# Patient Record
Sex: MALE | Race: WHITE | Employment: OTHER | URBAN - METROPOLITAN AREA
[De-identification: names, ages, dates, MRNs, and addresses within clinical notes are randomized per-mention and may not be internally consistent; named-entity substitution may affect disease eponyms.]

---

## 2017-03-14 ENCOUNTER — HOSPITAL ENCOUNTER (OUTPATIENT)
Dept: CARDIOLOGY | Age: 77
Discharge: HOME | End: 2017-03-14
Payer: MEDICARE

## 2017-03-14 PROCEDURE — 93296 REM INTERROG EVL PM/IDS: CPT

## 2017-06-13 ENCOUNTER — HOSPITAL ENCOUNTER (OUTPATIENT)
Dept: CARDIOLOGY | Age: 77
Discharge: HOME | End: 2017-06-13
Payer: MEDICARE

## 2017-06-13 PROCEDURE — 93283 PRGRMG EVAL IMPLANTABLE DFB: CPT

## 2017-09-12 ENCOUNTER — HOSPITAL ENCOUNTER (OUTPATIENT)
Dept: CARDIOLOGY | Age: 77
Discharge: HOME | End: 2017-09-12
Payer: MEDICARE

## 2017-09-12 PROCEDURE — 93296 REM INTERROG EVL PM/IDS: CPT

## 2018-04-05 ENCOUNTER — HOSPITAL ENCOUNTER (OUTPATIENT)
Dept: CARDIOLOGY | Age: 78
Discharge: HOME | End: 2018-04-05
Payer: MEDICARE

## 2018-04-05 PROCEDURE — 93283 PRGRMG EVAL IMPLANTABLE DFB: CPT

## 2018-07-06 ENCOUNTER — HOSPITAL ENCOUNTER (OUTPATIENT)
Dept: CARDIOLOGY | Age: 78
Discharge: HOME | End: 2018-07-06
Payer: MEDICARE

## 2018-07-06 PROCEDURE — 93296 REM INTERROG EVL PM/IDS: CPT

## 2018-07-09 ENCOUNTER — HOSPITAL ENCOUNTER (OUTPATIENT)
Dept: CT IMAGING | Age: 78
Discharge: HOME | End: 2018-07-11
Payer: MEDICARE

## 2018-07-09 ENCOUNTER — HOSPITAL ENCOUNTER (OUTPATIENT)
Dept: GENERAL RADIOLOGY | Age: 78
Discharge: HOME | End: 2018-07-09
Attending: NEUROLOGICAL SURGERY | Admitting: NEUROLOGICAL SURGERY
Payer: MEDICARE

## 2018-07-09 VITALS
SYSTOLIC BLOOD PRESSURE: 139 MMHG | OXYGEN SATURATION: 91 % | TEMPERATURE: 97.7 F | RESPIRATION RATE: 18 BRPM | DIASTOLIC BLOOD PRESSURE: 86 MMHG | HEART RATE: 54 BPM

## 2018-07-09 DIAGNOSIS — M51.37 DEGENERATION OF LUMBAR OR LUMBOSACRAL INTERVERTEBRAL DISC: ICD-10-CM

## 2018-07-09 DIAGNOSIS — M48.062 SPINAL STENOSIS OF LUMBAR REGION WITH NEUROGENIC CLAUDICATION: ICD-10-CM

## 2018-07-09 DIAGNOSIS — M48.061 SPINAL STENOSIS, LUMBAR REGION, WITHOUT NEUROGENIC CLAUDICATION: ICD-10-CM

## 2018-07-09 DIAGNOSIS — M47.817 LUMBOSACRAL SPONDYLOSIS WITHOUT MYELOPATHY: ICD-10-CM

## 2018-07-09 PROCEDURE — 6370000000 HC RX 637 (ALT 250 FOR IP): Performed by: NEUROLOGICAL SURGERY

## 2018-07-09 PROCEDURE — 6360000004 HC RX CONTRAST MEDICATION: Performed by: NEUROLOGICAL SURGERY

## 2018-07-09 PROCEDURE — 72131 CT LUMBAR SPINE W/O DYE: CPT

## 2018-07-09 PROCEDURE — 72265 MYELOGRAPHY L-S SPINE: CPT

## 2018-07-09 RX ORDER — TRAMADOL HYDROCHLORIDE 50 MG/1
50 TABLET ORAL EVERY 6 HOURS PRN
Status: DISCONTINUED | OUTPATIENT
Start: 2018-07-09 | End: 2018-07-09 | Stop reason: HOSPADM

## 2018-07-09 RX ORDER — ACETAMINOPHEN 325 MG/1
650 TABLET ORAL EVERY 4 HOURS PRN
Status: DISCONTINUED | OUTPATIENT
Start: 2018-07-09 | End: 2018-07-09 | Stop reason: HOSPADM

## 2018-07-09 RX ADMIN — TRAMADOL HYDROCHLORIDE 50 MG: 50 TABLET, FILM COATED ORAL at 17:09

## 2018-07-09 RX ADMIN — IOPAMIDOL 20 ML: 408 INJECTION, SOLUTION INTRATHECAL at 14:25

## 2018-07-09 ASSESSMENT — PAIN SCALES - GENERAL: PAINLEVEL_OUTOF10: 4

## 2018-07-09 NOTE — FLOWSHEET NOTE
1500- pt arrived to unit, pt assessment complete, pt alert and oriented x 4, pain 5/10, will address pain, denies nausea and vomiting, oriented to room and call system, call light within reach, bed in lowest position, bed alarm engaged, will continue to monitor. 0- spoke with dr Iva Herrera regarding no orders, said to continue with everything at discharge at home, just rest and eat here. 1630- spoke with dr Iva Herrera regarding patients pain, no orders in, orders being placed per dr Iva Herrera. 8793- pt given discharge instructions, pt verbalizes understanding, awaiting wheelchair. 6894- pt discharged via wheelchair home.

## 2018-07-17 PROBLEM — M48.062 LUMBAR STENOSIS WITH NEUROGENIC CLAUDICATION: Status: ACTIVE | Noted: 2018-07-17

## 2018-08-10 ENCOUNTER — HOSPITAL ENCOUNTER (OUTPATIENT)
Dept: PREADMISSION TESTING | Age: 78
Discharge: HOME | End: 2018-08-14
Payer: MEDICARE

## 2018-08-10 VITALS
DIASTOLIC BLOOD PRESSURE: 68 MMHG | WEIGHT: 210.4 LBS | SYSTOLIC BLOOD PRESSURE: 120 MMHG | BODY MASS INDEX: 33.82 KG/M2 | RESPIRATION RATE: 16 BRPM | HEART RATE: 54 BPM | HEIGHT: 66 IN | OXYGEN SATURATION: 95 % | TEMPERATURE: 98.4 F

## 2018-08-10 PROBLEM — M48.061 LUMBAR STENOSIS: Status: ACTIVE | Noted: 2018-08-10

## 2018-08-10 LAB
ANION GAP SERPL CALCULATED.3IONS-SCNC: 17 MEQ/L (ref 7–13)
APTT: 25.7 SEC (ref 21.6–35.4)
BILIRUBIN URINE: NEGATIVE
BLOOD, URINE: NEGATIVE
BUN BLDV-MCNC: 29 MG/DL (ref 8–23)
CALCIUM SERPL-MCNC: 9.9 MG/DL (ref 8.6–10.2)
CHLORIDE BLD-SCNC: 99 MEQ/L (ref 98–107)
CLARITY: CLEAR
CO2: 26 MEQ/L (ref 22–29)
COLOR: YELLOW
CREAT SERPL-MCNC: 0.96 MG/DL (ref 0.7–1.2)
GFR AFRICAN AMERICAN: >60
GFR NON-AFRICAN AMERICAN: >60
GLUCOSE BLD-MCNC: 85 MG/DL (ref 74–109)
GLUCOSE URINE: NEGATIVE MG/DL
HCT VFR BLD CALC: 47.8 % (ref 42–52)
HEMOGLOBIN: 16 G/DL (ref 14–18)
INR BLD: 1.1
KETONES, URINE: NEGATIVE MG/DL
LEUKOCYTE ESTERASE, URINE: NEGATIVE
MCH RBC QN AUTO: 31.9 PG (ref 27–31.3)
MCHC RBC AUTO-ENTMCNC: 33.5 % (ref 33–37)
MCV RBC AUTO: 95.4 FL (ref 80–100)
NITRITE, URINE: NEGATIVE
PDW BLD-RTO: 14 % (ref 11.5–14.5)
PH UA: 5 (ref 5–9)
PLATELET # BLD: 165 K/UL (ref 130–400)
POTASSIUM SERPL-SCNC: 4.8 MEQ/L (ref 3.5–5.1)
PROTEIN UA: NEGATIVE MG/DL
PROTHROMBIN TIME: 11.5 SEC (ref 9.6–12.3)
RBC # BLD: 5.01 M/UL (ref 4.7–6.1)
SODIUM BLD-SCNC: 142 MEQ/L (ref 132–144)
SPECIFIC GRAVITY UA: 1.02 (ref 1–1.03)
URINE REFLEX TO CULTURE: NORMAL
UROBILINOGEN, URINE: 1 E.U./DL
WBC # BLD: 10.7 K/UL (ref 4.8–10.8)

## 2018-08-10 PROCEDURE — 85027 COMPLETE CBC AUTOMATED: CPT

## 2018-08-10 PROCEDURE — 85610 PROTHROMBIN TIME: CPT

## 2018-08-10 PROCEDURE — 86900 BLOOD TYPING SEROLOGIC ABO: CPT

## 2018-08-10 PROCEDURE — 86850 RBC ANTIBODY SCREEN: CPT

## 2018-08-10 PROCEDURE — 80048 BASIC METABOLIC PNL TOTAL CA: CPT

## 2018-08-10 PROCEDURE — 81003 URINALYSIS AUTO W/O SCOPE: CPT

## 2018-08-10 PROCEDURE — 85730 THROMBOPLASTIN TIME PARTIAL: CPT

## 2018-08-10 PROCEDURE — 86901 BLOOD TYPING SEROLOGIC RH(D): CPT

## 2018-08-10 RX ORDER — LIDOCAINE HYDROCHLORIDE 10 MG/ML
1 INJECTION, SOLUTION EPIDURAL; INFILTRATION; INTRACAUDAL; PERINEURAL
Status: CANCELLED | OUTPATIENT
Start: 2018-08-10 | End: 2018-08-10

## 2018-08-10 RX ORDER — SODIUM CHLORIDE 0.9 % (FLUSH) 0.9 %
10 SYRINGE (ML) INJECTION PRN
Status: CANCELLED | OUTPATIENT
Start: 2018-08-10

## 2018-08-10 RX ORDER — SODIUM CHLORIDE, SODIUM LACTATE, POTASSIUM CHLORIDE, CALCIUM CHLORIDE 600; 310; 30; 20 MG/100ML; MG/100ML; MG/100ML; MG/100ML
INJECTION, SOLUTION INTRAVENOUS CONTINUOUS
Status: CANCELLED | OUTPATIENT
Start: 2018-08-10

## 2018-08-10 RX ORDER — SODIUM CHLORIDE, SODIUM LACTATE, POTASSIUM CHLORIDE, CALCIUM CHLORIDE 600; 310; 30; 20 MG/100ML; MG/100ML; MG/100ML; MG/100ML
INJECTION, SOLUTION INTRAVENOUS ONCE
Status: CANCELLED | OUTPATIENT
Start: 2018-08-15

## 2018-08-10 RX ORDER — ACETAMINOPHEN 500 MG
1500 TABLET ORAL 3 TIMES DAILY
Status: ON HOLD | COMMUNITY
End: 2021-12-18 | Stop reason: HOSPADM

## 2018-08-10 RX ORDER — SODIUM CHLORIDE 0.9 % (FLUSH) 0.9 %
10 SYRINGE (ML) INJECTION EVERY 12 HOURS SCHEDULED
Status: CANCELLED | OUTPATIENT
Start: 2018-08-10

## 2018-08-10 ASSESSMENT — ENCOUNTER SYMPTOMS
WHEEZING: 0
VOMITING: 0
BLURRED VISION: 0
DOUBLE VISION: 0
BACK PAIN: 1
SPUTUM PRODUCTION: 0
SORE THROAT: 0
SINUS PAIN: 0
CONSTIPATION: 0
DIARRHEA: 0
COUGH: 0
ABDOMINAL PAIN: 0
EYE DISCHARGE: 0
HEARTBURN: 0
SHORTNESS OF BREATH: 0
PHOTOPHOBIA: 0
NAUSEA: 0

## 2018-08-10 NOTE — H&P
Nurse Practitioner History and Physical      CHIEF COMPLAINT:  Chronic left hip and back pain    HISTORY OF PRESENT ILLNESS:      The patient is a 77 y.o. male with significant past medical history of degeneration of lumbar, lumbosacral spondylosis without myelopathy, and spinal stenosis in lumbar region. Presents for decompressive lumbar laminectomy lumbar 2-3, 4-5 left sided approach.      Past Medical History:        Diagnosis Date    AAA (abdominal aortic aneurysm) ([address]     monitoring 4.5cm    Arthritis     lower spine    Atrial fibrillation ([address]     CAD (coronary artery disease)     stents x 2 cardiac     Chronic back pain     Heart attack ([address] 1984    Hyperlipidemia     on meds >20yrs    Hypertension     on meds > 20yrs    Knee injury     AS A CHILD-RIGHT     Past Surgical History:    Past Surgical History:   Procedure Laterality Date    CARDIAC DEFIBRILLATOR PLACEMENT  2008    CARDIAC DEFIBRILLATOR PLACEMENT      CARDIAC DEFIBRILLATOR PLACEMENT      COLONOSCOPY  2012    CORONARY ANGIOPLASTY WITH STENT PLACEMENT      5 stents    EYE SURGERY  2013    bilateral cataracts    JOINT REPLACEMENT  2013    RIGHT HIP    JOINT REPLACEMENT Right 2012    hip    TONSILLECTOMY  1945         Medications Prior to Admission:    Current Outpatient Prescriptions   Medication Sig Dispense Refill    Omega-3 Fatty Acids (FISH OIL PO) Take 2 capsules by mouth 2 times daily      GLUCOSAMINE-CHONDROITIN PO Take 1 tablet by mouth 2 times daily      TURMERIC PO Take 1 capsule by mouth 2 times daily      acetaminophen (TYLENOL) 500 MG tablet Take 1,500 mg by mouth 3 times daily      aspirin (ECOTRIN LOW STRENGTH) 81 MG EC tablet Take 81 mg by mouth      sotalol (BETAPACE) 80 MG tablet Take 0.5 tablets by mouth 2 times daily 60 tablet 3    carvedilol (COREG) 6.25 MG tablet Take 1 tablet by mouth 2 times daily (with meals) 60 tablet 3    atorvastatin (LIPITOR) 40 MG tablet daily       hydrochlorothiazide

## 2018-08-11 LAB
ABO/RH: NORMAL
ANTIBODY SCREEN: NORMAL

## 2018-08-15 ENCOUNTER — HOSPITAL ENCOUNTER (OUTPATIENT)
Dept: GENERAL RADIOLOGY | Age: 78
Setting detail: OUTPATIENT SURGERY
Discharge: HOME | End: 2018-08-17
Attending: NEUROLOGICAL SURGERY
Payer: MEDICARE

## 2018-08-15 ENCOUNTER — HOSPITAL ENCOUNTER (OUTPATIENT)
Age: 78
Discharge: HOME | End: 2018-08-16
Attending: NEUROLOGICAL SURGERY | Admitting: NEUROLOGICAL SURGERY
Payer: MEDICARE

## 2018-08-15 ENCOUNTER — ANESTHESIA (OUTPATIENT)
Dept: OPERATING ROOM | Age: 78
End: 2018-08-15
Payer: MEDICARE

## 2018-08-15 ENCOUNTER — ANESTHESIA EVENT (OUTPATIENT)
Dept: OPERATING ROOM | Age: 78
End: 2018-08-15
Payer: MEDICARE

## 2018-08-15 VITALS — DIASTOLIC BLOOD PRESSURE: 76 MMHG | SYSTOLIC BLOOD PRESSURE: 136 MMHG | TEMPERATURE: 97.9 F | OXYGEN SATURATION: 95 %

## 2018-08-15 DIAGNOSIS — M48.062 SPINAL STENOSIS OF LUMBAR REGION WITH NEUROGENIC CLAUDICATION: ICD-10-CM

## 2018-08-15 DIAGNOSIS — M51.37 DEGENERATION OF LUMBAR OR LUMBOSACRAL INTERVERTEBRAL DISC: Primary | ICD-10-CM

## 2018-08-15 DIAGNOSIS — R52 PAIN: ICD-10-CM

## 2018-08-15 PROCEDURE — 2580000003 HC RX 258: Performed by: NEUROLOGICAL SURGERY

## 2018-08-15 PROCEDURE — 72020 X-RAY EXAM OF SPINE 1 VIEW: CPT

## 2018-08-15 PROCEDURE — 6360000002 HC RX W HCPCS: Performed by: NEUROLOGICAL SURGERY

## 2018-08-15 PROCEDURE — 2720000010 HC SURG SUPPLY STERILE: Performed by: NEUROLOGICAL SURGERY

## 2018-08-15 PROCEDURE — 7100000001 HC PACU RECOVERY - ADDTL 15 MIN: Performed by: NEUROLOGICAL SURGERY

## 2018-08-15 PROCEDURE — 2580000003 HC RX 258: Performed by: NURSE PRACTITIONER

## 2018-08-15 PROCEDURE — 3600000014 HC SURGERY LEVEL 4 ADDTL 15MIN: Performed by: NEUROLOGICAL SURGERY

## 2018-08-15 PROCEDURE — 6370000000 HC RX 637 (ALT 250 FOR IP): Performed by: NEUROLOGICAL SURGERY

## 2018-08-15 PROCEDURE — 2500000003 HC RX 250 WO HCPCS: Performed by: NEUROLOGICAL SURGERY

## 2018-08-15 PROCEDURE — 6360000002 HC RX W HCPCS: Performed by: ANESTHESIOLOGY

## 2018-08-15 PROCEDURE — 7100000000 HC PACU RECOVERY - FIRST 15 MIN: Performed by: NEUROLOGICAL SURGERY

## 2018-08-15 PROCEDURE — 2700000000 HC OXYGEN THERAPY PER DAY

## 2018-08-15 PROCEDURE — 6360000002 HC RX W HCPCS: Performed by: NURSE PRACTITIONER

## 2018-08-15 PROCEDURE — 6360000002 HC RX W HCPCS: Performed by: NURSE ANESTHETIST, CERTIFIED REGISTERED

## 2018-08-15 PROCEDURE — 3700000001 HC ADD 15 MINUTES (ANESTHESIA): Performed by: NEUROLOGICAL SURGERY

## 2018-08-15 PROCEDURE — A4648 IMPLANTABLE TISSUE MARKER: HCPCS | Performed by: NEUROLOGICAL SURGERY

## 2018-08-15 PROCEDURE — 3600000004 HC SURGERY LEVEL 4 BASE: Performed by: NEUROLOGICAL SURGERY

## 2018-08-15 PROCEDURE — 2500000003 HC RX 250 WO HCPCS: Performed by: ANESTHESIOLOGY

## 2018-08-15 PROCEDURE — 2709999900 HC NON-CHARGEABLE SUPPLY: Performed by: NEUROLOGICAL SURGERY

## 2018-08-15 PROCEDURE — 3700000000 HC ANESTHESIA ATTENDED CARE: Performed by: NEUROLOGICAL SURGERY

## 2018-08-15 PROCEDURE — 2500000003 HC RX 250 WO HCPCS: Performed by: NURSE ANESTHETIST, CERTIFIED REGISTERED

## 2018-08-15 RX ORDER — SODIUM CHLORIDE, SODIUM LACTATE, POTASSIUM CHLORIDE, CALCIUM CHLORIDE 600; 310; 30; 20 MG/100ML; MG/100ML; MG/100ML; MG/100ML
INJECTION, SOLUTION INTRAVENOUS ONCE
Status: COMPLETED | OUTPATIENT
Start: 2018-08-15 | End: 2018-08-15

## 2018-08-15 RX ORDER — KETOROLAC TROMETHAMINE 15 MG/ML
15 INJECTION, SOLUTION INTRAMUSCULAR; INTRAVENOUS EVERY 6 HOURS
Status: DISCONTINUED | OUTPATIENT
Start: 2018-08-15 | End: 2018-08-16 | Stop reason: HOSPADM

## 2018-08-15 RX ORDER — LISINOPRIL 20 MG/1
40 TABLET ORAL DAILY
Status: DISCONTINUED | OUTPATIENT
Start: 2018-08-15 | End: 2018-08-16 | Stop reason: HOSPADM

## 2018-08-15 RX ORDER — LIDOCAINE HYDROCHLORIDE 10 MG/ML
1 INJECTION, SOLUTION EPIDURAL; INFILTRATION; INTRACAUDAL; PERINEURAL
Status: DISCONTINUED | OUTPATIENT
Start: 2018-08-15 | End: 2018-08-15 | Stop reason: HOSPADM

## 2018-08-15 RX ORDER — METOCLOPRAMIDE HYDROCHLORIDE 5 MG/ML
10 INJECTION INTRAMUSCULAR; INTRAVENOUS
Status: DISCONTINUED | OUTPATIENT
Start: 2018-08-15 | End: 2018-08-15 | Stop reason: HOSPADM

## 2018-08-15 RX ORDER — MORPHINE SULFATE 1 MG/ML
INJECTION, SOLUTION EPIDURAL; INTRATHECAL; INTRAVENOUS PRN
Status: DISCONTINUED | OUTPATIENT
Start: 2018-08-15 | End: 2018-08-15 | Stop reason: HOSPADM

## 2018-08-15 RX ORDER — ASPIRIN 81 MG/1
81 TABLET ORAL ONCE
Status: COMPLETED | OUTPATIENT
Start: 2018-08-15 | End: 2018-08-15

## 2018-08-15 RX ORDER — HYDROCODONE BITARTRATE AND ACETAMINOPHEN 5; 325 MG/1; MG/1
2 TABLET ORAL PRN
Status: DISCONTINUED | OUTPATIENT
Start: 2018-08-15 | End: 2018-08-15 | Stop reason: HOSPADM

## 2018-08-15 RX ORDER — MAGNESIUM HYDROXIDE 1200 MG/15ML
LIQUID ORAL CONTINUOUS PRN
Status: COMPLETED | OUTPATIENT
Start: 2018-08-15 | End: 2018-08-15

## 2018-08-15 RX ORDER — DEXAMETHASONE SODIUM PHOSPHATE 4 MG/ML
INJECTION, SOLUTION INTRA-ARTICULAR; INTRALESIONAL; INTRAMUSCULAR; INTRAVENOUS; SOFT TISSUE PRN
Status: DISCONTINUED | OUTPATIENT
Start: 2018-08-15 | End: 2018-08-15 | Stop reason: SDUPTHER

## 2018-08-15 RX ORDER — MEPERIDINE HYDROCHLORIDE 25 MG/ML
12.5 INJECTION INTRAMUSCULAR; INTRAVENOUS; SUBCUTANEOUS EVERY 5 MIN PRN
Status: DISCONTINUED | OUTPATIENT
Start: 2018-08-15 | End: 2018-08-15 | Stop reason: HOSPADM

## 2018-08-15 RX ORDER — ACETAMINOPHEN 650 MG/1
650 SUPPOSITORY RECTAL EVERY 4 HOURS PRN
Status: DISCONTINUED | OUTPATIENT
Start: 2018-08-15 | End: 2018-08-16 | Stop reason: HOSPADM

## 2018-08-15 RX ORDER — SODIUM CHLORIDE 0.9 % (FLUSH) 0.9 %
10 SYRINGE (ML) INJECTION PRN
Status: DISCONTINUED | OUTPATIENT
Start: 2018-08-15 | End: 2018-08-15 | Stop reason: HOSPADM

## 2018-08-15 RX ORDER — SODIUM CHLORIDE 0.9 % (FLUSH) 0.9 %
10 SYRINGE (ML) INJECTION PRN
Status: DISCONTINUED | OUTPATIENT
Start: 2018-08-15 | End: 2018-08-16 | Stop reason: HOSPADM

## 2018-08-15 RX ORDER — HYDROCODONE BITARTRATE AND ACETAMINOPHEN 5; 325 MG/1; MG/1
2 TABLET ORAL EVERY 4 HOURS PRN
Status: DISCONTINUED | OUTPATIENT
Start: 2018-08-15 | End: 2018-08-16 | Stop reason: HOSPADM

## 2018-08-15 RX ORDER — ACETAMINOPHEN 325 MG/1
650 TABLET ORAL EVERY 4 HOURS PRN
Status: DISCONTINUED | OUTPATIENT
Start: 2018-08-15 | End: 2018-08-16 | Stop reason: HOSPADM

## 2018-08-15 RX ORDER — SODIUM CHLORIDE 0.9 % (FLUSH) 0.9 %
10 SYRINGE (ML) INJECTION EVERY 12 HOURS SCHEDULED
Status: DISCONTINUED | OUTPATIENT
Start: 2018-08-15 | End: 2018-08-16 | Stop reason: HOSPADM

## 2018-08-15 RX ORDER — DOCUSATE SODIUM 100 MG/1
100 CAPSULE, LIQUID FILLED ORAL 2 TIMES DAILY
Status: DISCONTINUED | OUTPATIENT
Start: 2018-08-15 | End: 2018-08-16 | Stop reason: HOSPADM

## 2018-08-15 RX ORDER — CYCLOBENZAPRINE HCL 10 MG
10 TABLET ORAL 3 TIMES DAILY PRN
Status: DISCONTINUED | OUTPATIENT
Start: 2018-08-15 | End: 2018-08-16 | Stop reason: HOSPADM

## 2018-08-15 RX ORDER — HYDROCODONE BITARTRATE AND ACETAMINOPHEN 5; 325 MG/1; MG/1
1 TABLET ORAL PRN
Status: DISCONTINUED | OUTPATIENT
Start: 2018-08-15 | End: 2018-08-15 | Stop reason: HOSPADM

## 2018-08-15 RX ORDER — ONDANSETRON 2 MG/ML
4 INJECTION INTRAMUSCULAR; INTRAVENOUS EVERY 6 HOURS PRN
Status: DISCONTINUED | OUTPATIENT
Start: 2018-08-15 | End: 2018-08-16 | Stop reason: HOSPADM

## 2018-08-15 RX ORDER — EPHEDRINE SULFATE/0.9% NACL/PF 50 MG/5 ML
SYRINGE (ML) INTRAVENOUS PRN
Status: DISCONTINUED | OUTPATIENT
Start: 2018-08-15 | End: 2018-08-15 | Stop reason: SDUPTHER

## 2018-08-15 RX ORDER — FENTANYL CITRATE 50 UG/ML
50 INJECTION, SOLUTION INTRAMUSCULAR; INTRAVENOUS EVERY 10 MIN PRN
Status: DISCONTINUED | OUTPATIENT
Start: 2018-08-15 | End: 2018-08-15 | Stop reason: HOSPADM

## 2018-08-15 RX ORDER — FENTANYL 25 UG/H
1 PATCH TRANSDERMAL
Status: DISCONTINUED | OUTPATIENT
Start: 2018-08-15 | End: 2018-08-16 | Stop reason: HOSPADM

## 2018-08-15 RX ORDER — CARVEDILOL 6.25 MG/1
6.25 TABLET ORAL 2 TIMES DAILY WITH MEALS
Status: DISCONTINUED | OUTPATIENT
Start: 2018-08-15 | End: 2018-08-16 | Stop reason: HOSPADM

## 2018-08-15 RX ORDER — HYDROCODONE BITARTRATE AND ACETAMINOPHEN 5; 325 MG/1; MG/1
1 TABLET ORAL EVERY 4 HOURS PRN
Status: DISCONTINUED | OUTPATIENT
Start: 2018-08-15 | End: 2018-08-16 | Stop reason: HOSPADM

## 2018-08-15 RX ORDER — FENTANYL CITRATE 50 UG/ML
INJECTION, SOLUTION INTRAMUSCULAR; INTRAVENOUS PRN
Status: DISCONTINUED | OUTPATIENT
Start: 2018-08-15 | End: 2018-08-15 | Stop reason: SDUPTHER

## 2018-08-15 RX ORDER — SOTALOL HYDROCHLORIDE 80 MG/1
40 TABLET ORAL 2 TIMES DAILY
Status: DISCONTINUED | OUTPATIENT
Start: 2018-08-15 | End: 2018-08-16 | Stop reason: HOSPADM

## 2018-08-15 RX ORDER — DIPHENHYDRAMINE HYDROCHLORIDE 50 MG/ML
12.5 INJECTION INTRAMUSCULAR; INTRAVENOUS
Status: DISCONTINUED | OUTPATIENT
Start: 2018-08-15 | End: 2018-08-15 | Stop reason: HOSPADM

## 2018-08-15 RX ORDER — METHYLPREDNISOLONE SODIUM SUCCINATE 125 MG/2ML
INJECTION, POWDER, LYOPHILIZED, FOR SOLUTION INTRAMUSCULAR; INTRAVENOUS PRN
Status: DISCONTINUED | OUTPATIENT
Start: 2018-08-15 | End: 2018-08-15 | Stop reason: HOSPADM

## 2018-08-15 RX ORDER — ACETAMINOPHEN 500 MG
1500 TABLET ORAL 3 TIMES DAILY
Status: DISCONTINUED | OUTPATIENT
Start: 2018-08-15 | End: 2018-08-16 | Stop reason: HOSPADM

## 2018-08-15 RX ORDER — ONDANSETRON 2 MG/ML
INJECTION INTRAMUSCULAR; INTRAVENOUS PRN
Status: DISCONTINUED | OUTPATIENT
Start: 2018-08-15 | End: 2018-08-15 | Stop reason: SDUPTHER

## 2018-08-15 RX ORDER — PROPOFOL 10 MG/ML
INJECTION, EMULSION INTRAVENOUS PRN
Status: DISCONTINUED | OUTPATIENT
Start: 2018-08-15 | End: 2018-08-15 | Stop reason: SDUPTHER

## 2018-08-15 RX ORDER — SODIUM CHLORIDE 450 MG/100ML
INJECTION, SOLUTION INTRAVENOUS CONTINUOUS
Status: DISCONTINUED | OUTPATIENT
Start: 2018-08-15 | End: 2018-08-16 | Stop reason: HOSPADM

## 2018-08-15 RX ORDER — SODIUM CHLORIDE 0.9 % (FLUSH) 0.9 %
10 SYRINGE (ML) INJECTION EVERY 12 HOURS SCHEDULED
Status: DISCONTINUED | OUTPATIENT
Start: 2018-08-15 | End: 2018-08-15 | Stop reason: HOSPADM

## 2018-08-15 RX ORDER — SODIUM CHLORIDE, SODIUM LACTATE, POTASSIUM CHLORIDE, CALCIUM CHLORIDE 600; 310; 30; 20 MG/100ML; MG/100ML; MG/100ML; MG/100ML
INJECTION, SOLUTION INTRAVENOUS CONTINUOUS
Status: DISCONTINUED | OUTPATIENT
Start: 2018-08-15 | End: 2018-08-15

## 2018-08-15 RX ORDER — ONDANSETRON 2 MG/ML
4 INJECTION INTRAMUSCULAR; INTRAVENOUS
Status: DISCONTINUED | OUTPATIENT
Start: 2018-08-15 | End: 2018-08-15 | Stop reason: HOSPADM

## 2018-08-15 RX ORDER — ATORVASTATIN CALCIUM 40 MG/1
40 TABLET, FILM COATED ORAL DAILY
Status: DISCONTINUED | OUTPATIENT
Start: 2018-08-15 | End: 2018-08-16 | Stop reason: HOSPADM

## 2018-08-15 RX ORDER — HYDROCODONE BITARTRATE AND ACETAMINOPHEN 5; 325 MG/1; MG/1
1 TABLET ORAL EVERY 6 HOURS PRN
Qty: 28 TABLET | Refills: 0 | Status: SHIPPED | OUTPATIENT
Start: 2018-08-15 | End: 2018-08-22

## 2018-08-15 RX ORDER — ROCURONIUM BROMIDE 10 MG/ML
INJECTION, SOLUTION INTRAVENOUS PRN
Status: DISCONTINUED | OUTPATIENT
Start: 2018-08-15 | End: 2018-08-15 | Stop reason: SDUPTHER

## 2018-08-15 RX ORDER — BACITRACIN 500 [USP'U]/G
OINTMENT OPHTHALMIC 3 TIMES DAILY PRN
Status: DISCONTINUED | OUTPATIENT
Start: 2018-08-15 | End: 2018-08-16 | Stop reason: HOSPADM

## 2018-08-15 RX ORDER — MORPHINE SULFATE 2 MG/ML
2 INJECTION, SOLUTION INTRAMUSCULAR; INTRAVENOUS
Status: DISCONTINUED | OUTPATIENT
Start: 2018-08-15 | End: 2018-08-16 | Stop reason: HOSPADM

## 2018-08-15 RX ORDER — LIDOCAINE HYDROCHLORIDE AND EPINEPHRINE 10; 10 MG/ML; UG/ML
INJECTION, SOLUTION INFILTRATION; PERINEURAL PRN
Status: DISCONTINUED | OUTPATIENT
Start: 2018-08-15 | End: 2018-08-15 | Stop reason: HOSPADM

## 2018-08-15 RX ADMIN — Medication 10 MG: at 12:45

## 2018-08-15 RX ADMIN — SODIUM CHLORIDE, POTASSIUM CHLORIDE, SODIUM LACTATE AND CALCIUM CHLORIDE: 600; 310; 30; 20 INJECTION, SOLUTION INTRAVENOUS at 13:06

## 2018-08-15 RX ADMIN — ATORVASTATIN CALCIUM 40 MG: 40 TABLET, FILM COATED ORAL at 21:32

## 2018-08-15 RX ADMIN — SUGAMMADEX 200 MG: 100 INJECTION, SOLUTION INTRAVENOUS at 13:52

## 2018-08-15 RX ADMIN — PROPOFOL 250 MG: 10 INJECTION, EMULSION INTRAVENOUS at 12:10

## 2018-08-15 RX ADMIN — Medication 10 MG: at 13:00

## 2018-08-15 RX ADMIN — CEFAZOLIN SODIUM 2 G: 1 INJECTION, SOLUTION INTRAVENOUS at 21:32

## 2018-08-15 RX ADMIN — SODIUM CHLORIDE, POTASSIUM CHLORIDE, SODIUM LACTATE AND CALCIUM CHLORIDE: 600; 310; 30; 20 INJECTION, SOLUTION INTRAVENOUS at 10:30

## 2018-08-15 RX ADMIN — CARVEDILOL 6.25 MG: 6.25 TABLET, FILM COATED ORAL at 21:33

## 2018-08-15 RX ADMIN — DOCUSATE SODIUM 100 MG: 100 CAPSULE, LIQUID FILLED ORAL at 21:33

## 2018-08-15 RX ADMIN — ROCURONIUM BROMIDE 70 MG: 10 INJECTION INTRAVENOUS at 12:11

## 2018-08-15 RX ADMIN — ONDANSETRON 4 MG: 2 INJECTION INTRAMUSCULAR; INTRAVENOUS at 13:40

## 2018-08-15 RX ADMIN — SODIUM CHLORIDE, POTASSIUM CHLORIDE, SODIUM LACTATE AND CALCIUM CHLORIDE 125 ML/HR: 600; 310; 30; 20 INJECTION, SOLUTION INTRAVENOUS at 10:32

## 2018-08-15 RX ADMIN — SODIUM CHLORIDE 75 ML/HR: 4.5 INJECTION, SOLUTION INTRAVENOUS at 18:42

## 2018-08-15 RX ADMIN — HYDROCODONE BITARTRATE AND ACETAMINOPHEN 1 TABLET: 5; 325 TABLET ORAL at 18:40

## 2018-08-15 RX ADMIN — LISINOPRIL 40 MG: 20 TABLET ORAL at 21:33

## 2018-08-15 RX ADMIN — FENTANYL CITRATE 100 MCG: 50 INJECTION, SOLUTION INTRAMUSCULAR; INTRAVENOUS at 12:11

## 2018-08-15 RX ADMIN — ASPIRIN 81 MG: 81 TABLET, COATED ORAL at 21:32

## 2018-08-15 RX ADMIN — Medication 10 MG: at 12:25

## 2018-08-15 RX ADMIN — SOTALOL HYDROCHLORIDE 40 MG: 80 TABLET ORAL at 21:32

## 2018-08-15 RX ADMIN — KETOROLAC TROMETHAMINE 15 MG: 15 INJECTION, SOLUTION INTRAMUSCULAR; INTRAVENOUS at 18:40

## 2018-08-15 RX ADMIN — CEFAZOLIN SODIUM 2 G: 1 INJECTION, SOLUTION INTRAVENOUS at 12:24

## 2018-08-15 RX ADMIN — DEXAMETHASONE SODIUM PHOSPHATE 8 MG: 4 INJECTION, SOLUTION INTRAMUSCULAR; INTRAVENOUS at 13:15

## 2018-08-15 ASSESSMENT — PULMONARY FUNCTION TESTS
PIF_VALUE: 22
PIF_VALUE: 24
PIF_VALUE: 16
PIF_VALUE: 23
PIF_VALUE: 16
PIF_VALUE: 22
PIF_VALUE: 23
PIF_VALUE: 23
PIF_VALUE: 1
PIF_VALUE: 22
PIF_VALUE: 1
PIF_VALUE: 25
PIF_VALUE: 22
PIF_VALUE: 1
PIF_VALUE: 1
PIF_VALUE: 16
PIF_VALUE: 24
PIF_VALUE: 23
PIF_VALUE: 24
PIF_VALUE: 24
PIF_VALUE: 23
PIF_VALUE: 15
PIF_VALUE: 23
PIF_VALUE: 24
PIF_VALUE: 22
PIF_VALUE: 22
PIF_VALUE: 14
PIF_VALUE: 22
PIF_VALUE: 22
PIF_VALUE: 14
PIF_VALUE: 23
PIF_VALUE: 22
PIF_VALUE: 1
PIF_VALUE: 23
PIF_VALUE: 23
PIF_VALUE: 8
PIF_VALUE: 22
PIF_VALUE: 22
PIF_VALUE: 23
PIF_VALUE: 24
PIF_VALUE: 25
PIF_VALUE: 23
PIF_VALUE: 24
PIF_VALUE: 15
PIF_VALUE: 23
PIF_VALUE: 23
PIF_VALUE: 24
PIF_VALUE: 31
PIF_VALUE: 23
PIF_VALUE: 22
PIF_VALUE: 23
PIF_VALUE: 22
PIF_VALUE: 24
PIF_VALUE: 2
PIF_VALUE: 38
PIF_VALUE: 23
PIF_VALUE: 13
PIF_VALUE: 22
PIF_VALUE: 22
PIF_VALUE: 23
PIF_VALUE: 15
PIF_VALUE: 23
PIF_VALUE: 16
PIF_VALUE: 23
PIF_VALUE: 14
PIF_VALUE: 1
PIF_VALUE: 23
PIF_VALUE: 16
PIF_VALUE: 23
PIF_VALUE: 25
PIF_VALUE: 22
PIF_VALUE: 23
PIF_VALUE: 22
PIF_VALUE: 23
PIF_VALUE: 23
PIF_VALUE: 24
PIF_VALUE: 23
PIF_VALUE: 23
PIF_VALUE: 24
PIF_VALUE: 25
PIF_VALUE: 24
PIF_VALUE: 17
PIF_VALUE: 24
PIF_VALUE: 17
PIF_VALUE: 23
PIF_VALUE: 22
PIF_VALUE: 23
PIF_VALUE: 6
PIF_VALUE: 22
PIF_VALUE: 23
PIF_VALUE: 23
PIF_VALUE: 26
PIF_VALUE: 23
PIF_VALUE: 23
PIF_VALUE: 16
PIF_VALUE: 24
PIF_VALUE: 23
PIF_VALUE: 24
PIF_VALUE: 6
PIF_VALUE: 23
PIF_VALUE: 24
PIF_VALUE: 15
PIF_VALUE: 22
PIF_VALUE: 23
PIF_VALUE: 23
PIF_VALUE: 24
PIF_VALUE: 22
PIF_VALUE: 13
PIF_VALUE: 23

## 2018-08-15 ASSESSMENT — PAIN - FUNCTIONAL ASSESSMENT: PAIN_FUNCTIONAL_ASSESSMENT: 0-10

## 2018-08-15 ASSESSMENT — ENCOUNTER SYMPTOMS: SHORTNESS OF BREATH: 1

## 2018-08-15 ASSESSMENT — PAIN SCALES - GENERAL: PAINLEVEL_OUTOF10: 1

## 2018-08-15 ASSESSMENT — COPD QUESTIONNAIRES: CAT_SEVERITY: NO INTERVAL CHANGE

## 2018-08-15 NOTE — H&P (VIEW-ONLY)
adenopathy. Neurological: He is alert and oriented to person, place, and time. Gait normal.   Skin: Skin is warm, dry and intact. No rash noted. Psychiatric: Affect normal.       Assessment:  Patient Active Problem List   Diagnosis    Lumbosacral spondylosis without myelopathy    Degeneration of lumbar or lumbosacral intervertebral disc    [facility]    Hypokalemia    NSVT (nonsustained ventricular tachycardia) ([facility]    AICD (automatic cardioverter/defibrillator) present    CHF (congestive heart failure) ([address]    CAD (coronary artery disease)    HTN (hypertension)    Hyperlipemia    Renal artery stenosis ([facility])    COPD (chronic obstructive pulmonary disease) ([address]    AAA (abdominal aortic aneurysm) ([facility])    Obesity    Prostate CA ([address]    Anemia    Chest pain    Lumbar stenosis with neurogenic claudication    Lumbar stenosis         Plan:  Scheduled for decompressive lumbar laminectomy lumbar 2-3, 4-5 left sided approach on 8/15/2018.          Leeann Babin, TOMASZ - CNP  8/10/2018  5:27 PM

## 2018-08-15 NOTE — PROGRESS NOTES
Received in PACU from OR accompanied by JAUNITA [facility]. Awake answering questions appropriately. Speech clear. Moves times four. Hand  equal and strong bilaterally. Able to flex and extend both feet without difficulty. Visual acuity intact. CYNDY in lumbar area and draining csmall dark red drainage. Denies pain.

## 2018-08-15 NOTE — ANESTHESIA PRE PROCEDURE
GFRAA >60.0 08/10/2018    LABGLOM >60.0 08/10/2018    GLUCOSE 85 08/10/2018    PROT 5.9 10/30/2016    CALCIUM 9.9 08/10/2018    BILITOT 0.6 10/30/2016    ALKPHOS 70 10/30/2016    AST 12 10/30/2016    ALT 16 10/30/2016       POC Tests: No results for input(s): POCGLU, POCNA, POCK, POCCL, POCBUN, POCHEMO, POCHCT in the last 72 hours. Coags:   Lab Results   Component Value Date    PROTIME 11.5 08/10/2018    INR 1.1 08/10/2018    APTT 25.7 08/10/2018       HCG (If Applicable): No results found for: PREGTESTUR, PREGSERUM, HCG, HCGQUANT     ABGs: No results found for: PHART, PO2ART, AMI3MOX, FNB8MZD, BEART, U1UBSEXK     Type & Screen (If Applicable):  No results found for: LABABO, LABRH    Anesthesia Evaluation    Airway: Mallampati: II  TM distance: >3 FB   Neck ROM: limited  Mouth opening: > = 3 FB Dental:    (+) upper dentures and lower dentures      Pulmonary: breath sounds clear to auscultation  (+) COPD: no interval change,  shortness of breath: chronic,                             Cardiovascular:  Exercise tolerance: no interval change,         NYHA Classification: II  ECG reviewed  Rhythm: regular  Rate: normal  Echocardiogram reviewed         Beta Blocker:  Dose within 24 Hrs         Neuro/Psych:               GI/Hepatic/Renal:             Endo/Other:                     Abdominal:       Abdomen: soft. Vascular:                                        Anesthesia Plan      general     ASA 3       Induction: intravenous. MIPS: Postoperative opioids intended. Anesthetic plan and risks discussed with patient and spouse. Use of blood products discussed with whom consented to blood products.      Attending anesthesiologist reviewed and agrees with Pre Eval content              URBANO [address], DO   8/15/2018

## 2018-08-15 NOTE — BRIEF OP NOTE
Brief Postoperative Note  ______________________________________________________________    Patient: Kenia Berman  YOB: 1940  MRN: [id number]  Date of Procedure: 8/15/2018    Pre-Op Diagnosis: SPINAL STENOSIS L2-3 L3-4    Post-Op Diagnosis: Same       Procedure(s):  DECOMPRESSIVE  LUMBAR LAMINECTOMY L2-3 L3-4 RIGHT SIDED APPROACH    Anesthesia: General    Surgeon(s):  Hortencia Sousa MD    Staff:  First Assistant: Delia Borges; Meliton Maddox  Scrub Person First: Carolyn Push     Estimated Blood Loss: 556 (cc    Complications: None    Specimens:   * No specimens in log *    Implants:  * No implants in log *      Drains:   Closed/Suction Drain Back Bulb 7 Khmer (Active)       Urethral Catheter Non-latex 16 fr (Active)       Findings: as expected severe hypertrophy of joints and ligament    Rafi Sun MD  Date: 8/15/2018  Time: 1:57 PM

## 2018-08-16 VITALS
RESPIRATION RATE: 18 BRPM | SYSTOLIC BLOOD PRESSURE: 108 MMHG | OXYGEN SATURATION: 95 % | TEMPERATURE: 97.7 F | DIASTOLIC BLOOD PRESSURE: 60 MMHG | HEART RATE: 62 BPM

## 2018-08-16 PROCEDURE — 2580000003 HC RX 258: Performed by: NEUROLOGICAL SURGERY

## 2018-08-16 PROCEDURE — G8978 MOBILITY CURRENT STATUS: HCPCS

## 2018-08-16 PROCEDURE — G8980 MOBILITY D/C STATUS: HCPCS

## 2018-08-16 PROCEDURE — 97161 PT EVAL LOW COMPLEX 20 MIN: CPT

## 2018-08-16 PROCEDURE — G8979 MOBILITY GOAL STATUS: HCPCS

## 2018-08-16 PROCEDURE — 6360000002 HC RX W HCPCS: Performed by: NEUROLOGICAL SURGERY

## 2018-08-16 PROCEDURE — 2700000000 HC OXYGEN THERAPY PER DAY

## 2018-08-16 PROCEDURE — 6370000000 HC RX 637 (ALT 250 FOR IP): Performed by: NEUROLOGICAL SURGERY

## 2018-08-16 RX ADMIN — KETOROLAC TROMETHAMINE 15 MG: 15 INJECTION, SOLUTION INTRAMUSCULAR; INTRAVENOUS at 06:25

## 2018-08-16 RX ADMIN — CARVEDILOL 6.25 MG: 6.25 TABLET, FILM COATED ORAL at 09:15

## 2018-08-16 RX ADMIN — SOTALOL HYDROCHLORIDE 40 MG: 80 TABLET ORAL at 09:29

## 2018-08-16 RX ADMIN — Medication 10 ML: at 09:15

## 2018-08-16 RX ADMIN — KETOROLAC TROMETHAMINE 15 MG: 15 INJECTION, SOLUTION INTRAMUSCULAR; INTRAVENOUS at 00:05

## 2018-08-16 RX ADMIN — CEFAZOLIN SODIUM 2 G: 1 INJECTION, SOLUTION INTRAVENOUS at 06:24

## 2018-08-16 RX ADMIN — DOCUSATE SODIUM 100 MG: 100 CAPSULE, LIQUID FILLED ORAL at 09:15

## 2018-08-16 RX ADMIN — HYDROCODONE BITARTRATE AND ACETAMINOPHEN 1 TABLET: 5; 325 TABLET ORAL at 09:29

## 2018-08-16 ASSESSMENT — PAIN SCALES - GENERAL
PAINLEVEL_OUTOF10: 0
PAINLEVEL_OUTOF10: 4

## 2018-08-16 NOTE — FLOWSHEET NOTE
Delgadillo catheter and CYNDY Drain emptied and removed. CYNDY drained 30 ml of thin red fluid. Pt tolerated both well. Pt ambulated up to chair. Pt also tolerated very well. Dressing to back changed, one ABD folded in thirds and secured beneath two large tegaderms.  Electronically signed by Lala Barriga RN on 8/16/2018 at 6:24 AM

## 2018-08-16 NOTE — PROGRESS NOTES
Physical Therapy Med Surg Initial Assessment  Facility/Department: [facility] NEURO  Room: N227/N227-01       NAME: Jose Loving  : 1940 (77 y.o.)  MRN: [id number]  CODE STATUS: Full Code    Date of Service: 2018    Patient Diagnosis(es): Spinal stenosis of lumbar region with neurogenic claudication [M48.062]   No chief complaint on file.     Patient Active Problem List    Diagnosis Date Noted    PAF (paroxysmal atrial fibrillation) ([address] 10/19/2016     Priority: High    Hypokalemia 10/19/2016     Priority: Medium    NSVT (nonsustained ventricular tachycardia) ([address] 10/19/2016     Priority: Medium    AICD (automatic cardioverter/defibrillator) present 10/19/2016     Priority: Medium    CHF (congestive heart failure) ([address] 10/19/2016     Priority: Medium    CAD (coronary artery disease) 10/19/2016     Priority: Medium    HTN (hypertension) 10/19/2016     Priority: Medium    Hyperlipemia 10/19/2016     Priority: Medium    Anemia 10/19/2016     Priority: Medium    Renal artery stenosis ([address] 10/19/2016     Priority: Low    COPD (chronic obstructive pulmonary disease) ([address] 10/19/2016     Priority: Low    AAA (abdominal aortic aneurysm) ([address] 10/19/2016     Priority: Low    Spinal stenosis of lumbar region with neurogenic claudication 08/15/2018    Lumbar stenosis 08/10/2018    Lumbar stenosis with neurogenic claudication 2018    Chest pain 10/30/2016    Obesity 10/19/2016    Prostate CA ([address] 10/19/2016    Lumbosacral spondylosis without myelopathy 2015    Degeneration of lumbar or lumbosacral intervertebral disc 2015        Past Medical History:   Diagnosis Date    [facility]     monitoring 4.5cm    Arthritis     lower spine    Atrial fibrillation ([address]     CAD (coronary artery disease)     stents x 2 cardiac     Chronic back pain     Heart attack ([address] 1984    Hyperlipidemia     on meds >20yrs    Hypertension     on meds > 20yrs    Knee injury     AS A CHILD-RIGHT     Past Surgical History:   Procedure Laterality Date    CARDIAC DEFIBRILLATOR PLACEMENT  2008    CARDIAC DEFIBRILLATOR PLACEMENT      CARDIAC DEFIBRILLATOR PLACEMENT      COLONOSCOPY  2012    CORONARY ANGIOPLASTY WITH STENT PLACEMENT      5 stents    EYE SURGERY  2013    bilateral cataracts    JOINT REPLACEMENT  2013    RIGHT HIP    JOINT REPLACEMENT Right 2012    hip    TONSILLECTOMY  1945       Chart Reviewed: Yes    Restrictions:  Restrictions/Precautions: Fall Risk     SUBJECTIVE: Subjective: \"I can stand up straight\"       Post Treatment Pain Screening:   Pain Screening  Patient Currently in Pain: No  Pain Assessment  Pain Assessment: 0-10  Pain Level: 0    Prior Level of Function:  Social/Functional History  Lives With: Spouse  Type of Home: House  Home Layout: One level  Home Access: Stairs to enter without rails  Entrance Stairs - Number of Steps: 1  Home Equipment: Rolling walker  ADL Assistance: Independent  Ambulation Assistance: Independent  Transfer Assistance: Independent    OBJECTIVE:   Vision/Hearing:  Vision: Within Functional Limits  Hearing: Within functional limits    Cognition:  Overall Orientation Status: Within Normal Limits  Follows Commands: Within Functional Limits    Observation/Palpation  Posture: Fair (rounded shoulders)    ROM:  RLE AROM: WFL  LLE AROM : WFL    Strength:  Strength RLE  Strength RLE: WNL  Strength LLE  Strength LLE: WNL    Neuro:  Balance  Sitting - Static: Good  Sitting - Dynamic: Good;-  Standing - Static: Good  Standing - Dynamic: Good;-        Sensation  Overall Sensation Status: WNL    Bed mobility  Rolling to Left: Modified independent  Supine to Sit: Modified independent  Sit to Supine: Modified independent  Comment: educated patient on bed mobility    Transfers  Sit to Stand: Stand by assistance;Supervision  Stand to sit: Stand by assistance;Supervision  Comment: heavy use of UEs    Ambulation  Ambulation?: Yes  Ambulation 1  Surface: level tile  Device: No Device  Assistance: Supervision  Quality of Gait: decreased bilateral step length, steady  Distance: 10 ft x 2; 50 ft  Comments: SPO2 post ambulation 89%, given oxygen SPO2 95%         ASSESSMENT:   Body structures, Functions, Activity limitations: Decreased functional mobility ; Decreased strength  Decision Making: Low Complexity  History: high  Exam: low  Clinical Presentation: stable    Prognosis: Good  Patient Education: spinal precaution, bed mobility, transfers, gait    DISCHARGE RECOMMENDATIONS:  Discharge Recommendations: Continue to assess pending progress    Assessment: Patient demonstrates mild decline in mobility post lumbar laminectomy. Educated patient on bed mobility, transfers, and gait for discharge home. Patient demonstrates good understanding of maintaining spinal precaution. Discharge patient from PT with assist from spouse. REQUIRES PT FOLLOW UP: No      PLAN OF CARE:  Plan  Plan Comment: D/C from PT  Safety Devices  Type of devices: All fall risk precautions in place, Call light within reach, Nurse notified    G-Code:  PT G-Codes  Functional Assessment Tool Used: clinical judgement  Functional Limitation: Mobility: Walking and moving around  Mobility: Walking and Moving Around Current Status (): At least 1 percent but less than 20 percent impaired, limited or restricted  Mobility: Walking and Moving Around Goal Status (): At least 1 percent but less than 20 percent impaired, limited or restricted  Mobility: Walking and Moving Around Discharge Status ():  At least 1 percent but less than 20 percent impaired, limited or restricted    Goals:  Long term goals  Long term goal 1:  (no goals secondary to patient at supervision/SBA level)    [facility] (6 CLICK) BASIC MOBILITY  AM-PAC Inpatient Mobility Raw Score : 22     Therapy Time:   Individual   Time In 0920   Time Out 1000   Minutes 79 Yifan Pritchard, 08/16/18 at 10:05

## 2018-08-16 NOTE — OP NOTE
with  Kerrison rongeurs. A very thick ligamentum flavum was detached and removed  as was the superior lamina of L3, the medial aspect of superior  articulation, the contralateral ligamentum flavum, and the contralateral  lamina decompressing the canal widely. An epidural catheter was inserted and 2.5 mg of epidural Duramorph were  injected for postop pain control. The retractors were then shifted to L3-4 where the same process was  repeated drilling away the inferior lamina of L3, medial aspect of the  inferior articulation, and the base of spinous process, thinned down the  bone removing with Kerrison rongeurs. The ligamentum flavum was detached  and removed as was the superior lamina of L4, medial aspect of the superior  articulation of L4, contralateral ligament, and contralateral lamina. A 7-mm Jono-Portillo drain was introduced through a separate stab wound and  placed in the epidural space, and the fascia was then approximated using  2-0 Vicryl in interrupted fashion, 2-0 Vicryl was used in interrupted  fashion to approximate the superficial fascia, 3-0 Vicryl in interrupted  inverted manner for subdermal layer, and 4-0 Vicryl for the skin. A  dressing was applied. The patient was then returned to the supine position, extubated, and taken  to the recovery room. The estimated blood loss was 150 mL. No blood was given. One drain was  used.         Cathy Lazaro MD    D: 08/15/2018 14:43:35       T: 08/15/2018 14:45:38     [id number]  Job#: [id number]     Doc#: [id number]    CC:

## 2018-10-04 ENCOUNTER — HOSPITAL ENCOUNTER (OUTPATIENT)
Dept: CARDIOLOGY | Age: 78
Discharge: HOME | End: 2018-10-04
Payer: MEDICARE

## 2018-10-04 PROCEDURE — 93284 PRGRMG EVAL IMPLANTABLE DFB: CPT

## 2019-01-03 ENCOUNTER — HOSPITAL ENCOUNTER (OUTPATIENT)
Dept: CARDIOLOGY | Age: 79
Discharge: HOME | End: 2019-01-03
Payer: MEDICARE

## 2019-01-03 PROCEDURE — 93296 REM INTERROG EVL PM/IDS: CPT

## 2019-03-06 ENCOUNTER — HOSPITAL ENCOUNTER (OUTPATIENT)
Dept: CT IMAGING | Age: 79
Discharge: HOME | End: 2019-03-08
Payer: MEDICARE

## 2019-03-06 DIAGNOSIS — G45.9 TIA (TRANSIENT ISCHEMIC ATTACK): ICD-10-CM

## 2019-03-06 PROCEDURE — 70450 CT HEAD/BRAIN W/O DYE: CPT

## 2019-04-04 ENCOUNTER — HOSPITAL ENCOUNTER (OUTPATIENT)
Dept: CARDIOLOGY | Age: 79
Discharge: HOME | End: 2019-04-04
Payer: MEDICARE

## 2019-04-04 PROCEDURE — 93283 PRGRMG EVAL IMPLANTABLE DFB: CPT

## 2019-04-05 LAB
ALBUMIN SERPL-MCNC: 4.1 G/DL (ref 3.5–4.6)
ALP BLD-CCNC: 77 U/L (ref 35–104)
ALT SERPL-CCNC: 18 U/L (ref 0–41)
ANION GAP SERPL CALCULATED.3IONS-SCNC: 13 MEQ/L (ref 9–15)
AST SERPL-CCNC: 18 U/L (ref 0–40)
BILIRUB SERPL-MCNC: 1.1 MG/DL (ref 0.2–0.7)
BUN BLDV-MCNC: 25 MG/DL (ref 8–23)
CALCIUM SERPL-MCNC: 9.2 MG/DL (ref 8.5–9.9)
CHLORIDE BLD-SCNC: 102 MEQ/L (ref 95–107)
CO2: 28 MEQ/L (ref 20–31)
CREAT SERPL-MCNC: 0.77 MG/DL (ref 0.7–1.2)
FOLATE: >20 NG/ML (ref 7.3–26.1)
GFR AFRICAN AMERICAN: >60
GFR NON-AFRICAN AMERICAN: >60
GLOBULIN: 2.7 G/DL (ref 2.3–3.5)
GLUCOSE BLD-MCNC: 85 MG/DL (ref 70–99)
HBA1C MFR BLD: 5.3 % (ref 4.8–5.9)
HCT VFR BLD CALC: 39.8 % (ref 42–52)
HEMOGLOBIN: 13.5 G/DL (ref 14–18)
MCH RBC QN AUTO: 32 PG (ref 27–31.3)
MCHC RBC AUTO-ENTMCNC: 33.8 % (ref 33–37)
MCV RBC AUTO: 94.5 FL (ref 80–100)
PDW BLD-RTO: 15.8 % (ref 11.5–14.5)
PLATELET # BLD: 194 K/UL (ref 130–400)
POTASSIUM SERPL-SCNC: 4.7 MEQ/L (ref 3.4–4.9)
RBC # BLD: 4.22 M/UL (ref 4.7–6.1)
SODIUM BLD-SCNC: 143 MEQ/L (ref 135–144)
TOTAL PROTEIN: 6.8 G/DL (ref 6.3–8)
TSH SERPL DL<=0.05 MIU/L-ACNC: 0.74 UIU/ML (ref 0.44–3.86)
VITAMIN B-12: 535 PG/ML (ref 232–1245)
VITAMIN D 25-HYDROXY: 55.4 NG/ML (ref 30–100)
WBC # BLD: 8.6 K/UL (ref 4.8–10.8)

## 2019-04-09 LAB
ALBUMIN SERPL-MCNC: 3.91 G/DL (ref 3.75–5.01)
ALPHA-1-GLOBULIN: 0.31 G/DL (ref 0.19–0.46)
ALPHA-2-GLOBULIN: 0.77 G/DL (ref 0.48–1.05)
BETA GLOBULIN: 0.8 G/DL (ref 0.48–1.1)
GAMMA GLOBULIN: 0.81 G/DL (ref 0.62–1.51)
PROTEIN ELECTROPHORESIS, SERUM: NORMAL
SPE/IFE INTERPRETATION: NORMAL
TOTAL PROTEIN: 6.6 G/DL (ref 6–8.3)

## 2019-04-24 ENCOUNTER — HOSPITAL ENCOUNTER (OUTPATIENT)
Dept: ULTRASOUND IMAGING | Age: 79
Discharge: HOME | End: 2019-04-26
Payer: MEDICARE

## 2019-04-24 DIAGNOSIS — I65.23 BILATERAL CAROTID ARTERY STENOSIS: ICD-10-CM

## 2019-04-24 PROCEDURE — 93880 EXTRACRANIAL BILAT STUDY: CPT

## 2019-11-12 ENCOUNTER — HOSPITAL ENCOUNTER (OUTPATIENT)
Dept: CARDIOLOGY | Age: 79
Discharge: HOME | End: 2019-11-12
Payer: MEDICARE

## 2019-11-12 PROCEDURE — 93296 REM INTERROG EVL PM/IDS: CPT

## 2020-02-12 ENCOUNTER — HOSPITAL ENCOUNTER (OUTPATIENT)
Dept: CARDIOLOGY | Age: 80
Discharge: HOME | End: 2020-02-12
Payer: MEDICARE

## 2020-02-12 PROCEDURE — 93283 PRGRMG EVAL IMPLANTABLE DFB: CPT

## 2020-05-13 ENCOUNTER — HOSPITAL ENCOUNTER (OUTPATIENT)
Dept: CARDIOLOGY | Age: 80
Discharge: HOME | End: 2020-05-13
Payer: MEDICARE

## 2020-05-13 PROCEDURE — 93296 REM INTERROG EVL PM/IDS: CPT

## 2020-08-12 ENCOUNTER — HOSPITAL ENCOUNTER (OUTPATIENT)
Dept: CARDIOLOGY | Age: 80
Discharge: HOME | End: 2020-08-12
Payer: MEDICARE

## 2020-08-12 PROCEDURE — 93296 REM INTERROG EVL PM/IDS: CPT

## 2020-11-11 ENCOUNTER — HOSPITAL ENCOUNTER (OUTPATIENT)
Dept: CARDIOLOGY | Age: 80
Discharge: HOME | End: 2020-11-11
Payer: MEDICARE

## 2020-11-11 PROCEDURE — 93283 PRGRMG EVAL IMPLANTABLE DFB: CPT

## 2021-01-11 ENCOUNTER — NURSE ONLY (OUTPATIENT)
Dept: PRIMARY CARE CLINIC | Age: 81
End: 2021-01-11

## 2021-01-11 ENCOUNTER — HOSPITAL ENCOUNTER (OUTPATIENT)
Dept: PREADMISSION TESTING | Age: 81
Setting detail: SPECIMEN
Discharge: HOME | End: 2021-01-15
Payer: MEDICARE

## 2021-01-11 VITALS
SYSTOLIC BLOOD PRESSURE: 169 MMHG | WEIGHT: 227.6 LBS | OXYGEN SATURATION: 95 % | HEIGHT: 65 IN | RESPIRATION RATE: 16 BRPM | BODY MASS INDEX: 37.92 KG/M2 | DIASTOLIC BLOOD PRESSURE: 97 MMHG | TEMPERATURE: 96.8 F | HEART RATE: 59 BPM

## 2021-01-11 LAB
ABO/RH: NORMAL
ALBUMIN SERPL-MCNC: 4.8 G/DL (ref 3.5–4.6)
ALP BLD-CCNC: 89 U/L (ref 35–104)
ALT SERPL-CCNC: 20 U/L (ref 0–41)
ANION GAP SERPL CALCULATED.3IONS-SCNC: 9 MEQ/L (ref 9–15)
ANTIBODY SCREEN: NORMAL
AST SERPL-CCNC: 20 U/L (ref 0–40)
BASE EXCESS ARTERIAL: 6 (ref -3–3)
BILIRUB SERPL-MCNC: 1 MG/DL (ref 0.2–0.7)
BILIRUBIN URINE: NEGATIVE
BLOOD, URINE: NEGATIVE
BUN BLDV-MCNC: 19 MG/DL (ref 8–23)
CALCIUM IONIZED: 1.16 MMOL/L (ref 1.12–1.32)
CALCIUM SERPL-MCNC: 9.1 MG/DL (ref 8.5–9.9)
CHLORIDE BLD-SCNC: 98 MEQ/L (ref 95–107)
CLARITY: CLEAR
CO2: 30 MEQ/L (ref 20–31)
COLOR: YELLOW
CREAT SERPL-MCNC: 0.64 MG/DL (ref 0.7–1.2)
EKG ATRIAL RATE: 65 BPM
EKG P AXIS: 26 DEGREES
EKG Q-T INTERVAL: 482 MS
EKG QRS DURATION: 106 MS
EKG QTC CALCULATION (BAZETT): 501 MS
EKG R AXIS: 10 DEGREES
EKG T AXIS: 58 DEGREES
EKG VENTRICULAR RATE: 65 BPM
GFR AFRICAN AMERICAN: >60
GFR AFRICAN AMERICAN: >60
GFR NON-AFRICAN AMERICAN: >60
GFR NON-AFRICAN AMERICAN: >60
GLOBULIN: 2.4 G/DL (ref 2.3–3.5)
GLUCOSE BLD-MCNC: 101 MG/DL (ref 70–99)
GLUCOSE BLD-MCNC: 96 MG/DL (ref 60–115)
GLUCOSE URINE: NEGATIVE MG/DL
HCO3 ARTERIAL: 30.4 MMOL/L (ref 21–29)
HCT VFR BLD CALC: 47.2 % (ref 42–52)
HEMOGLOBIN: 15.5 G/DL (ref 14–18)
HEMOGLOBIN: 16.1 GM/DL (ref 13.5–17.5)
KETONES, URINE: NEGATIVE MG/DL
LACTATE: 0.73 MMOL/L (ref 0.4–2)
LEUKOCYTE ESTERASE, URINE: NEGATIVE
MCH RBC QN AUTO: 30.6 PG (ref 27–31.3)
MCHC RBC AUTO-ENTMCNC: 32.8 % (ref 33–37)
MCV RBC AUTO: 93.2 FL (ref 80–100)
NITRITE, URINE: NEGATIVE
O2 SAT, ARTERIAL: 91 % (ref 93–100)
PCO2 ARTERIAL: 50 MM HG (ref 35–45)
PDW BLD-RTO: 14.6 % (ref 11.5–14.5)
PERFORMED ON: ABNORMAL
PH ARTERIAL: 7.39 (ref 7.35–7.45)
PH UA: 7 (ref 5–9)
PLATELET # BLD: 169 K/UL (ref 130–400)
PO2 ARTERIAL: 63 MM HG (ref 75–108)
POC CHLORIDE: 100 MEQ/L (ref 99–110)
POC CREATININE: 0.8 MG/DL (ref 0.8–1.3)
POC HEMATOCRIT: 47 % (ref 41–53)
POC POTASSIUM: 4.1 MEQ/L (ref 3.5–5.1)
POC SAMPLE TYPE: ABNORMAL
POC SODIUM: 140 MEQ/L (ref 136–145)
POTASSIUM SERPL-SCNC: 4.1 MEQ/L (ref 3.4–4.9)
PROTEIN UA: NEGATIVE MG/DL
RBC # BLD: 5.06 M/UL (ref 4.7–6.1)
SODIUM BLD-SCNC: 137 MEQ/L (ref 135–144)
SPECIFIC GRAVITY UA: 1.01 (ref 1–1.03)
TCO2 ARTERIAL: 32 (ref 22–29)
TOTAL PROTEIN: 7.2 G/DL (ref 6.3–8)
UROBILINOGEN, URINE: 0.2 E.U./DL
WBC # BLD: 8.8 K/UL (ref 4.8–10.8)

## 2021-01-11 PROCEDURE — 82803 BLOOD GASES ANY COMBINATION: CPT

## 2021-01-11 PROCEDURE — 85027 COMPLETE CBC AUTOMATED: CPT

## 2021-01-11 PROCEDURE — 84132 ASSAY OF SERUM POTASSIUM: CPT

## 2021-01-11 PROCEDURE — 80053 COMPREHEN METABOLIC PANEL: CPT

## 2021-01-11 PROCEDURE — 85014 HEMATOCRIT: CPT

## 2021-01-11 PROCEDURE — 36600 WITHDRAWAL OF ARTERIAL BLOOD: CPT

## 2021-01-11 PROCEDURE — 86901 BLOOD TYPING SEROLOGIC RH(D): CPT

## 2021-01-11 PROCEDURE — 82435 ASSAY OF BLOOD CHLORIDE: CPT

## 2021-01-11 PROCEDURE — 82330 ASSAY OF CALCIUM: CPT

## 2021-01-11 PROCEDURE — 86850 RBC ANTIBODY SCREEN: CPT

## 2021-01-11 PROCEDURE — 86900 BLOOD TYPING SEROLOGIC ABO: CPT

## 2021-01-11 PROCEDURE — 84295 ASSAY OF SERUM SODIUM: CPT

## 2021-01-11 PROCEDURE — 81003 URINALYSIS AUTO W/O SCOPE: CPT

## 2021-01-11 PROCEDURE — 82565 ASSAY OF CREATININE: CPT

## 2021-01-11 PROCEDURE — 93005 ELECTROCARDIOGRAM TRACING: CPT | Performed by: THORACIC SURGERY (CARDIOTHORACIC VASCULAR SURGERY)

## 2021-01-11 PROCEDURE — 86923 COMPATIBILITY TEST ELECTRIC: CPT

## 2021-01-11 PROCEDURE — 83605 ASSAY OF LACTIC ACID: CPT

## 2021-01-11 RX ORDER — SODIUM CHLORIDE 0.9 % (FLUSH) 0.9 %
10 SYRINGE (ML) INJECTION EVERY 12 HOURS SCHEDULED
Status: CANCELLED | OUTPATIENT
Start: 2021-01-15

## 2021-01-11 RX ORDER — SODIUM CHLORIDE, SODIUM LACTATE, POTASSIUM CHLORIDE, CALCIUM CHLORIDE 600; 310; 30; 20 MG/100ML; MG/100ML; MG/100ML; MG/100ML
INJECTION, SOLUTION INTRAVENOUS CONTINUOUS
Status: CANCELLED | OUTPATIENT
Start: 2021-01-15

## 2021-01-11 RX ORDER — MULTIVIT-MIN/IRON/FOLIC ACID/K 18-600-40
1000 CAPSULE ORAL DAILY
COMMUNITY

## 2021-01-11 RX ORDER — MONTELUKAST SODIUM 10 MG/1
10 TABLET ORAL NIGHTLY
COMMUNITY
Start: 2020-07-16

## 2021-01-11 RX ORDER — AZELASTINE HCL 205.5 UG/1
2 SPRAY NASAL 2 TIMES DAILY
COMMUNITY
Start: 2020-09-03

## 2021-01-11 RX ORDER — LIDOCAINE HYDROCHLORIDE 10 MG/ML
1 INJECTION, SOLUTION EPIDURAL; INFILTRATION; INTRACAUDAL; PERINEURAL
Status: CANCELLED | OUTPATIENT
Start: 2021-01-15 | End: 2021-01-15

## 2021-01-11 RX ORDER — SODIUM CHLORIDE 0.9 % (FLUSH) 0.9 %
10 SYRINGE (ML) INJECTION PRN
Status: CANCELLED | OUTPATIENT
Start: 2021-01-15

## 2021-01-11 RX ORDER — CEFAZOLIN SODIUM 2 G/50ML
2 SOLUTION INTRAVENOUS ONCE
Status: CANCELLED | OUTPATIENT
Start: 2021-01-15

## 2021-01-11 RX ORDER — METOPROLOL SUCCINATE 100 MG/1
100 TABLET, EXTENDED RELEASE ORAL NIGHTLY
COMMUNITY

## 2021-01-12 PROCEDURE — 93010 ELECTROCARDIOGRAM REPORT: CPT | Performed by: INTERNAL MEDICINE

## 2021-01-13 LAB
SARS-COV-2: NOT DETECTED
SOURCE: NORMAL

## 2021-01-14 ENCOUNTER — ANESTHESIA EVENT (OUTPATIENT)
Dept: OPERATING ROOM | Age: 81
End: 2021-01-14
Payer: MEDICARE

## 2021-01-15 ENCOUNTER — HOSPITAL ENCOUNTER (OUTPATIENT)
Age: 81
Setting detail: SURGERY ADMIT
Discharge: HOME | End: 2021-01-15
Attending: THORACIC SURGERY (CARDIOTHORACIC VASCULAR SURGERY) | Admitting: THORACIC SURGERY (CARDIOTHORACIC VASCULAR SURGERY)
Payer: MEDICARE

## 2021-01-15 ENCOUNTER — APPOINTMENT (OUTPATIENT)
Dept: GENERAL RADIOLOGY | Age: 81
End: 2021-01-15
Attending: THORACIC SURGERY (CARDIOTHORACIC VASCULAR SURGERY)
Payer: MEDICARE

## 2021-01-15 ENCOUNTER — ANESTHESIA (OUTPATIENT)
Dept: OPERATING ROOM | Age: 81
End: 2021-01-15
Payer: MEDICARE

## 2021-01-15 VITALS
DIASTOLIC BLOOD PRESSURE: 81 MMHG | SYSTOLIC BLOOD PRESSURE: 145 MMHG | OXYGEN SATURATION: 94 % | HEART RATE: 60 BPM | RESPIRATION RATE: 16 BRPM | TEMPERATURE: 98 F

## 2021-01-15 VITALS — TEMPERATURE: 95.7 F | OXYGEN SATURATION: 96 %

## 2021-01-15 PROCEDURE — C1894 INTRO/SHEATH, NON-LASER: HCPCS | Performed by: THORACIC SURGERY (CARDIOTHORACIC VASCULAR SURGERY)

## 2021-01-15 PROCEDURE — 7100000010 HC PHASE II RECOVERY - FIRST 15 MIN: Performed by: THORACIC SURGERY (CARDIOTHORACIC VASCULAR SURGERY)

## 2021-01-15 PROCEDURE — 6360000004 HC RX CONTRAST MEDICATION: Performed by: THORACIC SURGERY (CARDIOTHORACIC VASCULAR SURGERY)

## 2021-01-15 PROCEDURE — 2580000003 HC RX 258: Performed by: NURSE PRACTITIONER

## 2021-01-15 PROCEDURE — 6360000002 HC RX W HCPCS: Performed by: THORACIC SURGERY (CARDIOTHORACIC VASCULAR SURGERY)

## 2021-01-15 PROCEDURE — 3600000005 HC SURGERY LEVEL 5 BASE: Performed by: THORACIC SURGERY (CARDIOTHORACIC VASCULAR SURGERY)

## 2021-01-15 PROCEDURE — 76000 FLUOROSCOPY <1 HR PHYS/QHP: CPT

## 2021-01-15 PROCEDURE — 6360000002 HC RX W HCPCS: Performed by: NURSE ANESTHETIST, CERTIFIED REGISTERED

## 2021-01-15 PROCEDURE — 2580000003 HC RX 258: Performed by: THORACIC SURGERY (CARDIOTHORACIC VASCULAR SURGERY)

## 2021-01-15 PROCEDURE — 2709999900 HC NON-CHARGEABLE SUPPLY: Performed by: THORACIC SURGERY (CARDIOTHORACIC VASCULAR SURGERY)

## 2021-01-15 PROCEDURE — 6360000002 HC RX W HCPCS: Performed by: ANESTHESIOLOGY

## 2021-01-15 PROCEDURE — 2500000003 HC RX 250 WO HCPCS: Performed by: NURSE ANESTHETIST, CERTIFIED REGISTERED

## 2021-01-15 PROCEDURE — 7100000001 HC PACU RECOVERY - ADDTL 15 MIN: Performed by: THORACIC SURGERY (CARDIOTHORACIC VASCULAR SURGERY)

## 2021-01-15 PROCEDURE — C2628 CATHETER, OCCLUSION: HCPCS | Performed by: THORACIC SURGERY (CARDIOTHORACIC VASCULAR SURGERY)

## 2021-01-15 PROCEDURE — 6370000000 HC RX 637 (ALT 250 FOR IP)

## 2021-01-15 PROCEDURE — 3700000000 HC ANESTHESIA ATTENDED CARE: Performed by: THORACIC SURGERY (CARDIOTHORACIC VASCULAR SURGERY)

## 2021-01-15 PROCEDURE — C1769 GUIDE WIRE: HCPCS | Performed by: THORACIC SURGERY (CARDIOTHORACIC VASCULAR SURGERY)

## 2021-01-15 PROCEDURE — 6370000000 HC RX 637 (ALT 250 FOR IP): Performed by: THORACIC SURGERY (CARDIOTHORACIC VASCULAR SURGERY)

## 2021-01-15 PROCEDURE — 2580000003 HC RX 258: Performed by: NURSE ANESTHETIST, CERTIFIED REGISTERED

## 2021-01-15 PROCEDURE — 3600000015 HC SURGERY LEVEL 5 ADDTL 15MIN: Performed by: THORACIC SURGERY (CARDIOTHORACIC VASCULAR SURGERY)

## 2021-01-15 PROCEDURE — 7100000011 HC PHASE II RECOVERY - ADDTL 15 MIN: Performed by: THORACIC SURGERY (CARDIOTHORACIC VASCULAR SURGERY)

## 2021-01-15 PROCEDURE — 7100000000 HC PACU RECOVERY - FIRST 15 MIN: Performed by: THORACIC SURGERY (CARDIOTHORACIC VASCULAR SURGERY)

## 2021-01-15 PROCEDURE — C1887 CATHETER, GUIDING: HCPCS | Performed by: THORACIC SURGERY (CARDIOTHORACIC VASCULAR SURGERY)

## 2021-01-15 PROCEDURE — 3700000001 HC ADD 15 MINUTES (ANESTHESIA): Performed by: THORACIC SURGERY (CARDIOTHORACIC VASCULAR SURGERY)

## 2021-01-15 RX ORDER — METOCLOPRAMIDE HYDROCHLORIDE 5 MG/ML
10 INJECTION INTRAMUSCULAR; INTRAVENOUS
Status: DISCONTINUED | OUTPATIENT
Start: 2021-01-15 | End: 2021-01-15 | Stop reason: HOSPADM

## 2021-01-15 RX ORDER — ONDANSETRON 2 MG/ML
INJECTION INTRAMUSCULAR; INTRAVENOUS PRN
Status: DISCONTINUED | OUTPATIENT
Start: 2021-01-15 | End: 2021-01-15 | Stop reason: SDUPTHER

## 2021-01-15 RX ORDER — HYDROCODONE BITARTRATE AND ACETAMINOPHEN 5; 325 MG/1; MG/1
2 TABLET ORAL PRN
Status: DISCONTINUED | OUTPATIENT
Start: 2021-01-15 | End: 2021-01-15 | Stop reason: HOSPADM

## 2021-01-15 RX ORDER — LIDOCAINE HYDROCHLORIDE 10 MG/ML
1 INJECTION, SOLUTION EPIDURAL; INFILTRATION; INTRACAUDAL; PERINEURAL
Status: DISCONTINUED | OUTPATIENT
Start: 2021-01-15 | End: 2021-01-15 | Stop reason: HOSPADM

## 2021-01-15 RX ORDER — SODIUM CHLORIDE 0.9 % (FLUSH) 0.9 %
10 SYRINGE (ML) INJECTION EVERY 12 HOURS SCHEDULED
Status: DISCONTINUED | OUTPATIENT
Start: 2021-01-15 | End: 2021-01-15 | Stop reason: HOSPADM

## 2021-01-15 RX ORDER — DEXAMETHASONE SODIUM PHOSPHATE 4 MG/ML
INJECTION, SOLUTION INTRA-ARTICULAR; INTRALESIONAL; INTRAMUSCULAR; INTRAVENOUS; SOFT TISSUE PRN
Status: DISCONTINUED | OUTPATIENT
Start: 2021-01-15 | End: 2021-01-15 | Stop reason: SDUPTHER

## 2021-01-15 RX ORDER — ROCURONIUM BROMIDE 10 MG/ML
INJECTION, SOLUTION INTRAVENOUS PRN
Status: DISCONTINUED | OUTPATIENT
Start: 2021-01-15 | End: 2021-01-15 | Stop reason: SDUPTHER

## 2021-01-15 RX ORDER — MAGNESIUM HYDROXIDE 1200 MG/15ML
LIQUID ORAL CONTINUOUS PRN
Status: COMPLETED | OUTPATIENT
Start: 2021-01-15 | End: 2021-01-15

## 2021-01-15 RX ORDER — MIDAZOLAM HYDROCHLORIDE 1 MG/ML
INJECTION INTRAMUSCULAR; INTRAVENOUS PRN
Status: DISCONTINUED | OUTPATIENT
Start: 2021-01-15 | End: 2021-01-15 | Stop reason: SDUPTHER

## 2021-01-15 RX ORDER — SODIUM CHLORIDE 0.9 % (FLUSH) 0.9 %
10 SYRINGE (ML) INJECTION PRN
Status: DISCONTINUED | OUTPATIENT
Start: 2021-01-15 | End: 2021-01-15 | Stop reason: HOSPADM

## 2021-01-15 RX ORDER — ONDANSETRON 2 MG/ML
4 INJECTION INTRAMUSCULAR; INTRAVENOUS
Status: DISCONTINUED | OUTPATIENT
Start: 2021-01-15 | End: 2021-01-15 | Stop reason: HOSPADM

## 2021-01-15 RX ORDER — SODIUM CHLORIDE, SODIUM LACTATE, POTASSIUM CHLORIDE, CALCIUM CHLORIDE 600; 310; 30; 20 MG/100ML; MG/100ML; MG/100ML; MG/100ML
INJECTION, SOLUTION INTRAVENOUS CONTINUOUS
Status: DISCONTINUED | OUTPATIENT
Start: 2021-01-15 | End: 2021-01-15 | Stop reason: HOSPADM

## 2021-01-15 RX ORDER — DIPHENHYDRAMINE HYDROCHLORIDE 50 MG/ML
12.5 INJECTION INTRAMUSCULAR; INTRAVENOUS
Status: DISCONTINUED | OUTPATIENT
Start: 2021-01-15 | End: 2021-01-15 | Stop reason: HOSPADM

## 2021-01-15 RX ORDER — PROPOFOL 10 MG/ML
INJECTION, EMULSION INTRAVENOUS PRN
Status: DISCONTINUED | OUTPATIENT
Start: 2021-01-15 | End: 2021-01-15 | Stop reason: SDUPTHER

## 2021-01-15 RX ORDER — MEPERIDINE HYDROCHLORIDE 25 MG/ML
12.5 INJECTION INTRAMUSCULAR; INTRAVENOUS; SUBCUTANEOUS EVERY 5 MIN PRN
Status: DISCONTINUED | OUTPATIENT
Start: 2021-01-15 | End: 2021-01-15 | Stop reason: HOSPADM

## 2021-01-15 RX ORDER — ESMOLOL HYDROCHLORIDE 10 MG/ML
INJECTION INTRAVENOUS PRN
Status: DISCONTINUED | OUTPATIENT
Start: 2021-01-15 | End: 2021-01-15 | Stop reason: SDUPTHER

## 2021-01-15 RX ORDER — FENTANYL CITRATE 50 UG/ML
50 INJECTION, SOLUTION INTRAMUSCULAR; INTRAVENOUS EVERY 10 MIN PRN
Status: DISCONTINUED | OUTPATIENT
Start: 2021-01-15 | End: 2021-01-15 | Stop reason: HOSPADM

## 2021-01-15 RX ORDER — HYDROCODONE BITARTRATE AND ACETAMINOPHEN 5; 325 MG/1; MG/1
1 TABLET ORAL PRN
Status: DISCONTINUED | OUTPATIENT
Start: 2021-01-15 | End: 2021-01-15 | Stop reason: HOSPADM

## 2021-01-15 RX ORDER — LIDOCAINE HYDROCHLORIDE 20 MG/ML
INJECTION, SOLUTION INFILTRATION; PERINEURAL PRN
Status: DISCONTINUED | OUTPATIENT
Start: 2021-01-15 | End: 2021-01-15 | Stop reason: SDUPTHER

## 2021-01-15 RX ORDER — CEFAZOLIN SODIUM 2 G/50ML
2 SOLUTION INTRAVENOUS ONCE
Status: COMPLETED | OUTPATIENT
Start: 2021-01-15 | End: 2021-01-15

## 2021-01-15 RX ADMIN — ONDANSETRON 4 MG: 2 INJECTION INTRAMUSCULAR; INTRAVENOUS at 08:30

## 2021-01-15 RX ADMIN — SUGAMMADEX 200 MG: 100 INJECTION, SOLUTION INTRAVENOUS at 09:40

## 2021-01-15 RX ADMIN — LIDOCAINE HYDROCHLORIDE 50 MG: 20 INJECTION, SOLUTION INFILTRATION; PERINEURAL at 08:24

## 2021-01-15 RX ADMIN — PHENYLEPHRINE HYDROCHLORIDE 33 MCG/MIN: 10 INJECTION INTRAVENOUS at 08:47

## 2021-01-15 RX ADMIN — PHENYLEPHRINE HYDROCHLORIDE 33 MCG: 10 INJECTION INTRAVENOUS at 08:30

## 2021-01-15 RX ADMIN — DEXAMETHASONE SODIUM PHOSPHATE 4 MG: 4 INJECTION, SOLUTION INTRAMUSCULAR; INTRAVENOUS at 08:30

## 2021-01-15 RX ADMIN — MIDAZOLAM HYDROCHLORIDE 2 MG: 2 INJECTION, SOLUTION INTRAMUSCULAR; INTRAVENOUS at 08:07

## 2021-01-15 RX ADMIN — PROPOFOL 130 MG: 10 INJECTION, EMULSION INTRAVENOUS at 09:47

## 2021-01-15 RX ADMIN — ESMOLOL HYDROCHLORIDE 50 MG: 100 INJECTION, SOLUTION INTRAVENOUS at 08:24

## 2021-01-15 RX ADMIN — CEFAZOLIN SODIUM 2 G: 2 SOLUTION INTRAVENOUS at 08:33

## 2021-01-15 RX ADMIN — SODIUM CHLORIDE, POTASSIUM CHLORIDE, SODIUM LACTATE AND CALCIUM CHLORIDE: 600; 310; 30; 20 INJECTION, SOLUTION INTRAVENOUS at 07:40

## 2021-01-15 RX ADMIN — FENTANYL CITRATE 50 MCG: 50 INJECTION, SOLUTION INTRAMUSCULAR; INTRAVENOUS at 08:24

## 2021-01-15 RX ADMIN — ROCURONIUM BROMIDE 50 MG: 10 INJECTION INTRAVENOUS at 08:24

## 2021-01-15 RX ADMIN — MIDAZOLAM HYDROCHLORIDE 2 MG: 2 INJECTION, SOLUTION INTRAMUSCULAR; INTRAVENOUS at 07:13

## 2021-01-15 RX ADMIN — PROPOFOL 130 MG: 10 INJECTION, EMULSION INTRAVENOUS at 08:24

## 2021-01-15 RX ADMIN — SODIUM CHLORIDE, POTASSIUM CHLORIDE, SODIUM LACTATE AND CALCIUM CHLORIDE: 600; 310; 30; 20 INJECTION, SOLUTION INTRAVENOUS at 08:42

## 2021-01-15 ASSESSMENT — PULMONARY FUNCTION TESTS
PIF_VALUE: 3
PIF_VALUE: 16
PIF_VALUE: 19
PIF_VALUE: 22
PIF_VALUE: 0
PIF_VALUE: 20
PIF_VALUE: 23
PIF_VALUE: 16
PIF_VALUE: 1
PIF_VALUE: 19
PIF_VALUE: 20
PIF_VALUE: 21
PIF_VALUE: 18
PIF_VALUE: 21
PIF_VALUE: 19
PIF_VALUE: 19
PIF_VALUE: 20
PIF_VALUE: 19
PIF_VALUE: 0
PIF_VALUE: 16
PIF_VALUE: 18
PIF_VALUE: 0
PIF_VALUE: 19
PIF_VALUE: 20
PIF_VALUE: 16
PIF_VALUE: 22
PIF_VALUE: 22
PIF_VALUE: 21
PIF_VALUE: 19
PIF_VALUE: 20
PIF_VALUE: 21
PIF_VALUE: 18
PIF_VALUE: 19
PIF_VALUE: 20
PIF_VALUE: 6
PIF_VALUE: 20
PIF_VALUE: 3
PIF_VALUE: 19
PIF_VALUE: 0
PIF_VALUE: 19
PIF_VALUE: 21
PIF_VALUE: 20
PIF_VALUE: 19
PIF_VALUE: 2
PIF_VALUE: 34
PIF_VALUE: 16
PIF_VALUE: 21
PIF_VALUE: 19
PIF_VALUE: 17
PIF_VALUE: 20
PIF_VALUE: 21
PIF_VALUE: 21
PIF_VALUE: 20
PIF_VALUE: 16
PIF_VALUE: 16
PIF_VALUE: 1
PIF_VALUE: 16

## 2021-01-15 ASSESSMENT — LIFESTYLE VARIABLES: SMOKING_STATUS: 1

## 2021-01-15 NOTE — OP NOTE
[address]                      [facility], [address] [facility]                                OPERATIVE REPORT    PATIENT NAME: Kandee Leventhal                   :        1940  MED REC NO:   [id number]                            ROOM:  ACCOUNT NO:   [de-identified]                           ADMIT DATE: 01/15/2021  PROVIDER:     Derian Malone MD    DATE OF PROCEDURE:  01/15/2021    PREOPERATIVE DIAGNOSIS:  Infrarenal abdominal aortic aneurysm. PREOPERATIVE DIAGNOSIS:  Dumbbell infrarenal abdominal aortic aneurysm. OPERATION PERFORMED:  1. Aortofemoral digital subtraction arteriogram with supervision and  interpretation. 2.  Repair of bilateral femoral arteries. SURGEON:  Derian Malone MD    ANESTHESIA:  General.    FINDINGS:  1. An 23-year-old gentleman who has been followed for 7+ years for  aneurysmal disease of his abdominal aorta and was taken to the operating  room today for repair. At the time of the procedure, we found that the  patient had an indwelling left renal stent to a large left renal artery  with diminutive flow on the right-sided renal vasculature. 2.  A landing zone and the area of the renal artery, which was not 3-5  cm. 3.  A dumbbell aneurysm from that level and then continuing down. 4.  The aneurysm itself did not extend to the iliac bifurcations. 5.  Patency to the iliac arteries. After we performed the DSA study which demonstrated the  inappropriateness anatomically of a repair, we then terminated the  procedure. We removed the sheaths from each femoral artery and repaired  the site with 5-0 Prolene and then performed a completion DSA study to  validate the presence of full patency to the vessels. We irrigated the  wounds, checked for hemostasis and closed. Sponge, instrument and  needle counts were correct x2 and the patient maintained palpable pulse  on the feet, right and left.   He was transferred directly from [facility] to  PACU in a stable condition. We called his wife Taylor Diaz and informed her  of the procedure and the results and we answered her questions. The  patient tolerated this operation well.         Blayne Wilson MD    D: 01/15/2021 10:52:32       T: 01/15/2021 11:02:19     [email or web address]  Job#: [id number]     Doc#: [id number]    CC:

## 2021-01-15 NOTE — H&P
[address]                      [address], [address] [facility]                              HISTORY AND PHYSICAL    PATIENT NAME: Maryana Seals                   :        1940  MED REC NO:   [id number]                            ROOM:  ACCOUNT NO:   [de-identified]                           ADMIT DATE: 01/15/2021  PROVIDER:     Usha Oswald MD    DATE OF SERVICE:  Friday, 01/15/2021    HISTORY OF PRESENT ILLNESS:  An endovascular aneurysm repair is the plan  for this 18-year-old gentleman. He was first seen in the office by me  in 2012 at the request of Dr. Lashawn Sullivan. At that time, he had an  aneurysm, which was about 4.5 cm and he has been seen annually since  then for evaluation of his infrarenal aneurysm. Now, it has grown to 5  cm and Dr. Dk Sparrow had requested the potential for repair and we spoke  with the patient and his wife in the office in December and we have  elected to perform this surgery at this time. The patient's status is  totally asymptomatic in terms of the aneurysm. PAST MEDICAL HISTORY:  Positive for coronary disease with  cardiomyopathy, CAD as well as multiple stents. He has a history of  hypertension and renal artery stenosis. He has a history of  hyperlipidemia. PRIOR SURGERIES:  Include coronary stenting x3 and a defibrillator in  . He has also in 2009 undergone renal angioplasty and stenting. REVIEW OF SYSTEMS:  His 12-point review of systems is unremarkable for  any other features. ALLERGIES:  He has an allergy to [facility]. HOME MEDICATIONS:  Include Eliquis and we stopped his Eliquis three days  ago. SOCIAL HISTORY:  He is , lives with his wife, Shante Wheat. They have  three children. He is a retired . He smoked one to  two packs of cigarettes a day for 35 years. He stopped ten years ago. He denies recreational drug use and he uses ethanol socially.     PHYSICAL EXAMINATION:  GENERAL:  He is a pleasant, cooperative, right-handed dominant gentleman  who wears glasses, has no hearing aids, has partial upper and lower  dentures, and he walks with no assists. VITAL SIGNS:  He is 5 feet 5 inches tall and 226 pounds yielding a BMI  of 35. His pulse is 64 with a pacer. He is afebrile. Respirations are  unlabored at 14 per minute. His sitting right arm blood pressure is  142/80 and his left is 130/102. HEENT:  The patient is normocephalic. NECK:  Supple. There is no jugular venous distention. I hear no  audible carotid bruits. THORAX:  Features clear breath sounds from apex to base bilaterally. He  has a pacer over his chest and he has S1, S2 intact. ABDOMEN:  Soft. There is a palpable aortic pulse. GENITALIA:  Normal for age and sex. RECTAL:  Exam is deferred to EULALIA Decker.  EXTREMITIES:  Examination at this time features no ankle edema and no  finger clubbing. He has bilateral palpable femoral pulses. NEUROLOGIC:  Evaluation features a mentation which is clear. He has no  motor deficits of his upper or lower extremities. We have reviewed with the patient and his wife in the office the concept  of an endovascular aneurysm repair using the anatomic EVAR model. Dr. Genna Quiñones has performed cardiac risk stratification. The patient who has  been watched now for eight years is a candidate for endovascular  aneurysm repair and this will be done in the standard way that we  provide for repair. In an effort to protect his privacy and comply with  HIPAA regulations, he has authorized us to discuss the outcome of his  surgery with his wife, Elyssa Bowen and his son, Milly Rubio.         Lynne Avina MD    D: 01/14/2021 16:59:06       T: 01/14/2021 17:07:20     [email or web address]  Job#: [id number]     Doc#: [id number]

## 2021-01-15 NOTE — ANESTHESIA POSTPROCEDURE EVALUATION
Department of Anesthesiology  Postprocedure Note    Patient: Gertrudis Ariza  MRN: [id number]  YOB: 1940  Date of evaluation: 1/15/2021  Time:  10:12 AM     Procedure Summary     Date: 01/15/21 Room / Location: [address] [place] [address]    Anesthesia Start: 0818 Anesthesia Stop: 1011    Procedure: AORTO FEMORAL DIGITAL SUBTRACTION ARTERIOGRAM AND REPAIR FEMORAL ARTERIES (Bilateral Groin) Diagnosis: (ABDOMINAL AORTIC ANEURYSM)    Surgeons: Alba Dupree MD Responsible Provider: Yannick Tay MD    Anesthesia Type: general ASA Status: 3          Anesthesia Type: general    Crystal Phase I:      Crystal Phase II:      Last vitals: Reviewed and per EMR flowsheets.        Anesthesia Post Evaluation    Patient location during evaluation: bedside  Patient participation: complete - patient participated  Nausea & Vomiting: no nausea and no vomiting  Complications: no  Cardiovascular status: blood pressure returned to baseline  Respiratory status: acceptable  Hydration status: euvolemic

## 2021-01-15 NOTE — ANESTHESIA PROCEDURE NOTES
Arterial Line:    An arterial line was placed in the pre-op for the following indication(s): continuous blood pressure monitoring. A 20 gauge (size), 1 and 3/4 inch (length), Angiocath (type) catheter was placed, Seldinger technique used, into the left radial artery, secured by Tegaderm. Anesthesia type: Local  Local infiltration: Injection    Events:  patient tolerated procedure well with no complications.   Anesthesiologist: Sulema Valdivia MD  Performed: Resident/CRNA and Anesthesiologist   Preanesthetic Checklist  Completed: patient identified, IV checked, site marked, risks and benefits discussed, surgical consent, monitors and equipment checked, pre-op evaluation, timeout performed, anesthesia consent given, oxygen available and patient being monitored

## 2021-01-15 NOTE — PROGRESS NOTES
Bilateral aquacel dressings to to right and left groins. Area is soft to touch and dressings are clean and dry and intact.

## 2021-01-15 NOTE — ANESTHESIA PRE PROCEDURE
Department of Anesthesiology  Preprocedure Note       Name:  Ruth Rodney   Age:  79 y.o.  :  1940                                          MRN:  [id number]         Date:  1/15/2021      Surgeon: Zana Grover):  Stanley Meyers MD    Procedure: Procedure(s):  ENDOVASCULAR REPAIR OF ABDOMINAL AORTIC ANEURYSM ART LINE/ SINGLE LUMEN ET-TUBE/ CENTRAL LINE OR TWO GOOD PERIPHERAL IV'S/ CELL SAVER    Medications prior to admission:   Prior to Admission medications    Medication Sig Start Date End Date Taking?  Authorizing Provider   metoprolol tartrate (LOPRESSOR) 25 MG tablet Take 25 mg by mouth as needed   Yes Historical Provider, MD   montelukast (SINGULAIR) 10 MG tablet Take 10 mg by mouth nightly 20  Yes Historical Provider, MD   metoprolol succinate (TOPROL XL) 100 MG extended release tablet Take 100 mg by mouth nightly   Yes Historical Provider, MD   acetaminophen (TYLENOL) 500 MG tablet Take 1,500 mg by mouth 3 times daily   Yes Historical Provider, MD   aspirin (ECOTRIN LOW STRENGTH) 81 MG EC tablet Take 81 mg by mouth 12/11/15  Yes Historical Provider, MD   sotalol (BETAPACE) 80 MG tablet Take 0.5 tablets by mouth 2 times daily  Patient taking differently: Take 120 mg by mouth 2 times daily  10/22/16  Yes Megan Hills MD   atorvastatin (LIPITOR) 40 MG tablet daily  7/18/15  Yes Historical Provider, MD   hydrochlorothiazide (HYDRODIURIL) 25 MG tablet daily  6/8/15  Yes Historical Provider, MD   lisinopril (PRINIVIL;ZESTRIL) 40 MG tablet daily  6/8/15  Yes Historical Provider, MD   azelastine HCl 0.15 % SOLN 2 sprays by NOT APPLICABLE route 2 times daily 9/3/20   Historical Provider, MD   Cholecalciferol (VITAMIN D) 50 MCG (2000) CAPS capsule Take 1,000 Units by mouth daily    Historical Provider, MD   Multiple Vitamins-Minerals (EMERGEN-C IMMUNE PO) Take by mouth    Historical Provider, MD   Omega-3 Fatty Acids (FISH OIL PO) Take 1,000 mg by mouth 2 times daily     Historical Provider, MD   GLUCOSAMINE-CHONDROITIN PO Take 1 tablet by mouth 2 times daily    Historical Provider, MD   TURMERIC PO Take 1 capsule by mouth 2 times daily    Historical Provider, MD   apixaban (ELIQUIS) 5 MG TABS tablet Take by mouth 2 times daily    Historical Provider, MD   nitroGLYCERIN (NITROSTAT) 0.4 MG SL tablet  12/28/14   Historical Provider, MD       Current medications:    Current Facility-Administered Medications   Medication Dose Route Frequency Provider Last Rate Last Admin    lactated ringers infusion   Intravenous Continuous Jorge Peña, APRN - CNP   New Bag at 01/15/21 0740    lidocaine PF 1 % injection 1 mL  1 mL Intradermal Once PRN Jorge Peña, APRN - CNP        sodium chloride flush 0.9 % injection 10 mL  10 mL Intravenous 2 times per day Jorge Peña, APRN - CNP        sodium chloride flush 0.9 % injection 10 mL  10 mL Intravenous PRN Jorge Peña, APRN - CNP        ceFAZolin (ANCEF) 2 g in dextrose 3 % 50 mL IVPB (duplex)  2 g Intravenous Once Maggie Push, MD         Facility-Administered Medications Ordered in Other Encounters   Medication Dose Route Frequency Provider Last Rate Last Admin    midazolam (VERSED) injection    PRN Mariya Randhawa APRN - CRNA   2 mg at 01/15/21 6500       Allergies:     Allergies   Allergen Reactions    Levofloxacin Other (See Comments)     CAUSED INCREASED HEART RATE       Problem List:    Patient Active Problem List   Diagnosis Code    Lumbosacral spondylosis without myelopathy M47.817    Degeneration of lumbar or lumbosacral intervertebral disc M51.37    PAF (paroxysmal atrial fibrillation) ([facility]) I48.0    Hypokalemia E87.6    NSVT (nonsustained ventricular tachycardia) ([facility]) I47.2    AICD (automatic cardioverter/defibrillator) present Z95.810    CHF (congestive heart failure) ([facility]) I50.9    CAD (coronary artery disease) I25.10    HTN (hypertension) I10    Hyperlipemia E78.5    Renal artery stenosis ([facility] I70.1    COPD (chronic obstructive pulmonary disease) ([facility]) J44.9    AAA (abdominal aortic aneurysm) ([facility]) I71.4    Obesity E66.9    Prostate CA ([facility] C61    Anemia D64.9    Chest pain R07.9    Lumbar stenosis with neurogenic claudication M48.062    Spinal stenosis of lumbar region with neurogenic claudication M48.062       Past Medical History:        Diagnosis Date    AAA (abdominal aortic aneurysm) ([facility])     monitoring 4.5cm    Arthritis     lower spine    Atrial fibrillation ([facility]     CAD (coronary artery disease)     stents x 2 cardiac     Chronic back pain     Heart attack ([facility] 1984    Hyperlipidemia     on meds >20yrs    Hypertension     on meds > 20yrs    Knee injury     AS A CHILD-RIGHT       Past Surgical History:        Procedure Laterality Date    CARDIAC DEFIBRILLATOR PLACEMENT      CARDIAC DEFIBRILLATOR PLACEMENT      CARDIAC DEFIBRILLATOR PLACEMENT      COLONOSCOPY  2012    CORONARY ANGIOPLASTY WITH STENT PLACEMENT      5 stents    EYE SURGERY  2013    bilateral cataracts    JOINT REPLACEMENT  2013    RIGHT HIP    JOINT REPLACEMENT Right 2012    hip    [place] LAMINECTOMY,>2 SGMT,LUMBAR N/A 8/15/2018    DECOMPRESSIVE  LUMBAR LAMINECTOMY L2-3 L3-4 RIGHT SIDED APPROACH performed by Zoie Jones MD at [address] [place] [address]       Social History:    Social History     Tobacco Use    Smoking status: Former Smoker     Types: Cigarettes     Quit date: 2000     Years since quittin.4    Smokeless tobacco: Never Used   Substance Use Topics    Alcohol use: Yes     Alcohol/week: 0.0 standard drinks     Comment: OCCASIONALLY                                Counseling given: Not Answered      Vital Signs (Current): There were no vitals filed for this visit.                                            BP Readings from Last 3 Encounters:   21 (!) 169/97   18 108/60   08/15/18 136/76       NPO Status: Time of last liquid consumption:                         Time auscultation  (+) COPD:  current smoker                           Cardiovascular:    (+) hypertension:, past MI: no interval change, CAD:, dysrhythmias: atrial fibrillation,       ECG reviewed  Rhythm: regular    Echocardiogram reviewed         Beta Blocker:  Dose within 24 Hrs         Neuro/Psych:   Negative Neuro/Psych ROS              GI/Hepatic/Renal:   (+) morbid obesity          Endo/Other:    (+) blood dyscrasia: anticoagulation therapy:., .                 Abdominal:           Vascular:   + PVD, aortic or cerebral, . Anesthesia Plan      general     ASA 3       Induction: intravenous. arterial line  MIPS: Postoperative opioids intended and Prophylactic antiemetics administered. Anesthetic plan and risks discussed with patient. Use of blood products discussed with patient whom consented to blood products. Plan discussed with CRNA.     Attending anesthesiologist reviewed and agrees with Pre Eval content              Diane Cm MD   1/15/2021

## 2021-01-15 NOTE — BRIEF OP NOTE
Brief Postoperative Note      Patient: Kim Lord  YOB: 1940  MRN: [id number]    Date of Procedure: 1/15/2021    Pre-Op Diagnosis: ABDOMINAL AORTIC ANEURYSM    Post-Op Diagnosis: SAME       Procedure(s):  AORTO FEMORAL DIGITAL SUBTRACTION ARTERIOGRAM AND REPAIR FEMORAL ARTERIES    Surgeon(s):  Stephane Terry MD    Assistant:  First Assistant: Erlinda Casillas    Anesthesia: General    Estimated Blood Loss (mL): minimal    Complications: none    Specimens:   * No specimens in log *    Implants:  * No implants in log *      Drains: * No LDAs found *    Findings: dictated    Electronically signed by Stephane Terry MD on 1/15/2021 at 9:47 AM

## 2021-01-16 LAB
BLOOD BANK DISPENSE STATUS: NORMAL
BLOOD BANK PRODUCT CODE: NORMAL
BPU ID: NORMAL
DESCRIPTION BLOOD BANK: NORMAL

## 2021-02-10 ENCOUNTER — HOSPITAL ENCOUNTER (OUTPATIENT)
Dept: CARDIOLOGY | Age: 81
Discharge: HOME | End: 2021-02-10
Payer: MEDICARE

## 2021-02-10 PROCEDURE — 93296 REM INTERROG EVL PM/IDS: CPT

## 2021-07-21 ENCOUNTER — HOSPITAL ENCOUNTER (OUTPATIENT)
Dept: ULTRASOUND IMAGING | Age: 81
Discharge: HOME | End: 2021-07-23
Payer: MEDICARE

## 2021-07-21 DIAGNOSIS — I71.40 ABDOMINAL AORTIC ANEURYSM WITHOUT RUPTURE: ICD-10-CM

## 2021-07-21 PROCEDURE — 76706 US ABDL AORTA SCREEN AAA: CPT

## 2021-08-24 ENCOUNTER — OFFICE VISIT (OUTPATIENT)
Dept: UROLOGY | Age: 81
End: 2021-08-24
Payer: MEDICARE

## 2021-08-24 VITALS
WEIGHT: 218 LBS | HEART RATE: 64 BPM | HEIGHT: 66 IN | DIASTOLIC BLOOD PRESSURE: 88 MMHG | BODY MASS INDEX: 35.03 KG/M2 | SYSTOLIC BLOOD PRESSURE: 132 MMHG

## 2021-08-24 DIAGNOSIS — R97.20 ELEVATED PSA: Primary | ICD-10-CM

## 2021-08-24 LAB
BILIRUBIN, POC: ABNORMAL
BLOOD URINE, POC: ABNORMAL
CLARITY, POC: CLEAR
COLOR, POC: YELLOW
GLUCOSE URINE, POC: ABNORMAL
KETONES, POC: ABNORMAL
LEUKOCYTE EST, POC: ABNORMAL
NITRITE, POC: ABNORMAL
PH, POC: 7
PROTEIN, POC: ABNORMAL
SPECIFIC GRAVITY, POC: 1.02
UROBILINOGEN, POC: 1

## 2021-08-24 PROCEDURE — 1123F ACP DISCUSS/DSCN MKR DOCD: CPT | Performed by: UROLOGY

## 2021-08-24 PROCEDURE — 4040F PNEUMOC VAC/ADMIN/RCVD: CPT | Performed by: UROLOGY

## 2021-08-24 PROCEDURE — G8417 CALC BMI ABV UP PARAM F/U: HCPCS | Performed by: UROLOGY

## 2021-08-24 PROCEDURE — G8427 DOCREV CUR MEDS BY ELIG CLIN: HCPCS | Performed by: UROLOGY

## 2021-08-24 PROCEDURE — 81003 URINALYSIS AUTO W/O SCOPE: CPT | Performed by: UROLOGY

## 2021-08-24 PROCEDURE — 1036F TOBACCO NON-USER: CPT | Performed by: UROLOGY

## 2021-08-24 PROCEDURE — 99204 OFFICE O/P NEW MOD 45 MIN: CPT | Performed by: UROLOGY

## 2021-08-24 RX ORDER — MAGNESIUM OXIDE 400 MG/1
400 TABLET ORAL DAILY
COMMUNITY

## 2021-08-24 RX ORDER — SOTALOL HYDROCHLORIDE 120 MG/1
120 TABLET ORAL 2 TIMES DAILY
COMMUNITY
Start: 2021-07-15

## 2021-08-24 RX ORDER — GENTAMICIN SULFATE 1 MG/G
CREAM TOPICAL
COMMUNITY
Start: 2020-12-18 | End: 2021-12-16 | Stop reason: ALTCHOICE

## 2021-08-24 RX ORDER — SULFAMETHOXAZOLE AND TRIMETHOPRIM 800; 160 MG/1; MG/1
1 TABLET ORAL 2 TIMES DAILY
Qty: 6 TABLET | Refills: 0 | Status: SHIPPED | OUTPATIENT
Start: 2021-08-24 | End: 2021-08-27

## 2021-08-24 RX ORDER — KETOCONAZOLE 20 MG/ML
SHAMPOO TOPICAL DAILY PRN
COMMUNITY

## 2021-08-24 NOTE — PROGRESS NOTES
[facility] EVALUATION NOTE                                                 H&P                                                                                                                                                 Reason for Visit  Elevated PSA of 76.8    History of Present Illness  79-year-old male with history of elevated PSA done at [address]   Patient has not had a PSA in some time  Denies obstructive voiding symptoms  Denies hematuria        Urologic Review of Systems/Symptoms  Minimal voiding dysfunction    Review of Systems  Hospitalization: Patient has multiple medical issues including aortic aneurysm and atrial fibrillation  All 14 categories of Review of Systems otherwise reviewed no other findings reported.   No new findings in his review of systems  Past Medical History:   Diagnosis Date    AAA (abdominal aortic aneurysm) ([address]     monitoring 4.5cm    Arthritis     lower spine    Atrial fibrillation ([address]     CAD (coronary artery disease)     stents x 2 cardiac     Chronic back pain     Heart attack ([address] 1984    Hyperlipidemia     on meds >20yrs    Hypertension     on meds > 20yrs    Knee injury     AS A CHILD-RIGHT     Past Surgical History:   Procedure Laterality Date    ABDOMINAL AORTIC ANEURYSM REPAIR, ENDOVASCULAR Bilateral 1/15/2021    AORTO FEMORAL DIGITAL SUBTRACTION ARTERIOGRAM AND REPAIR FEMORAL ARTERIES performed by Lucretia Licona MD at [address] [facility]  2008   [facility] COLONOSCOPY  2012    CORONARY ANGIOPLASTY WITH STENT PLACEMENT      5 stents    EYE SURGERY  2013    bilateral cataracts    JOINT REPLACEMENT  2013    RIGHT HIP    JOINT REPLACEMENT Right 2012    hip    [place] LAMINECTOMY,>2 SGMT,LUMBAR N/A 8/15/2018    DECOMPRESSIVE  LUMBAR LAMINECTOMY L2-3 L3-4 RIGHT SIDED APPROACH performed by Brenda Cabrales MD at [facility] History Socioeconomic History    Marital status:      Spouse name: None    Number of children: None    Years of education: None    Highest education level: None   Occupational History    None   Tobacco Use    Smoking status: Former Smoker     Types: Cigarettes     Quit date: 2000     Years since quittin.1    Smokeless tobacco: Never Used   Vaping Use    Vaping Use: Never used   Substance and Sexual Activity    Alcohol use: Yes     Alcohol/week: 0.0 standard drinks     Comment: OCCASIONALLY    Drug use: No    Sexual activity: None   Other Topics Concern    None   Social History Narrative    None     Social Determinants of Health     Financial Resource Strain:     Difficulty of Paying Living Expenses:    Food Insecurity:     Worried About Running Out of Food in the Last Year:     Ran Out of Food in the Last Year:    Transportation Needs:     Lack of Transportation (Medical):      Lack of Transportation (Non-Medical):    Physical Activity:     Days of Exercise per Week:     Minutes of Exercise per Session:    Stress:     Feeling of Stress :    Social Connections:     Frequency of Communication with Friends and Family:     Frequency of Social Gatherings with Friends and Family:     Attends Scientologist Services:     Active Member of Clubs or Organizations:     Attends Club or Organization Meetings:     Marital Status:    Intimate Partner Violence:     Fear of Current or Ex-Partner:     Emotionally Abused:     Physically Abused:     Sexually Abused:      Family History   Problem Relation Age of Onset    High Blood Pressure Mother     Other Mother         liver failure    Heart Attack Father         heart occlusion    Colon Cancer Sister     No Known Problems Son     Other Daughter         spinal problems     Current Outpatient Medications   Medication Sig Dispense Refill    gentamicin (GARAMYCIN) 0.1 % cream apply sparingly to affected areas(s) twice daily      sotalol (BETAPACE) 120 MG tablet Take 120 mg by mouth 2 times daily      ketoconazole (NIZORAL) 2 % shampoo Apply topically daily as needed for Itching Apply topically daily as needed.  magnesium oxide (MAG-OX) 400 MG tablet Take 400 mg by mouth daily      sulfamethoxazole-trimethoprim (BACTRIM DS;SEPTRA DS) 800-160 MG per tablet Take 1 tablet by mouth 2 times daily for 3 days START THE MORNING OF 9/28/21 6 tablet 0    azelastine HCl 0.15 % SOLN 2 sprays by NOT APPLICABLE route 2 times daily      Cholecalciferol (VITAMIN D) 50 MCG (2000 UT) CAPS capsule Take 1,000 Units by mouth daily      metoprolol tartrate (LOPRESSOR) 25 MG tablet Take 25 mg by mouth as needed      montelukast (SINGULAIR) 10 MG tablet Take 10 mg by mouth nightly      metoprolol succinate (TOPROL XL) 100 MG extended release tablet Take 100 mg by mouth nightly      Omega-3 Fatty Acids (FISH OIL PO) Take 1,000 mg by mouth 2 times daily       GLUCOSAMINE-CHONDROITIN PO Take 1 tablet by mouth 2 times daily      acetaminophen (TYLENOL) 500 MG tablet Take 1,500 mg by mouth 3 times daily      aspirin (ECOTRIN LOW STRENGTH) 81 MG EC tablet Take 81 mg by mouth Mon-Fri      apixaban (ELIQUIS) 5 MG TABS tablet Take by mouth 2 times daily      atorvastatin (LIPITOR) 40 MG tablet daily       hydrochlorothiazide (HYDRODIURIL) 25 MG tablet daily       lisinopril (PRINIVIL;ZESTRIL) 40 MG tablet daily       nitroGLYCERIN (NITROSTAT) 0.4 MG SL tablet  (Patient not taking: Reported on 8/24/2021)       No current facility-administered medications for this visit.      Levofloxacin  All reviewed and verified by Dr Jhonatan Singh on today's visit    No results found for: PSA, PSADIA  Results for POC orders placed in visit on 08/24/21   POCT Urinalysis No Micro (Auto)   Result Value Ref Range    Color, UA yellow     Clarity, UA clear     Glucose, UA POC neg     Bilirubin, UA neg     Ketones, UA neg     Spec Grav, UA 1.025     Blood, UA POC neg     pH, UA 7.0 Protein, UA POC >=300 mg/dL     Urobilinogen, UA 1.0     Leukocytes, UA neg     Nitrite, UA neg        Physical Exam  Vitals:    08/24/21 1316   BP: 132/88   Pulse: 64   Weight: 218 lb (98.9 kg)   Height: 5' 6\" (1.676 m)     Constitutional: Not in distress. Cardiovascular: Normal rate, BP reviewed. Rhythm consistent with atrial fib  Pulmonary/Chest: Normal respiratory effort not short of breath  Abdominal: Not distended. No hernias  Urologic Exam  Uncircumcised penis normal meatus  Testis mildly atrophic  Normal rectal tone  Hard prostate with large nodules bilaterally  Exam consistent with prostate cancer adherent to pelvic sidewall. Musculoskeletal: Patient is ambulatory. Extremities: No edema  Neurological: Intact no deficits  Lymphatics: No evidence of gross lymphadenopathy  Psychiatric: Alert oriented x3 normal affect patient intellectually solid for his age. Assessment/Medical Necessity-Decision Making  Findings consistent with stage III prostate cancer  PSA of 76.8  Plan  Prostate biopsies under anesthesia  PAT  Instructions given  Risks and benefits explained in detail  Greater than 50% of 45 minutes spent consulting patient face-to-face  Orders Placed This Encounter   Procedures    POCT Urinalysis No Micro (Auto)     Orders Placed This Encounter   Medications    sulfamethoxazole-trimethoprim (BACTRIM DS;SEPTRA DS) 800-160 MG per tablet     Sig: Take 1 tablet by mouth 2 times daily for 3 days START THE MORNING OF 9/28/21     Dispense:  6 tablet     Refill:  0     Griselda Delaney MD       Please note this report has been partially produced using speech recognition software  And may cause contain errors related to that system including grammar, punctuation and spelling as well as words and phrases that may seem inappropriate. If there are questions or concerns please feel free to contact me to clarify.

## 2021-09-07 ENCOUNTER — HOSPITAL ENCOUNTER (OUTPATIENT)
Dept: CARDIOLOGY | Age: 81
Discharge: HOME | End: 2021-09-07
Payer: MEDICARE

## 2021-09-07 PROCEDURE — 93296 REM INTERROG EVL PM/IDS: CPT

## 2021-09-16 ENCOUNTER — TELEPHONE (OUTPATIENT)
Dept: UROLOGY | Age: 81
End: 2021-09-16

## 2021-09-16 NOTE — TELEPHONE ENCOUNTER
Urology  I called the referring physician's office called regarding patient canceling prostate biopsies 9/16/2021 10:42 AM  Patient's prostate exam is highly suspicious for prostate cancer  Patient's PSA is 76.80  It was reiterated to the primary care physician that patient more than likely has prostate cancer and should pursue a prostate biopsy and to see whether or not he can convince this patient to follow-up with the procedure  Attempts to contact patient failed  Jose Ortega MD

## 2021-09-16 NOTE — TELEPHONE ENCOUNTER
Patient called to cancel surgery with Garima Kahn on Wed 9-29-21   He does not wish to have to surgery at all now,, he just doesn't want it.   Surgery has been cancelled with surgery

## 2021-10-26 LAB
ANION GAP SERPL CALCULATED.3IONS-SCNC: 12 MEQ/L (ref 9–15)
BUN BLDV-MCNC: 20 MG/DL (ref 8–23)
CALCIUM SERPL-MCNC: 9.7 MG/DL (ref 8.5–9.9)
CHLORIDE BLD-SCNC: 102 MEQ/L (ref 95–107)
CO2: 31 MEQ/L (ref 20–31)
CREAT SERPL-MCNC: 0.82 MG/DL (ref 0.7–1.2)
GFR AFRICAN AMERICAN: >60
GFR NON-AFRICAN AMERICAN: >60
GLUCOSE BLD-MCNC: 99 MG/DL (ref 70–99)
POTASSIUM SERPL-SCNC: 4.8 MEQ/L (ref 3.4–4.9)
SODIUM BLD-SCNC: 145 MEQ/L (ref 135–144)

## 2021-12-03 ENCOUNTER — HOSPITAL ENCOUNTER (OUTPATIENT)
Dept: CARDIOLOGY | Age: 81
Discharge: HOME | End: 2021-12-03
Payer: MEDICARE

## 2021-12-03 PROCEDURE — 93296 REM INTERROG EVL PM/IDS: CPT

## 2021-12-16 ENCOUNTER — APPOINTMENT (OUTPATIENT)
Dept: CT IMAGING | Age: 81
DRG: 190 | End: 2021-12-16
Payer: MEDICARE

## 2021-12-16 ENCOUNTER — HOSPITAL ENCOUNTER (INPATIENT)
Age: 81
LOS: 2 days | Discharge: HOME | DRG: 190 | End: 2021-12-18
Attending: EMERGENCY MEDICINE | Admitting: INTERNAL MEDICINE
Payer: MEDICARE

## 2021-12-16 DIAGNOSIS — J44.1 COPD WITH ACUTE EXACERBATION (HCC): Primary | ICD-10-CM

## 2021-12-16 DIAGNOSIS — J44.1 COPD EXACERBATION (HCC): ICD-10-CM

## 2021-12-16 DIAGNOSIS — R09.02 HYPOXIA: ICD-10-CM

## 2021-12-16 LAB
ALBUMIN SERPL-MCNC: 4.1 G/DL (ref 3.5–4.6)
ALP BLD-CCNC: 70 U/L (ref 35–104)
ALT SERPL-CCNC: 17 U/L (ref 0–41)
ANION GAP SERPL CALCULATED.3IONS-SCNC: 11 MEQ/L (ref 9–15)
APTT: 27.6 SEC (ref 24.4–36.8)
AST SERPL-CCNC: 34 U/L (ref 0–40)
BASOPHILS ABSOLUTE: 0 K/UL (ref 0–0.2)
BASOPHILS RELATIVE PERCENT: 0.5 %
BILIRUB SERPL-MCNC: 1.2 MG/DL (ref 0.2–0.7)
BUN BLDV-MCNC: 18 MG/DL (ref 8–23)
CALCIUM SERPL-MCNC: 9.3 MG/DL (ref 8.5–9.9)
CHLORIDE BLD-SCNC: 102 MEQ/L (ref 95–107)
CO2: 29 MEQ/L (ref 20–31)
CREAT SERPL-MCNC: 0.54 MG/DL (ref 0.7–1.2)
EOSINOPHILS ABSOLUTE: 0.3 K/UL (ref 0–0.7)
EOSINOPHILS RELATIVE PERCENT: 3.2 %
GFR AFRICAN AMERICAN: >60
GFR NON-AFRICAN AMERICAN: >60
GLOBULIN: 2.9 G/DL (ref 2.3–3.5)
GLUCOSE BLD-MCNC: 109 MG/DL (ref 70–99)
HCT VFR BLD CALC: 45.5 % (ref 42–52)
HEMOGLOBIN: 14.7 G/DL (ref 14–18)
INFLUENZA A BY PCR: NEGATIVE
INFLUENZA B BY PCR: NEGATIVE
INR BLD: 1.2
LYMPHOCYTES ABSOLUTE: 1.5 K/UL (ref 1–4.8)
LYMPHOCYTES RELATIVE PERCENT: 18.8 %
MAGNESIUM: 2.1 MG/DL (ref 1.7–2.4)
MCH RBC QN AUTO: 31 PG (ref 27–31.3)
MCHC RBC AUTO-ENTMCNC: 32.3 % (ref 33–37)
MCV RBC AUTO: 96 FL (ref 80–100)
MONOCYTES ABSOLUTE: 0.7 K/UL (ref 0.2–0.8)
MONOCYTES RELATIVE PERCENT: 9.1 %
NEUTROPHILS ABSOLUTE: 5.4 K/UL (ref 1.4–6.5)
NEUTROPHILS RELATIVE PERCENT: 68.4 %
PDW BLD-RTO: 14.2 % (ref 11.5–14.5)
PLATELET # BLD: 133 K/UL (ref 130–400)
POTASSIUM SERPL-SCNC: 5 MEQ/L (ref 3.4–4.9)
PRO-BNP: 1594 PG/ML
PROTHROMBIN TIME: 14.9 SEC (ref 12.3–14.9)
RBC # BLD: 4.75 M/UL (ref 4.7–6.1)
SARS-COV-2, NAAT: NOT DETECTED
SODIUM BLD-SCNC: 142 MEQ/L (ref 135–144)
TOTAL PROTEIN: 7 G/DL (ref 6.3–8)
TROPONIN: <0.01 NG/ML (ref 0–0.01)
WBC # BLD: 8 K/UL (ref 4.8–10.8)

## 2021-12-16 PROCEDURE — 83735 ASSAY OF MAGNESIUM: CPT

## 2021-12-16 PROCEDURE — 85025 COMPLETE CBC W/AUTO DIFF WBC: CPT

## 2021-12-16 PROCEDURE — 96375 TX/PRO/DX INJ NEW DRUG ADDON: CPT

## 2021-12-16 PROCEDURE — 84484 ASSAY OF TROPONIN QUANT: CPT

## 2021-12-16 PROCEDURE — 87502 INFLUENZA DNA AMP PROBE: CPT

## 2021-12-16 PROCEDURE — 83880 ASSAY OF NATRIURETIC PEPTIDE: CPT

## 2021-12-16 PROCEDURE — 94640 AIRWAY INHALATION TREATMENT: CPT

## 2021-12-16 PROCEDURE — 6360000002 HC RX W HCPCS: Performed by: NURSE PRACTITIONER

## 2021-12-16 PROCEDURE — 71275 CT ANGIOGRAPHY CHEST: CPT

## 2021-12-16 PROCEDURE — 87635 SARS-COV-2 COVID-19 AMP PRB: CPT

## 2021-12-16 PROCEDURE — 6360000002 HC RX W HCPCS: Performed by: EMERGENCY MEDICINE

## 2021-12-16 PROCEDURE — 6370000000 HC RX 637 (ALT 250 FOR IP): Performed by: NURSE PRACTITIONER

## 2021-12-16 PROCEDURE — 80053 COMPREHEN METABOLIC PANEL: CPT

## 2021-12-16 PROCEDURE — 87040 BLOOD CULTURE FOR BACTERIA: CPT

## 2021-12-16 PROCEDURE — 2580000003 HC RX 258: Performed by: NURSE PRACTITIONER

## 2021-12-16 PROCEDURE — 96365 THER/PROPH/DIAG IV INF INIT: CPT

## 2021-12-16 PROCEDURE — 6360000004 HC RX CONTRAST MEDICATION: Performed by: EMERGENCY MEDICINE

## 2021-12-16 PROCEDURE — 93005 ELECTROCARDIOGRAM TRACING: CPT | Performed by: EMERGENCY MEDICINE

## 2021-12-16 PROCEDURE — 94761 N-INVAS EAR/PLS OXIMETRY MLT: CPT

## 2021-12-16 PROCEDURE — 1210000000 HC MED SURG R&B

## 2021-12-16 PROCEDURE — 36415 COLL VENOUS BLD VENIPUNCTURE: CPT

## 2021-12-16 PROCEDURE — 85730 THROMBOPLASTIN TIME PARTIAL: CPT

## 2021-12-16 PROCEDURE — 85610 PROTHROMBIN TIME: CPT

## 2021-12-16 PROCEDURE — 99285 EMERGENCY DEPT VISIT HI MDM: CPT

## 2021-12-16 RX ORDER — GUAIFENESIN 600 MG/1
600 TABLET, EXTENDED RELEASE ORAL 2 TIMES DAILY
Status: DISCONTINUED | OUTPATIENT
Start: 2021-12-16 | End: 2021-12-18 | Stop reason: HOSPADM

## 2021-12-16 RX ORDER — ASPIRIN 81 MG/1
81 TABLET ORAL DAILY
Status: DISCONTINUED | OUTPATIENT
Start: 2021-12-17 | End: 2021-12-18 | Stop reason: HOSPADM

## 2021-12-16 RX ORDER — ONDANSETRON 4 MG/1
4 TABLET, ORALLY DISINTEGRATING ORAL EVERY 8 HOURS PRN
Status: DISCONTINUED | OUTPATIENT
Start: 2021-12-16 | End: 2021-12-18 | Stop reason: HOSPADM

## 2021-12-16 RX ORDER — ACETAMINOPHEN 325 MG/1
650 TABLET ORAL EVERY 6 HOURS PRN
Status: DISCONTINUED | OUTPATIENT
Start: 2021-12-16 | End: 2021-12-18 | Stop reason: HOSPADM

## 2021-12-16 RX ORDER — SODIUM CHLORIDE 0.9 % (FLUSH) 0.9 %
5-40 SYRINGE (ML) INJECTION PRN
Status: DISCONTINUED | OUTPATIENT
Start: 2021-12-16 | End: 2021-12-18 | Stop reason: HOSPADM

## 2021-12-16 RX ORDER — FUROSEMIDE 10 MG/ML
20 INJECTION INTRAMUSCULAR; INTRAVENOUS ONCE
Status: COMPLETED | OUTPATIENT
Start: 2021-12-16 | End: 2021-12-16

## 2021-12-16 RX ORDER — SODIUM CHLORIDE 9 MG/ML
25 INJECTION, SOLUTION INTRAVENOUS PRN
Status: DISCONTINUED | OUTPATIENT
Start: 2021-12-16 | End: 2021-12-18 | Stop reason: HOSPADM

## 2021-12-16 RX ORDER — PREDNISONE 20 MG/1
40 TABLET ORAL DAILY
Status: DISCONTINUED | OUTPATIENT
Start: 2021-12-19 | End: 2021-12-17

## 2021-12-16 RX ORDER — SODIUM CHLORIDE 0.9 % (FLUSH) 0.9 %
5-40 SYRINGE (ML) INJECTION EVERY 12 HOURS SCHEDULED
Status: DISCONTINUED | OUTPATIENT
Start: 2021-12-16 | End: 2021-12-18 | Stop reason: HOSPADM

## 2021-12-16 RX ORDER — HYDROCHLOROTHIAZIDE 25 MG/1
25 TABLET ORAL DAILY
Status: DISCONTINUED | OUTPATIENT
Start: 2021-12-17 | End: 2021-12-18 | Stop reason: HOSPADM

## 2021-12-16 RX ORDER — BICALUTAMIDE 50 MG/1
50 TABLET, FILM COATED ORAL DAILY
COMMUNITY
Start: 2021-11-02

## 2021-12-16 RX ORDER — IPRATROPIUM BROMIDE AND ALBUTEROL SULFATE 2.5; .5 MG/3ML; MG/3ML
1 SOLUTION RESPIRATORY (INHALATION)
Status: DISCONTINUED | OUTPATIENT
Start: 2021-12-16 | End: 2021-12-17

## 2021-12-16 RX ORDER — METHYLPREDNISOLONE SODIUM SUCCINATE 40 MG/ML
40 INJECTION, POWDER, LYOPHILIZED, FOR SOLUTION INTRAMUSCULAR; INTRAVENOUS EVERY 6 HOURS
Status: DISCONTINUED | OUTPATIENT
Start: 2021-12-16 | End: 2021-12-17

## 2021-12-16 RX ORDER — SODIUM CHLORIDE 0.9 % (FLUSH) 0.9 %
10 SYRINGE (ML) INJECTION 2 TIMES DAILY
Status: DISCONTINUED | OUTPATIENT
Start: 2021-12-16 | End: 2021-12-18 | Stop reason: HOSPADM

## 2021-12-16 RX ORDER — METOPROLOL SUCCINATE 100 MG/1
100 TABLET, EXTENDED RELEASE ORAL NIGHTLY
Status: DISCONTINUED | OUTPATIENT
Start: 2021-12-16 | End: 2021-12-18 | Stop reason: HOSPADM

## 2021-12-16 RX ORDER — SOTALOL HYDROCHLORIDE 120 MG/1
120 TABLET ORAL 2 TIMES DAILY
Status: DISCONTINUED | OUTPATIENT
Start: 2021-12-16 | End: 2021-12-18 | Stop reason: HOSPADM

## 2021-12-16 RX ORDER — MAGNESIUM SULFATE IN WATER 40 MG/ML
2000 INJECTION, SOLUTION INTRAVENOUS ONCE
Status: COMPLETED | OUTPATIENT
Start: 2021-12-16 | End: 2021-12-16

## 2021-12-16 RX ORDER — ACETAMINOPHEN 650 MG/1
650 SUPPOSITORY RECTAL EVERY 6 HOURS PRN
Status: DISCONTINUED | OUTPATIENT
Start: 2021-12-16 | End: 2021-12-18 | Stop reason: HOSPADM

## 2021-12-16 RX ORDER — MONTELUKAST SODIUM 10 MG/1
10 TABLET ORAL NIGHTLY
Status: DISCONTINUED | OUTPATIENT
Start: 2021-12-16 | End: 2021-12-18 | Stop reason: HOSPADM

## 2021-12-16 RX ORDER — ONDANSETRON 2 MG/ML
4 INJECTION INTRAMUSCULAR; INTRAVENOUS EVERY 6 HOURS PRN
Status: DISCONTINUED | OUTPATIENT
Start: 2021-12-16 | End: 2021-12-18 | Stop reason: HOSPADM

## 2021-12-16 RX ORDER — ALBUTEROL SULFATE 2.5 MG/3ML
2.5 SOLUTION RESPIRATORY (INHALATION)
Status: COMPLETED | OUTPATIENT
Start: 2021-12-16 | End: 2021-12-16

## 2021-12-16 RX ORDER — LANOLIN ALCOHOL/MO/W.PET/CERES
400 CREAM (GRAM) TOPICAL DAILY
Status: DISCONTINUED | OUTPATIENT
Start: 2021-12-17 | End: 2021-12-18 | Stop reason: HOSPADM

## 2021-12-16 RX ORDER — BICALUTAMIDE 50 MG/1
50 TABLET, FILM COATED ORAL DAILY
Status: DISCONTINUED | OUTPATIENT
Start: 2021-12-16 | End: 2021-12-18 | Stop reason: HOSPADM

## 2021-12-16 RX ORDER — AZELASTINE HCL 205.5 UG/1
2 SPRAY NASAL 2 TIMES DAILY
Status: DISCONTINUED | OUTPATIENT
Start: 2021-12-16 | End: 2021-12-18 | Stop reason: HOSPADM

## 2021-12-16 RX ORDER — LISINOPRIL 20 MG/1
40 TABLET ORAL DAILY
Status: DISCONTINUED | OUTPATIENT
Start: 2021-12-17 | End: 2021-12-18 | Stop reason: HOSPADM

## 2021-12-16 RX ORDER — POLYETHYLENE GLYCOL 3350 17 G/17G
17 POWDER, FOR SOLUTION ORAL DAILY PRN
Status: DISCONTINUED | OUTPATIENT
Start: 2021-12-16 | End: 2021-12-18 | Stop reason: HOSPADM

## 2021-12-16 RX ORDER — ATORVASTATIN CALCIUM 40 MG/1
40 TABLET, FILM COATED ORAL NIGHTLY
Status: DISCONTINUED | OUTPATIENT
Start: 2021-12-16 | End: 2021-12-18 | Stop reason: HOSPADM

## 2021-12-16 RX ORDER — METHYLPREDNISOLONE SODIUM SUCCINATE 125 MG/2ML
125 INJECTION, POWDER, LYOPHILIZED, FOR SOLUTION INTRAMUSCULAR; INTRAVENOUS ONCE
Status: COMPLETED | OUTPATIENT
Start: 2021-12-16 | End: 2021-12-16

## 2021-12-16 RX ADMIN — FUROSEMIDE 20 MG: 10 INJECTION, SOLUTION INTRAVENOUS at 10:04

## 2021-12-16 RX ADMIN — MONTELUKAST 10 MG: 10 TABLET, FILM COATED ORAL at 21:57

## 2021-12-16 RX ADMIN — METHYLPREDNISOLONE SODIUM SUCCINATE 125 MG: 125 INJECTION, POWDER, FOR SOLUTION INTRAMUSCULAR; INTRAVENOUS at 08:11

## 2021-12-16 RX ADMIN — ALBUTEROL SULFATE 2.5 MG: 2.5 SOLUTION RESPIRATORY (INHALATION) at 10:05

## 2021-12-16 RX ADMIN — MAGNESIUM SULFATE HEPTAHYDRATE 2000 MG: 2 INJECTION, SOLUTION INTRAVENOUS at 08:12

## 2021-12-16 RX ADMIN — METOPROLOL SUCCINATE 100 MG: 100 TABLET, EXTENDED RELEASE ORAL at 21:57

## 2021-12-16 RX ADMIN — APIXABAN 5 MG: 5 TABLET, FILM COATED ORAL at 21:57

## 2021-12-16 RX ADMIN — ALBUTEROL SULFATE 2.5 MG: 2.5 SOLUTION RESPIRATORY (INHALATION) at 10:10

## 2021-12-16 RX ADMIN — ATORVASTATIN CALCIUM 40 MG: 40 TABLET, FILM COATED ORAL at 21:57

## 2021-12-16 RX ADMIN — GUAIFENESIN 600 MG: 600 TABLET ORAL at 21:57

## 2021-12-16 RX ADMIN — IOPAMIDOL 100 ML: 612 INJECTION, SOLUTION INTRAVENOUS at 09:17

## 2021-12-16 RX ADMIN — ALBUTEROL SULFATE 2.5 MG: 2.5 SOLUTION RESPIRATORY (INHALATION) at 10:09

## 2021-12-16 RX ADMIN — Medication 10 ML: at 21:56

## 2021-12-16 RX ADMIN — SOTALOL HYDROCHLORIDE 120 MG: 120 TABLET ORAL at 21:56

## 2021-12-16 RX ADMIN — METHYLPREDNISOLONE SODIUM SUCCINATE 40 MG: 40 INJECTION, POWDER, FOR SOLUTION INTRAMUSCULAR; INTRAVENOUS at 21:57

## 2021-12-16 ASSESSMENT — ENCOUNTER SYMPTOMS
SHORTNESS OF BREATH: 1
VOMITING: 0
EYES NEGATIVE: 1
SORE THROAT: 0
NAUSEA: 0
DIARRHEA: 0
ABDOMINAL PAIN: 0
COUGH: 1
GASTROINTESTINAL NEGATIVE: 1
BACK PAIN: 0

## 2021-12-16 NOTE — H&P
Hospitalist History and Physical  Name: Jatin Plummer  Age: 79 y.o. Gender: male  CodeStatus: Prior  Allergies: Levofloxacin    Chief Complaint:Shortness of Breath (pt c/o SOB through the night)    Primary Care Provider: Yudi Pedro MD  InpatientTreatment Team: Treatment Team: Attending Provider: Natalie Hung MD; Registered Nurse: Yenifer Duncan RN  Admission Date: 12/16/2021      Subjective: Patient is a 79 y.o. male per chart review has a h/o CHF with EF 60% in 2016, afib on eliquis, CAD, OA, HTN, hpl, chronic back pain and recently dx Prostate cancer on chemo and radiation who presents with increased shortness of breath and productive cough over the last two weeks. SpO2 was 77% on arrival. He did improve to 90% on 4l nc. He does not wear O2 at home. He denies chest pain, fever/chills or abdominal discomfort. He is afebrile, hemodynamically stable,  EKG shows atrial paced rhythm with HR 63, normal axis, QTc 507, no ST changes. Labs remarkable for BNP 1594. CT PE negative. Pt with RLL atx vs infiltrate. Physical Exam  HENT:      Head: Normocephalic and atraumatic. Nose: Nose normal.      Mouth/Throat:      Mouth: Mucous membranes are dry. Pharynx: Oropharynx is clear. Eyes:      Extraocular Movements: Extraocular movements intact. Conjunctiva/sclera: Conjunctivae normal.      Pupils: Pupils are equal, round, and reactive to light. Cardiovascular:      Rate and Rhythm: Normal rate and regular rhythm. Pulses: Normal pulses. Heart sounds: Normal heart sounds. Pulmonary:      Effort: Pulmonary effort is normal.      Breath sounds: Normal breath sounds. Abdominal:      General: Bowel sounds are normal.      Palpations: Abdomen is soft. Musculoskeletal:         General: Normal range of motion. Cervical back: Normal range of motion and neck supple. Skin:     General: Skin is warm and dry. Capillary Refill: Capillary refill takes less than 2 seconds. Neurological:      General: No focal deficit present. Mental Status: He is alert and oriented to person, place, and time. Psychiatric:         Mood and Affect: Mood normal.         Review of Systems   Constitutional: Positive for fatigue. HENT: Negative. Eyes: Negative. Respiratory: Positive for cough and shortness of breath. Cardiovascular: Negative. Gastrointestinal: Negative. Endocrine: Negative. Genitourinary: Negative. Musculoskeletal: Negative. Skin: Negative. Neurological: Negative. Hematological: Negative. Psychiatric/Behavioral: Negative. Medications:  Reviewed     No current facility-administered medications on file prior to encounter. Current Outpatient Medications on File Prior to Encounter   Medication Sig Dispense Refill    gentamicin (GARAMYCIN) 0.1 % cream apply sparingly to affected areas(s) twice daily      sotalol (BETAPACE) 120 MG tablet Take 120 mg by mouth 2 times daily      ketoconazole (NIZORAL) 2 % shampoo Apply topically daily as needed for Itching Apply topically daily as needed.       magnesium oxide (MAG-OX) 400 MG tablet Take 400 mg by mouth daily      azelastine HCl 0.15 % SOLN 2 sprays by NOT APPLICABLE route 2 times daily      Cholecalciferol (VITAMIN D) 50 MCG (2000 UT) CAPS capsule Take 1,000 Units by mouth daily      metoprolol tartrate (LOPRESSOR) 25 MG tablet Take 25 mg by mouth as needed      montelukast (SINGULAIR) 10 MG tablet Take 10 mg by mouth nightly      metoprolol succinate (TOPROL XL) 100 MG extended release tablet Take 100 mg by mouth nightly      Omega-3 Fatty Acids (FISH OIL PO) Take 1,000 mg by mouth 2 times daily       GLUCOSAMINE-CHONDROITIN PO Take 1 tablet by mouth 2 times daily      acetaminophen (TYLENOL) 500 MG tablet Take 1,500 mg by mouth 3 times daily      aspirin (ECOTRIN LOW STRENGTH) 81 MG EC tablet Take 81 mg by mouth Mon-Fri      apixaban (ELIQUIS) 5 MG TABS tablet Take by mouth 2 times daily      atorvastatin (LIPITOR) 40 MG tablet daily       hydrochlorothiazide (HYDRODIURIL) 25 MG tablet daily       lisinopril (PRINIVIL;ZESTRIL) 40 MG tablet daily       nitroGLYCERIN (NITROSTAT) 0.4 MG SL tablet  (Patient not taking: Reported on 8/24/2021)          Past Medical History:   Diagnosis Date    AAA (abdominal aortic aneurysm) (HCC)     monitoring 4.5cm    Arthritis     lower spine    Atrial fibrillation (HCC)     CAD (coronary artery disease)     stents x 2 cardiac     Chronic back pain     Heart attack ([facility] [address] 1984    Hyperlipidemia     on meds >20yrs    Hypertension     on meds > 20yrs    Knee injury     AS A CHILD-RIGHT       Past Surgical History:   Procedure Laterality Date    ABDOMINAL AORTIC ANEURYSM REPAIR, ENDOVASCULAR Bilateral 1/15/2021    AORTO FEMORAL DIGITAL SUBTRACTION ARTERIOGRAM AND REPAIR FEMORAL ARTERIES performed by Jose M Grossman MD at [address]      COLONOSCOPY  2012    CORONARY ANGIOPLASTY WITH STENT PLACEMENT      5 stents    EYE SURGERY  2013    bilateral cataracts    JOINT REPLACEMENT  2013    RIGHT HIP    JOINT REPLACEMENT Right 2012    hip    [place] LAMINECTOMY,>2 SGMT,LUMBAR N/A 8/15/2018    DECOMPRESSIVE  LUMBAR LAMINECTOMY L2-3 L3-4 RIGHT SIDED APPROACH performed by Jose Peñaloza MD at [facility]        Family History   Problem Relation Age of Onset    High Blood Pressure Mother     Other Mother         liver failure    Heart Attack Father         heart occlusion    Colon Cancer Sister     No Known Problems Son     Other Daughter         spinal problems        Social History     Socioeconomic History    Marital status:      Spouse name: Not on file    Number of children: Not on file    Years of education: Not on file    Highest education level: Not on file   Occupational History    Not on file Tobacco Use    Smoking status: Former Smoker     Types: Cigarettes     Quit date: 2000     Years since quittin.4    Smokeless tobacco: Never Used   Vaping Use    Vaping Use: Never used   Substance and Sexual Activity    Alcohol use: Yes     Alcohol/week: 0.0 standard drinks     Comment: OCCASIONALLY    Drug use: No    Sexual activity: Not on file   Other Topics Concern    Not on file   Social History Narrative    Not on file     Social Determinants of Health     Financial Resource Strain:     Difficulty of Paying Living Expenses: Not on file   Food Insecurity:     Worried About Running Out of Food in the Last Year: Not on file    Geoffrey of Food in the Last Year: Not on file   Transportation Needs:     Lack of Transportation (Medical): Not on file    Lack of Transportation (Non-Medical):  Not on file   Physical Activity:     Days of Exercise per Week: Not on file    Minutes of Exercise per Session: Not on file   Stress:     Feeling of Stress : Not on file   Social Connections:     Frequency of Communication with Friends and Family: Not on file    Frequency of Social Gatherings with Friends and Family: Not on file    Attends Muslim Services: Not on file    Active Member of [address] [place] [address] or Organizations: Not on file    Attends Club or Organization Meetings: Not on file    Marital Status: Not on file   Intimate Partner Violence:     Fear of Current or Ex-Partner: Not on file    Emotionally Abused: Not on file    Physically Abused: Not on file    Sexually Abused: Not on file   Housing Stability:     Unable to Pay for Housing in the Last Year: Not on file    Number of [place] in the Last Year: Not on file    Unstable Housing in the Last Year: Not on file        Infusion Medications:   Scheduled Medications:    sodium chloride flush  10 mL IntraVENous BID     PRN Meds:     Labs:   Recent Labs     21  0730   WBC 8.0   HGB 14.7   HCT 45.5        Recent Labs 12/16/21  0730      K 5.0*      CO2 29   BUN 18   CREATININE 0.54*   CALCIUM 9.3     Recent Labs     12/16/21  0730   AST 34   ALT 17   BILITOT 1.2*   ALKPHOS 70     Recent Labs     12/16/21  0730   INR 1.2     Recent Labs     12/16/21  0730   TROPONINI <0.010       Urinalysis:   Lab Results   Component Value Date    NITRU Negative 01/11/2021    WBCUA 0-2 10/19/2016    BACTERIA Rare 10/19/2016    RBCUA 0-2 10/19/2016    BLOODU neg 08/24/2021    BLOODU Negative 01/11/2021    SPECGRAV 1.025 08/24/2021    SPECGRAV 1.008 01/11/2021    GLUCOSEU neg 08/24/2021    GLUCOSEU Negative 01/11/2021       Radiology:   Most recent    Chest CT      WITH CONTRAST:No results found for this or any previous visit. WITHOUT CONTRAST: No results found for this or any previous visit. CXR      2-view: Results for orders placed during the hospital encounter of 10/29/16    XR Chest Standard TWO VW    Narrative  2 CHEST FILMS  REASON FOR EXAM: SHORTNESS OF BREATH.  COMPARISON: OCTOBER 18, 2016. FINDINGS: Cardiac bipolar ICD device remains in satisfactory position. Heart size normal. Lungs clear. Pleural angles smooth. Bones intact. Impression  NO ACTIVE LUNG DISEASE. Portable: Results for orders placed during the hospital encounter of 10/18/16    XR Chest Portable    Narrative  CHEST SINGLE VIEW PORTABLE  REASON FOR EXAMINATION:  Mid chest pain, sweating  COMPARISON:2/24/11  Single view of the chest was obtained. The heart is normal in size. The lungs are clear. Pacer wire is seen which appears unchanged in position. The pulmonary vasculature is normal.  Mediastinum and hilar regions are unremarkable. No pleural effusions  are seen. Visualized bones are intact. IMPRESSION  NO ACTIVE CHEST DISEASE. Echo No results found for this or any previous visit.             Assessment/Plan:    Acute COPD exacerbation: Pulmonology consult, IV Steroids with taper to PO once exp wheezes resolve,  Chest PT, Acapella, Incentive spirometry, Duonebs Q4hr, Guaneficine. Ordered sputum to be sent for culture/ sensitivities and gram stain. Supplemental O2 with goal SPO2 of 92% or greater. Wean down O2 if/when possible. If continues to need O2, will order home O2 evaluation prior to discharge to evaluate for home O2 needs. 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 On ASA, statin, BB,  ACEI/ARB. Telemetry. Daily weights, fluid restriction, Cardiac diet. Monitor for signs/ symptoms of volume overload. Elevate lower exts while at rest    A fib/CAD: rate controlled, pacemaker present. Goal K and Mg 4.0 and 2.0, respectively. On Long term anticoagulation    HTN/HLD: Stable. Continue home meds    Prostate CA: continue Bicalutamide      I personally spent estimated 60 minutes with this patient today. Additional work up or/and treatment plan may be added today or then after based on clinical progression. I am managing a portion of pt care. Some medical issues are handled by other specialists. Additional work up and treatment should be done in out pt setting by pt PCP and other out pt providers. In addition to examining and evaluating pt, I spent additional time explaining care, normaland abnormal findings, and treatment plan. All of pt questions were answered. Counseling, diet and education were provided. Case will be discussed with nursing staff when appropriate. Family will be updated if and when appropriate. Electronically signed by TOMASZ Tarango CNP on 12/16/2021 at 10:40 AM     I saw and evaluated the pt. I personally obtained the key and critical portions of the history and physical exam and made additions where appropriate in the documentation.  I reviewed the mid level documentation and agree with assessment and plan that we come up with together    Shelly Reid DO  Internal Medicine   Hospitalist    >45 minutes in total care time

## 2021-12-16 NOTE — ED NOTES
Assumed care of patient. A & Ox4. Skin pink warm and dry. Pt on breathing treatment at this time. Denies any need of anything. Call light within reach. Aware we are waiting on bed.       Pilar Yanez RN  12/16/21 0654

## 2021-12-16 NOTE — ED NOTES
[facility]     Gianna Espitia RN  12/16/21 [id number]

## 2021-12-16 NOTE — ED NOTES
Patient medicated per MAR. All questions answered and allergies reviewed. Will continue to monitor.       Gloria De Paz RN  12/16/21 6001

## 2021-12-16 NOTE — ED PROVIDER NOTES
[facility] ED  eMERGENCYdEPARTMENT eNCOUnter      Pt Name: Carlo Ness  MRN: [id number]  [place] 1940  Date of evaluation: 12/16/2021  Tomasa Kingston MD    CHIEF COMPLAINT           HPI  aCrlo Ness is a 79 y.o. male per chart review has a h/o CHF with EF 60% in 2016, afib on eliquis, CAD, OA, HTN, hpl, chronic back pain presents to the ED with sob. Pt notes gradual onset, moderate, intermittent, worsening sob x 2 weeks. Worse with exertion. +Cough. Pt denies fever, cp, ab pain, dysuria, diarrhea. ROS  Review of Systems   Constitutional: Negative for activity change, chills and fever. HENT: Negative for ear pain and sore throat. Eyes: Negative for visual disturbance. Respiratory: Positive for cough and shortness of breath. Cardiovascular: Negative for chest pain, palpitations and leg swelling. Gastrointestinal: Negative for abdominal pain, diarrhea, nausea and vomiting. Genitourinary: Negative for dysuria. Musculoskeletal: Negative for back pain. Skin: Negative for rash. Neurological: Negative for dizziness and weakness. Except as noted above the remainder of the review of systems was reviewed and negative.        PAST MEDICAL HISTORY     Past Medical History:   Diagnosis Date    AAA (abdominal aortic aneurysm) ([address]     monitoring 4.5cm    Arthritis     lower spine    Atrial fibrillation (HCC)     CAD (coronary artery disease)     stents x 2 cardiac     Chronic back pain     Heart attack ([address] 1984    Hyperlipidemia     on meds >20yrs    Hypertension     on meds > 20yrs    Knee injury     AS A CHILD-RIGHT         SURGICAL HISTORY       Past Surgical History:   Procedure Laterality Date    ABDOMINAL AORTIC ANEURYSM REPAIR, ENDOVASCULAR Bilateral 1/15/2021    AORTO FEMORAL DIGITAL SUBTRACTION ARTERIOGRAM AND REPAIR FEMORAL ARTERIES performed by Susana Maya MD at [facility]  2008   [address] PLACEMENT      CARDIAC DEFIBRILLATOR PLACEMENT      COLONOSCOPY  2012    CORONARY ANGIOPLASTY WITH STENT PLACEMENT      5 stents    EYE SURGERY  2013    bilateral cataracts    JOINT REPLACEMENT  2013    RIGHT HIP    JOINT REPLACEMENT Right 2012    hip    [place] LAMINECTOMY,>2 SGMT,LUMBAR N/A 8/15/2018    DECOMPRESSIVE  LUMBAR LAMINECTOMY L2-3 L3-4 RIGHT SIDED APPROACH performed by Eunice Newell MD at [facility]       Previous Medications    ACETAMINOPHEN (TYLENOL) 500 MG TABLET    Take 1,500 mg by mouth 3 times daily    APIXABAN (ELIQUIS) 5 MG TABS TABLET    Take by mouth 2 times daily    ASPIRIN (ECOTRIN LOW STRENGTH) 81 MG EC TABLET    Take 81 mg by mouth Mon-Fri    ATORVASTATIN (LIPITOR) 40 MG TABLET    daily     AZELASTINE HCL 0.15 % SOLN    2 sprays by NOT APPLICABLE route 2 times daily    CHOLECALCIFEROL (VITAMIN D) 50 MCG (2000 UT) CAPS CAPSULE    Take 1,000 Units by mouth daily    GENTAMICIN (GARAMYCIN) 0.1 % CREAM    apply sparingly to affected areas(s) twice daily    GLUCOSAMINE-CHONDROITIN PO    Take 1 tablet by mouth 2 times daily    HYDROCHLOROTHIAZIDE (HYDRODIURIL) 25 MG TABLET    daily     KETOCONAZOLE (NIZORAL) 2 % SHAMPOO    Apply topically daily as needed for Itching Apply topically daily as needed.     LISINOPRIL (PRINIVIL;ZESTRIL) 40 MG TABLET    daily     MAGNESIUM OXIDE (MAG-OX) 400 MG TABLET    Take 400 mg by mouth daily    METOPROLOL SUCCINATE (TOPROL XL) 100 MG EXTENDED RELEASE TABLET    Take 100 mg by mouth nightly    METOPROLOL TARTRATE (LOPRESSOR) 25 MG TABLET    Take 25 mg by mouth as needed    MONTELUKAST (SINGULAIR) 10 MG TABLET    Take 10 mg by mouth nightly    NITROGLYCERIN (NITROSTAT) 0.4 MG SL TABLET        OMEGA-3 FATTY ACIDS (FISH OIL PO)    Take 1,000 mg by mouth 2 times daily     SOTALOL (BETAPACE) 120 MG TABLET    Take 120 mg by mouth 2 times daily       ALLERGIES     Levofloxacin    FAMILY HISTORY       Family History Problem Relation Age of Onset    High Blood Pressure Mother     Other Mother         liver failure    Heart Attack Father         heart occlusion    Colon Cancer Sister     No Known Problems Son     Other Daughter         spinal problems          SOCIAL HISTORY       Social History     Socioeconomic History    Marital status:      Spouse name: Not on file    Number of children: Not on file    Years of education: Not on file    Highest education level: Not on file   Occupational History    Not on file   Tobacco Use    Smoking status: Former Smoker     Types: Cigarettes     Quit date: 2000     Years since quittin.4    Smokeless tobacco: Never Used   Vaping Use    Vaping Use: Never used   Substance and Sexual Activity    Alcohol use: Yes     Alcohol/week: 0.0 standard drinks     Comment: OCCASIONALLY    Drug use: No    Sexual activity: Not on file   Other Topics Concern    Not on file   Social History Narrative    Not on file     Social Determinants of Health     Financial Resource Strain:     Difficulty of Paying Living Expenses: Not on file   Food Insecurity:     Worried About Running Out of Food in the Last Year: Not on file    Geoffrey of Food in the Last Year: Not on file   Transportation Needs:     Lack of Transportation (Medical): Not on file    Lack of Transportation (Non-Medical):  Not on file   Physical Activity:     Days of Exercise per Week: Not on file    Minutes of Exercise per Session: Not on file   Stress:     Feeling of Stress : Not on file   Social Connections:     Frequency of Communication with Friends and Family: Not on file    Frequency of Social Gatherings with Friends and Family: Not on file    Attends Jehovah's witness Services: Not on file    Active Member of Clubs or Organizations: Not on file    Attends Club or Organization Meetings: Not on file    Marital Status: Not on file   Intimate Partner Violence:     Fear of Current or Ex-Partner: Not on file    Emotionally Abused: Not on file    Physically Abused: Not on file    Sexually Abused: Not on file   Housing Stability:     Unable to Pay for Housing in the Last Year: Not on file    Number of Places Lived in the Last Year: Not on file    Unstable Housing in the Last Year: Not on file         PHYSICAL EXAM       ED Triage Vitals [12/16/21 0719]   BP Temp Temp Source Pulse Resp SpO2 Height Weight   (!) 187/97 97.6 °F (36.4 °C) Oral 63 24 (!) 79 % 5' 6\" (1.676 m) 218 lb (98.9 kg)       Physical Exam  Vitals and nursing note reviewed. Constitutional:       Appearance: He is well-developed. HENT:      Head: Normocephalic. Right Ear: External ear normal.      Left Ear: External ear normal.   Eyes:      Conjunctiva/sclera: Conjunctivae normal.      Pupils: Pupils are equal, round, and reactive to light. Cardiovascular:      Rate and Rhythm: Normal rate and regular rhythm. Heart sounds: Normal heart sounds. Pulmonary:      Effort: Pulmonary effort is normal.      Breath sounds: Examination of the right-lower field reveals wheezing. Examination of the left-lower field reveals wheezing. Wheezing present. Abdominal:      General: Bowel sounds are normal. There is no distension. Palpations: Abdomen is soft. Tenderness: There is no abdominal tenderness. Musculoskeletal:         General: Normal range of motion. Cervical back: Normal range of motion and neck supple. Skin:     General: Skin is warm and dry. Neurological:      Mental Status: He is alert and oriented to person, place, and time. Psychiatric:         Mood and Affect: Mood normal.           MDM  78 yo male presents to the ED with sob, cough. Pt is afebrile, hemodynamically stable however pt only noted to be 79% on RA. Pt placed on 4 L NC with improvement of sats to 97%. EKG shows atrial paced rhythm with HR 63, normal axis, QTc 507, no ST changes. Labs remarkable for BNP 1594. CT PE negative.   Pt with RLL atx vs infiltrate. Pt without fever, leukocytosis, productive cough. Lower suspicion for pneumonia clinically. Suspect pt is hypoxic, sob due to COPD. Given COPD exacerbation, hypoxia, case discussed with Dr. ZAPATA [facility] (medicine) and pt admitted in stable condition. FINAL IMPRESSION      1. COPD with acute exacerbation ([facility] [address]    2.  Hypoxia          DISPOSITION/PLAN   DISPOSITION Decision To Admit 12/16/2021 10:02:42 AM        DISCHARGE MEDICATIONS:  [unfilled]         Stacy Pedroza MD(electronically signed)  Attending Emergency Physician            Stacy Pedroza MD  12/16/21 4892

## 2021-12-16 NOTE — ED NOTES
SBAR report called to Timoteo Agrawal RN on 4W. Portable tele placed on pt; pt identifier confirmed. Pt stable for transport.      Tuyet Lopez RN  12/16/21 1640

## 2021-12-16 NOTE — ED NOTES
First contact with pt. Pt informed that he has bed assignment, however bed is being cleaned. Pt verbalizes understanding; denies needs at this time.      Chapo Villavicencio RN  12/16/21 6584

## 2021-12-16 NOTE — ED NOTES
Patient resting in bed with lights on and call light within reach. Equal rise and fall of chest. No distress noted. Nurse will continue to monitor. Bed in lowest position. No complaints at this time.       Mayra Montes RN  12/16/21 3883

## 2021-12-16 NOTE — ED NOTES
Pt to ED for c/o Hypoxia. Pt states his O2 was 77% on RA. Pt has hx of COPD, but denies wearing O2. Pt placed on 4L NC and came up to 90%.       Claudell Starr, RN  12/16/21 4851

## 2021-12-16 NOTE — ED NOTES
Pt moved to bed 20. Placed on monitors. Breathing treatment resumed.       Adan Valenzuela  12/16/21 7842

## 2021-12-16 NOTE — CARE COORDINATION
[facility] Case Management Initial Discharge Assessment    Met with Patient to discuss discharge plan. PCP: Arabella Grier MD                                Date of Last Visit:  Oncologist 11/24/21, pcp sept. If no PCP, list provided? N/A    Discharge Planning    Living Arrangements: independently at home    Who do you live with? wife    Who helps you with your care:  self    If lives at home:     Do you have any barriers navigating in your home? no    Patient can perform ADL? Yes    Current Services (outpatient and in home) :  None    Dialysis: No    Is transportation available to get to your appointments? Yes    DME Equipment:  no    Respiratory equipment: None    Respiratory provider:  no     Pharmacy:  yes - [facility] or [facility] mail away    Consult with Medication Assistance Program?  No      Patient agreeable to [facility]? Declined    Patient agreeable to SNF/Rehab? Declined    Other discharge needs identified? N/A    Does Patient Have a High-Risk for Readmission Diagnosis (CHF, PN, MI, COPD)? Yes    If Yes,     Consult with pulmonologist? Yes   Consult with cardiologist? No   Cardiac Rehab referral if EF <35%? N/A   Consult with Pharmacy for medication assessment prior to discharge? No   Consult with Behavioral health to aid in depression, anxiety, or coping issues? No   Palliative Care Consult? No   Pulmonary Rehab order for COPD, PN, and CHF (if EF > 35%)? No    Does patient have a reliable scale and know how to read it (for CHF)? N/A   Nutrition consult for CHF? N/A   Respiratory therapy consult that includes bedside instruction on administration of nebulizers and/or inhalers, and assessment of oxygen and equipment needs in the home? Yes    Initial Discharge Plan? (Note: please see concurrent daily documentation for any updates after initial note).       Pt denied any d/c needs however he may need a home o2 eval. Cm to assess for further d/c needs and

## 2021-12-16 NOTE — ACP (ADVANCE CARE PLANNING)
Advance Care Planning     Advance Care Planning Activator (Inpatient)  Conversation Note      Date of ACP Conversation: 12/16/2021     Conversation Conducted with: Patient with Decision Making Capacity    ACP Activator: Srinivas Garrett    Pt states that he does have a living will and it is current with his wishes. Health Care Decision Maker:     Current Designated Health Care Decision Maker:     Primary Decision Maker: Stephanie Love - [phone]    Secondary Decision Maker: Lois Hernandez - [place] - [phone]    Care Preferences    Ventilation: \"If you were in your present state of health and suddenly became very ill and were unable to breathe on your own, what would your preference be about the use of a ventilator (breathing machine) if it were available to you? \"      Would the patient desire the use of ventilator (breathing machine)?: yes    \"If your health worsens and it becomes clear that your chance of recovery is unlikely, what would your preference be about the use of a ventilator (breathing machine) if it were available to you? \"     Would the patient desire the use of ventilator (breathing machine)?: No      Resuscitation  \"CPR works best to restart the heart when there is a sudden event, like a heart attack, in someone who is otherwise healthy. Unfortunately, CPR does not typically restart the heart for people who have serious health conditions or who are very sick. \"    \"In the event your heart stopped as a result of an underlying serious health condition, would you want attempts to be made to restart your heart (answer \"yes\" for attempt to resuscitate) or would you prefer a natural death (answer \"no\" for do not attempt to resuscitate)? \" yes       [x] Yes   [] No   Educated Patient / Geno List regarding differences between Advance Directives and portable DNR orders.     Length of ACP Conversation in minutes:  10  Conversation Outcomes:  [x] ACP discussion completed  [] Existing advance directive reviewed with patient; no changes to patient's previously recorded wishes  [] New Advance Directive completed  [] Portable Do Not Rescitate prepared for Provider review and signature  [] POLST/POST/MOLST/MOST prepared for Provider review and signature      Follow-up plan:    [] Schedule follow-up conversation to continue planning  [] Referred individual to Provider for additional questions/concerns   [] Advised patient/agent/surrogate to review completed ACP document and update if needed with changes in condition, patient preferences or care setting    [x] This note routed to one or more involved healthcare providers

## 2021-12-17 LAB
ALBUMIN SERPL-MCNC: 4.2 G/DL (ref 3.5–4.6)
ALP BLD-CCNC: 82 U/L (ref 35–104)
ALT SERPL-CCNC: 15 U/L (ref 0–41)
ANION GAP SERPL CALCULATED.3IONS-SCNC: 15 MEQ/L (ref 9–15)
AST SERPL-CCNC: 17 U/L (ref 0–40)
BASOPHILS ABSOLUTE: 0 K/UL (ref 0–0.2)
BASOPHILS RELATIVE PERCENT: 0 %
BILIRUB SERPL-MCNC: 1.2 MG/DL (ref 0.2–0.7)
BUN BLDV-MCNC: 24 MG/DL (ref 8–23)
CALCIUM SERPL-MCNC: 9.1 MG/DL (ref 8.5–9.9)
CHLORIDE BLD-SCNC: 95 MEQ/L (ref 95–107)
CO2: 28 MEQ/L (ref 20–31)
CREAT SERPL-MCNC: 0.71 MG/DL (ref 0.7–1.2)
EKG ATRIAL RATE: 63 BPM
EKG P AXIS: -10 DEGREES
EKG P-R INTERVAL: 210 MS
EKG Q-T INTERVAL: 496 MS
EKG QRS DURATION: 104 MS
EKG QTC CALCULATION (BAZETT): 507 MS
EKG R AXIS: 28 DEGREES
EKG T AXIS: 61 DEGREES
EKG VENTRICULAR RATE: 63 BPM
EOSINOPHILS ABSOLUTE: 0 K/UL (ref 0–0.7)
EOSINOPHILS RELATIVE PERCENT: 0 %
GFR AFRICAN AMERICAN: >60
GFR NON-AFRICAN AMERICAN: >60
GLOBULIN: 3 G/DL (ref 2.3–3.5)
GLUCOSE BLD-MCNC: 235 MG/DL (ref 70–99)
HCT VFR BLD CALC: 45.4 % (ref 42–52)
HEMOGLOBIN: 14.8 G/DL (ref 14–18)
LYMPHOCYTES ABSOLUTE: 0.9 K/UL (ref 1–4.8)
LYMPHOCYTES RELATIVE PERCENT: 6.8 %
MCH RBC QN AUTO: 31 PG (ref 27–31.3)
MCHC RBC AUTO-ENTMCNC: 32.7 % (ref 33–37)
MCV RBC AUTO: 95 FL (ref 80–100)
MONOCYTES ABSOLUTE: 0.2 K/UL (ref 0.2–0.8)
MONOCYTES RELATIVE PERCENT: 1.7 %
NEUTROPHILS ABSOLUTE: 12.7 K/UL (ref 1.4–6.5)
NEUTROPHILS RELATIVE PERCENT: 91.5 %
PDW BLD-RTO: 14.3 % (ref 11.5–14.5)
PLATELET # BLD: 174 K/UL (ref 130–400)
POTASSIUM REFLEX MAGNESIUM: 3.9 MEQ/L (ref 3.4–4.9)
RBC # BLD: 4.78 M/UL (ref 4.7–6.1)
SODIUM BLD-SCNC: 138 MEQ/L (ref 135–144)
TOTAL PROTEIN: 7.2 G/DL (ref 6.3–8)
WBC # BLD: 13.9 K/UL (ref 4.8–10.8)

## 2021-12-17 PROCEDURE — 2580000003 HC RX 258: Performed by: NURSE PRACTITIONER

## 2021-12-17 PROCEDURE — 94664 DEMO&/EVAL PT USE INHALER: CPT

## 2021-12-17 PROCEDURE — 87205 SMEAR GRAM STAIN: CPT

## 2021-12-17 PROCEDURE — 2700000000 HC OXYGEN THERAPY PER DAY

## 2021-12-17 PROCEDURE — 87070 CULTURE OTHR SPECIMN AEROBIC: CPT

## 2021-12-17 PROCEDURE — 6360000002 HC RX W HCPCS: Performed by: NURSE PRACTITIONER

## 2021-12-17 PROCEDURE — 99223 1ST HOSP IP/OBS HIGH 75: CPT | Performed by: INTERNAL MEDICINE

## 2021-12-17 PROCEDURE — 85025 COMPLETE CBC W/AUTO DIFF WBC: CPT

## 2021-12-17 PROCEDURE — 94640 AIRWAY INHALATION TREATMENT: CPT

## 2021-12-17 PROCEDURE — 80053 COMPREHEN METABOLIC PANEL: CPT

## 2021-12-17 PROCEDURE — 1210000000 HC MED SURG R&B

## 2021-12-17 PROCEDURE — 36415 COLL VENOUS BLD VENIPUNCTURE: CPT

## 2021-12-17 PROCEDURE — 6370000000 HC RX 637 (ALT 250 FOR IP): Performed by: NURSE PRACTITIONER

## 2021-12-17 PROCEDURE — 6370000000 HC RX 637 (ALT 250 FOR IP): Performed by: INTERNAL MEDICINE

## 2021-12-17 PROCEDURE — 93010 ELECTROCARDIOGRAM REPORT: CPT | Performed by: INTERNAL MEDICINE

## 2021-12-17 PROCEDURE — 94761 N-INVAS EAR/PLS OXIMETRY MLT: CPT

## 2021-12-17 RX ORDER — BUDESONIDE AND FORMOTEROL FUMARATE DIHYDRATE 160; 4.5 UG/1; UG/1
2 AEROSOL RESPIRATORY (INHALATION) 2 TIMES DAILY
Status: DISCONTINUED | OUTPATIENT
Start: 2021-12-17 | End: 2021-12-18 | Stop reason: HOSPADM

## 2021-12-17 RX ORDER — PREDNISONE 20 MG/1
40 TABLET ORAL DAILY
Status: DISCONTINUED | OUTPATIENT
Start: 2021-12-18 | End: 2021-12-18 | Stop reason: HOSPADM

## 2021-12-17 RX ORDER — AZITHROMYCIN 250 MG/1
250 TABLET, FILM COATED ORAL DAILY
Status: DISCONTINUED | OUTPATIENT
Start: 2021-12-18 | End: 2021-12-17

## 2021-12-17 RX ORDER — CEFUROXIME AXETIL 500 MG/1
500 TABLET ORAL EVERY 12 HOURS SCHEDULED
Status: DISCONTINUED | OUTPATIENT
Start: 2021-12-17 | End: 2021-12-18 | Stop reason: HOSPADM

## 2021-12-17 RX ORDER — AZITHROMYCIN 500 MG/1
500 TABLET, FILM COATED ORAL ONCE
Status: DISCONTINUED | OUTPATIENT
Start: 2021-12-17 | End: 2021-12-17

## 2021-12-17 RX ORDER — IPRATROPIUM BROMIDE AND ALBUTEROL SULFATE 2.5; .5 MG/3ML; MG/3ML
1 SOLUTION RESPIRATORY (INHALATION) EVERY 4 HOURS PRN
Status: DISCONTINUED | OUTPATIENT
Start: 2021-12-17 | End: 2021-12-18 | Stop reason: HOSPADM

## 2021-12-17 RX ADMIN — MONTELUKAST 10 MG: 10 TABLET, FILM COATED ORAL at 20:15

## 2021-12-17 RX ADMIN — METHYLPREDNISOLONE SODIUM SUCCINATE 40 MG: 40 INJECTION, POWDER, FOR SOLUTION INTRAMUSCULAR; INTRAVENOUS at 10:45

## 2021-12-17 RX ADMIN — ASPIRIN 81 MG: 81 TABLET, COATED ORAL at 08:57

## 2021-12-17 RX ADMIN — SOTALOL HYDROCHLORIDE 120 MG: 120 TABLET ORAL at 20:17

## 2021-12-17 RX ADMIN — GUAIFENESIN 600 MG: 600 TABLET ORAL at 08:54

## 2021-12-17 RX ADMIN — BUDESONIDE AND FORMOTEROL FUMARATE DIHYDRATE 2 PUFF: 160; 4.5 AEROSOL RESPIRATORY (INHALATION) at 20:09

## 2021-12-17 RX ADMIN — APIXABAN 5 MG: 5 TABLET, FILM COATED ORAL at 08:53

## 2021-12-17 RX ADMIN — METHYLPREDNISOLONE SODIUM SUCCINATE 40 MG: 40 INJECTION, POWDER, FOR SOLUTION INTRAMUSCULAR; INTRAVENOUS at 04:23

## 2021-12-17 RX ADMIN — HYDROCHLOROTHIAZIDE 25 MG: 25 TABLET ORAL at 08:53

## 2021-12-17 RX ADMIN — Medication 400 MG: at 08:53

## 2021-12-17 RX ADMIN — LISINOPRIL 40 MG: 20 TABLET ORAL at 08:54

## 2021-12-17 RX ADMIN — ATORVASTATIN CALCIUM 40 MG: 40 TABLET, FILM COATED ORAL at 20:16

## 2021-12-17 RX ADMIN — CEFUROXIME AXETIL 500 MG: 500 TABLET ORAL at 20:18

## 2021-12-17 RX ADMIN — Medication 10 ML: at 20:15

## 2021-12-17 RX ADMIN — APIXABAN 5 MG: 5 TABLET, FILM COATED ORAL at 20:16

## 2021-12-17 RX ADMIN — BICALUTAMIDE 50 MG: 50 TABLET ORAL at 08:54

## 2021-12-17 RX ADMIN — GUAIFENESIN 600 MG: 600 TABLET ORAL at 20:15

## 2021-12-17 RX ADMIN — METOPROLOL SUCCINATE 100 MG: 100 TABLET, EXTENDED RELEASE ORAL at 20:16

## 2021-12-17 RX ADMIN — SOTALOL HYDROCHLORIDE 120 MG: 120 TABLET ORAL at 08:54

## 2021-12-17 NOTE — PROGRESS NOTES
mg, 100 mg, Oral, Nightly  montelukast (SINGULAIR) tablet 10 mg, 10 mg, Oral, Nightly  sotalol (BETAPACE) tablet 120 mg, 120 mg, Oral, BID  bicalutamide (CASODEX) chemo tablet 50 mg, 50 mg, Oral, Daily  Physical    VITALS:  BP (!) 141/77   Pulse 66   Temp 97.6 °F (36.4 °C) (Oral)   Resp 16   Ht 5' 6\" (1.676 m)   Wt 218 lb (98.9 kg)   SpO2 93%   BMI 35.19 kg/m²   Constitutional: Awake and alert in no acute distress. Lying in bed comfortably  Head: Normocephalic, atraumatic  Eyes: EOMI, PERRLA  ENT: moist mucous membranes, nasal cannula  Neck: neck supple, trachea midline  Lungs: Good inspiratory effort, no wheeze, no rhonchi, no rales  Heart: RRR, normal S1 and S2  GI: Soft, non-distended, non tender, no guarding, no rebound, +BS  MSK: no edema noted  Skin: warm, dry  Psych: appropriate affect     Data    CBC:   Lab Results   Component Value Date    WBC 13.9 12/17/2021    RBC 4.78 12/17/2021    HGB 14.8 12/17/2021    HCT 45.4 12/17/2021    MCV 95.0 12/17/2021    MCH 31.0 12/17/2021    MCHC 32.7 12/17/2021    RDW 14.3 12/17/2021     12/17/2021    MPV 8.0 06/11/2013     CMP:    Lab Results   Component Value Date     12/17/2021    K 3.9 12/17/2021    CL 95 12/17/2021    CO2 28 12/17/2021    BUN 24 12/17/2021    CREATININE 0.71 12/17/2021    GFRAA >60.0 12/17/2021    LABGLOM >60.0 12/17/2021    GLUCOSE 235 12/17/2021    PROT 7.2 12/17/2021    LABALBU 4.2 12/17/2021    CALCIUM 9.1 12/17/2021    BILITOT 1.2 12/17/2021    ALKPHOS 82 12/17/2021    AST 17 12/17/2021    ALT 15 12/17/2021       ASSESSMENT AND PLAN      # Acute hypoxic respiratory failure 2/2 COPD exacerbation, CAP  - pt not on home O2. Required 4L in ED to maintain SpO2>90%. Today titrated down to 2L. Continue to wean as tolerated. May need home O2.  Home O2 eval prior to discharge  - cont nebs, prednisone  - started on Rocephin/Azithro for presumed CAP - changed to cefuroxime due to sotalol and QTc prolongation risk  - pulm consulted    # Chronic combined CHF  - cont home meds  - monitor for signs of overload    # PAF/CAD  - has pacemaker  - cont anticoagulation    # HTN/HLD  - cont home meds    # Prostate CA  - cont home meds    DVT: on eliquis    Disposition: Pt improved today. Down to 2L O2. Will attempt to wean off by tomorrow. Home O2 eval prior to discharge, likely tomorrow. Pulm consulted.        Flaco Jaramillo DO  Internal Medicine   Hospitalist    >35 minutes in total care time

## 2021-12-17 NOTE — CONSULTS
Pulmonary and Critical Care Medicine  Consult Note  Encounter Date: 2021 11:32 AM    Mr. Ramiro Nolasco is a 79 y.o. male  : 1940  Requesting Provider: Shelly Reid DO    Reason for request: Worsening shortness of breath            HISTORY OF PRESENT ILLNESS:    Patient is 79 y.o. presents with 1 day history of worsening shortness of breath, he woke up at night feeling tight and short of breath, no coughing, no chest pain, no fever no chills he has been progressively getting short of breath over the last few weeks, no worsening lower extremity edema, he quit smoking almost 20 years ago. Past Medical History:        Diagnosis Date    AAA (abdominal aortic aneurysm) ([facility] [address]     monitoring 4.5cm    Arthritis     lower spine    Atrial fibrillation (HCC)     CAD (coronary artery disease)     stents x 2 cardiac     Chronic back pain     Heart attack ([facility] [address] 1984    Hyperlipidemia     on meds >20yrs    Hypertension     on meds > 20yrs    Knee injury     AS A CHILD-RIGHT       Past Surgical History:        Procedure Laterality Date    ABDOMINAL AORTIC ANEURYSM REPAIR, ENDOVASCULAR Bilateral 1/15/2021    AORTO FEMORAL DIGITAL SUBTRACTION ARTERIOGRAM AND REPAIR FEMORAL ARTERIES performed by Lucia Saucedo MD at [address] [place] [address]     [address] [place] [address] Street      CORONARY ANGIOPLASTY WITH STENT PLACEMENT      5 stents    EYE SURGERY  2013    bilateral cataracts    JOINT REPLACEMENT  2013    RIGHT HIP    JOINT REPLACEMENT Right 2012    hip    [place] LAMINECTOMY,>2 SGMT,LUMBAR N/A 8/15/2018    DECOMPRESSIVE  LUMBAR LAMINECTOMY L2-3 L3-4 RIGHT SIDED APPROACH performed by Zoie Hamilton MD at [address] [place] [address] Drive History:     reports that he quit smoking about 21 years ago. His smoking use included cigarettes.  He has never used smokeless tobacco. He reports current alcohol use. He reports that he does not use drugs. Family History:       Problem Relation Age of Onset    High Blood Pressure Mother     Other Mother         liver failure    Heart Attack Father         heart occlusion    Colon Cancer Sister     No Known Problems Son     Other Daughter         spinal problems       Allergies:  Levofloxacin        MEDICATIONS during current hospitalization:    Continuous Infusions:   sodium chloride         Scheduled Meds:   sodium chloride flush  10 mL IntraVENous BID    sodium chloride flush  5-40 mL IntraVENous 2 times per day    methylPREDNISolone  40 mg IntraVENous Q6H    Followed by   Jay De Souza ON 12/19/2021] predniSONE  40 mg Oral Daily    guaiFENesin  600 mg Oral BID    apixaban  5 mg Oral BID    aspirin  81 mg Oral Daily    atorvastatin  40 mg Oral Nightly    azelastine HCl  2 spray Each Nostril BID    hydroCHLOROthiazide  25 mg Oral Daily    lisinopril  40 mg Oral Daily    magnesium oxide  400 mg Oral Daily    metoprolol succinate  100 mg Oral Nightly    montelukast  10 mg Oral Nightly    sotalol  120 mg Oral BID    bicalutamide  50 mg Oral Daily       PRN Meds:ipratropium-albuterol, sodium chloride flush, sodium chloride, ondansetron **OR** ondansetron, polyethylene glycol, acetaminophen **OR** acetaminophen        REVIEW OF SYSTEMS:  ROS: 10 organs review of system is done including general, psychological, ENT, hematological, endocrine, respiratory, cardiovascular, gastrointestinal, musculoskeletal, neurological,  allergy and Immunology is done and is otherwise negative. PHYSICAL EXAM:    Vitals:  BP (!) 141/77   Pulse 66   Temp 97.6 °F (36.4 °C) (Oral)   Resp 16   Ht 5' 6\" (1.676 m)   Wt 218 lb (98.9 kg)   SpO2 93%   BMI 35.19 kg/m²     General: alert, cooperative, no distress  Head: normocephalic, atraumatic  Eyes:No gross abnormalities.   ENT:  MMM no lesions  Neck:  supple and no masses  Chest : clear to auscultation bilaterally- no wheezes, rales or rhonchi, normal air movement, no respiratory distress  Heart[de-identified] Heart sounds are normal.  Regular rate and rhythm without murmur, gallop or rub. ABD:  symmetric, soft, non-tender, no guarding or rebound  Musculoskeletal : no cyanosis, no clubbing and no edema  Neuro:  Grossly normal  Skin: No rashes or nodules noted. Lymph node:  no cervical nodes  Urology: No Delgadillo   Psychiatric: appropriate        Data Review  Recent Labs     12/16/21  0730 12/17/21  0901   WBC 8.0 13.9*   HGB 14.7 14.8   HCT 45.5 45.4    174      Recent Labs     12/16/21  0730 12/17/21  0901    138   K 5.0* 3.9    95   CO2 29 28   BUN 18 24*   CREATININE 0.54* 0.71   GLUCOSE 109* 235*       MV Settings: ABGs: No results for input(s): PHART, UOA4MCL, PO2ART, JJT6VKT, BEART, J0PQXFFN, ABY7IWX in the last 72 hours.   O2 Device: Nasal cannula  O2 Flow Rate (L/min): 4 L/min  Lab Results   Component Value Date    LACTA 0.9 10/20/2016       Radiology  I personally reviewed imaging studies and CT chest reviewed by me, shows severe emphysema, hazy groundglass changes at the left base, no mass        Assessment, plan:   Patient is at risk due to    · Acute hypoxic respiratory failure,   · Likely COPD with acute exacerbation  · Possible left lower lobe community-acquired pneumonia  · Emphysema, probable underlying COPD needs further testing  · Obesity     Recommendation  · Start   cefuroxime for community-acquired pneumonia, will DC azithromycin in light of sotalol and QTc  prolongation risk  · Prednisone 40 mg daily, starting tomorrow, total 3 days to complete 5 days of steroid treatment  · Start Symbicort, and continue on discharge  · Will need PFT as outpatient  · As needed albuterol  · Home O2 eval prior to discharge  · Home today or tomorrow  · Follow-up with pulmonary as outpatient in 4 to 6 weeks for COPD/emphysema management        Thank you for consultation    Electronically signed by Brie Galarza MD, FCCP, on 12/17/2021 at 11:32 AM

## 2021-12-17 NOTE — PROGRESS NOTES
From: HOME, INDEPENDENT  TO ED WITH INCREASED SOB. DOES NOT WEAR HOME 02. PMH: CHF, CAD, OA, HTN, HLD, A. FIB (ON ELIQUIS), PROSTATE CA (RADIATION AND CHEMO)    Anticipated Discharge Date: TBD PENDING CLINICAL PROGRESS    Anticipated Discharge Disposition: HOME.  MONITOR FOR HHC AND POSSIBLE HOME 02 NEEDS (WILL NEED HOME 02 EVAL)    Patient Mobility or PT/OT ordered:    Covid Test Date and Result: NEGATIVE 12/16    Barriers to Discharge:  02 AT 2 L N/C  WILL NEED HOME 02 EVAL BEFORE DISCHARGE    CMI DONE  Readmission Risk              Risk of Unplanned Readmission:  19

## 2021-12-17 NOTE — CARE COORDINATION
: This Care Transition Nurse provided COPD booklet and zones sheet. Discussed the use of zones sheet. Goal is to be in the green zone. Stressed importance of phoning physician promptly for symptoms in the yellow zone and ems for symptoms in the red zone. Discussed cause of COPD, how lungs work, avoiding respiratory infection, good handwashing, avoid sick contacts, keeping nebulizer set up clean, avoiding inhaled irritants, use of masks to protect airway, breathing exercises, continue to use incentive spirometer and acapella at home, positions to reduce sob, energy conservation, copd medications, take antibiotics and prednisone (if ordered) until gone, vaccines, drinking plenty of fluids, nutrition, limit sodium,  pulmonary rehab. Stressed  importance of physician follow up within one week of discharge and follow up with pulmonologist as directed. Patient voiced understanding. Patient states he is a former smoker and was exposed to asbestos in the [place]. He was diagnosed with COPD about 15 years ago but has never needed to use a  Nebulizer or inhaler until this time. He does take Singulair QHS. He has a cat and is not sure if this is a trigger. He drinks plenty of water and other fluids, follows a low salt diet, and is up to date with vaccines.

## 2021-12-17 NOTE — PROGRESS NOTES
[facility] Respiratory Therapy Evaluation   Current Order:  DUONEB Q4 WA       Home Regimen: NONE       Ordering Physician: VICTRO M   Re-evaluation Date:  EVAL DONE      Diagnosis: CHF       Patient Status: Stable / Unstable + Physician notified    The following MDI Criteria must be met in order to convert aerosol to MDI with spacer. If unable to meet, MDI will be converted to aerosol:  []  Patient able to demonstrate the ability to use MDI effectively  []  Patient alert and cooperative  []  Patient able to take deep breath with 5-10 second hold  []  Medication(s) available in this delivery method   []  Peak flow greater than or equal to 200 ml/min            Current Order Substituted To  (same drug, same frequency)   Aerosol to MDI [] Albuterol Sulfate 0.083% unit dose by aerosol Albuterol Sulfate MDI 2 puffs by inhalation with spacer    [] Levalbuterol 1.25 mg unit dose by aerosol Levalbuterol MDI 2 puffs by inhalation with spacer    [] Levalbuterol 0.63 mg unit dose by aerosol Levalbuterol MDI 2 puffs by inhalation with spacer    [] Ipratropium Bromide 0.02% unit dose by aerosol Ipratropium Bromide MDI 2 puffs by inhalation with spacer    [] Duoneb (Ipratropium + Albuterol) unit dose by aerosol Ipratropium MDI + Albuterol MDI 2 puffs by inhalation w/spacer   MDI to Aerosol [] Albuterol Sulfate MDI Albuterol Sulfate 0.083% unit dose by aerosol    [] Levalbuterol MDI 2 puffs by inhalation Levalbuterol 1.25 mg unit dose by aerosol    [] Ipratropium Bromide MDI by inhalation Ipratropium Bromide 0.02% unit dose by aerosol    [] Combivent (Ipratropium + Albuterol) MDI by inhalation Duoneb (Ipratropium + Albuterol) unit dose by aerosol       Treatment Assessment [Frequency/Schedule]:  Change frequency to: _________DUONEB Q4 PRN _________________________________________per Protocol, P&T, MEC      Points 0 1 2 3 4   Pulmonary Status  Non-Smoker  []   Smoking history   < 20 pack years  [x]   Smoking history  ?  20 pack years  []   Pulmonary Disorder  (acute or chronic)  []   Severe or Chronic w/ Exacerbation  []     Surgical Status No [x]   Surgeries     General []   Surgery Lower []   Abdominal Thoracic or []   Upper Abdominal Thoracic with  PulmonaryDisorder  []     Chest X-ray Clear/Not  Ordered     [x]  Chronic Changes  Results Pending  []  Infiltrates, atelectasis, pleural effusion, or edema  []  Infiltrates in more than one lobe []  Infiltrate + Atelectasis, &/or pleural effusion  []    Respiratory Pattern Regular,  RR = 12-20 [x]  Increased,  RR = 21-25 []  UMAÑA, irregular,  or RR = 26-30 []  Decreased FEV1  or RR = 31-35 []  Severe SOB, use  of accessory muscles, or RR ? 35  []    Mental Status Alert, oriented,  Cooperative [x]  Confused but Follows commands []  Lethargic or unable to follow commands []  Obtunded  []  Comatose  []    Breath Sounds Clear to  auscultation  []  Decreased unilaterally or  in bases only [x]  Decreased  bilaterally  []  Crackles or intermittent wheezes []  Wheezes []    Cough Strong, Spontan., & nonproductive [x]  Strong,  spontaneous, &  productive []  Weak,  Nonproductive []  Weak, productive or  with wheezes []  No spontaneous  cough or may require suctioning []    Level of Activity Ambulatory [x]  Ambulatory w/ Assist  []  Non-ambulatory []  Paraplegic []  Quadriplegic []    Total    Score:___2____     Triage Score:_5_____      Tri       Triage:     1. (>20) Freq: Q3    2. (16-20) Freq: Q4   3. (11-15) Freq: QID & Albuterol Q2 PRN    4. (6-10) Freq: TID & Albuterol Q2 PRN    5. (0-5) Freq Q4prn

## 2021-12-18 VITALS
WEIGHT: 218 LBS | TEMPERATURE: 97.6 F | SYSTOLIC BLOOD PRESSURE: 138 MMHG | DIASTOLIC BLOOD PRESSURE: 79 MMHG | OXYGEN SATURATION: 92 % | BODY MASS INDEX: 35.03 KG/M2 | HEART RATE: 60 BPM | RESPIRATION RATE: 16 BRPM | HEIGHT: 66 IN

## 2021-12-18 PROCEDURE — 2580000003 HC RX 258: Performed by: NURSE PRACTITIONER

## 2021-12-18 PROCEDURE — 6370000000 HC RX 637 (ALT 250 FOR IP): Performed by: NURSE PRACTITIONER

## 2021-12-18 PROCEDURE — 2580000003 HC RX 258: Performed by: INTERNAL MEDICINE

## 2021-12-18 PROCEDURE — 6370000000 HC RX 637 (ALT 250 FOR IP): Performed by: INTERNAL MEDICINE

## 2021-12-18 PROCEDURE — 94761 N-INVAS EAR/PLS OXIMETRY MLT: CPT

## 2021-12-18 PROCEDURE — 94640 AIRWAY INHALATION TREATMENT: CPT

## 2021-12-18 PROCEDURE — 99232 SBSQ HOSP IP/OBS MODERATE 35: CPT | Performed by: INTERNAL MEDICINE

## 2021-12-18 PROCEDURE — 2700000000 HC OXYGEN THERAPY PER DAY

## 2021-12-18 RX ORDER — CEFUROXIME AXETIL 500 MG/1
500 TABLET ORAL EVERY 12 HOURS SCHEDULED
Qty: 12 TABLET | Refills: 0 | Status: SHIPPED | OUTPATIENT
Start: 2021-12-18 | End: 2021-12-24

## 2021-12-18 RX ORDER — BUDESONIDE AND FORMOTEROL FUMARATE DIHYDRATE 160; 4.5 UG/1; UG/1
2 AEROSOL RESPIRATORY (INHALATION) 2 TIMES DAILY
Qty: 10.2 G | Refills: 3 | Status: SHIPPED | OUTPATIENT
Start: 2021-12-18 | End: 2022-01-11 | Stop reason: ALTCHOICE

## 2021-12-18 RX ORDER — PREDNISONE 20 MG/1
40 TABLET ORAL DAILY
Qty: 4 TABLET | Refills: 0 | Status: SHIPPED | OUTPATIENT
Start: 2021-12-19 | End: 2021-12-21

## 2021-12-18 RX ADMIN — APIXABAN 5 MG: 5 TABLET, FILM COATED ORAL at 09:18

## 2021-12-18 RX ADMIN — Medication 400 MG: at 09:18

## 2021-12-18 RX ADMIN — BICALUTAMIDE 50 MG: 50 TABLET ORAL at 09:16

## 2021-12-18 RX ADMIN — PREDNISONE 40 MG: 20 TABLET ORAL at 09:17

## 2021-12-18 RX ADMIN — AZELASTINE HCL 2 SPRAY: 205.5 SPRAY NASAL at 09:26

## 2021-12-18 RX ADMIN — LISINOPRIL 40 MG: 20 TABLET ORAL at 09:17

## 2021-12-18 RX ADMIN — SOTALOL HYDROCHLORIDE 120 MG: 120 TABLET ORAL at 09:18

## 2021-12-18 RX ADMIN — GUAIFENESIN 600 MG: 600 TABLET ORAL at 09:18

## 2021-12-18 RX ADMIN — CEFUROXIME AXETIL 500 MG: 500 TABLET ORAL at 09:18

## 2021-12-18 RX ADMIN — SODIUM CHLORIDE, PRESERVATIVE FREE 10 ML: 5 INJECTION INTRAVENOUS at 09:18

## 2021-12-18 RX ADMIN — BUDESONIDE AND FORMOTEROL FUMARATE DIHYDRATE 2 PUFF: 160; 4.5 AEROSOL RESPIRATORY (INHALATION) at 07:57

## 2021-12-18 RX ADMIN — HYDROCHLOROTHIAZIDE 25 MG: 25 TABLET ORAL at 09:18

## 2021-12-18 RX ADMIN — ASPIRIN 81 MG: 81 TABLET, COATED ORAL at 09:18

## 2021-12-18 RX ADMIN — Medication 10 ML: at 09:26

## 2021-12-18 NOTE — PROGRESS NOTES
Spiritual Care Services     Summary of Visit:   visited patient in his room. Patient said he had been doing well but when he hit his [de-identified] he started to develop some health problems. Patient said he is relying on his karla and Rastafari and hopeful for a quick recovery. Spiritual Assessment/Intervention/Outcomes:    Encounter Summary  Services provided to[de-identified] Patient  Referral/Consult From[de-identified] Rounding  Support System: Spouse, Children, Family members  Place of [facility]: [facility] [address]: Completed  Complexity of Encounter: Moderate  Length of Encounter: 15 minutes  Spiritual Assessment Completed: Yes  Routine  Type: Initial     Spiritual/Spiritism  Type: Spiritual support  Assessment: Calm, Approachable  Intervention: Prayer, Sustaining presence/ Ministry of presence, Discussed relationship with God, Discussed belief system/Hoahaoism practices/karla  Outcome: Connection/belonging, Expressed gratitude                         Care Plan:        Spiritual Care Services   Electronically signed by Radha Murphy on 12/17/21 at 7:20 PM EST     To reach a  for emotional and spiritual support, place an [facility] consult request.   If a  is needed immediately, dial 0 and ask to page the on-call .

## 2021-12-18 NOTE — DISCHARGE SUMMARY
Physician Discharge Summary     Patient ID:  Gil Baca  [id number]  44 y.o.  1940    Admit date: 12/16/2021    Discharge date : 12/18/21     Admitting Physician: Bety Abernathy DO     Discharge Physician: Bety Abernathy DO     Admission Diagnoses: Hypoxia [R09.02]  COPD exacerbation ([facility] [address] [J44.1]  COPD with acute exacerbation ([facility] [address] [J44.1]    Discharge Diagnoses: COPD    Admission Condition: fair    Discharged Condition: good    Hospital Course: 79 y. o. male per chart review has a h/o CHF with EF 60% in 2016, afib on eliquis, CAD, OA, HTN, hpl, chronic back pain and recently dx Prostate cancer on chemo and radiation who presents with increased shortness of breath and productive cough over the last two weeks. SpO2 was 77% on arrival. He did improve to 90% on 4l nc. He does not wear O2 at home. He denies chest pain, fever/chills or abdominal discomfort. He is afebrile, hemodynamically stable,  EKG shows atrial paced rhythm with HR 63, normal axis, QTc 507, no ST changes.  Labs remarkable for BNP 1594.  CT PE negative. Pt admitted for acute COPD exacerbation. Pulm consulted. Started on steroids, nebs. O2 weaned to room air on 12/18. Still with some dyspnea on exertion so home O2 eval completed and patient did qualify for 2L with ambulation and was set up. Pt discharged home on 12/18 in stable and improved condition with Rx for steroids    Consults: pulmonary/intensive care      Discharge Exam:  Constitutional: Awake and alert in no acute distress.  Lying in bed comfortably  Head: Normocephalic, atraumatic  Eyes: EOMI, PERRLA  ENT: moist mucous membranes, nasal cannula  Neck: neck supple, trachea midline  Lungs: Good inspiratory effort, no wheeze, no rhonchi, no rales  Heart: RRR, normal S1 and S2  GI: Soft, non-distended, non tender, no guarding, no rebound, +BS  MSK: no edema noted  Skin: warm, dry  Psych: appropriate affect    Labs:   Recent Labs     12/16/21  0730 12/17/21  0901   WBC 8.0 13.9*   HGB 14.7 14.8   HCT 45.5 45.4    174     Recent Labs     12/16/21  0730 12/17/21  0901    138   K 5.0* 3.9    95   CO2 29 28   BUN 18 24*   CREATININE 0.54* 0.71   CALCIUM 9.3 9.1     Recent Labs     12/16/21  0730 12/17/21  0901   AST 34 17   ALT 17 15   BILITOT 1.2* 1.2*   ALKPHOS 70 82     Recent Labs     12/16/21  0730   INR 1.2     Recent Labs     12/16/21  0730   TROPONINI <0.010       Urinalysis:   Lab Results   Component Value Date    NITRU Negative 01/11/2021    WBCUA 0-2 10/19/2016    BACTERIA Rare 10/19/2016    RBCUA 0-2 10/19/2016    BLOODU neg 08/24/2021    BLOODU Negative 01/11/2021    SPECGRAV 1.025 08/24/2021    SPECGRAV 1.008 01/11/2021    GLUCOSEU neg 08/24/2021    GLUCOSEU Negative 01/11/2021       Radiology:   Most recent    Chest CT      WITH CONTRAST:No results found for this or any previous visit. WITHOUT CONTRAST: No results found for this or any previous visit. CXR      2-view: Results for orders placed during the hospital encounter of 10/29/16    XR Chest Standard TWO VW    Narrative  2 CHEST FILMS  REASON FOR EXAM: SHORTNESS OF BREATH.  COMPARISON: OCTOBER 18, 2016. FINDINGS: Cardiac bipolar ICD device remains in satisfactory position. Heart size normal. Lungs clear. Pleural angles smooth. Bones intact. Impression  NO ACTIVE LUNG DISEASE. Portable: Results for orders placed during the hospital encounter of 10/18/16    XR Chest Portable    Narrative  CHEST SINGLE VIEW PORTABLE  REASON FOR EXAMINATION:  Mid chest pain, sweating  COMPARISON:2/24/11  Single view of the chest was obtained. The heart is normal in size. The lungs are clear. Pacer wire is seen which appears unchanged in position. The pulmonary vasculature is normal.  Mediastinum and hilar regions are unremarkable. No pleural effusions  are seen. Visualized bones are intact. IMPRESSION  NO ACTIVE CHEST DISEASE. Echo No results found for this or any previous visit.       Disposition: home    In process/preliminary results:  Outstanding Order Results     Date and Time Order Name Status Description    12/17/2021 12:39 PM Culture, Respiratory Preliminary     12/16/2021  8:24 AM Culture, Blood 2 Preliminary     12/16/2021  8:24 AM Culture, Blood 1 Preliminary           Patient Instructions:   Current Discharge Medication List      START taking these medications    Details   cefUROXime (CEFTIN) 500 MG tablet Take 1 tablet by mouth every 12 hours for 12 doses  Qty: 12 tablet, Refills: 0      predniSONE (DELTASONE) 20 MG tablet Take 2 tablets by mouth daily for 2 doses  Qty: 4 tablet, Refills: 0      budesonide-formoterol (SYMBICORT) 160-4.5 MCG/ACT AERO Inhale 2 puffs into the lungs 2 times daily  Qty: 10.2 g, Refills: 3         CONTINUE these medications which have NOT CHANGED    Details   bicalutamide (CASODEX) 50 MG chemo tablet Take 50 mg by mouth daily      sotalol (BETAPACE) 120 MG tablet Take 120 mg by mouth 2 times daily      magnesium oxide (MAG-OX) 400 MG tablet Take 400 mg by mouth daily      azelastine HCl 0.15 % SOLN 2 sprays by NOT APPLICABLE route 2 times daily      Cholecalciferol (VITAMIN D) 50 MCG (2000 UT) CAPS capsule Take 1,000 Units by mouth daily      montelukast (SINGULAIR) 10 MG tablet Take 10 mg by mouth nightly      metoprolol succinate (TOPROL XL) 100 MG extended release tablet Take 100 mg by mouth nightly      Omega-3 Fatty Acids (FISH OIL PO) Take 1,000 mg by mouth 2 times daily       aspirin (ECOTRIN LOW STRENGTH) 81 MG EC tablet Take 81 mg by mouth Mon-Fri      apixaban (ELIQUIS) 5 MG TABS tablet Take by mouth 2 times daily      atorvastatin (LIPITOR) 40 MG tablet daily       hydrochlorothiazide (HYDRODIURIL) 25 MG tablet daily       lisinopril (PRINIVIL;ZESTRIL) 40 MG tablet daily       ketoconazole (NIZORAL) 2 % shampoo Apply topically daily as needed for Itching Apply topically daily as needed.       GLUCOSAMINE-CHONDROITIN PO Take 1 tablet by mouth 2 times daily nitroGLYCERIN (NITROSTAT) 0.4 MG SL tablet          STOP taking these medications       metoprolol tartrate (LOPRESSOR) 25 MG tablet Comments:   Reason for Stopping:         acetaminophen (TYLENOL) 500 MG tablet Comments:   Reason for Stopping:             Activity: activity as tolerated  Diet: cardiac diet  Wound Care: none needed    Follow-up with Damari Sanchez MD  in 1 week.     DC time 35 minutes    Signed:  Electronically signed by Misty Christian DO on 12/18/2021 at 3:52 PM

## 2021-12-18 NOTE — PROGRESS NOTES
INPATIENT PROGRESS NOTES    PATIENT NAME: Nhi Ghotra  MRN: [id number]  SERVICE DATE:  2021   SERVICE TIME:  11:41 AM      PRIMARY SERVICE: Pulmonary Disease    CHIEF COMPLAINTS: COPD exacerbation    INTERVAL HPI: Patient seen and examined at bedside, Interval Notes, orders reviewed. Nursing notes noted    Patient report feeling better, shortness of breath improved, he feels ready to go home, denies pain, no coughing, no fever no chills, is currently on room air saturation 92 to 95%. Review of system:     GI Abdominal pain No  Skin Rash No    Social History     Tobacco Use    Smoking status: Former Smoker     Types: Cigarettes     Quit date: 2000     Years since quittin.4    Smokeless tobacco: Never Used   Substance Use Topics    Alcohol use: Yes     Alcohol/week: 0.0 standard drinks     Comment: OCCASIONALLY         Problem Relation Age of Onset    High Blood Pressure Mother     Other Mother         liver failure    Heart Attack Father         heart occlusion    Colon Cancer Sister     No Known Problems Son    Wilson Other Daughter         spinal problems         OBJECTIVE    Body mass index is 35.19 kg/m². PHYSICAL EXAM:  Vitals:  /79   Pulse 60   Temp 97.6 °F (36.4 °C) (Oral)   Resp 16   Ht 5' 6\" (1.676 m)   Wt 218 lb (98.9 kg)   SpO2 92%   BMI 35.19 kg/m²     General: alert, cooperative, no distress  Head: normocephalic, atraumatic  Eyes:No gross abnormalities. ENT:  MMM no lesions  Neck:  supple and no masses  Chest : clear to auscultation bilaterally- no wheezes, rales or rhonchi, normal air movement, no respiratory distress  Heart[de-identified] Heart sounds are normal.  Regular rate and rhythm without murmur, gallop or rub. ABD:  symmetric, soft, non-tender, no guarding or rebound  Musculoskeletal : no cyanosis, no clubbing and no edema  Neuro:  Grossly normal  Skin: No rashes or nodules noted.   Lymph node:  no cervical nodes  Urology: No [place]illo   Psychiatric: appropriate    DATA:   Recent Labs     12/16/21  0730 12/17/21  0901   WBC 8.0 13.9*   HGB 14.7 14.8   HCT 45.5 45.4   MCV 96.0 95.0    174     Recent Labs     12/16/21  0730 12/16/21  0730 12/17/21  0901     --  138   K 5.0*  --  3.9     --  95   CO2 29  --  28   BUN 18  --  24*   CREATININE 0.54*  --  0.71   GLUCOSE 109*  --  235*   CALCIUM 9.3  --  9.1   PROT 7.0  --  7.2   LABALBU 4.1  --  4.2   BILITOT 1.2*  --  1.2*   ALKPHOS 70  --  82   AST 34  --  17   ALT 17   < > 15   LABGLOM >60.0   < > >60.0   GFRAA >60.0   < > >60.0   GLOB 2.9   < > 3.0    < > = values in this interval not displayed. MV Settings:          No results for input(s): PHART, QYX5AJE, PO2ART, PPS4PNY, BEART, U7DVDXMP in the last 72 hours. O2 Device: Nasal cannula  O2 Flow Rate (L/min): 1 L/min    ADULT DIET; Regular     MEDICATIONS during current hospitalization:    Continuous Infusions:   sodium chloride         Scheduled Meds:   predniSONE  40 mg Oral Daily    budesonide-formoterol  2 puff Inhalation BID    cefUROXime  500 mg Oral 2 times per day    sodium chloride flush  10 mL IntraVENous BID    sodium chloride flush  5-40 mL IntraVENous 2 times per day    guaiFENesin  600 mg Oral BID    apixaban  5 mg Oral BID    aspirin  81 mg Oral Daily    atorvastatin  40 mg Oral Nightly    azelastine HCl  2 spray Each Nostril BID    hydroCHLOROthiazide  25 mg Oral Daily    lisinopril  40 mg Oral Daily    magnesium oxide  400 mg Oral Daily    metoprolol succinate  100 mg Oral Nightly    montelukast  10 mg Oral Nightly    sotalol  120 mg Oral BID    bicalutamide  50 mg Oral Daily       PRN Meds:ipratropium-albuterol, sodium chloride flush, sodium chloride, ondansetron **OR** ondansetron, polyethylene glycol, acetaminophen **OR** acetaminophen    Radiology  CTA Chest W WO  (PE study)    Result Date: 12/16/2021  CT PULMONARY ANGIOGRAM WITH INTRAVENOUS CONTRAST MEDIUM.  REASON FOR EXAMINATION:  SHORTNESS OF BREATH, HYPOXIA TECHNIQUE: Helical CTA was performed through the chest utilizing 100 ml of Isovue-300 intravenous contrast.  Images were obtained with bolus tracking in order to opacify the pulmonary arteries. Thick section coronal MIP 3D reconstructions were performed  on a separate workstation. COMPARISON:none FINDINGS:  Pulmonary arteries: No intraluminal filling defects. Thoracic aorta: AP and transverse diameter, ascending thoracic aorta, at level of pulmonary arterial bifurcation, measures 4.8 x 4.7 cm. Cardiac: Upper limits of normal. Pacemaker wire right atrium and right ventricle. Pericardial effusion: None. Right lung: No nodules, masses, pleural effusion. Diffuse emphysematous and mosaic change. Consolidation, right lower lobe. Left lung: No nodules, masses, pleural effusion. Diffuse emphysematous mosaic change. Consolidation, left lower lobe. Lymph nodes: No hilar, mediastinal, or axillary lymph node enlargement. Upper abdomen:Limited imaging upper abdomen shows no gross anomaly. Musculoskeletal:No osteoblastic, and no osteolytic lesions. Diffuse disc space narrowing with anterior osteophytes. No CT evidence pulmonary embolism. Aneurysm, ascending thoracic aorta. Diffuse emphysema. Right basilar dependent subsegmental atelectasis/pneumonia. All CT scans at this facility use dose modulation, iterative reconstruction, and/or weight based dosing when appropriate to reduce radiation dose to as low as reasonably achievable.              IMPRESSION AND SUGGESTION:  Patient is at risk due to   · Acute hypoxic respiratory failure, improved probable underlying chronic hypoxia component  · COPD with acute exacerbation  · Left lower lobe community-acquired pneumonia  · Emphysema  · Obesity    Recommendation  · Continue cefuroxime, complete 7 days of treatment  · Prednisone 40 mg daily total 5 days  · Symbicort on discharge  · We will plan PFT as outpatient  · As needed albuterol  · Home O2 eval prior to

## 2021-12-18 NOTE — CARE COORDINATION
MICHELLEW spoke with the pt regarding his DC needs. Pt lives at home with his wife and he plans to return home today. Per home oxygen eval he will need 2 liters of oxygen at dc. He will need the oxygen only while ambulating. Pt reports no other needs at DC. Pt is very independent and ambulates well independently.

## 2021-12-18 NOTE — PROGRESS NOTES
Home o2 eval. Resting room air spo2 91%. Ambulating room air spo2 86%. Ambulating 2L  spo2 92%. Patient qualifies for home o2, 2L ambulating .  RW RRT

## 2021-12-19 LAB
CULTURE, RESPIRATORY: NORMAL
GRAM STAIN RESULT: NORMAL

## 2021-12-21 LAB
BLOOD CULTURE, ROUTINE: NORMAL
CULTURE, BLOOD 2: NORMAL
GFR AFRICAN AMERICAN: >60
GFR NON-AFRICAN AMERICAN: >60
PERFORMED ON: ABNORMAL
POC CREATININE: 0.7 MG/DL (ref 0.8–1.3)
POC SAMPLE TYPE: ABNORMAL

## 2022-01-05 ENCOUNTER — HOSPITAL ENCOUNTER (OUTPATIENT)
Dept: PULMONOLOGY | Age: 82
Discharge: HOME | End: 2022-01-05
Payer: MEDICARE

## 2022-01-05 DIAGNOSIS — J44.1 COPD EXACERBATION (HCC): ICD-10-CM

## 2022-01-05 PROCEDURE — 94729 DIFFUSING CAPACITY: CPT

## 2022-01-05 PROCEDURE — 94060 EVALUATION OF WHEEZING: CPT

## 2022-01-05 PROCEDURE — 6360000002 HC RX W HCPCS: Performed by: INTERNAL MEDICINE

## 2022-01-05 PROCEDURE — 94726 PLETHYSMOGRAPHY LUNG VOLUMES: CPT

## 2022-01-05 RX ORDER — ALBUTEROL SULFATE 2.5 MG/3ML
2.5 SOLUTION RESPIRATORY (INHALATION) ONCE
Status: COMPLETED | OUTPATIENT
Start: 2022-01-05 | End: 2022-01-05

## 2022-01-05 RX ADMIN — ALBUTEROL SULFATE 2.5 MG: 2.5 SOLUTION RESPIRATORY (INHALATION) at 09:35

## 2022-01-07 PROCEDURE — 94726 PLETHYSMOGRAPHY LUNG VOLUMES: CPT | Performed by: INTERNAL MEDICINE

## 2022-01-07 PROCEDURE — 94729 DIFFUSING CAPACITY: CPT | Performed by: INTERNAL MEDICINE

## 2022-01-07 PROCEDURE — 94060 EVALUATION OF WHEEZING: CPT | Performed by: INTERNAL MEDICINE

## 2022-01-07 NOTE — PROCEDURES
[address]                      [address], [address] [facility]                    PULMONARY FUNCTION  Comfort Micheal   79 y.o.   male  Height 66 in  Weight 218 lb      Referring provider   Ally Castro MD    Reading provider   Lamar Chapin MD    Test meets ATS criteria for acceptability and reproducibility Yes    Diagnosis: UMAÑA: Yes  Cough   Yes, wheezing Yes  Smoking   quit 30 years ago    Spirometry   FVC            2.27 L   69%  Post bronchodilator 2.32 L  70% Change 1 %  FEV1          1.31 L  57%   Post bronchodilator  1.34 L  58%   Change 2%  FEV1/FVC  57  %             Post bronchodilator  57 %  VYO89-44% 0.57 L  37%  Post bronchodilator  0.62 L  40%   Change 8%    Lung volume   SVC           2.45 L  74%   RV              2.59 L 104%   TLC            5.04  L 80%   RV/TLC      51 %    DLCO           55 %     Test interpretation     Spirometry meets ATS criteria for moderately severe obstructive airway disease with no significant response to bronchodilator  Lung volume shows air trapping  Diffusion capacity is moderately reduced and normalizes when corrected for alveolar volume.        Clinical correlation is recommended     Lamar Chapin MD [facility], 1/7/2022 2:00 PM

## 2022-01-11 ENCOUNTER — OFFICE VISIT (OUTPATIENT)
Dept: PULMONOLOGY | Age: 82
End: 2022-01-11
Payer: MEDICARE

## 2022-01-11 VITALS
HEART RATE: 70 BPM | SYSTOLIC BLOOD PRESSURE: 138 MMHG | HEIGHT: 66 IN | TEMPERATURE: 97.8 F | DIASTOLIC BLOOD PRESSURE: 76 MMHG | WEIGHT: 218 LBS | BODY MASS INDEX: 35.03 KG/M2 | OXYGEN SATURATION: 80 %

## 2022-01-11 DIAGNOSIS — R60.0 EDEMA, LOWER EXTREMITY: ICD-10-CM

## 2022-01-11 DIAGNOSIS — J96.12 CHRONIC RESPIRATORY FAILURE WITH HYPOXIA AND HYPERCAPNIA (HCC): ICD-10-CM

## 2022-01-11 DIAGNOSIS — J44.9 CHRONIC OBSTRUCTIVE PULMONARY DISEASE, UNSPECIFIED COPD TYPE (HCC): ICD-10-CM

## 2022-01-11 DIAGNOSIS — I51.89 DIASTOLIC DYSFUNCTION: ICD-10-CM

## 2022-01-11 DIAGNOSIS — J96.11 CHRONIC RESPIRATORY FAILURE WITH HYPOXIA AND HYPERCAPNIA (HCC): ICD-10-CM

## 2022-01-11 DIAGNOSIS — R04.2 HEMOPTYSIS: Primary | ICD-10-CM

## 2022-01-11 PROCEDURE — 99215 OFFICE O/P EST HI 40 MIN: CPT | Performed by: INTERNAL MEDICINE

## 2022-01-11 PROCEDURE — G8417 CALC BMI ABV UP PARAM F/U: HCPCS | Performed by: INTERNAL MEDICINE

## 2022-01-11 PROCEDURE — G8484 FLU IMMUNIZE NO ADMIN: HCPCS | Performed by: INTERNAL MEDICINE

## 2022-01-11 PROCEDURE — 1123F ACP DISCUSS/DSCN MKR DOCD: CPT | Performed by: INTERNAL MEDICINE

## 2022-01-11 PROCEDURE — 4040F PNEUMOC VAC/ADMIN/RCVD: CPT | Performed by: INTERNAL MEDICINE

## 2022-01-11 PROCEDURE — 1111F DSCHRG MED/CURRENT MED MERGE: CPT | Performed by: INTERNAL MEDICINE

## 2022-01-11 PROCEDURE — G8427 DOCREV CUR MEDS BY ELIG CLIN: HCPCS | Performed by: INTERNAL MEDICINE

## 2022-01-11 PROCEDURE — 3023F SPIROM DOC REV: CPT | Performed by: INTERNAL MEDICINE

## 2022-01-11 PROCEDURE — 1036F TOBACCO NON-USER: CPT | Performed by: INTERNAL MEDICINE

## 2022-01-11 RX ORDER — FLUTICASONE FUROATE, UMECLIDINIUM BROMIDE AND VILANTEROL TRIFENATATE 200; 62.5; 25 UG/1; UG/1; UG/1
1 POWDER RESPIRATORY (INHALATION) DAILY
Qty: 1 EACH | Refills: 3 | Status: SHIPPED | OUTPATIENT
Start: 2022-01-11 | End: 2022-02-16 | Stop reason: SDUPTHER

## 2022-01-11 RX ORDER — FUROSEMIDE 20 MG/1
20 TABLET ORAL DAILY
Qty: 2 TABLET | Refills: 0 | Status: SHIPPED | OUTPATIENT
Start: 2022-01-11 | End: 2022-02-16

## 2022-01-11 RX ORDER — IPRATROPIUM BROMIDE AND ALBUTEROL SULFATE 2.5; .5 MG/3ML; MG/3ML
1 SOLUTION RESPIRATORY (INHALATION) EVERY 4 HOURS
Qty: 360 ML | Refills: 3 | Status: SHIPPED | OUTPATIENT
Start: 2022-01-11 | End: 2022-01-11

## 2022-01-11 RX ORDER — ALBUTEROL SULFATE 90 UG/1
2 AEROSOL, METERED RESPIRATORY (INHALATION) EVERY 6 HOURS PRN
Qty: 18 G | Refills: 3 | Status: SHIPPED | OUTPATIENT
Start: 2022-01-11

## 2022-01-11 RX ORDER — IPRATROPIUM BROMIDE AND ALBUTEROL SULFATE 2.5; .5 MG/3ML; MG/3ML
1 SOLUTION RESPIRATORY (INHALATION) EVERY 4 HOURS
Qty: 360 ML | Refills: 3 | Status: SHIPPED | OUTPATIENT
Start: 2022-01-11

## 2022-01-11 NOTE — PROGRESS NOTES
Subjective:     Stanley Marcelino is a 79 y.o. male who complains today of:     Chief Complaint   Patient presents with    Follow-Up from Hospital    COPD     Exacerbation    Shortness of Breath     worsening SOB       HPI  Patient presents for COPD    He is not doing good, currently on Symbicort, short of breath on minimal exertion, he is on 4 L at home on 3 L with a portable oxygen device, he had his oxygen off under his mask for some time in the office and his saturation was 79% on room air, it took him a while to recover up to 88-86 and sometimes 90%, no chest pain, he reports feeling blood in the morning, going size old blood, he has mild cough productive of clear phlegm, no fever or chills, he has mild lower extremity edema, he wakes up at night short of breath, no nasal congestion or postnasal drip, no heartburn, his weight is stable.             Allergies:  Levofloxacin  Past Medical History:   Diagnosis Date    AAA (abdominal aortic aneurysm) ([address]     monitoring 4.5cm    Arthritis     lower spine    Atrial fibrillation (HCC)     CAD (coronary artery disease)     stents x 2 cardiac     Chronic back pain     Heart attack ([address] 1984    Hyperlipidemia     on meds >20yrs    Hypertension     on meds > 20yrs    Knee injury     AS A CHILD-RIGHT     Past Surgical History:   Procedure Laterality Date    ABDOMINAL AORTIC ANEURYSM REPAIR, ENDOVASCULAR Bilateral 1/15/2021    AORTO FEMORAL DIGITAL SUBTRACTION ARTERIOGRAM AND REPAIR FEMORAL ARTERIES performed by Lindy Lopez MD at [facility]  2008   [address]  2012    CORONARY ANGIOPLASTY WITH STENT PLACEMENT      5 stents    EYE SURGERY  2013    bilateral cataracts    JOINT REPLACEMENT  2013    RIGHT HIP    JOINT REPLACEMENT Right 2012    hip    VT LAMINECTOMY,>2 SGMT,LUMBAR N/A 8/15/2018    DECOMPRESSIVE  LUMBAR LAMINECTOMY L2-3 L3-4 RIGHT SIDED APPROACH performed by Marsha Joyce MD at [facility]     Family History   Problem Relation Age of Onset    High Blood Pressure Mother     Other Mother         liver failure    Heart Attack Father         heart occlusion    Colon Cancer Sister     No Known Problems Son     Other Daughter         spinal problems     Social History     Socioeconomic History    Marital status:      Spouse name: Not on file    Number of children: Not on file    Years of education: Not on file    Highest education level: Not on file   Occupational History    Not on file   Tobacco Use    Smoking status: Former Smoker     Types: Cigarettes     Quit date: 2000     Years since quittin.4    Smokeless tobacco: Never Used   Vaping Use    Vaping Use: Never used   Substance and Sexual Activity    Alcohol use: Yes     Alcohol/week: 0.0 standard drinks     Comment: OCCASIONALLY    Drug use: No    Sexual activity: Not on file   Other Topics Concern    Not on file   Social History Narrative    Not on file     Social Determinants of Health     Financial Resource Strain:     Difficulty of Paying Living Expenses: Not on file   Food Insecurity:     Worried About Running Out of Food in the Last Year: Not on file    Geoffrey of Food in the Last Year: Not on file   Transportation Needs:     Lack of Transportation (Medical): Not on file    Lack of Transportation (Non-Medical):  Not on file   Physical Activity:     Days of Exercise per Week: Not on file    Minutes of Exercise per Session: Not on file   Stress:     Feeling of Stress : Not on file   Social Connections:     Frequency of Communication with Friends and Family: Not on file    Frequency of Social Gatherings with Friends and Family: Not on file    Attends Hindu Services: Not on file    Active Member of Clubs or Organizations: Not on file    Attends Club or Organization Meetings: Not on file    Marital Status: Not on file   Intimate Partner Violence:     Fear of Current or Ex-Partner: Not on file    Emotionally Abused: Not on file    Physically Abused: Not on file    Sexually Abused: Not on file   Housing Stability:     Unable to Pay for Housing in the Last Year: Not on file    Number of [place] in the Last Year: Not on file    Unstable Housing in the Last Year: Not on file         Review of Systems      ROS: 10 organs review of system is done including general, psychological, ENT, hematological, endocrine, respiratory, cardiovascular, gastrointestinal,musculoskeletal, neurological,  allergy and Immunology is done and is otherwise negative. Current Outpatient Medications   Medication Sig Dispense Refill    Fluticasone-Umeclidin-Vilant (TRELEGY ELLIPTA) 200-62.5-25 MCG/INH AEPB Inhale 1 puff into the lungs daily 1 each 3    furosemide (LASIX) 20 MG tablet Take 1 tablet by mouth daily for 2 days 2 tablet 0    albuterol sulfate HFA (PROVENTIL HFA) 108 (90 Base) MCG/ACT inhaler Inhale 2 puffs into the lungs every 6 hours as needed for Wheezing 18 g 3    ipratropium-albuterol (DUONEB) 0.5-2.5 (3) MG/3ML SOLN nebulizer solution Inhale 3 mLs into the lungs every 4 hours 360 mL 3    bicalutamide (CASODEX) 50 MG chemo tablet Take 50 mg by mouth daily      sotalol (BETAPACE) 120 MG tablet Take 120 mg by mouth 2 times daily      ketoconazole (NIZORAL) 2 % shampoo Apply topically daily as needed for Itching Apply topically daily as needed.       magnesium oxide (MAG-OX) 400 MG tablet Take 400 mg by mouth daily      azelastine HCl 0.15 % SOLN 2 sprays by NOT APPLICABLE route 2 times daily      Cholecalciferol (VITAMIN D) 50 MCG (2000 UT) CAPS capsule Take 1,000 Units by mouth daily      montelukast (SINGULAIR) 10 MG tablet Take 10 mg by mouth nightly      metoprolol succinate (TOPROL XL) 100 MG extended release tablet Take 100 mg by mouth nightly      Omega-3 Fatty Acids (FISH OIL PO) Take 1,000 mg by mouth 2 times daily       GLUCOSAMINE-CHONDROITIN PO Take 1 tablet by mouth 2 times daily      aspirin (ECOTRIN LOW STRENGTH) 81 MG EC tablet Take 81 mg by mouth Mon-Fri      apixaban (ELIQUIS) 5 MG TABS tablet Take by mouth 2 times daily      atorvastatin (LIPITOR) 40 MG tablet daily       hydrochlorothiazide (HYDRODIURIL) 25 MG tablet daily       lisinopril (PRINIVIL;ZESTRIL) 40 MG tablet daily       nitroGLYCERIN (NITROSTAT) 0.4 MG SL tablet        No current facility-administered medications for this visit. Objective:     Vitals:    01/11/22 1247   BP: 138/76   Pulse: 70   Temp: 97.8 °F (36.6 °C)   TempSrc: Tympanic   SpO2: (!) 80%   Weight: 218 lb (98.9 kg)   Height: 5' 6\" (1.676 m)         Physical Exam  Constitutional:       Appearance: He is well-developed. HENT:      Head: Normocephalic and atraumatic. Eyes:      General:         Left eye: No discharge. Conjunctiva/sclera: Conjunctivae normal.      Pupils: Pupils are equal, round, and reactive to light. Neck:      Vascular: No JVD. Cardiovascular:      Rate and Rhythm: Normal rate and regular rhythm. Heart sounds: Normal heart sounds. No murmur heard. No friction rub. No gallop. Pulmonary:      Effort: Pulmonary effort is normal. No respiratory distress. Breath sounds: Normal breath sounds. No wheezing or rales. Chest:      Chest wall: No tenderness. Abdominal:      General: Bowel sounds are normal.      Palpations: Abdomen is soft. Musculoskeletal:         General: No tenderness or deformity. Cervical back: Normal range of motion and neck supple. Right lower leg: Edema present. Left lower leg: Edema present. Comments: 1+   Skin:     General: Skin is warm and dry. Neurological:      Mental Status: He is alert and oriented to person, place, and time.    Psychiatric:         Judgment: Judgment normal.         Imaging studies reviewed by me CT chest shows severe centrilobular emphysema, bibasilar atelectasis, no mass. Lab results reviewed in chart  PFT January 2022, shows FEV1 57%, FEV1/FVC 0.57, mild air trapping, DLCO 55% and normalized when corrected for alveolar volume  ECHO: 2016 shows EF 86%, with diastolic dysfunction    Assessment and Plan       Diagnosis Orders   1. Hemoptysis  CT CHEST WO CONTRAST   2. Chronic obstructive pulmonary disease, unspecified COPD type ([facility]  Fluticasone-Umeclidin-Vilant (TRELEGY ELLIPTA) 200-62.5-25 MCG/INH AEPB    [facility] [place]    Xuexi-2-Hanmrntvgrk W/ Phenotype    albuterol sulfate HFA (PROVENTIL HFA) 108 (90 Base) MCG/ACT inhaler    ipratropium-albuterol (DUONEB) 0.5-2.5 (3) MG/3ML SOLN nebulizer solution    DME Order for Nebulizer as OP    DME Order for BiPAP as OP   3. Edema, lower extremity  furosemide (LASIX) 20 MG tablet   4. Chronic respiratory failure with hypoxia and hypercapnia (HCC)     5. Diastolic dysfunction       · Hemoptysis, most probably due to chronic use of oxygen with underlying use of Eliquis and postnasal drip/oozing blood recent CT scan was in December showing no mass. I will repeat CT chest to rule out any evolving parenchymal etiology  · COPD, moderately severe, patient has chronic hypoxic and hypercapnic respiratory failure will need more aggressive treatment, he desaturated on room air to high 70s low 80s, it took him a while before recovering I gave him albuterol inhaler in the office and recruitment maneuvers he improved he did not want to go to the emergency room unless absolutely necessary especially in light of current pandemic and he is taking chemotherapy for his prostate cancer. He improved eventually to 88-89%, on his portable concentrator, I will change Symbicort to Trelegy Ellipta, will add nebulizer machine at home, and duo nebs to use every 6-8 hours as needed,.   · Patient has lower extremity edema, will order Lasix 20 mg daily for 2 days  · Chronic respiratory failure with hypoxia and hypercapnia secondary to COPD patient is at risk of recurrent exacerbation and recurrent hospitalization, he needs targeted volume BiPAP is not going to be an effective option in light of his underlying COPD, and obesity. AVAP mode with backup rate will be most optimal for him to prevent hospitalizations and recurrent exacerbations. I will order trilogy device   · Diastolic dysfunction, maintain euvolemic, Lasix as above. · Patient on Eliquis, VTE is unlikely      Orders Placed This Encounter   Procedures    CT CHEST WO CONTRAST     Standing Status:   Future     Standing Expiration Date:   1/11/2023    Alpha-1-Antitrypsin W/ Phenotype     Standing Status:   Future     Standing Expiration Date:   1/11/2023   [facility]     Referral Priority:   Routine     Referral Type:   Eval and Treat     Referral Reason:   Specialty Services Required     Requested Specialty:   Pulmonology     Number of Visits Requested:   1    DME Order for Nebulizer as OP     You must complete the order parameters below and add the medical necessity documentation for this DME in a separate note. Nebulizer with compressor  Disposable Med Nebs 2 per month  Reusable Med Nebs 1 per 6 months  Aerosol Mask 1 per month  Replacement Filters 2 per month  All other related supplies as needed per month    Frequency:  Four  times daily as needed     Diagnosis: COPD     Length of Need: 12 months     Order Specific Question:   Medications being used: Answer:    Other (Comment)     Comments:   duoneb      Orders Placed This Encounter   Medications    Fluticasone-Umeclidin-Vilant (Pearle Lani ELLIPTA) 200-62.5-25 MCG/INH AEPB     Sig: Inhale 1 puff into the lungs daily     Dispense:  1 each     Refill:  3    furosemide (LASIX) 20 MG tablet     Sig: Take 1 tablet by mouth daily for 2 days     Dispense:  2 tablet     Refill:  0    albuterol sulfate HFA (PROVENTIL HFA) 108 (90 Base) MCG/ACT inhaler     Sig: Inhale 2 puffs into the lungs every 6 hours as needed for Wheezing     Dispense:  18 g     Refill:  3    ipratropium-albuterol (DUONEB) 0.5-2.5 (3) MG/3ML SOLN nebulizer solution     Sig: Inhale 3 mLs into the lungs every 4 hours     Dispense:  360 mL     Refill:  3            Discussed with patient the importance of exercise and weight control and  overall health and well-being. Reviewed with the patient: current clinical status, medications, activities and diet. Side effects, adverse effects of the medication prescribed today, as well as treatment plan and result expectations have been discussed with the patient who expresses understanding and desires to proceed. Return in about 6 weeks (around 2/22/2022). I spent 40 min with this patient, greater the 50% of this time was spent in counseling and/or coordinating of care.       Rosy Garcia MD

## 2022-01-12 ENCOUNTER — HOSPITAL ENCOUNTER (OUTPATIENT)
Dept: NON INVASIVE DIAGNOSTICS | Age: 82
Discharge: HOME | End: 2022-01-12

## 2022-01-16 LAB
ALPHA-1 ANTITRYPSIN PHENOTYPE: NORMAL
ALPHA-1 ANTITRYPSIN: 171 MG/DL (ref 90–200)

## 2022-01-18 ENCOUNTER — HOSPITAL ENCOUNTER (OUTPATIENT)
Dept: CT IMAGING | Age: 82
Discharge: HOME | End: 2022-01-20
Payer: MEDICARE

## 2022-01-18 DIAGNOSIS — R04.2 HEMOPTYSIS: ICD-10-CM

## 2022-01-18 PROCEDURE — 71250 CT THORAX DX C-: CPT

## 2022-02-16 ENCOUNTER — OFFICE VISIT (OUTPATIENT)
Dept: PULMONOLOGY | Age: 82
End: 2022-02-16
Payer: MEDICARE

## 2022-02-16 VITALS
HEART RATE: 62 BPM | DIASTOLIC BLOOD PRESSURE: 72 MMHG | OXYGEN SATURATION: 96 % | SYSTOLIC BLOOD PRESSURE: 120 MMHG | HEIGHT: 66 IN | WEIGHT: 220 LBS | BODY MASS INDEX: 35.36 KG/M2

## 2022-02-16 DIAGNOSIS — I51.89 DIASTOLIC DYSFUNCTION: ICD-10-CM

## 2022-02-16 DIAGNOSIS — J96.12 CHRONIC RESPIRATORY FAILURE WITH HYPOXIA AND HYPERCAPNIA (HCC): ICD-10-CM

## 2022-02-16 DIAGNOSIS — R04.2 HEMOPTYSIS: ICD-10-CM

## 2022-02-16 DIAGNOSIS — J44.9 CHRONIC OBSTRUCTIVE PULMONARY DISEASE, UNSPECIFIED COPD TYPE (HCC): Primary | ICD-10-CM

## 2022-02-16 DIAGNOSIS — J96.11 CHRONIC RESPIRATORY FAILURE WITH HYPOXIA AND HYPERCAPNIA (HCC): ICD-10-CM

## 2022-02-16 PROCEDURE — G8427 DOCREV CUR MEDS BY ELIG CLIN: HCPCS | Performed by: INTERNAL MEDICINE

## 2022-02-16 PROCEDURE — 1036F TOBACCO NON-USER: CPT | Performed by: INTERNAL MEDICINE

## 2022-02-16 PROCEDURE — 3023F SPIROM DOC REV: CPT | Performed by: INTERNAL MEDICINE

## 2022-02-16 PROCEDURE — 4040F PNEUMOC VAC/ADMIN/RCVD: CPT | Performed by: INTERNAL MEDICINE

## 2022-02-16 PROCEDURE — 1123F ACP DISCUSS/DSCN MKR DOCD: CPT | Performed by: INTERNAL MEDICINE

## 2022-02-16 PROCEDURE — G8484 FLU IMMUNIZE NO ADMIN: HCPCS | Performed by: INTERNAL MEDICINE

## 2022-02-16 PROCEDURE — G8417 CALC BMI ABV UP PARAM F/U: HCPCS | Performed by: INTERNAL MEDICINE

## 2022-02-16 PROCEDURE — 99213 OFFICE O/P EST LOW 20 MIN: CPT | Performed by: INTERNAL MEDICINE

## 2022-02-16 RX ORDER — FLUTICASONE FUROATE, UMECLIDINIUM BROMIDE AND VILANTEROL TRIFENATATE 200; 62.5; 25 UG/1; UG/1; UG/1
1 POWDER RESPIRATORY (INHALATION) DAILY
Qty: 1 EACH | Refills: 3 | Status: SHIPPED | OUTPATIENT
Start: 2022-02-16 | End: 2022-05-18 | Stop reason: SDUPTHER

## 2022-02-16 NOTE — PROGRESS NOTES
Subjective:     Kim Lord is a 79 y.o. male who complains today of:     Chief Complaint   Patient presents with    Follow-up     6 week    Results     CT, labs       HPI  Patient presents for COPD      He has much improved since I seen him last, he currently has trilogy device at home he uses every day he can sleep well with it, no issues overnight, he uses incentive spirometer, he is currently on Trelegy Ellipta with no symptoms of wheezing or coughing, he is on 3 L O2 saturation 93 to 94%, no chest pain, no nasal congestion or postnasal drip, no heartburn and no lower extremity edema, he has significantly improved compared to when I saw him last time. He is planning to go to [place] in March and he will be back in April he will be flying to [place]. His weight is stable, his appetite is good.             Allergies:  Levofloxacin  Past Medical History:   Diagnosis Date    AAA (abdominal aortic aneurysm) ([facility]     monitoring 4.5cm    Arthritis     lower spine    Atrial fibrillation (HCC)     CAD (coronary artery disease)     stents x 2 cardiac     Chronic back pain     Heart attack ([facility] 1984    Hyperlipidemia     on meds >20yrs    Hypertension     on meds > 20yrs    Knee injury     AS A CHILD-RIGHT     Past Surgical History:   Procedure Laterality Date    ABDOMINAL AORTIC ANEURYSM REPAIR, ENDOVASCULAR Bilateral 1/15/2021    AORTO FEMORAL DIGITAL SUBTRACTION ARTERIOGRAM AND REPAIR FEMORAL ARTERIES performed by Stephane Terry MD at [facility]  2008   [address] COLONOSCOPY  2012    CORONARY ANGIOPLASTY WITH STENT PLACEMENT      5 stents    EYE SURGERY  2013    bilateral cataracts    JOINT REPLACEMENT  2013    RIGHT HIP    JOINT REPLACEMENT Right 2012    hip    CT LAMINECTOMY,>2 SGMT,LUMBAR N/A 8/15/2018    DECOMPRESSIVE  LUMBAR LAMINECTOMY L2-3 L3-4 RIGHT SIDED APPROACH performed by Tawanna Brito Shane Barron MD at [facility]     Family History   Problem Relation Age of Onset    High Blood Pressure Mother     Other Mother         liver failure    Heart Attack Father         heart occlusion    Colon Cancer Sister     No Known Problems Son     Other Daughter         spinal problems     Social History     Socioeconomic History    Marital status:      Spouse name: Not on file    Number of children: Not on file    Years of education: Not on file    Highest education level: Not on file   Occupational History    Not on file   Tobacco Use    Smoking status: Former Smoker     Types: Cigarettes     Quit date: 2000     Years since quittin.5    Smokeless tobacco: Never Used   Vaping Use    Vaping Use: Never used   Substance and Sexual Activity    Alcohol use: Yes     Alcohol/week: 0.0 standard drinks     Comment: OCCASIONALLY    Drug use: No    Sexual activity: Not on file   Other Topics Concern    Not on file   Social History Narrative    Not on file     Social Determinants of Health     Financial Resource Strain:     Difficulty of Paying Living Expenses: Not on file   Food Insecurity:     Worried About Running Out of Food in the Last Year: Not on file    Geoffrey of Food in the Last Year: Not on file   Transportation Needs:     Lack of Transportation (Medical): Not on file    Lack of Transportation (Non-Medical):  Not on file   Physical Activity:     Days of Exercise per Week: Not on file    Minutes of Exercise per Session: Not on file   Stress:     Feeling of Stress : Not on file   Social Connections:     Frequency of Communication with Friends and Family: Not on file    Frequency of Social Gatherings with Friends and Family: Not on file    Attends Restorationist Services: Not on file    Active Member of Clubs or Organizations: Not on file    Attends Club or Organization Meetings: Not on file    Marital Status: Not on file   Intimate Partner Violence:     Fear of Current or Ex-Partner: Not on file    Emotionally Abused: Not on file    Physically Abused: Not on file    Sexually Abused: Not on file   Housing Stability:     Unable to Pay for Housing in the Last Year: Not on file    Number of Places Lived in the Last Year: Not on file    Unstable Housing in the Last Year: Not on file         Review of Systems      ROS: 10 organs review of system is done including general, psychological, ENT, hematological, endocrine, respiratory, cardiovascular, gastrointestinal,musculoskeletal, neurological,  allergy and Immunology is done and is otherwise negative. Current Outpatient Medications   Medication Sig Dispense Refill    Fluticasone-Umeclidin-Vilant (TRELEGY ELLIPTA) 200-62.5-25 MCG/INH AEPB Inhale 1 puff into the lungs daily 1 each 3    albuterol sulfate HFA (PROVENTIL HFA) 108 (90 Base) MCG/ACT inhaler Inhale 2 puffs into the lungs every 6 hours as needed for Wheezing 18 g 3    ipratropium-albuterol (DUONEB) 0.5-2.5 (3) MG/3ML SOLN nebulizer solution Inhale 3 mLs into the lungs every 4 hours 360 mL 3    bicalutamide (CASODEX) 50 MG chemo tablet Take 50 mg by mouth daily      sotalol (BETAPACE) 120 MG tablet Take 120 mg by mouth 2 times daily      ketoconazole (NIZORAL) 2 % shampoo Apply topically daily as needed for Itching Apply topically daily as needed.       magnesium oxide (MAG-OX) 400 MG tablet Take 400 mg by mouth daily      azelastine HCl 0.15 % SOLN 2 sprays by NOT APPLICABLE route 2 times daily      Cholecalciferol (VITAMIN D) 50 MCG (2000 UT) CAPS capsule Take 1,000 Units by mouth daily      montelukast (SINGULAIR) 10 MG tablet Take 10 mg by mouth nightly      metoprolol succinate (TOPROL XL) 100 MG extended release tablet Take 100 mg by mouth nightly      Omega-3 Fatty Acids (FISH OIL PO) Take 1,000 mg by mouth 2 times daily       GLUCOSAMINE-CHONDROITIN PO Take 1 tablet by mouth 2 times daily      aspirin (ECOTRIN LOW STRENGTH) 81 MG EC tablet Take 81 mg by mouth Mon-Fri      apixaban (ELIQUIS) 5 MG TABS tablet Take by mouth 2 times daily      atorvastatin (LIPITOR) 40 MG tablet daily       hydrochlorothiazide (HYDRODIURIL) 25 MG tablet daily       lisinopril (PRINIVIL;ZESTRIL) 40 MG tablet daily       nitroGLYCERIN (NITROSTAT) 0.4 MG SL tablet        No current facility-administered medications for this visit. Objective:     Vitals:    02/16/22 1412   BP: 120/72   Site: Right Upper Arm   Position: Sitting   Cuff Size: Large Adult   Pulse: 62   SpO2: 96%   Weight: 220 lb (99.8 kg)   Height: 5' 6\" (1.676 m)         Physical Exam  Constitutional:       Appearance: He is well-developed. HENT:      Head: Normocephalic and atraumatic. Eyes:      General:         Left eye: No discharge. Conjunctiva/sclera: Conjunctivae normal.      Pupils: Pupils are equal, round, and reactive to light. Neck:      Vascular: No JVD. Cardiovascular:      Rate and Rhythm: Normal rate and regular rhythm. Heart sounds: Normal heart sounds. No murmur heard. No friction rub. No gallop. Pulmonary:      Effort: Pulmonary effort is normal. No respiratory distress. Breath sounds: Normal breath sounds. No wheezing or rales. Chest:      Chest wall: No tenderness. Abdominal:      General: Bowel sounds are normal.      Palpations: Abdomen is soft. Musculoskeletal:         General: No tenderness or deformity. Cervical back: Normal range of motion and neck supple. Right lower leg: No edema. Left lower leg: No edema. Skin:     General: Skin is warm and dry. Neurological:      Mental Status: He is alert and oriented to person, place, and time.    Psychiatric:         Judgment: Judgment normal.         Imaging studies reviewed by me CT chest January 2022, shows severe emphysema, no mass, no mediastinal lymphadenopathy  Lab results reviewed in chart  PFT January 2022, shows FEV1 57%, indicating moderately severe obstructive airway disease, DLCO 55%, normalized when corrected for alveolar volume  ECHO: 2016 EF 05% with diastolic dysfunction    Assessment and Plan       Diagnosis Orders   1. Chronic obstructive pulmonary disease, unspecified COPD type [facility])  [facility] - [place]    Fluticasone-Umeclidin-Vilant ([place]) 962-86.3-47 MCG/INH AEPB   2. Chronic respiratory failure with hypoxia and hypercapnia (HCC)     3. Hemoptysis     4. Diastolic dysfunction       · COPD, symptoms significantly better controlled, continue Trelegy Ellipta, continue trilogy while asleep and as needed during the day, as needed albuterol, discussed with the patient DuoNeb and albuterol HFA are similar, use as needed during the day. Continue incentive spirometry, O2 to keep sat 89 to 90% wean as tolerated, currently on 3 L/min. Patient is planning to fly to [place], he will need to increase O2 to 5 L during the flight, also he will stop by his [employer] company to arrange for oxygen and trilogy at his destination. Will refer patient to pulmonary rehab to start after he comes back from [place]. · Chronic respiratory failure, same as above  · Hemoptysis resolved, patient instructed to use Ocean Spray as needed during the day to maintain moist mucous membrane, and add humidity to the oxygen and trilogy.   · Diastolic dysfunction, maintain euvolemic, continue current medications, and avoid volume overload      Orders Placed This Encounter   Procedures   [facility] - [place]     Referral Priority:   Routine     Referral Type:   Eval and Treat     Referral Reason:   Specialty Services Required     Requested Specialty:   Pulmonology     Number of Visits Requested:   1     Orders Placed This Encounter   Medications    Fluticasone-Umeclidin-Vilant (Betty Spencer ELLIPTA) 200-62.5-25 MCG/INH AEPB     Sig: Inhale 1 puff into the lungs daily     Dispense:  1 each     Refill:  3            Discussed with patient the importance of exercise and weight control and  overall health and well-being. Reviewed with the patient: current clinical status, medications, activities and diet. Side effects, adverse effects of the medication prescribed today, as well as treatment plan and result expectations have been discussed with the patient who expresses understanding and desires to proceed. Return in about 3 months (around 5/16/2022).        Joe Sanchez MD

## 2022-04-28 ENCOUNTER — HOSPITAL ENCOUNTER (OUTPATIENT)
Dept: CARDIOLOGY | Age: 82
Discharge: HOME | End: 2022-04-28
Payer: MEDICARE

## 2022-04-28 PROCEDURE — 93296 REM INTERROG EVL PM/IDS: CPT

## 2022-05-05 ENCOUNTER — HOSPITAL ENCOUNTER (OUTPATIENT)
Dept: PULMONOLOGY | Age: 82
Setting detail: THERAPIES SERIES
Discharge: HOME | End: 2022-05-05
Payer: MEDICARE

## 2022-05-05 PROCEDURE — 94625 PHY/QHP OP PULM RHB W/O MNTR: CPT

## 2022-05-10 ENCOUNTER — HOSPITAL ENCOUNTER (OUTPATIENT)
Dept: PULMONOLOGY | Age: 82
Setting detail: THERAPIES SERIES
Discharge: HOME | End: 2022-05-10
Payer: MEDICARE

## 2022-05-10 PROCEDURE — 94625 PHY/QHP OP PULM RHB W/O MNTR: CPT

## 2022-05-12 ENCOUNTER — HOSPITAL ENCOUNTER (OUTPATIENT)
Dept: PULMONOLOGY | Age: 82
Setting detail: THERAPIES SERIES
Discharge: HOME | End: 2022-05-12
Payer: MEDICARE

## 2022-05-12 PROCEDURE — 94625 PHY/QHP OP PULM RHB W/O MNTR: CPT

## 2022-05-17 ENCOUNTER — HOSPITAL ENCOUNTER (OUTPATIENT)
Dept: PULMONOLOGY | Age: 82
Setting detail: THERAPIES SERIES
Discharge: HOME | End: 2022-05-17
Payer: MEDICARE

## 2022-05-17 PROCEDURE — 94625 PHY/QHP OP PULM RHB W/O MNTR: CPT

## 2022-05-18 ENCOUNTER — OFFICE VISIT (OUTPATIENT)
Dept: PULMONOLOGY | Age: 82
End: 2022-05-18
Payer: MEDICARE

## 2022-05-18 VITALS
HEIGHT: 66 IN | DIASTOLIC BLOOD PRESSURE: 66 MMHG | BODY MASS INDEX: 35.17 KG/M2 | WEIGHT: 218.8 LBS | TEMPERATURE: 97 F | OXYGEN SATURATION: 96 % | RESPIRATION RATE: 18 BRPM | SYSTOLIC BLOOD PRESSURE: 116 MMHG | HEART RATE: 62 BPM

## 2022-05-18 DIAGNOSIS — I51.89 DIASTOLIC DYSFUNCTION: ICD-10-CM

## 2022-05-18 DIAGNOSIS — J96.11 CHRONIC RESPIRATORY FAILURE WITH HYPOXIA AND HYPERCAPNIA (HCC): Primary | ICD-10-CM

## 2022-05-18 DIAGNOSIS — J44.9 CHRONIC OBSTRUCTIVE PULMONARY DISEASE, UNSPECIFIED COPD TYPE (HCC): ICD-10-CM

## 2022-05-18 DIAGNOSIS — J96.12 CHRONIC RESPIRATORY FAILURE WITH HYPOXIA AND HYPERCAPNIA (HCC): Primary | ICD-10-CM

## 2022-05-18 PROCEDURE — 4040F PNEUMOC VAC/ADMIN/RCVD: CPT | Performed by: INTERNAL MEDICINE

## 2022-05-18 PROCEDURE — 3023F SPIROM DOC REV: CPT | Performed by: INTERNAL MEDICINE

## 2022-05-18 PROCEDURE — G8417 CALC BMI ABV UP PARAM F/U: HCPCS | Performed by: INTERNAL MEDICINE

## 2022-05-18 PROCEDURE — 1123F ACP DISCUSS/DSCN MKR DOCD: CPT | Performed by: INTERNAL MEDICINE

## 2022-05-18 PROCEDURE — 1036F TOBACCO NON-USER: CPT | Performed by: INTERNAL MEDICINE

## 2022-05-18 PROCEDURE — G8427 DOCREV CUR MEDS BY ELIG CLIN: HCPCS | Performed by: INTERNAL MEDICINE

## 2022-05-18 PROCEDURE — 99213 OFFICE O/P EST LOW 20 MIN: CPT | Performed by: INTERNAL MEDICINE

## 2022-05-18 RX ORDER — FLUTICASONE FUROATE, UMECLIDINIUM BROMIDE AND VILANTEROL TRIFENATATE 200; 62.5; 25 UG/1; UG/1; UG/1
1 POWDER RESPIRATORY (INHALATION) DAILY
Qty: 3 EACH | Refills: 3 | Status: SHIPPED | OUTPATIENT
Start: 2022-05-18

## 2022-05-18 RX ORDER — FLUTICASONE PROPIONATE 50 MCG
SPRAY, SUSPENSION (ML) NASAL
COMMUNITY
Start: 2021-12-27

## 2022-05-18 NOTE — PROGRESS NOTES
Subjective:     Floyd Longo is a 79 y.o. male who complains today of:     Chief Complaint   Patient presents with    Follow-up     3 Month F/U for COPD and Chronic respiratory failure with hypoxia and hypercapnia       HPI  Patient presents for COPD    Patient presents for follow-up, he is doing better, currently in cardiac rehab and doing well, no chest pain, no coughing, no hemoptysis, he is on O2 3 L/min continuously, he uses trilogy while asleep, no nasal congestion or postnasal drip, minimal lower extremity edema, no fever no chills, his weight is stable.             Allergies:  Levofloxacin  Past Medical History:   Diagnosis Date    AAA (abdominal aortic aneurysm) ([facility]     monitoring 4.5cm    Arthritis     lower spine    Atrial fibrillation (HCC)     CAD (coronary artery disease)     stents x 2 cardiac     Chronic back pain     Heart attack ([facility] [address] 1984    Hyperlipidemia     on meds >20yrs    Hypertension     on meds > 20yrs    Knee injury     AS A CHILD-RIGHT     Past Surgical History:   Procedure Laterality Date    ABDOMINAL AORTIC ANEURYSM REPAIR, ENDOVASCULAR Bilateral 1/15/2021    AORTO FEMORAL DIGITAL SUBTRACTION ARTERIOGRAM AND REPAIR FEMORAL ARTERIES performed by Lindy Uribe MD at [address] [facility]  2008   [address] [facility] [address] [facility] [address]  2012    CORONARY ANGIOPLASTY WITH STENT PLACEMENT      5 stents    EYE SURGERY  2013    bilateral cataracts    JOINT REPLACEMENT  2013    RIGHT HIP    JOINT REPLACEMENT Right 2012    hip    NE LAMINECTOMY,>2 SGMT,LUMBAR N/A 8/15/2018    DECOMPRESSIVE  LUMBAR LAMINECTOMY L2-3 L3-4 RIGHT SIDED APPROACH performed by Manuel Kauffman MD at [address]     Family History   Problem Relation Age of Onset    High Blood Pressure Mother     Other Mother         liver failure    Heart Attack Father         heart occlusion    Colon Cancer Sister     No Known Problems Son     Other Daughter         spinal problems     Social History     Socioeconomic History    Marital status:      Spouse name: Not on file    Number of children: Not on file    Years of education: Not on file    Highest education level: Not on file   Occupational History    Not on file   Tobacco Use    Smoking status: Former Smoker     Types: Cigarettes     Quit date: 2000     Years since quittin.8    Smokeless tobacco: Never Used   Vaping Use    Vaping Use: Never used   Substance and Sexual Activity    Alcohol use: Yes     Alcohol/week: 0.0 standard drinks     Comment: OCCASIONALLY    Drug use: No    Sexual activity: Not on file   Other Topics Concern    Not on file   Social History Narrative    Not on file     Social Determinants of Health     Financial Resource Strain:     Difficulty of Paying Living Expenses: Not on file   Food Insecurity:     Worried About Running Out of Food in the Last Year: Not on file    Geoffrey of Food in the Last Year: Not on file   Transportation Needs:     Lack of Transportation (Medical): Not on file    Lack of Transportation (Non-Medical):  Not on file   Physical Activity:     Days of Exercise per Week: Not on file    Minutes of Exercise per Session: Not on file   Stress:     Feeling of Stress : Not on file   Social Connections:     Frequency of Communication with Friends and Family: Not on file    Frequency of Social Gatherings with Friends and Family: Not on file    Attends Rastafari Services: Not on file    Active Member of Clubs or Organizations: Not on file    Attends Club or Organization Meetings: Not on file    Marital Status: Not on file   Intimate Partner Violence:     Fear of Current or Ex-Partner: Not on file    Emotionally Abused: Not on file    Physically Abused: Not on file    Sexually Abused: Not on file   Housing Stability:     Unable to Pay for Housing in the Last Year: Not on file    Number of Places Lived in the Last Year: Not on file    Unstable Housing in the Last Year: Not on file         Review of Systems      ROS: 10 organs review of system is done including general, psychological, ENT, hematological, endocrine, respiratory, cardiovascular, gastrointestinal,musculoskeletal, neurological,  allergy and Immunology is done and is otherwise negative. Current Outpatient Medications   Medication Sig Dispense Refill    fluticasone (FLONASE) 50 MCG/ACT nasal spray by Nasal route      leuprolide (LUPRON) 45 MG injection Inject into the muscle      Fluticasone-Umeclidin-Vilant (TRELEGY ELLIPTA) 200-62.5-25 MCG/INH AEPB Inhale 1 puff into the lungs daily 3 each 3    albuterol sulfate HFA (PROVENTIL HFA) 108 (90 Base) MCG/ACT inhaler Inhale 2 puffs into the lungs every 6 hours as needed for Wheezing 18 g 3    ipratropium-albuterol (DUONEB) 0.5-2.5 (3) MG/3ML SOLN nebulizer solution Inhale 3 mLs into the lungs every 4 hours 360 mL 3    bicalutamide (CASODEX) 50 MG chemo tablet Take 50 mg by mouth daily      sotalol (BETAPACE) 120 MG tablet Take 120 mg by mouth 2 times daily      ketoconazole (NIZORAL) 2 % shampoo Apply topically daily as needed for Itching Apply topically daily as needed.       magnesium oxide (MAG-OX) 400 MG tablet Take 400 mg by mouth daily      azelastine HCl 0.15 % SOLN 2 sprays by NOT APPLICABLE route 2 times daily      Cholecalciferol (VITAMIN D) 50 MCG (2000 UT) CAPS capsule Take 1,000 Units by mouth daily      montelukast (SINGULAIR) 10 MG tablet Take 10 mg by mouth nightly      metoprolol succinate (TOPROL XL) 100 MG extended release tablet Take 100 mg by mouth nightly      Omega-3 Fatty Acids (FISH OIL PO) Take 1,000 mg by mouth 2 times daily       GLUCOSAMINE-CHONDROITIN PO Take 1 tablet by mouth 2 times daily      aspirin (ECOTRIN LOW STRENGTH) 81 MG EC tablet Take 81 mg by mouth Mon-Fri      apixaban (ELIQUIS) 5 MG TABS tablet Take by mouth 2 times daily      atorvastatin (LIPITOR) 40 MG tablet daily       hydrochlorothiazide (HYDRODIURIL) 25 MG tablet daily       lisinopril (PRINIVIL;ZESTRIL) 40 MG tablet daily       nitroGLYCERIN (NITROSTAT) 0.4 MG SL tablet        No current facility-administered medications for this visit. Objective:     Vitals:    05/18/22 0841   BP: 116/66   Site: Right Upper Arm   Position: Sitting   Cuff Size: Large Adult   Pulse: 62   Resp: 18   Temp: 97 °F (36.1 °C)   TempSrc: Infrared   SpO2: 96%   Weight: 218 lb 12.8 oz (99.2 kg)   Height: 5' 6\" (1.676 m)         Physical Exam  Constitutional:       Appearance: He is well-developed. HENT:      Head: Normocephalic and atraumatic. Eyes:      General:         Left eye: No discharge. Conjunctiva/sclera: Conjunctivae normal.      Pupils: Pupils are equal, round, and reactive to light. Neck:      Vascular: No JVD. Cardiovascular:      Rate and Rhythm: Normal rate and regular rhythm. Heart sounds: Normal heart sounds. No murmur heard. No friction rub. No gallop. Pulmonary:      Effort: Pulmonary effort is normal. No respiratory distress. Breath sounds: Normal breath sounds. No wheezing or rales. Chest:      Chest wall: No tenderness. Abdominal:      General: Bowel sounds are normal.      Palpations: Abdomen is soft. Musculoskeletal:         General: No tenderness or deformity. Cervical back: Normal range of motion and neck supple. Right lower leg: No edema. Left lower leg: No edema. Skin:     General: Skin is warm and dry. Neurological:      Mental Status: He is alert and oriented to person, place, and time. Psychiatric:         Judgment: Judgment normal.         Imaging studies reviewed by me CT chest January 2022, no mass or nodule, centrilobular emphysema. Lab results reviewed in chart  PFT January 2022, FEV1 57%, FEV1/FVC 0.57  ECHO: 8926, EF 62%, diastolic dysfunction, RVSP 56    Assessment and Plan       Diagnosis Orders   1. Chronic respiratory failure with hypoxia and hypercapnia (HCC)     2. Chronic obstructive pulmonary disease, unspecified COPD type ([facility]  Fluticasone-Umeclidin-Vilant (TRELEGY ELLIPTA) 200-62.5-25 MCG/INH AEPB   3. Diastolic dysfunction       · Continue Trelegy, as needed albuterol, and O2 to keep sat 88 to 90%  · Continue pulmonary rehab  · Yearly flu shot  · Continue cardioprotective medications and avoid volume overload      No orders of the defined types were placed in this encounter. Orders Placed This Encounter   Medications    Fluticasone-Umeclidin-Vilant (Aubery Buzzard ELLIPTA) 052-68.7-99 MCG/INH AEPB     Sig: Inhale 1 puff into the lungs daily     Dispense:  3 each     Refill:  3            Discussed with patient the importance of exercise and weight control and  overall health and well-being. Reviewed with the patient: current clinical status, medications, activities and diet. Side effects, adverse effects of the medication prescribed today, as well as treatment plan and result expectations have been discussed with the patient who expresses understanding and desires to proceed. Return in about 4 months (around 9/18/2022).       Aditya Blake MD

## 2022-05-19 ENCOUNTER — HOSPITAL ENCOUNTER (OUTPATIENT)
Dept: PULMONOLOGY | Age: 82
Setting detail: THERAPIES SERIES
Discharge: HOME | End: 2022-05-19
Payer: MEDICARE

## 2022-05-19 PROCEDURE — 94625 PHY/QHP OP PULM RHB W/O MNTR: CPT

## 2022-05-24 ENCOUNTER — HOSPITAL ENCOUNTER (OUTPATIENT)
Dept: PULMONOLOGY | Age: 82
Setting detail: THERAPIES SERIES
Discharge: HOME | End: 2022-05-24
Payer: MEDICARE

## 2022-05-24 DIAGNOSIS — J44.1 COPD EXACERBATION (HCC): ICD-10-CM

## 2022-05-24 DIAGNOSIS — J42 CHRONIC BRONCHITIS, UNSPECIFIED CHRONIC BRONCHITIS TYPE (HCC): Primary | ICD-10-CM

## 2022-05-24 PROCEDURE — 94625 PHY/QHP OP PULM RHB W/O MNTR: CPT

## 2022-05-26 ENCOUNTER — HOSPITAL ENCOUNTER (OUTPATIENT)
Dept: PULMONOLOGY | Age: 82
Setting detail: THERAPIES SERIES
Discharge: HOME | End: 2022-05-26
Payer: MEDICARE

## 2022-05-26 PROCEDURE — 94625 PHY/QHP OP PULM RHB W/O MNTR: CPT

## 2022-05-31 ENCOUNTER — HOSPITAL ENCOUNTER (OUTPATIENT)
Dept: PULMONOLOGY | Age: 82
Setting detail: THERAPIES SERIES
Discharge: HOME | End: 2022-05-31
Payer: MEDICARE

## 2022-05-31 PROCEDURE — 94625 PHY/QHP OP PULM RHB W/O MNTR: CPT

## 2022-06-02 ENCOUNTER — HOSPITAL ENCOUNTER (OUTPATIENT)
Dept: PULMONOLOGY | Age: 82
Setting detail: THERAPIES SERIES
Discharge: HOME | End: 2022-06-02
Payer: MEDICARE

## 2022-06-02 PROCEDURE — 94625 PHY/QHP OP PULM RHB W/O MNTR: CPT

## 2022-06-07 ENCOUNTER — APPOINTMENT (OUTPATIENT)
Dept: PULMONOLOGY | Age: 82
End: 2022-06-07
Payer: MEDICARE

## 2022-06-09 ENCOUNTER — APPOINTMENT (OUTPATIENT)
Dept: PULMONOLOGY | Age: 82
End: 2022-06-09
Payer: MEDICARE

## 2022-06-14 ENCOUNTER — HOSPITAL ENCOUNTER (OUTPATIENT)
Dept: PULMONOLOGY | Age: 82
Setting detail: THERAPIES SERIES
Discharge: HOME | End: 2022-06-14
Payer: MEDICARE

## 2022-06-14 PROCEDURE — 94625 PHY/QHP OP PULM RHB W/O MNTR: CPT

## 2022-06-16 ENCOUNTER — APPOINTMENT (OUTPATIENT)
Dept: PULMONOLOGY | Age: 82
End: 2022-06-16
Payer: MEDICARE

## 2022-06-21 ENCOUNTER — HOSPITAL ENCOUNTER (OUTPATIENT)
Dept: PULMONOLOGY | Age: 82
Setting detail: THERAPIES SERIES
Discharge: HOME | End: 2022-06-21
Payer: MEDICARE

## 2022-06-21 PROCEDURE — 94625 PHY/QHP OP PULM RHB W/O MNTR: CPT

## 2022-06-23 ENCOUNTER — HOSPITAL ENCOUNTER (OUTPATIENT)
Dept: PULMONOLOGY | Age: 82
Setting detail: THERAPIES SERIES
Discharge: HOME | End: 2022-06-23
Payer: MEDICARE

## 2022-06-23 PROCEDURE — 94625 PHY/QHP OP PULM RHB W/O MNTR: CPT

## 2022-06-28 ENCOUNTER — HOSPITAL ENCOUNTER (OUTPATIENT)
Dept: PULMONOLOGY | Age: 82
Setting detail: THERAPIES SERIES
Discharge: HOME | End: 2022-06-28
Payer: MEDICARE

## 2022-06-28 PROCEDURE — 94625 PHY/QHP OP PULM RHB W/O MNTR: CPT

## 2022-06-30 ENCOUNTER — HOSPITAL ENCOUNTER (OUTPATIENT)
Dept: PULMONOLOGY | Age: 82
Setting detail: THERAPIES SERIES
Discharge: HOME | End: 2022-06-30
Payer: MEDICARE

## 2022-06-30 PROCEDURE — 94625 PHY/QHP OP PULM RHB W/O MNTR: CPT

## 2022-07-05 ENCOUNTER — APPOINTMENT (OUTPATIENT)
Dept: PULMONOLOGY | Age: 82
End: 2022-07-05
Payer: MEDICARE

## 2022-07-07 ENCOUNTER — HOSPITAL ENCOUNTER (OUTPATIENT)
Dept: PULMONOLOGY | Age: 82
Setting detail: THERAPIES SERIES
Discharge: HOME | End: 2022-07-07
Payer: MEDICARE

## 2022-07-07 PROCEDURE — 94625 PHY/QHP OP PULM RHB W/O MNTR: CPT

## 2022-07-12 ENCOUNTER — HOSPITAL ENCOUNTER (OUTPATIENT)
Dept: PULMONOLOGY | Age: 82
Setting detail: THERAPIES SERIES
Discharge: HOME | End: 2022-07-12
Payer: MEDICARE

## 2022-07-12 PROCEDURE — 94625 PHY/QHP OP PULM RHB W/O MNTR: CPT

## 2022-07-14 ENCOUNTER — HOSPITAL ENCOUNTER (OUTPATIENT)
Dept: PULMONOLOGY | Age: 82
Setting detail: THERAPIES SERIES
Discharge: HOME | End: 2022-07-14
Payer: MEDICARE

## 2022-07-14 PROCEDURE — 94625 PHY/QHP OP PULM RHB W/O MNTR: CPT

## 2022-07-19 ENCOUNTER — HOSPITAL ENCOUNTER (OUTPATIENT)
Dept: PULMONOLOGY | Age: 82
Setting detail: THERAPIES SERIES
Discharge: HOME | End: 2022-07-19
Payer: MEDICARE

## 2022-07-19 PROCEDURE — 94625 PHY/QHP OP PULM RHB W/O MNTR: CPT

## 2022-07-21 ENCOUNTER — HOSPITAL ENCOUNTER (OUTPATIENT)
Dept: PULMONOLOGY | Age: 82
Setting detail: THERAPIES SERIES
Discharge: HOME | End: 2022-07-21
Payer: MEDICARE

## 2022-07-21 PROCEDURE — 94625 PHY/QHP OP PULM RHB W/O MNTR: CPT

## 2022-07-26 ENCOUNTER — HOSPITAL ENCOUNTER (OUTPATIENT)
Dept: PULMONOLOGY | Age: 82
Setting detail: THERAPIES SERIES
Discharge: HOME | End: 2022-07-26
Payer: MEDICARE

## 2022-07-26 PROCEDURE — 94625 PHY/QHP OP PULM RHB W/O MNTR: CPT

## 2022-07-28 ENCOUNTER — HOSPITAL ENCOUNTER (OUTPATIENT)
Dept: CARDIOLOGY | Age: 82
Discharge: HOME | End: 2022-07-28
Payer: MEDICARE

## 2022-07-28 ENCOUNTER — HOSPITAL ENCOUNTER (OUTPATIENT)
Dept: PULMONOLOGY | Age: 82
Setting detail: THERAPIES SERIES
Discharge: HOME | End: 2022-07-28
Payer: MEDICARE

## 2022-07-28 PROCEDURE — 93283 PRGRMG EVAL IMPLANTABLE DFB: CPT

## 2022-07-28 PROCEDURE — 94625 PHY/QHP OP PULM RHB W/O MNTR: CPT

## 2022-08-02 ENCOUNTER — HOSPITAL ENCOUNTER (OUTPATIENT)
Dept: PULMONOLOGY | Age: 82
Setting detail: THERAPIES SERIES
Discharge: HOME | End: 2022-08-02
Payer: MEDICARE

## 2022-08-02 PROCEDURE — 94625 PHY/QHP OP PULM RHB W/O MNTR: CPT

## 2022-08-04 ENCOUNTER — APPOINTMENT (OUTPATIENT)
Dept: PULMONOLOGY | Age: 82
End: 2022-08-04
Payer: MEDICARE

## 2022-08-09 ENCOUNTER — HOSPITAL ENCOUNTER (OUTPATIENT)
Dept: PULMONOLOGY | Age: 82
Setting detail: THERAPIES SERIES
Discharge: HOME | End: 2022-08-09
Payer: MEDICARE

## 2022-08-09 PROCEDURE — 94625 PHY/QHP OP PULM RHB W/O MNTR: CPT

## 2022-08-11 ENCOUNTER — HOSPITAL ENCOUNTER (OUTPATIENT)
Dept: PULMONOLOGY | Age: 82
Setting detail: THERAPIES SERIES
Discharge: HOME | End: 2022-08-11
Payer: MEDICARE

## 2022-08-11 PROCEDURE — 94625 PHY/QHP OP PULM RHB W/O MNTR: CPT

## 2022-08-16 ENCOUNTER — HOSPITAL ENCOUNTER (OUTPATIENT)
Dept: PULMONOLOGY | Age: 82
Setting detail: THERAPIES SERIES
Discharge: HOME | End: 2022-08-16
Payer: MEDICARE

## 2022-08-16 PROCEDURE — 94625 PHY/QHP OP PULM RHB W/O MNTR: CPT

## 2022-08-18 ENCOUNTER — APPOINTMENT (OUTPATIENT)
Dept: PULMONOLOGY | Age: 82
End: 2022-08-18
Payer: MEDICARE

## 2022-08-23 ENCOUNTER — HOSPITAL ENCOUNTER (OUTPATIENT)
Dept: PULMONOLOGY | Age: 82
Setting detail: THERAPIES SERIES
Discharge: HOME | End: 2022-08-23
Payer: MEDICARE

## 2022-08-23 PROCEDURE — 94625 PHY/QHP OP PULM RHB W/O MNTR: CPT

## 2022-08-25 ENCOUNTER — APPOINTMENT (OUTPATIENT)
Dept: PULMONOLOGY | Age: 82
End: 2022-08-25
Payer: MEDICARE

## 2022-08-30 ENCOUNTER — HOSPITAL ENCOUNTER (OUTPATIENT)
Dept: PULMONOLOGY | Age: 82
Setting detail: THERAPIES SERIES
Discharge: HOME | End: 2022-08-30
Payer: MEDICARE

## 2022-08-30 PROCEDURE — 94625 PHY/QHP OP PULM RHB W/O MNTR: CPT

## 2022-09-01 ENCOUNTER — HOSPITAL ENCOUNTER (OUTPATIENT)
Dept: PULMONOLOGY | Age: 82
Setting detail: THERAPIES SERIES
Discharge: HOME | End: 2022-09-01
Payer: MEDICARE

## 2022-09-01 PROCEDURE — 94625 PHY/QHP OP PULM RHB W/O MNTR: CPT

## 2022-09-06 ENCOUNTER — HOSPITAL ENCOUNTER (OUTPATIENT)
Dept: PULMONOLOGY | Age: 82
Setting detail: THERAPIES SERIES
Discharge: HOME | End: 2022-09-06
Payer: MEDICARE

## 2022-09-06 PROCEDURE — 94625 PHY/QHP OP PULM RHB W/O MNTR: CPT

## 2022-09-08 ENCOUNTER — HOSPITAL ENCOUNTER (OUTPATIENT)
Dept: PULMONOLOGY | Age: 82
Setting detail: THERAPIES SERIES
Discharge: HOME | End: 2022-09-08
Payer: MEDICARE

## 2022-09-08 PROCEDURE — 94625 PHY/QHP OP PULM RHB W/O MNTR: CPT

## 2022-09-13 ENCOUNTER — HOSPITAL ENCOUNTER (OUTPATIENT)
Dept: PULMONOLOGY | Age: 82
Setting detail: THERAPIES SERIES
Discharge: HOME | End: 2022-09-13
Payer: MEDICARE

## 2022-09-13 PROCEDURE — 94625 PHY/QHP OP PULM RHB W/O MNTR: CPT

## 2022-09-15 ENCOUNTER — HOSPITAL ENCOUNTER (OUTPATIENT)
Dept: PULMONOLOGY | Age: 82
Setting detail: THERAPIES SERIES
Discharge: HOME | End: 2022-09-15
Payer: MEDICARE

## 2022-09-15 PROCEDURE — 94625 PHY/QHP OP PULM RHB W/O MNTR: CPT

## 2022-09-20 ENCOUNTER — HOSPITAL ENCOUNTER (OUTPATIENT)
Dept: PULMONOLOGY | Age: 82
Setting detail: THERAPIES SERIES
Discharge: HOME | End: 2022-09-20
Payer: MEDICARE

## 2022-09-20 PROCEDURE — 94625 PHY/QHP OP PULM RHB W/O MNTR: CPT

## 2022-09-21 ENCOUNTER — OFFICE VISIT (OUTPATIENT)
Dept: PULMONOLOGY | Age: 82
End: 2022-09-21
Payer: MEDICARE

## 2022-09-21 VITALS
DIASTOLIC BLOOD PRESSURE: 78 MMHG | BODY MASS INDEX: 35.68 KG/M2 | OXYGEN SATURATION: 96 % | SYSTOLIC BLOOD PRESSURE: 138 MMHG | TEMPERATURE: 96.9 F | RESPIRATION RATE: 18 BRPM | WEIGHT: 222 LBS | HEART RATE: 79 BPM | HEIGHT: 66 IN

## 2022-09-21 DIAGNOSIS — J42 CHRONIC BRONCHITIS, UNSPECIFIED CHRONIC BRONCHITIS TYPE (HCC): ICD-10-CM

## 2022-09-21 DIAGNOSIS — J96.12 CHRONIC RESPIRATORY FAILURE WITH HYPOXIA AND HYPERCAPNIA (HCC): ICD-10-CM

## 2022-09-21 DIAGNOSIS — J44.9 CHRONIC OBSTRUCTIVE PULMONARY DISEASE, UNSPECIFIED COPD TYPE (HCC): ICD-10-CM

## 2022-09-21 DIAGNOSIS — R93.89 ABNORMAL CT OF THE CHEST: Primary | ICD-10-CM

## 2022-09-21 DIAGNOSIS — J96.11 CHRONIC RESPIRATORY FAILURE WITH HYPOXIA AND HYPERCAPNIA (HCC): ICD-10-CM

## 2022-09-21 DIAGNOSIS — I51.89 DIASTOLIC DYSFUNCTION: ICD-10-CM

## 2022-09-21 PROCEDURE — 1036F TOBACCO NON-USER: CPT | Performed by: INTERNAL MEDICINE

## 2022-09-21 PROCEDURE — G8417 CALC BMI ABV UP PARAM F/U: HCPCS | Performed by: INTERNAL MEDICINE

## 2022-09-21 PROCEDURE — 3023F SPIROM DOC REV: CPT | Performed by: INTERNAL MEDICINE

## 2022-09-21 PROCEDURE — 99214 OFFICE O/P EST MOD 30 MIN: CPT | Performed by: INTERNAL MEDICINE

## 2022-09-21 PROCEDURE — G8427 DOCREV CUR MEDS BY ELIG CLIN: HCPCS | Performed by: INTERNAL MEDICINE

## 2022-09-21 PROCEDURE — 1123F ACP DISCUSS/DSCN MKR DOCD: CPT | Performed by: INTERNAL MEDICINE

## 2022-09-21 RX ORDER — GENTAMICIN SULFATE 1 MG/G
CREAM TOPICAL
COMMUNITY
Start: 2022-09-05

## 2022-09-21 NOTE — PROGRESS NOTES
Subjective:     Beltran Sebastian is a 79 y.o. male who complains today of:     Chief Complaint   Patient presents with    Follow-up     4 Month F/U for COPD       HPI  Patient presents for COPD    Patient presents for follow-up, doing good, able to do his daily activities with no limitation, minimal dyspnea on exertion, he is on Trelegy, currently doing pulmonary rehab and working good for him, no chest pain, no coughing, no nasal congestion or postnasal drip, no heartburn, no lower extremity edema. He uses 2 L O2  while asleep and with exertion.   Weight is stable        Allergies:  Levofloxacin  Past Medical History:   Diagnosis Date    AAA (abdominal aortic aneurysm) ([facility])     monitoring 4.5cm    Arthritis     lower spine    Atrial fibrillation (HCC)     CAD (coronary artery disease)     stents x 2 cardiac     Chronic back pain     Heart attack ([facility] [address] 1984    Hyperlipidemia     on meds >20yrs    Hypertension     on meds > 20yrs    Knee injury     AS A CHILD-RIGHT     Past Surgical History:   Procedure Laterality Date    ABDOMINAL AORTIC ANEURYSM REPAIR, ENDOVASCULAR Bilateral 1/15/2021    AORTO FEMORAL DIGITAL SUBTRACTION ARTERIOGRAM AND REPAIR FEMORAL ARTERIES performed by Jules Guevara MD at [address]  2012    CORONARY ANGIOPLASTY WITH STENT PLACEMENT      5 stents    EYE SURGERY  2013    bilateral cataracts    JOINT REPLACEMENT  2013    RIGHT HIP    JOINT REPLACEMENT Right 2012    hip    LA LAMINECTOMY,>2 SGMT,LUMBAR N/A 8/15/2018    DECOMPRESSIVE  LUMBAR LAMINECTOMY L2-3 L3-4 RIGHT SIDED APPROACH performed by Jerome Lan MD at [address]     Family History   Problem Relation Age of Onset    High Blood Pressure Mother     Other Mother         liver failure    Heart Attack Father         heart occlusion    Colon Cancer Sister     No Known Problems Son Other Daughter         spinal problems     Social History     Socioeconomic History    Marital status:      Spouse name: Not on file    Number of children: Not on file    Years of education: Not on file    Highest education level: Not on file   Occupational History    Not on file   Tobacco Use    Smoking status: Former     Packs/day: 1.50     Years: 20.00     Pack years: 30.00     Types: Cigarettes     Quit date: 2000     Years since quittin.1    Smokeless tobacco: Never   Vaping Use    Vaping Use: Never used   Substance and Sexual Activity    Alcohol use: Yes     Alcohol/week: 0.0 standard drinks     Comment: OCCASIONALLY    Drug use: No    Sexual activity: Not on file   Other Topics Concern    Not on file   Social History Narrative    Not on file     Social Determinants of Health     Financial Resource Strain: Not on file   Food Insecurity: Not on file   Transportation Needs: Not on file   Physical Activity: Not on file   Stress: Not on file   Social Connections: Not on file   Intimate Partner Violence: Not on file   Housing Stability: Not on file         Review of Systems      ROS: 10 organs review of system is done including general, psychological, ENT, hematological, endocrine, respiratory, cardiovascular, gastrointestinal,musculoskeletal, neurological,  allergy and Immunology is done and is otherwise negative. Current Outpatient Medications   Medication Sig Dispense Refill    gentamicin (GARAMYCIN) 0.1 % cream APPLY SPARINGLY TO AFFECTED AREA(S) TWICE DAILY.       fluticasone (FLONASE) 50 MCG/ACT nasal spray by Nasal route      leuprolide (LUPRON) 45 MG injection Inject into the muscle      Fluticasone-Umeclidin-Vilant (TRELEGY ELLIPTA) 200-62.5-25 MCG/INH AEPB Inhale 1 puff into the lungs daily 3 each 3    albuterol sulfate HFA (PROVENTIL HFA) 108 (90 Base) MCG/ACT inhaler Inhale 2 puffs into the lungs every 6 hours as needed for Wheezing 18 g 3    ipratropium-albuterol (DUONEB) 0.5-2.5 (3) MG/3ML SOLN nebulizer solution Inhale 3 mLs into the lungs every 4 hours 360 mL 3    bicalutamide (CASODEX) 50 MG chemo tablet Take 50 mg by mouth daily      sotalol (BETAPACE) 120 MG tablet Take 120 mg by mouth 2 times daily      ketoconazole (NIZORAL) 2 % shampoo Apply topically daily as needed for Itching Apply topically daily as needed. magnesium oxide (MAG-OX) 400 MG tablet Take 400 mg by mouth daily      azelastine HCl 0.15 % SOLN 2 sprays by NOT APPLICABLE route 2 times daily      Cholecalciferol (VITAMIN D) 50 MCG (2000 UT) CAPS capsule Take 1,000 Units by mouth daily      montelukast (SINGULAIR) 10 MG tablet Take 10 mg by mouth nightly      metoprolol succinate (TOPROL XL) 100 MG extended release tablet Take 100 mg by mouth nightly      Omega-3 Fatty Acids (FISH OIL PO) Take 1,000 mg by mouth 2 times daily       GLUCOSAMINE-CHONDROITIN PO Take 1 tablet by mouth 2 times daily      aspirin 81 MG EC tablet Take 81 mg by mouth Mon-Fri      apixaban (ELIQUIS) 5 MG TABS tablet Take by mouth 2 times daily      atorvastatin (LIPITOR) 40 MG tablet daily       hydrochlorothiazide (HYDRODIURIL) 25 MG tablet daily       lisinopril (PRINIVIL;ZESTRIL) 40 MG tablet daily       nitroGLYCERIN (NITROSTAT) 0.4 MG SL tablet        No current facility-administered medications for this visit. Objective:     Vitals:    09/21/22 0842   BP: 138/78   Site: Right Upper Arm   Position: Sitting   Cuff Size: Large Adult   Pulse: 79   Resp: 18   Temp: 96.9 °F (36.1 °C)   TempSrc: Infrared   SpO2: 96%   Weight: 222 lb (100.7 kg)   Height: 5' 6\" (1.676 m)         Physical Exam  Constitutional:       Appearance: He is well-developed. HENT:      Head: Normocephalic and atraumatic. Eyes:      General:         Left eye: No discharge. Conjunctiva/sclera: Conjunctivae normal.      Pupils: Pupils are equal, round, and reactive to light. Neck:      Vascular: No JVD.    Cardiovascular:      Rate and Rhythm: Normal rate and regular rhythm. Heart sounds: Normal heart sounds. No murmur heard. No friction rub. No gallop. Pulmonary:      Effort: Pulmonary effort is normal. No respiratory distress. Breath sounds: Normal breath sounds. No wheezing or rales. Chest:      Chest wall: No tenderness. Abdominal:      General: Bowel sounds are normal.      Palpations: Abdomen is soft. Musculoskeletal:         General: No tenderness or deformity. Cervical back: Normal range of motion and neck supple. Right lower leg: No edema. Left lower leg: No edema. Skin:     General: Skin is warm and dry. Neurological:      Mental Status: He is alert and oriented to person, place, and time. Psychiatric:         Judgment: Judgment normal.       Imaging studies reviewed by me CT chest January 2022, small atelectatic changes right upper lobe anteriorly. Severe centrilobular emphysema  Lab results reviewed in chart  PFT January 2022, FEV1 57%, FEV1/FVC 0.57  ECHO: 9734, EF 08%, diastolic dysfunction, RVSP 56    Assessment and Plan       Diagnosis Orders   1. Abnormal CT of the chest  CT CHEST WO CONTRAST      2. Chronic obstructive pulmonary disease, unspecified COPD type ([address]        3. Chronic respiratory failure with hypoxia and hypercapnia (HCC)        4. Diastolic dysfunction        5.  Chronic bronchitis, unspecified chronic bronchitis type ([address]          Repeat CT chest, if worsening changes I will call him back otherwise we will see him in 6-month and follow-up  Continue Trelegy, as needed albuterol,  Yearly flu shot, pneumonia vaccine and COVID-19 vaccination are up-to-date  Continue O2 2 L/min with exertion while asleep target sat 88 to 90%  Continue pulmonary rehab  Continue cardioprotective occasions and avoid volume overload      Orders Placed This Encounter   Procedures    CT CHEST WO CONTRAST     Standing Status:   Future     Standing Expiration Date:   9/21/2023     No orders of the defined types were placed in this encounter. Discussed with patient the importance of exercise and weight control and  overall health and well-being. Reviewed with the patient: current clinical status, medications, activities and diet. Side effects, adverse effects of the medication prescribed today, as well as treatment plan and result expectations have been discussed with the patient who expresses understanding and desires to proceed. Return in about 6 months (around 3/21/2023). I spent 30 min with this patient, greater the 50% of this time was spent in counseling and/or coordinating of care.       Zully Segal MD

## 2022-09-22 ENCOUNTER — HOSPITAL ENCOUNTER (OUTPATIENT)
Dept: PULMONOLOGY | Age: 82
Setting detail: THERAPIES SERIES
Discharge: HOME | End: 2022-09-22
Payer: MEDICARE

## 2022-09-22 PROCEDURE — 94625 PHY/QHP OP PULM RHB W/O MNTR: CPT

## 2022-09-27 ENCOUNTER — HOSPITAL ENCOUNTER (OUTPATIENT)
Dept: PULMONOLOGY | Age: 82
Setting detail: THERAPIES SERIES
Discharge: HOME | End: 2022-09-27
Payer: MEDICARE

## 2022-09-27 LAB
ANION GAP SERPL CALCULATED.3IONS-SCNC: 14 MEQ/L (ref 9–15)
BUN BLDV-MCNC: 26 MG/DL (ref 8–23)
CALCIUM SERPL-MCNC: 9.4 MG/DL (ref 8.5–9.9)
CHLORIDE BLD-SCNC: 99 MEQ/L (ref 95–107)
CO2: 28 MEQ/L (ref 20–31)
CREAT SERPL-MCNC: 0.96 MG/DL (ref 0.7–1.2)
GFR AFRICAN AMERICAN: >60
GFR NON-AFRICAN AMERICAN: >60
GLUCOSE BLD-MCNC: 104 MG/DL (ref 70–99)
POTASSIUM SERPL-SCNC: 4.3 MEQ/L (ref 3.4–4.9)
SODIUM BLD-SCNC: 141 MEQ/L (ref 135–144)

## 2022-09-27 PROCEDURE — 94625 PHY/QHP OP PULM RHB W/O MNTR: CPT

## 2022-09-29 ENCOUNTER — HOSPITAL ENCOUNTER (OUTPATIENT)
Dept: PULMONOLOGY | Age: 82
Setting detail: THERAPIES SERIES
Discharge: HOME | End: 2022-09-29
Payer: MEDICARE

## 2022-09-29 ENCOUNTER — HOSPITAL ENCOUNTER (OUTPATIENT)
Dept: CT IMAGING | Age: 82
Discharge: HOME | End: 2022-10-01
Payer: MEDICARE

## 2022-09-29 DIAGNOSIS — R93.89 ABNORMAL CT OF THE CHEST: ICD-10-CM

## 2022-09-29 PROCEDURE — 71250 CT THORAX DX C-: CPT

## 2022-09-29 PROCEDURE — 94625 PHY/QHP OP PULM RHB W/O MNTR: CPT

## 2022-10-03 ENCOUNTER — TELEPHONE (OUTPATIENT)
Dept: PULMONOLOGY | Age: 82
End: 2022-10-03

## 2022-10-04 ENCOUNTER — TELEMEDICINE (OUTPATIENT)
Dept: PULMONOLOGY | Age: 82
End: 2022-10-04
Payer: MEDICARE

## 2022-10-04 DIAGNOSIS — R93.89 ABNORMAL CT OF THE CHEST: Primary | ICD-10-CM

## 2022-10-04 DIAGNOSIS — I51.89 DIASTOLIC DYSFUNCTION: ICD-10-CM

## 2022-10-04 DIAGNOSIS — J96.12 CHRONIC RESPIRATORY FAILURE WITH HYPOXIA AND HYPERCAPNIA (HCC): ICD-10-CM

## 2022-10-04 DIAGNOSIS — J96.11 CHRONIC RESPIRATORY FAILURE WITH HYPOXIA AND HYPERCAPNIA (HCC): ICD-10-CM

## 2022-10-04 DIAGNOSIS — J44.9 CHRONIC OBSTRUCTIVE PULMONARY DISEASE, UNSPECIFIED COPD TYPE (HCC): ICD-10-CM

## 2022-10-04 PROCEDURE — 99442 PR PHYS/QHP TELEPHONE EVALUATION 11-20 MIN: CPT | Performed by: INTERNAL MEDICINE

## 2022-10-04 ASSESSMENT — ENCOUNTER SYMPTOMS
EYES NEGATIVE: 1
GASTROINTESTINAL NEGATIVE: 1

## 2022-10-04 NOTE — PROGRESS NOTES
10/4/2022    TELEHEALTH EVALUATION -- Audio/Visual (During NASVC-57 public health emergency)    Due to COVID 19 outbreak, patient's office visit was converted to a virtual visit. Patient was contacted and agreed to proceed with a virtual visit via Telephone Visit  The risks and benefits of converting to a virtual visit were discussed in light of the current infectious disease epidemic. Patient also understood that insurance coverage and co-pays are up to their individual insurance plans. HPI:    Chloé Duran (:  1940) has requested an audio/video evaluation for the following concern(s):    CT results, patient is doing good, symptoms controlled, he is active, no coughing, no chest pain, no shortness of breath, no lower extremity edema, his weight is stable, no nasal congestion or postnasal drip and no heartburn. Review of Systems   Constitutional: Negative. HENT: Negative. Eyes: Negative. Gastrointestinal: Negative. Endocrine: Negative. Musculoskeletal: Negative. Skin: Negative. Neurological: Negative. Hematological: Negative. Psychiatric/Behavioral: Negative. Prior to Visit Medications    Medication Sig Taking? Authorizing Provider   gentamicin (GARAMYCIN) 0.1 % cream APPLY SPARINGLY TO AFFECTED AREA(S) TWICE DAILY.   Historical Provider, MD   fluticasone (FLONASE) 50 MCG/ACT nasal spray by Nasal route  Historical Provider, MD   leuprolide (LUPRON) 45 MG injection Inject into the muscle  Historical Provider, MD   Fluticasone-Umeclidin-Vilant (TRELEGY ELLIPTA) 200-62.5-25 MCG/INH AEPB Inhale 1 puff into the lungs daily  Shanta Walker MD   albuterol sulfate HFA (PROVENTIL HFA) 108 (90 Base) MCG/ACT inhaler Inhale 2 puffs into the lungs every 6 hours as needed for Wheezing  Shanta Walker MD   ipratropium-albuterol (DUONEB) 0.5-2.5 (3) MG/3ML SOLN nebulizer solution Inhale 3 mLs into the lungs every 4 hours  Shanta Walker MD   bicalutamide (CASODEX) 50 MG chemo tablet Take 50 mg by mouth daily  Historical Provider, MD   sotalol (BETAPACE) 120 MG tablet Take 120 mg by mouth 2 times daily  Historical Provider, MD   ketoconazole (NIZORAL) 2 % shampoo Apply topically daily as needed for Itching Apply topically daily as needed. Historical Provider, MD   magnesium oxide (MAG-OX) 400 MG tablet Take 400 mg by mouth daily  Historical Provider, MD   azelastine HCl 0.15 % SOLN 2 sprays by NOT APPLICABLE route 2 times daily  Historical Provider, MD   Cholecalciferol (VITAMIN D) 50 MCG (2000) CAPS capsule Take 1,000 Units by mouth daily  Historical Provider, MD   montelukast (SINGULAIR) 10 MG tablet Take 10 mg by mouth nightly  Historical Provider, MD   metoprolol succinate (TOPROL XL) 100 MG extended release tablet Take 100 mg by mouth nightly  Historical Provider, MD   Omega-3 Fatty Acids (FISH OIL PO) Take 1,000 mg by mouth 2 times daily   Historical Provider, MD   GLUCOSAMINE-CHONDROITIN PO Take 1 tablet by mouth 2 times daily  Historical Provider, MD   aspirin 81 MG EC tablet Take 81 mg by mouth Mon-Fri  Historical Provider, MD   apixaban (ELIQUIS) 5 MG TABS tablet Take by mouth 2 times daily  Historical Provider, MD   atorvastatin (LIPITOR) 40 MG tablet daily   Historical Provider, MD   hydrochlorothiazide (HYDRODIURIL) 25 MG tablet daily   Historical Provider, MD   lisinopril (PRINIVIL;ZESTRIL) 40 MG tablet daily   Historical Provider, MD   nitroGLYCERIN (NITROSTAT) 0.4 MG SL tablet   Historical Provider, MD       Social History     Tobacco Use    Smoking status: Former     Packs/day: 1.50     Years: 20.00     Pack years: 30.00     Types: Cigarettes     Quit date: 2000     Years since quittin.2    Smokeless tobacco: Never   Vaping Use    Vaping Use: Never used   Substance Use Topics    Alcohol use:  Yes     Alcohol/week: 0.0 standard drinks     Comment: OCCASIONALLY    Drug use: No        Allergies   Allergen Reactions    Levofloxacin Other (See Comments) CAUSED INCREASED HEART RATE   ,   Past Medical History:   Diagnosis Date    AAA (abdominal aortic aneurysm) ([facility] [address]     monitoring 4.5cm    Arthritis     lower spine    Atrial fibrillation (HCC)     CAD (coronary artery disease)     stents x 2 cardiac     Chronic back pain     Heart attack ([facility] [address] 1984    Hyperlipidemia     on meds >20yrs    Hypertension     on meds > 20yrs    Knee injury     AS A CHILD-RIGHT   ,   Past Surgical History:   Procedure Laterality Date    ABDOMINAL AORTIC ANEURYSM REPAIR, ENDOVASCULAR Bilateral 1/15/2021    AORTO FEMORAL DIGITAL SUBTRACTION ARTERIOGRAM AND REPAIR FEMORAL ARTERIES performed by Breanne Lazaro MD at [address]      CORONARY ANGIOPLASTY WITH STENT PLACEMENT      5 stents    EYE SURGERY  2013    bilateral cataracts    JOINT REPLACEMENT  2013    RIGHT HIP    JOINT REPLACEMENT Right 2012    hip    SD LAMINECTOMY,>2 SGMT,LUMBAR N/A 8/15/2018    DECOMPRESSIVE  LUMBAR LAMINECTOMY L2-3 L3-4 RIGHT SIDED APPROACH performed by Daniel Benavidez MD at [facility] [address]    ,   Social History     Tobacco Use    Smoking status: Former     Packs/day: 1.50     Years: 20.00     Pack years: 30.00     Types: Cigarettes     Quit date: 2000     Years since quittin.2    Smokeless tobacco: Never   Vaping Use    Vaping Use: Never used   Substance Use Topics    Alcohol use:  Yes     Alcohol/week: 0.0 standard drinks     Comment: OCCASIONALLY    Drug use: No   ,   Family History   Problem Relation Age of Onset    High Blood Pressure Mother     Other Mother         liver failure    Heart Attack Father         heart occlusion    Colon Cancer Sister     No Known Problems Son     Other Daughter         spinal problems   ,   Immunization History   Administered Date(s) Administered    Influenza, Triv, 3 Years and older, IM, PF (Afluria 5yrs and older) 10/22/2016   ,   Health Maintenance   Topic Date Due    Depression Screen  Never done    DTaP/Tdap/Td vaccine (1 - Tdap) Never done    Lipids  05/10/2014    Shingles vaccine (2 of 3) 01/10/2018    Annual Wellness Visit (AWV)  Never done    Prostate Specific Antigen (PSA) Screening or Monitoring  09/01/2023    Flu vaccine  Completed    Pneumococcal 65+ years Vaccine  Completed    COVID-19 Vaccine  Completed    Hepatitis A vaccine  Aged Out    Hepatitis B vaccine  Aged Out    Hib vaccine  Aged Out    Meningococcal (ACWY) vaccine  Aged Out           PHYSICAL EXAMINATION:  [ INSTRUCTIONS:  \"[x]\" Indicates a positive item  \"[]\" Indicates a negative item  -- DELETE ALL ITEMS NOT EXAMINED]  [x] Alert  [] Oriented to person/place/time    [] No apparent distress  [] Toxic appearing    [] Face flushed appearing [] Sclera clear  [] Lips are cyanotic      [] Breathing appears normal  [] Appears tachypneic      [] No rash on visible skin    [] Cranial Nerves II-XII grossly intact    [] Motor grossly intact in visible upper extremities    [] Motor grossly intact in visible lower extremities    [] Normal Mood  [] Anxious appearing    [] Depressed appearing  [] Confused appearing      [] Poor short term memory  [] Poor long term memory    [] OTHER:      Due to this being a TeleHealth encounter, evaluation of the following organ systems is limited: Vitals/Constitutional/EENT/Resp/CV/GI//MS/Neuro/Skin/Heme-Lymph-Imm. Imaging studies CT chest September 2022, personally reviewed and interpreted by me, reviewed with the patient over the phone, right upper lobe atelectatic/nodular-like change has resolved, otherwise emphysema and bronchiectatic changes noted. No concerning mass or nodule  Labs reviewed   PFT January 2022, FEV1 57%, FEV1/FVC 0.57  Echo 1391 EF 41%, diastolic dysfunction, RVSP 56    ASSESSMENT/PLAN:  1. Abnormal CT of the chest  Atelectatic like lesion in the right upper lobe has resolved, otherwise stable CT    2. Chronic obstructive pulmonary disease, unspecified COPD type ([facility] [address]  Symptoms controlled, continue Trelegy, yearly flu shot, pneumonia and COVID-19 vaccines are up-to-date    3. Chronic respiratory failure with hypoxia and hypercapnia (HCC)  Continue O2 2 L/min with exertion and while asleep    4. Diastolic dysfunction  Continue cardioprotective medications and avoid volume overload    Follow-up scheduled for March of next year  Total time 15 minutes  An  electronic signature was used to authenticate this note. --Brenden Cabrera MD on 10/4/2022 at 3:06 PM        Pursuant to the emergency declaration under the [facility] waiver authority and the [employer] Act, this Virtual  Visit was conducted, with patient's consent, to reduce the patient's risk of exposure to COVID-19 and provide continuity of care for an established patient. Services were provided through a video synchronous discussion virtually to substitute for in-person clinic visit.

## 2022-10-05 NOTE — TELEPHONE ENCOUNTER
SAMPLES OF TRELEGY 200 GIVEN TO PATIENT BY VIANNEY MEJÍAY: 2  LOT# [id number]  EXP 3/2024      PLEASE SIGN OFF ON SAMPLES -- PLEASE DO NOT REFUSE SINCE THESE SAMPLES HAVE ALREADY BEEN GIVEN TO PATIENT

## 2022-10-06 RX ORDER — FLUTICASONE FUROATE, UMECLIDINIUM BROMIDE AND VILANTEROL TRIFENATATE 200; 62.5; 25 UG/1; UG/1; UG/1
1 POWDER RESPIRATORY (INHALATION) DAILY
Qty: 2 EACH | Refills: 0 | COMMUNITY
Start: 2022-10-06

## 2022-10-27 ENCOUNTER — HOSPITAL ENCOUNTER (OUTPATIENT)
Dept: CARDIOLOGY | Age: 82
Discharge: HOME | End: 2022-10-27
Payer: MEDICARE

## 2022-10-27 PROCEDURE — 93296 REM INTERROG EVL PM/IDS: CPT

## 2023-01-23 ENCOUNTER — HOSPITAL ENCOUNTER (OUTPATIENT)
Dept: CARDIOLOGY | Age: 83
Discharge: HOME | End: 2023-01-23
Payer: MEDICARE

## 2023-01-23 PROCEDURE — 93296 REM INTERROG EVL PM/IDS: CPT

## 2023-03-01 LAB
ALANINE AMINOTRANSFERASE (SGPT) (U/L) IN SER/PLAS: 16 U/L (ref 10–52)
ALBUMIN (G/DL) IN SER/PLAS: 4.2 G/DL (ref 3.4–5)
ALKALINE PHOSPHATASE (U/L) IN SER/PLAS: 79 U/L (ref 33–136)
ANION GAP IN SER/PLAS: 12 MMOL/L (ref 10–20)
ASPARTATE AMINOTRANSFERASE (SGOT) (U/L) IN SER/PLAS: 18 U/L (ref 9–39)
BILIRUBIN TOTAL (MG/DL) IN SER/PLAS: 1.4 MG/DL (ref 0–1.2)
CALCIUM (MG/DL) IN SER/PLAS: 9.5 MG/DL (ref 8.6–10.3)
CARBON DIOXIDE, TOTAL (MMOL/L) IN SER/PLAS: 30 MMOL/L (ref 21–32)
CHLORIDE (MMOL/L) IN SER/PLAS: 103 MMOL/L (ref 98–107)
CHOLESTEROL (MG/DL) IN SER/PLAS: 146 MG/DL (ref 0–199)
CHOLESTEROL IN HDL (MG/DL) IN SER/PLAS: 48.5 MG/DL
CHOLESTEROL/HDL RATIO: 3
CREATININE (MG/DL) IN SER/PLAS: 0.83 MG/DL (ref 0.5–1.3)
ERYTHROCYTE DISTRIBUTION WIDTH (RATIO) BY AUTOMATED COUNT: 14.5 % (ref 11.5–14.5)
ERYTHROCYTE MEAN CORPUSCULAR HEMOGLOBIN CONCENTRATION (G/DL) BY AUTOMATED: 31.4 G/DL (ref 32–36)
ERYTHROCYTE MEAN CORPUSCULAR VOLUME (FL) BY AUTOMATED COUNT: 102 FL (ref 80–100)
ERYTHROCYTE [DISTWIDTH] IN BLOOD BY AUTOMATED COUNT: 14.5 % (ref 11.5–14)
ERYTHROCYTES (10*6/UL) IN BLOOD BY AUTOMATED COUNT: 4.16 X10E12/L (ref 4.5–5.9)
GFR MALE: 86 ML/MIN/1.73M2
GLUCOSE (MG/DL) IN SER/PLAS: 102 MG/DL (ref 74–99)
HCT VFR BLD CALC: 42.4 % (ref 41–52)
HEMATOCRIT (%) IN BLOOD BY AUTOMATED COUNT: 42.4 % (ref 41–52)
HEMOGLOBIN (G/DL) IN BLOOD: 13.3 G/DL (ref 13.5–17.5)
HEMOGLOBIN: 13.3 G/DL (ref 13.5–17)
LDL: 78 MG/DL (ref 0–99)
LEUKOCYTES (10*3/UL) IN BLOOD BY AUTOMATED COUNT: 8.1 X10E9/L (ref 4.4–11.3)
MAGNESIUM (MG/DL) IN SER/PLAS: 1.83 MG/DL (ref 1.6–2.4)
MAGNESIUM: 1.83 MG/DL (ref 1.6–2.4)
MCHC RBC AUTO-ENTMCNC: 31.4 G/DL (ref 32–36)
MCV RBC AUTO: 102 FL (ref 80–100)
PLATELET # BLD: 171 X10E9/L (ref 150–450)
PLATELETS (10*3/UL) IN BLOOD AUTOMATED COUNT: 171 X10E9/L (ref 150–450)
POTASSIUM (MMOL/L) IN SER/PLAS: 4.1 MMOL/L (ref 3.5–5.3)
PROTEIN TOTAL: 6.9 G/DL (ref 6.4–8.2)
RBC # BLD: 4.16 X10E12/L (ref 4.5–5.9)
SODIUM (MMOL/L) IN SER/PLAS: 141 MMOL/L (ref 136–145)
TRIGLYCERIDE (MG/DL) IN SER/PLAS: 100 MG/DL (ref 0–149)
UREA NITROGEN (MG/DL) IN SER/PLAS: 20 MG/DL (ref 6–23)
VLDL: 20 MG/DL (ref 0–40)
WBC: 8.1 X10E9/L (ref 4.4–11.3)

## 2023-03-06 ENCOUNTER — OFFICE VISIT (OUTPATIENT)
Dept: PULMONOLOGY | Age: 83
End: 2023-03-06
Payer: MEDICARE

## 2023-03-06 VITALS
SYSTOLIC BLOOD PRESSURE: 128 MMHG | WEIGHT: 229 LBS | TEMPERATURE: 97.6 F | OXYGEN SATURATION: 98 % | DIASTOLIC BLOOD PRESSURE: 80 MMHG | HEIGHT: 66 IN | RESPIRATION RATE: 14 BRPM | BODY MASS INDEX: 36.8 KG/M2 | HEART RATE: 58 BPM

## 2023-03-06 DIAGNOSIS — J96.12 CHRONIC RESPIRATORY FAILURE WITH HYPOXIA AND HYPERCAPNIA (HCC): ICD-10-CM

## 2023-03-06 DIAGNOSIS — J96.11 CHRONIC RESPIRATORY FAILURE WITH HYPOXIA AND HYPERCAPNIA (HCC): ICD-10-CM

## 2023-03-06 DIAGNOSIS — I51.89 DIASTOLIC DYSFUNCTION: ICD-10-CM

## 2023-03-06 DIAGNOSIS — J44.9 CHRONIC OBSTRUCTIVE PULMONARY DISEASE, UNSPECIFIED COPD TYPE (HCC): Primary | ICD-10-CM

## 2023-03-06 PROCEDURE — 3023F SPIROM DOC REV: CPT | Performed by: INTERNAL MEDICINE

## 2023-03-06 PROCEDURE — G8484 FLU IMMUNIZE NO ADMIN: HCPCS | Performed by: INTERNAL MEDICINE

## 2023-03-06 PROCEDURE — 3079F DIAST BP 80-89 MM HG: CPT | Performed by: INTERNAL MEDICINE

## 2023-03-06 PROCEDURE — G8427 DOCREV CUR MEDS BY ELIG CLIN: HCPCS | Performed by: INTERNAL MEDICINE

## 2023-03-06 PROCEDURE — 1036F TOBACCO NON-USER: CPT | Performed by: INTERNAL MEDICINE

## 2023-03-06 PROCEDURE — G8417 CALC BMI ABV UP PARAM F/U: HCPCS | Performed by: INTERNAL MEDICINE

## 2023-03-06 PROCEDURE — 3074F SYST BP LT 130 MM HG: CPT | Performed by: INTERNAL MEDICINE

## 2023-03-06 PROCEDURE — 1123F ACP DISCUSS/DSCN MKR DOCD: CPT | Performed by: INTERNAL MEDICINE

## 2023-03-06 PROCEDURE — 99213 OFFICE O/P EST LOW 20 MIN: CPT | Performed by: INTERNAL MEDICINE

## 2023-03-06 RX ORDER — METOPROLOL SUCCINATE 50 MG/1
50 TABLET, EXTENDED RELEASE ORAL NIGHTLY
COMMUNITY

## 2023-03-06 NOTE — PROGRESS NOTES
Subjective:     Guillermina Bailey is a 79 y.o. male who complains today of:     Chief Complaint   Patient presents with    COPD     Patient would like to discuss the trelegy medication       HPI  Patient presents for COPD    Doing good, symptoms controlled, no shortness of breath, no coughing, no chest pain he uses trilogy while asleep tolerating well, uses 2 L O2 bleed, no fever no chills, no nasal congestion or postnasal drip and no heartburn. No lower extremity edema. He is on Trelegy Ellipta, tolerating well and symptoms are controlled. Vaccines are up-to-date.         Allergies:  Levofloxacin  Past Medical History:   Diagnosis Date    AAA (abdominal aortic aneurysm)     monitoring 4.5cm    Arthritis     lower spine    Atrial fibrillation (HCC)     CAD (coronary artery disease)     stents x 2 cardiac     Chronic back pain     Heart attack ([facility] [address] 1984    Hyperlipidemia     on meds >20yrs    Hypertension     on meds > 20yrs    Knee injury     AS A CHILD-RIGHT     Past Surgical History:   Procedure Laterality Date    ABDOMINAL AORTIC ANEURYSM REPAIR, ENDOVASCULAR Bilateral 1/15/2021    AORTO FEMORAL DIGITAL SUBTRACTION ARTERIOGRAM AND REPAIR FEMORAL ARTERIES performed by Elroy Be MD at [address]  2012    CORONARY ANGIOPLASTY WITH STENT PLACEMENT      5 stents    EYE SURGERY  2013    bilateral cataracts    JOINT REPLACEMENT  2013    RIGHT HIP    JOINT REPLACEMENT Right 2012    hip    NM LAMINECTOMY W/O FFD > 2 VERT SEG LUMBAR N/A 8/15/2018    DECOMPRESSIVE  LUMBAR LAMINECTOMY L2-3 L3-4 RIGHT SIDED APPROACH performed by Iesha Emanuel MD at [address]     Family History   Problem Relation Age of Onset    High Blood Pressure Mother     Other Mother         liver failure    Heart Attack Father         heart occlusion    Colon Cancer Sister     No Known Problems Son     Other Daughter         spinal problems     Social History     Socioeconomic History    Marital status:      Spouse name: Not on file    Number of children: Not on file    Years of education: Not on file    Highest education level: Not on file   Occupational History    Not on file   Tobacco Use    Smoking status: Former     Packs/day: 1.50     Years: 20.00     Pack years: 30.00     Types: Cigarettes     Quit date: 2000     Years since quittin.6    Smokeless tobacco: Never   Vaping Use    Vaping Use: Never used   Substance and Sexual Activity    Alcohol use: Yes     Alcohol/week: 0.0 standard drinks     Comment: OCCASIONALLY    Drug use: No    Sexual activity: Not on file   Other Topics Concern    Not on file   Social History Narrative    Not on file     Social Determinants of Health     Financial Resource Strain: Not on file   Food Insecurity: Not on file   Transportation Needs: Not on file   Physical Activity: Not on file   Stress: Not on file   Social Connections: Not on file   Intimate Partner Violence: Not on file   Housing Stability: Not on file         Review of Systems      ROS: 10 organs review of system is done including general, psychological, ENT, hematological, endocrine, respiratory, cardiovascular, gastrointestinal,musculoskeletal, neurological,  allergy and Immunology is done and is otherwise negative. Current Outpatient Medications   Medication Sig Dispense Refill    metoprolol succinate (TOPROL XL) 50 MG extended release tablet Take 50 mg by mouth nightly      gentamicin (GARAMYCIN) 0.1 % cream APPLY SPARINGLY TO AFFECTED AREA(S) TWICE DAILY.       fluticasone (FLONASE) 50 MCG/ACT nasal spray by Nasal route      leuprolide (LUPRON) 45 MG injection Inject into the muscle      Fluticasone-Umeclidin-Vilant (TRELEGY ELLIPTA) 200-62.5-25 MCG/INH AEPB Inhale 1 puff into the lungs daily 3 each 3    albuterol sulfate HFA (PROVENTIL HFA) 108 (90 Base) MCG/ACT inhaler Inhale 2 puffs into the lungs every 6 hours as needed for Wheezing 18 g 3    ipratropium-albuterol (DUONEB) 0.5-2.5 (3) MG/3ML SOLN nebulizer solution Inhale 3 mLs into the lungs every 4 hours 360 mL 3    bicalutamide (CASODEX) 50 MG chemo tablet Take 50 mg by mouth daily      sotalol (BETAPACE) 120 MG tablet Take 120 mg by mouth 2 times daily      ketoconazole (NIZORAL) 2 % shampoo Apply topically daily as needed for Itching Apply topically daily as needed. magnesium oxide (MAG-OX) 400 MG tablet Take 400 mg by mouth daily      azelastine HCl 0.15 % SOLN 2 sprays by NOT APPLICABLE route 2 times daily      Cholecalciferol (VITAMIN D) 50 MCG (2000 UT) CAPS capsule Take 1,000 Units by mouth daily      montelukast (SINGULAIR) 10 MG tablet Take 10 mg by mouth nightly      metoprolol succinate (TOPROL XL) 100 MG extended release tablet Take 100 mg by mouth nightly      Omega-3 Fatty Acids (FISH OIL PO) Take 1,000 mg by mouth 2 times daily       GLUCOSAMINE-CHONDROITIN PO Take 1 tablet by mouth 2 times daily      aspirin 81 MG EC tablet Take 81 mg by mouth Mon-Fri      apixaban (ELIQUIS) 5 MG TABS tablet Take by mouth 2 times daily      atorvastatin (LIPITOR) 40 MG tablet daily       hydrochlorothiazide (HYDRODIURIL) 25 MG tablet daily       lisinopril (PRINIVIL;ZESTRIL) 40 MG tablet daily       nitroGLYCERIN (NITROSTAT) 0.4 MG SL tablet        No current facility-administered medications for this visit. Objective:     Vitals:    03/06/23 0851   BP: 128/80   Site: Right Upper Arm   Position: Sitting   Cuff Size: Large Adult   Pulse: 58   Resp: 14   Temp: 97.6 °F (36.4 °C)   TempSrc: Temporal   SpO2: 98%   Weight: 229 lb (103.9 kg)   Height: 5' 6\" (1.676 m)         Physical Exam  Constitutional:       Appearance: He is well-developed. HENT:      Head: Normocephalic and atraumatic. Eyes:      General:         Left eye: No discharge.       Conjunctiva/sclera: Conjunctivae normal.      Pupils: Pupils are equal, round, and reactive to light. Neck:      Vascular: No JVD. Cardiovascular:      Rate and Rhythm: Normal rate and regular rhythm. Heart sounds: Normal heart sounds. No murmur heard. No friction rub. No gallop. Pulmonary:      Effort: Pulmonary effort is normal. No respiratory distress. Breath sounds: Normal breath sounds. No wheezing or rales. Chest:      Chest wall: No tenderness. Abdominal:      General: Bowel sounds are normal.      Palpations: Abdomen is soft. Musculoskeletal:         General: No tenderness or deformity. Cervical back: Normal range of motion and neck supple. Right lower leg: No edema. Left lower leg: No edema. Skin:     General: Skin is warm and dry. Neurological:      Mental Status: He is alert and oriented to person, place, and time. Psychiatric:         Judgment: Judgment normal.       Imaging studies reviewed and interpreted by me CT chest September 2022, shows centrilobular emphysema, no mass or nodule. Lab results reviewed in chart  PFT January 2022, FEV1 57%, FEV1/FVC 0.57  Echo 0847 EF 45%, diastolic dysfunction, RVSP 56  Assessment and Plan       Diagnosis Orders   1. Chronic obstructive pulmonary disease, unspecified COPD type ([address]        2. Chronic respiratory failure with hypoxia and hypercapnia (HCC)        3. Diastolic dysfunction          Continue Trelegy, he has some mild hoarseness but does not wish to change or lower ICS dose. Yearly flu shot and COVID-19 vaccination recommended  Continue O2 while asleep  Continue trilogy while asleep  Continue cardioprotective medications and avoid volume overload      No orders of the defined types were placed in this encounter. No orders of the defined types were placed in this encounter. Discussed with patient the importance of exercise and weight control and  overall health and well-being. Reviewed with the patient: current clinical status, medications, activities and diet.       Side effects, adverse effects of the medication prescribed today, as well as treatment plan and result expectations have been discussed with the patient who expresses understanding and desires to proceed. Return in about 6 months (around 9/6/2023).       Yaneth Martínez MD

## 2023-04-24 RX ORDER — FLUTICASONE FUROATE, UMECLIDINIUM BROMIDE AND VILANTEROL TRIFENATATE 200; 62.5; 25 UG/1; UG/1; UG/1
1 POWDER RESPIRATORY (INHALATION) DAILY
Qty: 2 EACH | Refills: 0 | COMMUNITY
Start: 2023-04-24

## 2023-04-24 NOTE — TELEPHONE ENCOUNTER
Samples already given to patient.      Rx requested:  Requested Prescriptions     Pending Prescriptions Disp Refills    fluticasone-umeclidin-vilant (Ermias Becerrilil) 769-03.4-53 MCG/ACT AEPB inhaler 2 each 0     Sig: Inhale 1 puff into the lungs daily         Last Office Visit:   3/6/2023      Next Visit Date:  Future Appointments   Date Time Provider Madi Neal   4/26/2023 11:00 AM [facility]   9/6/2023  9:15 AM Shad López MD [facility]

## 2023-04-26 ENCOUNTER — HOSPITAL ENCOUNTER (OUTPATIENT)
Dept: CARDIOLOGY | Age: 83
Discharge: HOME | End: 2023-04-26
Payer: MEDICARE

## 2023-04-26 PROCEDURE — 93283 PRGRMG EVAL IMPLANTABLE DFB: CPT

## 2023-06-05 LAB
ALANINE AMINOTRANSFERASE (SGPT) (U/L) IN SER/PLAS: 16 U/L (ref 10–52)
ALBUMIN (G/DL) IN SER/PLAS: 4.4 G/DL (ref 3.4–5)
ALKALINE PHOSPHATASE (U/L) IN SER/PLAS: 74 U/L (ref 33–136)
ANION GAP IN SER/PLAS: 12 MMOL/L (ref 10–20)
ASPARTATE AMINOTRANSFERASE (SGOT) (U/L) IN SER/PLAS: 20 U/L (ref 9–39)
BILIRUBIN TOTAL (MG/DL) IN SER/PLAS: 1.2 MG/DL (ref 0–1.2)
CALCIUM (MG/DL) IN SER/PLAS: 9.2 MG/DL (ref 8.6–10.3)
CARBON DIOXIDE, TOTAL (MMOL/L) IN SER/PLAS: 28 MMOL/L (ref 21–32)
CHLORIDE (MMOL/L) IN SER/PLAS: 103 MMOL/L (ref 98–107)
CHOLESTEROL (MG/DL) IN SER/PLAS: 162 MG/DL (ref 0–199)
CHOLESTEROL IN HDL (MG/DL) IN SER/PLAS: 48.4 MG/DL
CHOLESTEROL/HDL RATIO: 3.3
CREATININE (MG/DL) IN SER/PLAS: 0.85 MG/DL (ref 0.5–1.3)
GFR MALE: 86 ML/MIN/1.73M2
GLUCOSE (MG/DL) IN SER/PLAS: 108 MG/DL (ref 74–99)
LDL: 84 MG/DL (ref 0–99)
POTASSIUM (MMOL/L) IN SER/PLAS: 4 MMOL/L (ref 3.5–5.3)
PROTEIN TOTAL: 7.2 G/DL (ref 6.4–8.2)
SODIUM (MMOL/L) IN SER/PLAS: 139 MMOL/L (ref 136–145)
TRIGLYCERIDE (MG/DL) IN SER/PLAS: 147 MG/DL (ref 0–149)
UREA NITROGEN (MG/DL) IN SER/PLAS: 20 MG/DL (ref 6–23)
VLDL: 29 MG/DL (ref 0–40)

## 2023-07-26 ENCOUNTER — HOSPITAL ENCOUNTER (OUTPATIENT)
Dept: CARDIOLOGY | Age: 83
Discharge: HOME | End: 2023-07-26
Payer: MEDICARE

## 2023-07-26 PROCEDURE — 93296 REM INTERROG EVL PM/IDS: CPT

## 2023-08-09 RX ORDER — FLUTICASONE FUROATE, UMECLIDINIUM BROMIDE AND VILANTEROL TRIFENATATE 100; 62.5; 25 UG/1; UG/1; UG/1
1 POWDER RESPIRATORY (INHALATION) DAILY
Qty: 1 EACH | Refills: 3 | Status: SHIPPED | OUTPATIENT
Start: 2023-08-09

## 2023-08-23 RX ORDER — FLUTICASONE FUROATE, UMECLIDINIUM BROMIDE AND VILANTEROL TRIFENATATE 100; 62.5; 25 UG/1; UG/1; UG/1
1 POWDER RESPIRATORY (INHALATION) DAILY
Qty: 1 EACH | Refills: 3 | COMMUNITY
Start: 2023-08-23

## 2023-09-06 ENCOUNTER — OFFICE VISIT (OUTPATIENT)
Dept: PULMONOLOGY | Age: 83
End: 2023-09-06
Payer: MEDICARE

## 2023-09-06 VITALS
HEART RATE: 72 BPM | BODY MASS INDEX: 35.9 KG/M2 | WEIGHT: 223.4 LBS | SYSTOLIC BLOOD PRESSURE: 112 MMHG | OXYGEN SATURATION: 93 % | RESPIRATION RATE: 16 BRPM | TEMPERATURE: 97.4 F | DIASTOLIC BLOOD PRESSURE: 64 MMHG | HEIGHT: 66 IN

## 2023-09-06 DIAGNOSIS — I51.89 DIASTOLIC DYSFUNCTION: ICD-10-CM

## 2023-09-06 DIAGNOSIS — J44.9 CHRONIC OBSTRUCTIVE PULMONARY DISEASE, UNSPECIFIED COPD TYPE (HCC): Primary | ICD-10-CM

## 2023-09-06 DIAGNOSIS — J96.11 CHRONIC RESPIRATORY FAILURE WITH HYPOXIA AND HYPERCAPNIA (HCC): ICD-10-CM

## 2023-09-06 DIAGNOSIS — J96.12 CHRONIC RESPIRATORY FAILURE WITH HYPOXIA AND HYPERCAPNIA (HCC): ICD-10-CM

## 2023-09-06 PROCEDURE — 3078F DIAST BP <80 MM HG: CPT | Performed by: INTERNAL MEDICINE

## 2023-09-06 PROCEDURE — 3074F SYST BP LT 130 MM HG: CPT | Performed by: INTERNAL MEDICINE

## 2023-09-06 PROCEDURE — 1036F TOBACCO NON-USER: CPT | Performed by: INTERNAL MEDICINE

## 2023-09-06 PROCEDURE — 99213 OFFICE O/P EST LOW 20 MIN: CPT | Performed by: INTERNAL MEDICINE

## 2023-09-06 PROCEDURE — 3023F SPIROM DOC REV: CPT | Performed by: INTERNAL MEDICINE

## 2023-09-06 PROCEDURE — 1123F ACP DISCUSS/DSCN MKR DOCD: CPT | Performed by: INTERNAL MEDICINE

## 2023-09-06 PROCEDURE — G8427 DOCREV CUR MEDS BY ELIG CLIN: HCPCS | Performed by: INTERNAL MEDICINE

## 2023-09-06 PROCEDURE — G8417 CALC BMI ABV UP PARAM F/U: HCPCS | Performed by: INTERNAL MEDICINE

## 2023-09-06 RX ORDER — FLUTICASONE FUROATE, UMECLIDINIUM BROMIDE AND VILANTEROL TRIFENATATE 100; 62.5; 25 UG/1; UG/1; UG/1
1 POWDER RESPIRATORY (INHALATION) DAILY
Qty: 1 EACH | Refills: 3 | Status: SHIPPED | COMMUNITY
Start: 2023-09-06

## 2023-09-06 RX ORDER — RIVAROXABAN 20 MG/1
TABLET, FILM COATED ORAL
COMMUNITY
Start: 2023-07-13

## 2023-09-06 RX ORDER — EZETIMIBE 10 MG/1
TABLET ORAL
COMMUNITY
Start: 2023-08-23

## 2023-09-12 LAB
ALANINE AMINOTRANSFERASE (SGPT) (U/L) IN SER/PLAS: 21 U/L (ref 10–52)
ALBUMIN (G/DL) IN SER/PLAS: 4.2 G/DL (ref 3.4–5)
ALKALINE PHOSPHATASE (U/L) IN SER/PLAS: 78 U/L (ref 33–136)
ANION GAP IN SER/PLAS: 13 MMOL/L (ref 10–20)
ASPARTATE AMINOTRANSFERASE (SGOT) (U/L) IN SER/PLAS: 24 U/L (ref 9–39)
BILIRUBIN TOTAL (MG/DL) IN SER/PLAS: 1.6 MG/DL (ref 0–1.2)
CALCIUM (MG/DL) IN SER/PLAS: 9.2 MG/DL (ref 8.6–10.3)
CARBON DIOXIDE, TOTAL (MMOL/L) IN SER/PLAS: 29 MMOL/L (ref 21–32)
CHLORIDE (MMOL/L) IN SER/PLAS: 104 MMOL/L (ref 98–107)
CHOLESTEROL (MG/DL) IN SER/PLAS: 97 MG/DL (ref 0–199)
CHOLESTEROL IN HDL (MG/DL) IN SER/PLAS: 45 MG/DL
CHOLESTEROL/HDL RATIO: 2.2
CREATININE (MG/DL) IN SER/PLAS: 0.92 MG/DL (ref 0.5–1.3)
GFR MALE: 82 ML/MIN/1.73M2
GLUCOSE (MG/DL) IN SER/PLAS: 97 MG/DL (ref 74–99)
LDL: 34 MG/DL (ref 0–99)
POTASSIUM (MMOL/L) IN SER/PLAS: 4 MMOL/L (ref 3.5–5.3)
PROTEIN TOTAL: 6.6 G/DL (ref 6.4–8.2)
SODIUM (MMOL/L) IN SER/PLAS: 142 MMOL/L (ref 136–145)
TRIGLYCERIDE (MG/DL) IN SER/PLAS: 89 MG/DL (ref 0–149)
UREA NITROGEN (MG/DL) IN SER/PLAS: 19 MG/DL (ref 6–23)
VLDL: 18 MG/DL (ref 0–40)

## 2023-09-29 PROBLEM — I50.32 CHRONIC DIASTOLIC CONGESTIVE HEART FAILURE, NYHA CLASS 2 (MULTI): Status: ACTIVE | Noted: 2023-09-29

## 2023-09-29 PROBLEM — I70.1 RENAL ARTERY STENOSIS (CMS-HCC): Status: ACTIVE | Noted: 2023-09-29

## 2023-09-29 PROBLEM — Z79.01 ANTICOAGULANT LONG-TERM USE: Status: ACTIVE | Noted: 2023-09-29

## 2023-09-29 PROBLEM — I25.5 ISCHEMIC CARDIOMYOPATHY: Status: ACTIVE | Noted: 2023-09-29

## 2023-09-29 PROBLEM — I25.10 ATHEROSCLEROSIS OF CORONARY ARTERY: Status: ACTIVE | Noted: 2023-09-29

## 2023-09-29 PROBLEM — I47.20 PAROXYSMAL VENTRICULAR TACHYCARDIA: Status: ACTIVE | Noted: 2023-09-29

## 2023-09-29 PROBLEM — R25.1 TREMOR: Status: ACTIVE | Noted: 2023-09-29

## 2023-09-29 PROBLEM — J30.2 ALLERGIC RHINITIS, SEASONAL: Status: ACTIVE | Noted: 2023-09-29

## 2023-09-29 PROBLEM — C61 PROSTATE CANCER (MULTI): Status: ACTIVE | Noted: 2023-09-29

## 2023-09-29 PROBLEM — M25.552 ACUTE PAIN OF BOTH HIPS: Status: ACTIVE | Noted: 2023-09-29

## 2023-09-29 PROBLEM — B35.6 TINEA CRURIS: Status: ACTIVE | Noted: 2023-09-29

## 2023-09-29 PROBLEM — I47.29 PAROXYSMAL VENTRICULAR TACHYCARDIA (MULTI): Status: ACTIVE | Noted: 2023-09-29

## 2023-09-29 PROBLEM — J43.2 CENTRILOBULAR EMPHYSEMA (MULTI): Status: ACTIVE | Noted: 2023-09-29

## 2023-09-29 PROBLEM — E78.2 MIXED HYPERLIPIDEMIA: Status: ACTIVE | Noted: 2023-09-29

## 2023-09-29 PROBLEM — R06.02 SHORTNESS OF BREATH: Status: ACTIVE | Noted: 2023-09-29

## 2023-09-29 PROBLEM — Z95.810 AICD (AUTOMATIC CARDIOVERTER/DEFIBRILLATOR) PRESENT: Status: ACTIVE | Noted: 2023-09-29

## 2023-09-29 PROBLEM — I48.19 PERSISTENT ATRIAL FIBRILLATION (MULTI): Status: ACTIVE | Noted: 2023-09-29

## 2023-09-29 PROBLEM — I71.40 ABDOMINAL AORTIC ANEURYSM (CMS-HCC): Status: ACTIVE | Noted: 2023-09-29

## 2023-09-29 PROBLEM — I63.9: Status: ACTIVE | Noted: 2023-09-29

## 2023-09-29 PROBLEM — I47.29 NSVT (NONSUSTAINED VENTRICULAR TACHYCARDIA) (MULTI): Status: ACTIVE | Noted: 2023-09-29

## 2023-09-29 PROBLEM — E79.0 HYPERURICEMIA: Status: ACTIVE | Noted: 2023-09-29

## 2023-09-29 PROBLEM — I10 ESSENTIAL HYPERTENSION: Status: ACTIVE | Noted: 2023-09-29

## 2023-09-29 PROBLEM — J01.90 ACUTE SINUSITIS: Status: ACTIVE | Noted: 2023-09-29

## 2023-09-29 PROBLEM — M25.551 ACUTE PAIN OF BOTH HIPS: Status: ACTIVE | Noted: 2023-09-29

## 2023-09-29 PROBLEM — Z79.899 HIGH RISK MEDICATION USE: Status: ACTIVE | Noted: 2023-09-29

## 2023-09-29 PROBLEM — L71.9 ROSACEA: Status: ACTIVE | Noted: 2023-09-29

## 2023-09-29 PROBLEM — M25.561 ACUTE PAIN OF RIGHT KNEE: Status: ACTIVE | Noted: 2023-09-29

## 2023-09-29 RX ORDER — METOPROLOL SUCCINATE 100 MG/1
1 TABLET, EXTENDED RELEASE ORAL DAILY
COMMUNITY
Start: 2022-07-13 | End: 2023-10-11 | Stop reason: WASHOUT

## 2023-09-29 RX ORDER — GLUCOSAMINE/CHONDRO SU A 500-400 MG
1 TABLET ORAL DAILY
COMMUNITY
End: 2023-10-11 | Stop reason: WASHOUT

## 2023-09-29 RX ORDER — LANOLIN ALCOHOL/MO/W.PET/CERES
1 CREAM (GRAM) TOPICAL 2 TIMES DAILY
COMMUNITY

## 2023-09-29 RX ORDER — LISINOPRIL 40 MG/1
1 TABLET ORAL DAILY
COMMUNITY
Start: 2022-02-04

## 2023-09-29 RX ORDER — NYSTATIN 100000 [USP'U]/G
POWDER TOPICAL
COMMUNITY
End: 2023-10-11 | Stop reason: WASHOUT

## 2023-09-29 RX ORDER — ATORVASTATIN CALCIUM 40 MG/1
1 TABLET, FILM COATED ORAL NIGHTLY
COMMUNITY
Start: 2022-07-13 | End: 2024-04-24 | Stop reason: SDUPTHER

## 2023-09-29 RX ORDER — NITROGLYCERIN 0.4 MG/1
0.4 TABLET SUBLINGUAL EVERY 5 MIN PRN
COMMUNITY
End: 2024-04-24 | Stop reason: SDUPTHER

## 2023-09-29 RX ORDER — IPRATROPIUM BROMIDE AND ALBUTEROL SULFATE 2.5; .5 MG/3ML; MG/3ML
3 SOLUTION RESPIRATORY (INHALATION) 4 TIMES DAILY
COMMUNITY
End: 2024-02-01

## 2023-09-29 RX ORDER — LEUPROLIDE ACETATE 45 MG
45 KIT INTRAMUSCULAR ONCE
COMMUNITY
End: 2024-02-01

## 2023-09-29 RX ORDER — RIVAROXABAN 20 MG/1
1 TABLET, FILM COATED ORAL DAILY
COMMUNITY
Start: 2022-10-14 | End: 2023-10-23 | Stop reason: SDUPTHER

## 2023-09-29 RX ORDER — SOTALOL HYDROCHLORIDE 120 MG/1
1 TABLET ORAL 2 TIMES DAILY
COMMUNITY
Start: 2022-02-07 | End: 2024-02-22

## 2023-09-29 RX ORDER — ALBUTEROL SULFATE 90 UG/1
2 AEROSOL, METERED RESPIRATORY (INHALATION) EVERY 4 HOURS PRN
COMMUNITY
End: 2023-10-11 | Stop reason: WASHOUT

## 2023-09-29 RX ORDER — HYDROCHLOROTHIAZIDE 25 MG/1
1 TABLET ORAL DAILY
COMMUNITY
Start: 2022-07-13 | End: 2024-04-24 | Stop reason: SDUPTHER

## 2023-09-29 RX ORDER — DIAPER,BRIEF,INFANT-TODD,DISP
EACH MISCELLANEOUS 3 TIMES DAILY
COMMUNITY
End: 2023-12-06 | Stop reason: ALTCHOICE

## 2023-09-29 RX ORDER — FLUCONAZOLE 100 MG/1
100 TABLET ORAL DAILY
COMMUNITY
End: 2023-10-11 | Stop reason: SDUPTHER

## 2023-09-29 RX ORDER — AZELASTINE HCL 205.5 UG/1
SPRAY NASAL
COMMUNITY
End: 2023-10-11 | Stop reason: SDUPTHER

## 2023-09-29 RX ORDER — FLUTICASONE FUROATE, UMECLIDINIUM BROMIDE AND VILANTEROL TRIFENATATE 100; 62.5; 25 UG/1; UG/1; UG/1
POWDER RESPIRATORY (INHALATION)
COMMUNITY
End: 2023-10-11 | Stop reason: WASHOUT

## 2023-09-29 RX ORDER — FLUTICASONE PROPIONATE 50 MCG
2 SPRAY, SUSPENSION (ML) NASAL DAILY
COMMUNITY
Start: 2021-12-27 | End: 2023-10-11 | Stop reason: SDUPTHER

## 2023-09-29 RX ORDER — ASPIRIN 81 MG/1
81 TABLET ORAL DAILY
COMMUNITY
End: 2023-10-11 | Stop reason: WASHOUT

## 2023-09-29 RX ORDER — CLOTRIMAZOLE AND BETAMETHASONE DIPROPIONATE 10; .5 MG/ML; MG/ML
1 LOTION TOPICAL 2 TIMES DAILY
COMMUNITY
End: 2023-12-06 | Stop reason: ALTCHOICE

## 2023-09-29 RX ORDER — EZETIMIBE 10 MG/1
10 TABLET ORAL DAILY
COMMUNITY
End: 2024-04-24 | Stop reason: SDUPTHER

## 2023-10-05 ENCOUNTER — OFFICE VISIT (OUTPATIENT)
Dept: ORTHOPEDIC SURGERY | Facility: CLINIC | Age: 83
End: 2023-10-05
Payer: MEDICARE

## 2023-10-05 VITALS — BODY MASS INDEX: 35.36 KG/M2 | HEIGHT: 66 IN | WEIGHT: 220 LBS

## 2023-10-05 DIAGNOSIS — S86.111A GASTROCNEMIUS STRAIN, RIGHT, INITIAL ENCOUNTER: Primary | ICD-10-CM

## 2023-10-05 PROCEDURE — 99213 OFFICE O/P EST LOW 20 MIN: CPT | Performed by: FAMILY MEDICINE

## 2023-10-05 RX ORDER — METHYLPREDNISOLONE 4 MG/1
TABLET ORAL
Qty: 21 TABLET | Refills: 0 | Status: SHIPPED | OUTPATIENT
Start: 2023-10-05 | End: 2023-10-11 | Stop reason: WASHOUT

## 2023-10-05 NOTE — PROGRESS NOTES
Acute Injury New Patient Visit    CC:   Chief Complaint   Patient presents with    Right Knee - Pain     Rt Knee / Rt Calf Pain s/p A lot of up and down activity - X-Rays @ [facility]       HPI: Right lower extremity pain secondary to activities at home.  83-year-old male patient of our practice presents here today with calf pain and discomfort.  He initially thought maybe it was the knee but seems to be more settled into the calf.  He was doing a lot of extra activity and helping his wife was getting ready for a knee replacement.  He denies any numbness tingling or burning visited with his primary care doc was asked to get x-rays got x-rays of the knee as well as an ultrasound of the calf which was all negative for any acute pathology.  He states he was doing some foot and ankle stretches and exercises feels better today than he did before.  States he did not fall hit or banged the knee may be just stumbled a little bit but was able to catch himself.  He presents here today for further evaluation.    Review of Systems   GENERAL: Negative for malaise, significant weight loss, fever  MUSCULOSKELETAL: See HPI  NEURO:  Negative for numbness / tingling     Past Medical History  Past Medical History:   Diagnosis Date    Encounter for follow-up examination after completed treatment for conditions other than malignant neoplasm 12/27/2021    Hospital discharge follow-up    Ischemic cardiomyopathy     Ischemic cardiomyopathy with implantable cardioverter-defibrillator (ICD)    Pain in unspecified hip     Joint pain, hip    Personal history of other diseases of the musculoskeletal system and connective tissue     History of low back pain    Personal history of other endocrine, nutritional and metabolic disease     History of hyperlipidemia    Personal history of other specified conditions 12/21/2021    History of elevated prostate specific antigen (PSA)    Presence of coronary angioplasty implant and graft     Stented  coronary artery    Unspecified atrial flutter (CMS/Formerly [facility])     Paroxysmal atrial flutter       Medication review  Medication Documentation Review Audit       Reviewed by Cole C Budinsky, MD (Physician) on 10/05/23 at 0926      Medication Order Taking? Sig Documenting Provider Last Dose Status   albuterol 90 mcg/actuation inhaler [id number]  Inhale 2 puffs every 4 hours if needed for wheezing. Historical Provider, MD  Active   aspirin 81 mg EC tablet [id number]  Take 1 tablet (81 mg) by mouth once daily. Historical Provider, MD  Active   atorvastatin (Lipitor) 40 mg tablet [id number]  Take 1 tablet (40 mg) by mouth once daily at bedtime. Historical Provider, MD  Active   azelastine 205.5 mcg (0.15 %) spray,non-aerosol [id number]  Administer into affected nostril(s). Historical Provider, MD  Active   clotrimazole-betamethasone (Lotrisone) lotion [id number]  Apply 1 Application topically 2 times a day. Historical Provider, MD  Active   docosahexaenoic acid/epa (FISH OIL ORAL) [id number]  Take by mouth. Historical Provider, MD  Active   ezetimibe (Zetia) 10 mg tablet [id number]  Take 1 tablet (10 mg) by mouth once daily. Historical Provider, MD  Active   fluconazole (Diflucan) 100 mg tablet [id number]  Take 1 tablet (100 mg) by mouth once daily. Historical Provider, MD  Active   fluticasone (Flonase) 50 mcg/actuation nasal spray [id number]  Administer 2 sprays into affected nostril(s) once daily. Historical Provider, MD  Active   fluticasone-umeclidin-vilanter (Trelegy Ellipta) 100-62.5-25 mcg blister with device [id number]  Inhale. Historical Provider, MD  Active   glucosamine-chondroitin 500-400 mg tablet [id number]  Take 1 tablet by mouth once daily. Historical Provider, MD  Active   hydroCHLOROthiazide (HYDRODiuril) 25 mg tablet [id number]  Take 1 tablet (25 mg) by mouth once daily. Historical Provider, MD  Active   hydrocortisone 0.5 % cream [id number]  Apply topically. Historical Provider, MD  Active   ipratropium-albuteroL  (Duo-Neb) 0.5-2.5 mg/3 mL nebulizer solution [id number]  Inhale 3 mL 4 times a day. Historical Provider, MD  Active   leuprolide, 6-month, (Lupron Depot, 6 Month,) 45 mg injection [id number]  Inject 45 mg into the shoulder, thigh, or buttocks 1 time. Historical Provider, MD  Active   lisinopril 40 mg tablet [id number]  Take 1 tablet (40 mg) by mouth once daily. Historical Provider, MD  Active   magnesium oxide (MagOx) 400 mg (241.3 mg magnesium) tablet [id number]  Take 1 tablet (400 mg) by mouth once daily. Historical Provider, MD  Active   metoprolol succinate XL (Toprol-XL) 100 mg 24 hr tablet [id number]  Take 1 tablet (100 mg) by mouth once daily. Historical Provider, MD  Active   nitroglycerin (Nitrostat) 0.4 mg SL tablet [id number]  Place 1 tablet (0.4 mg) under the tongue every 5 minutes if needed. Historical Provider, MD  Active   nystatin (Mycostatin) 100,000 unit/gram powder [id number]  Apply topically. Historical Provider, MD  Active   sotalol (Betapace) 120 mg tablet [id number]  Take 1 tablet (120 mg) by mouth 2 times a day. Historical Provider, MD  Active   Xarelto 20 mg tablet [id number]  Take 1 tablet (20 mg) by mouth once daily. Historical Provider, MD  Active                    Allergies  Allergies   Allergen Reactions    Levofloxacin Palpitations     Rapid Heart Rate - Severe per pt.    Amiodarone Unknown    Jardiance [Empagliflozin] Unknown       Social History  Social History     Socioeconomic History    Marital status:      Spouse name: Not on file    Number of children: Not on file    Years of education: Not on file    Highest education level: Not on file   Occupational History    Not on file   Tobacco Use    Smoking status: Never    Smokeless tobacco: Never   Vaping Use    Vaping Use: Never used   Substance and Sexual Activity    Alcohol use: Not on file    Drug use: Not on file    Sexual activity: Not on file   Other Topics Concern    Not on file   Social History Narrative    Not on file      Social Determinants of Health     Financial Resource Strain: Not on file   Food Insecurity: Not on file   Transportation Needs: Not on file   Physical Activity: Not on file   Stress: Not on file   Social Connections: Not on file   Intimate Partner Violence: Not on file   Housing Stability: Not on file       Surgical History  Past Surgical History:   Procedure Laterality Date    CT ABDOMEN ANGIOGRAM W AND/OR WO IV CONTRAST  10/29/2021    CT ABDOMEN ANGIOGRAM W AND/OR WO IV CONTRAST 10/29/2021 [facility]    CT ABDOMEN ANGIOGRAM W AND/OR WO IV CONTRAST  10/3/2022    CT ABDOMEN ANGIOGRAM W AND/OR WO IV CONTRAST 10/3/2022 [facility]    OTHER SURGICAL HISTORY  10/12/2021    Renal angioplasty and stenting    OTHER SURGICAL HISTORY  10/12/2021    Cataract surgery    OTHER SURGICAL HISTORY  12/21/2021    Hip replacement    OTHER SURGICAL HISTORY  01/04/2022    Complete colonoscopy    OTHER SURGICAL HISTORY  12/21/2021    Back surgery    OTHER SURGICAL HISTORY  12/21/2021    Abdominal aortic aneurysm repair    OTHER SURGICAL HISTORY  12/21/2021    Hip replacement    OTHER SURGICAL HISTORY  12/21/2021    Surgery       Physical Exam:  GENERAL:  Patient is awake, alert, and oriented to person place and time.  Patient appears well nourished and well kept.  Affect Calm, Not Acutely Distressed.  HEENT:  Normocephalic, Atraumatic, EOMI  CARDIOVASCULAR:  Hemodynamically stable.  RESPIRATORY:  Normal respirations with unlabored breathing.  NEURO: Gross sensation intact to the lower extremities bilaterally.  Extremity: Right lower extremity exam: On inspection no redness warmth erythema pulses and sensation are intact calf is very soft very minimal tenderness to the lateral gastroc no medial sided gastroc pain Achilles tendon is intact and nontender negative tib-fib squeeze proximally or distally.  Full intact extensor mechanism at the knee with neck flexion and extension full equal and symmetric.  Negative  Sunil and Apley test no tenderness to palpation over the medial or lateral joint line very minimal patellar crepitus noted.  Ambulating with a slight antalgic gait and use of a rollator.      Diagnostics: Recent ultrasound and x-rays reviewed no presence for fracture or dislocation, no DVT.      Procedure: None    Assessment: Right leg pain, knee OA, right gastroc strain    Plan: Discussed the very nonconcerning findings on x-ray and on the exam with the patient.  We will offer him an oral steroid pack in addition to home exercises and a compression Ace bandage.  He can also consider over-the-counter compression stockings.  We will plan on seeing him back as needed going forward.  Also discussed with the patient the ability to do the home exercises and to utilize topical muscle rubs and creams.  Patient will do the steroid pack his Tylenol he was asked by primary care to avoid NSAIDs.  We will see him back as needed going forward.  No orders of the defined types were placed in this encounter.     At the conclusion of the visit there were no further questions by the patient/family regarding their plan of care.  Patient was instructed to call or return with any issues, questions, or concerns regarding their injury and/or treatment plan described above.     10/05/23 at 9:27 AM - Cole C Budinsky, MD    Office: [phone]    This note was prepared using voice recognition software.  The details of this note are correct and have been reviewed, and corrected to the best of my ability.  Some grammatical errors may persist related to the Dragon software.

## 2023-10-07 ASSESSMENT — ENCOUNTER SYMPTOMS
RHINORRHEA: 1
FEVER: 0
EYE PAIN: 0
SHORTNESS OF BREATH: 0
ANOREXIA: 0
COUGH: 0
DIARRHEA: 0
VOMITING: 0
NAIL CHANGES: 0
FATIGUE: 0
SORE THROAT: 0

## 2023-10-09 PROBLEM — M25.561 ACUTE PAIN OF RIGHT KNEE: Status: RESOLVED | Noted: 2023-09-29 | Resolved: 2023-10-09

## 2023-10-09 PROBLEM — M25.552 ACUTE PAIN OF BOTH HIPS: Status: RESOLVED | Noted: 2023-09-29 | Resolved: 2023-10-09

## 2023-10-09 PROBLEM — M48.062 SPINAL STENOSIS, LUMBAR REGION WITH NEUROGENIC CLAUDICATION: Status: ACTIVE | Noted: 2018-07-17

## 2023-10-09 PROBLEM — I50.30: Status: ACTIVE | Noted: 2023-10-09

## 2023-10-09 PROBLEM — I63.9 CVA (CEREBRAL VASCULAR ACCIDENT) (MULTI): Status: ACTIVE | Noted: 2023-10-09

## 2023-10-09 PROBLEM — E66.812 CLASS 2 OBESITY WITH BODY MASS INDEX (BMI) OF 35 TO 39.9 WITHOUT COMORBIDITY: Status: ACTIVE | Noted: 2023-10-09

## 2023-10-09 PROBLEM — B37.2 CUTANEOUS CANDIDIASIS: Status: ACTIVE | Noted: 2023-10-09

## 2023-10-09 PROBLEM — E66.9 CLASS 2 OBESITY WITH BODY MASS INDEX (BMI) OF 35 TO 39.9 WITHOUT COMORBIDITY: Status: ACTIVE | Noted: 2023-10-09

## 2023-10-09 PROBLEM — J01.90 ACUTE SINUSITIS: Status: RESOLVED | Noted: 2023-09-29 | Resolved: 2023-10-09

## 2023-10-09 PROBLEM — M25.551 ACUTE PAIN OF BOTH HIPS: Status: RESOLVED | Noted: 2023-09-29 | Resolved: 2023-10-09

## 2023-10-09 RX ORDER — MONTELUKAST SODIUM 10 MG/1
1 TABLET ORAL EVERY EVENING
COMMUNITY
Start: 2022-04-19 | End: 2023-10-11 | Stop reason: WASHOUT

## 2023-10-09 RX ORDER — GENTAMICIN SULFATE 1 MG/G
CREAM TOPICAL 2 TIMES DAILY
COMMUNITY
Start: 2020-12-18

## 2023-10-09 RX ORDER — METOPROLOL TARTRATE 25 MG/1
25 TABLET, FILM COATED ORAL DAILY
COMMUNITY
End: 2023-10-11 | Stop reason: WASHOUT

## 2023-10-09 RX ORDER — OMEGA-3 FATTY ACIDS 1000 MG
1000 CAPSULE ORAL DAILY
COMMUNITY
Start: 2015-12-11 | End: 2023-10-11 | Stop reason: WASHOUT

## 2023-10-09 RX ORDER — NAPROXEN SODIUM 220 MG/1
81 TABLET, FILM COATED ORAL
COMMUNITY

## 2023-10-09 RX ORDER — OMEGA-3 FATTY ACIDS/FISH OIL 340-1000MG
1 CAPSULE ORAL DAILY
COMMUNITY
End: 2023-10-11 | Stop reason: WASHOUT

## 2023-10-09 RX ORDER — KETOCONAZOLE 20 MG/ML
SHAMPOO, SUSPENSION TOPICAL DAILY PRN
COMMUNITY
End: 2023-12-06 | Stop reason: WASHOUT

## 2023-10-09 RX ORDER — AMOXICILLIN 500 MG/1
4 CAPSULE ORAL SEE ADMIN INSTRUCTIONS
COMMUNITY
Start: 2023-01-17

## 2023-10-09 RX ORDER — BICALUTAMIDE 50 MG/1
1 TABLET, FILM COATED ORAL DAILY
COMMUNITY
Start: 2021-11-02 | End: 2023-10-11 | Stop reason: WASHOUT

## 2023-10-09 RX ORDER — ACETAMINOPHEN 500 MG
1000 TABLET ORAL DAILY
COMMUNITY
End: 2023-10-11 | Stop reason: WASHOUT

## 2023-10-09 RX ORDER — METOPROLOL SUCCINATE 100 MG/1
1.5 TABLET, EXTENDED RELEASE ORAL DAILY
COMMUNITY
End: 2023-12-13 | Stop reason: SDUPTHER

## 2023-10-09 RX ORDER — ALBUTEROL SULFATE 90 UG/1
1 AEROSOL, METERED RESPIRATORY (INHALATION) EVERY 4 HOURS PRN
COMMUNITY

## 2023-10-09 RX ORDER — VIT C/E/ZN/COPPR/LUTEIN/ZEAXAN 250MG-90MG
1000 CAPSULE ORAL DAILY
COMMUNITY
Start: 2015-12-11 | End: 2024-02-01

## 2023-10-09 RX ORDER — ACETAMINOPHEN 500 MG
1 TABLET ORAL DAILY
COMMUNITY
End: 2023-10-11 | Stop reason: WASHOUT

## 2023-10-09 RX ORDER — GLUCOSAMINE/CHONDR SU A SOD 750-600 MG
1 TABLET ORAL DAILY
COMMUNITY

## 2023-10-09 RX ORDER — METOPROLOL SUCCINATE 25 MG/1
TABLET, EXTENDED RELEASE ORAL
COMMUNITY
Start: 2023-01-09 | End: 2023-10-11 | Stop reason: WASHOUT

## 2023-10-09 RX ORDER — METHYLPREDNISOLONE 4 MG
500 TABLET, DOSE PACK ORAL 3 TIMES DAILY
COMMUNITY
Start: 2015-12-11 | End: 2023-10-11 | Stop reason: WASHOUT

## 2023-10-11 ENCOUNTER — OFFICE VISIT (OUTPATIENT)
Dept: PRIMARY CARE | Facility: CLINIC | Age: 83
End: 2023-10-11
Payer: MEDICARE

## 2023-10-11 VITALS
BODY MASS INDEX: 35.27 KG/M2 | SYSTOLIC BLOOD PRESSURE: 124 MMHG | HEART RATE: 59 BPM | HEIGHT: 66 IN | WEIGHT: 219.5 LBS | DIASTOLIC BLOOD PRESSURE: 70 MMHG | TEMPERATURE: 97.1 F

## 2023-10-11 DIAGNOSIS — I25.10 CORONARY ARTERY DISEASE INVOLVING NATIVE HEART, UNSPECIFIED VESSEL OR LESION TYPE, UNSPECIFIED WHETHER ANGINA PRESENT: ICD-10-CM

## 2023-10-11 DIAGNOSIS — T78.40XD ALLERGY, SUBSEQUENT ENCOUNTER: Primary | ICD-10-CM

## 2023-10-11 DIAGNOSIS — J30.2 SEASONAL ALLERGIC RHINITIS, UNSPECIFIED TRIGGER: ICD-10-CM

## 2023-10-11 DIAGNOSIS — I10 ESSENTIAL HYPERTENSION: ICD-10-CM

## 2023-10-11 DIAGNOSIS — C61 PROSTATE CANCER (MULTI): ICD-10-CM

## 2023-10-11 DIAGNOSIS — M25.561 ACUTE PAIN OF RIGHT KNEE: ICD-10-CM

## 2023-10-11 DIAGNOSIS — M16.12 PRIMARY OSTEOARTHRITIS OF LEFT HIP: ICD-10-CM

## 2023-10-11 DIAGNOSIS — I48.19 PERSISTENT ATRIAL FIBRILLATION (MULTI): ICD-10-CM

## 2023-10-11 DIAGNOSIS — E78.2 MIXED HYPERLIPIDEMIA: ICD-10-CM

## 2023-10-11 DIAGNOSIS — B35.6 TINEA CRURIS: ICD-10-CM

## 2023-10-11 PROCEDURE — 3074F SYST BP LT 130 MM HG: CPT | Performed by: FAMILY MEDICINE

## 2023-10-11 PROCEDURE — 3078F DIAST BP <80 MM HG: CPT | Performed by: FAMILY MEDICINE

## 2023-10-11 PROCEDURE — 1159F MED LIST DOCD IN RCRD: CPT | Performed by: FAMILY MEDICINE

## 2023-10-11 PROCEDURE — 1036F TOBACCO NON-USER: CPT | Performed by: FAMILY MEDICINE

## 2023-10-11 PROCEDURE — 99214 OFFICE O/P EST MOD 30 MIN: CPT | Performed by: FAMILY MEDICINE

## 2023-10-11 PROCEDURE — 1160F RVW MEDS BY RX/DR IN RCRD: CPT | Performed by: FAMILY MEDICINE

## 2023-10-11 RX ORDER — FLUCONAZOLE 100 MG/1
100 TABLET ORAL DAILY
Qty: 7 TABLET | Refills: 0 | Status: SHIPPED | OUTPATIENT
Start: 2023-10-11 | End: 2023-12-06 | Stop reason: WASHOUT

## 2023-10-11 RX ORDER — AZELASTINE HCL 205.5 UG/1
2 SPRAY NASAL 2 TIMES DAILY
Qty: 30 ML | Refills: 2 | Status: SHIPPED | OUTPATIENT
Start: 2023-10-11 | End: 2023-12-06 | Stop reason: SDUPTHER

## 2023-10-11 RX ORDER — FLUTICASONE PROPIONATE 50 MCG
2 SPRAY, SUSPENSION (ML) NASAL DAILY
Qty: 16 G | Refills: 2 | Status: SHIPPED | OUTPATIENT
Start: 2023-10-11 | End: 2023-12-13 | Stop reason: SDUPTHER

## 2023-10-11 ASSESSMENT — ENCOUNTER SYMPTOMS
FEVER: 0
EYE PAIN: 0
DIARRHEA: 0
FATIGUE: 0
SHORTNESS OF BREATH: 0
NAIL CHANGES: 0
ANOREXIA: 0
COUGH: 0
SORE THROAT: 0
VOMITING: 0
RHINORRHEA: 1

## 2023-10-11 ASSESSMENT — PATIENT HEALTH QUESTIONNAIRE - PHQ9
2. FEELING DOWN, DEPRESSED OR HOPELESS: NOT AT ALL
1. LITTLE INTEREST OR PLEASURE IN DOING THINGS: NOT AT ALL
SUM OF ALL RESPONSES TO PHQ9 QUESTIONS 1 AND 2: 0

## 2023-10-11 NOTE — PROGRESS NOTES
"Subjective   Chief complaint: Guicho Jones is a 83 y.o. male who presents for Follow-up (Patient in office today for 4 week follow up on knee pain) and Rash.    HPI:  Rash  This is a chronic problem. The current episode started more than 1 year ago. The problem has been gradually worsening since onset. The affected locations include the genitalia, left toes and right toes. The rash is characterized by draining. Associated symptoms include joint pain and rhinorrhea. Pertinent negatives include no anorexia, congestion, cough, diarrhea, eye pain, facial edema, fatigue, fever, nail changes, shortness of breath, sore throat or vomiting.     Follow-up knee pain.  Went to see orthopedics.  [place] to be inflammatory.  Given a Medrol Dosepak.  Symptoms improved.  X-ray reviewed.  Unremarkable.  X-ray also reviewed of his left hip.  Osteoarthritis.  Rash is a follow-up.  Still having it.  Get a little better with the Diflucan but its been persistent.  Using medication as prescribed  Objective   /70 (BP Location: Left arm, Patient Position: Sitting)   Pulse 59   Temp 36.2 °C (97.1 °F) (Temporal)   Ht 1.676 m (5' 6\")   Wt 99.6 kg (219 lb 8 oz)   BMI 35.43 kg/m²   Physical Exam  Vitals and nursing note reviewed.   Constitutional:       Appearance: Normal appearance.   HENT:      Head: Normocephalic and atraumatic.   Eyes:      Extraocular Movements: Extraocular movements intact.      Conjunctiva/sclera: Conjunctivae normal.      Pupils: Pupils are equal, round, and reactive to light.   Cardiovascular:      Rate and Rhythm: Normal rate and regular rhythm.      Heart sounds: Normal heart sounds.   Pulmonary:      Effort: Pulmonary effort is normal.      Breath sounds: Normal breath sounds.   Abdominal:      General: Abdomen is flat. Bowel sounds are normal.      Palpations: Abdomen is soft.   Musculoskeletal:         General: Normal range of motion.   Skin:     General: Skin is warm and dry.   Neurological:      " General: No focal deficit present.      Mental Status: He is alert and oriented to person, place, and time. Mental status is at baseline.   Psychiatric:         Mood and Affect: Mood normal.         Behavior: Behavior normal.       Review of Systems   Constitutional:  Negative for fatigue and fever.   HENT:  Positive for rhinorrhea. Negative for congestion and sore throat.    Eyes:  Negative for pain.   Respiratory:  Negative for cough and shortness of breath.    Gastrointestinal:  Negative for anorexia, diarrhea and vomiting.   Musculoskeletal:  Positive for joint pain.   Skin:  Positive for rash. Negative for nail changes.      I have reviewed and reconciled the medication list with the patient today.   Current Outpatient Medications:     albuterol (Proventil HFA) 90 mcg/actuation inhaler, Inhale 1 puff every 4 hours if needed., Disp: , Rfl:     amoxicillin (Amoxil) 500 mg capsule, Take 4 capsules (2,000 mg) by mouth see administration instructions. TAKE 1 HOUR BEFORE APPOINTMENT, Disp: , Rfl:     aspirin 81 mg chewable tablet, Chew 1 tablet (81 mg) see administration instructions. TAKE 1 TABLET MONDAY THROUGH FRIDAY AT BEDTIME, Disp: , Rfl:     cholecalciferol (Vitamin D-3) 25 MCG (1000 UT) capsule, Take 1 capsule (25 mcg) by mouth once daily., Disp: , Rfl:     clotrimazole-betamethasone (Lotrisone) lotion, Apply 1 Application topically 2 times a day., Disp: , Rfl:     docosahexaenoic acid/epa (FISH OIL ORAL), Take by mouth., Disp: , Rfl:     ezetimibe (Zetia) 10 mg tablet, Take 1 tablet (10 mg) by mouth once daily., Disp: , Rfl:     fluticasone-umeclidin-vilanter (TRELEGY-ELLIPTA) 100-62.5-25 mcg blister with device, Inhale 1 puff once daily., Disp: , Rfl:     gentamicin (Garamycin) 0.1 % cream, Apply topically 2 times a day. APPLY SPARINGLY TO AFFECTED AREA, Disp: , Rfl:     hydroCHLOROthiazide (HYDRODiuril) 25 mg tablet, Take 1 tablet (25 mg) by mouth once daily., Disp: , Rfl:     hydrocortisone 0.5 % cream,  Apply topically 3 times a day. Apply sparingly to affected area, Disp: , Rfl:     ipratropium-albuteroL (Duo-Neb) 0.5-2.5 mg/3 mL nebulizer solution, Inhale 3 mL 4 times a day., Disp: , Rfl:     ketoconazole (NIZOral) 2 % shampoo, Apply topically once daily as needed. Apply as needed for itching., Disp: , Rfl:     leuprolide, 6-month, (Lupron Depot, 6 Month,) 45 mg injection, Inject 45 mg into the shoulder, thigh, or buttocks 1 time., Disp: , Rfl:     lisinopril 40 mg tablet, Take 1 tablet (40 mg) by mouth once daily., Disp: , Rfl:     magnesium oxide (MagOx) 400 mg (241.3 mg magnesium) tablet, Take 1 tablet (400 mg) by mouth once daily., Disp: , Rfl:     metoprolol succinate XL (Toprol-XL) 100 mg 24 hr tablet, Take 1.5 tablets (150 mg) by mouth once daily. Do not crush or chew., Disp: , Rfl:     nitroglycerin (Nitrostat) 0.4 mg SL tablet, Place 1 tablet (0.4 mg) under the tongue every 5 minutes if needed., Disp: , Rfl:     sotalol (Betapace) 120 mg tablet, Take 1 tablet (120 mg) by mouth 2 times a day., Disp: , Rfl:     Xarelto 20 mg tablet, Take 1 tablet (20 mg) by mouth once daily., Disp: , Rfl:     albuterol 90 mcg/actuation inhaler, Inhale 2 puffs every 4 hours if needed for wheezing., Disp: , Rfl:     apixaban (Eliquis) 5 mg tablet, Take 1 tablet (5 mg) by mouth twice a day., Disp: , Rfl:     aspirin 81 mg EC tablet, Take 1 tablet (81 mg) by mouth once daily., Disp: , Rfl:     atorvastatin (Lipitor) 40 mg tablet, Take 1 tablet (40 mg) by mouth once daily at bedtime., Disp: , Rfl:     azelastine 205.5 mcg (0.15 %) spray,non-aerosol, Administer 2 sprays into affected nostril(s) 2 times a day., Disp: 30 mL, Rfl: 2    bicalutamide (Casodex) 50 mg tablet, Take 1 tablet (50 mg total) by mouth once daily., Disp: , Rfl:     cholecalciferol (Vitamin D-3) 5,000 Units tablet, Take 1 tablet (5,000 Units) by mouth once daily., Disp: , Rfl:     cholecalciferol (Vitamin D-3) 50 mcg (2,000 unit) capsule, Take 1,000 Units by  mouth once daily., Disp: , Rfl:     empagliflozin (Jardiance) 10 mg, , Disp: , Rfl:     fluconazole (Diflucan) 100 mg tablet, Take 1 tablet (100 mg) by mouth once daily., Disp: 7 tablet, Rfl: 0    fluticasone (Flonase) 50 mcg/actuation nasal spray, Administer 2 sprays into each nostril once daily., Disp: 16 g, Rfl: 2    fluticasone-umeclidin-vilanter (Trelegy Ellipta) 100-62.5-25 mcg blister with device, Inhale., Disp: , Rfl:     glucosamine HCl 1,500 mg tablet, Take 1 tablet by mouth once daily., Disp: , Rfl:     glucosamine sulfate 500 mg tablet, Take 500 mg by mouth 3 times a day., Disp: , Rfl:     glucosamine-chondroitin 500-400 mg tablet, Take 1 tablet by mouth once daily., Disp: , Rfl:     methylPREDNISolone (Medrol Dospak) 4 mg tablets, Follow schedule on package instructions (Patient not taking: Reported on 10/11/2023), Disp: 21 tablet, Rfl: 0    metoprolol succinate XL (Toprol-XL) 100 mg 24 hr tablet, Take 1 tablet (100 mg) by mouth once daily., Disp: , Rfl:     metoprolol succinate XL (Toprol-XL) 25 mg 24 hr tablet, , Disp: , Rfl:     metoprolol tartrate (Lopressor) 25 mg tablet, Take 1 tablet (25 mg) by mouth once daily. IF PALPITATIONS ARE LONGER THAN 20 MINS., Disp: , Rfl:     montelukast (Singulair) 10 mg tablet, Take 1 tablet (10 mg) by mouth once daily in the evening., Disp: , Rfl:     nystatin (Mycostatin) 100,000 unit/gram powder, Apply topically., Disp: , Rfl:     omega-3 1,000 mg capsule capsule, Take 1 capsule (1,000 mg) by mouth once daily., Disp: , Rfl:     omega-3 fatty acids-fish oil (Fish OiL) 340-1,000 mg capsule, Take 1 capsule by mouth once daily., Disp: , Rfl:      Imaging:  Carotid Artery Duplex Ultrasoun    Result Date: 9/21/2023  [address], [address], [place] [address] Tel [phone] Fax [phone] Vascular Lab Report Carotid Artery Duplex Ultrasound Patient Name:     ASHLY ONEIL [place] Physician:   [id number] Marc Kumar MD, [facility] Study  Date:       9/21/2023        Referring Physician: RYAN WOODWARD MRN/PID:          [id number]         PCP:                 [id number] Baldo Almazan MD Accession/Order#: [id number]     CC Report to: YOB: 1940        Technologist:        MANFRED Gender:           M                Technologist 2: Admission Status: Inpatient        Location Performed:  [facility] Diagnosis/ICD: I63.9-Cerebral infarction, unspecified Indication:    CVA Procedure/CPT: 06388 Cerebrovascular Carotid Duplex scan [id number] Smoker:            Former. Pertinent History: HTN and Hyperlipidemia. CONCLUSIONS: Right Carotid: Findings are consistent with less than 50% stenosis of the right proximal internal carotid artery. Just mild plaquing at the origin of the left internal carotid artery. Probably in the mid to lower portions of that range. Right external carotid artery appears patent with no evidence of stenosis. The right vertebral artery is patent with antegrade flow. Left Carotid: Findings are consistent with less than 50% stenosis of the left proximal internal carotid artery. Little more plaque on the left internal carotid artery, compared to the right. But still probably in the mid to lower portion of that range. Left external carotid artery appears patent with no evidence of stenosis. The left vertebral artery is patent with antegrade flow. Additional Findings Less than 50% stenosis bilaterally, with just mild plaquing, a little worse on the left as compared to the right. No previous exam to compare. Imaging & Doppler Findings: Right Plaque Morph: The proximal right internal carotid artery demonstrates calcified plaque. Left Plaque Morph: The proximal left internal carotid artery demonstrates calcified plaque. Right                     Left PSV     EDV               PSV     EDV 81 cm/s 16 cm/s   CCA P   83 cm/s 19 cm/s 63 cm/s 12 cm/s   CCA M   70 cm/s 15 cm/s 43 cm/s 12 cm/s   CCA D   64 cm/s 16 cm/s 73 cm/s 25  cm/s   ICA P   88 cm/s 26 cm/s 78 cm/s 26 cm/s   ICA M   82 cm/s 22 cm/s 56 cm/s 21 cm/s   ICA D   68 cm/s 27 cm/s 77 cm/s            ECA    63 cm/s 36 cm/s         Vertebral 39 cm/s Right Left ICA/CCA Ratio  1.7  1.4 [id number] Marc Kumar MD, FACC Electronically signed by [id number] Marc Kumar MD, FACC on 9/21/2023 at 6:18:38 PM   Final       XR hips bilateral 5+ VW w or wo pelvis    Result Date: 9/14/2023  Interpreted By:  CINDI MENDOZA MD MRN: [id number] Patient Name: ASHLY ONEIL  STUDY: HIP, BILATERAL, WITH PELVIS WHEN PERFORMED MIN 5 VIEWS;  9/13/2023 11:34 am  INDICATION: Pain Acute pain of both hips .  COMPARISON: None.  ACCESSION NUMBER(S): [id number]  ORDERING CLINICIAN: PETER BECKER  FINDINGS: Bilateral hips, five views  Right total hip arthroplasty in place. No periprosthetic fracture or lucency seen. There is no dislocation. There is mild osteophytosis in the left hip. No fracture seen. Extensive vascular calcifications. Generator with leads projects over the left aspect of the abdomen      No hardware failure about the right total hip arthroplasty. Mild degenerative changes in the left hip    XR knee    Result Date: 9/14/2023  Interpreted By:  CINDI MENDOZA MD MRN: [id number] Patient Name: ASHLY ONEIL  STUDY: KNEE; 3 VIEWS;  9/13/2023 11:36 am  INDICATION: Acute pain of right knee.  COMPARISON: None.  ACCESSION NUMBER(S): [id number]  ORDERING CLINICIAN: PETER BECKER  FINDINGS: Right knee three views  There is no fracture. There is no dislocation. No significant degenerative changes seen. There is no joint effusion.      Unremarkable radiographs of the  right knee       Labs reviewed:    Lab Results   Component Value Date    WBC 8.1 03/01/2023    HGB 13.3 (L) 03/01/2023    HCT 42.4 03/01/2023     03/01/2023    CHOL 97 09/12/2023    TRIG 89 09/12/2023    HDL 45.0 09/12/2023    ALT 21 09/12/2023    AST 24 09/12/2023     09/12/2023    K 4.0 09/12/2023     09/12/2023     CREATININE 0.92 09/12/2023    BUN 19 09/12/2023    CO2 29 09/12/2023       Assessment/Plan   Problem List Items Addressed This Visit             ICD-10-CM    Allergic rhinitis, seasonal J30.2    CAD (coronary artery disease) I25.10    Essential hypertension I10    Relevant Orders    Comprehensive metabolic panel    Lipid panel    Mixed hyperlipidemia E78.2    Persistent atrial fibrillation (CMS/[facility]) I48.19    Tinea cruris B35.6    Relevant Medications    fluconazole (Diflucan) 100 mg tablet    Prostate cancer (CMS/Formerly [facility]) C61     Other Visit Diagnoses         Codes    Allergy, subsequent encounter    -  Primary T78.40XD    Relevant Medications    fluticasone (Flonase) 50 mcg/actuation nasal spray    azelastine 205.5 mcg (0.15 %) spray,non-aerosol    Acute pain of right knee     M25.561    Primary osteoarthritis of left hip     M16.12          Another course of oral antifungal.  Continue topical antifungal.  Consider dermatology consultation if or persist.  Reinforced lifestyle modifications  Continue current medications as listed  Physical activity as tolerated and healthy diet encouraged  Maintain a healthy weight  Follow up in [facility]

## 2023-10-16 ENCOUNTER — OFFICE VISIT (OUTPATIENT)
Dept: ORTHOPEDIC SURGERY | Facility: CLINIC | Age: 83
End: 2023-10-16
Payer: MEDICARE

## 2023-10-16 DIAGNOSIS — M17.11 PRIMARY OSTEOARTHRITIS OF RIGHT KNEE: Primary | ICD-10-CM

## 2023-10-16 PROCEDURE — 1036F TOBACCO NON-USER: CPT | Performed by: ORTHOPAEDIC SURGERY

## 2023-10-16 PROCEDURE — 20610 DRAIN/INJ JOINT/BURSA W/O US: CPT | Mod: RT | Performed by: ORTHOPAEDIC SURGERY

## 2023-10-16 PROCEDURE — 99214 OFFICE O/P EST MOD 30 MIN: CPT | Performed by: ORTHOPAEDIC SURGERY

## 2023-10-16 PROCEDURE — 2500000005 HC RX 250 GENERAL PHARMACY W/O HCPCS: Performed by: ORTHOPAEDIC SURGERY

## 2023-10-16 PROCEDURE — 1159F MED LIST DOCD IN RCRD: CPT | Performed by: ORTHOPAEDIC SURGERY

## 2023-10-16 PROCEDURE — 2500000004 HC RX 250 GENERAL PHARMACY W/ HCPCS (ALT 636 FOR OP/ED): Performed by: ORTHOPAEDIC SURGERY

## 2023-10-16 PROCEDURE — 1160F RVW MEDS BY RX/DR IN RCRD: CPT | Performed by: ORTHOPAEDIC SURGERY

## 2023-10-16 RX ORDER — LIDOCAINE HYDROCHLORIDE 10 MG/ML
5 INJECTION INFILTRATION; PERINEURAL
Status: COMPLETED | OUTPATIENT
Start: 2023-10-16 | End: 2023-10-16

## 2023-10-16 RX ORDER — BETAMETHASONE SODIUM PHOSPHATE AND BETAMETHASONE ACETATE 3; 3 MG/ML; MG/ML
12 INJECTION, SUSPENSION INTRA-ARTICULAR; INTRALESIONAL; INTRAMUSCULAR; SOFT TISSUE
Status: COMPLETED | OUTPATIENT
Start: 2023-10-16 | End: 2023-10-16

## 2023-10-16 RX ADMIN — LIDOCAINE HYDROCHLORIDE 5 ML: 10 INJECTION, SOLUTION INFILTRATION; PERINEURAL at 10:19

## 2023-10-16 RX ADMIN — BETAMETHASONE SODIUM PHOSPHATE AND BETAMETHASONE ACETATE 12 MG: 3; 3 INJECTION, SUSPENSION INTRA-ARTICULAR; INTRALESIONAL; INTRAMUSCULAR at 10:19

## 2023-10-16 NOTE — PROGRESS NOTES
History: Robert is here for his right knee.  He was doing some housework and may have irritated the knee.  He feels some tightness and pain behind the knee.  He cannot take anti-inflammatories but did see Dr. Tomlin for a Medrol Dosepak.    Past medical history: Multiple  Medications: Multiple  Allergies: No known drug allergies    Please refer to the intake H&P regarding the patient's review of systems, family history and social history as was done today    HEENT: Normal  Lungs: Clear to auscultation  Heart: RRR  Abdomen: Soft, nontender  Skin: clear  Extremity: He has tenderness again posteriorly.  1+ crepitus with range of motion.  Negative Lachman and posterior drawer.  No numbness noted.  Slight tenderness medially.  Contralateral exam is normal for strength, motion, stability and neurovascular assessment.    Radiographs: X-rays showed mild to moderate generative change only.    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  He would like try an injection today.    L Inj/Asp: R knee on 10/16/2023 10:19 AM  Indications: pain  Details: 22 G needle, lateral approach  Medications: 12 mg betamethasone acet,sod phos 6 mg/mL; 5 mL lidocaine 10 mg/mL (1 %)  Outcome: tolerated well, no immediate complications        Procedure, treatment alternatives, risks and benefits explained, specific risks discussed. Consent was given by the patient. Immediately prior to procedure a time out was called to verify the correct patient, procedure, equipment, support staff and site/side marked as required. Patient was prepped and draped in the usual sterile fashion.       He will ice accordingly and will follow up over the next 6 weeks to assess progress if not improving.  All questions were answered today with the patient.    This note was generated with voice recognition software and may contain grammatical  errors.

## 2023-10-21 DIAGNOSIS — I48.19 PERSISTENT ATRIAL FIBRILLATION (MULTI): ICD-10-CM

## 2023-10-21 DIAGNOSIS — Z79.899 HIGH RISK MEDICATION USE: ICD-10-CM

## 2023-10-23 ENCOUNTER — HOSPITAL ENCOUNTER (OUTPATIENT)
Dept: CARDIOLOGY | Age: 83
Discharge: HOME | End: 2023-10-23
Payer: MEDICARE

## 2023-10-23 PROCEDURE — 93295 DEV INTERROG REMOTE 1/2/MLT: CPT | Performed by: INTERNAL MEDICINE

## 2023-10-23 PROCEDURE — 93296 REM INTERROG EVL PM/IDS: CPT

## 2023-10-23 RX ORDER — RIVAROXABAN 20 MG/1
20 TABLET, FILM COATED ORAL DAILY
Qty: 90 TABLET | Refills: 10 | Status: SHIPPED | OUTPATIENT
Start: 2023-10-23 | End: 2024-02-02 | Stop reason: WASHOUT

## 2023-11-13 ENCOUNTER — OFFICE VISIT (OUTPATIENT)
Dept: ORTHOPEDIC SURGERY | Facility: CLINIC | Age: 83
End: 2023-11-13
Payer: MEDICARE

## 2023-11-13 DIAGNOSIS — M17.11 PRIMARY OSTEOARTHRITIS OF RIGHT KNEE: ICD-10-CM

## 2023-11-13 DIAGNOSIS — M48.062 SPINAL STENOSIS, LUMBAR REGION WITH NEUROGENIC CLAUDICATION: Primary | ICD-10-CM

## 2023-11-13 PROCEDURE — 3074F SYST BP LT 130 MM HG: CPT | Performed by: ORTHOPAEDIC SURGERY

## 2023-11-13 PROCEDURE — 1160F RVW MEDS BY RX/DR IN RCRD: CPT | Performed by: ORTHOPAEDIC SURGERY

## 2023-11-13 PROCEDURE — 1159F MED LIST DOCD IN RCRD: CPT | Performed by: ORTHOPAEDIC SURGERY

## 2023-11-13 PROCEDURE — 3078F DIAST BP <80 MM HG: CPT | Performed by: ORTHOPAEDIC SURGERY

## 2023-11-13 PROCEDURE — 1036F TOBACCO NON-USER: CPT | Performed by: ORTHOPAEDIC SURGERY

## 2023-11-13 PROCEDURE — 99213 OFFICE O/P EST LOW 20 MIN: CPT | Performed by: ORTHOPAEDIC SURGERY

## 2023-11-13 NOTE — PROGRESS NOTES
History: Guicho is here for his right knee.  We did a cortisone injection about 6 weeks ago which helped significantly.  His biggest complaint is pain rating from the left low back and buttock down the leg.  He has a history of prior back surgery as well.    Past medical history: Multiple  Medications: Multiple  Allergies: No known drug allergies    Please refer to the intake H&P regarding the patient's review of systems, family history and social history as was done today    HEENT: Normal  Lungs: Clear to auscultation  Heart: RRR  Abdomen: Soft, nontender  Skin: clear  Extremity: Knee exam shows excellent range of motion.  Minimal swelling.  Very mild pain only with palpation.  No numbness or tingling.  Left hip shows good internal X rotation.  Mild pain in the left sciatic notch.  Mildly positive straight leg raise.  Upper extremity exam is normal for strength, motion, stability and neurovascular assessment.    Radiographs: Previous x-rays were again reviewed.    Assessment: Improved right knee DJD status post injection trial.  History of lumbar surgery and developing sciatica.    Plan: He has been doing some exercises for his back which have been helping.  I have urged him to continue work on core and low back exercises as he is already had surgery on this spine and wants to avoid that for as long as possible.  He could certainly get back in the pain management for injections as he is done this in the past as well.  Otherwise he is happy with his knee results and I will see him back for the knees as needed.  All questions were answered today with the patient.    This note was generated with voice recognition software and may contain grammatical errors.

## 2023-11-19 PROCEDURE — 93295 DEV INTERROG REMOTE 1/2/MLT: CPT | Performed by: INTERNAL MEDICINE

## 2023-11-20 ENCOUNTER — HOSPITAL ENCOUNTER (OUTPATIENT)
Dept: CARDIOLOGY | Age: 83
Discharge: HOME | End: 2023-11-20
Payer: MEDICARE

## 2023-11-20 PROCEDURE — 93296 REM INTERROG EVL PM/IDS: CPT

## 2023-11-29 RX ORDER — FLUTICASONE FUROATE, UMECLIDINIUM BROMIDE AND VILANTEROL TRIFENATATE 100; 62.5; 25 UG/1; UG/1; UG/1
1 POWDER RESPIRATORY (INHALATION) DAILY
Qty: 2 EACH | Refills: 0 | COMMUNITY
Start: 2023-11-29

## 2023-12-05 ENCOUNTER — LAB (OUTPATIENT)
Dept: LAB | Facility: LAB | Age: 83
End: 2023-12-05
Payer: MEDICARE

## 2023-12-05 DIAGNOSIS — E79.0 HYPERURICEMIA: ICD-10-CM

## 2023-12-05 DIAGNOSIS — I10 ESSENTIAL HYPERTENSION: ICD-10-CM

## 2023-12-05 LAB
ALBUMIN SERPL BCP-MCNC: 3.9 G/DL (ref 3.4–5)
ALP SERPL-CCNC: 79 U/L (ref 33–136)
ALT SERPL W P-5'-P-CCNC: 27 U/L (ref 10–52)
ANION GAP SERPL CALC-SCNC: 12 MMOL/L (ref 10–20)
AST SERPL W P-5'-P-CCNC: 24 U/L (ref 9–39)
BILIRUB SERPL-MCNC: 1.1 MG/DL (ref 0–1.2)
BUN SERPL-MCNC: 19 MG/DL (ref 6–23)
CALCIUM SERPL-MCNC: 8.9 MG/DL (ref 8.6–10.3)
CHLORIDE SERPL-SCNC: 105 MMOL/L (ref 98–107)
CHOLEST SERPL-MCNC: 96 MG/DL (ref 0–199)
CHOLESTEROL/HDL RATIO: 2.5
CO2 SERPL-SCNC: 30 MMOL/L (ref 21–32)
CREAT SERPL-MCNC: 0.91 MG/DL (ref 0.5–1.3)
GFR SERPL CREATININE-BSD FRML MDRD: 83 ML/MIN/1.73M*2
GLUCOSE SERPL-MCNC: 97 MG/DL (ref 74–99)
HDLC SERPL-MCNC: 38.1 MG/DL
LDLC SERPL CALC-MCNC: 42 MG/DL
NON HDL CHOLESTEROL: 58 MG/DL (ref 0–149)
POTASSIUM SERPL-SCNC: 4 MMOL/L (ref 3.5–5.3)
PROT SERPL-MCNC: 6.3 G/DL (ref 6.4–8.2)
SODIUM SERPL-SCNC: 143 MMOL/L (ref 136–145)
TRIGL SERPL-MCNC: 79 MG/DL (ref 0–149)
VLDL: 16 MG/DL (ref 0–40)

## 2023-12-05 PROCEDURE — 84550 ASSAY OF BLOOD/URIC ACID: CPT

## 2023-12-05 PROCEDURE — 36415 COLL VENOUS BLD VENIPUNCTURE: CPT

## 2023-12-05 PROCEDURE — 80061 LIPID PANEL: CPT

## 2023-12-05 PROCEDURE — 80053 COMPREHEN METABOLIC PANEL: CPT

## 2023-12-06 ENCOUNTER — ANCILLARY PROCEDURE (OUTPATIENT)
Dept: RADIOLOGY | Facility: CLINIC | Age: 83
End: 2023-12-06
Payer: MEDICARE

## 2023-12-06 ENCOUNTER — OFFICE VISIT (OUTPATIENT)
Dept: PRIMARY CARE | Facility: CLINIC | Age: 83
End: 2023-12-06
Payer: MEDICARE

## 2023-12-06 VITALS
HEART RATE: 50 BPM | BODY MASS INDEX: 35.97 KG/M2 | WEIGHT: 223.8 LBS | OXYGEN SATURATION: 94 % | DIASTOLIC BLOOD PRESSURE: 84 MMHG | HEIGHT: 66 IN | TEMPERATURE: 97.6 F | SYSTOLIC BLOOD PRESSURE: 122 MMHG

## 2023-12-06 DIAGNOSIS — M79.676 PAIN OF GREAT TOE, UNSPECIFIED LATERALITY: ICD-10-CM

## 2023-12-06 DIAGNOSIS — E79.0 HYPERURICEMIA: ICD-10-CM

## 2023-12-06 DIAGNOSIS — T78.40XD ALLERGY, SUBSEQUENT ENCOUNTER: Primary | ICD-10-CM

## 2023-12-06 DIAGNOSIS — Z00.00 ROUTINE GENERAL MEDICAL EXAMINATION AT HEALTH CARE FACILITY: ICD-10-CM

## 2023-12-06 LAB — URATE SERPL-MCNC: 6.9 MG/DL (ref 4–7.5)

## 2023-12-06 PROCEDURE — 1160F RVW MEDS BY RX/DR IN RCRD: CPT | Performed by: FAMILY MEDICINE

## 2023-12-06 PROCEDURE — 73660 X-RAY EXAM OF TOE(S): CPT | Mod: RIGHT SIDE | Performed by: RADIOLOGY

## 2023-12-06 PROCEDURE — 3074F SYST BP LT 130 MM HG: CPT | Performed by: FAMILY MEDICINE

## 2023-12-06 PROCEDURE — 99213 OFFICE O/P EST LOW 20 MIN: CPT | Performed by: FAMILY MEDICINE

## 2023-12-06 PROCEDURE — 3079F DIAST BP 80-89 MM HG: CPT | Performed by: FAMILY MEDICINE

## 2023-12-06 PROCEDURE — 1159F MED LIST DOCD IN RCRD: CPT | Performed by: FAMILY MEDICINE

## 2023-12-06 PROCEDURE — 73660 X-RAY EXAM OF TOE(S): CPT | Mod: LT,FY

## 2023-12-06 PROCEDURE — G0439 PPPS, SUBSEQ VISIT: HCPCS | Performed by: FAMILY MEDICINE

## 2023-12-06 PROCEDURE — 1036F TOBACCO NON-USER: CPT | Performed by: FAMILY MEDICINE

## 2023-12-06 PROCEDURE — 1170F FXNL STATUS ASSESSED: CPT | Performed by: FAMILY MEDICINE

## 2023-12-06 PROCEDURE — 73660 X-RAY EXAM OF TOE(S): CPT | Mod: LEFT SIDE | Performed by: RADIOLOGY

## 2023-12-06 PROCEDURE — 73660 X-RAY EXAM OF TOE(S): CPT | Mod: RT,FY

## 2023-12-06 RX ORDER — AZELASTINE HCL 205.5 UG/1
2 SPRAY NASAL 2 TIMES DAILY
Qty: 30 ML | Refills: 3 | Status: SHIPPED | OUTPATIENT
Start: 2023-12-06 | End: 2023-12-13 | Stop reason: DRUGHIGH

## 2023-12-06 ASSESSMENT — ACTIVITIES OF DAILY LIVING (ADL)
TAKING_MEDICATION: INDEPENDENT
MANAGING_FINANCES: INDEPENDENT
DOING_HOUSEWORK: INDEPENDENT
DRESSING: INDEPENDENT
BATHING: INDEPENDENT
GROCERY_SHOPPING: INDEPENDENT

## 2023-12-06 ASSESSMENT — PATIENT HEALTH QUESTIONNAIRE - PHQ9
SUM OF ALL RESPONSES TO PHQ9 QUESTIONS 1 AND 2: 0
1. LITTLE INTEREST OR PLEASURE IN DOING THINGS: NOT AT ALL
2. FEELING DOWN, DEPRESSED OR HOPELESS: NOT AT ALL

## 2023-12-06 ASSESSMENT — ENCOUNTER SYMPTOMS
DEPRESSION: 0
OCCASIONAL FEELINGS OF UNSTEADINESS: 0
LOSS OF SENSATION IN FEET: 0

## 2023-12-06 NOTE — PROGRESS NOTES
"Subjective   Reason for Visit: Guicho Jones is an 83 y.o. male here for a Medicare Wellness visit.     Medicare wellness documentation reviewed in detail  Co both great toe joint pain and swelling and pain under 1mtp with walking  Also we were seeing for rash on scrotum  He completed the rx, was using wash from his wife pre op  Rash has resolved  Reviewed his amb bp,controlled    Prostate ca on lupron follow up with cancer doc upcoming  Declines colonoscopy  Utd on vaccines  Had flu and covid   Has not had rsv rec to get at pharm    Past Medical, Surgical, and Family History reviewed and updated in chart.    Reviewed all medications by prescribing practitioner or clinical pharmacist (such as prescriptions, OTCs, herbal therapies and supplements) and documented in the medical record.    HPI    Patient Care Team:  Baldo Almazan MD as PCP - General (Family Medicine)  Baldo Almazan MD as PCP - [employer] PCP     Review of Systems    Objective   Vitals:  /84 (BP Location: Left arm, Patient Position: Sitting, BP Cuff Size: Large adult)   Pulse 50   Temp 36.4 °C (97.6 °F) (Temporal)   Ht 1.676 m (5' 6\")   Wt 102 kg (223 lb 12.8 oz)   SpO2 94%   BMI 36.12 kg/m²       Physical Exam  Vitals and nursing note reviewed.   Constitutional:       Appearance: Normal appearance.   HENT:      Head: Normocephalic and atraumatic.   Eyes:      Extraocular Movements: Extraocular movements intact.      Conjunctiva/sclera: Conjunctivae normal.      Pupils: Pupils are equal, round, and reactive to light.   Cardiovascular:      Rate and Rhythm: Normal rate and regular rhythm.      Heart sounds: Normal heart sounds.   Pulmonary:      Effort: Pulmonary effort is normal.      Breath sounds: Normal breath sounds.   Abdominal:      General: Abdomen is flat. Bowel sounds are normal.      Palpations: Abdomen is soft.   Musculoskeletal:         General: Normal range of motion.   Skin:     General: Skin is " warm and dry.   Neurological:      General: No focal deficit present.      Mental Status: He is alert and oriented to person, place, and time. Mental status is at baseline.   Psychiatric:         Mood and Affect: Mood normal.         Behavior: Behavior normal.     Great toe with angular deform and tender no red min swelling     Assessment/Plan   Problem List Items Addressed This Visit       Hyperuricemia    Relevant Orders    Uric acid     Other Visit Diagnoses       Allergy, subsequent encounter    -  Primary    Relevant Medications    azelastine 205.5 mcg (0.15 %) spray,non-aerosol    Routine general medical examination at health care facility        Pain of great toe, unspecified laterality        Relevant Orders    XR toe left 2+ views    XR toe right 2+ views

## 2023-12-13 DIAGNOSIS — T78.40XD ALLERGY, SUBSEQUENT ENCOUNTER: ICD-10-CM

## 2023-12-13 DIAGNOSIS — I10 ESSENTIAL HYPERTENSION: ICD-10-CM

## 2023-12-13 RX ORDER — FLUTICASONE PROPIONATE 50 MCG
2 SPRAY, SUSPENSION (ML) NASAL DAILY
Qty: 16 G | Refills: 2 | Status: SHIPPED | OUTPATIENT
Start: 2023-12-13

## 2023-12-13 RX ORDER — AZELASTINE 1 MG/ML
2 SPRAY, METERED NASAL 2 TIMES DAILY
Qty: 30 ML | Refills: 1 | Status: SHIPPED | OUTPATIENT
Start: 2023-12-13 | End: 2024-03-13

## 2023-12-13 RX ORDER — METOPROLOL SUCCINATE 100 MG/1
150 TABLET, EXTENDED RELEASE ORAL DAILY
Qty: 135 TABLET | Refills: 3 | Status: SHIPPED | OUTPATIENT
Start: 2023-12-13 | End: 2024-04-24

## 2023-12-13 RX ORDER — AZELASTINE HCL 205.5 UG/1
2 SPRAY NASAL 2 TIMES DAILY
Qty: 30 ML | Refills: 3 | Status: CANCELLED | OUTPATIENT
Start: 2023-12-13

## 2023-12-13 NOTE — TELEPHONE ENCOUNTER
----- Message from Baldo Almazan MD sent at 12/13/2023  9:43 AM EST -----  Regarding: FW: Azelastine spray  Contact: [phone]  Ok to change spray, ok to refill flonse 90 d rf x 1  ----- Message -----  From: Cathy Scott MA  Sent: 12/13/2023   8:42 AM EST  To: Baldo Almazan MD  Subject: FW: Azelastine spray                               ----- Message -----  From: Guicho Jones  Sent: 12/13/2023   7:27 AM EST  To: [employer] [place] [id number] Clinical Support Staff  Subject: Azelastine spray                                 Apparently this spray is not Available in 0.15% only 0.10% [facility] is awaiting your response.  Also Fluticasone propionate needs a refill.  Thank you,  Guicho Jones

## 2024-01-02 ENCOUNTER — OFFICE VISIT (OUTPATIENT)
Dept: CARDIOLOGY | Facility: CLINIC | Age: 84
End: 2024-01-02
Payer: MEDICARE

## 2024-01-02 VITALS
SYSTOLIC BLOOD PRESSURE: 112 MMHG | HEIGHT: 66 IN | WEIGHT: 225 LBS | DIASTOLIC BLOOD PRESSURE: 72 MMHG | HEART RATE: 63 BPM | BODY MASS INDEX: 36.16 KG/M2

## 2024-01-02 DIAGNOSIS — Z79.01 ANTICOAGULANT LONG-TERM USE: ICD-10-CM

## 2024-01-02 DIAGNOSIS — I48.19 PERSISTENT ATRIAL FIBRILLATION (MULTI): Primary | ICD-10-CM

## 2024-01-02 DIAGNOSIS — I47.29 NSVT (NONSUSTAINED VENTRICULAR TACHYCARDIA) (MULTI): ICD-10-CM

## 2024-01-02 DIAGNOSIS — I25.5 ISCHEMIC CARDIOMYOPATHY: ICD-10-CM

## 2024-01-02 DIAGNOSIS — R55 SYNCOPE AND COLLAPSE: ICD-10-CM

## 2024-01-02 DIAGNOSIS — I10 ESSENTIAL HYPERTENSION: ICD-10-CM

## 2024-01-02 DIAGNOSIS — I50.30 DIASTOLIC HEART FAILURE, NYHA CLASS 1 (MULTI): ICD-10-CM

## 2024-01-02 PROCEDURE — 1160F RVW MEDS BY RX/DR IN RCRD: CPT | Performed by: NURSE PRACTITIONER

## 2024-01-02 PROCEDURE — 99214 OFFICE O/P EST MOD 30 MIN: CPT | Performed by: NURSE PRACTITIONER

## 2024-01-02 PROCEDURE — 1036F TOBACCO NON-USER: CPT | Performed by: NURSE PRACTITIONER

## 2024-01-02 PROCEDURE — 93000 ELECTROCARDIOGRAM COMPLETE: CPT | Performed by: NURSE PRACTITIONER

## 2024-01-02 PROCEDURE — 3074F SYST BP LT 130 MM HG: CPT | Performed by: NURSE PRACTITIONER

## 2024-01-02 PROCEDURE — 1159F MED LIST DOCD IN RCRD: CPT | Performed by: NURSE PRACTITIONER

## 2024-01-02 PROCEDURE — 3078F DIAST BP <80 MM HG: CPT | Performed by: NURSE PRACTITIONER

## 2024-01-02 RX ORDER — METOPROLOL TARTRATE 25 MG/1
TABLET, FILM COATED ORAL
Qty: 90 TABLET | Refills: 1 | Status: SHIPPED | OUTPATIENT
Start: 2024-01-02

## 2024-01-02 NOTE — PROGRESS NOTES
Guicho Jones is a 83 y.o. male that presents to the office today with his wife for cardiac evaluation. He follows with his  primary cardiologist Dr. Degroot and Dr. Bassett  and was added to my schedule today.  He has a PMH of   ischemic cardiomyopathy, echo 10/21 50% EF. Cath 6/15/2022 LM-30%. LAD-diffuse disease 40% distal. CX/RCA calcified diffuse disease. LVEDP 15. He has AICD secondary to prior severe decrease in overall systolic function. Also with persistent atrial fibrillation. He has a 5.6 cm complex dumbbell abdominal aortic aneurysm that has been followed by vascular surgery. Was to be intervened upon previously but he then developed prostate cancer and intervention postponed. Finally, he has centrilobular emphysema.     He states that over the last 2 months he feels as though he is declining.  He states that he becomes very fatigued with minimal activity.  Stating that if he walks from one into the house carrying something or carries in groceries he is extremely fatigued and needs to sit down to rest.  He states he knows he went into atrial fibrillation 12/29/2023 that lasted approximately 72 hours.  He also states on 12/31/2023 when walking from the bathroom to the bedroom he had a syncopal episode which resulted in him hitting his nose on the trash can.  He denies hitting his head at that time.  He states he was only unconscious for minimal amount of time maybe seconds prior to coming to.  He denies any chest pain, chest pressure, chest tightness.     Testing Reviewed  EKG obtained in the office today and verified with Dr. Degroot shows sinus rhythm with PACs.  Prolonged QT HR 63 bpm, QT/Qtc 504/515.    Assessment/Plan  1.  Persistent atrial fibrillation; EKG as noted above.  Mr. Jones has been assessed by Dr. Degroot in the office today who recommends obtaining an echocardiogram, and nuclear Lexiscan stress test to assess for progression of CAD.  Due to the fact that he ambulates with a rollator he would  be unable to ambulate on a treadmill for the stress test.  Will also obtain a remote device check to assess for atrial fibrillation and ventricular arrhythmias.  He will follow-up in the office with Dr. Bassett as previously scheduled to be evaluated for possible AV node ablation.  2.  Long-term anticoagulation; Xarelto.  No noted bleeding.  Denies hitting head with recent fall.  3.  Hypertension; well-controlled in office today.  4.  Follow-up in the office with Dr. Degroot after testing for results and further recommendations.      Patient Active Problem List   Diagnosis    Abdominal aortic aneurysm (CMS/HCC)    AICD (automatic cardioverter/defibrillator) present    Allergic rhinitis, seasonal    CAD (coronary artery disease)    Centrilobular emphysema (CMS/HCC)    Essential hypertension    Hyperuricemia    Infarction of right basal ganglia (CMS/HCC)    Ischemic cardiomyopathy    Mixed hyperlipidemia    NSVT (nonsustained ventricular tachycardia) (CMS/HCC)    Paroxysmal ventricular tachycardia (CMS/HCC)    Persistent atrial fibrillation (CMS/HCC)    Renal artery stenosis (CMS/HCC)    Rosacea    Shortness of breath    Tinea cruris    Anticoagulant long-term use    Chronic diastolic congestive heart failure, NYHA class 2 (CMS/HCC)    High risk medication use    Tremor    Prostate cancer (CMS/HCC)    Blepharitis of both eyes    Epiretinal membrane (ERM) of right eye    Drusen (degenerative) of retina    Dermatochalasis of right upper eyelid    Dermatochalasis of left upper eyelid    Degenerative retinal drusen of both eyes    Choroidal nevus, right eye    Anemia    Lumbosacral spondylosis without myelopathy    Hypokalemia    Floaters    Spinal stenosis, lumbar region with neurogenic claudication    Pseudophakia of both eyes    Pinguecula    Obesity    Chest pain    Vitreomacular traction syndrome of right eye    COPD exacerbation (CMS/HCC)    Diastolic heart failure, NYHA class 1 (CMS/HCC)    MGD (meibomian gland  dysfunction)    Class 2 obesity with body mass index (BMI) of 35 to 39.9 without comorbidity    Cutaneous candidiasis    CVA (cerebral vascular accident) (CMS/HCC)    Primary osteoarthritis of right knee       Social History     Tobacco Use    Smoking status: Never    Smokeless tobacco: Never   Vaping Use    Vaping Use: Never used   Substance Use Topics    Alcohol use: Yes    Drug use: Never       Past Medical History:   Diagnosis Date    Encounter for follow-up examination after completed treatment for conditions other than malignant neoplasm 12/27/2021    Hospital discharge follow-up    Ischemic cardiomyopathy     Ischemic cardiomyopathy with implantable cardioverter-defibrillator (ICD)    Pain in unspecified hip     Joint pain, hip    Personal history of other diseases of the musculoskeletal system and connective tissue     History of low back pain    Personal history of other endocrine, nutritional and metabolic disease     History of hyperlipidemia    Personal history of other specified conditions 12/21/2021    History of elevated prostate specific antigen (PSA)    Presence of coronary angioplasty implant and graft     Stented coronary artery    Unspecified atrial flutter (CMS/HCC)     Paroxysmal atrial flutter         Current Outpatient Medications:     albuterol (Proventil HFA) 90 mcg/actuation inhaler, Inhale 1 puff every 4 hours if needed., Disp: , Rfl:     amoxicillin (Amoxil) 500 mg capsule, Take 4 capsules (2,000 mg) by mouth see administration instructions. TAKE 1 HOUR BEFORE APPOINTMENT, Disp: , Rfl:     aspirin 81 mg chewable tablet, Chew 1 tablet (81 mg) see administration instructions. TAKE 1 TABLET MONDAY THROUGH FRIDAY AT BEDTIME, Disp: , Rfl:     atorvastatin (Lipitor) 40 mg tablet, Take 1 tablet (40 mg) by mouth once daily at bedtime., Disp: , Rfl:     azelastine (Astelin) 137 mcg (0.1 %) nasal spray, Administer 2 sprays into each nostril 2 times a day., Disp: 30 mL, Rfl: 1    cholecalciferol  (Vitamin D-3) 25 MCG (1000 UT) capsule, Take 1 capsule (25 mcg) by mouth once daily., Disp: , Rfl:     docosahexaenoic acid/epa (FISH OIL ORAL), Take by mouth., Disp: , Rfl:     ezetimibe (Zetia) 10 mg tablet, Take 1 tablet (10 mg) by mouth once daily., Disp: , Rfl:     fluticasone (Flonase) 50 mcg/actuation nasal spray, Administer 2 sprays into each nostril once daily., Disp: 16 g, Rfl: 2    fluticasone-umeclidin-vilanter (TRELEGY-ELLIPTA) 100-62.5-25 mcg blister with device, Inhale 1 puff once daily., Disp: , Rfl:     gentamicin (Garamycin) 0.1 % cream, Apply topically 2 times a day. APPLY SPARINGLY TO AFFECTED AREA, Disp: , Rfl:     glucosamine HCl 1,500 mg tablet, Take 1 tablet by mouth once daily., Disp: , Rfl:     hydroCHLOROthiazide (HYDRODiuril) 25 mg tablet, Take 1 tablet (25 mg) by mouth once daily., Disp: , Rfl:     ipratropium-albuteroL (Duo-Neb) 0.5-2.5 mg/3 mL nebulizer solution, Inhale 3 mL 4 times a day., Disp: , Rfl:     leuprolide, 6-month, (Lupron Depot, 6 Month,) 45 mg injection, Inject 45 mg into the muscle 1 time., Disp: , Rfl:     lisinopril 40 mg tablet, Take 1 tablet (40 mg) by mouth once daily., Disp: , Rfl:     magnesium oxide (MagOx) 400 mg (241.3 mg magnesium) tablet, Take 1 tablet (400 mg) by mouth once daily., Disp: , Rfl:     metoprolol succinate XL (Toprol-XL) 100 mg 24 hr tablet, Take 1.5 tablets (150 mg) by mouth once daily., Disp: 135 tablet, Rfl: 3    nitroglycerin (Nitrostat) 0.4 mg SL tablet, Place 1 tablet (0.4 mg) under the tongue every 5 minutes if needed., Disp: , Rfl:     sotalol (Betapace) 120 mg tablet, Take 1 tablet (120 mg) by mouth 2 times a day., Disp: , Rfl:     Xarelto 20 mg tablet, TAKE 1 TABLET EVERY DAY, Disp: 90 tablet, Rfl: 10    metoprolol tartrate (Lopressor) 25 mg tablet, Take 1 tablet daily for palpitations only, Disp: 90 tablet, Rfl: 1    Levofloxacin, Amiodarone, and Jardiance [empagliflozin]    Family History   Problem Relation Name Age of Onset     "Coronary artery disease Father         Past Surgical History:   Procedure Laterality Date    CT ABDOMEN ANGIOGRAM W AND/OR WO IV CONTRAST  10/29/2021    CT ABDOMEN ANGIOGRAM W AND/OR WO IV CONTRAST 10/29/2021 [facility]    CT ABDOMEN ANGIOGRAM W AND/OR WO IV CONTRAST  10/3/2022    CT ABDOMEN ANGIOGRAM W AND/OR WO IV CONTRAST 10/3/2022 [facility]    OTHER SURGICAL HISTORY  10/12/2021    Renal angioplasty and stenting    OTHER SURGICAL HISTORY  10/12/2021    Cataract surgery    OTHER SURGICAL HISTORY  12/21/2021    Hip replacement    OTHER SURGICAL HISTORY  01/04/2022    Complete colonoscopy    OTHER SURGICAL HISTORY  12/21/2021    Back surgery    OTHER SURGICAL HISTORY  12/21/2021    Abdominal aortic aneurysm repair    OTHER SURGICAL HISTORY  12/21/2021    Hip replacement    OTHER SURGICAL HISTORY  12/21/2021    Surgery          Review of systems  Constitutional: No weight loss, fever, chills, weakness.  Positive for increasing fatigue  HEENT: No visual loss, blurred vision, double vision or yellow sclerae  Skin: No rash or itching  Cardiovascular: No chest pain, pressure or discomfort, positive for palpitations.  Denies edema.  Respiratory: Positive for exertional shortness of breath, denies cough or sputum  Gastrointestinal: No nausea, vomiting or diarrhea. No bloody or dark tarry stools.  Neurological: No headache, lightheadedness, dizziness, syncope.   Musculoskeletal: No muscle, back pain, joint pain or stiffness.  Hematologic: No anemia, bleeding or bruising.    /72 (BP Location: Left arm, Patient Position: Sitting)   Pulse 63   Ht 1.676 m (5' 6\")   Wt 102 kg (225 lb)   BMI 36.32 kg/m²     Patient Active Problem List   Diagnosis    Abdominal aortic aneurysm (CMS/HCC)    AICD (automatic cardioverter/defibrillator) present    Allergic rhinitis, seasonal    CAD (coronary artery disease)    Centrilobular emphysema (CMS/HCC)    Essential hypertension    Hyperuricemia    Infarction of " right basal ganglia (CMS/HCC)    Ischemic cardiomyopathy    Mixed hyperlipidemia    NSVT (nonsustained ventricular tachycardia) (CMS/HCC)    Paroxysmal ventricular tachycardia (CMS/HCC)    Persistent atrial fibrillation (CMS/HCC)    Renal artery stenosis (CMS/HCC)    Rosacea    Shortness of breath    Tinea cruris    Anticoagulant long-term use    Chronic diastolic congestive heart failure, NYHA class 2 (CMS/HCC)    High risk medication use    Tremor    Prostate cancer (CMS/[facility])    Blepharitis of both eyes    Epiretinal membrane (ERM) of right eye    Drusen (degenerative) of retina    Dermatochalasis of right upper eyelid    Dermatochalasis of left upper eyelid    Degenerative retinal drusen of both eyes    Choroidal nevus, right eye    Anemia    Lumbosacral spondylosis without myelopathy    Hypokalemia    Floaters    Spinal stenosis, lumbar region with neurogenic claudication    Pseudophakia of both eyes    Pinguecula    Obesity    Chest pain    Vitreomacular traction syndrome of right eye    COPD exacerbation (CMS/[facility])    Diastolic heart failure, NYHA class 1 (CMS/[facility])    MGD (meibomian gland dysfunction)    Class 2 obesity with body mass index (BMI) of 35 to 39.9 without comorbidity    Cutaneous candidiasis    CVA (cerebral vascular accident) (CMS/[facility])    Primary osteoarthritis of right knee       Physical Exam  Constitutional: Well developed, awake/alert x 3, no distress.  Head/Neck: No JVD, No bruits  Respiratory/Thorax: patent airways, CTAB, normal breath sounds with good expansion.  Cardiovascular: Regular rate and rhythm, no murmurs, normal S1 and S2,   Gastrointestinal: Non distended, soft, non-tender, no rebound tenderness or guarding.  Extremities: No cyanosis, edema.   Neurological: Alert and oriented x 3. Moves extremities spontaneous with purpose.  Psychological: Appropriate mood and behavior  Skin: Warm and Dry. No lesions or rashes.         Please excuse any errors in grammar or translation related to  dictation, voice recognition software was used to prepare this document.

## 2024-01-03 ENCOUNTER — APPOINTMENT (OUTPATIENT)
Dept: CARDIOLOGY | Facility: CLINIC | Age: 84
End: 2024-01-03
Payer: MEDICARE

## 2024-01-19 ENCOUNTER — TELEPHONE (OUTPATIENT)
Dept: CARDIOLOGY | Facility: CLINIC | Age: 84
End: 2024-01-19
Payer: MEDICARE

## 2024-01-19 NOTE — TELEPHONE ENCOUNTER
WASHINGOTN:[id number]  PA SENT- WAITING APPROVAL.    Advised patient of message and verbalized understanding. CPalmerMA     Your request has been approved  PA Case: [id number], Status: Approved, Coverage Starts on: 1/1/2024 12:00:00 AM, Coverage Ends on: 12/31/2024.

## 2024-01-30 ENCOUNTER — APPOINTMENT (OUTPATIENT)
Dept: RADIOLOGY | Facility: CLINIC | Age: 84
End: 2024-01-30
Payer: MEDICARE

## 2024-01-30 ENCOUNTER — APPOINTMENT (OUTPATIENT)
Dept: CARDIOLOGY | Facility: CLINIC | Age: 84
End: 2024-01-30
Payer: MEDICARE

## 2024-01-31 ENCOUNTER — HOSPITAL ENCOUNTER (OUTPATIENT)
Dept: RADIOLOGY | Facility: CLINIC | Age: 84
Discharge: HOME | End: 2024-01-31
Payer: MEDICARE

## 2024-01-31 ENCOUNTER — CLINICAL SUPPORT (OUTPATIENT)
Dept: CARDIOLOGY | Facility: CLINIC | Age: 84
End: 2024-01-31
Payer: MEDICARE

## 2024-01-31 ENCOUNTER — ANCILLARY PROCEDURE (OUTPATIENT)
Dept: CARDIOLOGY | Facility: CLINIC | Age: 84
End: 2024-01-31
Payer: MEDICARE

## 2024-01-31 DIAGNOSIS — R55 SYNCOPE AND COLLAPSE: ICD-10-CM

## 2024-01-31 DIAGNOSIS — Z79.01 ANTICOAGULANT LONG-TERM USE: ICD-10-CM

## 2024-01-31 DIAGNOSIS — I25.5 ISCHEMIC CARDIOMYOPATHY: ICD-10-CM

## 2024-01-31 DIAGNOSIS — I50.30 DIASTOLIC HEART FAILURE, NYHA CLASS 1 (MULTI): ICD-10-CM

## 2024-01-31 DIAGNOSIS — I48.19 PERSISTENT ATRIAL FIBRILLATION (MULTI): ICD-10-CM

## 2024-01-31 DIAGNOSIS — I47.29 NSVT (NONSUSTAINED VENTRICULAR TACHYCARDIA) (MULTI): ICD-10-CM

## 2024-01-31 DIAGNOSIS — I10 ESSENTIAL HYPERTENSION: ICD-10-CM

## 2024-01-31 DIAGNOSIS — I50.32 CHRONIC DIASTOLIC (CONGESTIVE) HEART FAILURE (MULTI): ICD-10-CM

## 2024-01-31 DIAGNOSIS — I48.11 LONGSTANDING PERSISTENT ATRIAL FIBRILLATION (MULTI): ICD-10-CM

## 2024-01-31 PROCEDURE — 3430000001 HC RX 343 DIAGNOSTIC RADIOPHARMACEUTICALS: Performed by: NURSE PRACTITIONER

## 2024-01-31 PROCEDURE — 93306 TTE W/DOPPLER COMPLETE: CPT

## 2024-01-31 PROCEDURE — 93306 TTE W/DOPPLER COMPLETE: CPT | Performed by: INTERNAL MEDICINE

## 2024-01-31 PROCEDURE — 2500000004 HC RX 250 GENERAL PHARMACY W/ HCPCS (ALT 636 FOR OP/ED): Performed by: NURSE PRACTITIONER

## 2024-01-31 PROCEDURE — 93017 CV STRESS TEST TRACING ONLY: CPT

## 2024-01-31 PROCEDURE — 2500000004 HC RX 250 GENERAL PHARMACY W/ HCPCS (ALT 636 FOR OP/ED): Performed by: INTERNAL MEDICINE

## 2024-01-31 PROCEDURE — A9502 TC99M TETROFOSMIN: HCPCS | Performed by: NURSE PRACTITIONER

## 2024-01-31 PROCEDURE — 78452 HT MUSCLE IMAGE SPECT MULT: CPT

## 2024-01-31 RX ORDER — REGADENOSON 0.08 MG/ML
0.4 INJECTION, SOLUTION INTRAVENOUS ONCE
Status: COMPLETED | OUTPATIENT
Start: 2024-01-31 | End: 2024-01-31

## 2024-01-31 RX ADMIN — TETROFOSMIN 11.1 MILLICURIE: 0.23 INJECTION, POWDER, LYOPHILIZED, FOR SOLUTION INTRAVENOUS at 07:26

## 2024-01-31 RX ADMIN — TETROFOSMIN 35.8 MILLICURIE: 0.23 INJECTION, POWDER, LYOPHILIZED, FOR SOLUTION INTRAVENOUS at 08:42

## 2024-01-31 RX ADMIN — HUMAN ALBUMIN MICROSPHERES AND PERFLUTREN 0.5 ML: 10; .22 INJECTION, SOLUTION INTRAVENOUS at 10:43

## 2024-01-31 RX ADMIN — REGADENOSON 0.4 MG: 0.08 INJECTION, SOLUTION INTRAVENOUS at 08:35

## 2024-02-01 ENCOUNTER — OFFICE VISIT (OUTPATIENT)
Dept: CARDIOLOGY | Facility: CLINIC | Age: 84
End: 2024-02-01
Payer: MEDICARE

## 2024-02-01 ENCOUNTER — APPOINTMENT (OUTPATIENT)
Dept: CARDIOLOGY | Facility: CLINIC | Age: 84
End: 2024-02-01
Payer: MEDICARE

## 2024-02-01 VITALS
BODY MASS INDEX: 36.32 KG/M2 | DIASTOLIC BLOOD PRESSURE: 63 MMHG | WEIGHT: 225 LBS | SYSTOLIC BLOOD PRESSURE: 136 MMHG | HEART RATE: 59 BPM

## 2024-02-01 DIAGNOSIS — I25.10 CORONARY ARTERY DISEASE INVOLVING NATIVE CORONARY ARTERY OF NATIVE HEART WITHOUT ANGINA PECTORIS: ICD-10-CM

## 2024-02-01 DIAGNOSIS — I27.20 PULMONARY HYPERTENSION (MULTI): ICD-10-CM

## 2024-02-01 DIAGNOSIS — I48.19 PERSISTENT ATRIAL FIBRILLATION (MULTI): ICD-10-CM

## 2024-02-01 DIAGNOSIS — I50.30 DIASTOLIC CONGESTIVE HEART FAILURE, NYHA CLASS 2, UNSPECIFIED CONGESTIVE HEART FAILURE CHRONICITY (MULTI): Primary | ICD-10-CM

## 2024-02-01 PROBLEM — Z78.9 NEVER SMOKED CIGARETTES: Status: ACTIVE | Noted: 2024-02-01

## 2024-02-01 LAB
AORTIC VALVE MEAN GRADIENT: 3 MMHG
AORTIC VALVE PEAK VELOCITY: 1.05 M/S
AV PEAK GRADIENT: 4.4 MMHG
AVA (PEAK VEL): 4.15 CM2
AVA (VTI): 4.01 CM2
EJECTION FRACTION APICAL 4 CHAMBER: 37.6
LEFT VENTRICLE INTERNAL DIMENSION DIASTOLE: 5.04 CM (ref 3.5–6)
LEFT VENTRICULAR OUTFLOW TRACT DIAMETER: 2.5 CM
MITRAL VALVE E/A RATIO: 0.82
MITRAL VALVE E/E' RATIO: 6.5
RIGHT VENTRICLE PEAK SYSTOLIC PRESSURE: 50.1 MMHG

## 2024-02-01 PROCEDURE — 1159F MED LIST DOCD IN RCRD: CPT | Performed by: INTERNAL MEDICINE

## 2024-02-01 PROCEDURE — 3078F DIAST BP <80 MM HG: CPT | Performed by: INTERNAL MEDICINE

## 2024-02-01 PROCEDURE — 3075F SYST BP GE 130 - 139MM HG: CPT | Performed by: INTERNAL MEDICINE

## 2024-02-01 PROCEDURE — 1160F RVW MEDS BY RX/DR IN RCRD: CPT | Performed by: INTERNAL MEDICINE

## 2024-02-01 PROCEDURE — 1036F TOBACCO NON-USER: CPT | Performed by: INTERNAL MEDICINE

## 2024-02-01 PROCEDURE — 99214 OFFICE O/P EST MOD 30 MIN: CPT | Performed by: INTERNAL MEDICINE

## 2024-02-01 PROCEDURE — 93000 ELECTROCARDIOGRAM COMPLETE: CPT | Performed by: INTERNAL MEDICINE

## 2024-02-01 PROCEDURE — 1157F ADVNC CARE PLAN IN RCRD: CPT | Performed by: INTERNAL MEDICINE

## 2024-02-01 RX ORDER — ISOSORBIDE MONONITRATE 30 MG/1
30 TABLET, EXTENDED RELEASE ORAL DAILY
Qty: 90 TABLET | Refills: 0 | Status: SHIPPED | OUTPATIENT
Start: 2024-02-01 | End: 2024-04-24 | Stop reason: SDUPTHER

## 2024-02-01 NOTE — PROGRESS NOTES
Patient:  Guicho Jones  YOB: 1940  MRN: [id number]       Impression/Plan:     Diagnoses and all orders for this visit:  Diastolic congestive heart failure, NYHA class 2, unspecified congestive heart failure chronicity (CMS/HCC)  Pulmonary hypertension (CMS/HCC)         -      Cannot tolerate Jardiance.  Entresto would be extremely expensive for him.          -      Systolic function is normal he has a component of diastolic dysfunction but I think his pulm hypertension is not only diastolic dysfunction but probably worsening of his emphysema.  I have encouraged him to follow-up with his primary pulmonologist to her perhaps to see if further med optimization could be pursued.  Will add Jardiance to see if reduction of pulmonary venous pressures could be of  benefit      Persistent atrial fibrillation (CMS/HCC)  -     Low burden continue anticoagulation no bleeding  -     QTc is just below 500 on manual measurement.  Computer suggested 515 but I do not believe that accurate.  He has had no ventricular ectopy on his device checks.  He has appoint with Dr. Bassett in 2 weeks.      Coronary artery disease involving native coronary artery of native heart without angina pectoris  -     No angina perfusion study without evidence to suggest progressive disease      Chief Complaint/Active Symptoms:       Guicho Jones is a 83 y.o. male who presents with ischemic cardiomyopathy, echo 10/21 50% EF. Cath 6/15/2022 LM-30%. LAD-diffuse disease 40% distal. CX/RCA calcified diffuse disease. LVEDP 15. He has AICD secondary to prior severe decrease in overall systolic function. Also with persistent atrial fibrillation. He has a 5.6 cm complex dumbbell abdominal aortic aneurysm that has been followed by vascular surgery.  As of 2022 patient preferred no intervention.  Was to be intervened upon previously but he then developed prostate cancer and intervention postponed. Finally, he has centrilobular emphysema-severe.       I had seen him in the office 8/23/2023.  Overall he was feeling well at that time could not tolerate Jardiance secondary to groin infection.  He was having a bit more A-fib burden on his device checks and QTc had slightly increased on sotalol hence increasing that dose was not practical and he was referred on to EP.  An appointment was evidently not made for EP but he did follow-up with nurse practitioner Caty Martínez 1/2/2024    That time he described that he had been feeling gradually worse over the prehire 2 months.  Or fatigued with minimal activity.  He did notice episode of atrial fibrillation in late December that lasted almost 3 days.  December 31 had an episode of syncope.  ECG in the office today shows sinus rhythm QTc 515 ms.  In light of the symptoms it is recommended he have a repeat echo nuclear perfusion study.  Was to be considered for follow-up with Dr. Bassett as to potential ablation of the AV node.  Vital signs were stable in the office at that time    1/31/2024 perfusion study: Abnormal no evidence of ischemia prior inferolateral infarct LVEF 50%.  No change from previously    Echo from yesterday with 50% ejection fraction inferolateral hypokinesis trivial AI trivial to 1+ MR RVSP 55 mm.  Echo 2021 RVSP 43 mmHg    Lab work December 2023 normal uric acid normal PSA cholesterol 96 HDL 38 LDL 42 BMP normal except total protein slightly decreased 6.3    ECG 1/2/2024 normal sinus rhythm  ms QTc 492 ms.    He still is getting more out of breath more easily.  In addition to his cardiomyopathy he does have emphysema.  As can be seen echocardiogram demonstrated preserved LV systolic function and only mild pulmonary hypertension.  He has had no dizziness lightheadedness or syncope.               Review of Systems: Unremarkable except as noted above    Meds     Current Outpatient Medications   Medication Instructions    albuterol (Proventil HFA) 90 mcg/actuation inhaler 1 puff, inhalation, Every 4  hours PRN    amoxicillin (Amoxil) 500 mg capsule 4 capsules, oral, See admin instructions, TAKE 1 HOUR BEFORE APPOINTMENT     aspirin 81 mg, oral, See admin instructions, TAKE 1 TABLET MONDAY THROUGH FRIDAY AT BEDTIME     atorvastatin (Lipitor) 40 mg tablet 1 tablet, oral, Nightly    azelastine (Astelin) 137 mcg (0.1 %) nasal spray 2 sprays, Each Nostril, 2 times daily    docosahexaenoic acid/epa (FISH OIL ORAL) oral    ezetimibe (ZETIA) 10 mg, oral, Daily    fluticasone (Flonase) 50 mcg/actuation nasal spray 2 sprays, Each Nostril, Daily    fluticasone-umeclidin-vilanter (TRELEGY-ELLIPTA) 100-62.5-25 mcg blister with device 1 puff, inhalation, Daily    gentamicin (Garamycin) 0.1 % cream Topical, 2 times daily, APPLY SPARINGLY TO AFFECTED AREA    glucosamine HCl 1,500 mg tablet 1 tablet, oral, Daily    hydroCHLOROthiazide (HYDRODiuril) 25 mg tablet 1 tablet, oral, Daily    isosorbide mononitrate ER (IMDUR) 30 mg, oral, Daily, Do not crush or chew.    lisinopril 40 mg tablet 1 tablet, oral, Daily    magnesium oxide (MagOx) 400 mg (241.3 mg magnesium) tablet 1 tablet, oral, Daily    metoprolol succinate XL (TOPROL-XL) 150 mg, oral, Daily    metoprolol tartrate (Lopressor) 25 mg tablet Take 1 tablet daily for palpitations only    nitroglycerin (NITROSTAT) 0.4 mg, sublingual, Every 5 min PRN    rivaroxaban (XARELTO) 20 mg, oral, Daily with evening meal, Take with food.    sotalol (Betapace) 120 mg tablet 1 tablet, oral, 2 times daily        Allergies     Allergies   Allergen Reactions    Levofloxacin Palpitations     Rapid Heart Rate - Severe per pt.    Amiodarone Other     SECONDARY TO NEURO       Jardiance [Empagliflozin] Rash         Annotated Problems     Specialty Problems          Cardiology Problems    CAD (coronary artery disease)     1/31/2024 perfusion study: Abnormal no evidence of ischemia prior inferolateral infarct LVEF 50%.  No change from previously    Echo from yesterday with 50% ejection fraction  inferolateral hypokinesis trivial AI trivial to 1+ MR RVSP 55 mm.  Echo 2021 RVSP 43 mmHg   · Cath 6/15/2022 LM-30%. LAD-diffuse disease 40% distal. CX/RCA calcified diffuse      disease. LVEDP 15   3/19/2020. Lexiscan. Posterior lateral MI with mild jimmy-infarction ischemia. LVEF 42%.       Echocardiogram demonstrates 60% ejection fraction       2009. LVEF normal. LAD/RCA stents patent. Occluded distal circumflex. LM aneurysmal.       2008. BRONSON-proximal and mid LAD. BRONSON RCA.       1994 acute inferior infarct         Chest pain    Abdominal aortic aneurysm (CMS/HCC)     Follows with Dr. Ross and as of 10/2022 though felt to be a candidate for an off label fenestrated approach, patient preferred no intervention   · November 2021 vascular follow-up with Dr. Finney. Aorta at 5.6 cm. Intervention      postponed secondary to prostate cancer treatments   January 2021 dumbbell shaped abdominal aortic aneurysm maximal diameter 5 cm      involving origin of renal arteries         Chronic diastolic congestive heart failure, NYHA class 2 (CMS/HCC)     Echo from yesterday with 50% ejection fraction inferolateral hypokinesis trivial AI trivial to 1+ MR RVSP 55 mm.  Echo 2021 RVSP 43 mmHg         Essential hypertension    Ischemic cardiomyopathy     January 2024 LVEF 50% RVSP 55   · October 2021 echo LVEF 55%.  Biatrial enlargement.  No significant valvular disease      March 2020 echo LVEF 60%.  Diastolic dysfunction      2002 50%      1996 25% after acute inferior infarct         Mixed hyperlipidemia    NSVT (nonsustained ventricular tachycardia) (CMS/HCC)    Paroxysmal ventricular tachycardia (CMS/HCC)    Persistent atrial fibrillation (CMS/HCC)    Renal artery stenosis (CMS/HCC)    CVA (cerebral vascular accident) (CMS/HCC)    Diastolic heart failure, NYHA class 1 (CMS/HCC)    Pulmonary hypertension (CMS/HCC)        Problem List     Patient Active Problem List    Diagnosis Date Noted    Pulmonary hypertension (CMS/HCC)  02/02/2024    Never smoked cigarettes 02/01/2024    Primary osteoarthritis of right knee 11/13/2023    Diastolic heart failure, NYHA class 1 (CMS/[facility]) 10/09/2023    BMI 36.0-36.9,adult 10/09/2023    Cutaneous candidiasis 10/09/2023    CVA (cerebral vascular accident) (CMS/[facility]) 10/09/2023    Abdominal aortic aneurysm (CMS/[facility]) 09/29/2023    AICD (automatic cardioverter/defibrillator) present 09/29/2023    Allergic rhinitis, seasonal 09/29/2023    Centrilobular emphysema (CMS/[facility]) 09/29/2023    Essential hypertension 09/29/2023    Hyperuricemia 09/29/2023    Infarction of right basal ganglia (CMS/[facility]) 09/29/2023    Ischemic cardiomyopathy 09/29/2023    Mixed hyperlipidemia 09/29/2023    NSVT (nonsustained ventricular tachycardia) (CMS/[facility]) 09/29/2023    Paroxysmal ventricular tachycardia (CMS/[facility]) 09/29/2023    Persistent atrial fibrillation (CMS/[facility]) 09/29/2023    Renal artery stenosis (CMS/[facility]) 09/29/2023    Rosacea 09/29/2023    Shortness of breath 09/29/2023    Tinea cruris 09/29/2023    Anticoagulant long-term use 09/29/2023    Chronic diastolic congestive heart failure, NYHA class 2 (CMS/[facility]) 09/29/2023    High risk medication use 09/29/2023    Tremor 09/29/2023    Prostate cancer (CMS/[facility]) 09/29/2023    Spinal stenosis, lumbar region with neurogenic claudication 07/17/2018    Chest pain 10/30/2016    CAD (coronary artery disease) 10/19/2016    Anemia 10/19/2016    Hypokalemia 10/19/2016    Obesity 10/19/2016    COPD exacerbation (CMS/[facility]) 10/19/2016    Epiretinal membrane (ERM) of right eye 03/15/2016    Pseudophakia of both eyes 01/14/2016    Vitreomacular traction syndrome of right eye 01/14/2016    Dermatochalasis of right upper eyelid 08/17/2015    Dermatochalasis of left upper eyelid 08/17/2015    Degenerative retinal drusen of both eyes 08/17/2015    Floaters 08/17/2015    Lumbosacral spondylosis without myelopathy 07/23/2015    Blepharitis of both eyes 01/28/2015    Drusen (degenerative) of retina  01/28/2015    Choroidal nevus, right eye 01/28/2015    Pinguecula 01/28/2015    MGD (meibomian gland dysfunction) 01/28/2015       Objective:     Vitals:    02/01/24 1235   BP: 136/63   BP Location: Left arm   Patient Position: Sitting   Pulse: 59   Weight: 102 kg (225 lb)      Wt Readings from Last 4 Encounters:   02/01/24 102 kg (225 lb)   01/02/24 102 kg (225 lb)   12/06/23 102 kg (223 lb 12.8 oz)   10/11/23 99.6 kg (219 lb 8 oz)           LAB:     Lab Results   Component Value Date    WBC 8.1 03/01/2023    HGB 13.3 (L) 03/01/2023    HCT 42.4 03/01/2023     03/01/2023    CHOL 96 12/05/2023    TRIG 79 12/05/2023    HDL 38.1 12/05/2023    ALT 27 12/05/2023    AST 24 12/05/2023     12/05/2023    K 4.0 12/05/2023     12/05/2023    CREATININE 0.91 12/05/2023    BUN 19 12/05/2023    CO2 30 12/05/2023       Diagnostic Studies:     Nuclear Stress Test    Result Date: 1/31/2024  Interpreted By:  Bro Gamble and Beal Gina STUDY: MYOCARDIAL PERFUSION STRESS TEST WITH LEXISCAN   Performing facility: [facility], [address] [place] [address]   [facility] Provider: ABDELRAHMAN RUIZ CNP PCP:  Dr. ISAURA BECKER Supervising provider:  Hugh Degroot MD, [facility]   INDICATION: CAD;  AFib  NSVT, ICM, HTN, Syncope   HISTORY: Gender:  M; Age:  82 y/o ; Height:  .6 cm cm; Weight:   .059 kg kg.   CAD;  High Cholesterol;  Family HX CAD;  HTN;  Arrhythmias; AFib, NSVT, ICD,  fatigue;  CVA, BREANNE, PROSTATE CA  AAA 5.6 cm   Quit smoking 15 years ago.   PTCA.   COMPARISON: Previous nuclear testing completed 2021 at [facility].     ACCESSION NUMBER(S): [id number]   ORDERING CLINICIAN: DANIEL SARA   TECHNIQUE: ONE DAY protocol. Stress injection: Date:1/31/24, 35.8 mCi of Myoview IV 20 seconds after rapid injection of Lexiscan. Rest injection: Date: 1/31/24, 11.1 mCi of Myoview IV at rest. The patient had a rapid injection of 0.4 mg of Lexiscan IV over 10 seconds. Imaging was performed  by gated tomographic technique. Reason for Lexiscan: UNSTEADY GAIT   STRESS TEST DATA: Resting heart rate was 62 BPM. Resting blood pressure was 124/84 mmHg. Peak blood pressure was 140/80 mmHg. Peak heart rate was 66 BPM.   TEST TERMINATED DUE TO:  Protocol completed   FINDINGS: STRESS TEST RESULTS:   Resting electrocardiogram revealed sinus rhythm, non-specific STT wave changes. There were no significant ischemic ECG changes or dysrhythmias. The patient did not have chest pains/symptoms during procedure. There was a normal recovery phase.   IMAGING RESULTS:   Image quality was good. Rest and stress tomographic images were reviewed and revealed abnormal perfusion with moderate-sized predominantly fixed inferior posterolateral defect No evidence of ischemia  or left ventricular dilatation with stress. Prior myocardial infarction noted.. Overall left ventricular systolic function appeared to be abnormal with moderate inferolateral hypokinesia. Ejection fraction was 50%. TID is 0.98 and is normal. There was no evidence of significant attenuation artifact.       Abnormal Lexiscan Myoview cardiac perfusion stress test. No evidence of ischemia by perfusion imaging. Moderate-sized fixed inferior posterolateral defect consistent with prior myocardial infarction. Abnormal left ventricular systolic function with moderate inferolateral hypokinesia. Ejection fraction 50%. No significant change in comparison to previous study of 10/2021. Clinical correlation is advised..   Signed by: Bro Gamble 1/31/2024 6:47 PM Dictation workstation:   [id number]        Radiology:     No orders to display       Physical Exam     General Appearance: alert and oriented to person, place and time, in no acute distress  Cardiovascular: normal rate, regular rhythm, normal S1 and S2, no murmurs, rubs, clicks, or gallops,  no JVD  Pulmonary/Chest: clear to auscultation bilaterally- no wheezes, rales or rhonchi, normal air movement, no respiratory  distress  Abdomen: soft, non-tender, non-distended, normal bowel sounds, no masses   Extremities: no cyanosis, clubbing trace edema  Skin: warm and dry, no rash or erythema  Eyes: EOMI  Neck: supple and non-tender without mass, no thyromegaly   Neurological: alert, oriented, normal speech, no focal findings or movement disorder noted

## 2024-02-01 NOTE — PATIENT INSTRUCTIONS
Call insurance and ask if a medication called Entresto 24/26 mg 1 tablet twice daily is in your FORMULARY and ask the COST for a 90 day supply.      Keep appointment with Dr. Bassett 2/19/24    Start Isosorbide 30 mg daily. This medication may cause a headache.     Please bring all medicines, vitamins, and herbal supplements with you in original bottles to every appointment!!!!    Prescriptions will not be filled unless you are compliant with your follow up appointments or have a follow up appointment scheduled as per instruction of your physician. Refills should be requested at the time of your visit.

## 2024-02-02 PROBLEM — I27.20 PULMONARY HYPERTENSION (MULTI): Status: ACTIVE | Noted: 2024-02-02

## 2024-02-14 RX ORDER — FLUTICASONE FUROATE, UMECLIDINIUM BROMIDE AND VILANTEROL TRIFENATATE 100; 62.5; 25 UG/1; UG/1; UG/1
1 POWDER RESPIRATORY (INHALATION) DAILY
Qty: 2 EACH | Refills: 0 | Status: SHIPPED | COMMUNITY
Start: 2024-02-14

## 2024-02-19 ENCOUNTER — OFFICE VISIT (OUTPATIENT)
Dept: CARDIOLOGY | Facility: CLINIC | Age: 84
End: 2024-02-19
Payer: MEDICARE

## 2024-02-19 VITALS
BODY MASS INDEX: 36.96 KG/M2 | HEART RATE: 60 BPM | DIASTOLIC BLOOD PRESSURE: 76 MMHG | WEIGHT: 229 LBS | SYSTOLIC BLOOD PRESSURE: 124 MMHG

## 2024-02-19 DIAGNOSIS — Z78.9 NEVER SMOKED CIGARETTES: ICD-10-CM

## 2024-02-19 DIAGNOSIS — I25.10 CORONARY ARTERY DISEASE INVOLVING NATIVE HEART, UNSPECIFIED VESSEL OR LESION TYPE, UNSPECIFIED WHETHER ANGINA PRESENT: Primary | ICD-10-CM

## 2024-02-19 DIAGNOSIS — Z79.899 HIGH RISK MEDICATION USE: ICD-10-CM

## 2024-02-19 DIAGNOSIS — Z95.810 AICD (AUTOMATIC CARDIOVERTER/DEFIBRILLATOR) PRESENT: ICD-10-CM

## 2024-02-19 DIAGNOSIS — I48.19 PERSISTENT ATRIAL FIBRILLATION (MULTI): ICD-10-CM

## 2024-02-19 PROCEDURE — 1160F RVW MEDS BY RX/DR IN RCRD: CPT | Performed by: INTERNAL MEDICINE

## 2024-02-19 PROCEDURE — 1157F ADVNC CARE PLAN IN RCRD: CPT | Performed by: INTERNAL MEDICINE

## 2024-02-19 PROCEDURE — 93000 ELECTROCARDIOGRAM COMPLETE: CPT | Performed by: INTERNAL MEDICINE

## 2024-02-19 PROCEDURE — 99215 OFFICE O/P EST HI 40 MIN: CPT | Performed by: INTERNAL MEDICINE

## 2024-02-19 PROCEDURE — 3078F DIAST BP <80 MM HG: CPT | Performed by: INTERNAL MEDICINE

## 2024-02-19 PROCEDURE — 1036F TOBACCO NON-USER: CPT | Performed by: INTERNAL MEDICINE

## 2024-02-19 PROCEDURE — 1159F MED LIST DOCD IN RCRD: CPT | Performed by: INTERNAL MEDICINE

## 2024-02-19 PROCEDURE — 3074F SYST BP LT 130 MM HG: CPT | Performed by: INTERNAL MEDICINE

## 2024-02-19 RX ORDER — MONTELUKAST SODIUM 10 MG/1
10 TABLET ORAL NIGHTLY
COMMUNITY
Start: 2024-01-13 | End: 2024-04-04

## 2024-02-19 NOTE — PROGRESS NOTES
CARDIOLOGY OFFICE VISIT      CHIEF COMPLAINT  Chief Complaint   Patient presents with    Follow-up       HISTORY OF PRESENT ILLNESS  HPI    84-year-old male  with a past medical history of coronary artery disease with normal  left ventricular function on echocardiogram in March 2008, congestive  heart failure New York Heart Association class 2, remote ventricular  fibrillation RVR status post single-chamber ICD implanted in 1994  with multiple generator change outs and also with an abdominal  implant. His device was upgraded to a dual-chamber ICD in the  prepectoral side due to evidence of atrial fibrillation. He was  placed on sotalol.     His echocardiogram performed in March 2020 showed normal left ventricular function value 55 to 60% with 1+ mitral regurgitation. Stress test at the same time, show a small area of jimmy-infarct ischemia in the posterior lateral area and also a small posterolateral myocardial infarction with a left ventricular ejection fraction 42%.     Looking at his records, patient was admitted in December 2021 for COPD exacerbation and apparently pneumonia. Since then patient has been using oxygen therapy at home. Covidâ€“19 was negative.     Patient had an a stress test in February 2021 that shows moderate inferior posterior lateral perfusion defect consistent with myocardial infarction with no evidence of ischemia with a left ventricular ejection fraction 45%.     February 2022 device interrogation, patient had an episode of atrial fibrillation that lasted approximately 15 hours. In the middle of these episodes, patient had episode of ventricular tachycardia (polymorphic) that triggered ICD charging but no ICD shock was delivered. Episode was aborted.     Patient had a cardiac catheterization in May 2022 that shows left main 30%, LAD diffusely diseased. Distal LAD 40%. Circumflex diffusely diseased with right coronary artery calcified. Medical therapy was recommended.    Patient states that  he continues feeling tired and fatigued doing his daily activities.  Sometimes he is out of breath going from home to his mailbox.    He recalls having an episode of dizziness and syncope while walking to the bathroom in December 31, 2023 around 10 PM.  Device interrogation showed that the patient had an episode of atrial fibrillation with episodes of atrial fibrillation right ventricular response or change intraventricular tachycardia rate of 288 bpm.  This episode was self terminated before device deliver an ICD shock.  Second episode was in January 2024 with an episode of atrial fibrillation that ended with ventricular tachycardia self terminated into atrial fibrillation again.    Patient still using sotalol therapy.    EKG performed today shows atrial paced rhythm ventricular sensed rhythm at a rate of 60 bpm QRS duration 100 ms QT corrected 522 ms.  Rhythm strip shows the same pattern.        Past Medical History  Past Medical History:   Diagnosis Date    Encounter for follow-up examination after completed treatment for conditions other than malignant neoplasm 12/27/2021    Hospital discharge follow-up    Ischemic cardiomyopathy     Ischemic cardiomyopathy with implantable cardioverter-defibrillator (ICD)    Pain in unspecified hip     Joint pain, hip    Personal history of other diseases of the musculoskeletal system and connective tissue     History of low back pain    Personal history of other endocrine, nutritional and metabolic disease     History of hyperlipidemia    Personal history of other specified conditions 12/21/2021    History of elevated prostate specific antigen (PSA)    Presence of coronary angioplasty implant and graft     Stented coronary artery    Unspecified atrial flutter (CMS/HCC)     Paroxysmal atrial flutter       Social History  Social History     Tobacco Use    Smoking status: Never    Smokeless tobacco: Never   Vaping Use    Vaping Use: Never used   Substance Use Topics    Alcohol  use: Yes    Drug use: Never       Family History     Family History   Problem Relation Name Age of Onset    Coronary artery disease Father          Allergies:  Allergies   Allergen Reactions    Levofloxacin Palpitations     Rapid Heart Rate - Severe per pt.    Amiodarone Other     SECONDARY TO NEURO       Jardiance [Empagliflozin] Rash        Outpatient Medications:  Current Outpatient Medications   Medication Instructions    albuterol (Proventil HFA) 90 mcg/actuation inhaler 1 puff, inhalation, Every 4 hours PRN    amoxicillin (Amoxil) 500 mg capsule 4 capsules, oral, See admin instructions, TAKE 1 HOUR BEFORE APPOINTMENT     aspirin 81 mg, oral, See admin instructions, TAKE 1 TABLET MONDAY THROUGH FRIDAY AT BEDTIME     atorvastatin (Lipitor) 40 mg tablet 1 tablet, oral, Nightly    azelastine (Astelin) 137 mcg (0.1 %) nasal spray 2 sprays, Each Nostril, 2 times daily    docosahexaenoic acid/epa (FISH OIL ORAL) oral    ezetimibe (ZETIA) 10 mg, oral, Daily    fluticasone (Flonase) 50 mcg/actuation nasal spray 2 sprays, Each Nostril, Daily    fluticasone-umeclidin-vilanter (TRELEGY-ELLIPTA) 100-62.5-25 mcg blister with device 1 puff, inhalation, Daily    gentamicin (Garamycin) 0.1 % cream Topical, 2 times daily, APPLY SPARINGLY TO AFFECTED AREA    glucosamine HCl 1,500 mg tablet 1 tablet, oral, Daily    hydroCHLOROthiazide (HYDRODiuril) 25 mg tablet 1 tablet, oral, Daily    isosorbide mononitrate ER (IMDUR) 30 mg, oral, Daily, Do not crush or chew.    lisinopril 40 mg tablet 1 tablet, oral, Daily    magnesium oxide (MagOx) 400 mg (241.3 mg magnesium) tablet 1 tablet, oral, Daily    metoprolol succinate XL (TOPROL-XL) 150 mg, oral, Daily    metoprolol tartrate (Lopressor) 25 mg tablet Take 1 tablet daily for palpitations only    montelukast (SINGULAIR) 10 mg, oral, Nightly    nitroglycerin (NITROSTAT) 0.4 mg, sublingual, Every 5 min PRN    rivaroxaban (XARELTO) 20 mg, oral, Daily with evening meal, Take with food.     sotalol (Betapace) 120 mg tablet 1 tablet, oral, 2 times daily          REVIEW OF SYSTEMS  Review of Systems   All other systems reviewed and are negative.        VITALS  Vitals:    02/19/24 0919   BP: 124/76   Pulse: 60       PHYSICAL EXAM  Constitutional:       Appearance: Healthy appearance. Not in distress.   Neck:      Vascular: No JVR. JVD normal.   Pulmonary:      Effort: Pulmonary effort is normal.      Breath sounds: Normal breath sounds. No wheezing. No rhonchi. No rales.   Chest:      Chest wall: Not tender to palpatation.   Cardiovascular:      PMI at left midclavicular line. Normal rate. Regular rhythm. Normal S1. Normal S2.       Murmurs: There is no murmur.      No gallop.  No click. No rub.      Comments: Device left pectoral area  Pulses:     Intact distal pulses.   Edema:     Peripheral edema absent.   Abdominal:      General: Bowel sounds are normal.      Palpations: Abdomen is soft.      Tenderness: There is no abdominal tenderness.   Musculoskeletal: Normal range of motion.         General: No tenderness. Skin:     General: Skin is warm and dry.   Neurological:      General: No focal deficit present.      Mental Status: Alert and oriented to person, place and time.         ASSESSMENT AND PLAN  CLINICAL IMPRESSION:   1. Status post cardiac arrest. Status post ICD therapy, device interrogation reviewed with patient during this evaluation  2. Ischemic cardiomyopathy. Stable  3. Congestive heart failure, New York Heart Association class II. Compensated  4. Persistent atrial fibrillation. Device interrogation reviewed during this evaluation with patient  5. High-risk medication. On sotalol therapy  6. Ventricular tachycardia. Recurrence of this arrhythmia by device interrogation by episode in February 2022. Plan discussed with patient during this office visit  7. Coronary artery disease with percutaneous coronary interventions  in the past. Recommended appointment with Dr. Penn for possible cardiac  catheterization  8. Hypertension. Controlled  9. Hyperlipidemia. Stable  10. Chronic obstructive pulmonary disease. No recurrence  11. Long-term anticoagulation therapy with Eliquis. No evidence of bleeding  12. Shortness of breath with recent hospitalization in December 2021 due to COPD exacerbationâ€“pneumonia, now with oxygen therapy followed by pulmonary service. Stable  13.  Syncope associated with ventricular arrhythmias.    Plan recommendations    I had a lengthy discussion with patient regarding findings of the device interrogation.  Patient still Atrial fibrillation episodes approximately 3 to 5% of the burden.  But also he had 2 episodes of ventricular tachycardia during episodes of atrial fibrillation.  QT interval already on the maximum level.  We will discontinue sotalol.    Patient will be presenting to the office in today for an EKG.  If EKG and today's with QT interval less than 500 ms, patient can start amiodarone 200 mg twice a day for 7 days and then 200 mg daily.    Patient will be follow my office very closely next 3 to 4 months for reevaluation of atrial and ventricular arrhythmias.    Patient states that he used amiodarone in the past and he was having significant amount of tremors.  If he cannot tolerate amiodarone therapy we can try ablation for atrial fibrillation.  He may still need antiarrhythmic therapy for possible ventricular arrhythmias.      Risk factor modification and lifestyle modification discussed with patient. Diet , exercise and hydration discussed with patient.    I have personally review with patient during this office visit, laboratory data, echocardiogram results, stress test results, Holter-event monitor results prior and after the last electrophysiology visit. All questions has been answered.    Please excuse any errors in grammar or translation related to this dictation.  Voice recognition software was utilized to prepare this document.         Scribe Attestation  By  signing my name below, I, Michelle Edmond CMA   , Scribe   attest that this documentation has been prepared under the direction and in the presence of Joce Bassett MD.

## 2024-02-19 NOTE — PATIENT INSTRUCTIONS
DISCONTINUE SOTALOL  STARTING 2/19/2024-NO NIGHT TIME DOSE, NO SOTALOL TUES 2/20/24 AND NO MORE GOING FORWARD. DO NOT REFILL SOTALOL. YOU NEED AN EKG ON [facility] 2/21/24 FOR DR JIANG TO DETERMINE WHAT NEW MEDICATION TO START AND WHEN TO START.

## 2024-02-20 ENCOUNTER — OFFICE VISIT (OUTPATIENT)
Dept: PULMONOLOGY | Age: 84
End: 2024-02-20
Payer: MEDICARE

## 2024-02-20 VITALS
RESPIRATION RATE: 16 BRPM | HEART RATE: 61 BPM | DIASTOLIC BLOOD PRESSURE: 80 MMHG | SYSTOLIC BLOOD PRESSURE: 138 MMHG | TEMPERATURE: 97.6 F | BODY MASS INDEX: 36.38 KG/M2 | OXYGEN SATURATION: 94 % | HEIGHT: 66 IN | WEIGHT: 226.4 LBS

## 2024-02-20 DIAGNOSIS — E66.09 CLASS 2 OBESITY DUE TO EXCESS CALORIES WITHOUT SERIOUS COMORBIDITY WITH BODY MASS INDEX (BMI) OF 36.0 TO 36.9 IN ADULT: ICD-10-CM

## 2024-02-20 DIAGNOSIS — R93.89 ABNORMAL CT OF THE CHEST: ICD-10-CM

## 2024-02-20 DIAGNOSIS — R06.02 SOB (SHORTNESS OF BREATH): Primary | ICD-10-CM

## 2024-02-20 DIAGNOSIS — J96.12 CHRONIC RESPIRATORY FAILURE WITH HYPOXIA AND HYPERCAPNIA (HCC): ICD-10-CM

## 2024-02-20 DIAGNOSIS — I51.89 DIASTOLIC DYSFUNCTION: ICD-10-CM

## 2024-02-20 DIAGNOSIS — I48.0 PAF (PAROXYSMAL ATRIAL FIBRILLATION) (HCC): ICD-10-CM

## 2024-02-20 DIAGNOSIS — J96.11 CHRONIC RESPIRATORY FAILURE WITH HYPOXIA AND HYPERCAPNIA (HCC): ICD-10-CM

## 2024-02-20 PROCEDURE — G8427 DOCREV CUR MEDS BY ELIG CLIN: HCPCS | Performed by: INTERNAL MEDICINE

## 2024-02-20 PROCEDURE — 3079F DIAST BP 80-89 MM HG: CPT | Performed by: INTERNAL MEDICINE

## 2024-02-20 PROCEDURE — 1123F ACP DISCUSS/DSCN MKR DOCD: CPT | Performed by: INTERNAL MEDICINE

## 2024-02-20 PROCEDURE — 1036F TOBACCO NON-USER: CPT | Performed by: INTERNAL MEDICINE

## 2024-02-20 PROCEDURE — 3075F SYST BP GE 130 - 139MM HG: CPT | Performed by: INTERNAL MEDICINE

## 2024-02-20 PROCEDURE — 99214 OFFICE O/P EST MOD 30 MIN: CPT | Performed by: INTERNAL MEDICINE

## 2024-02-20 PROCEDURE — G8484 FLU IMMUNIZE NO ADMIN: HCPCS | Performed by: INTERNAL MEDICINE

## 2024-02-20 PROCEDURE — G8417 CALC BMI ABV UP PARAM F/U: HCPCS | Performed by: INTERNAL MEDICINE

## 2024-02-20 RX ORDER — FLUTICASONE FUROATE, UMECLIDINIUM BROMIDE AND VILANTEROL TRIFENATATE 100; 62.5; 25 UG/1; UG/1; UG/1
1 POWDER RESPIRATORY (INHALATION) DAILY
Qty: 2 EACH | Refills: 0 | COMMUNITY
Start: 2024-02-20

## 2024-02-20 RX ORDER — ISOSORBIDE MONONITRATE 30 MG/1
30 TABLET, EXTENDED RELEASE ORAL DAILY
COMMUNITY
Start: 2024-02-01 | End: 2024-05-01

## 2024-02-20 NOTE — PROGRESS NOTES
Subjective:     Albert Eastman is a 84 y.o. male who complains today of:     Chief Complaint   Patient presents with    Follow-up     5 Month F/U for COPD and Chronic respiratory failure with hypoxia and hypercapnia        HPI  Patient presents for COPD      2/20/2024  Presents for follow-up, overall doing good, at his baseline, however continues to have dyspnea on exertion, limiting his daily activities, no lower extremity edema, no chest pain, he uses trilogy while asleep with 2 L O2 bleed, and compliant with treatment, he is on Trelegy and compliant, has history of A-fib and follows up with cardiology, history of abdominal aortic aneurysm and he does not wish to continue monitoring.  No chest pain, no nasal congestion or postnasal drip, no heartburn, his weight is stable.    9/6/2023  Doing good, has no complaint, no chest pain, no shortness of breath and no coughing.  He tolerates trilogy while asleep with 2 L O2 bleed, no nasal congestion or postnasal drip, no heartburn his weight is stable, no lower extremity edema.        3/6/2023  Doing good, symptoms controlled, no shortness of breath, no coughing, no chest pain he uses trilogy while asleep tolerating well, uses 2 L O2 bleed, no fever no chills, no nasal congestion or postnasal drip and no heartburn.  No lower extremity edema.  He is on Trelegy Ellipta, tolerating well and symptoms are controlled.  Vaccines are up-to-date.        Allergies:  Amiodarone, Jardiance [empagliflozin], and Levofloxacin  Past Medical History:   Diagnosis Date    AAA (abdominal aortic aneurysm) ([facility])     monitoring 4.5cm    Arthritis     lower spine    Atrial fibrillation (HCC)     CAD (coronary artery disease)     stents x 2 cardiac     Chronic back pain     Heart attack (HCC) 1984    Hyperlipidemia     on meds >20yrs    Hypertension     on meds > 20yrs    Knee injury     AS A CHILD-RIGHT     Past Surgical History:   Procedure Laterality Date    ABDOMINAL AORTIC ANEURYSM REPAIR,

## 2024-02-22 ENCOUNTER — LAB (OUTPATIENT)
Dept: LAB | Facility: LAB | Age: 84
End: 2024-02-22
Payer: MEDICARE

## 2024-02-22 ENCOUNTER — TELEPHONE (OUTPATIENT)
Dept: CARDIOLOGY | Facility: CLINIC | Age: 84
End: 2024-02-22

## 2024-02-22 ENCOUNTER — ANCILLARY PROCEDURE (OUTPATIENT)
Dept: CARDIOLOGY | Facility: CLINIC | Age: 84
End: 2024-02-22
Payer: MEDICARE

## 2024-02-22 VITALS — HEART RATE: 107 BPM

## 2024-02-22 DIAGNOSIS — I25.10 ATHEROSCLEROTIC HEART DISEASE OF NATIVE CORONARY ARTERY WITHOUT ANGINA PECTORIS: Primary | ICD-10-CM

## 2024-02-22 DIAGNOSIS — Z79.899 HIGH RISK MEDICATION USE: ICD-10-CM

## 2024-02-22 DIAGNOSIS — I48.19 PERSISTENT ATRIAL FIBRILLATION (MULTI): ICD-10-CM

## 2024-02-22 LAB
CHOLEST SERPL-MCNC: 106 MG/DL (ref 0–199)
CHOLESTEROL/HDL RATIO: 2.5
HDLC SERPL-MCNC: 42.1 MG/DL
LDLC SERPL CALC-MCNC: 43 MG/DL
NON HDL CHOLESTEROL: 64 MG/DL (ref 0–149)
TRIGL SERPL-MCNC: 107 MG/DL (ref 0–149)
VLDL: 21 MG/DL (ref 0–40)

## 2024-02-22 PROCEDURE — 80061 LIPID PANEL: CPT

## 2024-02-22 PROCEDURE — 36415 COLL VENOUS BLD VENIPUNCTURE: CPT

## 2024-02-22 NOTE — TELEPHONE ENCOUNTER
Patient in office for an EKG visit.  He states Dr Cardona counseled patient on need to lose weight and mentioned weight loss drugs, injections.  Patient request message be sent to Dr. Hugh Degroot, [email or web address] to see what he thinks of the weight loss drugs.  Michelle Edmond, [facility]

## 2024-02-22 NOTE — PROGRESS NOTES
Pt presents to office for EKG Visit per Dr. Bassett. Pt was taken off sotolol and awaiting assessment of QT for further orders. EKG reviewed by Dr. Prasad. No new orders given. EKG scanned to chart. Pt voiced concerns regarding increased pulse. He states he feels fine. Pt states he is pending PFTs and CT scan 3/14/24.     Daniela Banegas MA

## 2024-03-04 ENCOUNTER — HOSPITAL ENCOUNTER (OUTPATIENT)
Dept: CARDIOLOGY | Age: 84
Discharge: HOME | End: 2024-03-04
Payer: MEDICARE

## 2024-03-04 PROCEDURE — 93296 REM INTERROG EVL PM/IDS: CPT

## 2024-03-09 ENCOUNTER — APPOINTMENT (OUTPATIENT)
Dept: RADIOLOGY | Facility: HOSPITAL | Age: 84
End: 2024-03-09
Payer: MEDICARE

## 2024-03-09 ENCOUNTER — APPOINTMENT (OUTPATIENT)
Dept: CARDIOLOGY | Facility: HOSPITAL | Age: 84
End: 2024-03-09
Payer: MEDICARE

## 2024-03-09 ENCOUNTER — HOSPITAL ENCOUNTER (EMERGENCY)
Facility: HOSPITAL | Age: 84
Discharge: HOME | End: 2024-03-09
Attending: EMERGENCY MEDICINE
Payer: MEDICARE

## 2024-03-09 VITALS
SYSTOLIC BLOOD PRESSURE: 120 MMHG | WEIGHT: 220 LBS | HEIGHT: 66 IN | HEART RATE: 98 BPM | BODY MASS INDEX: 35.36 KG/M2 | RESPIRATION RATE: 17 BRPM | OXYGEN SATURATION: 96 % | TEMPERATURE: 97.7 F | DIASTOLIC BLOOD PRESSURE: 70 MMHG

## 2024-03-09 DIAGNOSIS — R00.2 PALPITATIONS: Primary | ICD-10-CM

## 2024-03-09 DIAGNOSIS — I49.3 FREQUENT PVCS: ICD-10-CM

## 2024-03-09 LAB
ALBUMIN SERPL BCP-MCNC: 3.9 G/DL (ref 3.4–5)
ALP SERPL-CCNC: 89 U/L (ref 33–136)
ALT SERPL W P-5'-P-CCNC: 33 U/L (ref 10–52)
ANION GAP SERPL CALC-SCNC: 11 MMOL/L (ref 10–20)
AST SERPL W P-5'-P-CCNC: 24 U/L (ref 9–39)
BASOPHILS # BLD AUTO: 0.03 X10*3/UL (ref 0–0.1)
BASOPHILS NFR BLD AUTO: 0.5 %
BILIRUB SERPL-MCNC: 1 MG/DL (ref 0–1.2)
BNP SERPL-MCNC: 848 PG/ML (ref 0–99)
BUN SERPL-MCNC: 22 MG/DL (ref 6–23)
CALCIUM SERPL-MCNC: 9 MG/DL (ref 8.6–10.3)
CARDIAC TROPONIN I PNL SERPL HS: 10 NG/L (ref 0–20)
CARDIAC TROPONIN I PNL SERPL HS: 10 NG/L (ref 0–20)
CHLORIDE SERPL-SCNC: 103 MMOL/L (ref 98–107)
CO2 SERPL-SCNC: 29 MMOL/L (ref 21–32)
CREAT SERPL-MCNC: 1.24 MG/DL (ref 0.5–1.3)
EGFRCR SERPLBLD CKD-EPI 2021: 57 ML/MIN/1.73M*2
EOSINOPHIL # BLD AUTO: 0.16 X10*3/UL (ref 0–0.4)
EOSINOPHIL NFR BLD AUTO: 2.5 %
ERYTHROCYTE [DISTWIDTH] IN BLOOD BY AUTOMATED COUNT: 15.3 % (ref 11.5–14.5)
FLUAV RNA RESP QL NAA+PROBE: NOT DETECTED
FLUBV RNA RESP QL NAA+PROBE: NOT DETECTED
GLUCOSE SERPL-MCNC: 94 MG/DL (ref 74–99)
HCT VFR BLD AUTO: 37.6 % (ref 41–52)
HGB BLD-MCNC: 11.9 G/DL (ref 13.5–17.5)
IMM GRANULOCYTES # BLD AUTO: 0.01 X10*3/UL (ref 0–0.5)
IMM GRANULOCYTES NFR BLD AUTO: 0.2 % (ref 0–0.9)
INR PPP: 1.9 (ref 0.9–1.1)
LACTATE SERPL-SCNC: 1.2 MMOL/L (ref 0.4–2)
LYMPHOCYTES # BLD AUTO: 1.21 X10*3/UL (ref 0.8–3)
LYMPHOCYTES NFR BLD AUTO: 19.1 %
MAGNESIUM SERPL-MCNC: 2.18 MG/DL (ref 1.6–2.4)
MCH RBC QN AUTO: 30.1 PG (ref 26–34)
MCHC RBC AUTO-ENTMCNC: 31.6 G/DL (ref 32–36)
MCV RBC AUTO: 95 FL (ref 80–100)
MONOCYTES # BLD AUTO: 0.66 X10*3/UL (ref 0.05–0.8)
MONOCYTES NFR BLD AUTO: 10.4 %
NEUTROPHILS # BLD AUTO: 4.26 X10*3/UL (ref 1.6–5.5)
NEUTROPHILS NFR BLD AUTO: 67.3 %
NRBC BLD-RTO: 0 /100 WBCS (ref 0–0)
PLATELET # BLD AUTO: 182 X10*3/UL (ref 150–450)
POTASSIUM SERPL-SCNC: 4 MMOL/L (ref 3.5–5.3)
PROT SERPL-MCNC: 6.4 G/DL (ref 6.4–8.2)
PROTHROMBIN TIME: 21.4 SECONDS (ref 9.8–12.8)
RBC # BLD AUTO: 3.96 X10*6/UL (ref 4.5–5.9)
RSV RNA RESP QL NAA+PROBE: NOT DETECTED
SARS-COV-2 RNA RESP QL NAA+PROBE: NOT DETECTED
SODIUM SERPL-SCNC: 139 MMOL/L (ref 136–145)
WBC # BLD AUTO: 6.3 X10*3/UL (ref 4.4–11.3)

## 2024-03-09 PROCEDURE — 36415 COLL VENOUS BLD VENIPUNCTURE: CPT | Performed by: PHYSICIAN ASSISTANT

## 2024-03-09 PROCEDURE — 83605 ASSAY OF LACTIC ACID: CPT | Performed by: PHYSICIAN ASSISTANT

## 2024-03-09 PROCEDURE — 83735 ASSAY OF MAGNESIUM: CPT | Performed by: PHYSICIAN ASSISTANT

## 2024-03-09 PROCEDURE — 96365 THER/PROPH/DIAG IV INF INIT: CPT

## 2024-03-09 PROCEDURE — 84075 ASSAY ALKALINE PHOSPHATASE: CPT | Performed by: PHYSICIAN ASSISTANT

## 2024-03-09 PROCEDURE — 99284 EMERGENCY DEPT VISIT MOD MDM: CPT | Mod: 25

## 2024-03-09 PROCEDURE — 2500000004 HC RX 250 GENERAL PHARMACY W/ HCPCS (ALT 636 FOR OP/ED): Performed by: PHYSICIAN ASSISTANT

## 2024-03-09 PROCEDURE — 85025 COMPLETE CBC W/AUTO DIFF WBC: CPT | Performed by: PHYSICIAN ASSISTANT

## 2024-03-09 PROCEDURE — 87637 SARSCOV2&INF A&B&RSV AMP PRB: CPT | Performed by: PHYSICIAN ASSISTANT

## 2024-03-09 PROCEDURE — 93005 ELECTROCARDIOGRAM TRACING: CPT

## 2024-03-09 PROCEDURE — 84484 ASSAY OF TROPONIN QUANT: CPT | Performed by: PHYSICIAN ASSISTANT

## 2024-03-09 PROCEDURE — 83880 ASSAY OF NATRIURETIC PEPTIDE: CPT | Performed by: PHYSICIAN ASSISTANT

## 2024-03-09 PROCEDURE — 85610 PROTHROMBIN TIME: CPT | Performed by: PHYSICIAN ASSISTANT

## 2024-03-09 PROCEDURE — 71045 X-RAY EXAM CHEST 1 VIEW: CPT

## 2024-03-09 PROCEDURE — 96361 HYDRATE IV INFUSION ADD-ON: CPT

## 2024-03-09 PROCEDURE — 71045 X-RAY EXAM CHEST 1 VIEW: CPT | Mod: FOREIGN READ | Performed by: RADIOLOGY

## 2024-03-09 RX ORDER — MAGNESIUM SULFATE HEPTAHYDRATE 40 MG/ML
2 INJECTION, SOLUTION INTRAVENOUS ONCE
Status: COMPLETED | OUTPATIENT
Start: 2024-03-09 | End: 2024-03-09

## 2024-03-09 RX ADMIN — SODIUM CHLORIDE 500 ML: 9 INJECTION, SOLUTION INTRAVENOUS at 09:47

## 2024-03-09 RX ADMIN — MAGNESIUM SULFATE HEPTAHYDRATE 2 G: 40 INJECTION, SOLUTION INTRAVENOUS at 11:40

## 2024-03-09 ASSESSMENT — PAIN DESCRIPTION - LOCATION: LOCATION: CHEST

## 2024-03-09 ASSESSMENT — HEART SCORE
AGE: 65+
HEART SCORE: 5
AGE: 65+
HISTORY: SLIGHTLY SUSPICIOUS
TROPONIN: LESS THAN OR EQUAL TO NORMAL LIMIT
ECG: NON-SPECIFIC REPOLARIZATION DISTURBANCE
RISK FACTORS: >2 RISK FACTORS OR HX OF ATHEROSCLEROTIC DISEASE
HISTORY: SLIGHTLY SUSPICIOUS
RISK FACTORS: >2 RISK FACTORS OR HX OF ATHEROSCLEROTIC DISEASE
HEART SCORE: 4
TROPONIN: LESS THAN OR EQUAL TO NORMAL LIMIT
ECG: NORMAL

## 2024-03-09 ASSESSMENT — PAIN SCALES - GENERAL
PAINLEVEL_OUTOF10: 3
PAINLEVEL_OUTOF10: 3

## 2024-03-09 ASSESSMENT — COLUMBIA-SUICIDE SEVERITY RATING SCALE - C-SSRS
1. IN THE PAST MONTH, HAVE YOU WISHED YOU WERE DEAD OR WISHED YOU COULD GO TO SLEEP AND NOT WAKE UP?: NO
6. HAVE YOU EVER DONE ANYTHING, STARTED TO DO ANYTHING, OR PREPARED TO DO ANYTHING TO END YOUR LIFE?: NO
2. HAVE YOU ACTUALLY HAD ANY THOUGHTS OF KILLING YOURSELF?: NO

## 2024-03-09 ASSESSMENT — LIFESTYLE VARIABLES
HAVE YOU EVER FELT YOU SHOULD CUT DOWN ON YOUR DRINKING: NO
EVER HAD A DRINK FIRST THING IN THE MORNING TO STEADY YOUR NERVES TO GET RID OF A HANGOVER: NO
EVER FELT BAD OR GUILTY ABOUT YOUR DRINKING: NO
HAVE PEOPLE ANNOYED YOU BY CRITICIZING YOUR DRINKING: NO

## 2024-03-09 ASSESSMENT — PAIN DESCRIPTION - DESCRIPTORS: DESCRIPTORS: PRESSURE

## 2024-03-09 ASSESSMENT — PAIN - FUNCTIONAL ASSESSMENT: PAIN_FUNCTIONAL_ASSESSMENT: 0-10

## 2024-03-09 NOTE — ED TRIAGE NOTES
Pt to ED for c/o chest pressure/heaviness.  Respirations even and unlabored.  No acute distress noted at this time.  Denies SOB.  A&Ox4.  VSS.

## 2024-03-09 NOTE — ED PROVIDER NOTES
HPI   Chief Complaint   Patient presents with    Chest Pain     Recent med change with Dr. Bassett, pacemaker, hx of afib and takes xeralto        An 84-year-old male patient with history of A-fib, with defibrillator, cardiac stent comes in the emergency department today with complaints of flulike his heart was racing, chest heaviness.  States that started this morning.  Described that he follows with Dr. DODGE.  They have been talking about potentially doing a an ablation.  States he was taken off his sotalol on February 19.  States he is on Xarelto.  Denies any new shortness of breath.  States he does wear oxygen at night.  Otherwise has no complaints this present time.  This purpose comes in the emergency department today for further evaluation.                          Alen Coma Scale Score: 15   HEART Score: 5                   Patient History   Past Medical History:   Diagnosis Date    Encounter for follow-up examination after completed treatment for conditions other than malignant neoplasm 12/27/2021    Hospital discharge follow-up    Ischemic cardiomyopathy     Ischemic cardiomyopathy with implantable cardioverter-defibrillator (ICD)    Pain in unspecified hip     Joint pain, hip    Personal history of other diseases of the musculoskeletal system and connective tissue     History of low back pain    Personal history of other endocrine, nutritional and metabolic disease     History of hyperlipidemia    Personal history of other specified conditions 12/21/2021    History of elevated prostate specific antigen (PSA)    Presence of coronary angioplasty implant and graft     Stented coronary artery    Unspecified atrial flutter (CMS/HCC)     Paroxysmal atrial flutter     Past Surgical History:   Procedure Laterality Date    CT ABDOMEN ANGIOGRAM W AND/OR WO IV CONTRAST  10/29/2021    CT ABDOMEN ANGIOGRAM W AND/OR WO IV CONTRAST 10/29/2021 [facility]    CT ABDOMEN ANGIOGRAM W AND/OR WO IV CONTRAST  10/3/2022     CT ABDOMEN ANGIOGRAM W AND/OR WO IV CONTRAST 10/3/2022 [facility]    OTHER SURGICAL HISTORY  10/12/2021    Renal angioplasty and stenting    OTHER SURGICAL HISTORY  10/12/2021    Cataract surgery    OTHER SURGICAL HISTORY  12/21/2021    Hip replacement    OTHER SURGICAL HISTORY  01/04/2022    Complete colonoscopy    OTHER SURGICAL HISTORY  12/21/2021    Back surgery    OTHER SURGICAL HISTORY  12/21/2021    Abdominal aortic aneurysm repair    OTHER SURGICAL HISTORY  12/21/2021    Hip replacement    OTHER SURGICAL HISTORY  12/21/2021    Surgery     Family History   Problem Relation Name Age of Onset    Coronary artery disease Father       Social History     Tobacco Use    Smoking status: Never    Smokeless tobacco: Never   Vaping Use    Vaping Use: Never used   Substance Use Topics    Alcohol use: Yes    Drug use: Never       Physical Exam   ED Triage Vitals [03/09/24 0906]   Temperature Heart Rate Respirations BP   36.5 °C (97.7 °F) 99 18 107/66      Pulse Ox Temp Source Heart Rate Source Patient Position   95 % Oral -- --      BP Location FiO2 (%)     -- --       Physical Exam  Constitutional:       Appearance: Normal appearance. He is well-developed.   HENT:      Head: Normocephalic and atraumatic.      Nose: Nose normal.   Eyes:      Extraocular Movements: Extraocular movements intact.      Conjunctiva/sclera: Conjunctivae normal.      Pupils: Pupils are equal, round, and reactive to light.   Cardiovascular:      Rate and Rhythm: Normal rate. Rhythm irregular.   Pulmonary:      Effort: Pulmonary effort is normal. No respiratory distress.      Breath sounds: Normal breath sounds. No stridor. No wheezing.   Musculoskeletal:         General: Normal range of motion.      Cervical back: Normal range of motion.   Skin:     General: Skin is warm and dry.   Neurological:      General: No focal deficit present.      Mental Status: He is alert and oriented to person, place, and time. Mental status is at  baseline.   Psychiatric:         Mood and Affect: Mood normal.         ED Course & MDM   Diagnoses as of 03/09/24 1312   Palpitations   Frequent PVCs       Medical Decision Making  An 84-year-old male patient with history of A-fib, with defibrillator, cardiac stent comes in the emergency department today with complaints of flulike his heart was racing, chest heaviness.  States that started this morning.  Described that he follows with Dr. DODGE.  They have been talking about potentially doing a an ablation.  States he was taken off his sotalol on February 19.  States he is on Xarelto.  Denies any new shortness of breath.  States he does wear oxygen at night.  Otherwise has no complaints this present time.  This purpose comes in the emergency department today for further evaluation.    EKG, chest x-ray, laboratory studies are ordered to rule out ACS, arrhythmias, electrolyte abnormalities, leukocytosis, left shift, acute kidney injury, COVID-19 influenza pneumonia, pneumothorax, pulmonary congestion.    Patient has PVCs EKG.  IV magnesium ordered for the patient.    No abnormal for patient's oxygen did dip at home which she has oxygen at home for patient placed on nasal cannula here.  Patient doing well.  Patient has negative delta troponins negative for influenza COVID-19 RSV.  Patient's BNP a 48 but no signs of edema on chest x-ray.  Lactate 1.2.  Metabolic panel within normal limits.  CBC shows no leukocytosis or left shift.  Hemoglobin 11.9.  Chest x-ray shows no acute process.    Patient is doing really well.  States he is feeling well.  Patient ambulated without any symptoms.  Patient had negative troponins.  Patient has multiple follow-up appointments with cardiology this week.  States they are doing multiple tests.  Discussing ablation.  Through shared decision-making patient will be discharged home with close return precautions to the emergency department..  Patient had recent echo and stress test.    Historian  is the patient    Diagnosis: Palpitations            Labs Reviewed   CBC WITH AUTO DIFFERENTIAL - Abnormal       Result Value    WBC 6.3      nRBC 0.0      RBC 3.96 (*)     Hemoglobin 11.9 (*)     Hematocrit 37.6 (*)     MCV 95      MCH 30.1      MCHC 31.6 (*)     RDW 15.3 (*)     Platelets 182      Neutrophils % 67.3      Immature Granulocytes %, Automated 0.2      Lymphocytes % 19.1      Monocytes % 10.4      Eosinophils % 2.5      Basophils % 0.5      Neutrophils Absolute 4.26      Immature Granulocytes Absolute, Automated 0.01      Lymphocytes Absolute 1.21      Monocytes Absolute 0.66      Eosinophils Absolute 0.16      Basophils Absolute 0.03     COMPREHENSIVE METABOLIC PANEL - Abnormal    Glucose 94      Sodium 139      Potassium 4.0      Chloride 103      Bicarbonate 29      Anion Gap 11      Urea Nitrogen 22      Creatinine 1.24      eGFR 57 (*)     Calcium 9.0      Albumin 3.9      Alkaline Phosphatase 89      Total Protein 6.4      AST 24      Bilirubin, Total 1.0      ALT 33     PROTIME-INR - Abnormal    Protime 21.4 (*)     INR 1.9 (*)    B-TYPE NATRIURETIC PEPTIDE - Abnormal     (*)     Narrative:        <100 pg/mL - Heart failure unlikely  100-299 pg/mL - Intermediate probability of acute heart                  failure exacerbation. Correlate with clinical                  context and patient history.    >=300 pg/mL - Heart Failure likely. Correlate with clinical                  context and patient history.    BNP testing is performed using different testing methodology at [facility] than at other [facility]. Direct result comparisons should only be made within the same method.      MAGNESIUM - Normal    Magnesium 2.18     LACTATE - Normal    Lactate 1.2      Narrative:     Venipuncture immediately after or during the administration of Metamizole may lead to falsely low results. Testing should be performed immediately  prior to Metamizole dosing.   SARS-COV-2 AND INFLUENZA  A/B PCR - Normal    Flu A Result Not Detected      Flu B Result Not Detected      Coronavirus 2019, PCR Not Detected      Narrative:     This assay has received FDA Emergency Use Authorization (EUA) and  is only authorized for the duration of time that circumstances exist to justify the authorization of the emergency use of in vitro diagnostic tests for the detection of SARS-CoV-2 virus and/or diagnosis of COVID-19 infection under section 564(b)(1) of the Act, 21 U.S.C. 360bbb-3(b)(1). Testing for SARS-CoV-2 is only recommended for patients who meet current clinical and/or epidemiological criteria as defined by federal, state, or local public health directives. This assay is an in vitro diagnostic nucleic acid amplification test for the qualitative detection of SARS-CoV-2, Influenza A, and Influenza B from nasopharyngeal specimens and has been validated for use at [facility]. Negative results do not preclude COVID-19 infections or Influenza A/B infections, and should not be used as the sole basis for diagnosis, treatment, or other management decisions. If Influenza A/B and RSV PCR results are negative, testing for Parainfluenza virus, Adenovirus and Metapneumovirus is routinely performed [place] [facility] [place], and is available on other patients by placing an add-on request.    RSV PCR - Normal    RSV PCR Not Detected      Narrative:     This assay is an FDA-cleared, in vitro diagnostic nucleic acid amplification test for the detection of RSV from nasopharyngeal specimens, and has been validated for use at [facility]. Negative results do not preclude RSV infections, and should not be used as the sole basis for diagnosis, treatment, or other management decisions. If Influenza A/B and RSV PCR results are negative, testing for Parainfluenza virus, Adenovirus and Metapneumovirus is routinely performed for pediatric oncology and intensive  care [place] [facility] [place] and is available on other patients by placing an add-on request.       SERIAL TROPONIN-INITIAL - Normal    Troponin I, High Sensitivity 10      Narrative:     Less than 99th percentile of normal range cutoff-  Female and children under 18 years old <14 ng/L; Male <21 ng/L: Negative  Repeat testing should be performed if clinically indicated.     Female and children under 18 years old 14-50 ng/L; Male 21-50 ng/L:  Consistent with possible cardiac damage and possible increased clinical   risk. Serial measurements may help to assess extent of myocardial damage.     >50 ng/L: Consistent with cardiac damage, increased clinical risk and  myocardial infarction. Serial measurements may help assess extent of   myocardial damage.      NOTE: Children less than 1 year old may have higher baseline troponin   levels and results should be interpreted in conjunction with the overall   clinical context.     NOTE: Troponin I testing is performed using a different   testing methodology at [facility] than at other   [facility]. Direct result comparisons should only   be made within the same method.   SERIAL TROPONIN, 1 HOUR - Normal    Troponin I, High Sensitivity 10      Narrative:     Less than 99th percentile of normal range cutoff-  Female and children under 18 years old <14 ng/L; Male <21 ng/L: Negative  Repeat testing should be performed if clinically indicated.     Female and children under 18 years old 14-50 ng/L; Male 21-50 ng/L:  Consistent with possible cardiac damage and possible increased clinical   risk. Serial measurements may help to assess extent of myocardial damage.     >50 ng/L: Consistent with cardiac damage, increased clinical risk and  myocardial infarction. Serial measurements may help assess extent of   myocardial damage.      NOTE: Children less than 1 year old may have higher baseline troponin   levels and results should be interpreted in conjunction with the  overall   clinical context.     NOTE: Troponin I testing is performed using a different   testing methodology at [facility] than at other   [facility] hospitals. Direct result comparisons should only   be made within the same method.   TROPONIN SERIES- (INITIAL, 1 HR)    Narrative:     The following orders were created for panel order Troponin I Series, High Sensitivity (0, 1 HR).  Procedure                               Abnormality         Status                     ---------                               -----------         ------                     Troponin I, High Sensiti...[[id number]]  Normal              Final result               Troponin, High Sensitivi...[[id number]]  Normal              Final result                 Please view results for these tests on the individual orders.        XR chest 1 view   Final Result   No acute process.   Signed by Kelvin Leung MD      Cardiac Device Check - Inpatient    (Results Pending)       Procedure  ECG 12 lead    Performed by: Prasad Hawthorne PA-C  Authorized by: Prasad Hawthorne PA-C    Interpretation:     Details:  My EKG interpretation  Rate:     ECG rate:  86  Rhythm:     Rhythm: atrial fibrillation    Ectopy:     Ectopy: PVCs    ST segments:     ST segments:  Normal  ECG 12 lead    Performed by: Prasad Hawthorne PA-C  Authorized by: Prasad Hawthorne PA-C    Interpretation:     Details:  My EKG interpretation  Rate:     ECG rate:  97  Rhythm:     Rhythm: atrial fibrillation    Ectopy:     Ectopy: PVCs    ST segments:     ST segments:  Normal       Prasad Hawthorne PA-C  03/09/24 1312

## 2024-03-10 LAB
ATRIAL RATE: 101 BPM
ATRIAL RATE: 250 BPM
ATRIAL RATE: 90 BPM
Q ONSET: 218 MS
Q ONSET: 227 MS
Q ONSET: 228 MS
QRS COUNT: 14 BEATS
QRS COUNT: 15 BEATS
QRS COUNT: 16 BEATS
QRS DURATION: 102 MS
QRS DURATION: 104 MS
QRS DURATION: 98 MS
QT INTERVAL: 394 MS
QT INTERVAL: 398 MS
QT INTERVAL: 410 MS
QTC CALCULATION(BAZETT): 471 MS
QTC CALCULATION(BAZETT): 505 MS
QTC CALCULATION(BAZETT): 507 MS
QTC FREDERICIA: 444 MS
QTC FREDERICIA: 466 MS
QTC FREDERICIA: 472 MS
R AXIS: -16 DEGREES
R AXIS: -6 DEGREES
R AXIS: 0 DEGREES
T AXIS: 66 DEGREES
T AXIS: 80 DEGREES
T AXIS: 85 DEGREES
T OFFSET: 417 MS
T OFFSET: 425 MS
T OFFSET: 432 MS
VENTRICULAR RATE: 86 BPM
VENTRICULAR RATE: 92 BPM
VENTRICULAR RATE: 97 BPM

## 2024-03-12 DIAGNOSIS — T78.40XD ALLERGY, SUBSEQUENT ENCOUNTER: ICD-10-CM

## 2024-03-12 NOTE — TELEPHONE ENCOUNTER
Rx Refill Request Telephone Encounter    Name:  Guicho Jones  :  [id number]  Medication Name:  Azelastine nasal spray    Specific Pharmacy location:  [facility]  Date of last appointment:  10/2023  Date of next appointment:  24

## 2024-03-13 ENCOUNTER — LAB (OUTPATIENT)
Dept: LAB | Facility: LAB | Age: 84
End: 2024-03-13
Payer: MEDICARE

## 2024-03-13 ENCOUNTER — OFFICE VISIT (OUTPATIENT)
Dept: CARDIOLOGY | Facility: CLINIC | Age: 84
End: 2024-03-13
Payer: MEDICARE

## 2024-03-13 VITALS
HEART RATE: 90 BPM | WEIGHT: 224 LBS | SYSTOLIC BLOOD PRESSURE: 108 MMHG | BODY MASS INDEX: 36 KG/M2 | HEIGHT: 66 IN | DIASTOLIC BLOOD PRESSURE: 56 MMHG

## 2024-03-13 DIAGNOSIS — I50.30 DIASTOLIC CONGESTIVE HEART FAILURE, NYHA CLASS 2, UNSPECIFIED CONGESTIVE HEART FAILURE CHRONICITY (MULTI): ICD-10-CM

## 2024-03-13 DIAGNOSIS — I47.20 VT (VENTRICULAR TACHYCARDIA) (MULTI): ICD-10-CM

## 2024-03-13 DIAGNOSIS — I25.10 CORONARY ARTERY DISEASE INVOLVING NATIVE CORONARY ARTERY OF NATIVE HEART WITHOUT ANGINA PECTORIS: ICD-10-CM

## 2024-03-13 DIAGNOSIS — I48.19 PERSISTENT ATRIAL FIBRILLATION (MULTI): ICD-10-CM

## 2024-03-13 DIAGNOSIS — I50.32 CHRONIC DIASTOLIC CONGESTIVE HEART FAILURE, NYHA CLASS 2 (MULTI): ICD-10-CM

## 2024-03-13 LAB
ANION GAP SERPL CALC-SCNC: 11 MMOL/L (ref 10–20)
BUN SERPL-MCNC: 21 MG/DL (ref 6–23)
CALCIUM SERPL-MCNC: 8.9 MG/DL (ref 8.6–10.3)
CHLORIDE SERPL-SCNC: 102 MMOL/L (ref 98–107)
CO2 SERPL-SCNC: 30 MMOL/L (ref 21–32)
CREAT SERPL-MCNC: 0.96 MG/DL (ref 0.5–1.3)
EGFRCR SERPLBLD CKD-EPI 2021: 77 ML/MIN/1.73M*2
GLUCOSE SERPL-MCNC: 93 MG/DL (ref 74–99)
POTASSIUM SERPL-SCNC: 4.3 MMOL/L (ref 3.5–5.3)
SODIUM SERPL-SCNC: 139 MMOL/L (ref 136–145)
T4 FREE SERPL-MCNC: 1.05 NG/DL (ref 0.61–1.12)
TSH SERPL-ACNC: 1.15 MIU/L (ref 0.44–3.98)

## 2024-03-13 PROCEDURE — 36415 COLL VENOUS BLD VENIPUNCTURE: CPT

## 2024-03-13 PROCEDURE — 3074F SYST BP LT 130 MM HG: CPT | Performed by: INTERNAL MEDICINE

## 2024-03-13 PROCEDURE — 84439 ASSAY OF FREE THYROXINE: CPT

## 2024-03-13 PROCEDURE — 1036F TOBACCO NON-USER: CPT | Performed by: INTERNAL MEDICINE

## 2024-03-13 PROCEDURE — 1157F ADVNC CARE PLAN IN RCRD: CPT | Performed by: INTERNAL MEDICINE

## 2024-03-13 PROCEDURE — 80048 BASIC METABOLIC PNL TOTAL CA: CPT

## 2024-03-13 PROCEDURE — 3078F DIAST BP <80 MM HG: CPT | Performed by: INTERNAL MEDICINE

## 2024-03-13 PROCEDURE — 1159F MED LIST DOCD IN RCRD: CPT | Performed by: INTERNAL MEDICINE

## 2024-03-13 PROCEDURE — 1160F RVW MEDS BY RX/DR IN RCRD: CPT | Performed by: INTERNAL MEDICINE

## 2024-03-13 PROCEDURE — 84443 ASSAY THYROID STIM HORMONE: CPT

## 2024-03-13 PROCEDURE — 99215 OFFICE O/P EST HI 40 MIN: CPT | Performed by: INTERNAL MEDICINE

## 2024-03-13 RX ORDER — AMIODARONE HYDROCHLORIDE 200 MG/1
TABLET ORAL
Qty: 200 TABLET | Refills: 1 | Status: SHIPPED | OUTPATIENT
Start: 2024-03-13 | End: 2024-04-24 | Stop reason: SDUPTHER

## 2024-03-13 RX ORDER — AZELASTINE 1 MG/ML
SPRAY, METERED NASAL
Qty: 30 ML | Refills: 0 | Status: SHIPPED | OUTPATIENT
Start: 2024-03-13 | End: 2024-04-17 | Stop reason: SDUPTHER

## 2024-03-13 NOTE — PATIENT INSTRUCTIONS
Amiodarone 400 mg three times daily for a week, The 400 mg twice daily for a week, Then 400 mg daily for 2 weeks, Then 200 mg daily thereafter.    Today have labs completed    Another lab in two weeks    Start Jardiance 10mg take one tab daily

## 2024-03-13 NOTE — PROGRESS NOTES
Patient:  Guicho Jones  YOB: 1940  MRN: [id number]       Impression/Plan:     Diagnoses and all orders for this visit:  Persistent atrial fibrillation (CMS/HCC)  VT (ventricular tachycardia) (CMS/HCC)         -     February 29 was seen by Dr. Bassett at which time sotalol was discontinued as QTc was continuously over 500 and he was still having VT and A-fib suggesting suboptimal benefit of the sotalol as well as inability to further increase the dose.  Plan had been to consider adding amiodarone but no order was placed at that time.          -     He remains on full anticoagulation without bleeding.  When he was in the emergency room he had primarily frequent PVCs but I am fairly certain that prior to that he was probably having bursts of atrial fibrillation possibly V. tach.  That was the pattern he had on his previous AICD check.  Has had no dizziness syncope or shocks recently.          -     diastolic CHF benefits from A-fib ablation probably not to the same degree as systolic CHF.  He is likelihood of successful PVI ablation at the age of 84 with very severe COPD and obesity is significantly decreased.  He would prefer a medical approach if it works.  He had had a tremor with amiodarone in the past but it was manageable.  He did not have a full-blown Parkinson's syndrome nor did he have other even pulmonary side effects.  At being the case we will reinstitute amiodarone 400 3 times daily for a week.  This will then be followed by 400 twice daily for a week then 400-day for 2 weeks and I will see him thereafter.  Thyroid to be obtained prior to institution of amiodarone            -     He does have coronary disease albeit mild.  It is not clear whether or not the etiology of his VT is ischemic or from his nonischemic diastolic CHF.  Hopefully amiodarone and Jardiance will reduce episodes of ventricular tachycardia though VT ablation could be considered.  Will be sure he has follow-up with  electrophysiology    Diastolic congestive heart failure, NYHA class 2, unspecified congestive heart failure chronicity (CMS/HCC)  -     He is functional class II but does easily get out of breath when he does more than usual.  He would be willing to try Jardiance again.  I reviewed with him that if the rash was truly minimal it may have not have been the Jardiance the fact that he is not sure it made a difference does not mean we should not have him on it.  It reduces hospitalizations probably can reduce left regular end-diastolic pressures which could reduce incidence of VT as well as A-fib.  -     empagliflozin (Jardiance) 10 mg; Take 1 tablet (10 mg) by mouth once daily.  -     Basic metabolic panel; Future  -     T4, free; Future    Coronary artery disease involving native coronary artery of native heart without angina pectoris      Chief Complaint/Active Symptoms:       Guicho Jones is a 84 y.o. male who presents with ischemic cardiomyopathy, echo 10/21 50% EF. Cath 6/15/2022 LM-30%. LAD-diffuse disease 40% distal. CX/RCA calcified diffuse disease. LVEDP 15. He has AICD secondary to prior severe decrease in overall systolic function. Also with persistent atrial fibrillation. He has a 5.6 cm complex dumbbell abdominal aortic aneurysm that has been followed by vascular surgery.  As of 2022 patient preferred no intervention.  Was to be intervened upon previously but he then developed prostate cancer and intervention postponed. Finally, he has centrilobular emphysema-severe.        1/31/2024 perfusion study: Abnormal no evidence of ischemia prior inferolateral infarct LVEF 50%. No change from previously        1/31/24   Echo  with 50% ejection fraction inferolateral hypokinesis trivial AI trivial to 1+ MR RVSP 55 mm.         I had seen him in the office 2/1/2024 with functional class II diastolic CHF pulmonary hypertension.  He cannot tolerate Jardiance having been tried before Entresto was very expensive for  him.  It was felt his pulm hypertension was combination of diastolic dysfunction and worsening of his emphysema which is known to be severe.  A-fib seemed reasonably controlled at that time though he was noting a bit more episodes.  He had been on sotalol but QTc beginning to increase.    2/19/2024 was seen by Dr. Bassett where defibrillator assessment showed 2 episodes of self terminated VT.  A-fib 3-5%.  Because of QTc sotalol cannot be increased and instead was discontinued with plan of beginning amiodarone.  However patient did have significant tremors in the past on amiodarone.  The thought was if he still could not tolerate amiodarone they may consider an ablative approach.    Was seen by pulmonary medicine at [facility] 2/20/2024 with continued dyspnea felt multifactorial no specific changes in medication recommended at that time.  Obviously except follow-up of amiodarone toxicity should it occur.    Was seen in the emergency department [facility] 3/9/2024 with chest pain heart racing EKG showed frequent PVCs was given IV magnesium.  Troponins were negative.    He has had no shocks.  He gets out of breath but he is finding most activities he is okay with.  Much more than what he usually does he will note dyspnea.  He has had no dizziness or lightheadedness.  He does have frequent episodes where his heart is racing considerably.  He has no lower extremity edema.  We spoke again about Jardiance he had suggested it had caused a rash.  He is not convinced it necessarily was related to the Jardiance and would be willing to try it again except he also was not sure it made a huge difference.  Is more the fact that he could not tell a difference after a month that he stopped it.    Review of Systems: Unremarkable except as noted above    Meds     Current Outpatient Medications   Medication Instructions    albuterol (Proventil HFA) 90 mcg/actuation inhaler 1 puff, inhalation, Every 4 hours PRN     amoxicillin (Amoxil) 500 mg capsule 4 capsules, oral, See admin instructions, TAKE 1 HOUR BEFORE APPOINTMENT     aspirin 81 mg, oral, TAKE 1 TABLET MONDAY THROUGH FRIDAY AT BEDTIME     atorvastatin (Lipitor) 40 mg tablet 1 tablet, oral, Nightly    azelastine (Astelin) 137 mcg (0.1 %) nasal spray USE TWO SPRAYS INTO EACH NOSTRIL TWO TIMES A DAY    docosahexaenoic acid/epa (FISH OIL ORAL) 1 capsule, oral, Daily    ezetimibe (ZETIA) 10 mg, oral, Daily    fluticasone (Flonase) 50 mcg/actuation nasal spray 2 sprays, Each Nostril, Daily    fluticasone-umeclidin-vilanter (TRELEGY-ELLIPTA) 100-62.5-25 mcg blister with device 1 puff, inhalation, Daily    gentamicin (Garamycin) 0.1 % cream Topical, 2 times daily, APPLY SPARINGLY TO AFFECTED AREA    glucosamine HCl 1,500 mg tablet 1 tablet, oral, Daily    hydroCHLOROthiazide (HYDRODiuril) 25 mg tablet 1 tablet, oral, Daily    isosorbide mononitrate ER (IMDUR) 30 mg, oral, Daily, Do not crush or chew.    lisinopril 40 mg tablet 1 tablet, oral, Daily    magnesium oxide (MagOx) 400 mg (241.3 mg magnesium) tablet 1 tablet, oral, 2 times daily    metoprolol succinate XL (TOPROL-XL) 150 mg, oral, Daily    metoprolol tartrate (Lopressor) 25 mg tablet Take 1 tablet daily for palpitations only    montelukast (SINGULAIR) 10 mg, oral, Nightly    nitroglycerin (NITROSTAT) 0.4 mg, sublingual, Every 5 min PRN    rivaroxaban (XARELTO) 20 mg, oral, Daily with evening meal, Take with food.        Allergies     Allergies   Allergen Reactions    Levofloxacin Palpitations     Rapid Heart Rate - Severe per pt.    Amiodarone Other     SECONDARY TO NEURO       Jardiance [Empagliflozin] Rash         Annotated Problems     Specialty Problems          Cardiology Problems    CAD (coronary artery disease)     1/31/2024 perfusion study: Abnormal no evidence of ischemia prior inferolateral infarct LVEF 50%.  No change from previously    Echo from yesterday with 50% ejection fraction inferolateral  hypokinesis trivial AI trivial to 1+ MR RVSP 55 mm.  Echo 2021 RVSP 43 mmHg   · Cath 6/15/2022 LM-30%. LAD-diffuse disease 40% distal. CX/RCA calcified diffuse      disease. LVEDP 15   3/19/2020. Lexiscan. Posterior lateral MI with mild jimmy-infarction ischemia. LVEF 42%.       Echocardiogram demonstrates 60% ejection fraction       2009. LVEF normal. LAD/RCA stents patent. Occluded distal circumflex. LM aneurysmal.       2008. BRONSON-proximal and mid LAD. BRONSON RCA.       1994 acute inferior infarct         Chest pain    Abdominal aortic aneurysm (CMS/HCC)     Follows with Dr. Ross and as of 10/2022 though felt to be a candidate for an off label fenestrated approach, patient preferred no intervention   · November 2021 vascular follow-up with Dr. Finney. Aorta at 5.6 cm. Intervention      postponed secondary to prostate cancer treatments   January 2021 dumbbell shaped abdominal aortic aneurysm maximal diameter 5 cm      involving origin of renal arteries         AICD (automatic cardioverter/defibrillator) present    Anticoagulant long-term use    Chronic diastolic congestive heart failure, NYHA class 2 (CMS/HCC)     Echo from yesterday with 50% ejection fraction inferolateral hypokinesis trivial AI trivial to 1+ MR RVSP 55 mm.  Echo 2021 RVSP 43 mmHg         Essential hypertension    Ischemic cardiomyopathy     January 2024 LVEF 50% RVSP 55   · October 2021 echo LVEF 55%.  Biatrial enlargement.  No significant valvular disease      March 2020 echo LVEF 60%.  Diastolic dysfunction      2002 50%      1996 25% after acute inferior infarct         Mixed hyperlipidemia    NSVT (nonsustained ventricular tachycardia) (CMS/HCC)    Paroxysmal ventricular tachycardia (CMS/HCC)    Persistent atrial fibrillation (CMS/HCC)    Renal artery stenosis (CMS/HCC)    Diastolic heart failure, NYHA class 1 (CMS/HCC)    Never smoked cigarettes        Problem List     Patient Active Problem List    Diagnosis Date Noted    Pulmonary  hypertension (CMS/[facility]) 02/02/2024    Never smoked cigarettes 02/01/2024    Primary osteoarthritis of right knee 11/13/2023    Diastolic heart failure, NYHA class 1 (CMS/[facility]) 10/09/2023    BMI 36.0-36.9,adult 10/09/2023    Cutaneous candidiasis 10/09/2023    CVA (cerebral vascular accident) (CMS/[facility]) 10/09/2023    Abdominal aortic aneurysm (CMS/[facility]) 09/29/2023    AICD (automatic cardioverter/defibrillator) present 09/29/2023    Allergic rhinitis, seasonal 09/29/2023    Centrilobular emphysema (CMS/[facility]) 09/29/2023    Essential hypertension 09/29/2023    Hyperuricemia 09/29/2023    Infarction of right basal ganglia (CMS/[facility]) 09/29/2023    Ischemic cardiomyopathy 09/29/2023    Mixed hyperlipidemia 09/29/2023    NSVT (nonsustained ventricular tachycardia) (CMS/[facility]) 09/29/2023    Paroxysmal ventricular tachycardia (CMS/[facility]) 09/29/2023    Persistent atrial fibrillation (CMS/[facility]) 09/29/2023    Renal artery stenosis (CMS/[facility]) 09/29/2023    Rosacea 09/29/2023    Shortness of breath 09/29/2023    Tinea cruris 09/29/2023    Anticoagulant long-term use 09/29/2023    Chronic diastolic congestive heart failure, NYHA class 2 (CMS/[facility]) 09/29/2023    High risk medication use 09/29/2023    Tremor 09/29/2023    Prostate cancer (CMS/[facility]) 09/29/2023    Spinal stenosis, lumbar region with neurogenic claudication 07/17/2018    Chest pain 10/30/2016    CAD (coronary artery disease) 10/19/2016    Anemia 10/19/2016    Hypokalemia 10/19/2016    Obesity 10/19/2016    COPD exacerbation (CMS/[facility]) 10/19/2016    Epiretinal membrane (ERM) of right eye 03/15/2016    Pseudophakia of both eyes 01/14/2016    Vitreomacular traction syndrome of right eye 01/14/2016    Dermatochalasis of right upper eyelid 08/17/2015    Dermatochalasis of left upper eyelid 08/17/2015    Degenerative retinal drusen of both eyes 08/17/2015    Floaters 08/17/2015    Lumbosacral spondylosis without myelopathy 07/23/2015    Blepharitis of both eyes 01/28/2015    Alondra  "(degenerative) of retina 01/28/2015    Choroidal nevus, right eye 01/28/2015    Pinguecula 01/28/2015    MGD (meibomian gland dysfunction) 01/28/2015       Objective:     Vitals:    03/13/24 0902   BP: 108/56   BP Location: Left arm   Patient Position: Sitting   Pulse: 90   Weight: 102 kg (224 lb)   Height: 1.676 m (5' 6\")      Wt Readings from Last 4 Encounters:   03/13/24 102 kg (224 lb)   03/09/24 99.8 kg (220 lb)   02/19/24 104 kg (229 lb)   02/01/24 102 kg (225 lb)           LAB:     Lab Results   Component Value Date    WBC 6.3 03/09/2024    HGB 11.9 (L) 03/09/2024    HCT 37.6 (L) 03/09/2024     03/09/2024    CHOL 106 02/22/2024    TRIG 107 02/22/2024    HDL 42.1 02/22/2024    ALT 33 03/09/2024    AST 24 03/09/2024     03/09/2024    K 4.0 03/09/2024     03/09/2024    CREATININE 1.24 03/09/2024    BUN 22 03/09/2024    CO2 29 03/09/2024    INR 1.9 (H) 03/09/2024       Diagnostic Studies:     ECG 12 lead    Result Date: 3/10/2024  Atrial fibrillation with frequent ventricular-paced complexes and with premature ventricular or aberrantly conducted complexes Nonspecific T wave abnormality Prolonged QT Abnormal ECG When compared with ECG of 09-MAR-2024 11:01, (unconfirmed) Previous ECG has undetermined rhythm, needs review See ED provider note for full interpretation and clinical correlation Confirmed by Thania Welch ([id number]) on 3/10/2024 12:26:49 PM    ECG 12 lead    Result Date: 3/10/2024  Undetermined rhythm Inferior infarct , age undetermined Prolonged QT Abnormal ECG When compared with ECG of 09-MAR-2024 09:02, (unconfirmed) Current undetermined rhythm precludes rhythm comparison, needs review See ED provider note for full interpretation and clinical correlation Confirmed by Thania Welch ([id number]) on 3/10/2024 12:26:21 PM    ECG 12 lead    Result Date: 3/10/2024  Atrial fibrillation with frequent ventricular-paced complexes and with premature ventricular or aberrantly conducted " complexes Inferior infarct , age undetermined Abnormal ECG When compared with ECG of 15-JAIME-2022 10:05, Electronic ventricular pacemaker has replaced Electronic atrial pacemaker See ED provider note for full interpretation and clinical correlation Confirmed by Thania Welch ([id number]) on 3/10/2024 12:25:39 PM    XR chest 1 view    Result Date: 3/9/2024  STUDY: Chest Radiograph;  3/9/2024 9:51 AM. INDICATION: Chest heaviness and palpitations. COMPARISON: None Available. ACCESSION NUMBER(S): [id number] ORDERING CLINICIAN: BESS CHRISTIANSON TECHNIQUE:  Frontal chest was obtained at 09:51 hours. FINDINGS: CARDIOMEDIASTINAL SILHOUETTE: Heart is enlarged with right-sided cardiac AICD.  LUNGS: Lungs are clear.  ABDOMEN: No remarkable upper abdominal findings.  BONES: No acute osseous changes.    No acute process. Signed by Kelvin Leung MD        Radiology:     No orders to display       Physical Exam     General Appearance: alert and oriented to person, place and time, in no acute distress  Cardiovascular: normal rate, regular rhythm, normal S1 and S2, no murmurs, rubs, clicks, or gallops,  no JVD  Pulmonary/Chest: clear to auscultation bilaterally- no wheezes, rales or rhonchi, normal air movement, no respiratory distress  Abdomen: soft, non-tender, non-distended, normal bowel sounds, no masses   Extremities: no cyanosis, clubbing or edema  Skin: warm and dry, no rash or erythema  Eyes: EOMI  Neck: supple and non-tender without mass, no thyromegaly   Neurological: alert, oriented, normal speech, no focal findings or movement disorder noted  Vascular:

## 2024-03-14 ENCOUNTER — APPOINTMENT (OUTPATIENT)
Dept: PULMONOLOGY | Age: 84
End: 2024-03-14
Attending: INTERNAL MEDICINE
Payer: MEDICARE

## 2024-03-14 ENCOUNTER — HOSPITAL ENCOUNTER (OUTPATIENT)
Dept: CT IMAGING | Age: 84
Discharge: HOME | End: 2024-03-16
Attending: INTERNAL MEDICINE
Payer: MEDICARE

## 2024-03-14 DIAGNOSIS — R06.02 SOB (SHORTNESS OF BREATH): ICD-10-CM

## 2024-03-14 PROCEDURE — 71250 CT THORAX DX C-: CPT

## 2024-04-01 ENCOUNTER — OFFICE VISIT (OUTPATIENT)
Dept: CARDIOLOGY | Facility: CLINIC | Age: 84
End: 2024-04-01
Payer: MEDICARE

## 2024-04-01 VITALS
HEIGHT: 66 IN | SYSTOLIC BLOOD PRESSURE: 122 MMHG | WEIGHT: 223.6 LBS | HEART RATE: 59 BPM | DIASTOLIC BLOOD PRESSURE: 64 MMHG | BODY MASS INDEX: 35.93 KG/M2

## 2024-04-01 DIAGNOSIS — I25.5 ISCHEMIC CARDIOMYOPATHY: ICD-10-CM

## 2024-04-01 DIAGNOSIS — Z95.810 AICD (AUTOMATIC CARDIOVERTER/DEFIBRILLATOR) PRESENT: ICD-10-CM

## 2024-04-01 DIAGNOSIS — Z78.9 NEVER SMOKED CIGARETTES: ICD-10-CM

## 2024-04-01 DIAGNOSIS — I10 ESSENTIAL HYPERTENSION: ICD-10-CM

## 2024-04-01 DIAGNOSIS — Z79.899 HIGH RISK MEDICATION USE: Primary | ICD-10-CM

## 2024-04-01 DIAGNOSIS — I48.19 PERSISTENT ATRIAL FIBRILLATION (MULTI): ICD-10-CM

## 2024-04-01 DIAGNOSIS — I47.29 NSVT (NONSUSTAINED VENTRICULAR TACHYCARDIA) (MULTI): ICD-10-CM

## 2024-04-01 PROCEDURE — 3074F SYST BP LT 130 MM HG: CPT | Performed by: INTERNAL MEDICINE

## 2024-04-01 PROCEDURE — 1160F RVW MEDS BY RX/DR IN RCRD: CPT | Performed by: INTERNAL MEDICINE

## 2024-04-01 PROCEDURE — 1157F ADVNC CARE PLAN IN RCRD: CPT | Performed by: INTERNAL MEDICINE

## 2024-04-01 PROCEDURE — 1036F TOBACCO NON-USER: CPT | Performed by: INTERNAL MEDICINE

## 2024-04-01 PROCEDURE — 99215 OFFICE O/P EST HI 40 MIN: CPT | Performed by: INTERNAL MEDICINE

## 2024-04-01 PROCEDURE — 93000 ELECTROCARDIOGRAM COMPLETE: CPT | Performed by: INTERNAL MEDICINE

## 2024-04-01 PROCEDURE — 3078F DIAST BP <80 MM HG: CPT | Performed by: INTERNAL MEDICINE

## 2024-04-01 PROCEDURE — 1159F MED LIST DOCD IN RCRD: CPT | Performed by: INTERNAL MEDICINE

## 2024-04-01 NOTE — PATIENT INSTRUCTIONS
Fasting labs to be done prior to next appointment with Dr. Bassett in 6 weeks    Keep appointment as scheduled for PFT's      -Please bring all medicines, vitamins, and herbal supplements with you in original bottles to every appointment!!!!    -Prescriptions will not be filled unless you are compliant with your follow up appointments or have a follow up appointment scheduled as per instruction of your physician. Refills should be requested at the time of your visit.

## 2024-04-01 NOTE — PROGRESS NOTES
CARDIOLOGY OFFICE VISIT      CHIEF COMPLAINT  Chief Complaint   Patient presents with    Follow-up       HISTORY OF PRESENT ILLNESS  HPI  84-year-old male  with a past medical history of coronary artery disease with normal  left ventricular function on echocardiogram in March 2008, congestive  heart failure New York Heart Association class 2, remote ventricular  fibrillation RVR status post single-chamber ICD implanted in 1994  with multiple generator change outs and also with an abdominal  implant. His device was upgraded to a dual-chamber ICD in the  prepectoral side due to evidence of atrial fibrillation. He was  placed on sotalol.     His echocardiogram performed in March 2020 showed normal left ventricular function value 55 to 60% with 1+ mitral regurgitation. Stress test at the same time, show a small area of jimmy-infarct ischemia in the posterior lateral area and also a small posterolateral myocardial infarction with a left ventricular ejection fraction 42%.     Looking at his records, patient was admitted in December 2021 for COPD exacerbation and apparently pneumonia. Since then patient has been using oxygen therapy at home. Covidâ€“19 was negative.     Patient had an a stress test in February 2021 that shows moderate inferior posterior lateral perfusion defect consistent with myocardial infarction with no evidence of ischemia with a left ventricular ejection fraction 45%.     February 2022 device interrogation, patient had an episode of atrial fibrillation that lasted approximately 15 hours. In the middle of these episodes, patient had episode of ventricular tachycardia (polymorphic) that triggered ICD charging but no ICD shock was delivered. Episode was aborted.     Patient had a cardiac catheterization in May 2022 that shows left main 30%, LAD diffusely diseased. Distal LAD 40%. Circumflex diffusely diseased with right coronary artery calcified. Medical therapy was recommended.    He recalls having an  episode of dizziness and syncope while walking to the bathroom in December 31, 2023 around 10 PM.  Device interrogation showed that the patient had an episode of atrial fibrillation with episodes of atrial fibrillation with rapid ventricular response or change intraventricular tachycardia rate of 288 bpm.  This episode was self terminated before device deliver an ICD shock.  Second episode was in January 2024 with an episode of atrial fibrillation that ended with ventricular tachycardia self terminated into atrial fibrillation again.     Patient still using sotalol therapy.    During the last office visit back in February 2024, we discussed the option of changing antiarrhythmic therapy.  Patient discontinue sotalol.  Patient had an EKG performed few days later with QT interval above 500 ms.  Patient did not receive the phone call to start amiodarone.  Patient presented to emergency department few weeks later complaining of palpitations and he was found to be in atrial fibrillation.  He saw general cardiology service recently (Dr. Hugh Perez) who put patient back on amiodarone 400 mg 3 times a day for a week and then 400 mg twice a day for a week and then 400 mg daily for 2 weeks and then 200 mg daily.    Patient states that lately he has been doing much better.  He has not had any more palpitations.    EKG performed today shows atrial paced rhythm at a rate of 60 bpm QRS duration 112 ms QT corrected 520 ms.  Rhythm strip shows the same pattern.          Past Medical History  Past Medical History:   Diagnosis Date    Encounter for follow-up examination after completed treatment for conditions other than malignant neoplasm 12/27/2021    Hospital discharge follow-up    Ischemic cardiomyopathy     Ischemic cardiomyopathy with implantable cardioverter-defibrillator (ICD)    Pain in unspecified hip     Joint pain, hip    Personal history of other diseases of the musculoskeletal system and connective tissue     History of  low back pain    Personal history of other endocrine, nutritional and metabolic disease     History of hyperlipidemia    Personal history of other specified conditions 12/21/2021    History of elevated prostate specific antigen (PSA)    Presence of coronary angioplasty implant and graft     Stented coronary artery    Unspecified atrial flutter (CMS/HCC)     Paroxysmal atrial flutter       Social History  Social History     Tobacco Use    Smoking status: Never    Smokeless tobacco: Never   Vaping Use    Vaping Use: Never used   Substance Use Topics    Alcohol use: Yes    Drug use: Never       Family History     Family History   Problem Relation Name Age of Onset    Coronary artery disease Father          Allergies:  Allergies   Allergen Reactions    Levofloxacin Palpitations     Rapid Heart Rate - Severe per pt.        Outpatient Medications:  Current Outpatient Medications   Medication Instructions    albuterol (Proventil HFA) 90 mcg/actuation inhaler 1 puff, inhalation, Every 4 hours PRN    amiodarone (Pacerone) 200 mg tablet 400 mg three times daily for a week, The 400 mg twice daily for a week, Then 400 mg daily for 2 weeks, Then 200 mg daily thereafter.    amoxicillin (Amoxil) 500 mg capsule 4 capsules, oral, See admin instructions, TAKE 1 HOUR BEFORE APPOINTMENT     aspirin 81 mg, oral, TAKE 1 TABLET MONDAY THROUGH FRIDAY AT BEDTIME     atorvastatin (Lipitor) 40 mg tablet 1 tablet, oral, Nightly    azelastine (Astelin) 137 mcg (0.1 %) nasal spray USE TWO SPRAYS INTO EACH NOSTRIL TWO TIMES A DAY    docosahexaenoic acid/epa (FISH OIL ORAL) 1 capsule, oral, Daily    empagliflozin (JARDIANCE) 10 mg, oral, Daily    ezetimibe (ZETIA) 10 mg, oral, Daily    fluticasone (Flonase) 50 mcg/actuation nasal spray 2 sprays, Each Nostril, Daily    fluticasone-umeclidin-vilanter (TRELEGY-ELLIPTA) 100-62.5-25 mcg blister with device 1 puff, inhalation, Daily    gentamicin (Garamycin) 0.1 % cream Topical, 2 times daily, APPLY  SPARINGLY TO AFFECTED AREA    glucosamine HCl 1,500 mg tablet 1 tablet, oral, Daily    hydroCHLOROthiazide (HYDRODiuril) 25 mg tablet 1 tablet, oral, Daily    isosorbide mononitrate ER (IMDUR) 30 mg, oral, Daily, Do not crush or chew.    lisinopril 40 mg tablet 1 tablet, oral, Daily    magnesium oxide (MagOx) 400 mg (241.3 mg magnesium) tablet 1 tablet, oral, 2 times daily    metoprolol succinate XL (TOPROL-XL) 150 mg, oral, Daily    metoprolol tartrate (Lopressor) 25 mg tablet Take 1 tablet daily for palpitations only    montelukast (SINGULAIR) 10 mg, oral, Nightly    nitroglycerin (NITROSTAT) 0.4 mg, sublingual, Every 5 min PRN    rivaroxaban (XARELTO) 20 mg, oral, Daily with evening meal, Take with food.          REVIEW OF SYSTEMS  Review of Systems   All other systems reviewed and are negative.        VITALS  Vitals:    04/01/24 0955   BP: 122/64   Pulse: 59       PHYSICAL EXAM  Constitutional:       Appearance: Healthy appearance. Not in distress.   Neck:      Vascular: No JVR. JVD normal.   Pulmonary:      Effort: Pulmonary effort is normal.      Breath sounds: Normal breath sounds. No wheezing. No rhonchi. No rales.   Chest:      Chest wall: Not tender to palpatation.   Cardiovascular:      PMI at left midclavicular line. Normal rate. Regular rhythm. Normal S1. Normal S2.       Murmurs: There is no murmur.      No gallop.  No click. No rub.      Comments: Device left pectoral area. No hematoma or infection noted.     Pulses:     Intact distal pulses.   Edema:     Peripheral edema absent.   Abdominal:      General: Bowel sounds are normal.      Palpations: Abdomen is soft.      Tenderness: There is no abdominal tenderness.   Musculoskeletal: Normal range of motion.         General: No tenderness. Skin:     General: Skin is warm and dry.   Neurological:      General: No focal deficit present.      Mental Status: Alert and oriented to person, place and time.           ASSESSMENT AND PLAN  CLINICAL IMPRESSION:    1. Status post cardiac arrest. Status post ICD therapy, device interrogation reviewed with patient during this evaluation  2. Ischemic cardiomyopathy. Stable  3. Congestive heart failure, New York Heart Association class II. Compensated  4. Persistent atrial fibrillation. Device interrogation reviewed during this evaluation with patient  5. High-risk medication.  Of sotalol therapy.  Now on amiodarone  6. Ventricular tachycardia. Recurrence of this arrhythmia by device interrogation by episode in February 2022. Plan discussed with patient during this office visit  7. Coronary artery disease with percutaneous coronary interventions  in the past. Recommended appointment with Dr. Penn for possible cardiac catheterization  8. Hypertension. Controlled  9. Hyperlipidemia. Stable  10. Chronic obstructive pulmonary disease. No recurrence  11. Long-term anticoagulation therapy with Eliquis. No evidence of bleeding  12. Shortness of breath with recent hospitalization in December 2021 due to COPD exacerbationâ€“pneumonia, now with oxygen therapy followed by pulmonary service. Stable  13.  Syncope associated with ventricular arrhythmias.      Plan recommendations    Patient is doing much better on current medical therapy that includes high risk medication (amiodarone).    Laboratory from March 2024 shows creatinine 0.96.  Potassium 4.3.  TSH 1.15.  He has not had any significant tremors.  Will continue with amiodarone therapy for now.    Follow my office very closely every 4 to 6 weeks or sooner needed.    Follow device clinic as scheduled.    Continue with high risk medication (amiodarone).    Get CMP and TSH every 6 months for high risk medication.    Get complete PFTs annually for high risk medication.    Risk factor modification and lifestyle modification discussed with patient. Diet , exercise and hydration discussed with patient.    If he has recurrence of atrial fibrillation we can discuss the option with pulmonary  isolation (high risk procedure) versus AV node ablation.    I have personally review with patient during this office visit, laboratory data, echocardiogram results, stress test results, Holter-event monitor results prior and after the last electrophysiology visit. All questions has been answered.    Please excuse any errors in grammar or translation related to this dictation.  Voice recognition software was utilized to prepare this document.      Scribe Attestation  By signing my name below, I, Christina Benjamin LPN , Scribe   attest that this documentation has been prepared under the direction and in the presence of Joce Bassett MD.

## 2024-04-04 DIAGNOSIS — J30.2 SEASONAL ALLERGIC RHINITIS, UNSPECIFIED TRIGGER: ICD-10-CM

## 2024-04-04 RX ORDER — MONTELUKAST SODIUM 10 MG/1
10 TABLET ORAL EVERY EVENING
Qty: 90 TABLET | Refills: 3 | Status: SHIPPED | OUTPATIENT
Start: 2024-04-04

## 2024-04-05 ENCOUNTER — HOSPITAL ENCOUNTER (OUTPATIENT)
Dept: PULMONOLOGY | Age: 84
Discharge: HOME | End: 2024-04-05
Attending: INTERNAL MEDICINE
Payer: MEDICARE

## 2024-04-05 DIAGNOSIS — J44.9 CHRONIC OBSTRUCTIVE PULMONARY DISEASE, UNSPECIFIED COPD TYPE (HCC): ICD-10-CM

## 2024-04-05 PROCEDURE — 94726 PLETHYSMOGRAPHY LUNG VOLUMES: CPT

## 2024-04-05 PROCEDURE — 94729 DIFFUSING CAPACITY: CPT

## 2024-04-05 PROCEDURE — 94060 EVALUATION OF WHEEZING: CPT

## 2024-04-05 PROCEDURE — 6360000002 HC RX W HCPCS

## 2024-04-05 RX ORDER — ALBUTEROL SULFATE 2.5 MG/3ML
SOLUTION RESPIRATORY (INHALATION)
Status: COMPLETED
Start: 2024-04-05 | End: 2024-04-05

## 2024-04-05 RX ADMIN — ALBUTEROL SULFATE 2.5 MG: 2.5 SOLUTION RESPIRATORY (INHALATION) at 09:39

## 2024-04-16 ENCOUNTER — APPOINTMENT (OUTPATIENT)
Dept: CARDIOLOGY | Facility: CLINIC | Age: 84
End: 2024-04-16
Payer: MEDICARE

## 2024-04-17 DIAGNOSIS — T78.40XD ALLERGY, SUBSEQUENT ENCOUNTER: ICD-10-CM

## 2024-04-17 RX ORDER — AZELASTINE 1 MG/ML
2 SPRAY, METERED NASAL 2 TIMES DAILY
Qty: 90 ML | Refills: 1 | Status: SHIPPED | OUTPATIENT
Start: 2024-04-17 | End: 2024-06-05 | Stop reason: WASHOUT

## 2024-04-17 NOTE — TELEPHONE ENCOUNTER
Patient called clinical voice mail to request refill  [facility] pharmacy  LOV 10/2023  Pending OV 6/5/24

## 2024-04-18 DIAGNOSIS — Z95.810 AICD (AUTOMATIC CARDIOVERTER/DEFIBRILLATOR) PRESENT: ICD-10-CM

## 2024-04-18 NOTE — PROGRESS NOTES
Rec'd fax from [facility] device clinic requesting  order for services rendered.  Order entered for date of service rendered and x next year for routine device checks in-clinic and remotely or as directed by physician.

## 2024-04-23 ENCOUNTER — OFFICE VISIT (OUTPATIENT)
Dept: PULMONOLOGY | Age: 84
End: 2024-04-23
Payer: MEDICARE

## 2024-04-23 VITALS
RESPIRATION RATE: 16 BRPM | BODY MASS INDEX: 36.38 KG/M2 | SYSTOLIC BLOOD PRESSURE: 126 MMHG | TEMPERATURE: 97.4 F | HEIGHT: 66 IN | WEIGHT: 226.4 LBS | HEART RATE: 60 BPM | OXYGEN SATURATION: 90 % | DIASTOLIC BLOOD PRESSURE: 68 MMHG

## 2024-04-23 DIAGNOSIS — J96.11 CHRONIC RESPIRATORY FAILURE WITH HYPOXIA AND HYPERCAPNIA (HCC): ICD-10-CM

## 2024-04-23 DIAGNOSIS — Z79.899 ON AMIODARONE THERAPY: ICD-10-CM

## 2024-04-23 DIAGNOSIS — J30.89 NON-SEASONAL ALLERGIC RHINITIS, UNSPECIFIED TRIGGER: Primary | ICD-10-CM

## 2024-04-23 DIAGNOSIS — I48.0 PAF (PAROXYSMAL ATRIAL FIBRILLATION) (HCC): ICD-10-CM

## 2024-04-23 DIAGNOSIS — J96.12 CHRONIC RESPIRATORY FAILURE WITH HYPOXIA AND HYPERCAPNIA (HCC): ICD-10-CM

## 2024-04-23 DIAGNOSIS — J30.89 NON-SEASONAL ALLERGIC RHINITIS, UNSPECIFIED TRIGGER: ICD-10-CM

## 2024-04-23 DIAGNOSIS — E66.09 CLASS 2 OBESITY DUE TO EXCESS CALORIES WITHOUT SERIOUS COMORBIDITY WITH BODY MASS INDEX (BMI) OF 36.0 TO 36.9 IN ADULT: ICD-10-CM

## 2024-04-23 PROCEDURE — G8427 DOCREV CUR MEDS BY ELIG CLIN: HCPCS | Performed by: INTERNAL MEDICINE

## 2024-04-23 PROCEDURE — 99214 OFFICE O/P EST MOD 30 MIN: CPT | Performed by: INTERNAL MEDICINE

## 2024-04-23 PROCEDURE — 1036F TOBACCO NON-USER: CPT | Performed by: INTERNAL MEDICINE

## 2024-04-23 PROCEDURE — 1123F ACP DISCUSS/DSCN MKR DOCD: CPT | Performed by: INTERNAL MEDICINE

## 2024-04-23 PROCEDURE — 3074F SYST BP LT 130 MM HG: CPT | Performed by: INTERNAL MEDICINE

## 2024-04-23 PROCEDURE — G8417 CALC BMI ABV UP PARAM F/U: HCPCS | Performed by: INTERNAL MEDICINE

## 2024-04-23 PROCEDURE — 3078F DIAST BP <80 MM HG: CPT | Performed by: INTERNAL MEDICINE

## 2024-04-23 RX ORDER — IPRATROPIUM BROMIDE 42 UG/1
2 SPRAY, METERED NASAL 3 TIMES DAILY
Qty: 15 ML | Refills: 3 | Status: SHIPPED | OUTPATIENT
Start: 2024-04-23

## 2024-04-23 RX ORDER — AMIODARONE HYDROCHLORIDE 200 MG/1
TABLET ORAL
COMMUNITY
Start: 2024-03-13

## 2024-04-23 NOTE — PROGRESS NOTES
Subjective:     Albert Eastman is a 84 y.o. male who complains today of:     Chief Complaint   Patient presents with    Follow-up     2 Month F/U for Shortness of Breath and Chronic respiratory failure with hypoxia and hypercapnia     Results     CT Chest and PFT       HPI  Patient presents for COPD  4/23/2024  Presents for evaluation of COPD, he is doing good, symptoms controlled, he has dyspnea on significant exertion, able to do his daily activities, main limitation is from hip pain and back pain.  No lower extremity edema, he has nose congestion and runny nose almost throughout the day, he is on Flonase azelastine and Singulair for that.  No chest pain, no heartburn, weight is stable, he uses trilogy every night while asleep, with 2 L O2 bleed.    2/20/2024  Presents for follow-up, overall doing good, at his baseline, however continues to have dyspnea on exertion, limiting his daily activities, no lower extremity edema, no chest pain, he uses trilogy while asleep with 2 L O2 bleed, and compliant with treatment, he is on Trelegy and compliant, has history of A-fib and follows up with cardiology, history of abdominal aortic aneurysm and he does not wish to continue monitoring.  No chest pain, no nasal congestion or postnasal drip, no heartburn, his weight is stable.    9/6/2023  Doing good, has no complaint, no chest pain, no shortness of breath and no coughing.  He tolerates trilogy while asleep with 2 L O2 bleed, no nasal congestion or postnasal drip, no heartburn his weight is stable, no lower extremity edema.        3/6/2023  Doing good, symptoms controlled, no shortness of breath, no coughing, no chest pain he uses trilogy while asleep tolerating well, uses 2 L O2 bleed, no fever no chills, no nasal congestion or postnasal drip and no heartburn.  No lower extremity edema.  He is on Trelegy Ellipta, tolerating well and symptoms are controlled.  Vaccines are up-to-date.        Allergies:  Amiodarone,

## 2024-04-24 ENCOUNTER — OFFICE VISIT (OUTPATIENT)
Dept: CARDIOLOGY | Facility: CLINIC | Age: 84
End: 2024-04-24
Payer: MEDICARE

## 2024-04-24 VITALS
WEIGHT: 223 LBS | SYSTOLIC BLOOD PRESSURE: 116 MMHG | HEIGHT: 66 IN | HEART RATE: 69 BPM | DIASTOLIC BLOOD PRESSURE: 64 MMHG | BODY MASS INDEX: 35.84 KG/M2

## 2024-04-24 DIAGNOSIS — T46.2X5A ADVERSE EFFECT OF AMIODARONE, INITIAL ENCOUNTER: ICD-10-CM

## 2024-04-24 DIAGNOSIS — I50.30 DIASTOLIC CONGESTIVE HEART FAILURE, NYHA CLASS 2, UNSPECIFIED CONGESTIVE HEART FAILURE CHRONICITY (MULTI): ICD-10-CM

## 2024-04-24 DIAGNOSIS — I48.19 PERSISTENT ATRIAL FIBRILLATION (MULTI): Primary | ICD-10-CM

## 2024-04-24 PROCEDURE — 3078F DIAST BP <80 MM HG: CPT | Performed by: INTERNAL MEDICINE

## 2024-04-24 PROCEDURE — 3074F SYST BP LT 130 MM HG: CPT | Performed by: INTERNAL MEDICINE

## 2024-04-24 PROCEDURE — 1157F ADVNC CARE PLAN IN RCRD: CPT | Performed by: INTERNAL MEDICINE

## 2024-04-24 PROCEDURE — 1160F RVW MEDS BY RX/DR IN RCRD: CPT | Performed by: INTERNAL MEDICINE

## 2024-04-24 PROCEDURE — 99214 OFFICE O/P EST MOD 30 MIN: CPT | Performed by: INTERNAL MEDICINE

## 2024-04-24 PROCEDURE — 1036F TOBACCO NON-USER: CPT | Performed by: INTERNAL MEDICINE

## 2024-04-24 PROCEDURE — 1159F MED LIST DOCD IN RCRD: CPT | Performed by: INTERNAL MEDICINE

## 2024-04-24 RX ORDER — AMIODARONE HYDROCHLORIDE 200 MG/1
200 TABLET ORAL DAILY
Qty: 90 TABLET | Refills: 1 | Status: SHIPPED | OUTPATIENT
Start: 2024-04-24 | End: 2024-05-09 | Stop reason: WASHOUT

## 2024-04-24 RX ORDER — EZETIMIBE 10 MG/1
10 TABLET ORAL DAILY
Qty: 10 TABLET | Refills: 1 | Status: SHIPPED | OUTPATIENT
Start: 2024-04-24

## 2024-04-24 RX ORDER — ATORVASTATIN CALCIUM 40 MG/1
40 TABLET, FILM COATED ORAL NIGHTLY
Qty: 90 TABLET | Refills: 1 | Status: SHIPPED | OUTPATIENT
Start: 2024-04-24

## 2024-04-24 RX ORDER — NITROGLYCERIN 0.4 MG/1
0.4 TABLET SUBLINGUAL EVERY 5 MIN PRN
Qty: 25 TABLET | Refills: 5 | Status: SHIPPED | OUTPATIENT
Start: 2024-04-24

## 2024-04-24 RX ORDER — HYDROCHLOROTHIAZIDE 25 MG/1
25 TABLET ORAL DAILY
Qty: 90 TABLET | Refills: 1 | Status: SHIPPED | OUTPATIENT
Start: 2024-04-24

## 2024-04-24 RX ORDER — FLUTICASONE FUROATE, UMECLIDINIUM BROMIDE AND VILANTEROL TRIFENATATE 100; 62.5; 25 UG/1; UG/1; UG/1
1 POWDER RESPIRATORY (INHALATION) DAILY
Qty: 2 EACH | Refills: 0 | COMMUNITY
Start: 2024-04-24

## 2024-04-24 RX ORDER — ISOSORBIDE MONONITRATE 30 MG/1
TABLET, EXTENDED RELEASE ORAL
Qty: 90 TABLET | Refills: 1 | Status: SHIPPED | OUTPATIENT
Start: 2024-04-24

## 2024-04-24 RX ORDER — METOPROLOL SUCCINATE 100 MG/1
TABLET, EXTENDED RELEASE ORAL
Qty: 135 TABLET | Refills: 1 | Status: SHIPPED | OUTPATIENT
Start: 2024-04-24

## 2024-04-24 NOTE — PROGRESS NOTES
Patient:  Guicho Jones  YOB: 1940  MRN: [id number]       Impression/Plan:     Diagnoses and all orders for this visit:  Persistent atrial fibrillation (Multi)  -     He has had no recurrent atrial fibrillation or suggestion of VT  -     No bleeding on anticoagulation  -      As noted below tremor from amiodarone mild at this time    Adverse effect of amiodarone, initial encounter         -     Amiodarone neurotoxicity: He has had mild tremor in the past.  He has not had Parkinson-like syndrome.          -     He is not a particularly appealing candidate for ablation given his obesity, age and severe emphysema.           -     The tremor may be improving now that he is beyond the load stage but I suspect it will become problematic over time as it had previously.  May need to consider AV node ablation with CRT-D.    Diastolic congestive heart failure, NYHA class 2, unspecified congestive heart failure chronicity (Multi)  -     Clinically stable.  -      Function is stable.  He is on maximal guideline directed medical therapy for diastolic CHF I think most of his dyspnea is emphysema at this time      Chief Complaint/Active Symptoms:       Guicho Jones is a 84 y.o. male who presents with  ischemic cardiomyopathy, echo 10/21 50% EF. Cath 6/15/2022 LM-30%. LAD-diffuse disease 40% distal. CX/RCA calcified diffuse disease. LVEDP 15. He has AICD secondary to prior severe decrease in overall systolic function. Also with persistent atrial fibrillation. He has a 5.6 cm complex dumbbell abdominal aortic aneurysm that has been followed by vascular surgery.  As of 2022 patient preferred no intervention.  Was to be intervened upon previously but he then developed prostate cancer and intervention postponed. Finally, he has centrilobular emphysema-severe.         1/31/2024 perfusion study: Abnormal no evidence of ischemia prior inferolateral infarct LVEF 50%. No change from previously        1/31/24   Echo  with  50% ejection fraction inferolateral hypokinesis trivial AI trivial to 1+ MR RVSP 55 mm.        Last in this office 3/13/2024.  At that time had to have sotalol discontinued because of increasing QTc.  It was replaced with amiodarone.  His AICD had also prior to changed to amnio showing increasing episodes of A-fib and nonsustained VT.  Felt his success rate of A-fib ablation would be diminished secondary to age and very severe COPD and obesity.  He did have some tremor with amiodarone but was manageable to him and hence retrial.  He was continued on Jardiance for his diastolic CHF    4/1/24 seen by Dr. Bassett and on amiodarone patient was noted to have resolution of the palpitations.  ECG at that time with atrial paced rhythm corrected  no medication changes recommended    April 4, 2024 seen by Dr. Ceballos.  PFTs that date stable DLCO 79-84% predicted    On amiodarone he is completely free of A-fib or palpitation.  Unfortunately he starts to have a tremor it is very fine at rest but if he goes to try to separate 2 pieces of paper it is annoying.  However now that he is on the 200 mg daily he says that it is improving.  He has had no chest pain or shortness of breath unless he overdoes it.  His primary  the dyspnea at this point is his COPD which is quite severe.  He recently was offered change in an inhaler and he is not gotten that prescription filled yet.                   Review of Systems: Unremarkable except as noted above    Meds     Current Outpatient Medications   Medication Instructions    albuterol (Proventil HFA) 90 mcg/actuation inhaler 1 puff, inhalation, Every 4 hours PRN    amiodarone (Pacerone) 200 mg tablet 400 mg three times daily for a week, The 400 mg twice daily for a week, Then 400 mg daily for 2 weeks, Then 200 mg daily thereafter.    amoxicillin (Amoxil) 500 mg capsule 4 capsules, oral, See admin instructions, TAKE 1 HOUR BEFORE APPOINTMENT     aspirin 81 mg, oral, TAKE 1 TABLET  MONDAY THROUGH FRIDAY AT BEDTIME     atorvastatin (Lipitor) 40 mg tablet 1 tablet, oral, Nightly    azelastine (Astelin) 137 mcg (0.1 %) nasal spray 2 sprays, Each Nostril, 2 times daily, USE TWO SPRAYS INTO EACH NOSTRIL TWO TIMES A DAY<BR>Strength: 137 mcg (0.1 %)    docosahexaenoic acid/epa (FISH OIL ORAL) 1 capsule, oral, Daily    empagliflozin (JARDIANCE) 10 mg, oral, Daily    ezetimibe (ZETIA) 10 mg, oral, Daily    fluticasone (Flonase) 50 mcg/actuation nasal spray 2 sprays, Each Nostril, Daily    fluticasone-umeclidin-vilanter (TRELEGY-ELLIPTA) 100-62.5-25 mcg blister with device 1 puff, inhalation, Daily    gentamicin (Garamycin) 0.1 % cream Topical, 2 times daily, APPLY SPARINGLY TO AFFECTED AREA    glucosamine HCl 1,500 mg tablet 1 tablet, oral, Daily    hydroCHLOROthiazide (HYDRODiuril) 25 mg tablet 1 tablet, oral, Daily    isosorbide mononitrate ER (IMDUR) 30 mg, oral, Daily, Do not crush or chew.    lisinopril 40 mg tablet 1 tablet, oral, Daily    magnesium oxide (MagOx) 400 mg (241.3 mg magnesium) tablet 1 tablet, oral, 2 times daily    metoprolol succinate XL (TOPROL-XL) 150 mg, oral, Daily    metoprolol tartrate (Lopressor) 25 mg tablet Take 1 tablet daily for palpitations only    montelukast (SINGULAIR) 10 mg, oral, Every evening    nitroglycerin (NITROSTAT) 0.4 mg, sublingual, Every 5 min PRN    rivaroxaban (XARELTO) 20 mg, oral, Daily with evening meal, Take with food.    rivaroxaban (XARELTO) 20 mg, oral, Daily with evening meal, Take with food.        Allergies     Allergies   Allergen Reactions    Levofloxacin Palpitations     Rapid Heart Rate - Severe per pt.         Annotated Problems     Specialty Problems          Cardiology Problems    CAD (coronary artery disease)     1/31/2024 perfusion study: Abnormal no evidence of ischemia prior inferolateral infarct LVEF 50%.  No change from previously    Echo from yesterday with 50% ejection fraction inferolateral hypokinesis trivial AI trivial to 1+  MR RVSP 55 mm.  Echo 2021 RVSP 43 mmHg   · Cath 6/15/2022 LM-30%. LAD-diffuse disease 40% distal. CX/RCA calcified diffuse      disease. LVEDP 15   3/19/2020. Lexiscan. Posterior lateral MI with mild jimmy-infarction ischemia. LVEF 42%.       Echocardiogram demonstrates 60% ejection fraction       2009. LVEF normal. LAD/RCA stents patent. Occluded distal circumflex. LM aneurysmal.       2008. BRONSON-proximal and mid LAD. BRONSON RCA.       1994 acute inferior infarct         Chest pain    Abdominal aortic aneurysm (CMS-HCC)     Follows with Dr. Ross and as of 10/2022 though felt to be a candidate for an off label fenestrated approach, patient preferred no intervention   · November 2021 vascular follow-up with Dr. Finney. Aorta at 5.6 cm. Intervention      postponed secondary to prostate cancer treatments   January 2021 dumbbell shaped abdominal aortic aneurysm maximal diameter 5 cm      involving origin of renal arteries         AICD (automatic cardioverter/defibrillator) present    Anticoagulant long-term use    Chronic diastolic congestive heart failure, NYHA class 2 (Multi)     Echo from yesterday with 50% ejection fraction inferolateral hypokinesis trivial AI trivial to 1+ MR RVSP 55 mm.  Echo 2021 RVSP 43 mmHg         Essential hypertension    Ischemic cardiomyopathy     January 2024 LVEF 50% RVSP 55   · October 2021 echo LVEF 55%.  Biatrial enlargement.  No significant valvular disease      March 2020 echo LVEF 60%.  Diastolic dysfunction      2002 50%      1996 25% after acute inferior infarct         Mixed hyperlipidemia    NSVT (nonsustained ventricular tachycardia) (Multi)    Paroxysmal ventricular tachycardia (Multi)    Persistent atrial fibrillation (Multi)    Renal artery stenosis (CMS-HCC)    Diastolic heart failure, NYHA class 1 (Multi)    Never smoked cigarettes    VT (ventricular tachycardia) (Multi)        Problem List     Patient Active Problem List    Diagnosis Date Noted    VT (ventricular  tachycardia) (Multi) 03/13/2024    Pulmonary hypertension (Multi) 02/02/2024    Never smoked cigarettes 02/01/2024    Primary osteoarthritis of right knee 11/13/2023    Diastolic heart failure, NYHA class 1 (Multi) 10/09/2023    BMI 36.0-36.9,adult 10/09/2023    Cutaneous candidiasis 10/09/2023    CVA (cerebral vascular accident) (Multi) 10/09/2023    Abdominal aortic aneurysm (CMS-HCC) 09/29/2023    AICD (automatic cardioverter/defibrillator) present 09/29/2023    Allergic rhinitis, seasonal 09/29/2023    Centrilobular emphysema (Multi) 09/29/2023    Essential hypertension 09/29/2023    Hyperuricemia 09/29/2023    Infarction of right basal ganglia (Multi) 09/29/2023    Ischemic cardiomyopathy 09/29/2023    Mixed hyperlipidemia 09/29/2023    NSVT (nonsustained ventricular tachycardia) (Multi) 09/29/2023    Paroxysmal ventricular tachycardia (Multi) 09/29/2023    Persistent atrial fibrillation (Multi) 09/29/2023    Renal artery stenosis (CMS-HCC) 09/29/2023    Rosacea 09/29/2023    Shortness of breath 09/29/2023    Tinea cruris 09/29/2023    Anticoagulant long-term use 09/29/2023    Chronic diastolic congestive heart failure, NYHA class 2 (Multi) 09/29/2023    High risk medication use 09/29/2023    Tremor 09/29/2023    Prostate cancer (Multi) 09/29/2023    Spinal stenosis, lumbar region with neurogenic claudication 07/17/2018    Chest pain 10/30/2016    CAD (coronary artery disease) 10/19/2016    Anemia 10/19/2016    Hypokalemia 10/19/2016    Obesity 10/19/2016    COPD exacerbation (Multi) 10/19/2016    Epiretinal membrane (ERM) of right eye 03/15/2016    Pseudophakia of both eyes 01/14/2016    Vitreomacular traction syndrome of right eye 01/14/2016    Dermatochalasis of right upper eyelid 08/17/2015    Dermatochalasis of left upper eyelid 08/17/2015    Degenerative retinal drusen of both eyes 08/17/2015    Floaters 08/17/2015    Lumbosacral spondylosis without myelopathy 07/23/2015    Blepharitis of both eyes  "01/28/2015    Drusen (degenerative) of retina 01/28/2015    Choroidal nevus, right eye 01/28/2015    Pinguecula 01/28/2015    MGD (meibomian gland dysfunction) 01/28/2015       Objective:     Vitals:    04/24/24 1027   BP: 116/64   BP Location: Left arm   Patient Position: Sitting   Pulse: 69   Weight: 101 kg (223 lb)   Height: 1.676 m (5' 6\")      Wt Readings from Last 4 Encounters:   04/24/24 101 kg (223 lb)   04/01/24 101 kg (223 lb 9.6 oz)   03/13/24 102 kg (224 lb)   03/09/24 99.8 kg (220 lb)           LAB:     Lab Results   Component Value Date    WBC 6.3 03/09/2024    HGB 11.9 (L) 03/09/2024    HCT 37.6 (L) 03/09/2024     03/09/2024    CHOL 106 02/22/2024    TRIG 107 02/22/2024    HDL 42.1 02/22/2024    ALT 33 03/09/2024    AST 24 03/09/2024     03/13/2024    K 4.3 03/13/2024     03/13/2024    CREATININE 0.96 03/13/2024    BUN 21 03/13/2024    CO2 30 03/13/2024    TSH 1.15 03/13/2024    INR 1.9 (H) 03/09/2024       Diagnostic Studies:     ECG 12 lead    Result Date: 3/10/2024  Atrial fibrillation with frequent ventricular-paced complexes and with premature ventricular or aberrantly conducted complexes Nonspecific T wave abnormality Prolonged QT Abnormal ECG When compared with ECG of 09-MAR-2024 11:01, (unconfirmed) Previous ECG has undetermined rhythm, needs review See ED provider note for full interpretation and clinical correlation Confirmed by Thania Welch ([id number]) on 3/10/2024 12:26:49 PM    ECG 12 lead    Result Date: 3/10/2024  Undetermined rhythm Inferior infarct , age undetermined Prolonged QT Abnormal ECG When compared with ECG of 09-MAR-2024 09:02, (unconfirmed) Current undetermined rhythm precludes rhythm comparison, needs review See ED provider note for full interpretation and clinical correlation Confirmed by Thania Welch ([id number]) on 3/10/2024 12:26:21 PM    ECG 12 lead    Result Date: 3/10/2024  Atrial fibrillation with frequent ventricular-paced complexes and with " premature ventricular or aberrantly conducted complexes Inferior infarct , age undetermined Abnormal ECG When compared with ECG of 15-JAIME-2022 10:05, Electronic ventricular pacemaker has replaced Electronic atrial pacemaker See ED provider note for full interpretation and clinical correlation Confirmed by Thania Welch ([id number]) on 3/10/2024 12:25:39 PM    XR chest 1 view    Result Date: 3/9/2024  STUDY: Chest Radiograph;  3/9/2024 9:51 AM. INDICATION: Chest heaviness and palpitations. COMPARISON: None Available. ACCESSION NUMBER(S): [id number] ORDERING CLINICIAN: BESS CHRISTIANSON TECHNIQUE:  Frontal chest was obtained at 09:51 hours. FINDINGS: CARDIOMEDIASTINAL SILHOUETTE: Heart is enlarged with right-sided cardiac AICD.  LUNGS: Lungs are clear.  ABDOMEN: No remarkable upper abdominal findings.  BONES: No acute osseous changes.    No acute process. Signed by Kelvin Leung MD        Radiology:     No orders to display       Physical Exam     General Appearance: alert and oriented to person, place and time, in no acute distress  Cardiovascular: normal rate, regular rhythm, normal S1 and S2, no murmurs, rubs, clicks, or gallops,  no JVD  Pulmonary/Chest: clear to auscultation bilaterally- no wheezes, rales or rhonchi, normal air movement, no respiratory distress  Abdomen: soft, non-tender, non-distended, normal bowel sounds, no masses   Extremities: no cyanosis, clubbing or edema  Skin: warm and dry, no rash or erythema  Eyes: EOMI  Neck: supple and non-tender without mass, no thyromegaly   Neurological: alert, oriented, normal speech, no focal findings or movement disorder noted.  Has very fine resting tremor  Vascular: Pulses 2+

## 2024-04-24 NOTE — PATIENT INSTRUCTIONS
Please bring all medicines, vitamins, and herbal supplements with you in original bottles to every appointment!    Prescriptions will not be filled unless you are compliant with your follow up appointments or have a follow up appointment scheduled as per instruction of your physician. Refills should be requested at the time of your visit.

## 2024-04-27 LAB
A ALTERNATA IGE QN: <0.1 KU/L (ref 0–0.34)
A FUMIGATUS IGE QN: <0.1 KU/L (ref 0–0.34)
AMER SYCAMORE IGE QN: <0.1 KU/L (ref 0–0.34)
BERMUDA GRASS IGE QN: <0.1 KU/L (ref 0–0.34)
BOXELDER IGE QN: <0.1 KU/L (ref 0–0.34)
C SPHAEROSPERMUM IGE QN: <0.1 KUL/L (ref 0–0.34)
CALIF WALNUT IGE QN: <0.1 KU/L (ref 0–0.34)
CAT DANDER IGE QN: <0.1 KU/L (ref 0–0.34)
CMN PIGWEED IGE QN: <0.1 KU/L (ref 0–0.34)
COMMON RAGWEED IGE QN: <0.1 KU/L (ref 0–0.34)
COTTONWOOD IGE QN: <0.1 KU/L (ref 0–0.34)
D FARINAE IGE QN: <0.1 KU/L (ref 0–0.34)
D PTERONYSS IGE QN: <0.1 KU/L (ref 0–0.34)
DOG DANDER IGE QN: <0.1 KU/L (ref 0–0.34)
IGE SERPL-ACNC: 4 IU/ML (ref 0–100)
M RACEMOSUS IGE QN: <0.1 KU/L (ref 0–0.34)
MOUSE EPITH IGE QN: <0.1 KU/L (ref 0–0.34)
P NOTATUM IGE QN: <0.1 KU/L (ref 0–0.34)
PECAN/HICK TREE IGE QN: <0.1 KU/L (ref 0–0.34)
RED CEDAR IGE QN: <0.1 KU/L (ref 0–0.34)
ROACH IGE QN: <0.1 KU/L (ref 0–0.34)
SALTWORT IGE QN: <0.1 KU/L (ref 0–0.34)
SHEEP SORREL IGE QN: <0.1 KU/L (ref 0–0.34)
SILVER BIRCH IGE QN: <0.1 KU/L (ref 0–0.34)
TIMOTHY IGE QN: <0.1 KU/L (ref 0–0.34)
WHITE ASH IGE QN: <0.1 KU/L (ref 0–0.34)
WHITE ELM IGE QN: <0.1 KU/L (ref 0–0.34)
WHITE MULBERRY IGE QN: <0.1 KU/L (ref 0–0.34)
WHITE OAK IGE QN: <0.1 KU/L (ref 0–0.34)

## 2024-04-30 RX ORDER — FLUTICASONE FUROATE, UMECLIDINIUM BROMIDE AND VILANTEROL TRIFENATATE 100; 62.5; 25 UG/1; UG/1; UG/1
1 POWDER RESPIRATORY (INHALATION) DAILY
Qty: 2 EACH | Refills: 0 | Status: SHIPPED | COMMUNITY
Start: 2024-04-30

## 2024-05-02 ENCOUNTER — OFFICE VISIT (OUTPATIENT)
Dept: CARDIOLOGY | Facility: CLINIC | Age: 84
End: 2024-05-02
Payer: MEDICARE

## 2024-05-02 VITALS
BODY MASS INDEX: 36.16 KG/M2 | SYSTOLIC BLOOD PRESSURE: 124 MMHG | HEIGHT: 66 IN | DIASTOLIC BLOOD PRESSURE: 72 MMHG | HEART RATE: 69 BPM | WEIGHT: 225 LBS

## 2024-05-02 DIAGNOSIS — I25.5 ISCHEMIC CARDIOMYOPATHY: ICD-10-CM

## 2024-05-02 DIAGNOSIS — Z79.899 HIGH RISK MEDICATION USE: ICD-10-CM

## 2024-05-02 DIAGNOSIS — I10 ESSENTIAL HYPERTENSION: ICD-10-CM

## 2024-05-02 DIAGNOSIS — I47.29 NSVT (NONSUSTAINED VENTRICULAR TACHYCARDIA) (MULTI): ICD-10-CM

## 2024-05-02 DIAGNOSIS — I48.19 PERSISTENT ATRIAL FIBRILLATION (MULTI): ICD-10-CM

## 2024-05-02 DIAGNOSIS — Z78.9 NEVER SMOKED CIGARETTES: ICD-10-CM

## 2024-05-02 DIAGNOSIS — Z95.810 AICD (AUTOMATIC CARDIOVERTER/DEFIBRILLATOR) PRESENT: ICD-10-CM

## 2024-05-02 PROCEDURE — 1159F MED LIST DOCD IN RCRD: CPT | Performed by: INTERNAL MEDICINE

## 2024-05-02 PROCEDURE — 99215 OFFICE O/P EST HI 40 MIN: CPT | Performed by: INTERNAL MEDICINE

## 2024-05-02 PROCEDURE — 93283 PRGRMG EVAL IMPLANTABLE DFB: CPT | Performed by: INTERNAL MEDICINE

## 2024-05-02 PROCEDURE — 1036F TOBACCO NON-USER: CPT | Performed by: INTERNAL MEDICINE

## 2024-05-02 PROCEDURE — 1157F ADVNC CARE PLAN IN RCRD: CPT | Performed by: INTERNAL MEDICINE

## 2024-05-02 PROCEDURE — 3074F SYST BP LT 130 MM HG: CPT | Performed by: INTERNAL MEDICINE

## 2024-05-02 PROCEDURE — 3078F DIAST BP <80 MM HG: CPT | Performed by: INTERNAL MEDICINE

## 2024-05-02 PROCEDURE — 1160F RVW MEDS BY RX/DR IN RCRD: CPT | Performed by: INTERNAL MEDICINE

## 2024-05-02 RX ORDER — IPRATROPIUM BROMIDE 42 UG/1
2 SPRAY, METERED NASAL 3 TIMES DAILY
COMMUNITY
Start: 2024-04-23 | End: 2024-06-05 | Stop reason: SDUPTHER

## 2024-05-02 NOTE — PROGRESS NOTES
CARDIOLOGY OFFICE VISIT      CHIEF COMPLAINT  Chief Complaint   Patient presents with    Follow-up     6wk       HISTORY OF PRESENT ILLNESS  HPI  84-year-old male  with a past medical history of coronary artery disease with normal  left ventricular function on echocardiogram in March 2008, congestive  heart failure New York Heart Association class 2, remote ventricular  fibrillation RVR status post single-chamber ICD implanted in 1994  with multiple generator change outs and also with an abdominal  implant. His device was upgraded to a dual-chamber ICD in the  prepectoral side due to evidence of atrial fibrillation. He was  placed on sotalol.     His echocardiogram performed in March 2020 showed normal left ventricular function value 55 to 60% with 1+ mitral regurgitation. Stress test at the same time, show a small area of jimmy-infarct ischemia in the posterior lateral area and also a small posterolateral myocardial infarction with a left ventricular ejection fraction 42%.     Looking at his records, patient was admitted in December 2021 for COPD exacerbation and apparently pneumonia. Since then patient has been using oxygen therapy at home. Covidâ€“19 was negative.     Patient had an a stress test in February 2021 that shows moderate inferior posterior lateral perfusion defect consistent with myocardial infarction with no evidence of ischemia with a left ventricular ejection fraction 45%.     February 2022 device interrogation, patient had an episode of atrial fibrillation that lasted approximately 15 hours. In the middle of these episodes, patient had episode of ventricular tachycardia (polymorphic) that triggered ICD charging but no ICD shock was delivered. Episode was aborted.     Patient had a cardiac catheterization in May 2022 that shows left main 30%, LAD diffusely diseased. Distal LAD 40%. Circumflex diffusely diseased with right coronary artery calcified. Medical therapy was recommended.     He recalls  having an episode of dizziness and syncope while walking to the bathroom in December 31, 2023 around 10 PM.  Device interrogation showed that the patient had an episode of atrial fibrillation with episodes of atrial fibrillation with rapid ventricular response or change intraventricular tachycardia rate of 288 bpm.  This episode was self terminated before device deliver an ICD shock.  Second episode was in January 2024 with an episode of atrial fibrillation that ended with ventricular tachycardia self terminated into atrial fibrillation again.     Patient still using sotalol therapy.     During the last office visit back in February 2024, we discussed the option of changing antiarrhythmic therapy.  Patient discontinue sotalol.  Patient had an EKG performed few days later with QT interval above 500 ms.  Patient did not receive the phone call to start amiodarone.  Patient presented to emergency department few weeks later complaining of palpitations and he was found to be in atrial fibrillation.  He saw general cardiology service recently (Dr. Hugh Perez) who put patient back on amiodarone 400 mg 3 times a day for a week and then 400 mg twice a day for a week and then 400 mg daily for 2 weeks and then 200 mg daily.    Patient states that lately he has been doing well.  He denies any symptoms of chest pain or shortness breath or palpitations.    Patient had a device interrogation performed today during this office visit and it shows adequate sensing, capture and impedances of both leads of a dual-chamber ICD.  mktg.  No evidence of atrial fibrillation since the last device interrogation.          PFT   April 2024, FEV1 91% FEV1/FVC 0.62, no significant response to bronchodilator  January 2022, FEV1 57%, FEV1/FVC 0.57  Echo done at  January 2024, EF 55%, diastolic dysfunction, RVSP 50-55.  And valvular heart disease     Past Medical History  Past Medical History:   Diagnosis Date    Encounter for  follow-up examination after completed treatment for conditions other than malignant neoplasm 12/27/2021    Hospital discharge follow-up    Ischemic cardiomyopathy     Ischemic cardiomyopathy with implantable cardioverter-defibrillator (ICD)    Pain in unspecified hip     Joint pain, hip    Personal history of other diseases of the musculoskeletal system and connective tissue     History of low back pain    Personal history of other endocrine, nutritional and metabolic disease     History of hyperlipidemia    Personal history of other specified conditions 12/21/2021    History of elevated prostate specific antigen (PSA)    Presence of coronary angioplasty implant and graft     Stented coronary artery    Unspecified atrial flutter (Multi)     Paroxysmal atrial flutter       Social History  Social History     Tobacco Use    Smoking status: Never    Smokeless tobacco: Never   Vaping Use    Vaping status: Never Used   Substance Use Topics    Alcohol use: Yes    Drug use: Never       Family History     Family History   Problem Relation Name Age of Onset    Coronary artery disease Father          Allergies:  Allergies   Allergen Reactions    Levofloxacin Palpitations     Rapid Heart Rate - Severe per pt.        Outpatient Medications:  Current Outpatient Medications   Medication Instructions    albuterol (Proventil HFA) 90 mcg/actuation inhaler 1 puff, inhalation, Every 4 hours PRN    amiodarone (PACERONE) 200 mg, oral, Daily    amoxicillin (Amoxil) 500 mg capsule 4 capsules, oral, See admin instructions, TAKE 1 HOUR BEFORE APPOINTMENT     aspirin 81 mg, oral, TAKE 1 TABLET MONDAY THROUGH FRIDAY AT BEDTIME     atorvastatin (LIPITOR) 40 mg, oral, Nightly    azelastine (Astelin) 137 mcg (0.1 %) nasal spray 2 sprays, Each Nostril, 2 times daily, USE TWO SPRAYS INTO EACH NOSTRIL TWO TIMES A DAY<BR>Strength: 137 mcg (0.1 %)    docosahexaenoic acid/epa (FISH OIL ORAL) 1 capsule, oral, Daily    empagliflozin (JARDIANCE) 10 mg,  oral, Daily    ezetimibe (ZETIA) 10 mg, oral, Daily    fluticasone (Flonase) 50 mcg/actuation nasal spray 2 sprays, Each Nostril, Daily    fluticasone-umeclidin-vilanter (TRELEGY-ELLIPTA) 100-62.5-25 mcg blister with device 1 puff, inhalation, Daily    gentamicin (Garamycin) 0.1 % cream Topical, 2 times daily, APPLY SPARINGLY TO AFFECTED AREA    glucosamine HCl 1,500 mg tablet 1 tablet, oral, Daily    hydroCHLOROthiazide (HYDRODIURIL) 25 mg, oral, Daily    ipratropium (Atrovent) 42 mcg (0.06 %) nasal spray 2 sprays, Each Nostril, 3 times daily    isosorbide mononitrate ER (Imdur) 30 mg 24 hr tablet 1 tablet daily    lisinopril 40 mg tablet 1 tablet, oral, Daily    magnesium oxide (MagOx) 400 mg (241.3 mg magnesium) tablet 1 tablet, oral, 2 times daily    metoprolol succinate XL (Toprol-XL) 100 mg 24 hr tablet 1/2 tablet in the morning, 1 full tablet at night    metoprolol tartrate (Lopressor) 25 mg tablet Take 1 tablet daily for palpitations only    montelukast (SINGULAIR) 10 mg, oral, Every evening    nitroglycerin (NITROSTAT) 0.4 mg, sublingual, Every 5 min PRN    rivaroxaban (Xarelto) 20 mg tablet 1 tablet daily          REVIEW OF SYSTEMS  Review of Systems   All other systems reviewed and are negative.        VITALS  Vitals:    05/02/24 0829   BP: 124/72   Pulse: 69       PHYSICAL EXAM  Constitutional:       Appearance: Healthy appearance. Not in distress.   Neck:      Vascular: No JVR. JVD normal.   Pulmonary:      Effort: Pulmonary effort is normal.      Breath sounds: Normal breath sounds. No wheezing. No rhonchi. No rales.   Chest:      Chest wall: Not tender to palpatation.   Cardiovascular:      Comments: Device left pectoral area. No hematoma or infection noted.     Abdominal:      General: Bowel sounds are normal.      Palpations: Abdomen is soft.      Tenderness: There is no abdominal tenderness.   Musculoskeletal: Normal range of motion.         General: No tenderness. Skin:     General: Skin is warm  and dry.   Neurological:      General: No focal deficit present.      Mental Status: Alert and oriented to person, place and time.           ASSESSMENT AND PLAN  CLINICAL IMPRESSION:   1. Status post cardiac arrest. Status post ICD therapy, device interrogation reviewed with patient during this evaluation  2. Ischemic cardiomyopathy. Stable  3. Congestive heart failure, New York Heart Association class II. Compensated  4. Persistent atrial fibrillation. Device interrogation reviewed during this evaluation with patient  5. High-risk medication.  Of sotalol therapy.  Now on amiodarone  6. Ventricular tachycardia. Recurrence of this arrhythmia by device interrogation by episode in February 2022. Plan discussed with patient during this office visit  7. Coronary artery disease with percutaneous coronary interventions  in the past. Recommended appointment with Dr. Penn for possible cardiac catheterization  8. Hypertension. Controlled  9. Hyperlipidemia. Stable  10. Chronic obstructive pulmonary disease. No recurrence  11. Long-term anticoagulation therapy with Eliquis. No evidence of bleeding  12. Shortness of breath with recent hospitalization in December 2021 due to COPD exacerbationâ€“pneumonia, now with oxygen therapy followed by pulmonary service. Stable  13.  Syncope associated with ventricular arrhythmias.    Plan recommendations    Overall patient is doing well on current medical therapy that includes high risk medication (amiodarone).    Patient had a recent PFTs.  He is followed by pulmonary service.    Get CMP and TSH every 6 months for high risk medication (amiodarone).    Device interrogations per device clinic as per protocol.    Follow my office in 6 months or sooner needed.    Risk factor modification and lifestyle modification discussed with patient. Diet , exercise and hydration discussed with patient.    I have personally review with patient during this office visit, laboratory data, echocardiogram  results, stress test results, Holter-event monitor results prior and after the last electrophysiology visit. All questions has been answered.    Please excuse any errors in grammar or translation related to this dictation.  Voice recognition software was utilized to prepare this document.      Scribe Attestation  By signing my name below, IChristina LPN , [employer]rib[employer]   attest that this documentation has been prepared under the direction and in the presence of Joce Bassett MD.

## 2024-05-02 NOTE — PATIENT INSTRUCTIONS
-Labs to be done prior to next appointment   These labs should be fasting.        -Please bring all medicines, vitamins, and herbal supplements with you in original bottles to every appointment!!!!    -Prescriptions will not be filled unless you are compliant with your follow up appointments or have a follow up appointment scheduled as per instruction of your physician. Refills should be requested at the time of your visit.

## 2024-05-09 ENCOUNTER — TELEPHONE (OUTPATIENT)
Dept: CARDIOLOGY | Facility: CLINIC | Age: 84
End: 2024-05-09
Payer: MEDICARE

## 2024-05-09 DIAGNOSIS — I48.19 PERSISTENT ATRIAL FIBRILLATION (MULTI): ICD-10-CM

## 2024-05-09 RX ORDER — AMIODARONE HYDROCHLORIDE 200 MG/1
200 TABLET ORAL DAILY
Qty: 30 TABLET | Refills: 0 | Status: SHIPPED | OUTPATIENT
Start: 2024-05-09 | End: 2024-05-10 | Stop reason: SDUPTHER

## 2024-05-09 NOTE — TELEPHONE ENCOUNTER
The patient called into the office and stated that he is out of his Amiodraone and will not get his mail order until 05/20/2024. Needs a short term supply sent to local pharmacy. Mike LOWE

## 2024-05-09 NOTE — TELEPHONE ENCOUNTER
Patient called office in follow up to Amiodarone rx and informed rx approved.  Per local pharmacy too soon to fill and patient's out of pocket cost for 2 weeks will be $7.16.  Patient notified of same and verbalized understanding.  Michelle Edmond, CMA

## 2024-05-10 DIAGNOSIS — I48.19 PERSISTENT ATRIAL FIBRILLATION (MULTI): ICD-10-CM

## 2024-05-10 RX ORDER — AMIODARONE HYDROCHLORIDE 200 MG/1
200 TABLET ORAL DAILY
Qty: 30 TABLET | Refills: 0 | Status: SHIPPED | OUTPATIENT
Start: 2024-05-10 | End: 2024-06-05 | Stop reason: WASHOUT

## 2024-05-10 RX ORDER — AMIODARONE HYDROCHLORIDE 200 MG/1
200 TABLET ORAL DAILY
Qty: 90 TABLET | Refills: 1 | Status: SHIPPED | OUTPATIENT
Start: 2024-05-10 | End: 2024-11-06

## 2024-05-21 ENCOUNTER — TELEPHONE (OUTPATIENT)
Dept: PULMONOLOGY | Age: 84
End: 2024-05-21

## 2024-05-21 NOTE — TELEPHONE ENCOUNTER
FELIZ FROM [facility] CALLED IN TO THE OFFICE BECAUSE THE PT NEEDS A PEER TO PEER FOR HIS TRILOGY MACHINE,        IF HE WOULD LIKE TO SET THIS UP PLEASE CALL [phone]      AUTH#[id number]

## 2024-05-21 NOTE — TELEPHONE ENCOUNTER
I spoke to patient and he gets his Trilogy and O2 from [facility]. I told him to call [facility] and have them send all records to [facility] and I will send an order for his Trilogy and O2 to [facility]

## 2024-06-05 ENCOUNTER — OFFICE VISIT (OUTPATIENT)
Dept: PRIMARY CARE | Facility: CLINIC | Age: 84
End: 2024-06-05
Payer: MEDICARE

## 2024-06-05 VITALS
WEIGHT: 223.4 LBS | TEMPERATURE: 97.7 F | HEART RATE: 60 BPM | SYSTOLIC BLOOD PRESSURE: 112 MMHG | HEIGHT: 66 IN | DIASTOLIC BLOOD PRESSURE: 70 MMHG | OXYGEN SATURATION: 92 % | BODY MASS INDEX: 35.9 KG/M2

## 2024-06-05 DIAGNOSIS — T78.40XD ALLERGY, SUBSEQUENT ENCOUNTER: ICD-10-CM

## 2024-06-05 DIAGNOSIS — I25.10 CORONARY ARTERY DISEASE INVOLVING NATIVE HEART, UNSPECIFIED VESSEL OR LESION TYPE, UNSPECIFIED WHETHER ANGINA PRESENT: ICD-10-CM

## 2024-06-05 DIAGNOSIS — I48.19 PERSISTENT ATRIAL FIBRILLATION (MULTI): ICD-10-CM

## 2024-06-05 DIAGNOSIS — C61 PROSTATE CANCER (MULTI): ICD-10-CM

## 2024-06-05 DIAGNOSIS — E79.0 HYPERURICEMIA: Primary | ICD-10-CM

## 2024-06-05 DIAGNOSIS — M16.12 PRIMARY OSTEOARTHRITIS OF LEFT HIP: ICD-10-CM

## 2024-06-05 DIAGNOSIS — E78.2 MIXED HYPERLIPIDEMIA: ICD-10-CM

## 2024-06-05 DIAGNOSIS — I50.32 CHRONIC DIASTOLIC CONGESTIVE HEART FAILURE, NYHA CLASS 2 (MULTI): ICD-10-CM

## 2024-06-05 DIAGNOSIS — I10 ESSENTIAL HYPERTENSION: ICD-10-CM

## 2024-06-05 DIAGNOSIS — I50.30 DIASTOLIC CONGESTIVE HEART FAILURE, NYHA CLASS 2, UNSPECIFIED CONGESTIVE HEART FAILURE CHRONICITY (MULTI): ICD-10-CM

## 2024-06-05 PROCEDURE — 1159F MED LIST DOCD IN RCRD: CPT | Performed by: FAMILY MEDICINE

## 2024-06-05 PROCEDURE — 1123F ACP DISCUSS/DSCN MKR DOCD: CPT | Performed by: FAMILY MEDICINE

## 2024-06-05 PROCEDURE — 99214 OFFICE O/P EST MOD 30 MIN: CPT | Performed by: FAMILY MEDICINE

## 2024-06-05 PROCEDURE — 3074F SYST BP LT 130 MM HG: CPT | Performed by: FAMILY MEDICINE

## 2024-06-05 PROCEDURE — 3078F DIAST BP <80 MM HG: CPT | Performed by: FAMILY MEDICINE

## 2024-06-05 PROCEDURE — 1036F TOBACCO NON-USER: CPT | Performed by: FAMILY MEDICINE

## 2024-06-05 PROCEDURE — 1157F ADVNC CARE PLAN IN RCRD: CPT | Performed by: FAMILY MEDICINE

## 2024-06-05 PROCEDURE — 1158F ADVNC CARE PLAN TLK DOCD: CPT | Performed by: FAMILY MEDICINE

## 2024-06-05 RX ORDER — IPRATROPIUM BROMIDE 42 UG/1
2 SPRAY, METERED NASAL 3 TIMES DAILY
Qty: 15 ML | Refills: 2 | Status: SHIPPED | OUTPATIENT
Start: 2024-06-05

## 2024-06-05 ASSESSMENT — PATIENT HEALTH QUESTIONNAIRE - PHQ9
1. LITTLE INTEREST OR PLEASURE IN DOING THINGS: NOT AT ALL
SUM OF ALL RESPONSES TO PHQ9 QUESTIONS 1 AND 2: 0
2. FEELING DOWN, DEPRESSED OR HOPELESS: NOT AT ALL

## 2024-06-05 ASSESSMENT — ENCOUNTER SYMPTOMS
DEPRESSION: 0
OCCASIONAL FEELINGS OF UNSTEADINESS: 0
LOSS OF SENSATION IN FEET: 0

## 2024-06-05 NOTE — PROGRESS NOTES
"Subjective   Chief complaint: Guicho Jones is a 84 y.o. male who presents for 6 month Follow-up.    HPI:  HPI  Chronic disease management.  Active problems noted in [facility].  Accompanied by his wife.  Electrophysiology and cardiology follow-ups are noted.  Medication adjustments.  Currently doing better.  No recent chest pain.  No worsening of orthopnea paroxysmal nocturnal dyspnea lower extremity edema.  No cough congestion.  No palpitations.  Medications are reconciled.  No flareups of gout.  The rash on his scrotum has gotten better because he was using powder.  No fevers chills.  Medications reconciled.  Laboratory assessment ordered.  For now we will continue current medical therapy reinforced lifestyle modifications physical activity as tolerated healthy diet medication compliance encouraged follow-up in 6 months  Objective   /70 (BP Location: Left arm, Patient Position: Sitting)   Pulse 60   Temp 36.5 °C (97.7 °F)   Ht 1.676 m (5' 6\")   Wt 101 kg (223 lb 6.4 oz)   SpO2 92%   BMI 36.06 kg/m²   Physical Exam  Vitals and nursing note reviewed.   Constitutional:       Appearance: Normal appearance.   HENT:      Head: Normocephalic and atraumatic.   Eyes:      Extraocular Movements: Extraocular movements intact.      Conjunctiva/sclera: Conjunctivae normal.      Pupils: Pupils are equal, round, and reactive to light.   Cardiovascular:      Rate and Rhythm: Normal rate and regular rhythm.      Heart sounds: Normal heart sounds.   Pulmonary:      Effort: Pulmonary effort is normal.      Breath sounds: Normal breath sounds.   Abdominal:      General: Abdomen is flat. Bowel sounds are normal.      Palpations: Abdomen is soft.   Musculoskeletal:         General: Normal range of motion.   Skin:     General: Skin is warm and dry.   Neurological:      General: No focal deficit present.      Mental Status: He is alert and oriented to person, place, and time. Mental status is at baseline.   Psychiatric:       "   Mood and Affect: Mood normal.         Behavior: Behavior normal.       Review of Systems   I have reviewed and reconciled the medication list with the patient today.   Current Outpatient Medications:     albuterol (Proventil HFA) 90 mcg/actuation inhaler, Inhale 1 puff every 4 hours if needed., Disp: , Rfl:     amiodarone (Pacerone) 200 mg tablet, Take 1 tablet (200 mg) by mouth once daily., Disp: 90 tablet, Rfl: 1    amoxicillin (Amoxil) 500 mg capsule, Take 4 capsules (2,000 mg) by mouth see administration instructions. TAKE 1 HOUR BEFORE APPOINTMENT, Disp: , Rfl:     aspirin 81 mg chewable tablet, Chew 1 tablet (81 mg). TAKE 1 TABLET MONDAY THROUGH FRIDAY AT BEDTIME, Disp: , Rfl:     atorvastatin (Lipitor) 40 mg tablet, Take 1 tablet (40 mg) by mouth once daily at bedtime., Disp: 90 tablet, Rfl: 1    docosahexaenoic acid/epa (FISH OIL ORAL), Take 1 capsule by mouth once daily., Disp: , Rfl:     empagliflozin (Jardiance) 10 mg, Take 1 tablet (10 mg) by mouth once daily., Disp: 90 tablet, Rfl: 3    ezetimibe (Zetia) 10 mg tablet, Take 1 tablet (10 mg) by mouth once daily., Disp: 10 tablet, Rfl: 1    fluticasone (Flonase) 50 mcg/actuation nasal spray, Administer 2 sprays into each nostril once daily., Disp: 16 g, Rfl: 2    fluticasone-umeclidin-vilanter (TRELEGY-ELLIPTA) 100-62.5-25 mcg blister with device, Inhale 1 puff once daily., Disp: , Rfl:     glucosamine HCl 1,500 mg tablet, Take 1 tablet by mouth once daily., Disp: , Rfl:     hydroCHLOROthiazide (HYDRODiuril) 25 mg tablet, Take 1 tablet (25 mg) by mouth once daily., Disp: 90 tablet, Rfl: 1    isosorbide mononitrate ER (Imdur) 30 mg 24 hr tablet, 1 tablet daily (Patient taking differently: Take 1 tablet (30 mg) by mouth once daily. 1 tablet daily), Disp: 90 tablet, Rfl: 1    lisinopril 40 mg tablet, Take 1 tablet (40 mg) by mouth once daily., Disp: , Rfl:     magnesium oxide (MagOx) 400 mg (241.3 mg magnesium) tablet, Take 1 tablet (400 mg) by mouth 2  times a day., Disp: , Rfl:     metoprolol succinate XL (Toprol-XL) 100 mg 24 hr tablet, 1/2 tablet in the morning, 1 full tablet at night (Patient taking differently: Take 1.5 tablets (150 mg) by mouth once daily. 1/2 tablet in the morning, 1 full tablet at night), Disp: 135 tablet, Rfl: 1    metoprolol tartrate (Lopressor) 25 mg tablet, Take 1 tablet daily for palpitations only (Patient taking differently: Take 1 tablet (25 mg) by mouth once daily as needed. Take 1 tablet daily for palpitations only), Disp: 90 tablet, Rfl: 1    montelukast (Singulair) 10 mg tablet, TAKE 1 TABLET EVERY EVENING, Disp: 90 tablet, Rfl: 3    nitroglycerin (Nitrostat) 0.4 mg SL tablet, Place 1 tablet (0.4 mg) under the tongue every 5 minutes if needed for chest pain., Disp: 25 tablet, Rfl: 5    rivaroxaban (Xarelto) 20 mg tablet, 1 tablet daily (Patient taking differently: Take 1 tablet (20 mg) by mouth once daily. 1 tablet daily), Disp: 90 tablet, Rfl: 1    gentamicin (Garamycin) 0.1 % cream, Apply topically 2 times a day. APPLY SPARINGLY TO AFFECTED AREA, Disp: , Rfl:     ipratropium (Atrovent) 42 mcg (0.06 %) nasal spray, Administer 2 sprays into each nostril 3 times a day., Disp: 15 mL, Rfl: 2     Imaging:  No results found.     Labs reviewed:    Lab Results   Component Value Date    WBC 6.3 03/09/2024    HGB 11.9 (L) 03/09/2024    HCT 37.6 (L) 03/09/2024     03/09/2024    CHOL 106 02/22/2024    TRIG 107 02/22/2024    HDL 42.1 02/22/2024    ALT 33 03/09/2024    AST 24 03/09/2024     03/13/2024    K 4.3 03/13/2024     03/13/2024    CREATININE 0.96 03/13/2024    BUN 21 03/13/2024    CO2 30 03/13/2024    TSH 1.15 03/13/2024    INR 1.9 (H) 03/09/2024       Assessment/Plan   Problem List Items Addressed This Visit             ICD-10-CM    CAD (coronary artery disease) I25.10    Relevant Orders    Comprehensive metabolic panel    Essential hypertension I10    Relevant Orders    Comprehensive metabolic panel     Hyperuricemia - Primary E79.0    Relevant Orders    Uric acid    Mixed hyperlipidemia E78.2    Relevant Orders    Comprehensive metabolic panel    Lipid panel    Persistent atrial fibrillation (Multi) I48.19    Chronic diastolic congestive heart failure, NYHA class 2 (Multi) I50.32    Prostate cancer (Multi) C61    Diastolic congestive heart failure, NYHA class 2 (Multi) I50.30     Other Visit Diagnoses         Codes    Allergy, subsequent encounter     T78.40XD    Relevant Medications    ipratropium (Atrovent) 42 mcg (0.06 %) nasal spray    Primary osteoarthritis of left hip     M16.12            Reinforced lifestyle modifications  Continue current medications as listed  Physical activity as tolerated and healthy diet encouraged  Maintain a healthy weight  Follow up in 6mo

## 2024-06-10 ENCOUNTER — LAB (OUTPATIENT)
Dept: LAB | Facility: LAB | Age: 84
End: 2024-06-10
Payer: MEDICARE

## 2024-06-10 DIAGNOSIS — E78.2 MIXED HYPERLIPIDEMIA: ICD-10-CM

## 2024-06-10 DIAGNOSIS — E79.0 HYPERURICEMIA: ICD-10-CM

## 2024-06-10 DIAGNOSIS — I25.10 CORONARY ARTERY DISEASE INVOLVING NATIVE HEART, UNSPECIFIED VESSEL OR LESION TYPE, UNSPECIFIED WHETHER ANGINA PRESENT: ICD-10-CM

## 2024-06-10 DIAGNOSIS — I10 ESSENTIAL HYPERTENSION: ICD-10-CM

## 2024-06-10 LAB
ALBUMIN SERPL BCP-MCNC: 4.1 G/DL (ref 3.4–5)
ALP SERPL-CCNC: 75 U/L (ref 33–136)
ALT SERPL W P-5'-P-CCNC: 18 U/L (ref 10–52)
ANION GAP SERPL CALC-SCNC: 11 MMOL/L (ref 10–20)
AST SERPL W P-5'-P-CCNC: 19 U/L (ref 9–39)
BILIRUB SERPL-MCNC: 1.1 MG/DL (ref 0–1.2)
BUN SERPL-MCNC: 24 MG/DL (ref 6–23)
CALCIUM SERPL-MCNC: 9 MG/DL (ref 8.6–10.3)
CHLORIDE SERPL-SCNC: 104 MMOL/L (ref 98–107)
CHOLEST SERPL-MCNC: 117 MG/DL (ref 0–199)
CHOLESTEROL/HDL RATIO: 2.2
CO2 SERPL-SCNC: 29 MMOL/L (ref 21–32)
CREAT SERPL-MCNC: 1.11 MG/DL (ref 0.5–1.3)
EGFRCR SERPLBLD CKD-EPI 2021: 65 ML/MIN/1.73M*2
GLUCOSE SERPL-MCNC: 105 MG/DL (ref 74–99)
HDLC SERPL-MCNC: 53.8 MG/DL
LDLC SERPL CALC-MCNC: 39 MG/DL
NON HDL CHOLESTEROL: 63 MG/DL (ref 0–149)
POTASSIUM SERPL-SCNC: 4.4 MMOL/L (ref 3.5–5.3)
PROT SERPL-MCNC: 6.5 G/DL (ref 6.4–8.2)
SODIUM SERPL-SCNC: 140 MMOL/L (ref 136–145)
TRIGL SERPL-MCNC: 119 MG/DL (ref 0–149)
URATE SERPL-MCNC: 6.7 MG/DL (ref 4–7.5)
VLDL: 24 MG/DL (ref 0–40)

## 2024-06-10 PROCEDURE — 80053 COMPREHEN METABOLIC PANEL: CPT

## 2024-06-10 PROCEDURE — 36415 COLL VENOUS BLD VENIPUNCTURE: CPT

## 2024-06-10 PROCEDURE — 84550 ASSAY OF BLOOD/URIC ACID: CPT

## 2024-06-10 PROCEDURE — 80061 LIPID PANEL: CPT

## 2024-06-11 ENCOUNTER — HOSPITAL ENCOUNTER (OUTPATIENT)
Dept: CARDIOLOGY | Age: 84
Discharge: HOME | End: 2024-06-11
Payer: MEDICARE

## 2024-06-11 PROCEDURE — 93296 REM INTERROG EVL PM/IDS: CPT

## 2024-06-11 PROCEDURE — 93295 DEV INTERROG REMOTE 1/2/MLT: CPT | Performed by: INTERNAL MEDICINE

## 2024-07-01 ENCOUNTER — APPOINTMENT (OUTPATIENT)
Dept: CARDIOLOGY | Facility: CLINIC | Age: 84
End: 2024-07-01
Payer: MEDICARE

## 2024-07-02 ENCOUNTER — TELEPHONE (OUTPATIENT)
Dept: PRIMARY CARE | Facility: CLINIC | Age: 84
End: 2024-07-02
Payer: MEDICARE

## 2024-07-02 DIAGNOSIS — U07.1 COVID: Primary | ICD-10-CM

## 2024-07-02 NOTE — TELEPHONE ENCOUNTER
The patient called the office and stated that he tested positive for Covid today. He has cough congestion  loose stool . He is wanting to know if there is something he could take. Should we have him do a phone visit? Please advise

## 2024-07-02 NOTE — TELEPHONE ENCOUNTER
I spoke with the patient he states he is not short of breath . He states he is very tired and congested. His wife call and I said for her to do a test to see if Linnea has covid. Anything else they should be doing. I did tell  him if he gets worse to heads to the ED. Please advise

## 2024-07-08 ENCOUNTER — TELEPHONE (OUTPATIENT)
Dept: PULMONOLOGY | Age: 84
End: 2024-07-08

## 2024-07-08 NOTE — TELEPHONE ENCOUNTER
Patient needs order to go to [employer], consintrator , POC, johnny   Has [facility] , asked Devi to send records

## 2024-07-10 ENCOUNTER — PATIENT MESSAGE (OUTPATIENT)
Dept: CARDIOLOGY | Facility: CLINIC | Age: 84
End: 2024-07-10
Payer: MEDICARE

## 2024-07-10 DIAGNOSIS — Z79.899 MEDICATION DOSE CHANGED: ICD-10-CM

## 2024-07-10 DIAGNOSIS — I50.30 DIASTOLIC CONGESTIVE HEART FAILURE, NYHA CLASS 2, UNSPECIFIED CONGESTIVE HEART FAILURE CHRONICITY (MULTI): ICD-10-CM

## 2024-07-10 RX ORDER — HYDROCHLOROTHIAZIDE 25 MG/1
TABLET ORAL
Start: 2024-07-10

## 2024-07-17 RX ORDER — FLUTICASONE FUROATE, UMECLIDINIUM BROMIDE AND VILANTEROL TRIFENATATE 100; 62.5; 25 UG/1; UG/1; UG/1
1 POWDER RESPIRATORY (INHALATION) DAILY
Qty: 2 EACH | Refills: 0 | Status: SHIPPED | OUTPATIENT
Start: 2024-07-17

## 2024-07-24 ENCOUNTER — TELEPHONE (OUTPATIENT)
Dept: PULMONOLOGY | Age: 84
End: 2024-07-24

## 2024-07-24 NOTE — TELEPHONE ENCOUNTER
I left a voice message for Sonia to call me back so I can see why they haven't received his records from [facility]. I spoke to davian and she said she has sent the records over 3 times.

## 2024-07-24 NOTE — TELEPHONE ENCOUNTER
PT CALLED IN TO THE OFFICE BECAUSE HE HAS BEEN GOING BACK AND FORTH WITH OUR OFFICE [facility] AND [facility] SINCE MAY ON GETTING HIS O2 AND TRILOGY ORDERED WITH [facility].        VICENTE PUT A MESSAGE IN THE BEGINNING OF JULY STATING JOHAN WAS FAXING EVERYTHING THERE IS ANOTHER MESSAGE FROM MAY STATING [facility] FAXED THE RECORDS AND THEY STILL HAVENT FAXED THEM. THEY ARE GIVING THE PT THE RUN AROUND.       PT WOULD LIKE YOU TO SEND THE INFORMATION OVER AND SCRIPT.

## 2024-07-25 NOTE — TELEPHONE ENCOUNTER
I spoke to JEANNIE at [facility] they said that they received an order for trilogy, supplies and records. They said that they will send everything to respiratory and they will contact patient.

## 2024-08-07 ENCOUNTER — TELEPHONE (OUTPATIENT)
Dept: PULMONOLOGY | Age: 84
End: 2024-08-07

## 2024-08-07 NOTE — TELEPHONE ENCOUNTER
PT CALLED IN TO THE OFFICE BECAUSE HE STILL DID NOT RECEIVE ANYTHING FROM [facility].      I CALLED OFFICE AND SPOKE TO TRUMAN. SHE IS GOING TO ROUTE IT AS A HIGH PRIORITY TO HER MANAGER LIZ TO HAVE IT TAKEN CARE ASAP.       PT AWARE.     FYI

## 2024-08-12 ENCOUNTER — TELEPHONE (OUTPATIENT)
Dept: PULMONOLOGY | Age: 84
End: 2024-08-12

## 2024-08-12 NOTE — TELEPHONE ENCOUNTER
Winston called in to the office because jasson did not send the info for the bleed in o2 for his trilogy machine. She is asking for a script and last office notes to be faxed         Please fax to [phone]

## 2024-08-12 NOTE — TELEPHONE ENCOUNTER
I spoke to Devi from [facility] and she said everything was in the records they sent. She is going to resend it to [facility].

## 2024-08-14 ENCOUNTER — LAB (OUTPATIENT)
Dept: LAB | Facility: LAB | Age: 84
End: 2024-08-14
Payer: MEDICARE

## 2024-08-14 DIAGNOSIS — Z79.899 MEDICATION DOSE CHANGED: ICD-10-CM

## 2024-08-14 LAB
ANION GAP SERPL CALC-SCNC: 10 MMOL/L (ref 10–20)
BUN SERPL-MCNC: 15 MG/DL (ref 6–23)
CALCIUM SERPL-MCNC: 8.9 MG/DL (ref 8.6–10.3)
CHLORIDE SERPL-SCNC: 104 MMOL/L (ref 98–107)
CO2 SERPL-SCNC: 30 MMOL/L (ref 21–32)
CREAT SERPL-MCNC: 0.91 MG/DL (ref 0.5–1.3)
EGFRCR SERPLBLD CKD-EPI 2021: 83 ML/MIN/1.73M*2
GLUCOSE SERPL-MCNC: 97 MG/DL (ref 74–99)
POTASSIUM SERPL-SCNC: 4.3 MMOL/L (ref 3.5–5.3)
SODIUM SERPL-SCNC: 140 MMOL/L (ref 136–145)

## 2024-08-14 PROCEDURE — 36415 COLL VENOUS BLD VENIPUNCTURE: CPT

## 2024-08-14 PROCEDURE — 80048 BASIC METABOLIC PNL TOTAL CA: CPT

## 2024-08-19 ENCOUNTER — APPOINTMENT (OUTPATIENT)
Dept: CARDIOLOGY | Facility: CLINIC | Age: 84
End: 2024-08-19
Payer: MEDICARE

## 2024-08-19 VITALS
HEART RATE: 60 BPM | BODY MASS INDEX: 35.81 KG/M2 | HEIGHT: 66 IN | WEIGHT: 222.8 LBS | SYSTOLIC BLOOD PRESSURE: 118 MMHG | DIASTOLIC BLOOD PRESSURE: 70 MMHG

## 2024-08-19 DIAGNOSIS — I50.30 DIASTOLIC HEART FAILURE, NYHA CLASS 1 (MULTI): ICD-10-CM

## 2024-08-19 DIAGNOSIS — R25.1 TREMOR: ICD-10-CM

## 2024-08-19 DIAGNOSIS — I48.19 PERSISTENT ATRIAL FIBRILLATION (MULTI): ICD-10-CM

## 2024-08-19 DIAGNOSIS — I25.10 CORONARY ARTERY DISEASE INVOLVING NATIVE CORONARY ARTERY OF NATIVE HEART WITHOUT ANGINA PECTORIS: Primary | ICD-10-CM

## 2024-08-19 DIAGNOSIS — I10 ESSENTIAL HYPERTENSION: ICD-10-CM

## 2024-08-19 PROCEDURE — 1157F ADVNC CARE PLAN IN RCRD: CPT | Performed by: INTERNAL MEDICINE

## 2024-08-19 PROCEDURE — 99214 OFFICE O/P EST MOD 30 MIN: CPT | Performed by: INTERNAL MEDICINE

## 2024-08-19 PROCEDURE — 3074F SYST BP LT 130 MM HG: CPT | Performed by: INTERNAL MEDICINE

## 2024-08-19 PROCEDURE — 1159F MED LIST DOCD IN RCRD: CPT | Performed by: INTERNAL MEDICINE

## 2024-08-19 PROCEDURE — 3078F DIAST BP <80 MM HG: CPT | Performed by: INTERNAL MEDICINE

## 2024-08-19 PROCEDURE — 1036F TOBACCO NON-USER: CPT | Performed by: INTERNAL MEDICINE

## 2024-08-19 PROCEDURE — G2211 COMPLEX E/M VISIT ADD ON: HCPCS | Performed by: INTERNAL MEDICINE

## 2024-08-19 NOTE — PROGRESS NOTES
Patient:  Guicho Jones  YOB: 1940  MRN: [id number]       Impression/Plan:     Diagnoses and all orders for this visit:  Coronary artery disease involving native coronary artery of native heart without angina pectoris  -     Normal functional capacity without angina continue antiplatelet and statin therapy  Persistent atrial fibrillation (Multi)  Tremor        -     No recurrence of atrial fibrillation on amiodarone, tremor is still a problem but he says it is manageable.  The only real option would be discontinuation of amiodarone with consideration of AV node ablation and CRT should it recur.  He would prefer to put up with the mild tremor as it has become a bit less on the 100 daily amiodarone.  Essential hypertension  -    Controlled without adverse effect  Diastolic heart failure, NYHA class 1 (Multi)  -     Diastolic CHF well compensated      Chief Complaint/Active Symptoms:       Guicho Jones is a 84 y.o. male who presents with  ischemic cardiomyopathy, echo 10/21 50% EF. Cath 6/15/2022 LM-30%. LAD-diffuse disease 40% distal. CX/RCA calcified diffuse disease. LVEDP 15. He has AICD secondary to prior severe decrease in overall systolic function. Also with persistent atrial fibrillation. He has a 5.6 cm complex dumbbell abdominal aortic aneurysm that has been followed by vascular surgery.  As of 2022 patient preferred no intervention.  Was to be intervened upon previously but he then developed prostate cancer and intervention postponed. Finally, he has centrilobular emphysema-severe.         1/31/2024 perfusion study: Abnormal no evidence of ischemia prior inferolateral infarct LVEF 50%. No change from previously        1/31/24   Echo  with 50% ejection fraction inferolateral hypokinesis trivial AI trivial to 1+ MR RVSP 55 mm.        I had seen him 4/24/2024 at which time he had no recurrent atrial fibrillation or VT.  Was having some issues with tremor and amiodarone he was going to follow  with Dr. Bassett as may need to consider AV node ablation and CRT-D if amiodarone had to be discontinued.  He was stable as to his diastolic CHF and functional class II    5/2/2024 saw Dr. Bassett fortunately at that time his tremor apparently had improved as the amiodarone load effect resolved.  No changes recommended    He denies angina, dyspnea, palpitation, edema, lightheadedness or syncope.  He has had no symptoms of claudication or neurologic deterioration.  There have been no hospitalizations or emergency room visits since last office visit.      He says from a cardiac standpoint he thinks the medicines are doing very well he has no cardiac issues whatsoever.  Continues to have the tremor it is difficult to write a check his wife writes most of the checks now.  He said that symptom is annoying but is slightly better than it had been when he saw me last.  He says it is manageable.  He has some GI symptoms ever since COVID a few months ago and he is going to be following up with primary care in that regard.  Also some prostate issues and he has follow-up with urology.  He notices no limitations in his functional capacity at this time.            Component      Latest Ref Rng 8/14/2024   GLUCOSE      74 - 99 mg/dL 97    SODIUM      136 - 145 mmol/L 140    POTASSIUM      3.5 - 5.3 mmol/L 4.3    CHLORIDE      98 - 107 mmol/L 104    Bicarbonate      21 - 32 mmol/L 30    Anion Gap      10 - 20 mmol/L 10    Blood Urea Nitrogen      6 - 23 mg/dL 15    Creatinine      0.50 - 1.30 mg/dL 0.91    EGFR      >60 mL/min/1.73m*2 83    Calcium      8.6 - 10.3 mg/dL 8.9                     Review of Systems: Unremarkable except as noted above    Meds     Current Outpatient Medications   Medication Instructions    albuterol (Proventil HFA) 90 mcg/actuation inhaler 1 puff, inhalation, Every 4 hours PRN    amiodarone (PACERONE) 200 mg, oral, Daily    amoxicillin (Amoxil) 500 mg capsule 4 capsules, oral, See admin instructions, TAKE 1  HOUR BEFORE APPOINTMENT     aspirin 81 mg, oral, TAKE 1 TABLET MONDAY THROUGH FRIDAY AT BEDTIME     atorvastatin (LIPITOR) 40 mg, oral, Nightly    docosahexaenoic acid/epa (FISH OIL ORAL) 1 capsule, oral, Daily    empagliflozin (JARDIANCE) 10 mg, oral, Daily    ezetimibe (ZETIA) 10 mg, oral, Daily    fluticasone (Flonase) 50 mcg/actuation nasal spray 2 sprays, Each Nostril, Daily    fluticasone-umeclidin-vilanter (TRELEGY-ELLIPTA) 100-62.5-25 mcg blister with device 1 puff, inhalation, Daily    gentamicin (Garamycin) 0.1 % cream Topical, 2 times daily, APPLY SPARINGLY TO AFFECTED AREA    glucosamine HCl 1,500 mg tablet 1 tablet, oral, Daily    hydroCHLOROthiazide (HYDRODiuril) 25 mg tablet TAKE 1 TABLET MON WED FRI ONLY    ipratropium (Atrovent) 42 mcg (0.06 %) nasal spray 2 sprays, Each Nostril, 3 times daily    isosorbide mononitrate ER (Imdur) 30 mg 24 hr tablet 1 tablet daily    lisinopril 40 mg, oral, Daily    magnesium oxide (MagOx) 400 mg (241.3 mg magnesium) tablet 1 tablet, oral, 2 times daily    metoprolol succinate XL (Toprol-XL) 100 mg 24 hr tablet 1/2 tablet in the morning, 1 full tablet at night    metoprolol tartrate (Lopressor) 25 mg tablet Take 1 tablet daily for palpitations only    montelukast (SINGULAIR) 10 mg, oral, Every evening    nitroglycerin (NITROSTAT) 0.4 mg, sublingual, Every 5 min PRN    rivaroxaban (Xarelto) 20 mg tablet 1 tablet daily        Allergies     Allergies   Allergen Reactions    Levofloxacin Palpitations     Rapid Heart Rate - Severe per pt.         Annotated Problems     Specialty Problems          Cardiology Problems    CAD (coronary artery disease)     1/31/2024 perfusion study: Abnormal no evidence of ischemia prior inferolateral infarct LVEF 50%.  No change from previously    Echo from yesterday with 50% ejection fraction inferolateral hypokinesis trivial AI trivial to 1+ MR RVSP 55 mm.  Echo 2021 RVSP 43 mmHg   · Cath 6/15/2022 LM-30%. LAD-diffuse disease 40% distal.  CX/RCA calcified diffuse      disease. LVEDP 15   3/19/2020. Lexiscan. Posterior lateral MI with mild jimmy-infarction ischemia. LVEF 42%.       Echocardiogram demonstrates 60% ejection fraction       2009. LVEF normal. LAD/RCA stents patent. Occluded distal circumflex. LM aneurysmal.       2008. BRONSON-proximal and mid LAD. BRONSON RCA.       1994 acute inferior infarct         Chest pain    Abdominal aortic aneurysm (CMS-HCC)     Follows with Dr. Ross and as of 10/2022 though felt to be a candidate for an off label fenestrated approach, patient preferred no intervention   · November 2021 vascular follow-up with Dr. Finney. Aorta at 5.6 cm. Intervention      postponed secondary to prostate cancer treatments   January 2021 dumbbell shaped abdominal aortic aneurysm maximal diameter 5 cm      involving origin of renal arteries         AICD (automatic cardioverter/defibrillator) present    Anticoagulant long-term use    Chronic diastolic congestive heart failure, NYHA class 2 (Multi)     Echo from yesterday with 50% ejection fraction inferolateral hypokinesis trivial AI trivial to 1+ MR RVSP 55 mm.  Echo 2021 RVSP 43 mmHg         Essential hypertension    Ischemic cardiomyopathy     January 2024 LVEF 50% RVSP 55   · October 2021 echo LVEF 55%.  Biatrial enlargement.  No significant valvular disease      March 2020 echo LVEF 60%.  Diastolic dysfunction      2002 50%      1996 25% after acute inferior infarct         Mixed hyperlipidemia    NSVT (nonsustained ventricular tachycardia) (Multi)    Paroxysmal ventricular tachycardia (Multi)    Persistent atrial fibrillation (Multi)    Renal artery stenosis (CMS-HCC)    Diastolic congestive heart failure, NYHA class 2 (Multi)    Never smoked cigarettes    VT (ventricular tachycardia) (Multi)        Problem List     Patient Active Problem List    Diagnosis Date Noted    VT (ventricular tachycardia) (Multi) 03/13/2024    Pulmonary hypertension (Multi) 02/02/2024    Never smoked  cigarettes 02/01/2024    Primary osteoarthritis of right knee 11/13/2023    Diastolic congestive heart failure, NYHA class 2 (Multi) 10/09/2023    BMI 36.0-36.9,adult 10/09/2023    Cutaneous candidiasis 10/09/2023    CVA (cerebral vascular accident) (Multi) 10/09/2023    Abdominal aortic aneurysm (CMS-HCC) 09/29/2023    AICD (automatic cardioverter/defibrillator) present 09/29/2023    Allergic rhinitis, seasonal 09/29/2023    Centrilobular emphysema (Multi) 09/29/2023    Essential hypertension 09/29/2023    Hyperuricemia 09/29/2023    Infarction of right basal ganglia (Multi) 09/29/2023    Ischemic cardiomyopathy 09/29/2023    Mixed hyperlipidemia 09/29/2023    NSVT (nonsustained ventricular tachycardia) (Multi) 09/29/2023    Paroxysmal ventricular tachycardia (Multi) 09/29/2023    Persistent atrial fibrillation (Multi) 09/29/2023    Renal artery stenosis (CMS-HCC) 09/29/2023    Rosacea 09/29/2023    Shortness of breath 09/29/2023    Tinea cruris 09/29/2023    Anticoagulant long-term use 09/29/2023    Chronic diastolic congestive heart failure, NYHA class 2 (Multi) 09/29/2023    High risk medication use 09/29/2023    Tremor 09/29/2023    Prostate cancer (Multi) 09/29/2023    Spinal stenosis, lumbar region with neurogenic claudication 07/17/2018    Chest pain 10/30/2016    CAD (coronary artery disease) 10/19/2016    Anemia 10/19/2016    Hypokalemia 10/19/2016    Obesity 10/19/2016    COPD exacerbation (Multi) 10/19/2016    Epiretinal membrane (ERM) of right eye 03/15/2016    Pseudophakia of both eyes 01/14/2016    Vitreomacular traction syndrome of right eye 01/14/2016    Dermatochalasis of right upper eyelid 08/17/2015    Dermatochalasis of left upper eyelid 08/17/2015    Degenerative retinal drusen of both eyes 08/17/2015    Floaters 08/17/2015    Lumbosacral spondylosis without myelopathy 07/23/2015    Blepharitis of both eyes 01/28/2015    Drusen (degenerative) of retina 01/28/2015    Choroidal nevus, right eye  "01/28/2015    Pinguecula 01/28/2015    MGD (meibomian gland dysfunction) 01/28/2015       Objective:     Vitals:    08/19/24 1130   BP: 118/70   BP Location: Left arm   Patient Position: Sitting   Pulse: 60   Weight: 101 kg (222 lb 12.8 oz)   Height: 1.676 m (5' 6\")      Wt Readings from Last 4 Encounters:   08/19/24 101 kg (222 lb 12.8 oz)   06/05/24 101 kg (223 lb 6.4 oz)   05/02/24 102 kg (225 lb)   04/24/24 101 kg (223 lb)           LAB:     Lab Results   Component Value Date    WBC 6.3 03/09/2024    HGB 11.9 (L) 03/09/2024    HCT 37.6 (L) 03/09/2024     03/09/2024    CHOL 117 06/10/2024    TRIG 119 06/10/2024    HDL 53.8 06/10/2024    ALT 18 06/10/2024    AST 19 06/10/2024     08/14/2024    K 4.3 08/14/2024     08/14/2024    CREATININE 0.91 08/14/2024    BUN 15 08/14/2024    CO2 30 08/14/2024    TSH 1.15 03/13/2024    INR 1.9 (H) 03/09/2024       Diagnostic Studies:     ECG 12 lead    Result Date: 3/10/2024  Atrial fibrillation with frequent ventricular-paced complexes and with premature ventricular or aberrantly conducted complexes Nonspecific T wave abnormality Prolonged QT Abnormal ECG When compared with ECG of 09-MAR-2024 11:01, (unconfirmed) Previous ECG has undetermined rhythm, needs review See ED provider note for full interpretation and clinical correlation Confirmed by Thania Welch ([id number]) on 3/10/2024 12:26:49 PM    ECG 12 lead    Result Date: 3/10/2024  Undetermined rhythm Inferior infarct , age undetermined Prolonged QT Abnormal ECG When compared with ECG of 09-MAR-2024 09:02, (unconfirmed) Current undetermined rhythm precludes rhythm comparison, needs review See ED provider note for full interpretation and clinical correlation Confirmed by Thania Welch ([id number]) on 3/10/2024 12:26:21 PM    ECG 12 lead    Result Date: 3/10/2024  Atrial fibrillation with frequent ventricular-paced complexes and with premature ventricular or aberrantly conducted complexes Inferior " infarct , age undetermined Abnormal ECG When compared with ECG of 15-JAIME-2022 10:05, Electronic ventricular pacemaker has replaced Electronic atrial pacemaker See ED provider note for full interpretation and clinical correlation Confirmed by Thania Welch ([id number]) on 3/10/2024 12:25:39 PM    XR chest 1 view    Result Date: 3/9/2024  STUDY: Chest Radiograph;  3/9/2024 9:51 AM. INDICATION: Chest heaviness and palpitations. COMPARISON: None Available. ACCESSION NUMBER(S): [id number] ORDERING CLINICIAN: BESS CHRISTIANSON TECHNIQUE:  Frontal chest was obtained at 09:51 hours. FINDINGS: CARDIOMEDIASTINAL SILHOUETTE: Heart is enlarged with right-sided cardiac AICD.  LUNGS: Lungs are clear.  ABDOMEN: No remarkable upper abdominal findings.  BONES: No acute osseous changes.    No acute process. Signed by Kelvin Leung MD        Radiology:     No orders to display       Physical Exam     General Appearance: alert and oriented to person, place and time, in no acute distress, obese  Cardiovascular: normal rate, regular rhythm, normal S1 and S2, no murmurs, rubs, clicks, or gallops,  no JVD  Pulmonary/Chest: clear to auscultation bilaterally- no wheezes, rales or rhonchi, normal air movement, no respiratory distress  Abdomen: soft, non-tender, non-distended, normal bowel sounds, no masses   Extremities: no cyanosis, clubbing or edema  Skin: warm and dry, no rash or erythema  Eyes: EOMI  Neck: supple and non-tender without mass, no thyromegaly   Neurological: alert, oriented, normal speech, no focal findings or movement disorder noted  Vascular: Pulses 2+                   No

## 2024-08-21 ENCOUNTER — HOSPITAL ENCOUNTER (OUTPATIENT)
Dept: RADIOLOGY | Facility: HOSPITAL | Age: 84
Discharge: HOME | End: 2024-08-21
Payer: MEDICARE

## 2024-08-21 ENCOUNTER — OFFICE VISIT (OUTPATIENT)
Dept: PRIMARY CARE | Facility: CLINIC | Age: 84
End: 2024-08-21
Payer: MEDICARE

## 2024-08-21 ENCOUNTER — TELEPHONE (OUTPATIENT)
Dept: PULMONOLOGY | Age: 84
End: 2024-08-21

## 2024-08-21 VITALS
OXYGEN SATURATION: 95 % | HEIGHT: 65 IN | HEART RATE: 59 BPM | WEIGHT: 222.4 LBS | TEMPERATURE: 98.1 F | BODY MASS INDEX: 37.05 KG/M2 | SYSTOLIC BLOOD PRESSURE: 128 MMHG | DIASTOLIC BLOOD PRESSURE: 88 MMHG

## 2024-08-21 DIAGNOSIS — R10.84 GENERALIZED ABDOMINAL PAIN: Primary | ICD-10-CM

## 2024-08-21 DIAGNOSIS — C61 PROSTATE CANCER (MULTI): ICD-10-CM

## 2024-08-21 DIAGNOSIS — R10.84 GENERALIZED ABDOMINAL PAIN: ICD-10-CM

## 2024-08-21 DIAGNOSIS — K59.02 CONSTIPATION DUE TO OUTLET DYSFUNCTION: ICD-10-CM

## 2024-08-21 DIAGNOSIS — J44.1 COPD EXACERBATION (MULTI): ICD-10-CM

## 2024-08-21 DIAGNOSIS — I25.10 CORONARY ARTERY DISEASE INVOLVING NATIVE HEART, UNSPECIFIED VESSEL OR LESION TYPE, UNSPECIFIED WHETHER ANGINA PRESENT: ICD-10-CM

## 2024-08-21 PROCEDURE — 99214 OFFICE O/P EST MOD 30 MIN: CPT | Performed by: INTERNAL MEDICINE

## 2024-08-21 PROCEDURE — 1036F TOBACCO NON-USER: CPT | Performed by: INTERNAL MEDICINE

## 2024-08-21 PROCEDURE — 74019 RADEX ABDOMEN 2 VIEWS: CPT

## 2024-08-21 PROCEDURE — 3074F SYST BP LT 130 MM HG: CPT | Performed by: INTERNAL MEDICINE

## 2024-08-21 PROCEDURE — 1124F ACP DISCUSS-NO DSCNMKR DOCD: CPT | Performed by: INTERNAL MEDICINE

## 2024-08-21 PROCEDURE — 1157F ADVNC CARE PLAN IN RCRD: CPT | Performed by: INTERNAL MEDICINE

## 2024-08-21 PROCEDURE — 1159F MED LIST DOCD IN RCRD: CPT | Performed by: INTERNAL MEDICINE

## 2024-08-21 PROCEDURE — 3079F DIAST BP 80-89 MM HG: CPT | Performed by: INTERNAL MEDICINE

## 2024-08-21 RX ORDER — GLUCOSAM/CHONDRO/HERB 149/HYAL 750-100 MG
1000 TABLET ORAL 2 TIMES DAILY
COMMUNITY

## 2024-08-21 RX ORDER — POLYETHYLENE GLYCOL 3350 17 G/17G
17 POWDER, FOR SOLUTION ORAL DAILY
Qty: 30 EACH | Refills: 3 | Status: SHIPPED | OUTPATIENT
Start: 2024-08-21 | End: 2024-12-19

## 2024-08-21 RX ORDER — IPRATROPIUM BROMIDE 42 UG/1
2 SPRAY, METERED NASAL 3 TIMES DAILY
Qty: 15 ML | Refills: 3 | Status: SHIPPED | OUTPATIENT
Start: 2024-08-21

## 2024-08-21 ASSESSMENT — ENCOUNTER SYMPTOMS
DYSURIA: 0
LIGHT-HEADEDNESS: 0
MYALGIAS: 0
COUGH: 0
ACTIVITY CHANGE: 0
ABDOMINAL PAIN: 0
SORE THROAT: 0

## 2024-08-21 NOTE — TELEPHONE ENCOUNTER
[facility] RECEIVED PAPERWORK FROM [facility] BUT THE ORDER FOR THE TRILOGY MACHINE IS OVER A YEAR OLD AND THEY NEED A NEW ORDER FAXED TO THEM.

## 2024-08-21 NOTE — PROGRESS NOTES
Guicho LEE Robert, arianna 84 y.o. male was seen today:   Chief Complaint   Patient presents with    Changes in Bowl/Intestines       VISIT ELISABET:  Glenn is here to discuss about his constipation.  He has generalized abdominal pain.  He is not having good bowel movement.  His bowel habit has changed.  He has more constipation than soft stool.  Patient does have a history of prostate cancer status post local radiation.  He does also complain about the diameter of his stool is very narrow.  He never feels that he is emptying enough.  He took some milk of magnesia and had better bowel movement.  He wants to make sure he does not have any abdominal pathology for having this change in the bowel habit.  Patient did not change any dietary habit.  He does have a history of COPD and needs his solution for the DuoNeb.  Patient has ischemic cardiomyopathy with coronary artery disease status post AICD.  Today his abdominal exam is pretty benign.  Is soft and nontender.  He has a old AICD in the left lower quadrant.      TODAY'S VISIT  DX:     1. Generalized abdominal pain  XR abdomen 3+ views      2. Constipation due to outlet dysfunction  polyethylene glycol (Glycolax, Miralax) 17 gram packet      3. Prostate cancer (Multi)        4. COPD exacerbation (Multi)  ipratropium (Atrovent) 42 mcg (0.06 %) nasal spray      5. Coronary artery disease involving native heart, unspecified vessel or lesion type, unspecified whether angina present  omega 3-dha-epa-fish oil (Fish OiL) 1,000 mg (120 mg-180 mg) capsule           MEDICAL DECISION MAKING:  - The current and active medical co morbidities have been considered.   - Recent lab work and relevant imaging studies have been reviewed.    - Relevant correspondence/notes from other specialty consultants were reviewed.    - Therapy and treatment as per above plan   - Next Follow up in 3 months with PCP.  I will review the x-ray abdomen and let patient know about the finding.      Review  "of Systems   Constitutional:  Negative for activity change.   HENT:  Negative for congestion and sore throat.    Respiratory:  Negative for cough.    Cardiovascular:  Negative for chest pain.   Gastrointestinal:  Negative for abdominal pain.   Endocrine: Negative for polyuria.   Genitourinary:  Negative for dysuria.   Musculoskeletal:  Negative for myalgias.   Skin:  Negative for rash.   Neurological:  Negative for light-headedness.   Psychiatric/Behavioral:  Negative for behavioral problems.      Visit Vitals  /88 (BP Location: Left arm)   Pulse 59   Temp 36.7 °C (98.1 °F)   Ht 1.651 m (5' 5\")   Wt 101 kg (222 lb 6.4 oz)   SpO2 95% Comment: RA   BMI 37.01 kg/m²   Smoking Status Never   BSA 2.15 m²         Wt Readings from Last 10 Encounters:   08/21/24 101 kg (222 lb 6.4 oz)   08/19/24 101 kg (222 lb 12.8 oz)   06/05/24 101 kg (223 lb 6.4 oz)   05/02/24 102 kg (225 lb)   04/24/24 101 kg (223 lb)   04/01/24 101 kg (223 lb 9.6 oz)   03/13/24 102 kg (224 lb)   03/09/24 99.8 kg (220 lb)   02/19/24 104 kg (229 lb)   02/01/24 102 kg (225 lb)     Physical Exam  Vitals and nursing note reviewed.   Constitutional:       Appearance: Normal appearance.   HENT:      Head: Normocephalic.      Right Ear: Tympanic membrane normal.      Left Ear: Tympanic membrane normal.      Nose: Nose normal.      Mouth/Throat:      Mouth: Mucous membranes are moist.   Cardiovascular:      Rate and Rhythm: Normal rate and regular rhythm.      Pulses: Normal pulses.      Heart sounds: No murmur heard.  Pulmonary:      Effort: No respiratory distress.      Breath sounds: Normal breath sounds.   Abdominal:      Palpations: Abdomen is soft.   Musculoskeletal:      Cervical back: Neck supple.      Right lower leg: No edema.      Left lower leg: No edema.   Skin:     General: Skin is warm.      Findings: No rash.   Neurological:      General: No focal deficit present.      Mental Status: He is alert and oriented to person, place, and time. "   Psychiatric:         Mood and Affect: Mood normal.          RECENT LABS:  Lab Results   Component Value Date    WBC 6.3 03/09/2024    HGB 11.9 (L) 03/09/2024    HCT 37.6 (L) 03/09/2024     03/09/2024    CHOL 117 06/10/2024    TRIG 119 06/10/2024    HDL 53.8 06/10/2024    ALT 18 06/10/2024    AST 19 06/10/2024     08/14/2024    K 4.3 08/14/2024     08/14/2024    CREATININE 0.91 08/14/2024    BUN 15 08/14/2024    CO2 30 08/14/2024    TSH 1.15 03/13/2024    INR 1.9 (H) 03/09/2024     Lab Results   Component Value Date    GLUCOSE 97 08/14/2024    CALCIUM 8.9 08/14/2024     08/14/2024    K 4.3 08/14/2024    CO2 30 08/14/2024     08/14/2024    BUN 15 08/14/2024    CREATININE 0.91 08/14/2024      Lab Results   Component Value Date    LDLCALC 39 06/10/2024       IMAGING:  === 03/09/24 ===  XR CHEST 1 VIEW    No acute process.    === 10/03/22 ===  CT ABDOMEN ANGIOGRAM W AND/OR WO IV CONTRAST  1. Two infrarenal abdominal aorta aneurysms. The aorta between the  two aneurysm measures 24 mm diameter.  2. Superior infrarenal abdominal aorta aneurysm 46 mm  diameter(measured 24 mm below renal artery).  3. Inferior infrarenal abdominal aorta aneurysm 56 mm  diameter(measured 63 mm below renal artery).  4. Nonaneurysmal aortic neck length 0 mm.  5. Proximal left renal artery stent is patent without InStent  stenosis.  6. Compared to previous CTA abdomen October 29, 2021 there is no  significant interval change. Previous study reports similar findings.      MEDICATIONS:   Current Outpatient Medications   Medication Instructions    albuterol (Proventil HFA) 90 mcg/actuation inhaler 1 puff, inhalation, Every 4 hours PRN    amiodarone (PACERONE) 200 mg, oral, Daily    amoxicillin (Amoxil) 500 mg capsule 4 capsules, oral, See admin instructions, TAKE 1 HOUR BEFORE DENTAL APPOINTMENT     aspirin 81 mg, oral, TAKE 1 TABLET MONDAY THROUGH FRIDAY AT BEDTIME     atorvastatin (LIPITOR) 40 mg, oral, Nightly     empagliflozin (JARDIANCE) 10 mg, oral, Daily    ezetimibe (ZETIA) 10 mg, oral, Daily    fluticasone (Flonase) 50 mcg/actuation nasal spray 2 sprays, Each Nostril, Daily    fluticasone-umeclidin-vilanter (TRELEGY-ELLIPTA) 100-62.5-25 mcg blister with device 1 puff, inhalation, Daily    glucosamine HCl 1,500 mg tablet 1 tablet, oral, Daily    hydroCHLOROthiazide (HYDRODiuril) 25 mg tablet TAKE 1 TABLET MON WED FRI ONLY    ipratropium (Atrovent) 42 mcg (0.06 %) nasal spray 2 sprays, Each Nostril, 3 times daily    isosorbide mononitrate ER (Imdur) 30 mg 24 hr tablet 1 tablet daily    lisinopril 40 mg, oral, Daily    magnesium oxide (MagOx) 400 mg (241.3 mg magnesium) tablet 1 tablet, oral, 2 times daily    metoprolol succinate XL (Toprol-XL) 100 mg 24 hr tablet 1/2 tablet in the morning, 1 full tablet at night    metoprolol tartrate (Lopressor) 25 mg tablet Take 1 tablet daily for palpitations only    montelukast (SINGULAIR) 10 mg, oral, Every evening    nitroglycerin (NITROSTAT) 0.4 mg, sublingual, Every 5 min PRN    omega 3-dha-epa-fish oil (Fish OiL) 1,000 mg (120 mg-180 mg) capsule 1,000 mg, oral, 2 times daily    polyethylene glycol (GLYCOLAX, MIRALAX) 17 g, oral, Daily, Mix 1 cap (17g) into 8 ounces of fluid.    rivaroxaban (Xarelto) 20 mg tablet 1 tablet daily

## 2024-08-27 DIAGNOSIS — J96.12 CHRONIC RESPIRATORY FAILURE WITH HYPOXIA AND HYPERCAPNIA (HCC): Primary | ICD-10-CM

## 2024-08-27 DIAGNOSIS — J96.11 CHRONIC RESPIRATORY FAILURE WITH HYPOXIA AND HYPERCAPNIA (HCC): Primary | ICD-10-CM

## 2024-09-09 RX ORDER — FLUTICASONE FUROATE, UMECLIDINIUM BROMIDE AND VILANTEROL TRIFENATATE 100; 62.5; 25 UG/1; UG/1; UG/1
1 POWDER RESPIRATORY (INHALATION) DAILY
Qty: 2 EACH | Refills: 0 | COMMUNITY
Start: 2024-09-09

## 2024-10-04 ENCOUNTER — HOSPITAL ENCOUNTER (OUTPATIENT)
Dept: CARDIOLOGY | Age: 84
Discharge: HOME | End: 2024-10-04
Payer: MEDICARE

## 2024-10-04 PROCEDURE — 93296 REM INTERROG EVL PM/IDS: CPT

## 2024-10-04 PROCEDURE — 93295 DEV INTERROG REMOTE 1/2/MLT: CPT | Performed by: INTERNAL MEDICINE

## 2024-10-28 ENCOUNTER — OFFICE VISIT (OUTPATIENT)
Dept: PULMONOLOGY | Age: 84
End: 2024-10-28
Payer: MEDICARE

## 2024-10-28 VITALS
DIASTOLIC BLOOD PRESSURE: 78 MMHG | SYSTOLIC BLOOD PRESSURE: 122 MMHG | HEIGHT: 66 IN | HEART RATE: 74 BPM | OXYGEN SATURATION: 92 % | TEMPERATURE: 97.8 F | BODY MASS INDEX: 36.16 KG/M2 | WEIGHT: 225 LBS

## 2024-10-28 DIAGNOSIS — I51.89 DIASTOLIC DYSFUNCTION: ICD-10-CM

## 2024-10-28 DIAGNOSIS — J30.89 NON-SEASONAL ALLERGIC RHINITIS, UNSPECIFIED TRIGGER: ICD-10-CM

## 2024-10-28 DIAGNOSIS — J44.9 CHRONIC OBSTRUCTIVE PULMONARY DISEASE, UNSPECIFIED COPD TYPE (HCC): ICD-10-CM

## 2024-10-28 DIAGNOSIS — I48.0 PAF (PAROXYSMAL ATRIAL FIBRILLATION) (HCC): ICD-10-CM

## 2024-10-28 DIAGNOSIS — E66.812 CLASS 2 OBESITY DUE TO EXCESS CALORIES WITHOUT SERIOUS COMORBIDITY WITH BODY MASS INDEX (BMI) OF 36.0 TO 36.9 IN ADULT: ICD-10-CM

## 2024-10-28 DIAGNOSIS — E66.09 CLASS 2 OBESITY DUE TO EXCESS CALORIES WITHOUT SERIOUS COMORBIDITY WITH BODY MASS INDEX (BMI) OF 36.0 TO 36.9 IN ADULT: ICD-10-CM

## 2024-10-28 DIAGNOSIS — J96.11 CHRONIC RESPIRATORY FAILURE WITH HYPOXIA AND HYPERCAPNIA: Primary | ICD-10-CM

## 2024-10-28 DIAGNOSIS — Z79.899 ON AMIODARONE THERAPY: ICD-10-CM

## 2024-10-28 DIAGNOSIS — J96.12 CHRONIC RESPIRATORY FAILURE WITH HYPOXIA AND HYPERCAPNIA: Primary | ICD-10-CM

## 2024-10-28 PROCEDURE — G8417 CALC BMI ABV UP PARAM F/U: HCPCS | Performed by: INTERNAL MEDICINE

## 2024-10-28 PROCEDURE — 1123F ACP DISCUSS/DSCN MKR DOCD: CPT | Performed by: INTERNAL MEDICINE

## 2024-10-28 PROCEDURE — 3078F DIAST BP <80 MM HG: CPT | Performed by: INTERNAL MEDICINE

## 2024-10-28 PROCEDURE — G8484 FLU IMMUNIZE NO ADMIN: HCPCS | Performed by: INTERNAL MEDICINE

## 2024-10-28 PROCEDURE — 3074F SYST BP LT 130 MM HG: CPT | Performed by: INTERNAL MEDICINE

## 2024-10-28 PROCEDURE — 1159F MED LIST DOCD IN RCRD: CPT | Performed by: INTERNAL MEDICINE

## 2024-10-28 PROCEDURE — 1036F TOBACCO NON-USER: CPT | Performed by: INTERNAL MEDICINE

## 2024-10-28 PROCEDURE — G8427 DOCREV CUR MEDS BY ELIG CLIN: HCPCS | Performed by: INTERNAL MEDICINE

## 2024-10-28 PROCEDURE — 3023F SPIROM DOC REV: CPT | Performed by: INTERNAL MEDICINE

## 2024-10-28 PROCEDURE — 99214 OFFICE O/P EST MOD 30 MIN: CPT | Performed by: INTERNAL MEDICINE

## 2024-10-28 NOTE — PROGRESS NOTES
Subjective:     Albert Eastman is a 84 y.o. male who complains today of:     Chief Complaint   Patient presents with    Follow-up     6month    COPD    Allergies    Shortness of Breath       HPI  Patient presents for COPD and chronic respiratory failure    10/28/2024  Overall doing good, able to do his daily activities with no issues, no chest pain, no coughing, mild shortness of breath on exertion, no lower extremity edema, no heartburn.  Weight is stable, no nasal congestion postnasal drip  He uses Linda currently, he feels it is pushes too much air with large tidal volumes.  He does not like his current mask.        4/23/2024  Presents for evaluation of COPD, he is doing good, symptoms controlled, he has dyspnea on significant exertion, able to do his daily activities, main limitation is from hip pain and back pain.  No lower extremity edema, he has nose congestion and runny nose almost throughout the day, he is on Flonase azelastine and Singulair for that.  No chest pain, no heartburn, weight is stable, he uses trilogy every night while asleep, with 2 L O2 bleed.    2/20/2024  Presents for follow-up, overall doing good, at his baseline, however continues to have dyspnea on exertion, limiting his daily activities, no lower extremity edema, no chest pain, he uses trilogy while asleep with 2 L O2 bleed, and compliant with treatment, he is on Trelegy and compliant, has history of A-fib and follows up with cardiology, history of abdominal aortic aneurysm and he does not wish to continue monitoring.  No chest pain, no nasal congestion or postnasal drip, no heartburn, his weight is stable.    9/6/2023  Doing good, has no complaint, no chest pain, no shortness of breath and no coughing.  He tolerates trilogy while asleep with 2 L O2 bleed, no nasal congestion or postnasal drip, no heartburn his weight is stable, no lower extremity edema.        3/6/2023  Doing good, symptoms controlled, no shortness of breath, no

## 2024-11-04 ENCOUNTER — APPOINTMENT (OUTPATIENT)
Dept: CARDIOLOGY | Facility: CLINIC | Age: 84
End: 2024-11-04
Payer: MEDICARE

## 2024-11-04 VITALS
BODY MASS INDEX: 36.45 KG/M2 | HEART RATE: 60 BPM | SYSTOLIC BLOOD PRESSURE: 122 MMHG | HEIGHT: 66 IN | DIASTOLIC BLOOD PRESSURE: 72 MMHG | WEIGHT: 226.8 LBS

## 2024-11-04 DIAGNOSIS — I47.29 NSVT (NONSUSTAINED VENTRICULAR TACHYCARDIA) (MULTI): ICD-10-CM

## 2024-11-04 DIAGNOSIS — Z95.810 AICD (AUTOMATIC CARDIOVERTER/DEFIBRILLATOR) PRESENT: ICD-10-CM

## 2024-11-04 DIAGNOSIS — I48.19 PERSISTENT ATRIAL FIBRILLATION (MULTI): ICD-10-CM

## 2024-11-04 DIAGNOSIS — I10 ESSENTIAL HYPERTENSION: ICD-10-CM

## 2024-11-04 DIAGNOSIS — I25.5 ISCHEMIC CARDIOMYOPATHY: ICD-10-CM

## 2024-11-04 DIAGNOSIS — Z79.899 HIGH RISK MEDICATION USE: ICD-10-CM

## 2024-11-04 DIAGNOSIS — Z78.9 NEVER SMOKED CIGARETTES: ICD-10-CM

## 2024-11-04 PROCEDURE — 1036F TOBACCO NON-USER: CPT | Performed by: INTERNAL MEDICINE

## 2024-11-04 PROCEDURE — 1123F ACP DISCUSS/DSCN MKR DOCD: CPT | Performed by: INTERNAL MEDICINE

## 2024-11-04 PROCEDURE — 1159F MED LIST DOCD IN RCRD: CPT | Performed by: INTERNAL MEDICINE

## 2024-11-04 PROCEDURE — 93000 ELECTROCARDIOGRAM COMPLETE: CPT | Performed by: INTERNAL MEDICINE

## 2024-11-04 PROCEDURE — 3078F DIAST BP <80 MM HG: CPT | Performed by: INTERNAL MEDICINE

## 2024-11-04 PROCEDURE — 99215 OFFICE O/P EST HI 40 MIN: CPT | Performed by: INTERNAL MEDICINE

## 2024-11-04 PROCEDURE — 3074F SYST BP LT 130 MM HG: CPT | Performed by: INTERNAL MEDICINE

## 2024-11-04 PROCEDURE — 1157F ADVNC CARE PLAN IN RCRD: CPT | Performed by: INTERNAL MEDICINE

## 2024-11-04 RX ORDER — AMIODARONE HYDROCHLORIDE 200 MG/1
100 TABLET ORAL DAILY
Qty: 90 TABLET | Refills: 1 | Status: SHIPPED | OUTPATIENT
Start: 2024-11-04 | End: 2025-10-30

## 2024-11-04 RX ORDER — METOPROLOL SUCCINATE 100 MG/1
TABLET, EXTENDED RELEASE ORAL
Start: 2024-11-04

## 2024-11-04 NOTE — PROGRESS NOTES
CARDIOLOGY OFFICE VISIT      CHIEF COMPLAINT  Chief Complaint   Patient presents with    Follow-up     6 month        HISTORY OF PRESENT ILLNESS  HPI  84-year-old male  with a past medical history of coronary artery disease with normal  left ventricular function on echocardiogram in March 2008, congestive  heart failure New York Heart Association class 2, remote ventricular  fibrillation RVR status post single-chamber ICD implanted in 1994  with multiple generator change outs and also with an abdominal  implant. His device was upgraded to a dual-chamber ICD in the  prepectoral side due to evidence of atrial fibrillation. He was  placed on sotalol.     His echocardiogram performed in March 2020 showed normal left ventricular function value 55 to 60% with 1+ mitral regurgitation. Stress test at the same time, show a small area of jimmy-infarct ischemia in the posterior lateral area and also a small posterolateral myocardial infarction with a left ventricular ejection fraction 42%.     Looking at his records, patient was admitted in December 2021 for COPD exacerbation and apparently pneumonia. Since then patient has been using oxygen therapy at home. Covidâ€“19 was negative.     Patient had an a stress test in February 2021 that shows moderate inferior posterior lateral perfusion defect consistent with myocardial infarction with no evidence of ischemia with a left ventricular ejection fraction 45%.     February 2022 device interrogation, patient had an episode of atrial fibrillation that lasted approximately 15 hours. In the middle of these episodes, patient had episode of ventricular tachycardia (polymorphic) that triggered ICD charging but no ICD shock was delivered. Episode was aborted.     Patient had a cardiac catheterization in May 2022 that shows left main 30%, LAD diffusely diseased. Distal LAD 40%. Circumflex diffusely diseased with right coronary artery calcified. Medical therapy was recommended.     He  recalls having an episode of dizziness and syncope while walking to the bathroom in December 31, 2023 around 10 PM.  Device interrogation showed that the patient had an episode of atrial fibrillation with episodes of atrial fibrillation with rapid ventricular response or change intraventricular tachycardia rate of 288 bpm.  This episode was self terminated before device deliver an ICD shock.  Second episode was in January 2024 with an episode of atrial fibrillation that ended with ventricular tachycardia self terminated into atrial fibrillation again.     Patient still using sotalol therapy.     During the last office visit back in February 2024, we discussed the option of changing antiarrhythmic therapy.  Patient discontinue sotalol.  Patient had an EKG performed few days later with QT interval above 500 ms.  Patient did not receive the phone call to start amiodarone.  Patient presented to emergency department few weeks later complaining of palpitations and he was found to be in atrial fibrillation.  He saw general cardiology service recently (Dr. Hugh Perez) who put patient back on amiodarone 400 mg 3 times a day for a week and then 400 mg twice a day for a week and then 400 mg daily for 2 weeks and then 200 mg daily.         PFT   April 2024, FEV1 91% FEV1/FVC 0.62, no significant response to bronchodilator  January 2022, FEV1 57%, FEV1/FVC 0.57  Echo done at  January 2024, EF 55%, diastolic dysfunction, RVSP 50-55.  And valvular heart disease       Since the last office visit, he has been doing well.  He denies any symptoms of chest pain shortness of breath or palpitations.    Patient also has decrease the dose of amiodarone 100 mg daily with improvement of his tremors.    Patient had a device interrogation in October 2024 and it shows device functioning with 1 brief episode of nonsustained ventricular tachycardia lasting 5 seconds duration at a rate of 204 bpm.  Battery longevity 4 years.    Past Medical  History  Past Medical History:   Diagnosis Date    Encounter for follow-up examination after completed treatment for conditions other than malignant neoplasm 12/27/2021    Hospital discharge follow-up    Ischemic cardiomyopathy     Ischemic cardiomyopathy with implantable cardioverter-defibrillator (ICD)    Pain in unspecified hip     Joint pain, hip    Personal history of other diseases of the musculoskeletal system and connective tissue     History of low back pain    Personal history of other endocrine, nutritional and metabolic disease     History of hyperlipidemia    Personal history of other specified conditions 12/21/2021    History of elevated prostate specific antigen (PSA)    Presence of coronary angioplasty implant and graft     Stented coronary artery    Unspecified atrial flutter (Multi)     Paroxysmal atrial flutter       Social History  Social History     Tobacco Use    Smoking status: Never     Passive exposure: Never    Smokeless tobacco: Never   Vaping Use    Vaping status: Never Used   Substance Use Topics    Alcohol use: Yes     Comment: occassional    Drug use: Never       Family History     Family History   Problem Relation Name Age of Onset    Liver disease Mother      Coronary artery disease Father          Allergies:  Allergies   Allergen Reactions    Levofloxacin Palpitations     Rapid Heart Rate - Severe per pt.        Outpatient Medications:  Current Outpatient Medications   Medication Instructions    albuterol (Proventil HFA) 90 mcg/actuation inhaler 1 puff, Every 4 hours PRN    amiodarone (PACERONE) 200 mg, oral, Daily    amoxicillin (Amoxil) 500 mg capsule 4 capsules, See admin instructions    aspirin 81 mg    atorvastatin (LIPITOR) 40 mg, oral, Nightly    empagliflozin (JARDIANCE) 10 mg, oral, Daily    ezetimibe (ZETIA) 10 mg, oral, Daily    fluticasone (Flonase) 50 mcg/actuation nasal spray 2 sprays, Each Nostril, Daily    fluticasone-umeclidin-vilanter (TRELEGY-ELLIPTA) 100-62.5-25  mcg blister with device 1 puff, Daily    glucosamine HCl 1,500 mg tablet 1 tablet, Daily    hydroCHLOROthiazide (HYDRODiuril) 25 mg tablet TAKE 1 TABLET MON WED FRI ONLY    ipratropium (Atrovent) 42 mcg (0.06 %) nasal spray 2 sprays, Each Nostril, 3 times daily    isosorbide mononitrate ER (IMDUR) 30 mg, oral, Daily, 1 tablet daily    lisinopril 40 mg, oral, Daily    magnesium oxide (MagOx) 400 mg (241.3 mg magnesium) tablet 1 tablet, 2 times daily    metoprolol succinate XL (Toprol-XL) 100 mg 24 hr tablet 1/2 tablet in the morning, 1 full tablet at night    metoprolol tartrate (Lopressor) 25 mg tablet Take 1 tablet daily for palpitations only    montelukast (SINGULAIR) 10 mg, oral, Every evening    nitroglycerin (NITROSTAT) 0.4 mg, sublingual, Every 5 min PRN    omega 3-dha-epa-fish oil (Fish OiL) 1,000 mg (120 mg-180 mg) capsule 1,000 mg, 2 times daily    polyethylene glycol (GLYCOLAX, MIRALAX) 17 g, oral, Daily, Mix 1 cap (17g) into 8 ounces of fluid.    rivaroxaban (Xarelto) 20 mg tablet 1 tablet daily          REVIEW OF SYSTEMS  Review of Systems   All other systems reviewed and are negative.        VITALS  Vitals:    11/04/24 0829   BP: 122/72   Pulse: 60       PHYSICAL EXAM  Constitutional:       Appearance: Healthy appearance. Not in distress.   Neck:      Vascular: No JVR. JVD normal.   Pulmonary:      Effort: Pulmonary effort is normal.      Breath sounds: Normal breath sounds. No wheezing. No rhonchi. No rales.   Chest:      Chest wall: Not tender to palpatation.   Cardiovascular:      PMI at left midclavicular line. Normal rate. Regular rhythm. Normal S1. Normal S2.       Murmurs: There is no murmur.      No gallop.  No click. No rub.      Comments: Device in the left prepectoral healing well.  No signs of hematoma or infection.     Pulses:     Intact distal pulses.   Edema:     Peripheral edema absent.   Abdominal:      General: Bowel sounds are normal.      Palpations: Abdomen is soft.      Tenderness:  There is no abdominal tenderness.   Musculoskeletal: Normal range of motion.         General: No tenderness. Skin:     General: Skin is warm and dry.   Neurological:      General: No focal deficit present.      Mental Status: Alert and oriented to person, place and time.           ASSESSMENT AND PLAN  CLINICAL IMPRESSION:   1. Status post cardiac arrest. Status post ICD therapy, device interrogation reviewed with patient during this evaluation  2. Ischemic cardiomyopathy. Stable  3. Congestive heart failure, New York Heart Association class II. Compensated  4. Persistent atrial fibrillation. Device interrogation reviewed during this evaluation with patient  5. High-risk medication.  Of sotalol therapy.  Now on amiodarone  6. Ventricular tachycardia. Recurrence of this arrhythmia by device interrogation by episode in February 2022. Plan discussed with patient during this office visit  7. Coronary artery disease with percutaneous coronary interventions  in the past. Recommended appointment with Dr. Penn for possible cardiac catheterization  8. Hypertension. Controlled  9. Hyperlipidemia. Stable  10. Chronic obstructive pulmonary disease. No recurrence  11. Long-term anticoagulation therapy with Eliquis. No evidence of bleeding  12. Shortness of breath with recent hospitalization in December 2021 due to COPD exacerbationâ€“pneumonia, now with oxygen therapy followed by pulmonary service. Stable  13.  Syncope associated with ventricular arrhythmias.    Plan recommendations    From the electrophysiology standpoint he is doing well.  Will continue with current medical therapy that includes amiodarone at a dose of 100 mg daily.    EKG performed today shows sinus rhythm at a rate of 60 bpm QRS ration 112 ms QT corrected 494 ms.    Follow device clinic as scheduled.    Follow my office every 6 months or sooner needed.    Get complete PFTs annually for high risk medication.    Get CMP and TSH every 6 months for high risk  medication.

## 2024-11-04 NOTE — PATIENT INSTRUCTIONS
6 month follow up with fasting labs to be done prior    Complete Pulmonary Function Test to be done April 2025      DID YOU KNOW  We have a pharmacy here in the [facility].  They can fill all prescriptions, not just cardiac medications.  Prescriptions from other pharmacies can easily be transferred to the  pharmacy by the  pharmacist on site.   pharmacies offer FREE HOME DELIVERY on medications to anywhere in [place]. They can sync your medications. Typically prescriptions can be ready in 10 - 15 minutes. If pharmacy is unable to fill your  prescription or if cost is more than your paying now the Pharmacist can easily transfer back to your Pharmacy of choice. Pharmacy phone # [phone].       Please bring all medicines, vitamins, and herbal supplements with you in original bottles to every appointment!!!!    Prescriptions will not be filled unless you are compliant with your follow up appointments or have a follow up appointment scheduled as per instruction of your physician. Refills should be requested at the time of your visit.

## 2024-11-07 ENCOUNTER — TELEPHONE (OUTPATIENT)
Dept: PULMONOLOGY | Age: 84
End: 2024-11-07

## 2024-11-07 NOTE — TELEPHONE ENCOUNTER
Pt called in to the office because they are denying his niv. He spoke to [employer] and they are stating they need a letter of importance and office notes faxed to them,        Please fax to [phone]

## 2024-11-25 ENCOUNTER — APPOINTMENT (OUTPATIENT)
Dept: PRIMARY CARE | Facility: CLINIC | Age: 84
End: 2024-11-25
Payer: MEDICARE

## 2024-11-25 VITALS
WEIGHT: 231 LBS | HEIGHT: 66 IN | HEART RATE: 60 BPM | BODY MASS INDEX: 37.12 KG/M2 | SYSTOLIC BLOOD PRESSURE: 118 MMHG | TEMPERATURE: 98.2 F | DIASTOLIC BLOOD PRESSURE: 76 MMHG

## 2024-11-25 DIAGNOSIS — I48.19 PERSISTENT ATRIAL FIBRILLATION (MULTI): ICD-10-CM

## 2024-11-25 DIAGNOSIS — E66.01 OBESITY, MORBID (MULTI): ICD-10-CM

## 2024-11-25 DIAGNOSIS — I25.5 ISCHEMIC CARDIOMYOPATHY: ICD-10-CM

## 2024-11-25 DIAGNOSIS — I71.43 INFRARENAL ABDOMINAL AORTIC ANEURYSM (AAA) WITHOUT RUPTURE (CMS-HCC): ICD-10-CM

## 2024-11-25 DIAGNOSIS — E78.2 MIXED HYPERLIPIDEMIA: ICD-10-CM

## 2024-11-25 DIAGNOSIS — L73.2 HIDRADENITIS SUPPURATIVA OF ANUS: Primary | ICD-10-CM

## 2024-11-25 DIAGNOSIS — I10 ESSENTIAL HYPERTENSION: ICD-10-CM

## 2024-11-25 PROBLEM — I47.20 VT (VENTRICULAR TACHYCARDIA) (MULTI): Status: RESOLVED | Noted: 2024-03-13 | Resolved: 2024-11-25

## 2024-11-25 PROBLEM — B37.2 CUTANEOUS CANDIDIASIS: Status: RESOLVED | Noted: 2023-10-09 | Resolved: 2024-11-25

## 2024-11-25 PROBLEM — I47.29 PAROXYSMAL VENTRICULAR TACHYCARDIA (MULTI): Status: RESOLVED | Noted: 2023-09-29 | Resolved: 2024-11-25

## 2024-11-25 PROBLEM — I50.30 DIASTOLIC CONGESTIVE HEART FAILURE, NYHA CLASS 2: Status: RESOLVED | Noted: 2023-10-09 | Resolved: 2024-11-25

## 2024-11-25 PROBLEM — L71.9 ROSACEA: Status: RESOLVED | Noted: 2023-09-29 | Resolved: 2024-11-25

## 2024-11-25 PROBLEM — I47.20 PAROXYSMAL VENTRICULAR TACHYCARDIA: Status: RESOLVED | Noted: 2023-09-29 | Resolved: 2024-11-25

## 2024-11-25 PROBLEM — R06.02 SHORTNESS OF BREATH: Status: RESOLVED | Noted: 2023-09-29 | Resolved: 2024-11-25

## 2024-11-25 PROBLEM — I47.29 NSVT (NONSUSTAINED VENTRICULAR TACHYCARDIA) (MULTI): Status: RESOLVED | Noted: 2023-09-29 | Resolved: 2024-11-25

## 2024-11-25 PROCEDURE — G2211 COMPLEX E/M VISIT ADD ON: HCPCS | Performed by: INTERNAL MEDICINE

## 2024-11-25 PROCEDURE — 1159F MED LIST DOCD IN RCRD: CPT | Performed by: INTERNAL MEDICINE

## 2024-11-25 PROCEDURE — 1036F TOBACCO NON-USER: CPT | Performed by: INTERNAL MEDICINE

## 2024-11-25 PROCEDURE — 1124F ACP DISCUSS-NO DSCNMKR DOCD: CPT | Performed by: INTERNAL MEDICINE

## 2024-11-25 PROCEDURE — 99214 OFFICE O/P EST MOD 30 MIN: CPT | Performed by: INTERNAL MEDICINE

## 2024-11-25 PROCEDURE — 3078F DIAST BP <80 MM HG: CPT | Performed by: INTERNAL MEDICINE

## 2024-11-25 PROCEDURE — 3074F SYST BP LT 130 MM HG: CPT | Performed by: INTERNAL MEDICINE

## 2024-11-25 PROCEDURE — 1157F ADVNC CARE PLAN IN RCRD: CPT | Performed by: INTERNAL MEDICINE

## 2024-11-25 RX ORDER — BUDESONIDE, GLYCOPYRROLATE, AND FORMOTEROL FUMARATE 160; 9; 4.8 UG/1; UG/1; UG/1
2 AEROSOL, METERED RESPIRATORY (INHALATION)
COMMUNITY

## 2024-11-25 ASSESSMENT — ENCOUNTER SYMPTOMS
VOMITING: 0
ABDOMINAL PAIN: 0
RHINORRHEA: 1
NAIL CHANGES: 0
LIGHT-HEADEDNESS: 0
SHORTNESS OF BREATH: 1
FATIGUE: 0
COUGH: 0
DIARRHEA: 0
EYE PAIN: 0
SORE THROAT: 0
ACTIVITY CHANGE: 0
FEVER: 0
DEPRESSION: 0
MYALGIAS: 0
DYSURIA: 0

## 2024-11-25 NOTE — PROGRESS NOTES
Guicho Jones, pleasant 84 y.o. male was seen today:     Chief Complaint   Patient presents with    Rash     Patient here for c/o rash in scrotum area, stated that this is not a new issue. Has been treated prior.        VISIT ELISABET:    Guicho Jones   Patient comes here for multiple medical comorbidities.  He is using noninvasive ventilator BiPAP for his pulmonary hypertension and COPD.  At times she gets abdominal bloating most likely from aerophagia.  Patient was told to discuss with his pulmonologist to readjust his BiPAP setting perhaps.  Patient also has been fighting with a rash.  To my exam it seems like it has passed, tender and inflammation with induration.  I have a feeling this is not really tinea cruris rather we are dealing with hidradenitis suppurativa.  I like to send him to the dermatology.  His cardiac health is better.  He denies any shortness of breath or lower extremity edema.  He has multiple high risk medications including Xarelto and amiodarone.  He will follow-up with his cardiologist.  At this time he does not have any acute medical complaint.      Rash  This is a chronic problem. The current episode started more than 1 year ago. The problem has been waxing and waning since onset. The affected locations include the genitalia. The rash is characterized by pain and draining. Associated symptoms include rhinorrhea and shortness of breath. Pertinent negatives include no congestion, cough, diarrhea, eye pain, facial edema, fatigue, fever, joint pain, nail changes, sore throat or vomiting.       MEDICATIONS:   Current Outpatient Medications   Medication Instructions    albuterol (Proventil HFA) 90 mcg/actuation inhaler 1 puff, Every 4 hours PRN    amiodarone (PACERONE) 100 mg, oral, Daily    amoxicillin (Amoxil) 500 mg capsule 4 capsules, See admin instructions    aspirin 81 mg    atorvastatin (LIPITOR) 40 mg, oral, Nightly    budesonide-glycopyr-formoterol (Breztri Aerosphere) 160-9-4.8  mcg/actuation HFA aerosol inhaler 2 puffs, 2 times daily RT    empagliflozin (JARDIANCE) 10 mg, oral, Daily    ezetimibe (ZETIA) 10 mg, oral, Daily    fluticasone (Flonase) 50 mcg/actuation nasal spray 2 sprays, Each Nostril, Daily    glucosamine HCl 1,500 mg tablet 1 tablet, Daily    hydroCHLOROthiazide (HYDRODiuril) 25 mg tablet TAKE 1 TABLET MON WED FRI ONLY    ipratropium (Atrovent) 42 mcg (0.06 %) nasal spray 2 sprays, Each Nostril, 3 times daily    isosorbide mononitrate ER (IMDUR) 30 mg, oral, Daily, 1 tablet daily    lisinopril 40 mg, oral, Daily    magnesium oxide (MagOx) 400 mg (241.3 mg magnesium) tablet 1 tablet, 2 times daily    metoprolol succinate XL (Toprol-XL) 100 mg 24 hr tablet 1/2 tablet in the morning, 1 full tablet at night    metoprolol tartrate (Lopressor) 25 mg tablet Take 1 tablet daily for palpitations only    montelukast (SINGULAIR) 10 mg, oral, Every evening    nitroglycerin (NITROSTAT) 0.4 mg, sublingual, Every 5 min PRN    omega 3-dha-epa-fish oil (Fish OiL) 1,000 mg (120 mg-180 mg) capsule 1,000 mg, 2 times daily    polyethylene glycol (GLYCOLAX, MIRALAX) 17 g, oral, Daily, Mix 1 cap (17g) into 8 ounces of fluid.    rivaroxaban (Xarelto) 20 mg tablet 1 tablet daily       TODAY'S VISIT  DX:     1. Hidradenitis suppurativa of anus  Referral to Dermatology      2. Essential hypertension  BP is normal today in the office.      3. Ischemic cardiomyopathy  Will follow-up with cardiology.      4. Mixed hyperlipidemia  Last LDL was 39.  Currently on atorvastatin      5. Persistent atrial fibrillation (Multi)  Disability Placard      6. Infrarenal abdominal aortic aneurysm (AAA) without rupture (CMS-HCC)  Under surveillance from the vascular surgeon.      7. Obesity, morbid (Multi)  Increase activity and decrease calorie consumption.           MEDICAL DECISION MAKING:  - Treatment and therapy plan are as above   - Active medical co morbidities have been considered.   - Recent lab work and  "relevant imaging studies were reviewed.    - Correspondence/notes from specialty consultants were checked.    - Next Follow up in 3 months      Review of Systems   Constitutional:  Negative for activity change, fatigue and fever.   HENT:  Positive for rhinorrhea. Negative for congestion and sore throat.    Eyes:  Negative for pain.   Respiratory:  Positive for shortness of breath. Negative for cough.    Cardiovascular:  Negative for chest pain.   Gastrointestinal:  Negative for abdominal pain, diarrhea and vomiting.   Endocrine: Negative for polyuria.   Genitourinary:  Negative for dysuria.   Musculoskeletal:  Negative for joint pain and myalgias.   Skin:  Positive for rash. Negative for nail changes.   Neurological:  Negative for light-headedness.   Psychiatric/Behavioral:  Negative for behavioral problems.      Visit Vitals  /76 (BP Location: Left arm, Patient Position: Sitting, BP Cuff Size: Adult)   Pulse 60   Temp 36.8 °C (98.2 °F) (Temporal)   Ht 1.676 m (5' 6\")   Wt 105 kg (231 lb)   BMI 37.28 kg/m²   Smoking Status Former   BSA 2.21 m²         Wt Readings from Last 10 Encounters:   11/25/24 105 kg (231 lb)   11/04/24 103 kg (226 lb 12.8 oz)   08/21/24 101 kg (222 lb 6.4 oz)   08/19/24 101 kg (222 lb 12.8 oz)   06/05/24 101 kg (223 lb 6.4 oz)   05/02/24 102 kg (225 lb)   04/24/24 101 kg (223 lb)   04/01/24 101 kg (223 lb 9.6 oz)   03/13/24 102 kg (224 lb)   03/09/24 99.8 kg (220 lb)     Physical Exam  Vitals and nursing note reviewed.   Constitutional:       Appearance: Normal appearance.   HENT:      Head: Normocephalic.      Right Ear: Tympanic membrane normal.      Left Ear: Tympanic membrane normal.      Nose: Nose normal.      Mouth/Throat:      Mouth: Mucous membranes are moist.   Cardiovascular:      Rate and Rhythm: Normal rate and regular rhythm.      Pulses: Normal pulses.      Heart sounds: No murmur heard.  Pulmonary:      Effort: No respiratory distress.      Breath sounds: Normal breath " sounds.   Abdominal:      Palpations: Abdomen is soft.   Musculoskeletal:      Cervical back: Neck supple.      Right lower leg: No edema.      Left lower leg: No edema.   Skin:     General: Skin is warm.      Findings: No rash.   Neurological:      General: No focal deficit present.      Mental Status: He is alert and oriented to person, place, and time.   Psychiatric:         Mood and Affect: Mood normal.        RECENT LABS:  Lab Results   Component Value Date    WBC 6.3 03/09/2024    HGB 11.9 (L) 03/09/2024    HCT 37.6 (L) 03/09/2024     03/09/2024    CHOL 117 06/10/2024    TRIG 119 06/10/2024    HDL 53.8 06/10/2024    ALT 18 06/10/2024    AST 19 06/10/2024     08/14/2024    K 4.3 08/14/2024     08/14/2024    CREATININE 0.91 08/14/2024    BUN 15 08/14/2024    CO2 30 08/14/2024    TSH 1.15 03/13/2024    INR 1.9 (H) 03/09/2024     Lab Results   Component Value Date    GLUCOSE 97 08/14/2024    CALCIUM 8.9 08/14/2024     08/14/2024    K 4.3 08/14/2024    CO2 30 08/14/2024     08/14/2024    BUN 15 08/14/2024    CREATININE 0.91 08/14/2024      Lab Results   Component Value Date    LDLCALC 39 06/10/2024     Lab Results   Component Value Date    LDLCALC 39 06/10/2024    CREATININE 0.91 08/14/2024             P.S: This note was completed using Dragon voice recognition technology and may include unintended errors with respect to translation of words, typographical errors or grammar errors which may not have been identified while finalizing the chart.

## 2024-12-16 ENCOUNTER — APPOINTMENT (OUTPATIENT)
Dept: PRIMARY CARE | Facility: CLINIC | Age: 84
End: 2024-12-16
Payer: MEDICARE

## 2024-12-17 DIAGNOSIS — T78.40XD ALLERGY, SUBSEQUENT ENCOUNTER: ICD-10-CM

## 2024-12-20 RX ORDER — FLUTICASONE PROPIONATE 50 MCG
2 SPRAY, SUSPENSION (ML) NASAL DAILY
Qty: 48 G | Refills: 3 | Status: SHIPPED | OUTPATIENT
Start: 2024-12-20

## 2025-01-10 ENCOUNTER — APPOINTMENT (OUTPATIENT)
Dept: RADIOLOGY | Facility: HOSPITAL | Age: 85
DRG: 871 | End: 2025-01-10
Payer: MEDICARE

## 2025-01-10 ENCOUNTER — APPOINTMENT (OUTPATIENT)
Dept: CARDIOLOGY | Facility: HOSPITAL | Age: 85
DRG: 871 | End: 2025-01-10
Payer: MEDICARE

## 2025-01-10 ENCOUNTER — HOSPITAL ENCOUNTER (INPATIENT)
Facility: HOSPITAL | Age: 85
End: 2025-01-10
Attending: EMERGENCY MEDICINE | Admitting: EMERGENCY MEDICINE
Payer: MEDICARE

## 2025-01-10 DIAGNOSIS — I25.5 ISCHEMIC CARDIOMYOPATHY: ICD-10-CM

## 2025-01-10 DIAGNOSIS — D68.9 MEDICATION INDUCED COAGULOPATHY (MULTI): ICD-10-CM

## 2025-01-10 DIAGNOSIS — D64.9 ANEMIA, UNSPECIFIED TYPE: ICD-10-CM

## 2025-01-10 DIAGNOSIS — R19.7 DIARRHEA, UNSPECIFIED TYPE: Primary | ICD-10-CM

## 2025-01-10 DIAGNOSIS — N28.9 RENAL INSUFFICIENCY: ICD-10-CM

## 2025-01-10 DIAGNOSIS — R78.81 BACTEREMIA: ICD-10-CM

## 2025-01-10 DIAGNOSIS — K25.0 ACUTE GASTRIC ULCER WITH HEMORRHAGE: ICD-10-CM

## 2025-01-10 DIAGNOSIS — Z95.810 AICD (AUTOMATIC CARDIOVERTER/DEFIBRILLATOR) PRESENT: ICD-10-CM

## 2025-01-10 DIAGNOSIS — I50.32 CHRONIC DIASTOLIC CONGESTIVE HEART FAILURE, NYHA CLASS 2: ICD-10-CM

## 2025-01-10 DIAGNOSIS — T50.905A MEDICATION INDUCED COAGULOPATHY (MULTI): ICD-10-CM
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
## 2025-01-10 DIAGNOSIS — R78.81 BACTEREMIA DUE TO GRAM-POSITIVE BACTERIA: ICD-10-CM

## 2025-01-10 DIAGNOSIS — B36.9 FUNGAL INFECTION OF SKIN OF ABDOMEN: ICD-10-CM

## 2025-01-10 DIAGNOSIS — R79.89 ELEVATED TROPONIN: ICD-10-CM

## 2025-01-10 DIAGNOSIS — B37.9 CANDIDA INFECTION: ICD-10-CM

## 2025-01-10 DIAGNOSIS — D50.0 BLOOD LOSS ANEMIA: ICD-10-CM

## 2025-01-10 DIAGNOSIS — E87.6 HYPOKALEMIA: ICD-10-CM

## 2025-01-10 DIAGNOSIS — R29.898 WEAKNESS OF BOTH ARMS: ICD-10-CM

## 2025-01-10 DIAGNOSIS — K92.1 MELENA: ICD-10-CM

## 2025-01-10 DIAGNOSIS — I27.20 PULMONARY HYPERTENSION (MULTI): ICD-10-CM

## 2025-01-10 DIAGNOSIS — I95.9 HYPOTENSION, UNSPECIFIED HYPOTENSION TYPE: ICD-10-CM

## 2025-01-10 DIAGNOSIS — M54.50 LUMBAR PAIN: ICD-10-CM

## 2025-01-10 LAB
ALBUMIN SERPL BCP-MCNC: 3.4 G/DL (ref 3.4–5)
ALP SERPL-CCNC: 56 U/L (ref 33–136)
ALT SERPL W P-5'-P-CCNC: 20 U/L (ref 10–52)
AMORPH CRY #/AREA UR COMP ASSIST: ABNORMAL /HPF
ANION GAP SERPL CALC-SCNC: 12 MMOL/L (ref 10–20)
APPEARANCE UR: CLEAR
AST SERPL W P-5'-P-CCNC: 54 U/L (ref 9–39)
ATRIAL RATE: 60 BPM
ATRIAL RATE: 64 BPM
BACTERIA #/AREA URNS AUTO: ABNORMAL /HPF
BASOPHILS # BLD AUTO: 0.02 X10*3/UL (ref 0–0.1)
BASOPHILS NFR BLD AUTO: 0.1 %
BILIRUB SERPL-MCNC: 2.2 MG/DL (ref 0–1.2)
BILIRUB UR STRIP.AUTO-MCNC: NEGATIVE MG/DL
BNP SERPL-MCNC: 680 PG/ML (ref 0–99)
BUN SERPL-MCNC: 39 MG/DL (ref 6–23)
CALCIUM SERPL-MCNC: 8.1 MG/DL (ref 8.6–10.3)
CARDIAC TROPONIN I PNL SERPL HS: 121 NG/L (ref 0–20)
CARDIAC TROPONIN I PNL SERPL HS: 128 NG/L (ref 0–20)
CHLORIDE SERPL-SCNC: 98 MMOL/L (ref 98–107)
CO2 SERPL-SCNC: 26 MMOL/L (ref 21–32)
COLOR UR: YELLOW
CREAT SERPL-MCNC: 1.67 MG/DL (ref 0.5–1.3)
EGFRCR SERPLBLD CKD-EPI 2021: 40 ML/MIN/1.73M*2
EOSINOPHIL # BLD AUTO: 0.04 X10*3/UL (ref 0–0.4)
EOSINOPHIL NFR BLD AUTO: 0.3 %
ERYTHROCYTE [DISTWIDTH] IN BLOOD BY AUTOMATED COUNT: 16.2 % (ref 11.5–14.5)
FLUAV RNA RESP QL NAA+PROBE: NOT DETECTED
FLUBV RNA RESP QL NAA+PROBE: NOT DETECTED
GLUCOSE SERPL-MCNC: 148 MG/DL (ref 74–99)
GLUCOSE UR STRIP.AUTO-MCNC: ABNORMAL MG/DL
HCT VFR BLD AUTO: 36.2 % (ref 41–52)
HGB BLD-MCNC: 11.7 G/DL (ref 13.5–17.5)
IMM GRANULOCYTES # BLD AUTO: 0.2 X10*3/UL (ref 0–0.5)
IMM GRANULOCYTES NFR BLD AUTO: 1.4 % (ref 0–0.9)
INR PPP: 1.7 (ref 0.9–1.1)
KETONES UR STRIP.AUTO-MCNC: NEGATIVE MG/DL
LACTATE SERPL-SCNC: 2.7 MMOL/L (ref 0.4–2)
LEUKOCYTE ESTERASE UR QL STRIP.AUTO: NEGATIVE
LYMPHOCYTES # BLD AUTO: 0.29 X10*3/UL (ref 0.8–3)
LYMPHOCYTES NFR BLD AUTO: 2.1 %
MAGNESIUM SERPL-MCNC: 1.95 MG/DL (ref 1.6–2.4)
MCH RBC QN AUTO: 31.5 PG (ref 26–34)
MCHC RBC AUTO-ENTMCNC: 32.3 G/DL (ref 32–36)
MCV RBC AUTO: 97 FL (ref 80–100)
MONOCYTES # BLD AUTO: 0.54 X10*3/UL (ref 0.05–0.8)
MONOCYTES NFR BLD AUTO: 3.9 %
MUCOUS THREADS #/AREA URNS AUTO: ABNORMAL /LPF
NEUTROPHILS # BLD AUTO: 12.84 X10*3/UL (ref 1.6–5.5)
NEUTROPHILS NFR BLD AUTO: 92.2 %
NITRITE UR QL STRIP.AUTO: NEGATIVE
NRBC BLD-RTO: 0 /100 WBCS (ref 0–0)
P AXIS: 38 DEGREES
P OFFSET: 214 MS
P ONSET: 183 MS
PH UR STRIP.AUTO: 5.5 [PH]
PLATELET # BLD AUTO: 100 X10*3/UL (ref 150–450)
POTASSIUM SERPL-SCNC: 3.2 MMOL/L (ref 3.5–5.3)
PR INTERVAL: 82 MS
PROT SERPL-MCNC: 6.2 G/DL (ref 6.4–8.2)
PROT UR STRIP.AUTO-MCNC: ABNORMAL MG/DL
PROTHROMBIN TIME: 19.7 SECONDS (ref 9.8–12.8)
Q ONSET: 218 MS
Q ONSET: 224 MS
QRS COUNT: 10 BEATS
QRS COUNT: 11 BEATS
QRS DURATION: 110 MS
QRS DURATION: 122 MS
QT INTERVAL: 526 MS
QT INTERVAL: 572 MS
QTC CALCULATION(BAZETT): 542 MS
QTC CALCULATION(BAZETT): 572 MS
QTC FREDERICIA: 537 MS
QTC FREDERICIA: 572 MS
R AXIS: 18 DEGREES
R AXIS: 28 DEGREES
RBC # BLD AUTO: 3.72 X10*6/UL (ref 4.5–5.9)
RBC # UR STRIP.AUTO: ABNORMAL /UL
RBC #/AREA URNS AUTO: ABNORMAL /HPF
RSV RNA RESP QL NAA+PROBE: NOT DETECTED
SARS-COV-2 RNA RESP QL NAA+PROBE: NOT DETECTED
SODIUM SERPL-SCNC: 133 MMOL/L (ref 136–145)
SP GR UR STRIP.AUTO: >1.03
SQUAMOUS #/AREA URNS AUTO: ABNORMAL /HPF
T AXIS: 110 DEGREES
T AXIS: 74 DEGREES
T OFFSET: 487 MS
T OFFSET: 504 MS
UROBILINOGEN UR STRIP.AUTO-MCNC: NORMAL MG/DL
VENTRICULAR RATE: 60 BPM
VENTRICULAR RATE: 64 BPM
WBC # BLD AUTO: 13.9 X10*3/UL (ref 4.4–11.3)
WBC #/AREA URNS AUTO: ABNORMAL /HPF

## 2025-01-10 PROCEDURE — 80053 COMPREHEN METABOLIC PANEL: CPT | Performed by: EMERGENCY MEDICINE

## 2025-01-10 PROCEDURE — 84484 ASSAY OF TROPONIN QUANT: CPT | Performed by: EMERGENCY MEDICINE

## 2025-01-10 PROCEDURE — 2550000001 HC RX 255 CONTRASTS: Performed by: EMERGENCY MEDICINE

## 2025-01-10 PROCEDURE — 70450 CT HEAD/BRAIN W/O DYE: CPT

## 2025-01-10 PROCEDURE — 2500000001 HC RX 250 WO HCPCS SELF ADMINISTERED DRUGS (ALT 637 FOR MEDICARE OP): Performed by: EMERGENCY MEDICINE

## 2025-01-10 PROCEDURE — 85610 PROTHROMBIN TIME: CPT | Performed by: EMERGENCY MEDICINE

## 2025-01-10 PROCEDURE — 71045 X-RAY EXAM CHEST 1 VIEW: CPT

## 2025-01-10 PROCEDURE — 99223 1ST HOSP IP/OBS HIGH 75: CPT | Performed by: NURSE PRACTITIONER

## 2025-01-10 PROCEDURE — 87636 SARSCOV2 & INF A&B AMP PRB: CPT | Performed by: EMERGENCY MEDICINE

## 2025-01-10 PROCEDURE — 70450 CT HEAD/BRAIN W/O DYE: CPT | Performed by: RADIOLOGY

## 2025-01-10 PROCEDURE — 72125 CT NECK SPINE W/O DYE: CPT

## 2025-01-10 PROCEDURE — 85025 COMPLETE CBC W/AUTO DIFF WBC: CPT | Performed by: EMERGENCY MEDICINE

## 2025-01-10 PROCEDURE — 2500000001 HC RX 250 WO HCPCS SELF ADMINISTERED DRUGS (ALT 637 FOR MEDICARE OP)

## 2025-01-10 PROCEDURE — 2020000001 HC ICU ROOM DAILY

## 2025-01-10 PROCEDURE — 83605 ASSAY OF LACTIC ACID: CPT | Performed by: EMERGENCY MEDICINE

## 2025-01-10 PROCEDURE — 93005 ELECTROCARDIOGRAM TRACING: CPT

## 2025-01-10 PROCEDURE — 2500000002 HC RX 250 W HCPCS SELF ADMINISTERED DRUGS (ALT 637 FOR MEDICARE OP, ALT 636 FOR OP/ED)

## 2025-01-10 PROCEDURE — 87077 CULTURE AEROBIC IDENTIFY: CPT | Mod: ELYLAB | Performed by: EMERGENCY MEDICINE

## 2025-01-10 PROCEDURE — 2500000004 HC RX 250 GENERAL PHARMACY W/ HCPCS (ALT 636 FOR OP/ED): Performed by: EMERGENCY MEDICINE

## 2025-01-10 PROCEDURE — G0390 TRAUMA RESPONS W/HOSP CRITI: HCPCS

## 2025-01-10 PROCEDURE — 72125 CT NECK SPINE W/O DYE: CPT | Performed by: RADIOLOGY

## 2025-01-10 PROCEDURE — 36415 COLL VENOUS BLD VENIPUNCTURE: CPT | Performed by: EMERGENCY MEDICINE

## 2025-01-10 PROCEDURE — 83880 ASSAY OF NATRIURETIC PEPTIDE: CPT | Performed by: EMERGENCY MEDICINE

## 2025-01-10 PROCEDURE — 99291 CRITICAL CARE FIRST HOUR: CPT | Performed by: EMERGENCY MEDICINE

## 2025-01-10 PROCEDURE — 71045 X-RAY EXAM CHEST 1 VIEW: CPT | Performed by: RADIOLOGY

## 2025-01-10 PROCEDURE — 74177 CT ABD & PELVIS W/CONTRAST: CPT | Performed by: RADIOLOGY

## 2025-01-10 PROCEDURE — 74177 CT ABD & PELVIS W/CONTRAST: CPT

## 2025-01-10 PROCEDURE — 83735 ASSAY OF MAGNESIUM: CPT | Performed by: EMERGENCY MEDICINE

## 2025-01-10 PROCEDURE — 2500000005 HC RX 250 GENERAL PHARMACY W/O HCPCS: Performed by: EMERGENCY MEDICINE

## 2025-01-10 PROCEDURE — 94660 CPAP INITIATION&MGMT: CPT

## 2025-01-10 PROCEDURE — 96374 THER/PROPH/DIAG INJ IV PUSH: CPT

## 2025-01-10 PROCEDURE — 5A09357 ASSISTANCE WITH RESPIRATORY VENTILATION, LESS THAN 24 CONSECUTIVE HOURS, CONTINUOUS POSITIVE AIRWAY PRESSURE: ICD-10-PCS | Performed by: NURSE PRACTITIONER

## 2025-01-10 PROCEDURE — 81003 URINALYSIS AUTO W/O SCOPE: CPT | Performed by: EMERGENCY MEDICINE

## 2025-01-10 PROCEDURE — 2500000002 HC RX 250 W HCPCS SELF ADMINISTERED DRUGS (ALT 637 FOR MEDICARE OP, ALT 636 FOR OP/ED): Performed by: NURSE PRACTITIONER

## 2025-01-10 RX ORDER — AMIODARONE HYDROCHLORIDE 200 MG/1
100 TABLET ORAL DAILY
Status: DISCONTINUED | OUTPATIENT
Start: 2025-01-10 | End: 2025-01-10

## 2025-01-10 RX ORDER — HYDROCHLOROTHIAZIDE 25 MG/1
25 TABLET ORAL
Status: ACTIVE | OUTPATIENT
Start: 2025-01-13

## 2025-01-10 RX ORDER — ISOSORBIDE MONONITRATE 30 MG/1
30 TABLET, EXTENDED RELEASE ORAL DAILY
Status: ACTIVE | OUTPATIENT
Start: 2025-01-10

## 2025-01-10 RX ORDER — FLUTICASONE FUROATE AND VILANTEROL 200; 25 UG/1; UG/1
1 POWDER RESPIRATORY (INHALATION)
Status: DISPENSED | OUTPATIENT
Start: 2025-01-11

## 2025-01-10 RX ORDER — NAPROXEN SODIUM 220 MG/1
81 TABLET, FILM COATED ORAL NIGHTLY
Status: DISPENSED | OUTPATIENT
Start: 2025-01-11

## 2025-01-10 RX ORDER — LISINOPRIL 20 MG/1
40 TABLET ORAL DAILY
Status: ACTIVE | OUTPATIENT
Start: 2025-01-10

## 2025-01-10 RX ORDER — MONTELUKAST SODIUM 10 MG/1
10 TABLET ORAL EVERY EVENING
Status: DISPENSED | OUTPATIENT
Start: 2025-01-10

## 2025-01-10 RX ORDER — METOPROLOL SUCCINATE 50 MG/1
100 TABLET, EXTENDED RELEASE ORAL NIGHTLY
Status: ACTIVE | OUTPATIENT
Start: 2025-01-11

## 2025-01-10 RX ORDER — METOPROLOL SUCCINATE 50 MG/1
50 TABLET, EXTENDED RELEASE ORAL DAILY
Status: DISPENSED | OUTPATIENT
Start: 2025-01-11

## 2025-01-10 RX ORDER — ATORVASTATIN CALCIUM 20 MG/1
40 TABLET, FILM COATED ORAL NIGHTLY
Status: DISPENSED | OUTPATIENT
Start: 2025-01-10

## 2025-01-10 RX ORDER — ACETAMINOPHEN 325 MG/1
975 TABLET ORAL ONCE
Status: COMPLETED | OUTPATIENT
Start: 2025-01-10 | End: 2025-01-10

## 2025-01-10 RX ORDER — SODIUM CHLORIDE, SODIUM LACTATE, POTASSIUM CHLORIDE, CALCIUM CHLORIDE 600; 310; 30; 20 MG/100ML; MG/100ML; MG/100ML; MG/100ML
100 INJECTION, SOLUTION INTRAVENOUS CONTINUOUS
Status: DISCONTINUED | OUTPATIENT
Start: 2025-01-10 | End: 2025-01-10

## 2025-01-10 RX ORDER — POTASSIUM CHLORIDE 20 MEQ/1
40 TABLET, EXTENDED RELEASE ORAL ONCE
Status: COMPLETED | OUTPATIENT
Start: 2025-01-10 | End: 2025-01-10

## 2025-01-10 RX ORDER — EZETIMIBE 10 MG/1
10 TABLET ORAL DAILY
Status: DISPENSED | OUTPATIENT
Start: 2025-01-10

## 2025-01-10 RX ORDER — AMIODARONE HYDROCHLORIDE 200 MG/1
100 TABLET ORAL DAILY
Status: DISPENSED | OUTPATIENT
Start: 2025-01-11

## 2025-01-10 RX ORDER — ONDANSETRON HYDROCHLORIDE 2 MG/ML
4 INJECTION, SOLUTION INTRAVENOUS ONCE
Status: COMPLETED | OUTPATIENT
Start: 2025-01-10 | End: 2025-01-10

## 2025-01-10 RX ORDER — FLUTICASONE PROPIONATE 50 MCG
2 SPRAY, SUSPENSION (ML) NASAL DAILY
Status: DISPENSED | OUTPATIENT
Start: 2025-01-10

## 2025-01-10 RX ADMIN — PIPERACILLIN SODIUM AND TAZOBACTAM SODIUM 3.38 G: 3; .375 INJECTION, SOLUTION INTRAVENOUS at 16:48

## 2025-01-10 RX ADMIN — SODIUM CHLORIDE 1000 ML: 9 INJECTION, SOLUTION INTRAVENOUS at 13:16

## 2025-01-10 RX ADMIN — SODIUM CHLORIDE 3000 ML: 9 INJECTION, SOLUTION INTRAVENOUS at 14:16

## 2025-01-10 RX ADMIN — POTASSIUM CHLORIDE 40 MEQ: 1500 TABLET, EXTENDED RELEASE ORAL at 20:15

## 2025-01-10 RX ADMIN — Medication 3 L/MIN: at 13:08

## 2025-01-10 RX ADMIN — MONTELUKAST SODIUM 10 MG: 10 TABLET, FILM COATED ORAL at 20:15

## 2025-01-10 RX ADMIN — ACETAMINOPHEN 975 MG: 325 TABLET ORAL at 13:16

## 2025-01-10 RX ADMIN — EZETIMIBE 10 MG: 10 TABLET ORAL at 20:15

## 2025-01-10 RX ADMIN — EMPAGLIFLOZIN 10 MG: 10 TABLET, FILM COATED ORAL at 20:16

## 2025-01-10 RX ADMIN — ONDANSETRON 4 MG: 2 INJECTION INTRAMUSCULAR; INTRAVENOUS at 13:16

## 2025-01-10 RX ADMIN — ATORVASTATIN CALCIUM 40 MG: 20 TABLET, FILM COATED ORAL at 20:27

## 2025-01-10 RX ADMIN — IOHEXOL 75 ML: 350 INJECTION, SOLUTION INTRAVENOUS at 14:45

## 2025-01-10 SDOH — SOCIAL STABILITY: SOCIAL INSECURITY: HAVE YOU HAD THOUGHTS OF HARMING ANYONE ELSE?: NO

## 2025-01-10 SDOH — SOCIAL STABILITY: SOCIAL INSECURITY: HAS ANYONE EVER THREATENED TO HURT YOUR FAMILY OR YOUR PETS?: NO

## 2025-01-10 SDOH — SOCIAL STABILITY: SOCIAL INSECURITY: DOES ANYONE TRY TO KEEP YOU FROM HAVING/CONTACTING OTHER FRIENDS OR DOING THINGS OUTSIDE YOUR HOME?: NO

## 2025-01-10 SDOH — SOCIAL STABILITY: SOCIAL INSECURITY: DO YOU FEEL UNSAFE GOING BACK TO THE PLACE WHERE YOU ARE LIVING?: NO

## 2025-01-10 SDOH — SOCIAL STABILITY: SOCIAL INSECURITY: ABUSE: ADULT

## 2025-01-10 SDOH — SOCIAL STABILITY: SOCIAL INSECURITY: HAVE YOU HAD ANY THOUGHTS OF HARMING ANYONE ELSE?: NO

## 2025-01-10 SDOH — SOCIAL STABILITY: SOCIAL INSECURITY: ARE YOU OR HAVE YOU BEEN THREATENED OR ABUSED PHYSICALLY, EMOTIONALLY, OR SEXUALLY BY ANYONE?: NO

## 2025-01-10 SDOH — SOCIAL STABILITY: SOCIAL INSECURITY: DO YOU FEEL ANYONE HAS EXPLOITED OR TAKEN ADVANTAGE OF YOU FINANCIALLY OR OF YOUR PERSONAL PROPERTY?: NO

## 2025-01-10 SDOH — SOCIAL STABILITY: SOCIAL INSECURITY: ARE THERE ANY APPARENT SIGNS OF INJURIES/BEHAVIORS THAT COULD BE RELATED TO ABUSE/NEGLECT?: NO

## 2025-01-10 SDOH — SOCIAL STABILITY: SOCIAL INSECURITY: WERE YOU ABLE TO COMPLETE ALL THE BEHAVIORAL HEALTH SCREENINGS?: YES

## 2025-01-10 ASSESSMENT — LIFESTYLE VARIABLES
SUBSTANCE_ABUSE_PAST_12_MONTHS: NO
AUDIT TOTAL SCORE: 0
HAVE PEOPLE ANNOYED YOU BY CRITICIZING YOUR DRINKING: NO
HOW MANY STANDARD DRINKS CONTAINING ALCOHOL DO YOU HAVE ON A TYPICAL DAY: 3 OR 4
AUDIT TOTAL SCORE: 3
TOTAL SCORE: 0
SUBSTANCE_ABUSE_PAST_12_MONTHS: NO
HOW OFTEN DURING THE LAST YEAR HAVE YOU HAD A FEELING OF GUILT OR REMORSE AFTER DRINKING: NEVER
HOW OFTEN DURING THE LAST YEAR HAVE YOU HAD A FEELING OF GUILT OR REMORSE AFTER DRINKING: NEVER
AUDIT-C TOTAL SCORE: 3
HOW OFTEN DURING THE LAST YEAR HAVE YOU NEEDED AN ALCOHOLIC DRINK FIRST THING IN THE MORNING TO GET YOURSELF GOING AFTER A NIGHT OF HEAVY DRINKING: NEVER
HOW OFTEN DURING THE LAST YEAR HAVE YOU BEEN UNABLE TO REMEMBER WHAT HAPPENED THE NIGHT BEFORE BECAUSE YOU HAD BEEN DRINKING: NEVER
HAS A RELATIVE, FRIEND, DOCTOR, OR ANOTHER HEALTH PROFESSIONAL EXPRESSED CONCERN ABOUT YOUR DRINKING OR SUGGESTED YOU CUT DOWN: NO
PRESCIPTION_ABUSE_PAST_12_MONTHS: NO
SKIP TO QUESTIONS 9-10: 0
HAS A RELATIVE, FRIEND, DOCTOR, OR ANOTHER HEALTH PROFESSIONAL EXPRESSED CONCERN ABOUT YOUR DRINKING OR SUGGESTED YOU CUT DOWN: NO
HOW OFTEN DO YOU HAVE A DRINK CONTAINING ALCOHOL: 2-4 TIMES A MONTH
HAVE YOU EVER FELT YOU SHOULD CUT DOWN ON YOUR DRINKING: NO
AUDIT-C TOTAL SCORE: 3
SKIP TO QUESTIONS 9-10: 0
HAVE YOU OR SOMEONE ELSE BEEN INJURED AS A RESULT OF YOUR DRINKING: NO
HAVE YOU OR SOMEONE ELSE BEEN INJURED AS A RESULT OF YOUR DRINKING: NO
HOW OFTEN DURING THE LAST YEAR HAVE YOU FOUND THAT YOU WERE NOT ABLE TO STOP DRINKING ONCE YOU HAD STARTED: NEVER
HOW OFTEN DO YOU HAVE 6 OR MORE DRINKS ON ONE OCCASION: NEVER
AUDIT-C TOTAL SCORE: 4
EVER FELT BAD OR GUILTY ABOUT YOUR DRINKING: NO
AUDIT TOTAL SCORE: 0
AUDIT-C TOTAL SCORE: 4
HOW OFTEN DURING THE LAST YEAR HAVE YOU BEEN UNABLE TO REMEMBER WHAT HAPPENED THE NIGHT BEFORE BECAUSE YOU HAD BEEN DRINKING: NEVER
HOW OFTEN DO YOU HAVE A DRINK CONTAINING ALCOHOL: 2-3 TIMES A WEEK
HOW OFTEN DO YOU HAVE 6 OR MORE DRINKS ON ONE OCCASION: NEVER
HOW OFTEN DURING THE LAST YEAR HAVE YOU FOUND THAT YOU WERE NOT ABLE TO STOP DRINKING ONCE YOU HAD STARTED: NEVER
HOW MANY STANDARD DRINKS CONTAINING ALCOHOL DO YOU HAVE ON A TYPICAL DAY: 3 OR 4
EVER HAD A DRINK FIRST THING IN THE MORNING TO STEADY YOUR NERVES TO GET RID OF A HANGOVER: NO
HOW OFTEN DURING THE LAST YEAR HAVE YOU FAILED TO DO WHAT WAS NORMALLY EXPECTED FROM YOU BECAUSE OF DRINKING: NEVER
HOW OFTEN DURING THE LAST YEAR HAVE YOU NEEDED AN ALCOHOLIC DRINK FIRST THING IN THE MORNING TO GET YOURSELF GOING AFTER A NIGHT OF HEAVY DRINKING: NEVER
HOW OFTEN DURING THE LAST YEAR HAVE YOU FAILED TO DO WHAT WAS NORMALLY EXPECTED FROM YOU BECAUSE OF DRINKING: NEVER
AUDIT TOTAL SCORE: 4

## 2025-01-10 ASSESSMENT — COGNITIVE AND FUNCTIONAL STATUS - GENERAL
TOILETING: A LITTLE
MOBILITY SCORE: 20
HELP NEEDED FOR BATHING: A LITTLE
EATING MEALS: A LITTLE
DRESSING REGULAR UPPER BODY CLOTHING: A LITTLE
MOVING TO AND FROM BED TO CHAIR: A LITTLE
WALKING IN HOSPITAL ROOM: A LITTLE
PERSONAL GROOMING: A LITTLE
DAILY ACTIVITIY SCORE: 18
PATIENT BASELINE BEDBOUND: NO
CLIMB 3 TO 5 STEPS WITH RAILING: A LITTLE
DRESSING REGULAR LOWER BODY CLOTHING: A LITTLE
STANDING UP FROM CHAIR USING ARMS: A LITTLE

## 2025-01-10 ASSESSMENT — ACTIVITIES OF DAILY LIVING (ADL)
HEARING - RIGHT EAR: FUNCTIONAL
PATIENT'S MEMORY ADEQUATE TO SAFELY COMPLETE DAILY ACTIVITIES?: YES
BATHING: NEEDS ASSISTANCE
JUDGMENT_ADEQUATE_SAFELY_COMPLETE_DAILY_ACTIVITIES: YES
DRESSING YOURSELF: NEEDS ASSISTANCE
HEARING - LEFT EAR: FUNCTIONAL
TOILETING: NEEDS ASSISTANCE
WALKS IN HOME: INDEPENDENT
ADEQUATE_TO_COMPLETE_ADL: YES
GROOMING: NEEDS ASSISTANCE
FEEDING YOURSELF: NEEDS ASSISTANCE

## 2025-01-10 ASSESSMENT — PATIENT HEALTH QUESTIONNAIRE - PHQ9
2. FEELING DOWN, DEPRESSED OR HOPELESS: NOT AT ALL
SUM OF ALL RESPONSES TO PHQ9 QUESTIONS 1 & 2: 0
1. LITTLE INTEREST OR PLEASURE IN DOING THINGS: NOT AT ALL
2. FEELING DOWN, DEPRESSED OR HOPELESS: NOT AT ALL
SUM OF ALL RESPONSES TO PHQ9 QUESTIONS 1 & 2: 0
1. LITTLE INTEREST OR PLEASURE IN DOING THINGS: NOT AT ALL

## 2025-01-10 ASSESSMENT — COLUMBIA-SUICIDE SEVERITY RATING SCALE - C-SSRS
6. HAVE YOU EVER DONE ANYTHING, STARTED TO DO ANYTHING, OR PREPARED TO DO ANYTHING TO END YOUR LIFE?: NO
6. HAVE YOU EVER DONE ANYTHING, STARTED TO DO ANYTHING, OR PREPARED TO DO ANYTHING TO END YOUR LIFE?: NO
2. HAVE YOU ACTUALLY HAD ANY THOUGHTS OF KILLING YOURSELF?: NO
1. IN THE PAST MONTH, HAVE YOU WISHED YOU WERE DEAD OR WISHED YOU COULD GO TO SLEEP AND NOT WAKE UP?: NO
2. HAVE YOU ACTUALLY HAD ANY THOUGHTS OF KILLING YOURSELF?: NO
1. IN THE PAST MONTH, HAVE YOU WISHED YOU WERE DEAD OR WISHED YOU COULD GO TO SLEEP AND NOT WAKE UP?: NO

## 2025-01-10 ASSESSMENT — PAIN - FUNCTIONAL ASSESSMENT: PAIN_FUNCTIONAL_ASSESSMENT: 0-10

## 2025-01-10 ASSESSMENT — PAIN SCALES - GENERAL: PAINLEVEL_OUTOF10: 0 - NO PAIN

## 2025-01-10 NOTE — ED PROVIDER NOTES
HPI   Chief Complaint   Patient presents with    Trauma     HIA         History provided by:  Patient and EMS personnel    Chief Complaint   Patient presents with    Trauma     HIA       History of Present Illness:  Guicho Jones is a 84 y.o. male presents with not feeling well.  The patient states he just has not had any energy for the past 1 to 2 days.  He has had some nausea, vomiting and diarrhea.  No hematemesis, hematochezia or melena.  No fever, chills or cough.  No congestion.  No chest pain or shortness of breath.  No lightheadedness or dizziness.  No loss of motor or sensory function.  No loss of bladder or bowel function.  No dysuria or hematuria.  The patient had a fall today.  He does not believe he hit his head.  He is on Xarelto.      PMFSH:   As per HPI, otherwise nurses notes reviewed in EMR.    Past Medical History:   Past Medical History:   Diagnosis Date    Allergic     Arthritis     Cancer (Multi)     Cataract     CHF (congestive heart failure)     COPD (chronic obstructive pulmonary disease) (Multi)     Encounter for follow-up examination after completed treatment for conditions other than malignant neoplasm 12/27/2021    Hospital discharge follow-up    Heart disease     Hypertension     Ischemic cardiomyopathy     Ischemic cardiomyopathy with implantable cardioverter-defibrillator (ICD)    Myocardial infarction (Multi)     Pain in unspecified hip     Joint pain, hip    Personal history of other diseases of the musculoskeletal system and connective tissue     History of low back pain    Personal history of other endocrine, nutritional and metabolic disease     History of hyperlipidemia    Personal history of other specified conditions 12/21/2021    History of elevated prostate specific antigen (PSA)    Presence of coronary angioplasty implant and graft     Stented coronary artery    Unspecified atrial flutter (Multi)     Paroxysmal atrial flutter      Past Surgical History:   Past Surgical  History:   Procedure Laterality Date    BACK SURGERY      CARDIAC CATHETERIZATION      CORONARY STENT PLACEMENT      CT ABDOMEN ANGIOGRAM W AND/OR WO IV CONTRAST  10/29/2021    CT ABDOMEN ANGIOGRAM W AND/OR WO IV CONTRAST 10/29/2021 [facility]    CT ABDOMEN ANGIOGRAM W AND/OR WO IV CONTRAST  10/03/2022    CT ABDOMEN ANGIOGRAM W AND/OR WO IV CONTRAST 10/3/2022 [facility]    EYE SURGERY      JOINT REPLACEMENT      OTHER SURGICAL HISTORY  10/12/2021    Renal angioplasty and stenting    OTHER SURGICAL HISTORY  10/12/2021    Cataract surgery    OTHER SURGICAL HISTORY  12/21/2021    Hip replacement    OTHER SURGICAL HISTORY  01/04/2022    Complete colonoscopy    OTHER SURGICAL HISTORY  12/21/2021    Back surgery    OTHER SURGICAL HISTORY  12/21/2021    Abdominal aortic aneurysm repair    OTHER SURGICAL HISTORY  12/21/2021    Hip replacement    OTHER SURGICAL HISTORY  12/21/2021    Surgery    TONSILLECTOMY      VASECTOMY        Family History:   Family History   Problem Relation Name Age of Onset    Liver disease Mother Ysabel. Campbell Robert     Arthritis Mother Kelle Jones     Coronary artery disease Father Guicho Jones     Atrial fibrillation Father Guicho Jones     Heart disease Father Guicho Jones     Cancer Sister Sally Jones       Social History:    Social History     Tobacco Use    Smoking status: Former     Current packs/day: 1.50     Average packs/day: 1.5 packs/day for 15.0 years (22.5 ttl pk-yrs)     Types: Cigarettes     Passive exposure: Never    Smokeless tobacco: Never   Vaping Use    Vaping status: Never Used   Substance Use Topics    Alcohol use: Yes     Alcohol/week: 3.0 standard drinks of alcohol     Types: 3 Standard drinks or equivalent per week     Comment: occassional    Drug use: Never     Allergies:   Allergies   Allergen Reactions    Levofloxacin Palpitations     Rapid Heart Rate - Severe per pt.     Current Outpatient Medications    Medication Instructions    albuterol (Proventil HFA) 90 mcg/actuation inhaler 1 puff, Every 4 hours PRN    amiodarone (PACERONE) 100 mg, oral, Daily    amoxicillin (Amoxil) 500 mg capsule 4 capsules, See admin instructions    aspirin 81 mg    atorvastatin (LIPITOR) 40 mg, oral, Nightly    budesonide-glycopyr-formoterol (Breztri Aerosphere) 160-9-4.8 mcg/actuation HFA aerosol inhaler 2 puffs, 2 times daily RT    empagliflozin (JARDIANCE) 10 mg, oral, Daily    ezetimibe (ZETIA) 10 mg, oral, Daily    fluticasone (Flonase) 50 mcg/actuation nasal spray 2 sprays, Each Nostril, Daily    glucosamine HCl 1,500 mg tablet 1 tablet, Daily    hydroCHLOROthiazide (HYDRODiuril) 25 mg tablet TAKE 1 TABLET MON WED FRI ONLY    ipratropium (Atrovent) 42 mcg (0.06 %) nasal spray 2 sprays, Each Nostril, 3 times daily    isosorbide mononitrate ER (IMDUR) 30 mg, oral, Daily, 1 tablet daily    lisinopril 40 mg, oral, Daily    magnesium oxide (MagOx) 400 mg (241.3 mg magnesium) tablet 1 tablet, 2 times daily    metoprolol succinate XL (Toprol-XL) 100 mg 24 hr tablet 1/2 tablet in the morning, 1 full tablet at night    metoprolol tartrate (Lopressor) 25 mg tablet Take 1 tablet daily for palpitations only    montelukast (SINGULAIR) 10 mg, oral, Every evening    nitroglycerin (NITROSTAT) 0.4 mg, sublingual, Every 5 min PRN    omega 3-dha-epa-fish oil (Fish OiL) 1,000 mg (120 mg-180 mg) capsule 1,000 mg, 2 times daily    rivaroxaban (Xarelto) 20 mg tablet 1 tablet daily              Patient History   Past Medical History:   Diagnosis Date    Allergic     Arthritis     Cancer (Multi)     Cataract     CHF (congestive heart failure)     COPD (chronic obstructive pulmonary disease) (Multi)     Encounter for follow-up examination after completed treatment for conditions other than malignant neoplasm 12/27/2021    Hospital discharge follow-up    Heart disease     Hypertension     Ischemic cardiomyopathy     Ischemic cardiomyopathy with implantable  cardioverter-defibrillator (ICD)    Myocardial infarction (Multi)     Pain in unspecified hip     Joint pain, hip    Personal history of other diseases of the musculoskeletal system and connective tissue     History of low back pain    Personal history of other endocrine, nutritional and metabolic disease     History of hyperlipidemia    Personal history of other specified conditions 12/21/2021    History of elevated prostate specific antigen (PSA)    Presence of coronary angioplasty implant and graft     Stented coronary artery    Unspecified atrial flutter (Multi)     Paroxysmal atrial flutter     Past Surgical History:   Procedure Laterality Date    BACK SURGERY      CARDIAC CATHETERIZATION      CORONARY STENT PLACEMENT      CT ABDOMEN ANGIOGRAM W AND/OR WO IV CONTRAST  10/29/2021    CT ABDOMEN ANGIOGRAM W AND/OR WO IV CONTRAST 10/29/2021 [facility]    CT ABDOMEN ANGIOGRAM W AND/OR WO IV CONTRAST  10/03/2022    CT ABDOMEN ANGIOGRAM W AND/OR WO IV CONTRAST 10/3/2022 [facility]    EYE SURGERY      JOINT REPLACEMENT      OTHER SURGICAL HISTORY  10/12/2021    Renal angioplasty and stenting    OTHER SURGICAL HISTORY  10/12/2021    Cataract surgery    OTHER SURGICAL HISTORY  12/21/2021    Hip replacement    OTHER SURGICAL HISTORY  01/04/2022    Complete colonoscopy    OTHER SURGICAL HISTORY  12/21/2021    Back surgery    OTHER SURGICAL HISTORY  12/21/2021    Abdominal aortic aneurysm repair    OTHER SURGICAL HISTORY  12/21/2021    Hip replacement    OTHER SURGICAL HISTORY  12/21/2021    Surgery    TONSILLECTOMY      VASECTOMY       Family History   Problem Relation Name Age of Onset    Liver disease Mother Ysabel. Campbell Robert     Arthritis Mother Kelle Jones     Coronary artery disease Father Guicho Barrett Robert     Atrial fibrillation Father Guicho Manele Robert     Heart disease Father Guicho Manele Robert     Cancer Sister Sally Mack Robert      Social History     Tobacco Use     "Smoking status: Former     Current packs/day: 1.50     Average packs/day: 1.5 packs/day for 15.0 years (22.5 ttl pk-yrs)     Types: Cigarettes     Passive exposure: Never    Smokeless tobacco: Never   Vaping Use    Vaping status: Never Used   Substance Use Topics    Alcohol use: Yes     Alcohol/week: 3.0 standard drinks of alcohol     Types: 3 Standard drinks or equivalent per week     Comment: occassional    Drug use: Never       Physical Exam   ED Triage Vitals   Temp Pulse Resp BP   -- -- -- --      SpO2 Temp src Heart Rate Source Patient Position   -- -- -- --      BP Location FiO2 (%)     -- --       Physical Exam  Physical Exam:    ED Triage Vitals [01/10/25 1259]   Temperature Heart Rate Respirations BP   (!) 38.6 °C (101.5 °F) 65 20 98/52      SpO2 Temp Source Heart Rate Source Patient Position   -- Temporal Monitor --      BP Location FiO2 (%)     -- --         Patient Vitals for the past 24 hrs:   BP Temp Temp src Pulse Resp SpO2 Height Weight   01/10/25 1513 105/50 37.1 °C (98.8 °F) -- 64 20 (!) 93 % -- --   01/10/25 1425 (!) 90/43 -- -- 60 20 (!) 93 % -- --   01/10/25 1415 (!) 76/38 -- -- 60 20 (!) 93 % -- --   01/10/25 1410 (!) 70/38 -- -- 60 -- (!) 93 % -- --   01/10/25 1400 (!) 68/37 -- -- 60 -- (!) 93 % -- --   01/10/25 1350 (!) 68/38 -- -- 60 20 94 % -- --   01/10/25 1340 (!) 70/40 -- -- 60 20 94 % -- --   01/10/25 1330 (!) 80/41 -- -- 60 20 94 % -- --   01/10/25 1323 -- -- -- -- -- -- 1.676 m (5' 6\") 99.8 kg (220 lb)   01/10/25 1321 -- -- -- 60 20 (!) 93 % -- --   01/10/25 1318 (!) 79/42 -- -- -- -- -- -- --   01/10/25 1318 -- -- -- 60 (!) 26 94 % -- --   01/10/25 1315 -- -- -- 60 (!) 29 (!) 93 % -- --   01/10/25 1312 -- -- -- 62 (!) 25 94 % -- --   01/10/25 1309 -- -- -- 62 (!) 27 (!) 93 % -- --   01/10/25 1303 98/52 -- -- -- -- -- -- --   01/10/25 1259 98/52 (!) 38.6 °C (101.5 °F) Temporal 65 20 94 % 1.676 m (5' 6\") 99.8 kg (220 lb)       Constitutional: Vital signs per nursing notes.  Well " developed, well nourished.  No acute distress.    Psychiatric: alert and oriented to person, place, and time; no abnormalities of mood or affect; memory intact  Eyes: PERRL; conjunctivae and lids normal; EOMI  ENT: otoscopic exam of external canal and TM´s normal; nasal mucosa, turbinates, and septum normal; mouth, tongue, and pharynx normal; pharynx without edema, exudate, or injection  Respiratory: normal respiratory effort and excursion; no rales, rhonchi, or wheezes; equal air entry  Cardiovascular: regular rate and rhythm; no murmurs, rubs or gallops; symmetric pulses; no edema; normal capillary refill; distal pulses present  Neurological: normal speech; CN II-XII grossly intact; normal motor and sensory function; no nystagmus; no pronator drift  GI: no masses, tenderness, rebound or guarding; no palpable, pulsatile mass; no organomegaly; no hernia; normal bowel sounds; (-) Galan´s sign; (-) McBurney´s sign; (-) CVA tenderness  Lymphatic: no adenopathy of neck  Musculoskeletal: normal gait and station; normal digits and nails; no gross tendon or ligament injury; normal to palpation; normal strength/tone; neurovascular status intact; (-) Jessica´s sign; (-) straight leg raise  Skin: normal to inspection; normal to palpation; no rash  GCS: 15      ED Course & MDM   Diagnoses as of 01/10/25 1613   Diarrhea, unspecified type   Hypotension, unspecified hypotension type   Elevated troponin   Hypokalemia   Renal insufficiency   Anemia, unspecified type   Fall, initial encounter   Medication induced coagulopathy (Multi)                 No data recorded     [place] Coma Scale Score: 15 (01/10/25 1306 : Chente Cee, ISSAC)                           Medical Decision Making  Medical Decision Making:    EKG: My interpretation of EKG is sinus rhythm at 64 bpm with nonspecific ST-T changes and prolonged QT interval              My interpretation of second EKG is sinus rhythm at 60 bpm with nonspecific ST-T changes and  prolonged QT interval    Labs:   Labs Reviewed   CBC WITH AUTO DIFFERENTIAL - Abnormal       Result Value    WBC 13.9 (*)     nRBC 0.0      RBC 3.72 (*)     Hemoglobin 11.7 (*)     Hematocrit 36.2 (*)     MCV 97      MCH 31.5      MCHC 32.3      RDW 16.2 (*)     Platelets 100 (*)     Neutrophils % 92.2      Immature Granulocytes %, Automated 1.4 (*)     Lymphocytes % 2.1      Monocytes % 3.9      Eosinophils % 0.3      Basophils % 0.1      Neutrophils Absolute 12.84 (*)     Immature Granulocytes Absolute, Automated 0.20      Lymphocytes Absolute 0.29 (*)     Monocytes Absolute 0.54      Eosinophils Absolute 0.04      Basophils Absolute 0.02     COMPREHENSIVE METABOLIC PANEL - Abnormal    Glucose 148 (*)     Sodium 133 (*)     Potassium 3.2 (*)     Chloride 98      Bicarbonate 26      Anion Gap 12      Urea Nitrogen 39 (*)     Creatinine 1.67 (*)     eGFR 40 (*)     Calcium 8.1 (*)     Albumin 3.4      Alkaline Phosphatase 56      Total Protein 6.2 (*)     AST 54 (*)     Bilirubin, Total 2.2 (*)     ALT 20     B-TYPE NATRIURETIC PEPTIDE - Abnormal     (*)     Narrative:        <100 pg/mL - Heart failure unlikely  100-299 pg/mL - Intermediate probability of acute heart                  failure exacerbation. Correlate with clinical                  context and patient history.    >=300 pg/mL - Heart Failure likely. Correlate with clinical                  context and patient history.    BNP testing is performed using different testing methodology at [facility] than at other [facility] hospitals. Direct result comparisons should only be made within the same method.      SERIAL TROPONIN-INITIAL - Abnormal    Troponin I, High Sensitivity 121 (*)     Narrative:     Less than 99th percentile of normal range cutoff-  Female and children under 18 years old <14 ng/L; Male <21 ng/L: Negative  Repeat testing should be performed if clinically indicated.     Female and children under 18 years old 14-50 ng/L; Male  21-50 ng/L:  Consistent with possible cardiac damage and possible increased clinical   risk. Serial measurements may help to assess extent of myocardial damage.     >50 ng/L: Consistent with cardiac damage, increased clinical risk and  myocardial infarction. Serial measurements may help assess extent of   myocardial damage.      NOTE: Children less than 1 year old may have higher baseline troponin   levels and results should be interpreted in conjunction with the overall   clinical context.     NOTE: Troponin I testing is performed using a different   testing methodology at [facility] than at other   [facility]. Direct result comparisons should only   be made within the same method.   LACTATE - Abnormal    Lactate 2.7 (*)     Narrative:     Venipuncture immediately after or during the administration of Metamizole may lead to falsely low results. Testing should be performed immediately prior to Metamizole dosing.   PROTIME-INR - Abnormal    Protime 19.7 (*)     INR 1.7 (*)    SERIAL TROPONIN, 1 HOUR - Abnormal    Troponin I, High Sensitivity 128 (*)     Narrative:     Less than 99th percentile of normal range cutoff-  Female and children under 18 years old <14 ng/L; Male <21 ng/L: Negative  Repeat testing should be performed if clinically indicated.     Female and children under 18 years old 14-50 ng/L; Male 21-50 ng/L:  Consistent with possible cardiac damage and possible increased clinical   risk. Serial measurements may help to assess extent of myocardial damage.     >50 ng/L: Consistent with cardiac damage, increased clinical risk and  myocardial infarction. Serial measurements may help assess extent of   myocardial damage.      NOTE: Children less than 1 year old may have higher baseline troponin   levels and results should be interpreted in conjunction with the overall   clinical context.     NOTE: Troponin I testing is performed using a different   testing methodology at [facility] than at other   [facility]. Direct result comparisons should only   be made within the same method.   MAGNESIUM - Normal    Magnesium 1.95     SARS-COV-2 AND INFLUENZA A/B PCR - Normal    Flu A Result Not Detected      Flu B Result Not Detected      Coronavirus 2019, PCR Not Detected      Narrative:     This assay has received FDA Emergency Use Authorization (EUA) and  is only authorized for the duration of time that circumstances exist to justify the authorization of the emergency use of in vitro diagnostic tests for the detection of SARS-CoV-2 virus and/or diagnosis of COVID-19 infection under section 564(b)(1) of the Act, 21 U.S.C. 360bbb-3(b)(1). Testing for SARS-CoV-2 is only recommended for patients who meet current clinical and/or epidemiological criteria as defined by federal, state, or local public health directives. This assay is an in vitro diagnostic nucleic acid amplification test for the qualitative detection of SARS-CoV-2, Influenza A, and Influenza B from nasopharyngeal specimens and has been validated for use at [facility]. Negative results do not preclude COVID-19 infections or Influenza A/B infections, and should not be used as the sole basis for diagnosis, treatment, or other management decisions. If Influenza A/B and RSV PCR results are negative, testing for Parainfluenza virus, Adenovirus and Metapneumovirus is routinely performed for [facility] pediatric oncology and intensive care inpatients, and is available on other patients by placing an add-on request.    RSV PCR - Normal    RSV PCR Not Detected      Narrative:     This assay is an FDA-cleared, in vitro diagnostic nucleic acid amplification test for the detection of RSV from nasopharyngeal specimens, and has been validated for use at [facility]. Negative results do not preclude RSV infections, and should not be used as the sole basis for diagnosis, treatment, or other management  decisions. If Influenza A/B and RSV PCR results are negative, testing for Parainfluenza virus, Adenovirus and Metapneumovirus is routinely performed for pediatric oncology and intensive care inpatients at [facility], and is available on other patients by placing an add-on request.       BLOOD CULTURE   BLOOD CULTURE   TROPONIN SERIES- (INITIAL, 1 HR)    Narrative:     The following orders were created for panel order Troponin I Series, High Sensitivity (0, 1 HR).  Procedure                               Abnormality         Status                     ---------                               -----------         ------                     Troponin I, High Sensiti...[[id number]]  Abnormal            Final result               Troponin, High Sensitivi...[[id number]]  Abnormal            Final result                 Please view results for these tests on the individual orders.   URINALYSIS WITH REFLEX CULTURE AND MICROSCOPIC    Narrative:     The following orders were created for panel order Urinalysis with Reflex Culture and Microscopic.  Procedure                               Abnormality         Status                     ---------                               -----------         ------                     Urinalysis with Reflex C...[[id number]]                                                 Extra Urine Gray Tube[[id number]]                                                         Please view results for these tests on the individual orders.   URINALYSIS WITH REFLEX CULTURE AND MICROSCOPIC   EXTRA URINE GRAY TUBE   LACTATE       Diagnostic Imaging:   CT head wo IV contrast   Final Result   1. No evidence of acute intracranial abnormality. Mild cerebral   volume loss noted and microvascular ischemic changes.   2. No evidence of acute cervical spine fracture. Multilevel cervical   spondylosis, relatively severe from C3 level through C7 level as   discussed above.                  Signed by: Rachna Umanzor 1/10/2025 3:31 PM   Dictation  workstation:   [id number]      CT cervical spine wo IV contrast   Final Result   1. No evidence of acute intracranial abnormality. Mild cerebral   volume loss noted and microvascular ischemic changes.   2. No evidence of acute cervical spine fracture. Multilevel cervical   spondylosis, relatively severe from C3 level through C7 level as   discussed above.                  Signed by: Rachna Umanzor 1/10/2025 3:31 PM   Dictation workstation:   [id number]      CT abdomen pelvis w IV contrast   Final Result   1.  Liquid stool throughout the colon, consistent with history of   diarrhea, nonspecific. No significant wall thickening of the colon or   definite evidence of colitis. No obstruction.   2. Slight interval increase in bilobed infrarenal abdominal aortic   aneurysm(s) as compared with 2022, without evidence of dissection or   acute aortic abnormality, maximum caliber of the largest distal   aortic aneurysm segment is 6.1 x 6.2 cm. Vascular follow-up   recommended.   3. There is a slightly more complex appearance of a right renal cyst   than on the prior study, favor artifact secondary to arms down   positioning however may consider an ultrasound to ensure there are no   complex features.   4. Other nonacute ancillary findings are unchanged .             Signed by: Rachna Umanzor 1/10/2025 3:25 PM   Dictation workstation:   [id number]      XR chest 1 view   Final Result   1.  Increased prominence of the markings particularly right perihilar   markings may at least in part be related to difference in patient   position.                  MACRO:   None        Signed by: Cameron Eaton 1/10/2025 1:43 PM   Dictation workstation:   [id number]          ED Medication Administration:   Medications   oxygen (O2) therapy (3 L/min inhalation Start 1/10/25 1308)   sodium chloride 0.9 % bolus 2,994 mL (3,000 mL intravenous New Bag 1/10/25 1416)   piperacillin-tazobactam (Zosyn) 3.375 g in dextrose (iso) IV 50 mL (has no administration in  time range)   sodium chloride 0.9 % bolus 1,000 mL (1,000 mL intravenous New Bag 1/10/25 1316)   ondansetron (Zofran) injection 4 mg (4 mg intravenous Given 1/10/25 1316)   acetaminophen (Tylenol) tablet 975 mg (975 mg oral Given 1/10/25 1316)   iohexol (OMNIPaque) 350 mg iodine/mL solution 75 mL (75 mL intravenous Given 1/10/25 1445)       ED Course:     Guicho Jones is a 84 y.o. male presents with fall and general weakness   .    Differential Diagnoses Considered:  Multisystem trauma; ACS, arrhythmia, electrolyte abnormality, infection      Diagnoses as of 01/10/25 1613   Diarrhea, unspecified type   Hypotension, unspecified hypotension type   Elevated troponin   Hypokalemia   Renal insufficiency   Anemia, unspecified type   Fall, initial encounter   Medication induced coagulopathy (Multi)       Abnormal Labs Reviewed   CBC WITH AUTO DIFFERENTIAL - Abnormal; Notable for the following components:       Result Value    WBC 13.9 (*)     RBC 3.72 (*)     Hemoglobin 11.7 (*)     Hematocrit 36.2 (*)     RDW 16.2 (*)     Platelets 100 (*)     Immature Granulocytes %, Automated 1.4 (*)     Neutrophils Absolute 12.84 (*)     Lymphocytes Absolute 0.29 (*)     All other components within normal limits   COMPREHENSIVE METABOLIC PANEL - Abnormal; Notable for the following components:    Glucose 148 (*)     Sodium 133 (*)     Potassium 3.2 (*)     Urea Nitrogen 39 (*)     Creatinine 1.67 (*)     eGFR 40 (*)     Calcium 8.1 (*)     Total Protein 6.2 (*)     AST 54 (*)     Bilirubin, Total 2.2 (*)     All other components within normal limits   B-TYPE NATRIURETIC PEPTIDE - Abnormal; Notable for the following components:     (*)     All other components within normal limits    Narrative:        <100 pg/mL - Heart failure unlikely  100-299 pg/mL - Intermediate probability of acute heart                  failure exacerbation. Correlate with clinical                  context and patient history.    >=300 pg/mL - Heart  Failure likely. Correlate with clinical                  context and patient history.    BNP testing is performed using different testing methodology at [facility] than at other [facility]. Direct result comparisons should only be made within the same method.      SERIAL TROPONIN-INITIAL - Abnormal; Notable for the following components:    Troponin I, High Sensitivity 121 (*)     All other components within normal limits    Narrative:     Less than 99th percentile of normal range cutoff-  Female and children under 18 years old <14 ng/L; Male <21 ng/L: Negative  Repeat testing should be performed if clinically indicated.     Female and children under 18 years old 14-50 ng/L; Male 21-50 ng/L:  Consistent with possible cardiac damage and possible increased clinical   risk. Serial measurements may help to assess extent of myocardial damage.     >50 ng/L: Consistent with cardiac damage, increased clinical risk and  myocardial infarction. Serial measurements may help assess extent of   myocardial damage.      NOTE: Children less than 1 year old may have higher baseline troponin   levels and results should be interpreted in conjunction with the overall   clinical context.     NOTE: Troponin I testing is performed using a different   testing methodology at [facility] than at other   [facility]. Direct result comparisons should only   be made within the same method.   LACTATE - Abnormal; Notable for the following components:    Lactate 2.7 (*)     All other components within normal limits    Narrative:     Venipuncture immediately after or during the administration of Metamizole may lead to falsely low results. Testing should be performed immediately prior to Metamizole dosing.   PROTIME-INR - Abnormal; Notable for the following components:    Protime 19.7 (*)     INR 1.7 (*)     All other components within normal limits   SERIAL TROPONIN, 1 HOUR - Abnormal; Notable for the following  components:    Troponin I, High Sensitivity 128 (*)     All other components within normal limits    Narrative:     Less than 99th percentile of normal range cutoff-  Female and children under 18 years old <14 ng/L; Male <21 ng/L: Negative  Repeat testing should be performed if clinically indicated.     Female and children under 18 years old 14-50 ng/L; Male 21-50 ng/L:  Consistent with possible cardiac damage and possible increased clinical   risk. Serial measurements may help to assess extent of myocardial damage.     >50 ng/L: Consistent with cardiac damage, increased clinical risk and  myocardial infarction. Serial measurements may help assess extent of   myocardial damage.      NOTE: Children less than 1 year old may have higher baseline troponin   levels and results should be interpreted in conjunction with the overall   clinical context.     NOTE: Troponin I testing is performed using a different   testing methodology at [facility] than at other   [facility]. Direct result comparisons should only   be made within the same method.       BP      Temp      Pulse     Resp      SpO2        Diagnostic evaluation was completed.  Initial troponin was elevated at 121 with the repeat also elevated at 128.  The significance of this is unclear at this time.  Influenza, COVID and RSV are negative.  BNP is elevated at 680.  Metabolic panel shows an elevated glucose at 148.  Sodium is slightly low 133.  Potassium is slightly low at 3.2.  BUN and creatinine are elevated at 39 and 1.67.  AST is slightly elevated at 54 but ALT is normal at 20.  Total bilirubin is slightly elevated at 2.2.  Lactate is elevated to 2.7.  CBC shows an elevated white blood cell count of 13.9.  There is mild anemia with a hemoglobin of 11.7.  Platelets are slightly low at 100.  INR is 1.7.  CT of the head shows no evidence of acute intracranial abnormality.  Mild cerebral volume loss noted and microvascular ischemic changes.  CT  of the cervical spine shows no acute cervical spine fracture.  Multilevel cervical spondylosis, relatively severe from C3 level through C7 level.  CT of the abdomen pelvis shows liquid stool throughout the colon consistent with history of diarrhea.  No significant wall thickening of the colon or definite evidence of colitis.  No obstruction.  Slight interval increase in bilobed infrarenal abdominal aortic aneurysm as compared with 2022 without evidence of dissection or acute aortic abnormality.  There is a slightly more complex appearance of a right renal cyst than on the prior study.  Chest x-ray shows increased prominence of the markings particularly right perihilar.    Patient was treated with IV fluids while in the emergency department.  His blood pressure has been low but seems to be slowly improving with IV fluid hydration.  He will be covered with a dose of IV antibiotics in case there is a bacterial component.    I suspected the patient is likely suffering from a viral syndrome causing him to have vomiting and diarrhea.  This is likely leading to dehydration which led to his generalized weakness and falls.  The patient will be hospitalized for further workup and evaluation.    I discussed the results and plan for hospitalization with the patient and/or family/friend if present.  Questions were addressed.  Patient and/or family/friend expressed understanding.    The patient's condition requires ongoing treatment and evaluation necessitating hospital admission.  I have reviewed the patient's history, physical exam, and test information with the admitting physician,   CHRISTOPHER Phoenix/Dr. Wilder                , who agrees to hospitalize the patient.           Diagnosis:   1. Diarrhea, unspecified type    2. Hypotension, unspecified hypotension type    3. Elevated troponin    4. Hypokalemia    5. Renal insufficiency    6. Anemia, unspecified type    7. Fall, initial encounter    8. Medication induced coagulopathy  (Multi)                    Procedure  Procedures     Shane ECHAVARRIA MD  01/10/25 [id number]

## 2025-01-10 NOTE — Clinical Note
Patient tolerated procedure well. Appears comfortable with no complaints of pain. VS stable. Arousable prior to transport. Patient transported to floor via bed.  Report called to bedside RN . Handoff completed

## 2025-01-10 NOTE — ED PROCEDURE NOTE
Procedure  Critical Care    Performed by: Shane ECHAVARRIA MD  Authorized by: Shane ECHAVARRIA MD    Critical care provider statement:     Critical care time (minutes):  35    Critical care time was exclusive of:  Separately billable procedures and treating other patients    Critical care was necessary to treat or prevent imminent or life-threatening deterioration of the following conditions:  Shock and trauma    Critical care was time spent personally by me on the following activities:  Blood draw for specimens, discussions with primary provider, evaluation of patient's response to treatment, examination of patient, ordering and performing treatments and interventions, ordering and review of laboratory studies, ordering and review of radiographic studies, pulse oximetry, re-evaluation of patient's condition and development of treatment plan with patient or surrogate    Care discussed with: admitting provider                 Shane ECHAVARRIA MD  01/10/25 [id number]

## 2025-01-10 NOTE — H&P
[facility] Critical Care Medicine       Date:  1/10/2025  Patient:  Guicho Jones  YOB: 1940  MRN:  [id number]   Admit Date:  1/10/2025  ========================================================================================================    Chief Complaint   Patient presents with    Trauma     HIA         History of Present Illness:  Guicho Jones is a 84 y.o. year old male patient with Past Medical History of  ICM s/p ICD, MI, diastolic heart failure, CAD s/p PCI, COPD (centrilobular emphysema), pulmonary hypertension, HTN, HLD, persistent Afib on ATC with xarelto, CVA, infrarenal AAA, obesity, and prostate cancer who presented to [facility] emergency department by EMS for fall. Reports on Wednesday after eating pizza began to have nausea, vomiting, and diarrhea. Nausea and vomiting improved but had persistent diarrhea. Last night at approx 0130 slipped out of the bed when attempting to get up to the bathroom. Wife was able to assist back into the bed but this morning was unable to ambulate due to weakness prompting family to call EMS. Denies sick contacts and no other family members with similar symptoms.     ED course:   On arrival to the emergency department was hypotensive with BP 60s/30s and febrile with temp 38.6, given fluids per sepsis protocol and treated with Zosyn.   Labs: glucose 148, potassium 3.2, Creat 1.67, AST 54, bili 2.2, lactate 2.7, , Troponin 121 repeat 128, WBC 13.9, plt 100, flu/ covid/ RSV  negative.  CT head: negative  CT a/p: liquid stool t/o colon, no significant wall thickening or evidence of colitis, slight interval change in bilobed infrarenal AAA without evidence of dissection or acute aortic abnormality (6.1x6.2 cm)    Echo 1/2024: EF 55%, mild to mod concentric LVH with DUST, no LVOT obstruction, mild RV dilatation with normal function, RVSP 50-55, mod to sev dilated LA, mild to mod dilated RA, mild to mod TR, 1+MR, trace AI     Interval ICU Events:  1/10:  Admit to the ICU for hypotension 2/2 to hypovolemia and concern for clinical decompensation.     Medical History:  Past Medical History:   Diagnosis Date    Allergic     Arthritis     Cancer (Multi)     Cataract     CHF (congestive heart failure)     COPD (chronic obstructive pulmonary disease) (Multi)     Encounter for follow-up examination after completed treatment for conditions other than malignant neoplasm 12/27/2021    Hospital discharge follow-up    Heart disease     Hypertension     Ischemic cardiomyopathy     Ischemic cardiomyopathy with implantable cardioverter-defibrillator (ICD)    Myocardial infarction (Multi)     Pain in unspecified hip     Joint pain, hip    Personal history of other diseases of the musculoskeletal system and connective tissue     History of low back pain    Personal history of other endocrine, nutritional and metabolic disease     History of hyperlipidemia    Personal history of other specified conditions 12/21/2021    History of elevated prostate specific antigen (PSA)    Presence of coronary angioplasty implant and graft     Stented coronary artery    Unspecified atrial flutter (Multi)     Paroxysmal atrial flutter     Past Surgical History:   Procedure Laterality Date    BACK SURGERY      CARDIAC CATHETERIZATION      CORONARY STENT PLACEMENT      CT ABDOMEN ANGIOGRAM W AND/OR WO IV CONTRAST  10/29/2021    CT ABDOMEN ANGIOGRAM W AND/OR WO IV CONTRAST 10/29/2021 [facility]    CT ABDOMEN ANGIOGRAM W AND/OR WO IV CONTRAST  10/03/2022    CT ABDOMEN ANGIOGRAM W AND/OR WO IV CONTRAST 10/3/2022 [facility]    EYE SURGERY      JOINT REPLACEMENT      OTHER SURGICAL HISTORY  10/12/2021    Renal angioplasty and stenting    OTHER SURGICAL HISTORY  10/12/2021    Cataract surgery    OTHER SURGICAL HISTORY  12/21/2021    Hip replacement    OTHER SURGICAL HISTORY  01/04/2022    Complete colonoscopy    OTHER SURGICAL HISTORY  12/21/2021    Back surgery    OTHER SURGICAL HISTORY   "12/21/2021    Abdominal aortic aneurysm repair    OTHER SURGICAL HISTORY  12/21/2021    Hip replacement    OTHER SURGICAL HISTORY  12/21/2021    Surgery    TONSILLECTOMY      VASECTOMY       (Not in a hospital admission)    Levofloxacin  Social History     Tobacco Use    Smoking status: Former     Current packs/day: 1.50     Average packs/day: 1.5 packs/day for 15.0 years (22.5 ttl pk-yrs)     Types: Cigarettes     Passive exposure: Never    Smokeless tobacco: Never   Vaping Use    Vaping status: Never Used   Substance Use Topics    Alcohol use: Yes     Alcohol/week: 3.0 standard drinks of alcohol     Types: 3 Standard drinks or equivalent per week     Comment: occassional    Drug use: Never     Family History   Problem Relation Name Age of Onset    Liver disease Mother Ysabel. Campbell Jones     Arthritis Mother Ysabel. Campbell Jones     Coronary artery disease Father Guicho Jones     Atrial fibrillation Father Guicho Jones     Heart disease Father Guicho Jones     Cancer Sister Sally Jones        Review of Systems:  14 point review of systems was completed and negative except for those specially mention in my HPI    Physical Exam:    Heart Rate:  [60-65]   Temperature:  [37.1 °C (98.8 °F)-38.6 °C (101.5 °F)]   Respirations:  [18-29]   BP: ()/(37-55)   Height:  [167.6 cm (5' 6\")]   Weight:  [99.8 kg (220 lb)]   Pulse Ox:  [92 %-94 %]     Physical Exam  HENT:      Head: Normocephalic.      Mouth/Throat:      Mouth: Mucous membranes are moist.   Eyes:      Extraocular Movements: Extraocular movements intact.      Conjunctiva/sclera: Conjunctivae normal.   Cardiovascular:      Rate and Rhythm: Normal rate and regular rhythm.      Pulses: Normal pulses.      Heart sounds: Normal heart sounds.   Pulmonary:      Effort: Pulmonary effort is normal.      Breath sounds: Normal breath sounds.   Abdominal:      General: Bowel sounds are increased.      Palpations: Abdomen is soft. "   Musculoskeletal:      Cervical back: Normal range of motion.   Skin:     General: Skin is warm.      Capillary Refill: Capillary refill takes less than 2 seconds.   Neurological:      General: No focal deficit present.      Mental Status: He is alert.   Psychiatric:         Mood and Affect: Mood normal.         Behavior: Behavior normal.         Objective:    I have reviewed all medications, laboratory results, and imaging pertinent for today's encounter    Assessment/Plan:    I am currently managing this critically ill patient for the following problems:    Neuro/Psych/Pain Ctrl/Sedation:  Hx of CVA  CAM-ICU  Delirium precautions  Pain management as needed    Respiratory/ENT:  COPD without acute exacerbation  Home inhalers   Duonebs as needed  BiPAP at night    Cardiovascular:  Hypotension, resolved  Infrarenal AAA, increased in size from previou  ICM s/p AICD  HTN  Chronic diastolic heart failure  HLD  Resume appropriate home medications  Hold antihypertensive/ diuretic medications in setting of hypotension  Gentle hydration as needed  Follow up with vascular regarding infrarenal AAA    GI:  Diarrhea  Advance diet as tolerated    Renal/Volume Status (Intra & Extravascular):  ISAIAS in setting of hypovolemia  Baseline Creat ~0.9-1.1  Trend BMP  Gentle hydration  Strict I&Os    Endocrine  Monitor for s/s of hypo/ hyperglycemia    Infectious Disease:  Leukocytosis  Stool pathogen  Blood culture  Urinalysis with reflex  Monitor off antibiotic therapy, initiate as clinically indicated    Heme/Onc:  Trend CBC  Monitor for s/s of bleeding  Transfuse for Hgb less than 7 or symptomatic anemia    OBGYN/MSK:  Fall  PT/ OT    Ethics/Code Status:  FULL    :  DVT Prophylaxis: xarelto  GI Prophylaxis: no indication  Bowel Regimen: none  Diet: advance as tolerated to cardiac  CVC: none  [place]: none  Reyes: none  Restraints: none  Dispo: ICU, monitor in ICU overnight at risk for clinical decompensation    45 minutes  spent in preparing to see patient (I.e. review of medical records), evaluation of diagnostics (I.e. labs, imaging, etc.), documentation, discussing plan of care with patient/ family/ caregiver, and/ or coordination of care with multidisciplinary team. Time does not include completion of procedure time.     Plan discussed with Dr. Meño Lincoln, APRN-CNP

## 2025-01-10 NOTE — Clinical Note
Huddle and Timeout completed together with team. Patient wristband and ARVIN information verified.  Anesthesia safety check completed

## 2025-01-11 ENCOUNTER — APPOINTMENT (OUTPATIENT)
Dept: CARDIOLOGY | Facility: HOSPITAL | Age: 85
End: 2025-01-11
Payer: MEDICARE

## 2025-01-11 LAB
ALBUMIN SERPL BCP-MCNC: 3.3 G/DL (ref 3.4–5)
ALP SERPL-CCNC: 49 U/L (ref 33–136)
ALT SERPL W P-5'-P-CCNC: 29 U/L (ref 10–52)
ANION GAP SERPL CALC-SCNC: 12 MMOL/L (ref 10–20)
AORTIC VALVE MEAN GRADIENT: 7 MMHG
AORTIC VALVE PEAK VELOCITY: 1.67 M/S
AST SERPL W P-5'-P-CCNC: 74 U/L (ref 9–39)
AV PEAK GRADIENT: 11 MMHG
AVA (PEAK VEL): 2.4 CM2
AVA (VTI): 2.36 CM2
BASOPHILS # BLD AUTO: 0.02 X10*3/UL (ref 0–0.1)
BASOPHILS NFR BLD AUTO: 0.1 %
BILIRUB SERPL-MCNC: 2.3 MG/DL (ref 0–1.2)
BUN SERPL-MCNC: 42 MG/DL (ref 6–23)
C DIF TOX TCDA+TCDB STL QL NAA+PROBE: NOT DETECTED
CALCIUM SERPL-MCNC: 7.5 MG/DL (ref 8.6–10.3)
CHLORIDE SERPL-SCNC: 102 MMOL/L (ref 98–107)
CO2 SERPL-SCNC: 22 MMOL/L (ref 21–32)
CREAT SERPL-MCNC: 1.69 MG/DL (ref 0.5–1.3)
EGFRCR SERPLBLD CKD-EPI 2021: 39 ML/MIN/1.73M*2
EJECTION FRACTION APICAL 4 CHAMBER: 50
EJECTION FRACTION: 55 %
EOSINOPHIL # BLD AUTO: 0.05 X10*3/UL (ref 0–0.4)
EOSINOPHIL NFR BLD AUTO: 0.3 %
ERYTHROCYTE [DISTWIDTH] IN BLOOD BY AUTOMATED COUNT: 16.7 % (ref 11.5–14.5)
ERYTHROCYTE [DISTWIDTH] IN BLOOD BY AUTOMATED COUNT: 16.7 % (ref 11.5–14.5)
GLUCOSE SERPL-MCNC: 127 MG/DL (ref 74–99)
HCT VFR BLD AUTO: 32.5 % (ref 41–52)
HCT VFR BLD AUTO: 32.5 % (ref 41–52)
HGB BLD-MCNC: 10.6 G/DL (ref 13.5–17.5)
HGB BLD-MCNC: 10.6 G/DL (ref 13.5–17.5)
HOLD SPECIMEN: NORMAL
IMM GRANULOCYTES # BLD AUTO: 0.23 X10*3/UL (ref 0–0.5)
IMM GRANULOCYTES NFR BLD AUTO: 1.6 % (ref 0–0.9)
LACTATE SERPL-SCNC: 1.2 MMOL/L (ref 0.4–2)
LEFT ATRIUM VOLUME AREA LENGTH INDEX BSA: 54.4 ML/M2
LEFT VENTRICLE INTERNAL DIMENSION DIASTOLE: 4.93 CM (ref 3.5–6)
LEFT VENTRICULAR OUTFLOW TRACT DIAMETER: 2.07 CM
LV EJECTION FRACTION BIPLANE: 48 %
LYMPHOCYTES # BLD AUTO: 0.72 X10*3/UL (ref 0.8–3)
LYMPHOCYTES NFR BLD AUTO: 5 %
MAGNESIUM SERPL-MCNC: 2 MG/DL (ref 1.6–2.4)
MCH RBC QN AUTO: 31.8 PG (ref 26–34)
MCH RBC QN AUTO: 31.8 PG (ref 26–34)
MCHC RBC AUTO-ENTMCNC: 32.6 G/DL (ref 32–36)
MCHC RBC AUTO-ENTMCNC: 32.6 G/DL (ref 32–36)
MCV RBC AUTO: 98 FL (ref 80–100)
MCV RBC AUTO: 98 FL (ref 80–100)
MITRAL VALVE E/A RATIO: 1.69
MONOCYTES # BLD AUTO: 1.18 X10*3/UL (ref 0.05–0.8)
MONOCYTES NFR BLD AUTO: 8.2 %
NEUTROPHILS # BLD AUTO: 12.13 X10*3/UL (ref 1.6–5.5)
NEUTROPHILS NFR BLD AUTO: 84.8 %
NRBC BLD-RTO: 0 /100 WBCS (ref 0–0)
NRBC BLD-RTO: 0 /100 WBCS (ref 0–0)
PLATELET # BLD AUTO: 81 X10*3/UL (ref 150–450)
PLATELET # BLD AUTO: 81 X10*3/UL (ref 150–450)
POTASSIUM SERPL-SCNC: 3.9 MMOL/L (ref 3.5–5.3)
PROT SERPL-MCNC: 5.8 G/DL (ref 6.4–8.2)
RBC # BLD AUTO: 3.33 X10*6/UL (ref 4.5–5.9)
RBC # BLD AUTO: 3.33 X10*6/UL (ref 4.5–5.9)
RIGHT VENTRICLE FREE WALL PEAK S': 22.8 CM/S
RIGHT VENTRICLE PEAK SYSTOLIC PRESSURE: 45.5 MMHG
SODIUM SERPL-SCNC: 132 MMOL/L (ref 136–145)
TRICUSPID ANNULAR PLANE SYSTOLIC EXCURSION: 2.5 CM
WBC # BLD AUTO: 14.2 X10*3/UL (ref 4.4–11.3)
WBC # BLD AUTO: 14.2 X10*3/UL (ref 4.4–11.3)

## 2025-01-11 PROCEDURE — 83735 ASSAY OF MAGNESIUM: CPT | Performed by: NURSE PRACTITIONER

## 2025-01-11 PROCEDURE — 94660 CPAP INITIATION&MGMT: CPT

## 2025-01-11 PROCEDURE — 2500000004 HC RX 250 GENERAL PHARMACY W/ HCPCS (ALT 636 FOR OP/ED): Performed by: NURSE PRACTITIONER

## 2025-01-11 PROCEDURE — 87506 IADNA-DNA/RNA PROBE TQ 6-11: CPT | Mod: ELYLAB | Performed by: NURSE PRACTITIONER

## 2025-01-11 PROCEDURE — 2500000002 HC RX 250 W HCPCS SELF ADMINISTERED DRUGS (ALT 637 FOR MEDICARE OP, ALT 636 FOR OP/ED)

## 2025-01-11 PROCEDURE — 84075 ASSAY ALKALINE PHOSPHATASE: CPT | Performed by: NURSE PRACTITIONER

## 2025-01-11 PROCEDURE — 87493 C DIFF AMPLIFIED PROBE: CPT | Performed by: NURSE PRACTITIONER

## 2025-01-11 PROCEDURE — 2020000001 HC ICU ROOM DAILY

## 2025-01-11 PROCEDURE — 99222 1ST HOSP IP/OBS MODERATE 55: CPT | Performed by: INTERNAL MEDICINE

## 2025-01-11 PROCEDURE — 2500000005 HC RX 250 GENERAL PHARMACY W/O HCPCS

## 2025-01-11 PROCEDURE — 2500000001 HC RX 250 WO HCPCS SELF ADMINISTERED DRUGS (ALT 637 FOR MEDICARE OP): Performed by: NURSE PRACTITIONER

## 2025-01-11 PROCEDURE — 93306 TTE W/DOPPLER COMPLETE: CPT | Performed by: INTERNAL MEDICINE

## 2025-01-11 PROCEDURE — 85027 COMPLETE CBC AUTOMATED: CPT | Performed by: NURSE PRACTITIONER

## 2025-01-11 PROCEDURE — C8929 TTE W OR WO FOL WCON,DOPPLER: HCPCS

## 2025-01-11 PROCEDURE — 2500000001 HC RX 250 WO HCPCS SELF ADMINISTERED DRUGS (ALT 637 FOR MEDICARE OP)

## 2025-01-11 PROCEDURE — 99291 CRITICAL CARE FIRST HOUR: CPT | Performed by: NURSE PRACTITIONER

## 2025-01-11 PROCEDURE — 36415 COLL VENOUS BLD VENIPUNCTURE: CPT | Performed by: NURSE PRACTITIONER

## 2025-01-11 PROCEDURE — 83605 ASSAY OF LACTIC ACID: CPT | Performed by: NURSE PRACTITIONER

## 2025-01-11 RX ORDER — ACETAMINOPHEN 650 MG/1
650 SUPPOSITORY RECTAL EVERY 4 HOURS PRN
Status: DISCONTINUED | OUTPATIENT
Start: 2025-01-11 | End: 2025-01-12

## 2025-01-11 RX ORDER — ACETAMINOPHEN 325 MG/1
650 TABLET ORAL EVERY 4 HOURS PRN
Status: DISCONTINUED | OUTPATIENT
Start: 2025-01-11 | End: 2025-01-12

## 2025-01-11 RX ORDER — LIDOCAINE 560 MG/1
1 PATCH PERCUTANEOUS; TOPICAL; TRANSDERMAL DAILY
Status: DISPENSED | OUTPATIENT
Start: 2025-01-11

## 2025-01-11 RX ORDER — VANCOMYCIN HYDROCHLORIDE 1 G/20ML
INJECTION, POWDER, LYOPHILIZED, FOR SOLUTION INTRAVENOUS DAILY PRN
Status: DISCONTINUED | OUTPATIENT
Start: 2025-01-11 | End: 2025-01-12

## 2025-01-11 RX ORDER — ACETAMINOPHEN 160 MG/5ML
650 SOLUTION ORAL EVERY 4 HOURS PRN
Status: DISCONTINUED | OUTPATIENT
Start: 2025-01-11 | End: 2025-01-12

## 2025-01-11 RX ORDER — CEFAZOLIN SODIUM 2 G/100ML
2 INJECTION, SOLUTION INTRAVENOUS EVERY 12 HOURS
Status: DISPENSED | OUTPATIENT
Start: 2025-01-12

## 2025-01-11 RX ORDER — ENOXAPARIN SODIUM 100 MG/ML
1 INJECTION SUBCUTANEOUS EVERY 12 HOURS
Status: DISCONTINUED | OUTPATIENT
Start: 2025-01-11 | End: 2025-01-11

## 2025-01-11 RX ORDER — METHOCARBAMOL 500 MG/1
500 TABLET, FILM COATED ORAL EVERY 8 HOURS SCHEDULED
Status: DISPENSED | OUTPATIENT
Start: 2025-01-11

## 2025-01-11 RX ADMIN — LIDOCAINE 4% 1 PATCH: 40 PATCH TOPICAL at 15:38

## 2025-01-11 RX ADMIN — PERFLUTREN 2 ML OF DILUTION: 6.52 INJECTION, SUSPENSION INTRAVENOUS at 09:15

## 2025-01-11 RX ADMIN — ACETAMINOPHEN 650 MG: 325 SOLUTION ORAL at 09:18

## 2025-01-11 RX ADMIN — EZETIMIBE 10 MG: 10 TABLET ORAL at 09:00

## 2025-01-11 RX ADMIN — MONTELUKAST SODIUM 10 MG: 10 TABLET, FILM COATED ORAL at 21:05

## 2025-01-11 RX ADMIN — METHOCARBAMOL TABLETS 500 MG: 500 TABLET, COATED ORAL at 15:38

## 2025-01-11 RX ADMIN — METOPROLOL SUCCINATE 50 MG: 50 TABLET, EXTENDED RELEASE ORAL at 09:00

## 2025-01-11 RX ADMIN — FLUTICASONE PROPIONATE 2 SPRAY: 50 SPRAY, METERED NASAL at 08:59

## 2025-01-11 RX ADMIN — FLUTICASONE FUROATE AND VILANTEROL TRIFENATATE 1 PUFF: 200; 25 POWDER RESPIRATORY (INHALATION) at 06:29

## 2025-01-11 RX ADMIN — PIPERACILLIN SODIUM AND TAZOBACTAM SODIUM 3.38 G: 3; .375 INJECTION, SOLUTION INTRAVENOUS at 17:16

## 2025-01-11 RX ADMIN — ASPIRIN 81 MG: 81 TABLET, CHEWABLE ORAL at 21:05

## 2025-01-11 RX ADMIN — TIOTROPIUM BROMIDE INHALATION SPRAY 2 PUFF: 3.12 SPRAY, METERED RESPIRATORY (INHALATION) at 06:29

## 2025-01-11 RX ADMIN — PIPERACILLIN SODIUM AND TAZOBACTAM SODIUM 3.38 G: 3; .375 INJECTION, SOLUTION INTRAVENOUS at 13:24

## 2025-01-11 RX ADMIN — AMIODARONE HYDROCHLORIDE 100 MG: 200 TABLET ORAL at 09:00

## 2025-01-11 RX ADMIN — VANCOMYCIN HYDROCHLORIDE 1500 MG: 1.5 INJECTION, POWDER, LYOPHILIZED, FOR SOLUTION INTRAVENOUS at 10:19

## 2025-01-11 RX ADMIN — METHOCARBAMOL TABLETS 500 MG: 500 TABLET, COATED ORAL at 21:05

## 2025-01-11 RX ADMIN — SODIUM CHLORIDE, POTASSIUM CHLORIDE, SODIUM LACTATE AND CALCIUM CHLORIDE 500 ML: 600; 310; 30; 20 INJECTION, SOLUTION INTRAVENOUS at 13:24

## 2025-01-11 RX ADMIN — EMPAGLIFLOZIN 10 MG: 10 TABLET, FILM COATED ORAL at 09:00

## 2025-01-11 RX ADMIN — ACETAMINOPHEN 650 MG: 325 SOLUTION ORAL at 13:23

## 2025-01-11 RX ADMIN — ATORVASTATIN CALCIUM 40 MG: 20 TABLET, FILM COATED ORAL at 21:05

## 2025-01-11 SDOH — SOCIAL STABILITY: SOCIAL INSECURITY
WITHIN THE LAST YEAR, HAVE YOU BEEN KICKED, HIT, SLAPPED, OR OTHERWISE PHYSICALLY HURT BY YOUR PARTNER OR EX-PARTNER?: NO

## 2025-01-11 SDOH — SOCIAL STABILITY: SOCIAL INSECURITY: WITHIN THE LAST YEAR, HAVE YOU BEEN HUMILIATED OR EMOTIONALLY ABUSED IN OTHER WAYS BY YOUR PARTNER OR EX-PARTNER?: NO

## 2025-01-11 SDOH — ECONOMIC STABILITY: HOUSING INSECURITY: IN THE PAST 12 MONTHS, HOW MANY TIMES HAVE YOU MOVED WHERE YOU WERE LIVING?: 1

## 2025-01-11 SDOH — ECONOMIC STABILITY: INCOME INSECURITY: IN THE PAST 12 MONTHS HAS THE ELECTRIC, GAS, OIL, OR WATER COMPANY THREATENED TO SHUT OFF SERVICES IN YOUR HOME?: NO

## 2025-01-11 SDOH — ECONOMIC STABILITY: HOUSING INSECURITY: AT ANY TIME IN THE PAST 12 MONTHS, WERE YOU HOMELESS OR LIVING IN A SHELTER (INCLUDING NOW)?: NO

## 2025-01-11 SDOH — ECONOMIC STABILITY: FOOD INSECURITY: WITHIN THE PAST 12 MONTHS, THE FOOD YOU BOUGHT JUST DIDN'T LAST AND YOU DIDN'T HAVE MONEY TO GET MORE.: NEVER TRUE

## 2025-01-11 SDOH — SOCIAL STABILITY: SOCIAL INSECURITY: WITHIN THE LAST YEAR, HAVE YOU BEEN AFRAID OF YOUR PARTNER OR EX-PARTNER?: NO

## 2025-01-11 SDOH — ECONOMIC STABILITY: TRANSPORTATION INSECURITY: IN THE PAST 12 MONTHS, HAS LACK OF TRANSPORTATION KEPT YOU FROM MEDICAL APPOINTMENTS OR FROM GETTING MEDICATIONS?: NO

## 2025-01-11 SDOH — SOCIAL STABILITY: SOCIAL INSECURITY
WITHIN THE LAST YEAR, HAVE YOU BEEN RAPED OR FORCED TO HAVE ANY KIND OF SEXUAL ACTIVITY BY YOUR PARTNER OR EX-PARTNER?: NO

## 2025-01-11 SDOH — ECONOMIC STABILITY: HOUSING INSECURITY: IN THE LAST 12 MONTHS, WAS THERE A TIME WHEN YOU WERE NOT ABLE TO PAY THE MORTGAGE OR RENT ON TIME?: NO

## 2025-01-11 SDOH — ECONOMIC STABILITY: FOOD INSECURITY: WITHIN THE PAST 12 MONTHS, YOU WORRIED THAT YOUR FOOD WOULD RUN OUT BEFORE YOU GOT THE MONEY TO BUY MORE.: NEVER TRUE

## 2025-01-11 SDOH — ECONOMIC STABILITY: FOOD INSECURITY: HOW HARD IS IT FOR YOU TO PAY FOR THE VERY BASICS LIKE FOOD, HOUSING, MEDICAL CARE, AND HEATING?: PATIENT DECLINED

## 2025-01-11 ASSESSMENT — ACTIVITIES OF DAILY LIVING (ADL)
LACK_OF_TRANSPORTATION: NO
LACK_OF_TRANSPORTATION: NO

## 2025-01-11 ASSESSMENT — PAIN SCALES - GENERAL
PAINLEVEL_OUTOF10: 4
PAINLEVEL_OUTOF10: 2
PAINLEVEL_OUTOF10: 2
PAINLEVEL_OUTOF10: 3

## 2025-01-11 ASSESSMENT — PAIN - FUNCTIONAL ASSESSMENT
PAIN_FUNCTIONAL_ASSESSMENT: 0-10
PAIN_FUNCTIONAL_ASSESSMENT: 0-10

## 2025-01-11 ASSESSMENT — PAIN DESCRIPTION - LOCATION
LOCATION: BACK
LOCATION: OTHER (COMMENT)

## 2025-01-11 NOTE — PROGRESS NOTES
[facility] Critical Care Medicine       Date:  1/11/2025  Patient:  Guicho Jones  YOB: 1940  MRN:  [id number]   Admit Date:  1/10/2025  ========================================================================================================    Chief Complaint   Patient presents with    Trauma     HIA         History of Present Illness:  Guicho Jones is a 84 y.o. year old male patient with Past Medical History of  ICM s/p ICD, MI, diastolic heart failure, CAD s/p PCI, COPD (centrilobular emphysema), pulmonary hypertension, HTN, HLD, persistent Afib on ATC with xarelto, CVA, infrarenal AAA, obesity, and prostate cancer who presented to [facility] emergency department by EMS for fall. Reports on Wednesday after eating pizza began to have nausea, vomiting, and diarrhea. Nausea and vomiting improved but had persistent diarrhea. Last night at approx 0130 slipped out of the bed when attempting to get up to the bathroom. Wife was able to assist back into the bed but this morning was unable to ambulate due to weakness prompting family to call EMS. Denies sick contacts and no other family members with similar symptoms.      ED course:   On arrival to the emergency department was hypotensive with BP 60s/30s and febrile with temp 38.6, given fluids per sepsis protocol and treated with Zosyn.   Labs: glucose 148, potassium 3.2, Creat 1.67, AST 54, bili 2.2, lactate 2.7, , Troponin 121 repeat 128, WBC 13.9, plt 100, flu/ covid/ RSV  negative.  CT head: negative  CT a/p: liquid stool t/o colon, no significant wall thickening or evidence of colitis, slight interval change in bilobed infrarenal AAA without evidence of dissection or acute aortic abnormality (6.1x6.2 cm)     Echo 1/2024: EF 55%, mild to mod concentric LVH with DUST, no LVOT obstruction, mild RV dilatation with normal function, RVSP 50-55, mod to sev dilated LA, mild to mod dilated RA, mild to mod TR, 1+MR, trace AI        Interval ICU  Events:  1/10: Admit to the ICU for hypotension 2/2 to hypovolemia and concern for clinical decompensation.   1/11: VSS. Febrile, Blood cultures, +GPC. States had biopsy of scrotum day prior to hospitalization and needs to have MOHS procedure, pending. Site of biopsy without s/s of infection/ drainage. RN staff noted upper extremity weakness with abnormality in depth perception. On initial exam bilateral upper extremity weakness, no loss of sensation, right hand tingling (at baseline), and difficulty with depth perception. MRI ordered of cervical/ thoracic/ lumbar spine. Spoke with rep from [employer] to verify device compatibility and reported that old device still present in abdomen is not MRI compatible but new device in chest wall is, MRI was canceled. Noted improvement in neurological exam and movement/ strength of upper extremities but persistent limitation with depth perception.      Medical History:  Past Medical History:   Diagnosis Date    Allergic     Arthritis     Cancer (Multi)     Cataract     CHF (congestive heart failure)     COPD (chronic obstructive pulmonary disease) (Multi)     Encounter for follow-up examination after completed treatment for conditions other than malignant neoplasm 12/27/2021    Hospital discharge follow-up    Heart disease     Hypertension     Ischemic cardiomyopathy     Ischemic cardiomyopathy with implantable cardioverter-defibrillator (ICD)    Myocardial infarction (Multi)     Pain in unspecified hip     Joint pain, hip    Personal history of other diseases of the musculoskeletal system and connective tissue     History of low back pain    Personal history of other endocrine, nutritional and metabolic disease     History of hyperlipidemia    Personal history of other specified conditions 12/21/2021    History of elevated prostate specific antigen (PSA)    Presence of coronary angioplasty implant and graft     Stented coronary artery    Unspecified atrial flutter  (Multi)     Paroxysmal atrial flutter     Past Surgical History:   Procedure Laterality Date    BACK SURGERY      CARDIAC CATHETERIZATION      CORONARY STENT PLACEMENT      CT ABDOMEN ANGIOGRAM W AND/OR WO IV CONTRAST  10/29/2021    CT ABDOMEN ANGIOGRAM W AND/OR WO IV CONTRAST 10/29/2021 [facility]    CT ABDOMEN ANGIOGRAM W AND/OR WO IV CONTRAST  10/03/2022    CT ABDOMEN ANGIOGRAM W AND/OR WO IV CONTRAST 10/3/2022 [facility]    EYE SURGERY      JOINT REPLACEMENT      OTHER SURGICAL HISTORY  10/12/2021    Renal angioplasty and stenting    OTHER SURGICAL HISTORY  10/12/2021    Cataract surgery    OTHER SURGICAL HISTORY  12/21/2021    Hip replacement    OTHER SURGICAL HISTORY  01/04/2022    Complete colonoscopy    OTHER SURGICAL HISTORY  12/21/2021    Back surgery    OTHER SURGICAL HISTORY  12/21/2021    Abdominal aortic aneurysm repair    OTHER SURGICAL HISTORY  12/21/2021    Hip replacement    OTHER SURGICAL HISTORY  12/21/2021    Surgery    TONSILLECTOMY      VASECTOMY       Medications Prior to Admission   Medication Sig Dispense Refill Last Dose/Taking    albuterol (Proventil HFA) 90 mcg/actuation inhaler Inhale 1 puff every 4 hours if needed.   1/9/2025    amiodarone (Pacerone) 200 mg tablet Take 0.5 tablets (100 mg) by mouth once daily. 90 tablet 1 1/9/2025    aspirin 81 mg chewable tablet Chew 1 tablet (81 mg). TAKE 1 TABLET MONDAY THROUGH FRIDAY AT BEDTIME   1/9/2025    atorvastatin (Lipitor) 40 mg tablet Take 1 tablet (40 mg) by mouth once daily at bedtime. 90 tablet 1 1/9/2025    budesonide-glycopyr-formoterol (Breztri Aerosphere) 160-9-4.8 mcg/actuation HFA aerosol inhaler Inhale 2 puffs 2 times a day.   1/10/2025    empagliflozin (Jardiance) 10 mg Take 1 tablet (10 mg) by mouth once daily. 90 tablet 3 1/9/2025    ezetimibe (Zetia) 10 mg tablet Take 1 tablet (10 mg) by mouth once daily. 90 tablet 1 1/9/2025    fluticasone (Flonase) 50 mcg/actuation nasal spray USE 2 SPRAYS IN EACH  NOSTRIL EVERY DAY 48 g 3 1/10/2025    glucosamine HCl 1,500 mg tablet Take 1 tablet by mouth once daily.   1/9/2025    hydroCHLOROthiazide (HYDRODiuril) 25 mg tablet TAKE 1 TABLET MON WED FRI ONLY   1/9/2025    ipratropium (Atrovent) 42 mcg (0.06 %) nasal spray Administer 2 sprays into each nostril 3 times a day. 15 mL 3 1/9/2025    isosorbide mononitrate ER (Imdur) 30 mg 24 hr tablet Take 1 tablet (30 mg) by mouth once daily. 1 tablet daily 90 tablet 3 1/9/2025    lisinopril 40 mg tablet TAKE 1 TABLET EVERY DAY 90 tablet 3 1/9/2025    magnesium oxide (MagOx) 400 mg (241.3 mg magnesium) tablet Take 1 tablet (400 mg) by mouth 2 times a day.   1/9/2025    metoprolol succinate XL (Toprol-XL) 100 mg 24 hr tablet 1/2 tablet in the morning, 1 full tablet at night   1/9/2025    metoprolol tartrate (Lopressor) 25 mg tablet Take 1 tablet daily for palpitations only 90 tablet 1 1/9/2025    montelukast (Singulair) 10 mg tablet TAKE 1 TABLET EVERY EVENING 90 tablet 3 1/9/2025    omega 3-dha-epa-fish oil (Fish OiL) 1,000 mg (120 mg-180 mg) capsule Take 1 capsule (1,000 mg) by mouth 2 times a day.   1/9/2025    rivaroxaban (Xarelto) 20 mg tablet 1 tablet daily 90 tablet 3 1/9/2025    amoxicillin (Amoxil) 500 mg capsule Take 4 capsules (2,000 mg) by mouth see administration instructions. TAKE 1 HOUR BEFORE DENTAL APPOINTMENT (Patient not taking: Reported on 1/10/2025)   More than a month    nitroglycerin (Nitrostat) 0.4 mg SL tablet Place 1 tablet (0.4 mg) under the tongue every 5 minutes if needed for chest pain. 25 tablet 5 Unknown     Levofloxacin  Social History     Tobacco Use    Smoking status: Former     Current packs/day: 1.50     Average packs/day: 1.5 packs/day for 15.0 years (22.5 ttl pk-yrs)     Types: Cigarettes     Passive exposure: Never    Smokeless tobacco: Never   Vaping Use    Vaping status: Never Used   Substance Use Topics    Alcohol use: Yes     Alcohol/week: 3.0 standard drinks of alcohol     Types: 3  "Standard drinks or equivalent per week     Comment: occassional    Drug use: Never     Family History   Problem Relation Name Age of Onset    Liver disease Mother Ysabel. Campbell Jones     Arthritis Mother Kelle Jones     Coronary artery disease Father Guicho Jones     Atrial fibrillation Father Guicho Jones     Heart disease Father Guicho Jones     Cancer Sister Sally Jones        Review of Systems:  14 point review of systems was completed and negative except for those specially mention in my HPI    Physical Exam:    Heart Rate:  [60-65]   Temp:  [35.9 °C (96.6 °F)-38.6 °C (101.5 °F)]   Resp:  [18-29]   BP: ()/(37-62)   Height:  [167.6 cm (5' 6\")]   Weight:  [99.8 kg (220 lb)-103 kg (227 lb 8.2 oz)]   SpO2:  [89 %-97 %]     Physical Exam  HENT:      Head: Normocephalic.      Mouth/Throat:      Mouth: Mucous membranes are moist.   Eyes:      Extraocular Movements: Extraocular movements intact.      Conjunctiva/sclera: Conjunctivae normal.      Pupils: Pupils are equal, round, and reactive to light.   Cardiovascular:      Rate and Rhythm: Normal rate and regular rhythm.      Pulses: Normal pulses.      Heart sounds: Normal heart sounds.   Pulmonary:      Breath sounds: Normal breath sounds.   Abdominal:      Palpations: Abdomen is soft.   Genitourinary:     Comments: Biopsy of scrotum, site pink without evidence of infection or drainage  Skin:     General: Skin is warm and dry.      Comments: flushed   Neurological:      Mental Status: He is alert.      Coordination: Finger-Nose-Finger Test abnormal.      Comments: Abnormal depth perception         Objective:    I have reviewed all medications, laboratory results, and imaging pertinent for today's encounter    Assessment/Plan:    I am currently managing this critically ill patient for the following problems:    Neuro/Psych/Pain Ctrl/Sedation:  Concern for stroke  Hx of CVA  Change in depth perception, unable to obtain " MRI related to ICD device incompatibility   Concern for septic emboli 2/2 gram positive bacteremia  Neuro checks  Consider neurology consult  CAM-ICU  Delirium precautions  Pain management as needed     Respiratory/ENT:  Acute on chronic hypoxic respiratory failure  COPD without acute exacerbation  Home inhalers, singulair, flonase  Duonebs as needed  Oxygen therapy as needed to maintain SPO2 greater than 90%  Pulmonary hygiene  BiPAP at night, may use home device     Cardiovascular:  Hypotension, resolved  Infrarenal AAA, increased in size from previou  ICM s/p AICD  HTN  Chronic diastolic heart failure  Atrial fibrillation  HLD  Home amiodarone, statin  Hold antihypertensive/ diuretic medications in setting of hypotension  Gentle hydration as needed  Echo, gram + blood culture  PAOLO, pending echo report  Consider cardiology consult  Hold home xarelto, currently in NSR  Follow up with vascular OP regarding infrarenal AAA     GI:  Diarrhea  Advance diet as tolerated     Renal/Volume Status (Intra & Extravascular):  ISAIAS in setting of hypovolemia and sepsis  Baseline Creat ~0.9-1.1  Trend BMP  Gentle hydration  Strict I&Os  Avoid nephrotoxic agents    Endocrine  Monitor for s/s of hypo/ hyperglycemia     Infectious Disease:  Sepsis 2/2 gram positive bacteremia, unknown source  Leukocytosis  Concern for device infection, endocarditis  Stool pathogen, pending  C diff, pending  Blood culture, +GPC  Broad spectrum coverage, Zosyn, vancomycin  Urinalysis with reflex-negative  Repeat blood cultures  ID consult     Heme/Onc:  Thrombocytopenia in setting of sepsis  Hold ATC therapy  Continue aspirin for now, monitor  Trend CBC  Monitor for s/s of bleeding  Transfuse for Hgb less than 7 or symptomatic anemia     MSK:  Fall  PT/ OT     Ethics/Code Status:  FULL     :  DVT Prophylaxis: on hold  GI Prophylaxis: no indication  Bowel Regimen: none  Diet: advance as tolerated to cardiac  CVC: none  Suyapa: none  Reyes:  none  Restraints: none  Dispo: ICU    Critical Care Time:  45 minutes spent in preparing to see patient (I.e. review of medical records), evaluation of diagnostics (I.e. labs, imaging, etc.), documentation, discussing plan of care with patient/ family/ caregiver, and/ or coordination of care with multidisciplinary team. Time does not include completion of procedure time.     Plan discussed with Dr. Meño Lincoln, APRN-CNP

## 2025-01-11 NOTE — SIGNIFICANT EVENT
"Patient, wife, and daughter updated at bedside regarding gram positive bacteremia. Discussed unknown source at this time but concern for device as primary source with endocarditis as urinalysis negative and CT abdomen unremarkable for infectious process. Updated on change in infrarenal AAA with increase in size compared to previous imaging. Wife stated has refused treatment/ surgical intervention for AAA in past stating would not want to pursue surgery and knows the risk of rupture with high risk of death if dissection. Informed of plan moving forward for echo with likely need to complete PAOLO for full evaluation to determine if vegetation present. Patient stated after further thought over last couple of days does feel as though was not feeling well and \"felt off\" with alteration in thought process and balance. Family informed of risk for stroke related to septic emboli if source of infection is found to be from device/ endocarditis with valvular vegetation. All questions from patient and family asked and discussed by this provider. Pending further input from ID consultation.   "

## 2025-01-11 NOTE — CARE PLAN
The patient's goals for the shift include Patient will remain safe and free from falls    The clinical goals for the shift include Patient will be hemodynamically stable throughout shift

## 2025-01-11 NOTE — CONSULTS
Vancomycin Dosing by Pharmacy- INITIAL    Guicho Jones is a 84 y.o. year old male who Pharmacy has been consulted for vancomycin dosing for other bacteremia . Based on the patient's indication and renal status this patient will be dosed based on a goal trough/random level of 15-20.     Renal function is currently declining.    Visit Vitals  /51   Pulse 64   Temp 36.3 °C (97.3 °F)   Resp 22        Lab Results   Component Value Date    CREATININE 1.69 (H) 2025    CREATININE 1.67 (H) 01/10/2025    CREATININE 0.91 2024    CREATININE 1.11 06/10/2024        Patient weight is as follows:   Vitals:    01/10/25 1846   Weight: 103 kg (227 lb 8.2 oz)       Cultures:  No results found for the encounter in last 14 days.        I/O last 3 completed shifts:  In: 2800 (27.1 mL/kg) [P.O.:800; IV Piggyback:2000]  Out: 1100 (10.7 mL/kg) [Urine:700 (0.2 mL/kg/hr); Stool:400]  Weight: 103.2 kg   I/O during current shift:  No intake/output data recorded.    Temp (24hrs), Av °C (98.6 °F), Min:35.9 °C (96.6 °F), Max:38.6 °C (101.5 °F)       Assessment/Plan     Patient will not be given a loading dose.  Will initiate vancomycin maintenance, a one time dose of 1,500 mg.  Follow-up level will be ordered on 25 at 0900 unless clinically indicated sooner.  Will continue to monitor renal function daily while on vancomycin and order serum creatinine at least every 48 hours if not already ordered.  Follow for continued vancomycin needs, clinical response, and signs/symptoms of toxicity.     Esther Iglesias, PharmD

## 2025-01-12 LAB
ALBUMIN SERPL BCP-MCNC: 3.2 G/DL (ref 3.4–5)
ALP SERPL-CCNC: 50 U/L (ref 33–136)
ALT SERPL W P-5'-P-CCNC: 27 U/L (ref 10–52)
ANION GAP SERPL CALC-SCNC: 12 MMOL/L (ref 10–20)
AST SERPL W P-5'-P-CCNC: 63 U/L (ref 9–39)
BACTERIA BLD AEROBE CULT: ABNORMAL
BACTERIA BLD AEROBE CULT: ABNORMAL
BACTERIA BLD CULT: ABNORMAL
BACTERIA BLD CULT: ABNORMAL
BACTERIA BLD CULT: NORMAL
BACTERIA BLD CULT: NORMAL
BILIRUB SERPL-MCNC: 2.4 MG/DL (ref 0–1.2)
BUN SERPL-MCNC: 42 MG/DL (ref 6–23)
C COLI+JEJ+UPSA DNA STL QL NAA+PROBE: NOT DETECTED
CALCIUM SERPL-MCNC: 8 MG/DL (ref 8.6–10.3)
CHLORIDE SERPL-SCNC: 105 MMOL/L (ref 98–107)
CO2 SERPL-SCNC: 22 MMOL/L (ref 21–32)
CREAT SERPL-MCNC: 1.59 MG/DL (ref 0.5–1.3)
EC STX1 GENE STL QL NAA+PROBE: NOT DETECTED
EC STX2 GENE STL QL NAA+PROBE: NOT DETECTED
EGFRCR SERPLBLD CKD-EPI 2021: 42 ML/MIN/1.73M*2
ERYTHROCYTE [DISTWIDTH] IN BLOOD BY AUTOMATED COUNT: 16.5 % (ref 11.5–14.5)
GLUCOSE SERPL-MCNC: 117 MG/DL (ref 74–99)
GRAM STN SPEC: ABNORMAL
HCT VFR BLD AUTO: 31.3 % (ref 41–52)
HGB BLD-MCNC: 10.4 G/DL (ref 13.5–17.5)
MAGNESIUM SERPL-MCNC: 2.28 MG/DL (ref 1.6–2.4)
MCH RBC QN AUTO: 31.6 PG (ref 26–34)
MCHC RBC AUTO-ENTMCNC: 33.2 G/DL (ref 32–36)
MCV RBC AUTO: 95 FL (ref 80–100)
NOROVIRUS GI + GII RNA STL NAA+PROBE: NOT DETECTED
NRBC BLD-RTO: 0 /100 WBCS (ref 0–0)
PLATELET # BLD AUTO: 71 X10*3/UL (ref 150–450)
POTASSIUM SERPL-SCNC: 3.2 MMOL/L (ref 3.5–5.3)
PROT SERPL-MCNC: 5.9 G/DL (ref 6.4–8.2)
RBC # BLD AUTO: 3.29 X10*6/UL (ref 4.5–5.9)
RV RNA STL NAA+PROBE: NOT DETECTED
SALMONELLA DNA STL QL NAA+PROBE: NOT DETECTED
SHIGELLA DNA SPEC QL NAA+PROBE: NOT DETECTED
SODIUM SERPL-SCNC: 136 MMOL/L (ref 136–145)
UFH PPP CHRO-ACNC: 0.2 IU/ML
UFH PPP CHRO-ACNC: 0.5 IU/ML
V CHOLERAE DNA STL QL NAA+PROBE: NOT DETECTED
VANCOMYCIN SERPL-MCNC: 6.6 UG/ML (ref 5–20)
WBC # BLD AUTO: 10.9 X10*3/UL (ref 4.4–11.3)
Y ENTEROCOL DNA STL QL NAA+PROBE: NOT DETECTED

## 2025-01-12 PROCEDURE — 85027 COMPLETE CBC AUTOMATED: CPT | Performed by: NURSE PRACTITIONER

## 2025-01-12 PROCEDURE — P9045 ALBUMIN (HUMAN), 5%, 250 ML: HCPCS | Mod: JZ | Performed by: HOSPITALIST

## 2025-01-12 PROCEDURE — 2500000005 HC RX 250 GENERAL PHARMACY W/O HCPCS

## 2025-01-12 PROCEDURE — 99291 CRITICAL CARE FIRST HOUR: CPT | Performed by: NURSE PRACTITIONER

## 2025-01-12 PROCEDURE — 2500000001 HC RX 250 WO HCPCS SELF ADMINISTERED DRUGS (ALT 637 FOR MEDICARE OP)

## 2025-01-12 PROCEDURE — 2500000004 HC RX 250 GENERAL PHARMACY W/ HCPCS (ALT 636 FOR OP/ED): Mod: JZ | Performed by: INTERNAL MEDICINE

## 2025-01-12 PROCEDURE — 2500000002 HC RX 250 W HCPCS SELF ADMINISTERED DRUGS (ALT 637 FOR MEDICARE OP, ALT 636 FOR OP/ED)

## 2025-01-12 PROCEDURE — 82947 ASSAY GLUCOSE BLOOD QUANT: CPT | Performed by: NURSE PRACTITIONER

## 2025-01-12 PROCEDURE — 85520 HEPARIN ASSAY: CPT | Performed by: NURSE PRACTITIONER

## 2025-01-12 PROCEDURE — 2500000004 HC RX 250 GENERAL PHARMACY W/ HCPCS (ALT 636 FOR OP/ED): Performed by: HOSPITALIST

## 2025-01-12 PROCEDURE — 2020000001 HC ICU ROOM DAILY

## 2025-01-12 PROCEDURE — 83735 ASSAY OF MAGNESIUM: CPT | Performed by: NURSE PRACTITIONER

## 2025-01-12 PROCEDURE — 87077 CULTURE AEROBIC IDENTIFY: CPT | Mod: ELYLAB | Performed by: NURSE PRACTITIONER

## 2025-01-12 PROCEDURE — 36415 COLL VENOUS BLD VENIPUNCTURE: CPT | Performed by: NURSE PRACTITIONER

## 2025-01-12 PROCEDURE — 87040 BLOOD CULTURE FOR BACTERIA: CPT | Mod: ELYLAB | Performed by: NURSE PRACTITIONER

## 2025-01-12 PROCEDURE — 2500000002 HC RX 250 W HCPCS SELF ADMINISTERED DRUGS (ALT 637 FOR MEDICARE OP, ALT 636 FOR OP/ED): Performed by: NURSE PRACTITIONER

## 2025-01-12 PROCEDURE — 80202 ASSAY OF VANCOMYCIN: CPT | Performed by: NURSE PRACTITIONER

## 2025-01-12 PROCEDURE — 2500000005 HC RX 250 GENERAL PHARMACY W/O HCPCS: Performed by: NURSE PRACTITIONER

## 2025-01-12 PROCEDURE — 2500000004 HC RX 250 GENERAL PHARMACY W/ HCPCS (ALT 636 FOR OP/ED): Performed by: NURSE PRACTITIONER

## 2025-01-12 PROCEDURE — 2500000004 HC RX 250 GENERAL PHARMACY W/ HCPCS (ALT 636 FOR OP/ED)

## 2025-01-12 PROCEDURE — 2500000001 HC RX 250 WO HCPCS SELF ADMINISTERED DRUGS (ALT 637 FOR MEDICARE OP): Performed by: NURSE PRACTITIONER

## 2025-01-12 RX ORDER — POTASSIUM CHLORIDE 20 MEQ/1
40 TABLET, EXTENDED RELEASE ORAL ONCE
Status: COMPLETED | OUTPATIENT
Start: 2025-01-12 | End: 2025-01-12

## 2025-01-12 RX ORDER — ACETAMINOPHEN 325 MG/1
650 TABLET ORAL EVERY 6 HOURS
Status: DISPENSED | OUTPATIENT
Start: 2025-01-12

## 2025-01-12 RX ORDER — HEPARIN SODIUM 10000 [USP'U]/100ML
0-4000 INJECTION, SOLUTION INTRAVENOUS CONTINUOUS
Status: DISPENSED | OUTPATIENT
Start: 2025-01-12

## 2025-01-12 RX ORDER — ONDANSETRON HYDROCHLORIDE 2 MG/ML
4 INJECTION, SOLUTION INTRAVENOUS EVERY 6 HOURS PRN
Status: DISPENSED | OUTPATIENT
Start: 2025-01-12

## 2025-01-12 RX ORDER — OXYCODONE HYDROCHLORIDE 5 MG/1
2.5 TABLET ORAL EVERY 6 HOURS PRN
Status: ACTIVE | OUTPATIENT
Start: 2025-01-12

## 2025-01-12 RX ORDER — ACETAMINOPHEN 500 MG
5 TABLET ORAL NIGHTLY
Status: DISPENSED | OUTPATIENT
Start: 2025-01-12

## 2025-01-12 RX ORDER — SODIUM CHLORIDE, SODIUM LACTATE, POTASSIUM CHLORIDE, CALCIUM CHLORIDE 600; 310; 30; 20 MG/100ML; MG/100ML; MG/100ML; MG/100ML
100 INJECTION, SOLUTION INTRAVENOUS CONTINUOUS
Status: ACTIVE | OUTPATIENT
Start: 2025-01-12 | End: 2025-01-13

## 2025-01-12 RX ORDER — METOPROLOL TARTRATE 50 MG/1
50 TABLET ORAL 2 TIMES DAILY
Status: DISPENSED | OUTPATIENT
Start: 2025-01-12

## 2025-01-12 RX ORDER — ALBUMIN HUMAN 50 G/1000ML
25 SOLUTION INTRAVENOUS ONCE
Status: COMPLETED | OUTPATIENT
Start: 2025-01-12 | End: 2025-01-12

## 2025-01-12 RX ADMIN — POTASSIUM CHLORIDE 40 MEQ: 1500 TABLET, EXTENDED RELEASE ORAL at 08:28

## 2025-01-12 RX ADMIN — HEPARIN SODIUM 1000 UNITS/HR: 10000 INJECTION, SOLUTION INTRAVENOUS at 10:22

## 2025-01-12 RX ADMIN — FLUTICASONE PROPIONATE 2 SPRAY: 50 SPRAY, METERED NASAL at 08:28

## 2025-01-12 RX ADMIN — ACETAMINOPHEN 650 MG: 325 TABLET ORAL at 08:28

## 2025-01-12 RX ADMIN — ALBUMIN HUMAN 25 G: 0.05 INJECTION, SOLUTION INTRAVENOUS at 21:33

## 2025-01-12 RX ADMIN — SODIUM CHLORIDE, POTASSIUM CHLORIDE, SODIUM LACTATE AND CALCIUM CHLORIDE 100 ML/HR: 600; 310; 30; 20 INJECTION, SOLUTION INTRAVENOUS at 21:33

## 2025-01-12 RX ADMIN — METHOCARBAMOL TABLETS 500 MG: 500 TABLET, COATED ORAL at 13:40

## 2025-01-12 RX ADMIN — FLUTICASONE FUROATE AND VILANTEROL TRIFENATATE 1 PUFF: 200; 25 POWDER RESPIRATORY (INHALATION) at 08:28

## 2025-01-12 RX ADMIN — METHOCARBAMOL TABLETS 500 MG: 500 TABLET, COATED ORAL at 21:04

## 2025-01-12 RX ADMIN — ACETAMINOPHEN 650 MG: 325 TABLET ORAL at 14:46

## 2025-01-12 RX ADMIN — VANCOMYCIN HYDROCHLORIDE 1750 MG: 5 INJECTION, POWDER, LYOPHILIZED, FOR SOLUTION INTRAVENOUS at 11:33

## 2025-01-12 RX ADMIN — ATORVASTATIN CALCIUM 40 MG: 20 TABLET, FILM COATED ORAL at 20:18

## 2025-01-12 RX ADMIN — ACETAMINOPHEN 650 MG: 325 TABLET ORAL at 20:32

## 2025-01-12 RX ADMIN — LIDOCAINE 4% 1 PATCH: 40 PATCH TOPICAL at 14:45

## 2025-01-12 RX ADMIN — ASPIRIN 81 MG: 81 TABLET, CHEWABLE ORAL at 20:18

## 2025-01-12 RX ADMIN — TIOTROPIUM BROMIDE INHALATION SPRAY 2 PUFF: 3.12 SPRAY, METERED RESPIRATORY (INHALATION) at 08:28

## 2025-01-12 RX ADMIN — CEFAZOLIN SODIUM 2 G: 2 INJECTION, SOLUTION INTRAVENOUS at 16:44

## 2025-01-12 RX ADMIN — EZETIMIBE 10 MG: 10 TABLET ORAL at 08:28

## 2025-01-12 RX ADMIN — CEFAZOLIN SODIUM 2 G: 2 INJECTION, SOLUTION INTRAVENOUS at 05:12

## 2025-01-12 RX ADMIN — METOPROLOL TARTRATE 50 MG: 50 TABLET, FILM COATED ORAL at 20:18

## 2025-01-12 RX ADMIN — MONTELUKAST SODIUM 10 MG: 10 TABLET, FILM COATED ORAL at 20:17

## 2025-01-12 RX ADMIN — METHOCARBAMOL TABLETS 500 MG: 500 TABLET, COATED ORAL at 05:12

## 2025-01-12 RX ADMIN — ONDANSETRON 4 MG: 2 INJECTION INTRAMUSCULAR; INTRAVENOUS at 20:17

## 2025-01-12 RX ADMIN — AMIODARONE HYDROCHLORIDE 100 MG: 200 TABLET ORAL at 08:28

## 2025-01-12 RX ADMIN — Medication 5 MG: at 20:18

## 2025-01-12 RX ADMIN — POTASSIUM CHLORIDE 40 MEQ: 1500 TABLET, EXTENDED RELEASE ORAL at 11:36

## 2025-01-12 RX ADMIN — METOPROLOL TARTRATE 50 MG: 50 TABLET, FILM COATED ORAL at 10:23

## 2025-01-12 ASSESSMENT — PAIN SCALES - GENERAL
PAINLEVEL_OUTOF10: 2
PAINLEVEL_OUTOF10: 2

## 2025-01-12 ASSESSMENT — PAIN - FUNCTIONAL ASSESSMENT: PAIN_FUNCTIONAL_ASSESSMENT: 0-10

## 2025-01-12 NOTE — PROGRESS NOTES
?  HISTORY OF PRESENT ILLNESS:         HPI 84-year-old male started having nausea vomiting and diarrhea after eating pizza on Wednesday. Tells me that his grandchildren have been ill with nausea/vomiting/diarrhea as well. CT abdomen and pelvis and CT head both noted -patient does have 6 x 6 cm aortic aneurysm, which is apparently changed in size compared to last image, currently without evidence of dissection.  He was admitted to the intensive care unit for further evaluation and treatment due to hypotension and hypovolemia     Flu, COVID, RSV all negative  Pathogen panel pending  C. difficile PCR normal  Repeat blood culture from 1/11/2025 pending     Of note, patient has AICD     Unfortunately, on 1/10/2025, patient's blood cultures x 2 positive for Staphylococcus aureus    Current Medications:    Current Facility-Administered Medications   Medication Dose Route Frequency Provider Last Rate Last Admin    acetaminophen (Tylenol) tablet 650 mg  650 mg oral q6h VICKY Messer   650 mg at 01/12/25 1446    amiodarone (Pacerone) tablet 100 mg  100 mg oral Daily VICKY Weathers   100 mg at 01/12/25 0828    aspirin chewable tablet 81 mg  81 mg oral Nightly SAJI Weathers-CNP   81 mg at 01/11/25 2105    atorvastatin (Lipitor) tablet 40 mg  40 mg oral Nightly SAJI Weathers-CNP   40 mg at 01/11/25 2105    ceFAZolin (Ancef) 2 g in dextrose (iso)  mL  2 g intravenous q12h Beth Clark DO 0 mL/hr at 01/12/25 0545 2 g at 01/12/25 1644    [Held by provider] empagliflozin (Jardiance) tablet 10 mg  10 mg oral Daily NEAL WeathersCNP   10 mg at 01/11/25 0900    ezetimibe (Zetia) tablet 10 mg  10 mg oral Daily SAJI Weathers-CNP   10 mg at 01/12/25 0828    fluticasone (Flonase) nasal spray 2 spray  2 spray Each Nostril Daily NEAL WeathersCNP   2 spray at 01/12/25 0828    tiotropium (Spiriva Respimat) 2.5 mcg/actuation inhaler 2 puff  2 puff  inhalation Daily VICKY Weathers   2 puff at 01/12/25 0828    And    fluticasone furoate-vilanteroL (Breo Ellipta) 200-25 mcg/dose inhaler 1 puff  1 puff inhalation Daily VICKY Weathers   1 puff at 01/12/25 0828    heparin 25,000 Units in dextrose 5% 250 mL (100 Units/mL) infusion (premix)  0-4,000 Units/hr intravenous Continuous VICKY Messer 12 mL/hr at 01/12/25 1514 1,200 Units/hr at 01/12/25 1514    heparin bolus from bag 2,000-4,000 Units  2,000-4,000 Units intravenous q4h PRN VICKY Messer   2,000 Units at 01/12/25 1515    [Held by provider] hydroCHLOROthiazide (HYDRODiuril) tablet 25 mg  25 mg oral Once per day on Monday Wednesday Friday VICKY Weathers        [Held by provider] isosorbide mononitrate ER (Imdur) 24 hr tablet 30 mg  30 mg oral Daily VICKY Weathers        lidocaine 4 % patch 1 patch  1 patch transdermal Daily VICKY Messer   1 patch at 01/12/25 1445    [Held by provider] lisinopril tablet 40 mg  40 mg oral Daily VICKY Weathers        melatonin tablet 5 mg  5 mg oral Nightly VICKY Messer        methocarbamol (Robaxin) tablet 500 mg  500 mg oral q8h [facility] Jeanne Maurer PA-C   500 mg at 01/12/25 1340    [Held by provider] metoprolol succinate XL (Toprol-XL) 24 hr tablet 100 mg  100 mg oral Nightly VICKY Weathers        [Held by provider] metoprolol succinate XL (Toprol-XL) 24 hr tablet 50 mg  50 mg oral Daily VICKY Weathers   50 mg at 01/11/25 0900    metoprolol tartrate (Lopressor) tablet 50 mg  50 mg oral BID VICKY Messer   50 mg at 01/12/25 1023    montelukast (Singulair) tablet 10 mg  10 mg oral q PM Jen Pulido, SAJI-CNP   10 mg at 01/11/25 2105    oxyCODONE (Roxicodone) immediate release tablet 2.5 mg  2.5 mg oral q6h PRN Lizett Lincoln, APRN-CNP        oxygen (O2) therapy   inhalation Continuous PRN - O2/gases  "Lizett Lincoln, APRN-CNP   30 percent at 01/10/25 2206        Allergies:    Allergies   Allergen Reactions    Levofloxacin Palpitations     Rapid Heart Rate - Severe per pt.          Review of Systems  14 system review is negative other than HPI     /59   Pulse 60   Temp 37.8 °C (100 °F) (Rectal)   Resp 21   Ht 1.676 m (5' 6\")   Wt 103 kg (227 lb 1.2 oz)   SpO2 (!) 89%   BMI 36.65 kg/m²    Physical Exam:  General: Patient appears ok at the present time. NAD  Skin: no new rashes  HEENT:  Neck is supple, No subconjunctival hemorrhages, no oral exudates  Heart: S1 S2  Lungs: diminished bases  Abdomen: soft, ND, NTTP,  Extrem: No edema, non tender           DATA:    .  Lab Results   Component Value Date    WBC 10.9 01/12/2025    HGB 10.4 (L) 01/12/2025    HCT 31.3 (L) 01/12/2025    MCV 95 01/12/2025    PLT 71 (L) 01/12/2025     Results from last 7 days   Lab Units 01/12/25  0408   SODIUM mmol/L 136   POTASSIUM mmol/L 3.2*   CHLORIDE mmol/L 105   CO2 mmol/L 22   BUN mg/dL 42*   CREATININE mg/dL 1.59*   CALCIUM mg/dL 8.0*   PROTEIN TOTAL g/dL 5.9*   BILIRUBIN TOTAL mg/dL 2.4*   ALK PHOS U/L 50   ALT U/L 27   AST U/L 63*   GLUCOSE mg/dL 117*   Susceptibility data from last 90 days.  Collected Specimen Info Organism Clindamycin Erythromycin Oxacillin Tetracycline Trimethoprim/Sulfamethoxazole Vancomycin   01/10/25 Blood culture from Peripheral Venipuncture Staphylococcus aureus         01/10/25 Blood culture from Peripheral Venipuncture Methicillin Susceptible Staphylococcus aureus (MSSA)  S  S  S  S  S  S   Susceptibility data from last 90 days.  Collected Specimen Info Organism Clindamycin Erythromycin Oxacillin Tetracycline Trimethoprim/Sulfamethoxazole Vancomycin   01/10/25 Blood culture from Peripheral Venipuncture Staphylococcus aureus         01/10/25 Blood culture from Peripheral Venipuncture Methicillin Susceptible Staphylococcus aureus (MSSA)  S  S  S  S  S  S           IMPRESSION:        Problem " List Items Addressed This Visit          Cardiac and Vasculature    AICD (automatic cardioverter/defibrillator) present    Relevant Orders    Transesophageal Echo (PAOLO)    Ischemic cardiomyopathy    Relevant Medications    isosorbide mononitrate ER (Imdur) 24 hr tablet 30 mg    metoprolol succinate XL (Toprol-XL) 24 hr tablet 100 mg    metoprolol succinate XL (Toprol-XL) 24 hr tablet 50 mg    metoprolol tartrate (Lopressor) tablet 50 mg    Other Relevant Orders    Transthoracic Echo (TTE) Complete (Completed)    Transesophageal Echo (PAOLO)    Chronic diastolic congestive heart failure, NYHA class 2    Relevant Medications    isosorbide mononitrate ER (Imdur) 24 hr tablet 30 mg    metoprolol succinate XL (Toprol-XL) 24 hr tablet 100 mg    metoprolol succinate XL (Toprol-XL) 24 hr tablet 50 mg    metoprolol tartrate (Lopressor) tablet 50 mg    Other Relevant Orders    Transthoracic Echo (TTE) Complete (Completed)    Transesophageal Echo (PAOLO)    Pulmonary hypertension (Multi)    Relevant Orders    Transthoracic Echo (TTE) Complete (Completed)    Transesophageal Echo (PAOLO)       Gastrointestinal and Abdominal    * (Principal) Diarrhea, unspecified type - Primary     Other Visit Diagnoses       Hypotension, unspecified hypotension type        Elevated troponin        Hypokalemia        Renal insufficiency        Anemia, unspecified type        Fall, initial encounter        Medication induced coagulopathy (Multi)        Relevant Medications    aspirin chewable tablet 81 mg    heparin 25,000 Units in dextrose 5% 250 mL (100 Units/mL) infusion (premix)    heparin bolus from bag 2,000-4,000 Units    Bacteremia due to Gram-positive bacteria        Relevant Orders    Transthoracic Echo (TTE) Complete (Completed)    Transesophageal Echo (PAOLO)    Weakness of both arms        Lumbar pain            Concerned of endovascular infection given presentation and risk factors  PLAN:   PAOLO planned  Continue antibiotic therapy  Repeat  blood cultures until clearance    We may also to image aneurysm further if possible      Marc Lomeli MD

## 2025-01-12 NOTE — PROGRESS NOTES
Vancomycin Dosing by Pharmacy- Cessation of Therapy    Consult to pharmacy for vancomycin dosing has been discontinued by the prescriber, pharmacy will sign off at this time.    Please call pharmacy if there are further questions or re-enter a consult if vancomycin is resumed.     Kristine E Steinert, [facility]

## 2025-01-12 NOTE — H&P (VIEW-ONLY)
"Consults      ID Consult:       HPI 84-year-old male started having nausea vomiting and diarrhea after eating pizza on Wednesday. Tells me that his grandchildren have been ill with nausea/vomiting/diarrhea as well.   Per medical record on further clarification patient has been \"feeling off\" for a couple of days prior to admission.  The night prior to admission patient slipped out of the bed while attempting to get up to go to the bathroom due to weakness.  Patient was noted to be hypotensive in the emergency department, febrile, multiple electrolyte abnormalities, leukocytosis.  CT abdomen and pelvis and CT head both noted -patient does have 6 x 6 cm aortic aneurysm, which is apparently changed in size compared to last image, currently without evidence of dissection.  He was admitted to the intensive care unit for further evaluation and treatment due to hypotension and hypovolemia    Flu, COVID, RSV all negative  Pathogen panel pending  C. difficile PCR normal  Repeat blood culture from 1/11/2025 pending    Of note, patient has AICD    Unfortunately, on 1/10/2025, patient's blood cultures x 2 positive for Staphylococcus aureus    All 14 ROS discussed with the patient and negative other than as stated as above       has a past medical history of Allergic, Arthritis, Cancer (Multi), Cataract, CHF (congestive heart failure), COPD (chronic obstructive pulmonary disease) (Multi), Encounter for follow-up examination after completed treatment for conditions other than malignant neoplasm (12/27/2021), Heart disease, Hypertension, Ischemic cardiomyopathy, Myocardial infarction (Multi), Pain in unspecified hip, Personal history of other diseases of the musculoskeletal system and connective tissue, Personal history of other endocrine, nutritional and metabolic disease, Personal history of other specified conditions (12/21/2021), Presence of coronary angioplasty implant and graft, and Unspecified atrial flutter (Multi).     has " a past surgical history that includes Other surgical history (10/12/2021); Other surgical history (10/12/2021); Other surgical history (12/21/2021); Other surgical history (01/04/2022); Other surgical history (12/21/2021); Other surgical history (12/21/2021); Other surgical history (12/21/2021); Other surgical history (12/21/2021); CT angio abdomen w and or wo IV IV contrast (10/29/2021); CT angio abdomen w and or wo IV IV contrast (10/03/2022); Cardiac catheterization; Vasectomy; Back surgery; Eye surgery; Joint replacement; Coronary stent placement; and Tonsillectomy.     reports that he has quit smoking. His smoking use included cigarettes. He has a 22.5 pack-year smoking history. He has never been exposed to tobacco smoke. He has never used smokeless tobacco. He reports current alcohol use of about 3.0 standard drinks of alcohol per week. He reports that he does not use drugs.    Family History   Problem Relation Name Age of Onset    Liver disease Mother Ysabel. Campbell Jones     Arthritis Mother Ysabel. Campbell Jones     Coronary artery disease Father Guicho Jones     Atrial fibrillation Father Guicho Jones     Heart disease Father Guicho Jones     Cancer Sister Sally Jones          Physical exam  Vitals:    01/11/25 2000   BP: 97/58   Pulse: 60   Resp: 18   Temp:    SpO2: 93%     Obese male currently on BIPAP in the ICU.   Patient is awake and alert  NAD  Neck supple  Heart S1S2  Chest: Equal expansion, bilaterally clear to auscultation  Abdomen: soft, ND, NTTP, no guarding  Extrem: no pain to palpation  Skin: no rashes, no diaphoresis  Neuro: CNS intact  Affect appropriate and patient is interactive    Admission on 01/10/2025   Component Date Value Ref Range Status    WBC 01/10/2025 13.9 (H)  4.4 - 11.3 x10*3/uL Final    nRBC 01/10/2025 0.0  0.0 - 0.0 /100 WBCs Final    RBC 01/10/2025 3.72 (L)  4.50 - 5.90 x10*6/uL Final    Hemoglobin 01/10/2025 11.7 (L)  13.5 - 17.5 g/dL Final     Hematocrit 01/10/2025 36.2 (L)  41.0 - 52.0 % Final    MCV 01/10/2025 97  80 - 100 fL Final    MCH 01/10/2025 31.5  26.0 - 34.0 pg Final    MCHC 01/10/2025 32.3  32.0 - 36.0 g/dL Final    RDW 01/10/2025 16.2 (H)  11.5 - 14.5 % Final    Platelets 01/10/2025 100 (L)  150 - 450 x10*3/uL Final    Neutrophils % 01/10/2025 92.2  40.0 - 80.0 % Final    Immature Granulocytes %, Automated 01/10/2025 1.4 (H)  0.0 - 0.9 % Final    Immature Granulocyte Count (IG) includes promyelocytes, myelocytes and metamyelocytes but does not include bands. Percent differential counts (%) should be interpreted in the context of the absolute cell counts (cells/UL).    Lymphocytes % 01/10/2025 2.1  13.0 - 44.0 % Final    Monocytes % 01/10/2025 3.9  2.0 - 10.0 % Final    Eosinophils % 01/10/2025 0.3  0.0 - 6.0 % Final    Basophils % 01/10/2025 0.1  0.0 - 2.0 % Final    Neutrophils Absolute 01/10/2025 12.84 (H)  1.60 - 5.50 x10*3/uL Final    Percent differential counts (%) should be interpreted in the context of the absolute cell counts (cells/uL).    Immature Granulocytes Absolute, Au* 01/10/2025 0.20  0.00 - 0.50 x10*3/uL Final    Lymphocytes Absolute 01/10/2025 0.29 (L)  0.80 - 3.00 x10*3/uL Final    Monocytes Absolute 01/10/2025 0.54  0.05 - 0.80 x10*3/uL Final    Eosinophils Absolute 01/10/2025 0.04  0.00 - 0.40 x10*3/uL Final    Basophils Absolute 01/10/2025 0.02  0.00 - 0.10 x10*3/uL Final    Glucose 01/10/2025 148 (H)  74 - 99 mg/dL Final    Sodium 01/10/2025 133 (L)  136 - 145 mmol/L Final    Potassium 01/10/2025 3.2 (L)  3.5 - 5.3 mmol/L Final    Chloride 01/10/2025 98  98 - 107 mmol/L Final    Bicarbonate 01/10/2025 26  21 - 32 mmol/L Final    Anion Gap 01/10/2025 12  10 - 20 mmol/L Final    Urea Nitrogen 01/10/2025 39 (H)  6 - 23 mg/dL Final    Creatinine 01/10/2025 1.67 (H)  0.50 - 1.30 mg/dL Final    eGFR 01/10/2025 40 (L)  >60 mL/min/1.73m*2 Final    Calculations of estimated GFR are performed using the 2021 CKD-EPI Study Refit  equation without the race variable for the IDMS-Traceable creatinine methods.  https://jasn.asnjournals.org/content/early/2021/09/22/ASN.2720254775    Calcium 01/10/2025 8.1 (L)  8.6 - 10.3 mg/dL Final    Albumin 01/10/2025 3.4  3.4 - 5.0 g/dL Final    Alkaline Phosphatase 01/10/2025 56  33 - 136 U/L Final    Total Protein 01/10/2025 6.2 (L)  6.4 - 8.2 g/dL Final    AST 01/10/2025 54 (H)  9 - 39 U/L Final    Bilirubin, Total 01/10/2025 2.2 (H)  0.0 - 1.2 mg/dL Final    ALT 01/10/2025 20  10 - 52 U/L Final    Patients treated with Sulfasalazine may generate falsely decreased results for ALT.    Magnesium 01/10/2025 1.95  1.60 - 2.40 mg/dL Final    BNP 01/10/2025 680 (H)  0 - 99 pg/mL Final    Ventricular Rate 01/10/2025 64  BPM Preliminary    Atrial Rate 01/10/2025 64  BPM Preliminary    IA Interval 01/10/2025 82  ms Preliminary    QRS Duration 01/10/2025 110  ms Preliminary    QT Interval 01/10/2025 526  ms Preliminary    QTC Calculation(Bazett) 01/10/2025 542  ms Preliminary    P [place] 01/10/2025 38  degrees Preliminary    R Axis 01/10/2025 18  degrees Preliminary    T [place] 01/10/2025 74  degrees Preliminary    QRS Count 01/10/2025 11  beats Preliminary    Q Onset 01/10/2025 224  ms Preliminary    P Onset 01/10/2025 183  ms Preliminary    P Offset 01/10/2025 214  ms Preliminary    T Offset 01/10/2025 487  ms Preliminary    QTC Fred[place] 01/10/2025 537  ms Preliminary    Ventricular Rate 01/10/2025 60  BPM Preliminary    Atrial Rate 01/10/2025 60  BPM Preliminary    QRS Duration 01/10/2025 122  ms Preliminary    QT Interval 01/10/2025 572  ms Preliminary    QTC Calculation(Bazett) 01/10/2025 572  ms Preliminary    R Axis 01/10/2025 28  degrees Preliminary    T Axis 01/10/2025 110  degrees Preliminary    QRS Count 01/10/2025 10  beats Preliminary    Q Onset 01/10/2025 218  ms Preliminary    T Offset 01/10/2025 504  ms Preliminary    QTC Fred[place]ia 01/10/2025 572  ms Preliminary    Flu A Result 01/10/2025 Not Detected   Not Detected Final    Flu B Result 01/10/2025 Not Detected  Not Detected Final    Coronavirus 2019, PCR 01/10/2025 Not Detected  Not Detected Final    RSV PCR 01/10/2025 Not Detected  Not Detected Final    Troponin I, High Sensitivity 01/10/2025 121 (HH)  0 - 20 ng/L Final    Lactate 01/10/2025 2.7 (H)  0.4 - 2.0 mmol/L Final    Protime 01/10/2025 19.7 (H)  9.8 - 12.8 seconds Final    INR 01/10/2025 1.7 (H)  0.9 - 1.1 Final    Color, Urine 01/10/2025 Yellow  Light-Yellow, Yellow, Dark-Yellow Final    Appearance, Urine 01/10/2025 Clear  Clear Final    Specific Gravity, Urine 01/10/2025 >1.030 (N)  1.005 - 1.035 Final    pH, Urine 01/10/2025 5.5  5.0, 5.5, 6.0, 6.5, 7.0, 7.5, 8.0 Final    Protein, Urine 01/10/2025 100 (2+) (A)  NEGATIVE, 10 (TRACE), 20 (TRACE) mg/dL Final    Glucose, Urine 01/10/2025 150 (2+) (A)  Normal mg/dL Final    Blood, Urine 01/10/2025 0.5 (2+) (A)  NEGATIVE Final    Ketones, Urine 01/10/2025 NEGATIVE  NEGATIVE mg/dL Final    Bilirubin, Urine 01/10/2025 NEGATIVE  NEGATIVE Final    Urobilinogen, Urine 01/10/2025 Normal  Normal mg/dL Final    Nitrite, Urine 01/10/2025 NEGATIVE  NEGATIVE Final    Leukocyte Esterase, Urine 01/10/2025 NEGATIVE  NEGATIVE Final    Extra Tube 01/10/2025 Hold for add-ons.   Final    Auto resulted.    Troponin I, High Sensitivity 01/10/2025 128 (HH)  0 - 20 ng/L Final    Previous result verified on 1/10/2025 1351 on specimen/case [id number] called with component [facility] for procedure Troponin I, High Sensitivity, Initial with value 121 ng/L.    Blood Culture 01/10/2025 Staphylococcus aureus (A)   Preliminary    BLOOD CULTURE BOTTLE  01/10/2025 Positive Anaerobic Bottle   Preliminary    Gram Stain 01/10/2025 Gram positive cocci, clusters (AA)   Preliminary    Anaerobic Bottle Positive    Gram Stain 01/10/2025 Gram positive cocci, clusters (AA)   Preliminary    Aerobic Bottle Positive    Blood Culture 01/10/2025 Staphylococcus aureus (A)   Preliminary    BLOOD CULTURE  BOTTLE  01/10/2025 Positive Aerobic and Anaerobic Bottle   Preliminary    Gram Stain 01/10/2025 Gram positive cocci, clusters (AA)   Preliminary    Aerobic and Anaerobic Bottle Positive    Lactate 01/11/2025 1.2  0.4 - 2.0 mmol/L Final    WBC 01/11/2025 14.2 (H)  4.4 - 11.3 x10*3/uL Final    nRBC 01/11/2025 0.0  0.0 - 0.0 /100 WBCs Final    RBC 01/11/2025 3.33 (L)  4.50 - 5.90 x10*6/uL Final    Hemoglobin 01/11/2025 10.6 (L)  13.5 - 17.5 g/dL Final    Hematocrit 01/11/2025 32.5 (L)  41.0 - 52.0 % Final    MCV 01/11/2025 98  80 - 100 fL Final    MCH 01/11/2025 31.8  26.0 - 34.0 pg Final    MCHC 01/11/2025 32.6  32.0 - 36.0 g/dL Final    RDW 01/11/2025 16.7 (H)  11.5 - 14.5 % Final    Platelets 01/11/2025 81 (L)  150 - 450 x10*3/uL Final    Platelet count verified by smear review.    Glucose 01/11/2025 127 (H)  74 - 99 mg/dL Final    Sodium 01/11/2025 132 (L)  136 - 145 mmol/L Final    Potassium 01/11/2025 3.9  3.5 - 5.3 mmol/L Final    Chloride 01/11/2025 102  98 - 107 mmol/L Final    Bicarbonate 01/11/2025 22  21 - 32 mmol/L Final    Anion Gap 01/11/2025 12  10 - 20 mmol/L Final    Urea Nitrogen 01/11/2025 42 (H)  6 - 23 mg/dL Final    Creatinine 01/11/2025 1.69 (H)  0.50 - 1.30 mg/dL Final    eGFR 01/11/2025 39 (L)  >60 mL/min/1.73m*2 Final    Calculations of estimated GFR are performed using the 2021 CKD-EPI Study Refit equation without the race variable for the IDMS-Traceable creatinine methods.  https://jasn.asnjournals.org/content/early/2021/09/22/ASN.5488037954    Calcium 01/11/2025 7.5 (L)  8.6 - 10.3 mg/dL Final    Albumin 01/11/2025 3.3 (L)  3.4 - 5.0 g/dL Final    Alkaline Phosphatase 01/11/2025 49  33 - 136 U/L Final    Total Protein 01/11/2025 5.8 (L)  6.4 - 8.2 g/dL Final    AST 01/11/2025 74 (H)  9 - 39 U/L Final    Bilirubin, Total 01/11/2025 2.3 (H)  0.0 - 1.2 mg/dL Final    ALT 01/11/2025 29  10 - 52 U/L Final    Patients treated with Sulfasalazine may generate falsely decreased results for ALT.     Magnesium 01/11/2025 2.00  1.60 - 2.40 mg/dL Final    WBC, Urine 01/10/2025 1-5  1-5, NONE /HPF Final    RBC, Urine 01/10/2025 3-5  NONE, 1-2, 3-5 /HPF Final    Squamous Epithelial Cells, Urine 01/10/2025 1-9 (SPARSE)  Reference range not established. /HPF Final    Bacteria, Urine 01/10/2025 1+ (A)  NONE SEEN /HPF Final    Mucus, Urine 01/10/2025 FEW  Reference range not established. /LPF Final    Amorphous Crystals, Urine 01/10/2025 1+  NONE, 1+, 2+ /HPF Final    AV mn grad 01/11/2025 7  mmHg Final    AV pk khurram 01/11/2025 1.67  m/s Final    LV Biplane EF 01/11/2025 48  % Final    LVOT diam 01/11/2025 2.07  cm Final    MV E/A ratio 01/11/2025 1.69   Final    Tricuspid annular plane systolic e* 01/11/2025 2.5  cm Final    LA vol index A/L 01/11/2025 54.4  ml/m2 Final    LV EF 01/11/2025 55  % Final    RV free wall pk S' 01/11/2025 22.80  cm/s Final    RVSP 01/11/2025 45.5  mmHg Final    LVIDd 01/11/2025 4.93  cm Final    Aortic Valve Area by Continuity of* 01/11/2025 2.40  cm2 Final    AV pk grad 01/11/2025 11  mmHg Final    Aortic Valve Area by Continuity of* 01/11/2025 2.36  cm2 Final    LV A4C EF 01/11/2025 50.0   Final    WBC 01/11/2025 14.2 (H)  4.4 - 11.3 x10*3/uL Final    nRBC 01/11/2025 0.0  0.0 - 0.0 /100 WBCs Final    RBC 01/11/2025 3.33 (L)  4.50 - 5.90 x10*6/uL Final    Hemoglobin 01/11/2025 10.6 (L)  13.5 - 17.5 g/dL Final    Hematocrit 01/11/2025 32.5 (L)  41.0 - 52.0 % Final    MCV 01/11/2025 98  80 - 100 fL Final    MCH 01/11/2025 31.8  26.0 - 34.0 pg Final    MCHC 01/11/2025 32.6  32.0 - 36.0 g/dL Final    RDW 01/11/2025 16.7 (H)  11.5 - 14.5 % Final    Platelets 01/11/2025 81 (L)  150 - 450 x10*3/uL Final    Platelet count verified by smear review.    Neutrophils % 01/11/2025 84.8  40.0 - 80.0 % Final    Immature Granulocytes %, Automated 01/11/2025 1.6 (H)  0.0 - 0.9 % Final    Immature Granulocyte Count (IG) includes promyelocytes, myelocytes and metamyelocytes but does not include bands. Percent  differential counts (%) should be interpreted in the context of the absolute cell counts (cells/UL).    Lymphocytes % 01/11/2025 5.0  13.0 - 44.0 % Final    Monocytes % 01/11/2025 8.2  2.0 - 10.0 % Final    Eosinophils % 01/11/2025 0.3  0.0 - 6.0 % Final    Basophils % 01/11/2025 0.1  0.0 - 2.0 % Final    Neutrophils Absolute 01/11/2025 12.13 (H)  1.60 - 5.50 x10*3/uL Final    Percent differential counts (%) should be interpreted in the context of the absolute cell counts (cells/uL).    Immature Granulocytes Absolute, Au* 01/11/2025 0.23  0.00 - 0.50 x10*3/uL Final    Lymphocytes Absolute 01/11/2025 0.72 (L)  0.80 - 3.00 x10*3/uL Final    Monocytes Absolute 01/11/2025 1.18 (H)  0.05 - 0.80 x10*3/uL Final    Eosinophils Absolute 01/11/2025 0.05  0.00 - 0.40 x10*3/uL Final    Basophils Absolute 01/11/2025 0.02  0.00 - 0.10 x10*3/uL Final    C. difficile, PCR 01/11/2025 Not Detected  Not Detected Final       Assessment:   Staphylococcus aureus bacteremia, susceptibilities pending  Patient with ICM status post AICD  Multiple falls at home   Recent episode of nausea vomiting diarrhea prior to admission - his grandkids all had the same nausea/vomiting/ diarrhea.  ISAIAS in the setting of hypovolemia, gentle hydration in progress  Other: History of COPD without exacerbation, infrarenal AAA increased in size from previous imaging    Plan:   Patient is currently on vancomycin and Zosyn.  Can discontinue Zosyn and changed to renal dosed cefazolin.  If no evidence of MRSA, discontinue vancomycin and direct therapy as soon as possible  Agree with plan for PAOLO when more stable as we have no etiology of Staphylococcus aureus  - has an AICD. Patient does tell me that he had a filling that come out approx a month ago but has not been seen for this.   Contact precautions for possible viral gastroenteritis. Maintain contact until symptoms are cleared or until stool panel is back.   Estimated Creatinine Clearance: 36.3 mL/min (A) (by C-G  formula based on SCr of 1.69 mg/dL (H)).    All communication directed to consulting provider.   Thank you very much for this consultation.     Time spent before, during, and after this consult reviewed data and coordinating care on the date of this consultation, including face to face visit with the patient > 60 min

## 2025-01-12 NOTE — CONSULTS
Vancomycin Dosing by Pharmacy- FOLLOW UP    Guicho Jones is a 84 y.o. year old male who Pharmacy has been consulted for vancomycin dosing for other bactermia . Based on the patient's indication and renal status this patient is being dosed based on a goal trough/random level of 15-20.     Renal function is currently improving.    Current vancomycin dose: 1500 mg given every once hours    Most recent trough level: 6.6 mcg/mL    Visit Vitals  /60   Pulse 62   Temp 37.8 °C (100 °F) (Rectal)   Resp 19        Lab Results   Component Value Date    CREATININE 1.59 (H) 2025    CREATININE 1.69 (H) 2025    CREATININE 1.67 (H) 01/10/2025    CREATININE 0.91 2024        Patient weight is as follows:   Vitals:    25 0537   Weight: 103 kg (227 lb 11.8 oz)       Cultures:  Susceptibility data for the encounter in last 14 days.  Collected Specimen Info Organism   01/10/25 Blood culture from Peripheral Venipuncture Staphylococcus aureus   01/10/25 Blood culture from Peripheral Venipuncture Staphylococcus aureus        I/O last 3 completed shifts:  In: 4000 (38.7 mL/kg) [P.O.:800; IV Piggyback:3200]  Out: 2300 (22.3 mL/kg) [Urine:1900 (0.5 mL/kg/hr); Stool:400]  Weight: 103.3 kg   I/O during current shift:  I/O this shift:  In: -   Out: 200 [Urine:200]    Temp (24hrs), Av.5 °C (99.5 °F), Min:37 °C (98.6 °F), Max:37.9 °C (100.2 °F)      Assessment/Plan    Below goal random/trough level. Orders placed for new vancomycin regimen of 1750 mg every once hours to begin at now .    This dosing regimen is predicted by [employer] to result in the following pharmacokinetic parameters:  Traditional dosing, doses based on levels    The next level will be obtained on 25 at 1100. May be obtained sooner if clinically indicated.   Will continue to monitor renal function daily while on vancomycin and order serum creatinine at least every 48 hours if not already ordered.  Follow for continued vancomycin needs,  clinical response, and signs/symptoms of toxicity.       Holley Lopez, PharmD

## 2025-01-12 NOTE — PROGRESS NOTES
[facility] Critical Care Medicine       Date:  1/12/2025  Patient:  Guicho Jones  YOB: 1940  MRN:  [id number]   Admit Date:  1/10/2025  ========================================================================================================    Chief Complaint   Patient presents with    Trauma     HIA         History of Present Illness:  Guicho Jones is a 84 y.o. year old male patient with Past Medical History of  ICM s/p ICD, MI, diastolic heart failure, CAD s/p PCI, COPD (centrilobular emphysema), pulmonary hypertension, HTN, HLD, persistent Afib on ATC with xarelto, CVA, infrarenal AAA, obesity, and prostate cancer who presented to [facility] emergency department by EMS for fall. Reports on Wednesday after eating pizza began to have nausea, vomiting, and diarrhea. Nausea and vomiting improved but had persistent diarrhea. Last night at approx 0130 slipped out of the bed when attempting to get up to the bathroom. Wife was able to assist back into the bed but this morning was unable to ambulate due to weakness prompting family to call EMS. Denies sick contacts and no other family members with similar symptoms.      ED course:   On arrival to the emergency department was hypotensive with BP 60s/30s and febrile with temp 38.6, given fluids per sepsis protocol and treated with Zosyn.   Labs: glucose 148, potassium 3.2, Creat 1.67, AST 54, bili 2.2, lactate 2.7, , Troponin 121 repeat 128, WBC 13.9, plt 100, flu/ covid/ RSV  negative.  CT head: negative  CT a/p: liquid stool t/o colon, no significant wall thickening or evidence of colitis, slight interval change in bilobed infrarenal AAA without evidence of dissection or acute aortic abnormality (6.1x6.2 cm)     Echo 1/2024: EF 55%, mild to mod concentric LVH with DUST, no LVOT obstruction, mild RV dilatation with normal function, RVSP 50-55, mod to sev dilated LA, mild to mod dilated RA, mild to mod TR, 1+MR, trace AI        Interval ICU  Events:  1/10: Admit to the ICU for hypotension 2/2 to hypovolemia and concern for clinical decompensation.   1/11: VSS. Febrile, Blood cultures, +GPC. States had biopsy of scrotum day prior to hospitalization and needs to have MOHS procedure, pending. Site of biopsy without s/s of infection/ drainage. RN staff noted upper extremity weakness with abnormality in depth perception. On initial exam bilateral upper extremity weakness, no loss of sensation, right hand tingling (at baseline), and difficulty with depth perception. MRI ordered of cervical/ thoracic/ lumbar spine. Spoke with rep from [employer] to verify device compatibility and reported that old device still present in abdomen is not MRI compatible but new device in chest wall is, MRI was canceled. Noted improvement in neurological exam and movement/ strength of upper extremities but persistent limitation with depth perception.     1/12: T max 39.2, WBC 10.9. decrease in volume of stool output, endorses abdominal cramping but no tenderness. Back pain with spasms, reports no increase from baseline and no point tenderness.   Echo: EF 55%, no source of embolus, no definite vegetation, normal RV systolic function, LA mod dilated, RA mild to mod dilated, mild MR, mild TR, mild AR, small PFO, mod dilatation of ascending aorta, mod increased septal and mildly increased posterior LV wall thickness    Medical History:  Past Medical History:   Diagnosis Date    Allergic     Arthritis     Cancer (Multi)     Cataract     CHF (congestive heart failure)     COPD (chronic obstructive pulmonary disease) (Multi)     Encounter for follow-up examination after completed treatment for conditions other than malignant neoplasm 12/27/2021    Hospital discharge follow-up    Heart disease     Hypertension     Ischemic cardiomyopathy     Ischemic cardiomyopathy with implantable cardioverter-defibrillator (ICD)    Myocardial infarction (Multi)     Pain in unspecified hip      Joint pain, hip    Personal history of other diseases of the musculoskeletal system and connective tissue     History of low back pain    Personal history of other endocrine, nutritional and metabolic disease     History of hyperlipidemia    Personal history of other specified conditions 12/21/2021    History of elevated prostate specific antigen (PSA)    Presence of coronary angioplasty implant and graft     Stented coronary artery    Unspecified atrial flutter (Multi)     Paroxysmal atrial flutter     Past Surgical History:   Procedure Laterality Date    BACK SURGERY      CARDIAC CATHETERIZATION      CORONARY STENT PLACEMENT      CT ABDOMEN ANGIOGRAM W AND/OR WO IV CONTRAST  10/29/2021    CT ABDOMEN ANGIOGRAM W AND/OR WO IV CONTRAST 10/29/2021 [facility]    CT ABDOMEN ANGIOGRAM W AND/OR WO IV CONTRAST  10/03/2022    CT ABDOMEN ANGIOGRAM W AND/OR WO IV CONTRAST 10/3/2022 [facility]    EYE SURGERY      JOINT REPLACEMENT      OTHER SURGICAL HISTORY  10/12/2021    Renal angioplasty and stenting    OTHER SURGICAL HISTORY  10/12/2021    Cataract surgery    OTHER SURGICAL HISTORY  12/21/2021    Hip replacement    OTHER SURGICAL HISTORY  01/04/2022    Complete colonoscopy    OTHER SURGICAL HISTORY  12/21/2021    Back surgery    OTHER SURGICAL HISTORY  12/21/2021    Abdominal aortic aneurysm repair    OTHER SURGICAL HISTORY  12/21/2021    Hip replacement    OTHER SURGICAL HISTORY  12/21/2021    Surgery    TONSILLECTOMY      VASECTOMY       Medications Prior to Admission   Medication Sig Dispense Refill Last Dose/Taking    albuterol (Proventil HFA) 90 mcg/actuation inhaler Inhale 1 puff every 4 hours if needed.   1/9/2025    amiodarone (Pacerone) 200 mg tablet Take 0.5 tablets (100 mg) by mouth once daily. 90 tablet 1 1/9/2025    aspirin 81 mg chewable tablet Chew 1 tablet (81 mg). TAKE 1 TABLET MONDAY THROUGH FRIDAY AT BEDTIME   1/9/2025    atorvastatin (Lipitor) 40 mg tablet Take 1 tablet (40 mg) by  mouth once daily at bedtime. 90 tablet 1 1/9/2025    budesonide-glycopyr-formoterol (Breztri Aerosphere) 160-9-4.8 mcg/actuation HFA aerosol inhaler Inhale 2 puffs 2 times a day.   1/10/2025    empagliflozin (Jardiance) 10 mg Take 1 tablet (10 mg) by mouth once daily. 90 tablet 3 1/9/2025    ezetimibe (Zetia) 10 mg tablet Take 1 tablet (10 mg) by mouth once daily. 90 tablet 1 1/9/2025    fluticasone (Flonase) 50 mcg/actuation nasal spray USE 2 SPRAYS IN EACH NOSTRIL EVERY DAY 48 g 3 1/10/2025    glucosamine HCl 1,500 mg tablet Take 1 tablet by mouth once daily.   1/9/2025    hydroCHLOROthiazide (HYDRODiuril) 25 mg tablet TAKE 1 TABLET MON WED FRI ONLY   1/9/2025    ipratropium (Atrovent) 42 mcg (0.06 %) nasal spray Administer 2 sprays into each nostril 3 times a day. 15 mL 3 1/9/2025    isosorbide mononitrate ER (Imdur) 30 mg 24 hr tablet Take 1 tablet (30 mg) by mouth once daily. 1 tablet daily 90 tablet 3 1/9/2025    lisinopril 40 mg tablet TAKE 1 TABLET EVERY DAY 90 tablet 3 1/9/2025    magnesium oxide (MagOx) 400 mg (241.3 mg magnesium) tablet Take 1 tablet (400 mg) by mouth 2 times a day.   1/9/2025    metoprolol succinate XL (Toprol-XL) 100 mg 24 hr tablet 1/2 tablet in the morning, 1 full tablet at night   1/9/2025    metoprolol tartrate (Lopressor) 25 mg tablet Take 1 tablet daily for palpitations only 90 tablet 1 1/9/2025    montelukast (Singulair) 10 mg tablet TAKE 1 TABLET EVERY EVENING 90 tablet 3 1/9/2025    omega 3-dha-epa-fish oil (Fish OiL) 1,000 mg (120 mg-180 mg) capsule Take 1 capsule (1,000 mg) by mouth 2 times a day.   1/9/2025    rivaroxaban (Xarelto) 20 mg tablet 1 tablet daily 90 tablet 3 1/9/2025    amoxicillin (Amoxil) 500 mg capsule Take 4 capsules (2,000 mg) by mouth see administration instructions. TAKE 1 HOUR BEFORE DENTAL APPOINTMENT (Patient not taking: Reported on 1/10/2025)   More than a month    nitroglycerin (Nitrostat) 0.4 mg SL tablet Place 1 tablet (0.4 mg) under the tongue  every 5 minutes if needed for chest pain. 25 tablet 5 Unknown     Levofloxacin  Social History     Tobacco Use    Smoking status: Former     Current packs/day: 1.50     Average packs/day: 1.5 packs/day for 15.0 years (22.5 ttl pk-yrs)     Types: Cigarettes     Passive exposure: Never    Smokeless tobacco: Never   Vaping Use    Vaping status: Never Used   Substance Use Topics    Alcohol use: Yes     Alcohol/week: 3.0 standard drinks of alcohol     Types: 3 Standard drinks or equivalent per week     Comment: occassional    Drug use: Never     Family History   Problem Relation Name Age of Onset    Liver disease Mother Ysabel. Campbell Jones     Arthritis Mother Kelle Jones     Coronary artery disease Father Guicho Jones     Atrial fibrillation Father Guicho Jones     Heart disease Father Guicho Jones     Cancer Sister Sally Jones        Review of Systems:  14 point review of systems was completed and negative except for those specially mention in my HPI    Physical Exam:    Heart Rate:  [60-66]   Temp:  [37 °C (98.6 °F)-39.2 °C (102.5 °F)]   Resp:  [14-28]   BP: ()/(43-66)   Weight:  [103 kg (227 lb 11.8 oz)-105 kg (230 lb 13.2 oz)]   SpO2:  [88 %-97 %]     Physical Exam  HENT:      Head: Normocephalic.      Mouth/Throat:      Mouth: Mucous membranes are moist.   Eyes:      Extraocular Movements: Extraocular movements intact.      Conjunctiva/sclera: Conjunctivae normal.      Pupils: Pupils are equal, round, and reactive to light.   Cardiovascular:      Rate and Rhythm: Normal rate and regular rhythm.      Pulses: Normal pulses.      Heart sounds: Normal heart sounds.   Pulmonary:      Breath sounds: Normal breath sounds.   Abdominal:      Palpations: Abdomen is soft.   Genitourinary:     Comments: Biopsy of scrotum, site pink without evidence of infection or drainage  Skin:     General: Skin is warm and dry.      Comments: flushed   Neurological:      Mental Status: He is  alert.      Coordination: Finger-Nose-Finger Test abnormal.      Comments: Abnormal depth perception         Objective:    I have reviewed all medications, laboratory results, and imaging pertinent for today's encounter    Assessment/Plan:    I am currently managing this critically ill patient for the following problems:    Neuro/Psych/Pain Ctrl/Sedation:  Acute on chronic back pain  Concern for stroke  Hx of CVA  Change in depth perception, unable to obtain MRI related to ICD device incompatibility   Concern for septic emboli 2/2 gram positive bacteremia  Neuro checks  Consider neurology consult  CAM-ICU  Delirium precautions  Multimodal pain management: acetaminophen, robaxin, lidocaine patch, ice  Oxycodone as needed     Respiratory/ENT:  Acute on chronic hypoxic respiratory failure  COPD without acute exacerbation  Home inhalers, singulair, flonase  Duonebs   Oxygen therapy as needed to maintain SPO2 greater than 90%  Pulmonary hygiene  BiPAP at night, may use home device     Cardiovascular:  Hypotension, resolved  Infrarenal AAA, increased in size from previou  ICM s/p AICD  HTN  Chronic diastolic heart failure  Atrial fibrillation  HLD  Home amiodarone, statin  Hold imdur, lisinopril, jardiance, hydrochlorothiazide  Resume metoprolol at reduced dosing, adjust as able  Gentle hydration as needed  Echo: Echo: EF 55%, no source of embolus, no definite vegetation, normal RV systolic function, LA mod dilated, RA mild to mod dilated, mild MR, mild TR, mild AR, small PFO, mod dilatation of ascending aorta, mod increased septal and mildly increased posterior LV wall thickness,   PAOLO  Consider cardiology consult  Hold home xarelto, pending need for procedure  Heparin drip  Follow up with vascular OP regarding infrarenal AAA     GI:  Elevated AST, hyperbilirubinemia in setting of sepsis  Diarrhea  Advance diet as tolerated  Trend CMP     Renal/Volume Status (Intra & Extravascular):  ISAIAS in setting of hypovolemia and  sepsis  Baseline Creat ~0.9-1.1  Trend BMP  Gentle hydration, as needed  Strict I&Os  Avoid nephrotoxic agents    Endocrine  Monitor for s/s of hypo/ hyperglycemia     Infectious Disease:  Sepsis 2/2 gram positive bacteremia, unknown source  Enterocolitis  Leukocytosis  Concern for device infection, endocarditis  Stool pathogen, pending  C diff, negative  Blood culture 1/10, +staph aureus  Cefazolin and vancomycin  Urinalysis with reflex-negative  Repeat blood cultures 1/12, pending  ID following, appreciate recs     Heme/Onc:  Thrombocytopenia in setting of sepsis  Basal cell carcinoma, scrotum  heparin drip  Trend CBC  Monitor for s/s of bleeding  Transfuse for Hgb less than 7 or symptomatic anemia  Follow up OP for completion of MOHS procedure     MSK:  Fall, prior to admission  Ataxia  Acute debilitation 2/2 critical illness  PT/ OT  OOB     Ethics/Code Status:  FULL     :  DVT Prophylaxis: heparin drip  GI Prophylaxis: no indication  Bowel Regimen: none  Diet: cardiac  CVC: none  [place]: none  Reyes: none  Restraints: none  Dispo: ICU  Remain in ICU d/t risk of clinical decompensation and pending PAOLO    Critical Care Time:  45 minutes spent in preparing to see patient (I.e. review of medical records), evaluation of diagnostics (I.e. labs, imaging, etc.), documentation, discussing plan of care with patient/ family/ caregiver, and/ or coordination of care with multidisciplinary team. Time does not include completion of procedure time.     Plan discussed with Dr. Meño Lincoln, APRN-CNP

## 2025-01-12 NOTE — CARE PLAN
The patient's goals for the shift include Patient will remain safe and free from falls    The clinical goals for the shift include Patient will be hemodyamically stable throughout shift

## 2025-01-12 NOTE — CONSULTS
"Consults      ID Consult:       HPI 84-year-old male started having nausea vomiting and diarrhea after eating pizza on Wednesday. Tells me that his grandchildren have been ill with nausea/vomiting/diarrhea as well.   Per medical record on further clarification patient has been \"feeling off\" for a couple of days prior to admission.  The night prior to admission patient slipped out of the bed while attempting to get up to go to the bathroom due to weakness.  Patient was noted to be hypotensive in the emergency department, febrile, multiple electrolyte abnormalities, leukocytosis.  CT abdomen and pelvis and CT head both noted -patient does have 6 x 6 cm aortic aneurysm, which is apparently changed in size compared to last image, currently without evidence of dissection.  He was admitted to the intensive care unit for further evaluation and treatment due to hypotension and hypovolemia    Flu, COVID, RSV all negative  Pathogen panel pending  C. difficile PCR normal  Repeat blood culture from 1/11/2025 pending    Of note, patient has AICD    Unfortunately, on 1/10/2025, patient's blood cultures x 2 positive for Staphylococcus aureus    All 14 ROS discussed with the patient and negative other than as stated as above       has a past medical history of Allergic, Arthritis, Cancer (Multi), Cataract, CHF (congestive heart failure), COPD (chronic obstructive pulmonary disease) (Multi), Encounter for follow-up examination after completed treatment for conditions other than malignant neoplasm (12/27/2021), Heart disease, Hypertension, Ischemic cardiomyopathy, Myocardial infarction (Multi), Pain in unspecified hip, Personal history of other diseases of the musculoskeletal system and connective tissue, Personal history of other endocrine, nutritional and metabolic disease, Personal history of other specified conditions (12/21/2021), Presence of coronary angioplasty implant and graft, and Unspecified atrial flutter (Multi).     has " a past surgical history that includes Other surgical history (10/12/2021); Other surgical history (10/12/2021); Other surgical history (12/21/2021); Other surgical history (01/04/2022); Other surgical history (12/21/2021); Other surgical history (12/21/2021); Other surgical history (12/21/2021); Other surgical history (12/21/2021); CT angio abdomen w and or wo IV IV contrast (10/29/2021); CT angio abdomen w and or wo IV IV contrast (10/03/2022); Cardiac catheterization; Vasectomy; Back surgery; Eye surgery; Joint replacement; Coronary stent placement; and Tonsillectomy.     reports that he has quit smoking. His smoking use included cigarettes. He has a 22.5 pack-year smoking history. He has never been exposed to tobacco smoke. He has never used smokeless tobacco. He reports current alcohol use of about 3.0 standard drinks of alcohol per week. He reports that he does not use drugs.    Family History   Problem Relation Name Age of Onset    Liver disease Mother Ysabel. Campbell Jones     Arthritis Mother Ysabel. Campbell Jones     Coronary artery disease Father Guicho Jones     Atrial fibrillation Father Guicho Jones     Heart disease Father Guicho Jones     Cancer Sister Sally Jones          Physical exam  Vitals:    01/11/25 2000   BP: 97/58   Pulse: 60   Resp: 18   Temp:    SpO2: 93%     Obese male currently on BIPAP in the ICU.   Patient is awake and alert  NAD  Neck supple  Heart S1S2  Chest: Equal expansion, bilaterally clear to auscultation  Abdomen: soft, ND, NTTP, no guarding  Extrem: no pain to palpation  Skin: no rashes, no diaphoresis  Neuro: CNS intact  Affect appropriate and patient is interactive    Admission on 01/10/2025   Component Date Value Ref Range Status    WBC 01/10/2025 13.9 (H)  4.4 - 11.3 x10*3/uL Final    nRBC 01/10/2025 0.0  0.0 - 0.0 /100 WBCs Final    RBC 01/10/2025 3.72 (L)  4.50 - 5.90 x10*6/uL Final    Hemoglobin 01/10/2025 11.7 (L)  13.5 - 17.5 g/dL Final     Hematocrit 01/10/2025 36.2 (L)  41.0 - 52.0 % Final    MCV 01/10/2025 97  80 - 100 fL Final    MCH 01/10/2025 31.5  26.0 - 34.0 pg Final    MCHC 01/10/2025 32.3  32.0 - 36.0 g/dL Final    RDW 01/10/2025 16.2 (H)  11.5 - 14.5 % Final    Platelets 01/10/2025 100 (L)  150 - 450 x10*3/uL Final    Neutrophils % 01/10/2025 92.2  40.0 - 80.0 % Final    Immature Granulocytes %, Automated 01/10/2025 1.4 (H)  0.0 - 0.9 % Final    Immature Granulocyte Count (IG) includes promyelocytes, myelocytes and metamyelocytes but does not include bands. Percent differential counts (%) should be interpreted in the context of the absolute cell counts (cells/UL).    Lymphocytes % 01/10/2025 2.1  13.0 - 44.0 % Final    Monocytes % 01/10/2025 3.9  2.0 - 10.0 % Final    Eosinophils % 01/10/2025 0.3  0.0 - 6.0 % Final    Basophils % 01/10/2025 0.1  0.0 - 2.0 % Final    Neutrophils Absolute 01/10/2025 12.84 (H)  1.60 - 5.50 x10*3/uL Final    Percent differential counts (%) should be interpreted in the context of the absolute cell counts (cells/uL).    Immature Granulocytes Absolute, Au* 01/10/2025 0.20  0.00 - 0.50 x10*3/uL Final    Lymphocytes Absolute 01/10/2025 0.29 (L)  0.80 - 3.00 x10*3/uL Final    Monocytes Absolute 01/10/2025 0.54  0.05 - 0.80 x10*3/uL Final    Eosinophils Absolute 01/10/2025 0.04  0.00 - 0.40 x10*3/uL Final    Basophils Absolute 01/10/2025 0.02  0.00 - 0.10 x10*3/uL Final    Glucose 01/10/2025 148 (H)  74 - 99 mg/dL Final    Sodium 01/10/2025 133 (L)  136 - 145 mmol/L Final    Potassium 01/10/2025 3.2 (L)  3.5 - 5.3 mmol/L Final    Chloride 01/10/2025 98  98 - 107 mmol/L Final    Bicarbonate 01/10/2025 26  21 - 32 mmol/L Final    Anion Gap 01/10/2025 12  10 - 20 mmol/L Final    Urea Nitrogen 01/10/2025 39 (H)  6 - 23 mg/dL Final    Creatinine 01/10/2025 1.67 (H)  0.50 - 1.30 mg/dL Final    eGFR 01/10/2025 40 (L)  >60 mL/min/1.73m*2 Final    Calculations of estimated GFR are performed using the 2021 CKD-EPI Study Refit  equation without the race variable for the IDMS-Traceable creatinine methods.  https://jasn.asnjournals.org/content/early/2021/09/22/ASN.8080942692    Calcium 01/10/2025 8.1 (L)  8.6 - 10.3 mg/dL Final    Albumin 01/10/2025 3.4  3.4 - 5.0 g/dL Final    Alkaline Phosphatase 01/10/2025 56  33 - 136 U/L Final    Total Protein 01/10/2025 6.2 (L)  6.4 - 8.2 g/dL Final    AST 01/10/2025 54 (H)  9 - 39 U/L Final    Bilirubin, Total 01/10/2025 2.2 (H)  0.0 - 1.2 mg/dL Final    ALT 01/10/2025 20  10 - 52 U/L Final    Patients treated with Sulfasalazine may generate falsely decreased results for ALT.    Magnesium 01/10/2025 1.95  1.60 - 2.40 mg/dL Final    BNP 01/10/2025 680 (H)  0 - 99 pg/mL Final    Ventricular Rate 01/10/2025 64  BPM Preliminary    Atrial Rate 01/10/2025 64  BPM Preliminary    FL Interval 01/10/2025 82  ms Preliminary    QRS Duration 01/10/2025 110  ms Preliminary    QT Interval 01/10/2025 526  ms Preliminary    QTC Calculation(Bazett) 01/10/2025 542  ms Preliminary    P Sullivan 01/10/2025 38  degrees Preliminary    R Axis 01/10/2025 18  degrees Preliminary    T Sullivan 01/10/2025 74  degrees Preliminary    QRS Count 01/10/2025 11  beats Preliminary    Q Onset 01/10/2025 224  ms Preliminary    P Onset 01/10/2025 183  ms Preliminary    P Offset 01/10/2025 214  ms Preliminary    T Offset 01/10/2025 487  ms Preliminary    QTC Fredericia 01/10/2025 537  ms Preliminary    Ventricular Rate 01/10/2025 60  BPM Preliminary    Atrial Rate 01/10/2025 60  BPM Preliminary    QRS Duration 01/10/2025 122  ms Preliminary    QT Interval 01/10/2025 572  ms Preliminary    QTC Calculation(Bazett) 01/10/2025 572  ms Preliminary    R Axis 01/10/2025 28  degrees Preliminary    T Axis 01/10/2025 110  degrees Preliminary    QRS Count 01/10/2025 10  beats Preliminary    Q Onset 01/10/2025 218  ms Preliminary    T Offset 01/10/2025 504  ms Preliminary    QTC Fredericia 01/10/2025 572  ms Preliminary    Flu A Result 01/10/2025 Not Detected   Not Detected Final    Flu B Result 01/10/2025 Not Detected  Not Detected Final    Coronavirus 2019, PCR 01/10/2025 Not Detected  Not Detected Final    RSV PCR 01/10/2025 Not Detected  Not Detected Final    Troponin I, High Sensitivity 01/10/2025 121 (HH)  0 - 20 ng/L Final    Lactate 01/10/2025 2.7 (H)  0.4 - 2.0 mmol/L Final    Protime 01/10/2025 19.7 (H)  9.8 - 12.8 seconds Final    INR 01/10/2025 1.7 (H)  0.9 - 1.1 Final    Color, Urine 01/10/2025 Yellow  Light-Yellow, Yellow, Dark-Yellow Final    Appearance, Urine 01/10/2025 Clear  Clear Final    Specific Gravity, Urine 01/10/2025 >1.030 (N)  1.005 - 1.035 Final    pH, Urine 01/10/2025 5.5  5.0, 5.5, 6.0, 6.5, 7.0, 7.5, 8.0 Final    Protein, Urine 01/10/2025 100 (2+) (A)  NEGATIVE, 10 (TRACE), 20 (TRACE) mg/dL Final    Glucose, Urine 01/10/2025 150 (2+) (A)  Normal mg/dL Final    Blood, Urine 01/10/2025 0.5 (2+) (A)  NEGATIVE Final    Ketones, Urine 01/10/2025 NEGATIVE  NEGATIVE mg/dL Final    Bilirubin, Urine 01/10/2025 NEGATIVE  NEGATIVE Final    Urobilinogen, Urine 01/10/2025 Normal  Normal mg/dL Final    Nitrite, Urine 01/10/2025 NEGATIVE  NEGATIVE Final    Leukocyte Esterase, Urine 01/10/2025 NEGATIVE  NEGATIVE Final    Extra Tube 01/10/2025 Hold for add-ons.   Final    Auto resulted.    Troponin I, High Sensitivity 01/10/2025 128 (HH)  0 - 20 ng/L Final    Previous result verified on 1/10/2025 1351 on specimen/case [id number] called with component [facility] for procedure Troponin I, High Sensitivity, Initial with value 121 ng/L.    Blood Culture 01/10/2025 Staphylococcus aureus (A)   Preliminary    BLOOD CULTURE BOTTLE  01/10/2025 Positive Anaerobic Bottle   Preliminary    Gram Stain 01/10/2025 Gram positive cocci, clusters (AA)   Preliminary    Anaerobic Bottle Positive    Gram Stain 01/10/2025 Gram positive cocci, clusters (AA)   Preliminary    Aerobic Bottle Positive    Blood Culture 01/10/2025 Staphylococcus aureus (A)   Preliminary    BLOOD CULTURE  BOTTLE  01/10/2025 Positive Aerobic and Anaerobic Bottle   Preliminary    Gram Stain 01/10/2025 Gram positive cocci, clusters (AA)   Preliminary    Aerobic and Anaerobic Bottle Positive    Lactate 01/11/2025 1.2  0.4 - 2.0 mmol/L Final    WBC 01/11/2025 14.2 (H)  4.4 - 11.3 x10*3/uL Final    nRBC 01/11/2025 0.0  0.0 - 0.0 /100 WBCs Final    RBC 01/11/2025 3.33 (L)  4.50 - 5.90 x10*6/uL Final    Hemoglobin 01/11/2025 10.6 (L)  13.5 - 17.5 g/dL Final    Hematocrit 01/11/2025 32.5 (L)  41.0 - 52.0 % Final    MCV 01/11/2025 98  80 - 100 fL Final    MCH 01/11/2025 31.8  26.0 - 34.0 pg Final    MCHC 01/11/2025 32.6  32.0 - 36.0 g/dL Final    RDW 01/11/2025 16.7 (H)  11.5 - 14.5 % Final    Platelets 01/11/2025 81 (L)  150 - 450 x10*3/uL Final    Platelet count verified by smear review.    Glucose 01/11/2025 127 (H)  74 - 99 mg/dL Final    Sodium 01/11/2025 132 (L)  136 - 145 mmol/L Final    Potassium 01/11/2025 3.9  3.5 - 5.3 mmol/L Final    Chloride 01/11/2025 102  98 - 107 mmol/L Final    Bicarbonate 01/11/2025 22  21 - 32 mmol/L Final    Anion Gap 01/11/2025 12  10 - 20 mmol/L Final    Urea Nitrogen 01/11/2025 42 (H)  6 - 23 mg/dL Final    Creatinine 01/11/2025 1.69 (H)  0.50 - 1.30 mg/dL Final    eGFR 01/11/2025 39 (L)  >60 mL/min/1.73m*2 Final    Calculations of estimated GFR are performed using the 2021 CKD-EPI Study Refit equation without the race variable for the IDMS-Traceable creatinine methods.  https://jasn.asnjournals.org/content/early/2021/09/22/ASN.8729090469    Calcium 01/11/2025 7.5 (L)  8.6 - 10.3 mg/dL Final    Albumin 01/11/2025 3.3 (L)  3.4 - 5.0 g/dL Final    Alkaline Phosphatase 01/11/2025 49  33 - 136 U/L Final    Total Protein 01/11/2025 5.8 (L)  6.4 - 8.2 g/dL Final    AST 01/11/2025 74 (H)  9 - 39 U/L Final    Bilirubin, Total 01/11/2025 2.3 (H)  0.0 - 1.2 mg/dL Final    ALT 01/11/2025 29  10 - 52 U/L Final    Patients treated with Sulfasalazine may generate falsely decreased results for ALT.     Magnesium 01/11/2025 2.00  1.60 - 2.40 mg/dL Final    WBC, Urine 01/10/2025 1-5  1-5, NONE /HPF Final    RBC, Urine 01/10/2025 3-5  NONE, 1-2, 3-5 /HPF Final    Squamous Epithelial Cells, Urine 01/10/2025 1-9 (SPARSE)  Reference range not established. /HPF Final    Bacteria, Urine 01/10/2025 1+ (A)  NONE SEEN /HPF Final    Mucus, Urine 01/10/2025 FEW  Reference range not established. /LPF Final    Amorphous Crystals, Urine 01/10/2025 1+  NONE, 1+, 2+ /HPF Final    AV mn grad 01/11/2025 7  mmHg Final    AV pk khurram 01/11/2025 1.67  m/s Final    LV Biplane EF 01/11/2025 48  % Final    LVOT diam 01/11/2025 2.07  cm Final    MV E/A ratio 01/11/2025 1.69   Final    Tricuspid annular plane systolic e* 01/11/2025 2.5  cm Final    LA vol index A/L 01/11/2025 54.4  ml/m2 Final    LV EF 01/11/2025 55  % Final    RV free wall pk S' 01/11/2025 22.80  cm/s Final    RVSP 01/11/2025 45.5  mmHg Final    LVIDd 01/11/2025 4.93  cm Final    Aortic Valve Area by Continuity of* 01/11/2025 2.40  cm2 Final    AV pk grad 01/11/2025 11  mmHg Final    Aortic Valve Area by Continuity of* 01/11/2025 2.36  cm2 Final    LV A4C EF 01/11/2025 50.0   Final    WBC 01/11/2025 14.2 (H)  4.4 - 11.3 x10*3/uL Final    nRBC 01/11/2025 0.0  0.0 - 0.0 /100 WBCs Final    RBC 01/11/2025 3.33 (L)  4.50 - 5.90 x10*6/uL Final    Hemoglobin 01/11/2025 10.6 (L)  13.5 - 17.5 g/dL Final    Hematocrit 01/11/2025 32.5 (L)  41.0 - 52.0 % Final    MCV 01/11/2025 98  80 - 100 fL Final    MCH 01/11/2025 31.8  26.0 - 34.0 pg Final    MCHC 01/11/2025 32.6  32.0 - 36.0 g/dL Final    RDW 01/11/2025 16.7 (H)  11.5 - 14.5 % Final    Platelets 01/11/2025 81 (L)  150 - 450 x10*3/uL Final    Platelet count verified by smear review.    Neutrophils % 01/11/2025 84.8  40.0 - 80.0 % Final    Immature Granulocytes %, Automated 01/11/2025 1.6 (H)  0.0 - 0.9 % Final    Immature Granulocyte Count (IG) includes promyelocytes, myelocytes and metamyelocytes but does not include bands. Percent  differential counts (%) should be interpreted in the context of the absolute cell counts (cells/UL).    Lymphocytes % 01/11/2025 5.0  13.0 - 44.0 % Final    Monocytes % 01/11/2025 8.2  2.0 - 10.0 % Final    Eosinophils % 01/11/2025 0.3  0.0 - 6.0 % Final    Basophils % 01/11/2025 0.1  0.0 - 2.0 % Final    Neutrophils Absolute 01/11/2025 12.13 (H)  1.60 - 5.50 x10*3/uL Final    Percent differential counts (%) should be interpreted in the context of the absolute cell counts (cells/uL).    Immature Granulocytes Absolute, Au* 01/11/2025 0.23  0.00 - 0.50 x10*3/uL Final    Lymphocytes Absolute 01/11/2025 0.72 (L)  0.80 - 3.00 x10*3/uL Final    Monocytes Absolute 01/11/2025 1.18 (H)  0.05 - 0.80 x10*3/uL Final    Eosinophils Absolute 01/11/2025 0.05  0.00 - 0.40 x10*3/uL Final    Basophils Absolute 01/11/2025 0.02  0.00 - 0.10 x10*3/uL Final    C. difficile, PCR 01/11/2025 Not Detected  Not Detected Final       Assessment:   Staphylococcus aureus bacteremia, susceptibilities pending  Patient with ICM status post AICD  Multiple falls at home   Recent episode of nausea vomiting diarrhea prior to admission - his grandkids all had the same nausea/vomiting/ diarrhea.  ISAIAS in the setting of hypovolemia, gentle hydration in progress  Other: History of COPD without exacerbation, infrarenal AAA increased in size from previous imaging    Plan:   Patient is currently on vancomycin and Zosyn.  Can discontinue Zosyn and changed to renal dosed cefazolin.  If no evidence of MRSA, discontinue vancomycin and direct therapy as soon as possible  Agree with plan for PAOLO when more stable as we have no etiology of Staphylococcus aureus  - has an AICD. Patient does tell me that he had a filling that come out approx a month ago but has not been seen for this.   Contact precautions for possible viral gastroenteritis. Maintain contact until symptoms are cleared or until stool panel is back.   Estimated Creatinine Clearance: 36.3 mL/min (A) (by C-G  formula based on SCr of 1.69 mg/dL (H)).    All communication directed to consulting provider.   Thank you very much for this consultation.     Time spent before, during, and after this consult reviewed data and coordinating care on the date of this consultation, including face to face visit with the patient > 60 min

## 2025-01-13 ENCOUNTER — APPOINTMENT (OUTPATIENT)
Dept: CARDIOLOGY | Facility: HOSPITAL | Age: 85
DRG: 871 | End: 2025-01-13
Payer: MEDICARE

## 2025-01-13 VITALS
TEMPERATURE: 100.2 F | DIASTOLIC BLOOD PRESSURE: 53 MMHG | BODY MASS INDEX: 36.49 KG/M2 | SYSTOLIC BLOOD PRESSURE: 92 MMHG | HEART RATE: 60 BPM | OXYGEN SATURATION: 92 % | WEIGHT: 227.07 LBS | HEIGHT: 66 IN | RESPIRATION RATE: 19 BRPM

## 2025-01-13 LAB
ALBUMIN SERPL BCP-MCNC: 2.5 G/DL (ref 3.4–5)
ALP SERPL-CCNC: 37 U/L (ref 33–136)
ALT SERPL W P-5'-P-CCNC: 16 U/L (ref 10–52)
ANION GAP SERPL CALC-SCNC: 15 MMOL/L (ref 10–20)
AST SERPL W P-5'-P-CCNC: 33 U/L (ref 9–39)
BACTERIA BLD AEROBE CULT: ABNORMAL
BACTERIA BLD AEROBE CULT: ABNORMAL
BACTERIA BLD CULT: ABNORMAL
BACTERIA BLD CULT: ABNORMAL
BILIRUB SERPL-MCNC: 0.9 MG/DL (ref 0–1.2)
BUN SERPL-MCNC: 41 MG/DL (ref 6–23)
CALCIUM SERPL-MCNC: 7.4 MG/DL (ref 8.6–10.3)
CHLORIDE SERPL-SCNC: 104 MMOL/L (ref 98–107)
CO2 SERPL-SCNC: 18 MMOL/L (ref 21–32)
CREAT SERPL-MCNC: 1.54 MG/DL (ref 0.5–1.3)
EGFRCR SERPLBLD CKD-EPI 2021: 44 ML/MIN/1.73M*2
ERYTHROCYTE [DISTWIDTH] IN BLOOD BY AUTOMATED COUNT: 16.5 % (ref 11.5–14.5)
GLUCOSE SERPL-MCNC: 108 MG/DL (ref 74–99)
GRAM STN SPEC: ABNORMAL
HCT VFR BLD AUTO: 26.8 % (ref 41–52)
HGB BLD-MCNC: 8.7 G/DL (ref 13.5–17.5)
MAGNESIUM SERPL-MCNC: 1.84 MG/DL (ref 1.6–2.4)
MCH RBC QN AUTO: 31.5 PG (ref 26–34)
MCHC RBC AUTO-ENTMCNC: 32.5 G/DL (ref 32–36)
MCV RBC AUTO: 97 FL (ref 80–100)
NRBC BLD-RTO: 0 /100 WBCS (ref 0–0)
PHOSPHATE SERPL-MCNC: 2.7 MG/DL (ref 2.5–4.9)
PLATELET # BLD AUTO: 60 X10*3/UL (ref 150–450)
POTASSIUM SERPL-SCNC: 3.3 MMOL/L (ref 3.5–5.3)
PROT SERPL-MCNC: 4.5 G/DL (ref 6.4–8.2)
RBC # BLD AUTO: 2.76 X10*6/UL (ref 4.5–5.9)
SODIUM SERPL-SCNC: 134 MMOL/L (ref 136–145)
UFH PPP CHRO-ACNC: 0.3 IU/ML
UFH PPP CHRO-ACNC: 0.4 IU/ML
WBC # BLD AUTO: 8.7 X10*3/UL (ref 4.4–11.3)

## 2025-01-13 PROCEDURE — 99152 MOD SED SAME PHYS/QHP 5/>YRS: CPT

## 2025-01-13 PROCEDURE — 2500000004 HC RX 250 GENERAL PHARMACY W/ HCPCS (ALT 636 FOR OP/ED): Performed by: STUDENT IN AN ORGANIZED HEALTH CARE EDUCATION/TRAINING PROGRAM

## 2025-01-13 PROCEDURE — 2500000004 HC RX 250 GENERAL PHARMACY W/ HCPCS (ALT 636 FOR OP/ED): Performed by: HOSPITALIST

## 2025-01-13 PROCEDURE — 99233 SBSQ HOSP IP/OBS HIGH 50: CPT | Performed by: STUDENT IN AN ORGANIZED HEALTH CARE EDUCATION/TRAINING PROGRAM

## 2025-01-13 PROCEDURE — 97165 OT EVAL LOW COMPLEX 30 MIN: CPT | Mod: GO

## 2025-01-13 PROCEDURE — 97161 PT EVAL LOW COMPLEX 20 MIN: CPT | Mod: GP

## 2025-01-13 PROCEDURE — 2500000005 HC RX 250 GENERAL PHARMACY W/O HCPCS: Performed by: INTERNAL MEDICINE

## 2025-01-13 PROCEDURE — B24BZZ4 ULTRASONOGRAPHY OF HEART WITH AORTA, TRANSESOPHAGEAL: ICD-10-PCS | Performed by: INTERNAL MEDICINE

## 2025-01-13 PROCEDURE — 93320 DOPPLER ECHO COMPLETE: CPT

## 2025-01-13 PROCEDURE — 93325 DOPPLER ECHO COLOR FLOW MAPG: CPT | Performed by: INTERNAL MEDICINE

## 2025-01-13 PROCEDURE — 80053 COMPREHEN METABOLIC PANEL: CPT | Performed by: NURSE PRACTITIONER

## 2025-01-13 PROCEDURE — 83735 ASSAY OF MAGNESIUM: CPT | Performed by: NURSE PRACTITIONER

## 2025-01-13 PROCEDURE — 85027 COMPLETE CBC AUTOMATED: CPT | Performed by: NURSE PRACTITIONER

## 2025-01-13 PROCEDURE — 2500000004 HC RX 250 GENERAL PHARMACY W/ HCPCS (ALT 636 FOR OP/ED): Performed by: INTERNAL MEDICINE

## 2025-01-13 PROCEDURE — 2500000001 HC RX 250 WO HCPCS SELF ADMINISTERED DRUGS (ALT 637 FOR MEDICARE OP)

## 2025-01-13 PROCEDURE — 2500000002 HC RX 250 W HCPCS SELF ADMINISTERED DRUGS (ALT 637 FOR MEDICARE OP, ALT 636 FOR OP/ED)

## 2025-01-13 PROCEDURE — 93312 ECHO TRANSESOPHAGEAL: CPT | Performed by: INTERNAL MEDICINE

## 2025-01-13 PROCEDURE — 2500000004 HC RX 250 GENERAL PHARMACY W/ HCPCS (ALT 636 FOR OP/ED): Mod: JZ

## 2025-01-13 PROCEDURE — 2500000005 HC RX 250 GENERAL PHARMACY W/O HCPCS: Performed by: NURSE PRACTITIONER

## 2025-01-13 PROCEDURE — 85520 HEPARIN ASSAY: CPT | Performed by: NURSE PRACTITIONER

## 2025-01-13 PROCEDURE — 2500000004 HC RX 250 GENERAL PHARMACY W/ HCPCS (ALT 636 FOR OP/ED): Mod: JZ | Performed by: INTERNAL MEDICINE

## 2025-01-13 PROCEDURE — 84100 ASSAY OF PHOSPHORUS: CPT | Performed by: NURSE PRACTITIONER

## 2025-01-13 PROCEDURE — 99232 SBSQ HOSP IP/OBS MODERATE 35: CPT | Performed by: INTERNAL MEDICINE

## 2025-01-13 PROCEDURE — 2500000004 HC RX 250 GENERAL PHARMACY W/ HCPCS (ALT 636 FOR OP/ED): Performed by: NURSE PRACTITIONER

## 2025-01-13 PROCEDURE — 99152 MOD SED SAME PHYS/QHP 5/>YRS: CPT | Performed by: INTERNAL MEDICINE

## 2025-01-13 PROCEDURE — 2500000001 HC RX 250 WO HCPCS SELF ADMINISTERED DRUGS (ALT 637 FOR MEDICARE OP): Performed by: NURSE PRACTITIONER

## 2025-01-13 PROCEDURE — 36415 COLL VENOUS BLD VENIPUNCTURE: CPT | Performed by: NURSE PRACTITIONER

## 2025-01-13 PROCEDURE — 2020000001 HC ICU ROOM DAILY

## 2025-01-13 PROCEDURE — 93320 DOPPLER ECHO COMPLETE: CPT | Performed by: INTERNAL MEDICINE

## 2025-01-13 RX ORDER — ALUMINUM HYDROXIDE, MAGNESIUM HYDROXIDE, AND SIMETHICONE 1200; 120; 1200 MG/30ML; MG/30ML; MG/30ML
20 SUSPENSION ORAL ONCE
Status: COMPLETED | OUTPATIENT
Start: 2025-01-13 | End: 2025-01-13

## 2025-01-13 RX ORDER — FAMOTIDINE 10 MG/ML
40 INJECTION INTRAVENOUS ONCE
Status: COMPLETED | OUTPATIENT
Start: 2025-01-13 | End: 2025-01-13

## 2025-01-13 RX ORDER — CEFAZOLIN SODIUM 2 G/50ML
2 SOLUTION INTRAVENOUS EVERY 8 HOURS
Status: DISCONTINUED | OUTPATIENT
Start: 2025-01-13 | End: 2025-01-13

## 2025-01-13 RX ORDER — FENTANYL CITRATE 50 UG/ML
INJECTION, SOLUTION INTRAMUSCULAR; INTRAVENOUS AS NEEDED
Status: DISCONTINUED | OUTPATIENT
Start: 2025-01-13 | End: 2025-01-13 | Stop reason: HOSPADM

## 2025-01-13 RX ORDER — CEFAZOLIN SODIUM 2 G/100ML
2 INJECTION, SOLUTION INTRAVENOUS EVERY 8 HOURS
Status: DISCONTINUED | OUTPATIENT
Start: 2025-01-13 | End: 2025-01-26 | Stop reason: HOSPADM

## 2025-01-13 RX ORDER — MIDAZOLAM HYDROCHLORIDE 1 MG/ML
INJECTION, SOLUTION INTRAMUSCULAR; INTRAVENOUS AS NEEDED
Status: DISCONTINUED | OUTPATIENT
Start: 2025-01-13 | End: 2025-01-13 | Stop reason: HOSPADM

## 2025-01-13 RX ORDER — PANTOPRAZOLE SODIUM 40 MG/10ML
40 INJECTION, POWDER, LYOPHILIZED, FOR SOLUTION INTRAVENOUS ONCE
Status: DISCONTINUED | OUTPATIENT
Start: 2025-01-13 | End: 2025-01-13

## 2025-01-13 RX ADMIN — CEFAZOLIN SODIUM 2 G: 2 INJECTION, SOLUTION INTRAVENOUS at 20:40

## 2025-01-13 RX ADMIN — METOPROLOL TARTRATE 50 MG: 50 TABLET, FILM COATED ORAL at 20:40

## 2025-01-13 RX ADMIN — MIDAZOLAM 1 MG: 1 INJECTION INTRAMUSCULAR; INTRAVENOUS at 10:49

## 2025-01-13 RX ADMIN — BENZOCAINE, BUTAMBEN, AND TETRACAINE HYDROCHLORIDE 2 SPRAY: .028; .004; .004 AEROSOL, SPRAY TOPICAL at 10:30

## 2025-01-13 RX ADMIN — EZETIMIBE 10 MG: 10 TABLET ORAL at 08:40

## 2025-01-13 RX ADMIN — ALUMINUM HYDROXIDE, MAGNESIUM HYDROXIDE, AND DIMETHICONE 20 ML: 200; 20; 200 SUSPENSION ORAL at 17:50

## 2025-01-13 RX ADMIN — ATORVASTATIN CALCIUM 40 MG: 20 TABLET, FILM COATED ORAL at 20:40

## 2025-01-13 RX ADMIN — Medication 5 MG: at 20:40

## 2025-01-13 RX ADMIN — ACETAMINOPHEN 650 MG: 325 TABLET ORAL at 15:00

## 2025-01-13 RX ADMIN — TIOTROPIUM BROMIDE INHALATION SPRAY 2 PUFF: 3.12 SPRAY, METERED RESPIRATORY (INHALATION) at 08:40

## 2025-01-13 RX ADMIN — SODIUM CHLORIDE, POTASSIUM CHLORIDE, SODIUM LACTATE AND CALCIUM CHLORIDE 100 ML/HR: 600; 310; 30; 20 INJECTION, SOLUTION INTRAVENOUS at 08:01

## 2025-01-13 RX ADMIN — OXYCODONE 2.5 MG: 5 TABLET ORAL at 14:01

## 2025-01-13 RX ADMIN — FAMOTIDINE 40 MG: 10 INJECTION, SOLUTION INTRAVENOUS at 17:50

## 2025-01-13 RX ADMIN — ASPIRIN 81 MG: 81 TABLET, CHEWABLE ORAL at 20:40

## 2025-01-13 RX ADMIN — HEPARIN SODIUM 1200 UNITS/HR: 10000 INJECTION, SOLUTION INTRAVENOUS at 06:36

## 2025-01-13 RX ADMIN — CEFAZOLIN SODIUM 2 G: 2 INJECTION, SOLUTION INTRAVENOUS at 13:57

## 2025-01-13 RX ADMIN — Medication 1 APPLICATION: at 10:31

## 2025-01-13 RX ADMIN — FENTANYL CITRATE 50 MCG: 50 INJECTION, SOLUTION INTRAMUSCULAR; INTRAVENOUS at 10:50

## 2025-01-13 RX ADMIN — ACETAMINOPHEN 650 MG: 325 TABLET ORAL at 20:40

## 2025-01-13 RX ADMIN — FLUTICASONE PROPIONATE 2 SPRAY: 50 SPRAY, METERED NASAL at 08:40

## 2025-01-13 RX ADMIN — FLUTICASONE FUROATE AND VILANTEROL TRIFENATATE 1 PUFF: 200; 25 POWDER RESPIRATORY (INHALATION) at 08:40

## 2025-01-13 RX ADMIN — MONTELUKAST SODIUM 10 MG: 10 TABLET, FILM COATED ORAL at 20:40

## 2025-01-13 RX ADMIN — LIDOCAINE 4% 1 PATCH: 40 PATCH TOPICAL at 14:01

## 2025-01-13 RX ADMIN — AMIODARONE HYDROCHLORIDE 100 MG: 200 TABLET ORAL at 08:39

## 2025-01-13 RX ADMIN — METHOCARBAMOL TABLETS 500 MG: 500 TABLET, COATED ORAL at 13:57

## 2025-01-13 RX ADMIN — METHOCARBAMOL TABLETS 500 MG: 500 TABLET, COATED ORAL at 21:05

## 2025-01-13 RX ADMIN — ACETAMINOPHEN 650 MG: 325 TABLET ORAL at 08:40

## 2025-01-13 RX ADMIN — CEFAZOLIN SODIUM 2 G: 2 INJECTION, SOLUTION INTRAVENOUS at 05:01

## 2025-01-13 ASSESSMENT — PAIN DESCRIPTION - DESCRIPTORS: DESCRIPTORS: ACHING;SORE

## 2025-01-13 ASSESSMENT — PAIN - FUNCTIONAL ASSESSMENT
PAIN_FUNCTIONAL_ASSESSMENT: 0-10

## 2025-01-13 ASSESSMENT — COGNITIVE AND FUNCTIONAL STATUS - GENERAL
EATING MEALS: A LITTLE
STANDING UP FROM CHAIR USING ARMS: A LOT
TOILETING: A LOT
HELP NEEDED FOR BATHING: A LOT
TURNING FROM BACK TO SIDE WHILE IN FLAT BAD: TOTAL
PERSONAL GROOMING: A LOT
DRESSING REGULAR LOWER BODY CLOTHING: A LOT
MOVING FROM LYING ON BACK TO SITTING ON SIDE OF FLAT BED WITH BEDRAILS: A LOT
WALKING IN HOSPITAL ROOM: A LOT
DRESSING REGULAR UPPER BODY CLOTHING: A LOT
MOVING TO AND FROM BED TO CHAIR: A LOT
CLIMB 3 TO 5 STEPS WITH RAILING: TOTAL
MOBILITY SCORE: 10
DAILY ACTIVITIY SCORE: 13

## 2025-01-13 ASSESSMENT — PAIN SCALES - GENERAL
PAINLEVEL_OUTOF10: 1
PAINLEVEL_OUTOF10: 0 - NO PAIN
PAINLEVEL_OUTOF10: 3
PAINLEVEL_OUTOF10: 0 - NO PAIN
PAINLEVEL_OUTOF10: 0 - NO PAIN
PAINLEVEL_OUTOF10: 5 - MODERATE PAIN

## 2025-01-13 ASSESSMENT — ACTIVITIES OF DAILY LIVING (ADL): BATHING_ASSISTANCE: MODERATE

## 2025-01-13 NOTE — PROGRESS NOTES
Physical Therapy    Physical Therapy Evaluation    Patient Name: Guicho Jones  MRN: [id number]  Department: [facility]  Room: 05/05  Today's Date: 1/13/2025   Time Calculation  Start Time: 1306  Stop Time: 1331  Time Calculation (min): 25 min    Assessment/Plan   PT Assessment  PT Assessment Results: Decreased strength, Decreased range of motion, Decreased endurance, Impaired balance, Decreased mobility  Rehab Prognosis: Good  Barriers to Discharge Home: Physical needs, Caregiver assistance  Evaluation/Treatment Tolerance: Patient limited by fatigue  End of Session Communication: Bedside nurse  Assessment Comment: Pt would benefit from continued therapy to improve ROM, strength, balance, and functional mobility.  End of Session Patient Position: Up in chair, Alarm off, not on at start of session (Call button within reach, family at bedside)  IP OR SWING BED PT PLAN  Inpatient or Swing Bed: Inpatient  PT Plan  Treatment/Interventions: Bed mobility, Transfer training, Gait training, Balance training, Strengthening, Therapeutic exercise, Home exercise program  PT Plan: Ongoing PT  PT Frequency: 3 times per week  PT Discharge Recommendations: Moderate intensity level of continued care  PT Recommended Transfer Status: Assist x2, Assistive device  PT - OK to Discharge: Once medically appropriate    Subjective   General Visit Information:  General  Reason for Referral: Impaired mobility  Referred By: PT/OT referred by Salazar 1/10/25  Past Medical History Relevant to Rehab: COPD, HTN, MI, CVA, AFib, CAD, Intrarenal AA,  Family/Caregiver Present: Yes (Son and Wife came into room mid session)  Co-Treatment: OT  Co-Treatment Reason: Maximize pt safety  Prior to Session Communication: Bedside nurse  Patient Position Received: Bed, 3 rail up, Alarm off, not on at start of session  General Comment: Pt to ED 1/10 c/o decreased energy, nausea, vomiting, and diarrhea. Pt slid out of bed last night and was helped back to bed by  his wife. In the morning, pt was unable to move d/t weakness. 1/10: flu A/B (-), Covid (-), XR chest (-), CT abd/pelvis (+) chronic AAA, CT head/C-spine (-) acute, (+) c3-c7 spondylosis, 1/11: Echo (-), Creatinine elevated (1.54) as of 1/13/25  Home Living:  Home Living  Home Living Comments: Pt lives in a one story home with wife. There is 1 DARION and no HR. Pt has a walk in shower with seat and grab bars. Pt owns a WW, cane, and Rollator. Home O2 only at night.  Prior Level of Function:  Prior Function Per Pt/Caregiver Report  Prior Function Comments:  (per pt, he was indep PTA with no AD. He was indep in ADLs and IADLs. No other falls in the last 3 months. Pt drives.)  Precautions:  Precautions  Medical Precautions: Fall precautions, Oxygen therapy device and L/min     Objective   Pain:  Pain Assessment  Pain Assessment: 0-10  0-10 (Numeric) Pain Score: 3  Pain Type: Chronic pain  Pain Location: Back  Cognition:  Cognition  Orientation Level: Oriented X4    General Assessments:  General Observation  General Observation: Pt supine in bed asleep prior to session. Pt easily roused with verbal stimuli. SpO2, BP cuff, tele, O2 donned. Pt is on 6.5 L O2 NC here and only uses it to sleep at baseline. Slight L facial droop, RN notified. Son and wife visited mid session, pt consents to them being present and for PT evaluation.     Activity Tolerance  Endurance:  (Decreased endurance d/t fatigue)    Static Sitting Balance  Static Sitting-Comment/Number of Minutes:  (Pt sat EOB with CGA + B feet and UE support.)  Dynamic Sitting Balance  Dynamic Sitting-Comments:  (CGA/Min A to prevent retropulsion with strength testing at EOB. B LE/UE support used.)    Static Standing Balance  Static Standing-Comment/Number of Minutes: Pt stood EOB with max A x 2 and WW. Assist needed to hold upright and anteriorly weight shift.  Dynamic Standing Balance  Dynamic Standing-Comments:  (4 side steps to R toward chair w/ Max x 2 and WW. Unsteady  with weight shifting, Increased steadiness with WW.)  Functional Assessments:  Bed Mobility  Bed Mobility:  (Supine > sit EOB w/ HOB at 30 deg: Pt was able to reach R UE for L BR and swing legs off EOB with CGA. Dependent x2 in advancing trunk. Pt states he cannot push himself up bc of chronic L wrist pain. Pt moderately retropulsive.)    Transfers  Transfer:  (Sit > stand: Max x 2 + WW. Pt had difficulty inititating movement and needed cueing to weight shift anteriorly. He needed cues to push up off bed. Stand > sit: Mod x 2 for deceleration of trunk and cueing to reach back for chair.)    Ambulation/Gait Training  Ambulation/Gait Training Performed:  (Max A x 2 + WW for 3 ft towards chair. Required assist to maintain balance during weight shifting and advance walker. Decreased foot clearance and heel strike. Decreased step length. Stance phase shorter on R than L.)  Extremity/Trunk Assessments:  ROM  RUE :  Shoulder flexion/ER: WFL, ELbow/ wrist/hand ROM: WFL  LUE:  Shoulder flexion/ER: WFL, ELbow/ wrist/hand ROM: WFL  RLE :  Hip flexion: 95 deg in sitting, Knee extension/flexion: WFL, DF: WFL  LLE :  Hip flexion: 95 deg in sitting, Knee extension/flexion: WFL, DF: WFL    Strength  B shoulder flexion/ strength: WFL. B Hip flexion strength: 3+/5. B knee extension: 4/5, B DF: 4+/5    Outcome Measures:  [facility] Basic Mobility  Turning from your back to your side while in a flat bed without using bedrails: A lot  Moving from lying on your back to sitting on the side of a flat bed without using bedrails: Total  Moving to and from bed to chair (including a wheelchair): A lot  Standing up from a chair using your arms (e.g. wheelchair or bedside chair): A lot  To walk in hospital room: A lot  Climbing 3-5 steps with railing: Total  Basic Mobility - Total Score: 10    FSS-ICU  Ambulation: Walks <50 feet with any assistance x1 or walks any distance with assistance x2 people  Rolling: Minimal assistance (performs 75% or  more of task)  Sitting: Supervision or set-up only  Transfer Sit-to-Stand: Maximal assistance (performs 25% - 49% of task)  Transfer Supine-to-Sit: Maximal assistance (performs 25% - 49% of task)  Total Score: 14    Encounter Problems       Encounter Problems (Active)       PT Problem       Pt will completed supine <> sit bed mobility from flat bed with Min A  (Progressing)       Start:  01/13/25    Expected End:  01/27/25            Pt will demonstrate sit to stand transfers with WW + Min A (Progressing)       Start:  01/13/25    Expected End:  01/27/25            Pt. will ambulate 30 with WW + Min A  (Progressing)       Start:  01/13/25    Expected End:  01/27/25            Pt will maintain balance while reaching outside PADMINI in sitting with WW/ B LE support, single UE support and Min A (Not Progressing)       Start:  01/13/25    Expected End:  01/27/25            Pt will independently complete B LE strengthening Hep (Not Progressing)       Start:  01/13/25    Expected End:  01/27/25               Pain - Adult              Education Documentation  Mobility Training, taught by Holley Batres, PT at 1/13/2025  3:10 PM.  Learner: Family, Patient  Readiness: Acceptance  Method: Explanation  Response: Verbalizes Understanding, Demonstrated Understanding, Needs Reinforcement

## 2025-01-13 NOTE — PROGRESS NOTES
Spoke with wife and grandson as they were exiting room. They are open to discussing rehab vs home pending needs and PT>OT assessments. TCC team to continue to follow.

## 2025-01-13 NOTE — CARE PLAN
The clinical goals for the shift include Patient will remain hemodynamically stable throughout the duration of the shift.

## 2025-01-13 NOTE — PROGRESS NOTES
[facility] Critical Care Medicine Progress Note      Date:  1/13/2025  Patient:  Guicho Jones  YOB: 1940  MRN:  [id number]   Admit Date:  1/10/2025  ========================================================================================================    Chief Complaint   Patient presents with    Trauma     HIA     Interval ICU Events:  1/10: Admit to the ICU for hypotension 2/2 to hypovolemia and concern for clinical decompensation.   1/11: VSS. Febrile, Blood cultures, +GPC. States had biopsy of scrotum day prior to hospitalization and needs to have MOHS procedure, pending. Site of biopsy without s/s of infection/ drainage. RN staff noted upper extremity weakness with abnormality in depth perception. On initial exam bilateral upper extremity weakness, no loss of sensation, right hand tingling (at baseline), and difficulty with depth perception. MRI ordered of cervical/ thoracic/ lumbar spine. Spoke with rep from [employer] to verify device compatibility and reported that old device still present in abdomen is not MRI compatible but new device in chest wall is, MRI was canceled. Noted improvement in neurological exam and movement/ strength of upper extremities but persistent limitation with depth perception.     1/12: T max 39.2, WBC 10.9. decrease in volume of stool output, endorses abdominal cramping but no tenderness. Back pain with spasms, reports no increase from baseline and no point tenderness.   Echo: EF 55%, no source of embolus, no definite vegetation, normal RV systolic function, LA mod dilated, RA mild to mod dilated, mild MR, mild TR, mild AR, small PFO, mod dilatation of ascending aorta, mod increased septal and mildly increased posterior LV wall thickness  1/13: Pt overnight wore his BiPAP and being transitioned to NC this AM. Pt otherwise Afebrile and normocardic but with low normal Bps with MAP near 65mmHg. Pt has been NPO for PAOLO. Pt with UOP of 700cc in 24 hours and is  net+1.6L in 24 hours and net +3.3L since admission with slightly improved Cr to 1.54 this AM and improved bilirubin. Blood Cultures still + from 1/12 for GPC clusters. Pt currently with no complaints other than some minor back pain.     Medical History:  Past Medical History:   Diagnosis Date    Allergic     Arthritis     Cancer (Multi)     Cataract     CHF (congestive heart failure)     COPD (chronic obstructive pulmonary disease) (Multi)     Encounter for follow-up examination after completed treatment for conditions other than malignant neoplasm 12/27/2021    Hospital discharge follow-up    Heart disease     Hypertension     Ischemic cardiomyopathy     Ischemic cardiomyopathy with implantable cardioverter-defibrillator (ICD)    Myocardial infarction (Multi)     Pain in unspecified hip     Joint pain, hip    Personal history of other diseases of the musculoskeletal system and connective tissue     History of low back pain    Personal history of other endocrine, nutritional and metabolic disease     History of hyperlipidemia    Personal history of other specified conditions 12/21/2021    History of elevated prostate specific antigen (PSA)    Presence of coronary angioplasty implant and graft     Stented coronary artery    Unspecified atrial flutter (Multi)     Paroxysmal atrial flutter     Past Surgical History:   Procedure Laterality Date    BACK SURGERY      CARDIAC CATHETERIZATION      CORONARY STENT PLACEMENT      CT ABDOMEN ANGIOGRAM W AND/OR WO IV CONTRAST  10/29/2021    CT ABDOMEN ANGIOGRAM W AND/OR WO IV CONTRAST 10/29/2021 [facility]    CT ABDOMEN ANGIOGRAM W AND/OR WO IV CONTRAST  10/03/2022    CT ABDOMEN ANGIOGRAM W AND/OR WO IV CONTRAST 10/3/2022 [facility]    EYE SURGERY      JOINT REPLACEMENT      OTHER SURGICAL HISTORY  10/12/2021    Renal angioplasty and stenting    OTHER SURGICAL HISTORY  10/12/2021    Cataract surgery    OTHER SURGICAL HISTORY  12/21/2021    Hip replacement     OTHER SURGICAL HISTORY  01/04/2022    Complete colonoscopy    OTHER SURGICAL HISTORY  12/21/2021    Back surgery    OTHER SURGICAL HISTORY  12/21/2021    Abdominal aortic aneurysm repair    OTHER SURGICAL HISTORY  12/21/2021    Hip replacement    OTHER SURGICAL HISTORY  12/21/2021    Surgery    TONSILLECTOMY      VASECTOMY       Medications Prior to Admission   Medication Sig Dispense Refill Last Dose/Taking    albuterol (Proventil HFA) 90 mcg/actuation inhaler Inhale 1 puff every 4 hours if needed.   1/9/2025    amiodarone (Pacerone) 200 mg tablet Take 0.5 tablets (100 mg) by mouth once daily. 90 tablet 1 1/9/2025    aspirin 81 mg chewable tablet Chew 1 tablet (81 mg). TAKE 1 TABLET MONDAY THROUGH FRIDAY AT BEDTIME   1/9/2025    atorvastatin (Lipitor) 40 mg tablet Take 1 tablet (40 mg) by mouth once daily at bedtime. 90 tablet 1 1/9/2025    budesonide-glycopyr-formoterol (Breztri Aerosphere) 160-9-4.8 mcg/actuation HFA aerosol inhaler Inhale 2 puffs 2 times a day.   1/10/2025    empagliflozin (Jardiance) 10 mg Take 1 tablet (10 mg) by mouth once daily. 90 tablet 3 1/9/2025    ezetimibe (Zetia) 10 mg tablet Take 1 tablet (10 mg) by mouth once daily. 90 tablet 1 1/9/2025    fluticasone (Flonase) 50 mcg/actuation nasal spray USE 2 SPRAYS IN EACH NOSTRIL EVERY DAY 48 g 3 1/10/2025    glucosamine HCl 1,500 mg tablet Take 1 tablet by mouth once daily.   1/9/2025    hydroCHLOROthiazide (HYDRODiuril) 25 mg tablet TAKE 1 TABLET MON WED FRI ONLY   1/9/2025    ipratropium (Atrovent) 42 mcg (0.06 %) nasal spray Administer 2 sprays into each nostril 3 times a day. 15 mL 3 1/9/2025    isosorbide mononitrate ER (Imdur) 30 mg 24 hr tablet Take 1 tablet (30 mg) by mouth once daily. 1 tablet daily 90 tablet 3 1/9/2025    lisinopril 40 mg tablet TAKE 1 TABLET EVERY DAY 90 tablet 3 1/9/2025    magnesium oxide (MagOx) 400 mg (241.3 mg magnesium) tablet Take 1 tablet (400 mg) by mouth 2 times a day.   1/9/2025    metoprolol succinate XL  (Toprol-XL) 100 mg 24 hr tablet 1/2 tablet in the morning, 1 full tablet at night   1/9/2025    metoprolol tartrate (Lopressor) 25 mg tablet Take 1 tablet daily for palpitations only 90 tablet 1 1/9/2025    montelukast (Singulair) 10 mg tablet TAKE 1 TABLET EVERY EVENING 90 tablet 3 1/9/2025    omega 3-dha-epa-fish oil (Fish OiL) 1,000 mg (120 mg-180 mg) capsule Take 1 capsule (1,000 mg) by mouth 2 times a day.   1/9/2025    rivaroxaban (Xarelto) 20 mg tablet 1 tablet daily 90 tablet 3 1/9/2025    amoxicillin (Amoxil) 500 mg capsule Take 4 capsules (2,000 mg) by mouth see administration instructions. TAKE 1 HOUR BEFORE DENTAL APPOINTMENT (Patient not taking: Reported on 1/10/2025)   More than a month    nitroglycerin (Nitrostat) 0.4 mg SL tablet Place 1 tablet (0.4 mg) under the tongue every 5 minutes if needed for chest pain. 25 tablet 5 Unknown     Levofloxacin  Social History     Tobacco Use    Smoking status: Former     Current packs/day: 1.50     Average packs/day: 1.5 packs/day for 15.0 years (22.5 ttl pk-yrs)     Types: Cigarettes     Passive exposure: Never    Smokeless tobacco: Never   Vaping Use    Vaping status: Never Used   Substance Use Topics    Alcohol use: Yes     Alcohol/week: 3.0 standard drinks of alcohol     Types: 3 Standard drinks or equivalent per week     Comment: occassional    Drug use: Never     Family History   Problem Relation Name Age of Onset    Liver disease Mother Ysabel. Campbell Jones     Arthritis Mother Kelle Jones     Coronary artery disease Father Guicho Jones     Atrial fibrillation Father Guicho Jones     Heart disease Father Guicho Jones     Cancer Sister Sally Jones        Review of Systems:  14 point review of systems was completed and negative except for those specially mention in my HPI    Physical Exam:    Heart Rate:  [60-66]   Temp:  [36.9 °C (98.4 °F)-37.9 °C (100.2 °F)]   Resp:  [17-29]   BP: ()/(44-70)   SpO2:  [88  %-95 %]     Physical Exam  Constitutional:       General: He is not in acute distress.     Appearance: He is obese. He is not ill-appearing or toxic-appearing.   HENT:      Head: Normocephalic.      Mouth/Throat:      Pharynx: Oropharynx is clear.   Eyes:      Extraocular Movements: Extraocular movements intact.      Conjunctiva/sclera: Conjunctivae normal.      Pupils: Pupils are equal, round, and reactive to light.   Cardiovascular:      Rate and Rhythm: Normal rate and regular rhythm.      Pulses: Normal pulses.      Heart sounds: Normal heart sounds.   Pulmonary:      Breath sounds: Normal breath sounds. No wheezing or rales.   Abdominal:      Palpations: Abdomen is soft. There is no mass.      Tenderness: There is no abdominal tenderness.   Genitourinary:     Comments: Biopsy of scrotum, site pink without evidence of infection or drainage  Musculoskeletal:      Right lower leg: Edema present.      Left lower leg: Edema present.      Comments: +2 pitting edema bilaterally noted   Skin:     General: Skin is warm and dry.      Coloration: Skin is not jaundiced.      Comments: flushed   Neurological:      General: No focal deficit present.      Mental Status: He is alert and oriented to person, place, and time.      Cranial Nerves: No cranial nerve deficit.      Motor: No weakness.      Coordination: Finger-Nose-Finger Test abnormal.      Comments: Abnormal depth perception         Objective:    I have reviewed all medications, laboratory results, and imaging pertinent for today's encounter    Assessment/Plan:  Pt is an 85 y/o M w/ a PMHx of ICM s/p ICD, MI, diastolic heart failure, CAD s/p PCI, COPD (centrilobular emphysema), pulmonary hypertension, HTN, HLD, persistent Afib on ATC with xarelto, CVA, infrarenal AAA, obesity, and prostate cancer who presented s/p fall due to septic shock from MSSA bacteremia possibly due to lead infection of AICD. Pt course also complicated by ISAIAS and hyperbilirubinemia due to  sepsis but much improved.     Neuro/Psych/Pain Ctrl/Sedation:  #Acute on chronic back pain  #Possible stroke  #Hx of CVA  #Fall, prior to admission  #Ataxia  #Acute debilitation 2/2 critical illness  - Concern for possible septic emboli 2/2 gram positive bacteremia due to change in depth perception, unable to obtain MRI related to ICD device incompatibility  - Given patient's back pain possible Epidural abscess but unable to get MRI as above. Will discuss with rads on either repeat CT C/T/L spine with contrast or re-cons of previous CTs with contrast  - Neuro checks  - Will hold off on neurology consult at this time pending PAOLO results  - CAM-ICU  - Delirium precautions  - Multimodal pain management: acetaminophen, robaxin, lidocaine patch, ice  - Oxycodone as needed  - Out of bed to chair  - PT/OT as toleraated     Respiratory/ENT:  #Acute on chronic hypoxic respiratory failure  #COPD without acute exacerbation  #MSSA PNA  - Home inhalers, singulair, flonase  - Duonebs   - Oxygen therapy as needed to maintain SPO2 greater than 90%  - Pulmonary hygiene  - BiPAP at night, may use home device     Cardiovascular:  #Septic shock - resolved  #Infrarenal AAA, increased in size from previou  #ICM s/p AICD  #HTN  #Chronic diastolic heart failure  #Atrial fibrillation  #HLD  - C/W home amiodarone, statin  - continue holding imdur, lisinopril, jardiance, hydrochlorothiazide  - Resume metoprolol at reduced dosing, adjust as able  - PAOLO planned fo today  - Consider cardiology consult pending PAOLO results  -Continue holding home xarelto, pending need for procedure  -Heparin drip  - Follow up with vascular OP regarding infrarenal AAA    Renal/Volume Status (Intra & Extravascular):  #ISAIAS in setting of hypovolemia and sepsis - improved  Baseline Creat ~0.9-1.1  - Trend BMP  - Gentle hydration, as needed  - Strict I&Os  - Avoid nephrotoxic agents    GI:  #Elevated AST, hyperbilirubinemia in setting of sepsis  #Diarrhea  - Advance  diet as tolerated  - Trend CMP     Infectious Disease:  #Sepsis 2/2 MSSA bacteremia  #Enterocolitis  #Leukocytosis  #Rule out Lead infection  - Concern for device infection, endocarditis  - Stool pathogen, pending  - Blood culture 1/10 & 1/12, +MSSA  - Cefazolin (Day 3 of Abx)  - ID following, appreciate recs     Heme/Onc:  #Thrombocytopenia in setting of sepsis  #Basal cell carcinoma, scrotum  - Heparin drip  - Trend CBC  - Monitor for s/s of bleeding  - Transfuse for Hgb less than 7 or symptomatic anemia  - Follow up OP for completion of MOHS procedure    Endocrine  - Monitor for s/s of hypo/ hyperglycemia      Ethics/Code Status:  - FULL     :  DVT Prophylaxis: heparin drip  GI Prophylaxis: no indication  Bowel Regimen: none  Diet: cardiac  CVC: none  Wiley: none  Reyes: none  Restraints: none  Dispo: Possible Transfer to floor after PAOLO today    Rodriguez Sapp MD

## 2025-01-13 NOTE — POST-PROCEDURE NOTE
Physician Transition of Care Summary  Invasive Cardiovascular Lab    Procedure Date: 01/13/25     Pre Procedure Indications:   Positive blood cultures, rule out endocarditis     Post Procedure Diagnosis:   No obvious vegetations on the valvular structures of the pacemaker leads.    Procedure(s):   Transesophageal echocardiogram    Procedure Findings:   Normal elevated vascular function with EF of about 55 to 60%.  Severe left atrial enlargement with moderate right atrial enlargement, with no obvious thrombus visualized.  Normal-sized left atrial appendage with no obvious thrombus visualized.    Mildly enlarged right ventricle with preserved right ventricular systolic function.  Trileaflet aortic valve with no obvious vegetation s.  There is mild aortic insufficiency with no significant aortic stenosis.    Mildly thickened mitral valve with moderate mitral regurgitation and no obvious vegetation  Poorly visualized tricuspid valve with at least moderate tricuspid regurgitation and no obvious vegetations.  Pacemaker wire was visualized in the right-sided chambers with no obvious vegetations on the visualized portion of the pacemaker leads.  Negative bubble study with no PFO/ASD.  There is moderate plaque in descending thoracic aorta    Description of the Procedure:   Transesophageal echocardiogram    Complications:   None    Estimated Blood Loss:   None    Anesthesia: Conscious sedation     any Specimen(s) Removed: None      Electronically signed by: Phil Galvan MD, 1/13/2025

## 2025-01-13 NOTE — SIGNIFICANT EVENT
Pt arrived back to MICU from cath lab for PAOLO. Pt connected to cardiac monitor. VSS. Call light within reach.

## 2025-01-13 NOTE — PROGRESS NOTES
"Occupational Therapy    Evaluation    Patient Name: Guicho Jones \"Doroteo\"  MRN: [id number]  : 1940  Today's Date: 25  Time Calculation  Start Time: 1305  Stop Time: 1331  Time Calculation (min): 26 min     -A    Assessment:  OT Assessment: pt presents with decreased indep with ADLS and functional mobility/transfers. Will benefit from con't skilled OT to address impairments  Prognosis: Good  Barriers to Discharge Home: Caregiver assistance, Physical needs  Caregiver Assistance: Caregiver assistance needed per identified barriers - however, level of patient's required assistance exceeds assistance available at home  Physical Needs: 24hr mobility assistance needed, 24hr ADL assistance needed, High falls risk due to function or environment  End of Session Communication: Bedside nurse  End of Session Patient Position: Up in chair, Alarm off, not on at start of session (call light in reach)  OT Assessment Results: Decreased ADL status, Decreased endurance, Decreased functional mobility  Prognosis: Good    Plan:  Treatment Interventions: ADL retraining, Functional transfer training, Endurance training, Patient/family training  OT Frequency: 2 times per week  OT Discharge Recommendations: Moderate intensity level of continued care  OT Recommended Transfer Status: Maximum assist, Assist of 2  OT - OK to Discharge: Yes  Treatment Interventions: ADL retraining, Functional transfer training, Endurance training, Patient/family training  Subjective     Current Problem:  1. Diarrhea, unspecified type        2. Hypotension, unspecified hypotension type        3. Elevated troponin        4. Hypokalemia        5. Renal insufficiency        6. Anemia, unspecified type        7. Fall, initial encounter  CANCELED: Cardiac device check - MRI    CANCELED: Cardiac device check - MRI      8. Medication induced coagulopathy (Multi)        9. Bacteremia due to Gram-positive bacteria  Transthoracic Echo (TTE) Complete    " Transthoracic Echo (TTE) Complete    Transesophageal Echo (PAOLO)    Transesophageal Echo (PAOLO)    CANCELED: Cardiac device check - MRI    CANCELED: Cardiac device check - MRI      10. Pulmonary hypertension (Multi)  Transthoracic Echo (TTE) Complete    Transthoracic Echo (TTE) Complete    Transesophageal Echo (PAOLO)    Transesophageal Echo (PAOLO)      11. Chronic diastolic congestive heart failure, NYHA class 2  Transthoracic Echo (TTE) Complete    Transthoracic Echo (TTE) Complete    Transesophageal Echo (PAOLO)    Transesophageal Echo (PAOLO)      12. Ischemic cardiomyopathy  Transthoracic Echo (TTE) Complete    Transthoracic Echo (TTE) Complete    Transesophageal Echo (PAOLO)    Transesophageal Echo (PAOLO)      13. Weakness of both arms  CANCELED: Cardiac device check - MRI    CANCELED: Cardiac device check - MRI      14. Lumbar pain  CANCELED: Cardiac device check - MRI    CANCELED: Cardiac device check - MRI      15. AICD (automatic cardioverter/defibrillator) present  Transesophageal Echo (PAOLO)    Transesophageal Echo (PAOLO)          General:   OT Received On: 01/13/25  General  Reason for Referral: ADL impairment  Referred By: Salazar PT/OT eval and tx 1/10/25  Past Medical History Relevant to Rehab: COPD, dyslipidemia, HTN, MI, CVA, CHF, arthritis, afib, CAD, tremor, prostate CA, JOSELINE, AICD, JOSELINE, AAA, emphysema, spinal stenosis, back surgery, morbid obesity, ICM, xarelto  Family/Caregiver Present: Yes  Caregiver Feedback: spouse and son present for mobility portion of evaluation  Co-Treatment: PT  Co-Treatment Reason: to maximize pt/staff safety  Prior to Session Communication: Bedside nurse  Patient Position Received: Bed, 4 rail up, Alarm off, not on at start of session (male external cathter, IV in R UE, tele, pt on 6Lo2)  General Comment: pt to ED 1/10 with c/o not feeling well, nausea, vomiting, diarrhea, fell OOB, pt hypotensive BP 60/30, transferred to ICU for hypotension. C diff negative, CT cspine and head  negative, spinal stenosis C3-C7. Flu negative, RSV negative, troponin 128, . EF 55%. PAOLO 1/13/25    Precautions:  Hearing/Visual Limitations: [place]  Medical Precautions: Fall precautions, Oxygen therapy device and L/min    Vital Signs:  Heart Rate: 60  Heart Rate Source: Monitor  SpO2: 96 %  BP: 92/56    Pain:  Pain Assessment  Pain Assessment: 0-10  0-10 (Numeric) Pain Score: 0 - No pain  Objective     Cognition:  Overall Cognitive Status: Impaired (initially lethargic ,increased alertness as eval progressed)  Orientation Level: Oriented X4  Attention:  (cues to redirect)  Processing Speed: Delayed             Home Living:  Home Living Comments: lives with spouse, 1 story, 1 DARION. Owns ww, cane and rollator. Walk in shower with seat and grab bar, has grab bar in front of toilet    Prior Function:  Prior Function Comments: pt indep with ADLS, shares IADLS with spouse, drives, works as . Reports one fall, cause of current adm. Amb without AD. Pt reports indep with med mgmt           Activities of Daily Living:   Eating Assistance: Minimal  Grooming Assistance: Moderate  Bathing Assistance: Moderate  UE Dressing Assistance: Moderate  LE Dressing Assistance: Maximal  Toileting Assistance with Device: Maximal  Functional Assistance:  (max A x 2 with ww 4')                         Activity Tolerance:  Endurance: Decreased tolerance for upright activites           Bed Mobility/Transfers: Bed Mobility  Bed Mobility:  (sup to sit dependent for lifting trunk up from EOB, use of bed pad. Max A to scoot hips to EOB)  Transfers  Transfer:  (sit to stand at EOB max A x 2, difficulty initiating stand. STAnd to sit at recliner chair mod a x 2, verbal adn physical cues to reach back for chair)                Balance:  Static Sitting: good- B UE support  Dynamic Sitting: fair  Static Standing: fair   Dynamic Standing: fair -         Vision:Vision - Basic Assessment  Current Vision: No visual deficits         Sensation:  Light Touch: No apparent deficits    Strength:  Strength Comments: B UE 4/5 throughout            Extremities: RUE   RUE : Within Functional Limits and LUE   LUE: Within Functional Limits    Outcome Measures: [facility] Daily Activity  Putting on and taking off regular lower body clothing: A lot  Bathing (including washing, rinsing, drying): A lot  Putting on and taking off regular upper body clothing: A lot  Toileting, which includes using toilet, bedpan or urinal: A lot  Taking care of personal grooming such as brushing teeth: A lot  Eating Meals: A little  Daily Activity - Total Score: 13    Education Documentation  ADL Training, taught by Basilia Chauhan, OT at 1/13/2025  2:37 PM.  Learner: Patient  Readiness: Acceptance  Method: Explanation  Response: Verbalizes Understanding, Needs Reinforcement                 Goals:  Encounter Problems       Encounter Problems (Active)       OT Goals       pt will dress LB with modified indep (Progressing)       Start:  01/13/25    Expected End:  01/27/25            Pt will transfer to bed, chair, toilet with min A (Progressing)       Start:  01/13/25    Expected End:  01/27/25            Pt will attend to ADL task x 5 minutes with less than 3 vc's to redirect (Progressing)       Start:  01/13/25    Expected End:  01/27/25            Pt will complete grooming with SBA (Progressing)       Start:  01/13/25    Expected End:  01/27/25

## 2025-01-14 ENCOUNTER — APPOINTMENT (OUTPATIENT)
Dept: RADIOLOGY | Facility: HOSPITAL | Age: 85
DRG: 871 | End: 2025-01-14
Payer: MEDICARE

## 2025-01-14 LAB
ALBUMIN SERPL BCP-MCNC: 3 G/DL (ref 3.4–5)
ALP SERPL-CCNC: 52 U/L (ref 33–136)
ALT SERPL W P-5'-P-CCNC: 11 U/L (ref 10–52)
ANION GAP SERPL CALC-SCNC: 13 MMOL/L (ref 10–20)
ANION GAP SERPL CALC-SCNC: 13 MMOL/L (ref 10–20)
ANION GAP SERPL CALC-SCNC: 14 MMOL/L (ref 10–20)
AST SERPL W P-5'-P-CCNC: 36 U/L (ref 9–39)
BILIRUB SERPL-MCNC: 0.8 MG/DL (ref 0–1.2)
BUN SERPL-MCNC: 60 MG/DL (ref 6–23)
BUN SERPL-MCNC: 61 MG/DL (ref 6–23)
BUN SERPL-MCNC: 63 MG/DL (ref 6–23)
CALCIUM SERPL-MCNC: 7.9 MG/DL (ref 8.6–10.3)
CALCIUM SERPL-MCNC: 8.1 MG/DL (ref 8.6–10.3)
CALCIUM SERPL-MCNC: 8.2 MG/DL (ref 8.6–10.3)
CHLORIDE SERPL-SCNC: 101 MMOL/L (ref 98–107)
CHLORIDE SERPL-SCNC: 103 MMOL/L (ref 98–107)
CHLORIDE SERPL-SCNC: 103 MMOL/L (ref 98–107)
CHLORIDE UR-SCNC: 32 MMOL/L
CHLORIDE/CREATININE (MMOL/G) IN URINE: 45 MMOL/G CREAT (ref 23–275)
CO2 SERPL-SCNC: 21 MMOL/L (ref 21–32)
CO2 SERPL-SCNC: 22 MMOL/L (ref 21–32)
CO2 SERPL-SCNC: 23 MMOL/L (ref 21–32)
CREAT SERPL-MCNC: 2.33 MG/DL (ref 0.5–1.3)
CREAT SERPL-MCNC: 2.5 MG/DL (ref 0.5–1.3)
CREAT SERPL-MCNC: 2.53 MG/DL (ref 0.5–1.3)
CREAT UR-MCNC: 71.8 MG/DL (ref 20–370)
EGFRCR SERPLBLD CKD-EPI 2021: 24 ML/MIN/1.73M*2
EGFRCR SERPLBLD CKD-EPI 2021: 25 ML/MIN/1.73M*2
EGFRCR SERPLBLD CKD-EPI 2021: 27 ML/MIN/1.73M*2
ERYTHROCYTE [DISTWIDTH] IN BLOOD BY AUTOMATED COUNT: 16.5 % (ref 11.5–14.5)
GLUCOSE SERPL-MCNC: 110 MG/DL (ref 74–99)
GLUCOSE SERPL-MCNC: 113 MG/DL (ref 74–99)
GLUCOSE SERPL-MCNC: 125 MG/DL (ref 74–99)
HCT VFR BLD AUTO: 32.2 % (ref 41–52)
HGB BLD-MCNC: 10.5 G/DL (ref 13.5–17.5)
HOLD SPECIMEN: NORMAL
MAGNESIUM SERPL-MCNC: 2.43 MG/DL (ref 1.6–2.4)
MAGNESIUM SERPL-MCNC: 2.45 MG/DL (ref 1.6–2.4)
MAGNESIUM SERPL-MCNC: 2.51 MG/DL (ref 1.6–2.4)
MCH RBC QN AUTO: 31.5 PG (ref 26–34)
MCHC RBC AUTO-ENTMCNC: 32.6 G/DL (ref 32–36)
MCV RBC AUTO: 97 FL (ref 80–100)
NRBC BLD-RTO: 0.2 /100 WBCS (ref 0–0)
OSMOLALITY UR: 430 MOSM/KG (ref 200–1200)
PHOSPHATE SERPL-MCNC: 3.3 MG/DL (ref 2.5–4.9)
PHOSPHATE SERPL-MCNC: 3.6 MG/DL (ref 2.5–4.9)
PHOSPHATE SERPL-MCNC: 3.9 MG/DL (ref 2.5–4.9)
PLATELET # BLD AUTO: 93 X10*3/UL (ref 150–450)
POTASSIUM SERPL-SCNC: 2.7 MMOL/L (ref 3.5–5.3)
POTASSIUM SERPL-SCNC: 3.1 MMOL/L (ref 3.5–5.3)
POTASSIUM SERPL-SCNC: 3.7 MMOL/L (ref 3.5–5.3)
POTASSIUM UR-SCNC: 26 MMOL/L
POTASSIUM/CREAT UR-RTO: 36 MMOL/G CREAT
PROT SERPL-MCNC: 5.7 G/DL (ref 6.4–8.2)
RBC # BLD AUTO: 3.33 X10*6/UL (ref 4.5–5.9)
SODIUM SERPL-SCNC: 133 MMOL/L (ref 136–145)
SODIUM SERPL-SCNC: 135 MMOL/L (ref 136–145)
SODIUM SERPL-SCNC: 135 MMOL/L (ref 136–145)
SODIUM UR-SCNC: 32 MMOL/L
SODIUM/CREAT UR-RTO: 45 MMOL/G CREAT
UFH PPP CHRO-ACNC: 0.5 IU/ML
UREA/CREAT UR-SRTO: 9.2 G/G CREAT
UUN UR-MCNC: 657 MG/DL
WBC # BLD AUTO: 10.1 X10*3/UL (ref 4.4–11.3)

## 2025-01-14 PROCEDURE — 2500000004 HC RX 250 GENERAL PHARMACY W/ HCPCS (ALT 636 FOR OP/ED): Performed by: HOSPITALIST

## 2025-01-14 PROCEDURE — 86038 ANTINUCLEAR ANTIBODIES: CPT | Mod: ELYLAB | Performed by: STUDENT IN AN ORGANIZED HEALTH CARE EDUCATION/TRAINING PROGRAM

## 2025-01-14 PROCEDURE — 2500000002 HC RX 250 W HCPCS SELF ADMINISTERED DRUGS (ALT 637 FOR MEDICARE OP, ALT 636 FOR OP/ED)

## 2025-01-14 PROCEDURE — 2500000004 HC RX 250 GENERAL PHARMACY W/ HCPCS (ALT 636 FOR OP/ED): Performed by: NURSE PRACTITIONER

## 2025-01-14 PROCEDURE — 84100 ASSAY OF PHOSPHORUS: CPT | Performed by: STUDENT IN AN ORGANIZED HEALTH CARE EDUCATION/TRAINING PROGRAM

## 2025-01-14 PROCEDURE — 83735 ASSAY OF MAGNESIUM: CPT | Performed by: NURSE PRACTITIONER

## 2025-01-14 PROCEDURE — 76770 US EXAM ABDO BACK WALL COMP: CPT

## 2025-01-14 PROCEDURE — 99291 CRITICAL CARE FIRST HOUR: CPT | Performed by: STUDENT IN AN ORGANIZED HEALTH CARE EDUCATION/TRAINING PROGRAM

## 2025-01-14 PROCEDURE — 82570 ASSAY OF URINE CREATININE: CPT | Performed by: STUDENT IN AN ORGANIZED HEALTH CARE EDUCATION/TRAINING PROGRAM

## 2025-01-14 PROCEDURE — 80048 BASIC METABOLIC PNL TOTAL CA: CPT | Mod: CCI | Performed by: STUDENT IN AN ORGANIZED HEALTH CARE EDUCATION/TRAINING PROGRAM

## 2025-01-14 PROCEDURE — 2500000004 HC RX 250 GENERAL PHARMACY W/ HCPCS (ALT 636 FOR OP/ED): Performed by: STUDENT IN AN ORGANIZED HEALTH CARE EDUCATION/TRAINING PROGRAM

## 2025-01-14 PROCEDURE — 36415 COLL VENOUS BLD VENIPUNCTURE: CPT | Performed by: NURSE PRACTITIONER

## 2025-01-14 PROCEDURE — 71250 CT THORAX DX C-: CPT | Performed by: STUDENT IN AN ORGANIZED HEALTH CARE EDUCATION/TRAINING PROGRAM

## 2025-01-14 PROCEDURE — 2500000002 HC RX 250 W HCPCS SELF ADMINISTERED DRUGS (ALT 637 FOR MEDICARE OP, ALT 636 FOR OP/ED): Performed by: STUDENT IN AN ORGANIZED HEALTH CARE EDUCATION/TRAINING PROGRAM

## 2025-01-14 PROCEDURE — 2500000005 HC RX 250 GENERAL PHARMACY W/O HCPCS: Performed by: NURSE PRACTITIONER

## 2025-01-14 PROCEDURE — 87040 BLOOD CULTURE FOR BACTERIA: CPT | Mod: ELYLAB | Performed by: STUDENT IN AN ORGANIZED HEALTH CARE EDUCATION/TRAINING PROGRAM

## 2025-01-14 PROCEDURE — 84133 ASSAY OF URINE POTASSIUM: CPT | Performed by: STUDENT IN AN ORGANIZED HEALTH CARE EDUCATION/TRAINING PROGRAM

## 2025-01-14 PROCEDURE — 80053 COMPREHEN METABOLIC PANEL: CPT | Performed by: NURSE PRACTITIONER

## 2025-01-14 PROCEDURE — 2500000001 HC RX 250 WO HCPCS SELF ADMINISTERED DRUGS (ALT 637 FOR MEDICARE OP)

## 2025-01-14 PROCEDURE — 2500000001 HC RX 250 WO HCPCS SELF ADMINISTERED DRUGS (ALT 637 FOR MEDICARE OP): Performed by: HOSPITALIST

## 2025-01-14 PROCEDURE — 86036 ANCA SCREEN EACH ANTIBODY: CPT | Performed by: STUDENT IN AN ORGANIZED HEALTH CARE EDUCATION/TRAINING PROGRAM

## 2025-01-14 PROCEDURE — 84132 ASSAY OF SERUM POTASSIUM: CPT | Performed by: STUDENT IN AN ORGANIZED HEALTH CARE EDUCATION/TRAINING PROGRAM

## 2025-01-14 PROCEDURE — 85520 HEPARIN ASSAY: CPT | Performed by: HOSPITALIST

## 2025-01-14 PROCEDURE — 85027 COMPLETE CBC AUTOMATED: CPT | Performed by: NURSE PRACTITIONER

## 2025-01-14 PROCEDURE — 82436 ASSAY OF URINE CHLORIDE: CPT | Performed by: STUDENT IN AN ORGANIZED HEALTH CARE EDUCATION/TRAINING PROGRAM

## 2025-01-14 PROCEDURE — 74176 CT ABD & PELVIS W/O CONTRAST: CPT

## 2025-01-14 PROCEDURE — 2500000004 HC RX 250 GENERAL PHARMACY W/ HCPCS (ALT 636 FOR OP/ED): Mod: JZ

## 2025-01-14 PROCEDURE — 2500000001 HC RX 250 WO HCPCS SELF ADMINISTERED DRUGS (ALT 637 FOR MEDICARE OP): Performed by: NURSE PRACTITIONER

## 2025-01-14 PROCEDURE — 2500000005 HC RX 250 GENERAL PHARMACY W/O HCPCS: Performed by: HOSPITALIST

## 2025-01-14 PROCEDURE — 83935 ASSAY OF URINE OSMOLALITY: CPT | Mod: ELYLAB | Performed by: STUDENT IN AN ORGANIZED HEALTH CARE EDUCATION/TRAINING PROGRAM

## 2025-01-14 PROCEDURE — 2020000001 HC ICU ROOM DAILY

## 2025-01-14 PROCEDURE — 84540 ASSAY OF URINE/UREA-N: CPT | Performed by: STUDENT IN AN ORGANIZED HEALTH CARE EDUCATION/TRAINING PROGRAM

## 2025-01-14 PROCEDURE — 99233 SBSQ HOSP IP/OBS HIGH 50: CPT | Performed by: INTERNAL MEDICINE

## 2025-01-14 PROCEDURE — 94640 AIRWAY INHALATION TREATMENT: CPT

## 2025-01-14 PROCEDURE — 36415 COLL VENOUS BLD VENIPUNCTURE: CPT | Performed by: STUDENT IN AN ORGANIZED HEALTH CARE EDUCATION/TRAINING PROGRAM

## 2025-01-14 PROCEDURE — 76770 US EXAM ABDO BACK WALL COMP: CPT | Performed by: RADIOLOGY

## 2025-01-14 PROCEDURE — 84100 ASSAY OF PHOSPHORUS: CPT | Performed by: NURSE PRACTITIONER

## 2025-01-14 PROCEDURE — 74176 CT ABD & PELVIS W/O CONTRAST: CPT | Performed by: STUDENT IN AN ORGANIZED HEALTH CARE EDUCATION/TRAINING PROGRAM

## 2025-01-14 PROCEDURE — 83735 ASSAY OF MAGNESIUM: CPT | Performed by: STUDENT IN AN ORGANIZED HEALTH CARE EDUCATION/TRAINING PROGRAM

## 2025-01-14 RX ORDER — PANTOPRAZOLE SODIUM 40 MG/10ML
40 INJECTION, POWDER, LYOPHILIZED, FOR SOLUTION INTRAVENOUS
Status: DISCONTINUED | OUTPATIENT
Start: 2025-01-15 | End: 2025-01-15

## 2025-01-14 RX ORDER — BUMETANIDE 0.25 MG/ML
2 INJECTION, SOLUTION INTRAMUSCULAR; INTRAVENOUS ONCE
Status: COMPLETED | OUTPATIENT
Start: 2025-01-14 | End: 2025-01-14

## 2025-01-14 RX ORDER — CHLOROTHIAZIDE SODIUM 500 MG/1
500 INJECTION INTRAVENOUS ONCE
Status: COMPLETED | OUTPATIENT
Start: 2025-01-14 | End: 2025-01-14

## 2025-01-14 RX ORDER — IPRATROPIUM BROMIDE AND ALBUTEROL SULFATE 2.5; .5 MG/3ML; MG/3ML
3 SOLUTION RESPIRATORY (INHALATION)
Status: DISCONTINUED | OUTPATIENT
Start: 2025-01-14 | End: 2025-01-14

## 2025-01-14 RX ORDER — IPRATROPIUM BROMIDE AND ALBUTEROL SULFATE 2.5; .5 MG/3ML; MG/3ML
3 SOLUTION RESPIRATORY (INHALATION)
Status: DISCONTINUED | OUTPATIENT
Start: 2025-01-15 | End: 2025-01-26 | Stop reason: HOSPADM

## 2025-01-14 RX ORDER — POTASSIUM CHLORIDE 1.5 G/1.58G
40 POWDER, FOR SOLUTION ORAL ONCE
Status: COMPLETED | OUTPATIENT
Start: 2025-01-14 | End: 2025-01-14

## 2025-01-14 RX ORDER — IPRATROPIUM BROMIDE AND ALBUTEROL SULFATE 2.5; .5 MG/3ML; MG/3ML
3 SOLUTION RESPIRATORY (INHALATION) EVERY 4 HOURS PRN
Status: DISCONTINUED | OUTPATIENT
Start: 2025-01-14 | End: 2025-01-26 | Stop reason: HOSPADM

## 2025-01-14 RX ORDER — POTASSIUM CHLORIDE 14.9 MG/ML
20 INJECTION INTRAVENOUS
Status: COMPLETED | OUTPATIENT
Start: 2025-01-14 | End: 2025-01-14

## 2025-01-14 RX ORDER — IPRATROPIUM BROMIDE AND ALBUTEROL SULFATE 2.5; .5 MG/3ML; MG/3ML
3 SOLUTION RESPIRATORY (INHALATION)
Status: DISCONTINUED | OUTPATIENT
Start: 2025-01-15 | End: 2025-01-14

## 2025-01-14 RX ORDER — PANTOPRAZOLE SODIUM 40 MG/1
40 TABLET, DELAYED RELEASE ORAL
Status: DISCONTINUED | OUTPATIENT
Start: 2025-01-15 | End: 2025-01-15

## 2025-01-14 RX ADMIN — METOPROLOL TARTRATE 50 MG: 50 TABLET, FILM COATED ORAL at 08:34

## 2025-01-14 RX ADMIN — ACETAMINOPHEN 650 MG: 325 TABLET ORAL at 08:34

## 2025-01-14 RX ADMIN — ACETAMINOPHEN 650 MG: 325 TABLET ORAL at 16:27

## 2025-01-14 RX ADMIN — IPRATROPIUM BROMIDE AND ALBUTEROL SULFATE 3 ML: .5; 3 SOLUTION RESPIRATORY (INHALATION) at 20:09

## 2025-01-14 RX ADMIN — Medication 15 L/MIN: at 10:00

## 2025-01-14 RX ADMIN — EZETIMIBE 10 MG: 10 TABLET ORAL at 08:34

## 2025-01-14 RX ADMIN — ATORVASTATIN CALCIUM 40 MG: 20 TABLET, FILM COATED ORAL at 20:13

## 2025-01-14 RX ADMIN — ASPIRIN 81 MG: 81 TABLET, CHEWABLE ORAL at 20:13

## 2025-01-14 RX ADMIN — BUMETANIDE 2 MG: 0.25 INJECTION INTRAMUSCULAR; INTRAVENOUS at 09:54

## 2025-01-14 RX ADMIN — CEFAZOLIN SODIUM 2 G: 2 INJECTION, SOLUTION INTRAVENOUS at 20:38

## 2025-01-14 RX ADMIN — Medication 8 L/MIN: at 13:30

## 2025-01-14 RX ADMIN — POTASSIUM CHLORIDE 40 MEQ: 1.5 POWDER, FOR SOLUTION ORAL at 09:03

## 2025-01-14 RX ADMIN — Medication 5 MG: at 20:12

## 2025-01-14 RX ADMIN — FLUTICASONE PROPIONATE 2 SPRAY: 50 SPRAY, METERED NASAL at 08:50

## 2025-01-14 RX ADMIN — AMIODARONE HYDROCHLORIDE 100 MG: 200 TABLET ORAL at 08:34

## 2025-01-14 RX ADMIN — FLUTICASONE FUROATE AND VILANTEROL TRIFENATATE 1 PUFF: 200; 25 POWDER RESPIRATORY (INHALATION) at 08:50

## 2025-01-14 RX ADMIN — CEFAZOLIN SODIUM 2 G: 2 INJECTION, SOLUTION INTRAVENOUS at 13:08

## 2025-01-14 RX ADMIN — HEPARIN SODIUM 1200 UNITS/HR: 10000 INJECTION, SOLUTION INTRAVENOUS at 03:03

## 2025-01-14 RX ADMIN — LIDOCAINE 4% 1 PATCH: 40 PATCH TOPICAL at 16:27

## 2025-01-14 RX ADMIN — Medication 6 L/MIN: at 09:00

## 2025-01-14 RX ADMIN — TIOTROPIUM BROMIDE INHALATION SPRAY 2 PUFF: 3.12 SPRAY, METERED RESPIRATORY (INHALATION) at 08:50

## 2025-01-14 RX ADMIN — METHOCARBAMOL TABLETS 500 MG: 500 TABLET, COATED ORAL at 16:27

## 2025-01-14 RX ADMIN — MONTELUKAST SODIUM 10 MG: 10 TABLET, FILM COATED ORAL at 20:13

## 2025-01-14 RX ADMIN — CHLOROTHIAZIDE SODIUM 500 MG: 500 INJECTION, POWDER, LYOPHILIZED, FOR SOLUTION INTRAVENOUS at 09:54

## 2025-01-14 RX ADMIN — METHOCARBAMOL TABLETS 500 MG: 500 TABLET, COATED ORAL at 09:03

## 2025-01-14 RX ADMIN — POTASSIUM CHLORIDE 40 MEQ: 1.5 POWDER, FOR SOLUTION ORAL at 22:56

## 2025-01-14 RX ADMIN — ACETAMINOPHEN 650 MG: 325 TABLET ORAL at 20:38

## 2025-01-14 RX ADMIN — METHOCARBAMOL TABLETS 500 MG: 500 TABLET, COATED ORAL at 22:56

## 2025-01-14 RX ADMIN — POTASSIUM CHLORIDE 20 MEQ: 14.9 INJECTION, SOLUTION INTRAVENOUS at 06:58

## 2025-01-14 RX ADMIN — CEFAZOLIN SODIUM 2 G: 2 INJECTION, SOLUTION INTRAVENOUS at 05:25

## 2025-01-14 RX ADMIN — POTASSIUM CHLORIDE 20 MEQ: 14.9 INJECTION, SOLUTION INTRAVENOUS at 11:22

## 2025-01-14 ASSESSMENT — COGNITIVE AND FUNCTIONAL STATUS - GENERAL
TOILETING: A LITTLE
PERSONAL GROOMING: A LITTLE
DRESSING REGULAR LOWER BODY CLOTHING: A LOT
HELP NEEDED FOR BATHING: A LITTLE
WALKING IN HOSPITAL ROOM: A LITTLE
STANDING UP FROM CHAIR USING ARMS: A LITTLE
TURNING FROM BACK TO SIDE WHILE IN FLAT BAD: A LITTLE
DRESSING REGULAR UPPER BODY CLOTHING: A LITTLE
EATING MEALS: A LITTLE
MOBILITY SCORE: 19
DAILY ACTIVITIY SCORE: 17
CLIMB 3 TO 5 STEPS WITH RAILING: A LITTLE
MOVING TO AND FROM BED TO CHAIR: A LITTLE

## 2025-01-14 ASSESSMENT — ENCOUNTER SYMPTOMS: FEVER: 1

## 2025-01-14 ASSESSMENT — PAIN DESCRIPTION - ORIENTATION: ORIENTATION: LOWER

## 2025-01-14 ASSESSMENT — PAIN - FUNCTIONAL ASSESSMENT
PAIN_FUNCTIONAL_ASSESSMENT: 0-10

## 2025-01-14 ASSESSMENT — PAIN DESCRIPTION - DESCRIPTORS
DESCRIPTORS: ACHING;SORE
DESCRIPTORS: ACHING;SORE

## 2025-01-14 ASSESSMENT — PAIN SCALES - GENERAL
PAINLEVEL_OUTOF10: 2
PAINLEVEL_OUTOF10: 1
PAINLEVEL_OUTOF10: 3
PAINLEVEL_OUTOF10: 1
PAINLEVEL_OUTOF10: 0 - NO PAIN

## 2025-01-14 ASSESSMENT — PAIN DESCRIPTION - LOCATION: LOCATION: BACK

## 2025-01-14 NOTE — PROGRESS NOTES
[facility] Critical Care Medicine Progress Note      Date:  1/14/2025  Patient:  Guicho Jones  YOB: 1940  MRN:  [id number]   Admit Date:  1/10/2025  ========================================================================================================    Chief Complaint   Patient presents with    Trauma     HIA     Interval ICU Events:  1/10: Admit to the ICU for hypotension 2/2 to hypovolemia and concern for clinical decompensation.   1/11: VSS. Febrile, Blood cultures, +GPC. States had biopsy of scrotum day prior to hospitalization and needs to have MOHS procedure, pending. Site of biopsy without s/s of infection/ drainage. RN staff noted upper extremity weakness with abnormality in depth perception. On initial exam bilateral upper extremity weakness, no loss of sensation, right hand tingling (at baseline), and difficulty with depth perception. MRI ordered of cervical/ thoracic/ lumbar spine. Spoke with rep from [employer] to verify device compatibility and reported that old device still present in abdomen is not MRI compatible but new device in chest wall is, MRI was canceled. Noted improvement in neurological exam and movement/ strength of upper extremities but persistent limitation with depth perception.     1/12: T max 39.2, WBC 10.9. decrease in volume of stool output, endorses abdominal cramping but no tenderness. Back pain with spasms, reports no increase from baseline and no point tenderness.   Echo: EF 55%, no source of embolus, no definite vegetation, normal RV systolic function, LA mod dilated, RA mild to mod dilated, mild MR, mild TR, mild AR, small PFO, mod dilatation of ascending aorta, mod increased septal and mildly increased posterior LV wall thickness  1/13: Pt overnight wore his BiPAP and being transitioned to NC this AM. Pt otherwise Afebrile and normocardic but with low normal Bps with MAP near 65mmHg. Pt has been NPO for PAOLO. Pt with UOP of 700cc in 24 hours and is  net+1.6L in 24 hours and net +3.3L since admission with slightly improved Cr to 1.54 this AM and improved bilirubin. Blood Cultures still + from 1/12 for GPC clusters. Pt currently with no complaints other than some minor back pain.   1/14: Pt overnight . Pt overnight with increasing creatinine to 2.33 from 1.54 with UOP of 1.3L and net +186 in 24 hours and net +3.4L since admission. Pt also noted to have some hemoptysis after PAOLO yesterday and into today and worsening O2 requirements as he is now on Oximyzer at 8-10lpm.     Medical History:  Past Medical History:   Diagnosis Date    Allergic     Arthritis     Cancer (Multi)     Cataract     CHF (congestive heart failure)     COPD (chronic obstructive pulmonary disease) (Multi)     Encounter for follow-up examination after completed treatment for conditions other than malignant neoplasm 12/27/2021    Hospital discharge follow-up    Heart disease     Hypertension     Ischemic cardiomyopathy     Ischemic cardiomyopathy with implantable cardioverter-defibrillator (ICD)    Myocardial infarction (Multi)     Pain in unspecified hip     Joint pain, hip    Personal history of other diseases of the musculoskeletal system and connective tissue     History of low back pain    Personal history of other endocrine, nutritional and metabolic disease     History of hyperlipidemia    Personal history of other specified conditions 12/21/2021    History of elevated prostate specific antigen (PSA)    Presence of coronary angioplasty implant and graft     Stented coronary artery    Unspecified atrial flutter (Multi)     Paroxysmal atrial flutter     Past Surgical History:   Procedure Laterality Date    BACK SURGERY      CARDIAC CATHETERIZATION      CORONARY STENT PLACEMENT      CT ABDOMEN ANGIOGRAM W AND/OR WO IV CONTRAST  10/29/2021    CT ABDOMEN ANGIOGRAM W AND/OR WO IV CONTRAST 10/29/2021 [facility]    CT ABDOMEN ANGIOGRAM W AND/OR WO IV CONTRAST  10/03/2022    CT ABDOMEN  ANGIOGRAM W AND/OR WO IV CONTRAST 10/3/2022 [facility]    EYE SURGERY      JOINT REPLACEMENT      OTHER SURGICAL HISTORY  10/12/2021    Renal angioplasty and stenting    OTHER SURGICAL HISTORY  10/12/2021    Cataract surgery    OTHER SURGICAL HISTORY  12/21/2021    Hip replacement    OTHER SURGICAL HISTORY  01/04/2022    Complete colonoscopy    OTHER SURGICAL HISTORY  12/21/2021    Back surgery    OTHER SURGICAL HISTORY  12/21/2021    Abdominal aortic aneurysm repair    OTHER SURGICAL HISTORY  12/21/2021    Hip replacement    OTHER SURGICAL HISTORY  12/21/2021    Surgery    TONSILLECTOMY      VASECTOMY       Medications Prior to Admission   Medication Sig Dispense Refill Last Dose/Taking    albuterol (Proventil HFA) 90 mcg/actuation inhaler Inhale 1 puff every 4 hours if needed.   1/9/2025    amiodarone (Pacerone) 200 mg tablet Take 0.5 tablets (100 mg) by mouth once daily. 90 tablet 1 1/9/2025    aspirin 81 mg chewable tablet Chew 1 tablet (81 mg). TAKE 1 TABLET MONDAY THROUGH FRIDAY AT BEDTIME   1/9/2025    atorvastatin (Lipitor) 40 mg tablet Take 1 tablet (40 mg) by mouth once daily at bedtime. 90 tablet 1 1/9/2025    budesonide-glycopyr-formoterol (Breztri Aerosphere) 160-9-4.8 mcg/actuation HFA aerosol inhaler Inhale 2 puffs 2 times a day.   1/10/2025    empagliflozin (Jardiance) 10 mg Take 1 tablet (10 mg) by mouth once daily. 90 tablet 3 1/9/2025    ezetimibe (Zetia) 10 mg tablet Take 1 tablet (10 mg) by mouth once daily. 90 tablet 1 1/9/2025    fluticasone (Flonase) 50 mcg/actuation nasal spray USE 2 SPRAYS IN EACH NOSTRIL EVERY DAY 48 g 3 1/10/2025    glucosamine HCl 1,500 mg tablet Take 1 tablet by mouth once daily.   1/9/2025    hydroCHLOROthiazide (HYDRODiuril) 25 mg tablet TAKE 1 TABLET MON WED FRI ONLY   1/9/2025    ipratropium (Atrovent) 42 mcg (0.06 %) nasal spray Administer 2 sprays into each nostril 3 times a day. 15 mL 3 1/9/2025    isosorbide mononitrate ER (Imdur) 30 mg 24 hr tablet  Take 1 tablet (30 mg) by mouth once daily. 1 tablet daily 90 tablet 3 1/9/2025    lisinopril 40 mg tablet TAKE 1 TABLET EVERY DAY 90 tablet 3 1/9/2025    magnesium oxide (MagOx) 400 mg (241.3 mg magnesium) tablet Take 1 tablet (400 mg) by mouth 2 times a day.   1/9/2025    metoprolol succinate XL (Toprol-XL) 100 mg 24 hr tablet 1/2 tablet in the morning, 1 full tablet at night   1/9/2025    metoprolol tartrate (Lopressor) 25 mg tablet Take 1 tablet daily for palpitations only 90 tablet 1 1/9/2025    montelukast (Singulair) 10 mg tablet TAKE 1 TABLET EVERY EVENING 90 tablet 3 1/9/2025    omega 3-dha-epa-fish oil (Fish OiL) 1,000 mg (120 mg-180 mg) capsule Take 1 capsule (1,000 mg) by mouth 2 times a day.   1/9/2025    rivaroxaban (Xarelto) 20 mg tablet 1 tablet daily 90 tablet 3 1/9/2025    amoxicillin (Amoxil) 500 mg capsule Take 4 capsules (2,000 mg) by mouth see administration instructions. TAKE 1 HOUR BEFORE DENTAL APPOINTMENT (Patient not taking: Reported on 1/10/2025)   More than a month    nitroglycerin (Nitrostat) 0.4 mg SL tablet Place 1 tablet (0.4 mg) under the tongue every 5 minutes if needed for chest pain. 25 tablet 5 Unknown     Levofloxacin  Social History     Tobacco Use    Smoking status: Former     Current packs/day: 1.50     Average packs/day: 1.5 packs/day for 15.0 years (22.5 ttl pk-yrs)     Types: Cigarettes     Passive exposure: Never    Smokeless tobacco: Never   Vaping Use    Vaping status: Never Used   Substance Use Topics    Alcohol use: Yes     Alcohol/week: 3.0 standard drinks of alcohol     Types: 3 Standard drinks or equivalent per week     Comment: occassional    Drug use: Never     Family History   Problem Relation Name Age of Onset    Liver disease Mother Ysabel. Campbell Jones     Arthritis Mother Kelle Jones     Coronary artery disease Father Guicho Jones     Atrial fibrillation Father Guicho Jones     Heart disease Father Guicho Jones     Cancer  Sister Sally Jones        Review of Systems:  14 point review of systems was completed and negative except for those specially mention in my HPI    Physical Exam:    Heart Rate:  [60-98]   Temp:  [35.9 °C (96.6 °F)-36.8 °C (98.2 °F)]   Resp:  [9-24]   BP: ()/(48-62)   SpO2:  [89 %-96 %]     Physical Exam  Constitutional:       General: He is not in acute distress.     Appearance: He is obese. He is not ill-appearing or toxic-appearing.   HENT:      Head: Normocephalic.      Mouth/Throat:      Pharynx: Oropharynx is clear.   Eyes:      Extraocular Movements: Extraocular movements intact.      Conjunctiva/sclera: Conjunctivae normal.      Pupils: Pupils are equal, round, and reactive to light.   Cardiovascular:      Rate and Rhythm: Normal rate and regular rhythm.      Pulses: Normal pulses.      Heart sounds: Normal heart sounds.   Pulmonary:      Breath sounds: Normal breath sounds. No wheezing or rales.   Abdominal:      Palpations: Abdomen is soft. There is no mass.      Tenderness: There is no abdominal tenderness.   Genitourinary:     Comments: Biopsy of scrotum, site pink without evidence of infection or drainage  Musculoskeletal:      Right lower leg: Edema present.      Left lower leg: Edema present.      Comments: +2 pitting edema bilaterally noted   Skin:     General: Skin is warm and dry.      Coloration: Skin is not jaundiced.      Comments: flushed   Neurological:      General: No focal deficit present.      Mental Status: He is alert and oriented to person, place, and time.      Cranial Nerves: No cranial nerve deficit.      Motor: No weakness.      Coordination: Finger-Nose-Finger Test abnormal.      Comments: Abnormal depth perception         Objective:    I have reviewed all medications, laboratory results, and imaging pertinent for today's encounter    Assessment/Plan:  Pt is an 83 y/o M w/ a PMHx of ICM s/p ICD, MI, diastolic heart failure, CAD s/p PCI, COPD (centrilobular emphysema),  pulmonary hypertension, HTN, HLD, persistent Afib on ATC with xarelto, CVA, infrarenal AAA, obesity, and prostate cancer who presented s/p fall due to septic shock from MSSA bacteremia possibly due to lead infection of AICD. Pt course also complicated by ISAIAS and hyperbilirubinemia due to sepsis but much improved.     Neuro/Psych/Pain Ctrl/Sedation:  #Acute on chronic back pain  #Possible stroke  #Hx of CVA  #Fall, prior to admission  #Ataxia  #Acute debilitation 2/2 critical illness  - Concern for possible septic emboli 2/2 gram positive bacteremia due to change in depth perception, unable to obtain MRI related to ICD device incompatibility  - Given patient's back pain possible Epidural abscess but unable to get MRI as above. Will discuss with rads on either repeat CT C/T/L spine with contrast or re-cons of previous CTs with contrast and consider NM scan of spine and other areas today after discussion with ID  - Neuro checks  - No vegetation or note of lead infection on PAOLO and will hold off on neurology consult as patient appears neurologically intact today  - CAM-ICU  - Delirium precautions  - Multimodal pain management: acetaminophen, robaxin, lidocaine patch, ice  - Oxycodone as needed  - Out of bed to chair  - PT/OT as toleraated     Respiratory/ENT:  #Acute on chronic hypoxic respiratory failure  #COPD without acute exacerbation  #MSSA PNA  #Hemoptysis  - concern for possible septic emboli and complicated by Anti-coagulation  - Will get CT chest non-con today given ISAIAS and may need to stop heparin  - Home inhalers, singulair, flonase  - Duonebs   - Oxygen therapy as needed to maintain SPO2 greater than 90% and wean Oximyzer as tolerated  - Pulmonary hygiene  - BiPAP at night, may use home device     Cardiovascular:  #Septic shock - resolved  #Infrarenal AAA, increased in size from previou  #ICM s/p AICD  #HTN  #Chronic diastolic heart failure  #Atrial fibrillation  #HLD  - C/W home amiodarone, statin  -  continue holding imdur, lisinopril, jardiance, hydrochlorothiazide  - Resume metoprolol at reduced dosing, adjust as able  - No vegetation or lead infection on PAOLO  - Continue holding home xarelto, pending need for procedure  -Heparin drip for now but may hold pending CT chest given his hemoptysis  - Follow up with vascular OP regarding infrarenal AAA    Renal/Volume Status (Intra & Extravascular):  #ISAIAS in setting of hypovolemia and sepsis -slightly worse today possibly due to cardio-renal syndrome given patient has been grossly positive the last few days and with 4 chamber dilation on PAOLO  Baseline Creat ~0.9-1.1  - Trend BMP and checking in afternoon and evening  - Will give Bumex and diuril today for diuresis  - Strict I&Os  - Avoid nephrotoxic agents    GI:  #Elevated AST, hyperbilirubinemia in setting of sepsis  #Diarrhea  - Advance diet as tolerated     Infectious Disease:  #Sepsis 2/2 MSSA bacteremia  #Possible Enterocolitis  #Leukocytosis  #Rule out Lead infection  - Concern for device infection, endocarditis  but PAOLO negative  - Stool pathogen, pending  - Blood culture 1/10 & 1/12, +MSSA; repeating blood cultures today and then Q48Hrs until cleared  - Cefazolin (Day 4 of Abx)  - ID following, and after discussion will repeat CT C/A/P non-con given ISAIAS to examine for possible new sources of infection as well as consider NM scan of spine and other areas today after discussion with ID  - possible explanations include possible seeding event from scrotal biopsy but then seeding to unclear location but limited by inability to get MRI to r/o epidural abscess and brain mycotic aneurysms due to incompatible AICD with MRI     Heme/Onc:  #Thrombocytopenia in setting of sepsis  #Basal cell carcinoma, scrotum  - Heparin drip but may need to stop today  - Trend CBC  - Monitor for s/s of bleeding  - Transfuse for Hgb less than 7 or symptomatic anemia  - Follow up OP for completion of MOHS procedure    Endocrine  -  Monitor for s/s of hypo/ hyperglycemia      Ethics/Code Status:  - FULL     :  DVT Prophylaxis: heparin drip  GI Prophylaxis: no indication  Bowel Regimen: none  Diet: cardiac  CVC: none  [place]: none  [place]: none  Restraints: none  Dispo: Possible Transfer to floor after PAOLO today    Rodriguez Sapp MD

## 2025-01-14 NOTE — CARE PLAN
The patient's goals for the shift include Patient will remain safe and free from falls    The clinical goals for the shift include Pt will remain hemodynamically stable throughout the duration of the shift.

## 2025-01-14 NOTE — PROGRESS NOTES
Physical Therapy                 Therapy Communication Note    Patient Name: Guicho Jones  MRN: [id number]  Department: [facility]  Room: 05/05-A  Today's Date: 1/14/2025     Discipline: Physical Therapy    PT Missed Visit: Yes     Missed Visit Reason: Missed Visit Reason: Patient refused (Pt states I want to move but not today , nursing statin ok to see pt if he can tolerate, states pt is having increased O2 needs and having low Potassium addressed  States he may just need to rest today  Will reattempt as appropriate)    Missed Time: Attempt    Comment:

## 2025-01-14 NOTE — CARE PLAN
Problem: Fall/Injury  Goal: Not fall by end of shift  1/14/2025 1026 by Maricel Clements RN  Outcome: Not Progressing  1/14/2025 1025 by Maricel Clements RN  Outcome: Progressing  Goal: Be free from injury by end of the shift  1/14/2025 1026 by Maricel Clements RN  Outcome: Not Progressing  1/14/2025 1025 by Maricel Clements RN  Outcome: Progressing  Goal: Verbalize understanding of personal risk factors for fall in the hospital  1/14/2025 1026 by Maricel Clements RN  Outcome: Not Progressing  1/14/2025 1025 by Maricel Clements RN  Outcome: Progressing  Goal: Verbalize understanding of risk factor reduction measures to prevent injury from fall in the home  1/14/2025 1026 by Maricel Clements RN  Outcome: Not Progressing  1/14/2025 1025 by Maricel Clements RN  Outcome: Progressing  Goal: Use assistive devices by end of the shift  1/14/2025 1026 by Maricel Clements RN  Outcome: Not Progressing  1/14/2025 1025 by Maricel Clements RN  Outcome: Progressing  Goal: Pace activities to prevent fatigue by end of the shift  1/14/2025 1026 by Maricel Clements RN  Outcome: Not Progressing  1/14/2025 1025 by Maricel Clements RN  Outcome: Progressing     Problem: Pain - Adult  Goal: Verbalizes/displays adequate comfort level or baseline comfort level  1/14/2025 1026 by Maricel Clements RN  Outcome: Not Progressing  1/14/2025 1025 by Maricel Clements RN  Outcome: Progressing     Problem: Safety - Adult  Goal: Free from fall injury  1/14/2025 1026 by Maricel Clements RN  Outcome: Not Progressing  1/14/2025 1025 by Maricel Clements RN  Outcome: Progressing     Problem: Discharge Planning  Goal: Discharge to home or other facility with appropriate resources  1/14/2025 1026 by Maricel Clements RN  Outcome: Not Progressing  1/14/2025 1025 by Maricel Clements RN  Outcome: Progressing     Problem: Chronic Conditions and Co-morbidities  Goal: Patient's chronic conditions and co-morbidity symptoms are monitored and maintained or  improved  1/14/2025 1026 by Maricel Clements RN  Outcome: Not Progressing  1/14/2025 1025 by Maricel Clements RN  Outcome: Progressing     Problem: Skin  Goal: Decreased wound size/increased tissue granulation at next dressing change  1/14/2025 1026 by Maricel Clements RN  Outcome: Not Progressing  Flowsheets (Taken 1/14/2025 1026)  Decreased wound size/increased tissue granulation at next dressing change: Promote sleep for wound healing  1/14/2025 1025 by Maricel Clements RN  Outcome: Progressing  Goal: Participates in plan/prevention/treatment measures  1/14/2025 1026 by Maricel Clements RN  Outcome: Not Progressing  Flowsheets (Taken 1/14/2025 1026)  Participates in plan/prevention/treatment measures:   Discuss with provider PT/OT consult   Elevate heels   Increase activity/out of bed for meals  1/14/2025 1025 by Maricel Clements RN  Outcome: Progressing  Goal: Prevent/manage excess moisture  1/14/2025 1026 by Maricel Clements RN  Outcome: Not Progressing  Flowsheets (Taken 1/14/2025 1026)  Prevent/manage excess moisture:   Cleanse incontinence/protect with barrier cream   Monitor for/manage infection if present  1/14/2025 1025 by Maricel Clements RN  Outcome: Progressing  Goal: Prevent/minimize sheer/friction injuries  1/14/2025 1026 by Maricel Clements RN  Outcome: Not Progressing  Flowsheets (Taken 1/14/2025 1026)  Prevent/minimize sheer/friction injuries:   Complete micro-shifts as needed if patient unable. Adjust patient position to relieve pressure points, not a full turn   Use pull sheet   Increase activity/out of bed for meals   HOB 30 degrees or less   Turn/reposition every 2 hours/use positioning/transfer devices  1/14/2025 1025 by Maricel Clements RN  Outcome: Progressing  Goal: Promote/optimize nutrition  1/14/2025 1026 by Maricel Clements RN  Outcome: Not Progressing  Flowsheets (Taken 1/14/2025 1026)  Promote/optimize nutrition:   Assist with feeding   Monitor/record intake including meals   Consume  > 50% meals/supplements   Offer water/supplements/favorite foods  1/14/2025 1025 by Maricel Clements RN  Outcome: Progressing  Goal: Promote skin healing  1/14/2025 1026 by Maricel Clements RN  Outcome: Not Progressing  Flowsheets (Taken 1/14/2025 1026)  Promote skin healing:   Assess skin/pad under line(s)/device(s)   Turn/reposition every 2 hours/use positioning/transfer devices   Ensure correct size (line/device) and apply per  instructions   Rotate device position/do not position patient on device  1/14/2025 1025 by Maricel Clements RN  Outcome: Progressing

## 2025-01-15 ENCOUNTER — APPOINTMENT (OUTPATIENT)
Dept: RADIOLOGY | Facility: HOSPITAL | Age: 85
DRG: 871 | End: 2025-01-15
Payer: MEDICARE

## 2025-01-15 LAB
ALBUMIN SERPL BCP-MCNC: 2.9 G/DL (ref 3.4–5)
ALP SERPL-CCNC: 64 U/L (ref 33–136)
ALT SERPL W P-5'-P-CCNC: 4 U/L (ref 10–52)
ANION GAP BLDV CALCULATED.4IONS-SCNC: 14 MMOL/L (ref 10–25)
ANION GAP SERPL CALC-SCNC: 12 MMOL/L (ref 10–20)
AST SERPL W P-5'-P-CCNC: 33 U/L (ref 9–39)
BASE EXCESS BLDV CALC-SCNC: -3 MMOL/L (ref -2–3)
BILIRUB SERPL-MCNC: 0.7 MG/DL (ref 0–1.2)
BODY TEMPERATURE: ABNORMAL
BUN SERPL-MCNC: 64 MG/DL (ref 6–23)
CA-I BLDV-SCNC: 1.15 MMOL/L (ref 1.1–1.33)
CALCIUM SERPL-MCNC: 8.2 MG/DL (ref 8.6–10.3)
CHLORIDE BLDV-SCNC: 100 MMOL/L (ref 98–107)
CHLORIDE SERPL-SCNC: 102 MMOL/L (ref 98–107)
CO2 SERPL-SCNC: 24 MMOL/L (ref 21–32)
CREAT SERPL-MCNC: 2.6 MG/DL (ref 0.5–1.3)
EGFRCR SERPLBLD CKD-EPI 2021: 23 ML/MIN/1.73M*2
ERYTHROCYTE [DISTWIDTH] IN BLOOD BY AUTOMATED COUNT: 16.4 % (ref 11.5–14.5)
GLUCOSE BLDV-MCNC: 111 MG/DL (ref 74–99)
GLUCOSE SERPL-MCNC: 101 MG/DL (ref 74–99)
HCO3 BLDV-SCNC: 22.7 MMOL/L (ref 22–26)
HCT VFR BLD AUTO: 31.2 % (ref 41–52)
HCT VFR BLD EST: 35 % (ref 41–52)
HGB BLD-MCNC: 10.3 G/DL (ref 13.5–17.5)
HGB BLDV-MCNC: 11.6 G/DL (ref 13.5–17.5)
HOLD SPECIMEN: NORMAL
INHALED O2 CONCENTRATION: 10 %
LACTATE BLDV-SCNC: 1.4 MMOL/L (ref 0.4–2)
MAGNESIUM SERPL-MCNC: 2.32 MG/DL (ref 1.6–2.4)
MCH RBC QN AUTO: 31.1 PG (ref 26–34)
MCHC RBC AUTO-ENTMCNC: 33 G/DL (ref 32–36)
MCV RBC AUTO: 94 FL (ref 80–100)
NRBC BLD-RTO: 0 /100 WBCS (ref 0–0)
OXYHGB MFR BLDV: 52.4 % (ref 45–75)
PCO2 BLDV: 42 MM HG (ref 41–51)
PH BLDV: 7.34 PH (ref 7.33–7.43)
PHOSPHATE SERPL-MCNC: 3.6 MG/DL (ref 2.5–4.9)
PLATELET # BLD AUTO: 124 X10*3/UL (ref 150–450)
PO2 BLDV: 29 MM HG (ref 35–45)
POTASSIUM BLDV-SCNC: 4.5 MMOL/L (ref 3.5–5.3)
POTASSIUM SERPL-SCNC: 2.9 MMOL/L (ref 3.5–5.3)
PROT SERPL-MCNC: 5.8 G/DL (ref 6.4–8.2)
RBC # BLD AUTO: 3.31 X10*6/UL (ref 4.5–5.9)
SAO2 % BLDV: 54 % (ref 45–75)
SODIUM BLDV-SCNC: 132 MMOL/L (ref 136–145)
SODIUM SERPL-SCNC: 135 MMOL/L (ref 136–145)
WBC # BLD AUTO: 10.1 X10*3/UL (ref 4.4–11.3)

## 2025-01-15 PROCEDURE — 2500000004 HC RX 250 GENERAL PHARMACY W/ HCPCS (ALT 636 FOR OP/ED): Mod: JZ

## 2025-01-15 PROCEDURE — 80053 COMPREHEN METABOLIC PANEL: CPT | Performed by: NURSE PRACTITIONER

## 2025-01-15 PROCEDURE — 2500000005 HC RX 250 GENERAL PHARMACY W/O HCPCS: Performed by: HOSPITALIST

## 2025-01-15 PROCEDURE — 2500000001 HC RX 250 WO HCPCS SELF ADMINISTERED DRUGS (ALT 637 FOR MEDICARE OP)

## 2025-01-15 PROCEDURE — 78830 RP LOCLZJ TUM SPECT W/CT 1: CPT

## 2025-01-15 PROCEDURE — A9503 TC99M MEDRONATE: HCPCS | Performed by: STUDENT IN AN ORGANIZED HEALTH CARE EDUCATION/TRAINING PROGRAM

## 2025-01-15 PROCEDURE — 2500000002 HC RX 250 W HCPCS SELF ADMINISTERED DRUGS (ALT 637 FOR MEDICARE OP, ALT 636 FOR OP/ED)

## 2025-01-15 PROCEDURE — 94640 AIRWAY INHALATION TREATMENT: CPT

## 2025-01-15 PROCEDURE — 99291 CRITICAL CARE FIRST HOUR: CPT | Performed by: STUDENT IN AN ORGANIZED HEALTH CARE EDUCATION/TRAINING PROGRAM

## 2025-01-15 PROCEDURE — 2020000001 HC ICU ROOM DAILY

## 2025-01-15 PROCEDURE — 84100 ASSAY OF PHOSPHORUS: CPT | Performed by: NURSE PRACTITIONER

## 2025-01-15 PROCEDURE — 36415 COLL VENOUS BLD VENIPUNCTURE: CPT | Performed by: NURSE PRACTITIONER

## 2025-01-15 PROCEDURE — 78315 BONE IMAGING 3 PHASE: CPT

## 2025-01-15 PROCEDURE — 78830 RP LOCLZJ TUM SPECT W/CT 1: CPT | Performed by: RADIOLOGY

## 2025-01-15 PROCEDURE — 36415 COLL VENOUS BLD VENIPUNCTURE: CPT | Performed by: STUDENT IN AN ORGANIZED HEALTH CARE EDUCATION/TRAINING PROGRAM

## 2025-01-15 PROCEDURE — 87040 BLOOD CULTURE FOR BACTERIA: CPT | Mod: ELYLAB | Performed by: STUDENT IN AN ORGANIZED HEALTH CARE EDUCATION/TRAINING PROGRAM

## 2025-01-15 PROCEDURE — 2500000001 HC RX 250 WO HCPCS SELF ADMINISTERED DRUGS (ALT 637 FOR MEDICARE OP): Performed by: HOSPITALIST

## 2025-01-15 PROCEDURE — 99222 1ST HOSP IP/OBS MODERATE 55: CPT | Performed by: NURSE PRACTITIONER

## 2025-01-15 PROCEDURE — 2500000005 HC RX 250 GENERAL PHARMACY W/O HCPCS: Performed by: NURSE PRACTITIONER

## 2025-01-15 PROCEDURE — 85027 COMPLETE CBC AUTOMATED: CPT | Performed by: NURSE PRACTITIONER

## 2025-01-15 PROCEDURE — 83735 ASSAY OF MAGNESIUM: CPT | Performed by: NURSE PRACTITIONER

## 2025-01-15 PROCEDURE — 2500000004 HC RX 250 GENERAL PHARMACY W/ HCPCS (ALT 636 FOR OP/ED): Mod: JZ | Performed by: STUDENT IN AN ORGANIZED HEALTH CARE EDUCATION/TRAINING PROGRAM

## 2025-01-15 PROCEDURE — 2500000001 HC RX 250 WO HCPCS SELF ADMINISTERED DRUGS (ALT 637 FOR MEDICARE OP): Performed by: NURSE PRACTITIONER

## 2025-01-15 PROCEDURE — 2500000002 HC RX 250 W HCPCS SELF ADMINISTERED DRUGS (ALT 637 FOR MEDICARE OP, ALT 636 FOR OP/ED): Performed by: HOSPITALIST

## 2025-01-15 PROCEDURE — 2500000004 HC RX 250 GENERAL PHARMACY W/ HCPCS (ALT 636 FOR OP/ED): Performed by: HOSPITALIST

## 2025-01-15 PROCEDURE — 99233 SBSQ HOSP IP/OBS HIGH 50: CPT | Performed by: INTERNAL MEDICINE

## 2025-01-15 PROCEDURE — 3430000001 HC RX 343 DIAGNOSTIC RADIOPHARMACEUTICALS: Performed by: STUDENT IN AN ORGANIZED HEALTH CARE EDUCATION/TRAINING PROGRAM

## 2025-01-15 RX ORDER — FAMOTIDINE 20 MG/1
20 TABLET, FILM COATED ORAL 2 TIMES DAILY PRN
Status: DISCONTINUED | OUTPATIENT
Start: 2025-01-15 | End: 2025-01-26 | Stop reason: HOSPADM

## 2025-01-15 RX ORDER — POTASSIUM CHLORIDE 1.5 G/1.58G
40 POWDER, FOR SOLUTION ORAL ONCE
Status: COMPLETED | OUTPATIENT
Start: 2025-01-15 | End: 2025-01-15

## 2025-01-15 RX ORDER — POTASSIUM CHLORIDE 14.9 MG/ML
20 INJECTION INTRAVENOUS
Status: COMPLETED | OUTPATIENT
Start: 2025-01-15 | End: 2025-01-15

## 2025-01-15 RX ADMIN — AMIODARONE HYDROCHLORIDE 100 MG: 200 TABLET ORAL at 09:30

## 2025-01-15 RX ADMIN — Medication 10 L/MIN: at 19:59

## 2025-01-15 RX ADMIN — METOPROLOL TARTRATE 50 MG: 50 TABLET, FILM COATED ORAL at 20:33

## 2025-01-15 RX ADMIN — Medication 10 L/MIN: at 13:15

## 2025-01-15 RX ADMIN — ACETAMINOPHEN 650 MG: 325 TABLET ORAL at 09:30

## 2025-01-15 RX ADMIN — POTASSIUM CHLORIDE 20 MEQ: 14.9 INJECTION, SOLUTION INTRAVENOUS at 09:31

## 2025-01-15 RX ADMIN — ACETAMINOPHEN 650 MG: 325 TABLET ORAL at 20:33

## 2025-01-15 RX ADMIN — IPRATROPIUM BROMIDE AND ALBUTEROL SULFATE 3 ML: 2.5; .5 SOLUTION RESPIRATORY (INHALATION) at 13:15

## 2025-01-15 RX ADMIN — MONTELUKAST SODIUM 10 MG: 10 TABLET, FILM COATED ORAL at 20:32

## 2025-01-15 RX ADMIN — METHOCARBAMOL TABLETS 500 MG: 500 TABLET, COATED ORAL at 13:32

## 2025-01-15 RX ADMIN — POTASSIUM CHLORIDE 20 MEQ: 14.9 INJECTION, SOLUTION INTRAVENOUS at 06:58

## 2025-01-15 RX ADMIN — METHYLPREDNISOLONE SODIUM SUCCINATE 40 MG: 40 INJECTION, POWDER, FOR SOLUTION INTRAMUSCULAR; INTRAVENOUS at 10:45

## 2025-01-15 RX ADMIN — IPRATROPIUM BROMIDE AND ALBUTEROL SULFATE 3 ML: 2.5; .5 SOLUTION RESPIRATORY (INHALATION) at 19:59

## 2025-01-15 RX ADMIN — Medication 5 MG: at 20:32

## 2025-01-15 RX ADMIN — ACETAMINOPHEN 650 MG: 325 TABLET ORAL at 15:08

## 2025-01-15 RX ADMIN — EZETIMIBE 10 MG: 10 TABLET ORAL at 09:30

## 2025-01-15 RX ADMIN — PANTOPRAZOLE SODIUM 40 MG: 40 INJECTION, POWDER, FOR SOLUTION INTRAVENOUS at 06:05

## 2025-01-15 RX ADMIN — TECHNETIUM TC 99M MEDRONATE 27.1 MILLICURIE: 25 INJECTION, POWDER, FOR SOLUTION INTRAVENOUS at 08:15

## 2025-01-15 RX ADMIN — CEFAZOLIN SODIUM 2 G: 2 INJECTION, SOLUTION INTRAVENOUS at 20:32

## 2025-01-15 RX ADMIN — METHOCARBAMOL TABLETS 500 MG: 500 TABLET, COATED ORAL at 21:07

## 2025-01-15 RX ADMIN — METOPROLOL TARTRATE 50 MG: 50 TABLET, FILM COATED ORAL at 09:30

## 2025-01-15 RX ADMIN — POTASSIUM CHLORIDE 40 MEQ: 1.5 POWDER, FOR SOLUTION ORAL at 06:58

## 2025-01-15 RX ADMIN — CEFAZOLIN SODIUM 2 G: 2 INJECTION, SOLUTION INTRAVENOUS at 13:32

## 2025-01-15 RX ADMIN — ASPIRIN 81 MG: 81 TABLET, CHEWABLE ORAL at 20:33

## 2025-01-15 RX ADMIN — CEFAZOLIN SODIUM 2 G: 2 INJECTION, SOLUTION INTRAVENOUS at 06:05

## 2025-01-15 RX ADMIN — METHOCARBAMOL TABLETS 500 MG: 500 TABLET, COATED ORAL at 06:06

## 2025-01-15 RX ADMIN — FLUTICASONE PROPIONATE 2 SPRAY: 50 SPRAY, METERED NASAL at 10:45

## 2025-01-15 RX ADMIN — LIDOCAINE 4% 1 PATCH: 40 PATCH TOPICAL at 15:08

## 2025-01-15 RX ADMIN — ATORVASTATIN CALCIUM 40 MG: 20 TABLET, FILM COATED ORAL at 20:32

## 2025-01-15 ASSESSMENT — ENCOUNTER SYMPTOMS
SHORTNESS OF BREATH: 1
VOMITING: 1
FATIGUE: 1
NAUSEA: 1
DIARRHEA: 1

## 2025-01-15 ASSESSMENT — PAIN - FUNCTIONAL ASSESSMENT
PAIN_FUNCTIONAL_ASSESSMENT: 0-10

## 2025-01-15 ASSESSMENT — PAIN SCALES - GENERAL
PAINLEVEL_OUTOF10: 0 - NO PAIN

## 2025-01-15 NOTE — PROGRESS NOTES
[facility] Critical Care Medicine Progress Note      Date:  1/15/2025  Patient:  Guicho Jones  YOB: 1940  MRN:  [id number]   Admit Date:  1/10/2025  ========================================================================================================    Chief Complaint   Patient presents with    Trauma     HIA     Interval ICU Events:  1/10: Admit to the ICU for hypotension 2/2 to hypovolemia and concern for clinical decompensation.   1/11: VSS. Febrile, Blood cultures, +GPC. States had biopsy of scrotum day prior to hospitalization and needs to have MOHS procedure, pending. Site of biopsy without s/s of infection/ drainage. RN staff noted upper extremity weakness with abnormality in depth perception. On initial exam bilateral upper extremity weakness, no loss of sensation, right hand tingling (at baseline), and difficulty with depth perception. MRI ordered of cervical/ thoracic/ lumbar spine. Spoke with rep from [employer] to verify device compatibility and reported that old device still present in abdomen is not MRI compatible but new device in chest wall is, MRI was canceled. Noted improvement in neurological exam and movement/ strength of upper extremities but persistent limitation with depth perception.     1/12: T max 39.2, WBC 10.9. decrease in volume of stool output, endorses abdominal cramping but no tenderness. Back pain with spasms, reports no increase from baseline and no point tenderness.   Echo: EF 55%, no source of embolus, no definite vegetation, normal RV systolic function, LA mod dilated, RA mild to mod dilated, mild MR, mild TR, mild AR, small PFO, mod dilatation of ascending aorta, mod increased septal and mildly increased posterior LV wall thickness  1/13: Pt overnight wore his BiPAP and being transitioned to NC this AM. Pt otherwise Afebrile and normocardic but with low normal Bps with MAP near 65mmHg. Pt has been NPO for PAOLO. Pt with UOP of 700cc in 24 hours and is  net+1.6L in 24 hours and net +3.3L since admission with slightly improved Cr to 1.54 this AM and improved bilirubin. Blood Cultures still + from 1/12 for GPC clusters. Pt currently with no complaints other than some minor back pain.   1/14: Pt overnight . Pt overnight with increasing creatinine to 2.33 from 1.54 with UOP of 1.3L and net +186 in 24 hours and net +3.4L since admission. Pt also noted to have some hemoptysis after PAOLO yesterday and into today and worsening O2 requirements as he is now on Oximyzer at 8-10lpm.   1/15: pt overnight tolerated his CPAP and was transitioned to oximyzer at 10lpm. Pt with CT not showing and gross evidence of intraabdominal infection but multiple consolidations concerning for possible multifocal PNA with left upper lobe with a small possible air fluid level concerning for a possible abscess.  Pt with a slightly increased Cr of 2.60 and K of 2.9 and UOP of 4L in 24 hours and net negative 2.6L with a nearly net even admission fluid balance. Pt otherwise has been afebrile and hemodynamically stable but still with some diarrhea that is honey colored and loose in consistency. Pt planned for first part of tagged WBC scan today.     Medical History:  Past Medical History:   Diagnosis Date    Allergic     Arthritis     Cancer (Multi)     Cataract     CHF (congestive heart failure)     COPD (chronic obstructive pulmonary disease) (Multi)     Encounter for follow-up examination after completed treatment for conditions other than malignant neoplasm 12/27/2021    Hospital discharge follow-up    Heart disease     Hypertension     Ischemic cardiomyopathy     Ischemic cardiomyopathy with implantable cardioverter-defibrillator (ICD)    Myocardial infarction (Multi)     Pain in unspecified hip     Joint pain, hip    Personal history of other diseases of the musculoskeletal system and connective tissue     History of low back pain    Personal history of other endocrine, nutritional and metabolic  disease     History of hyperlipidemia    Personal history of other specified conditions 12/21/2021    History of elevated prostate specific antigen (PSA)    Presence of coronary angioplasty implant and graft     Stented coronary artery    Unspecified atrial flutter (Multi)     Paroxysmal atrial flutter     Past Surgical History:   Procedure Laterality Date    BACK SURGERY      CARDIAC CATHETERIZATION      CORONARY STENT PLACEMENT      CT ABDOMEN ANGIOGRAM W AND/OR WO IV CONTRAST  10/29/2021    CT ABDOMEN ANGIOGRAM W AND/OR WO IV CONTRAST 10/29/2021 [facility]    CT ABDOMEN ANGIOGRAM W AND/OR WO IV CONTRAST  10/03/2022    CT ABDOMEN ANGIOGRAM W AND/OR WO IV CONTRAST 10/3/2022 [facility]    EYE SURGERY      JOINT REPLACEMENT      OTHER SURGICAL HISTORY  10/12/2021    Renal angioplasty and stenting    OTHER SURGICAL HISTORY  10/12/2021    Cataract surgery    OTHER SURGICAL HISTORY  12/21/2021    Hip replacement    OTHER SURGICAL HISTORY  01/04/2022    Complete colonoscopy    OTHER SURGICAL HISTORY  12/21/2021    Back surgery    OTHER SURGICAL HISTORY  12/21/2021    Abdominal aortic aneurysm repair    OTHER SURGICAL HISTORY  12/21/2021    Hip replacement    OTHER SURGICAL HISTORY  12/21/2021    Surgery    TONSILLECTOMY      VASECTOMY       Medications Prior to Admission   Medication Sig Dispense Refill Last Dose/Taking    albuterol (Proventil HFA) 90 mcg/actuation inhaler Inhale 1 puff every 4 hours if needed.   1/9/2025    amiodarone (Pacerone) 200 mg tablet Take 0.5 tablets (100 mg) by mouth once daily. 90 tablet 1 1/9/2025    aspirin 81 mg chewable tablet Chew 1 tablet (81 mg). TAKE 1 TABLET MONDAY THROUGH FRIDAY AT BEDTIME   1/9/2025    atorvastatin (Lipitor) 40 mg tablet Take 1 tablet (40 mg) by mouth once daily at bedtime. 90 tablet 1 1/9/2025    budesonide-glycopyr-formoterol (Breztri Aerosphere) 160-9-4.8 mcg/actuation HFA aerosol inhaler Inhale 2 puffs 2 times a day.   1/10/2025     empagliflozin (Jardiance) 10 mg Take 1 tablet (10 mg) by mouth once daily. 90 tablet 3 1/9/2025    ezetimibe (Zetia) 10 mg tablet Take 1 tablet (10 mg) by mouth once daily. 90 tablet 1 1/9/2025    fluticasone (Flonase) 50 mcg/actuation nasal spray USE 2 SPRAYS IN EACH NOSTRIL EVERY DAY 48 g 3 1/10/2025    glucosamine HCl 1,500 mg tablet Take 1 tablet by mouth once daily.   1/9/2025    hydroCHLOROthiazide (HYDRODiuril) 25 mg tablet TAKE 1 TABLET MON WED FRI ONLY   1/9/2025    ipratropium (Atrovent) 42 mcg (0.06 %) nasal spray Administer 2 sprays into each nostril 3 times a day. 15 mL 3 1/9/2025    isosorbide mononitrate ER (Imdur) 30 mg 24 hr tablet Take 1 tablet (30 mg) by mouth once daily. 1 tablet daily 90 tablet 3 1/9/2025    lisinopril 40 mg tablet TAKE 1 TABLET EVERY DAY 90 tablet 3 1/9/2025    magnesium oxide (MagOx) 400 mg (241.3 mg magnesium) tablet Take 1 tablet (400 mg) by mouth 2 times a day.   1/9/2025    metoprolol succinate XL (Toprol-XL) 100 mg 24 hr tablet 1/2 tablet in the morning, 1 full tablet at night   1/9/2025    metoprolol tartrate (Lopressor) 25 mg tablet Take 1 tablet daily for palpitations only 90 tablet 1 1/9/2025    montelukast (Singulair) 10 mg tablet TAKE 1 TABLET EVERY EVENING 90 tablet 3 1/9/2025    omega 3-dha-epa-fish oil (Fish OiL) 1,000 mg (120 mg-180 mg) capsule Take 1 capsule (1,000 mg) by mouth 2 times a day.   1/9/2025    rivaroxaban (Xarelto) 20 mg tablet 1 tablet daily 90 tablet 3 1/9/2025    amoxicillin (Amoxil) 500 mg capsule Take 4 capsules (2,000 mg) by mouth see administration instructions. TAKE 1 HOUR BEFORE DENTAL APPOINTMENT (Patient not taking: Reported on 1/10/2025)   More than a month    nitroglycerin (Nitrostat) 0.4 mg SL tablet Place 1 tablet (0.4 mg) under the tongue every 5 minutes if needed for chest pain. 25 tablet 5 Unknown     Levofloxacin  Social History     Tobacco Use    Smoking status: Former     Current packs/day: 1.50     Average packs/day: 1.5  packs/day for 15.0 years (22.5 ttl pk-yrs)     Types: Cigarettes     Passive exposure: Never    Smokeless tobacco: Never   Vaping Use    Vaping status: Never Used   Substance Use Topics    Alcohol use: Yes     Alcohol/week: 3.0 standard drinks of alcohol     Types: 3 Standard drinks or equivalent per week     Comment: occassional    Drug use: Never     Family History   Problem Relation Name Age of Onset    Liver disease Mother Ysabel. Campbell Jones     Arthritis Mother Kelle Jones     Coronary artery disease Father Guicho Jones     Atrial fibrillation Father Guicho Jones     Heart disease Father Guicho Jones     Cancer Sister Sally Jones        Review of Systems:  14 point review of systems was completed and negative except for those specially mention in my HPI    Physical Exam:    Heart Rate:  [60-62]   Temp:  [36.2 °C (97.2 °F)-36.4 °C (97.5 °F)]   Resp:  [16-26]   BP: ()/(51-79)   Weight:  [103 kg (227 lb 1.2 oz)]   SpO2:  [88 %-97 %]     Physical Exam  Constitutional:       General: He is not in acute distress.     Appearance: He is obese. He is not ill-appearing or toxic-appearing.      Comments: On oximyzer   HENT:      Head: Normocephalic.      Mouth/Throat:      Pharynx: Oropharynx is clear.   Eyes:      Extraocular Movements: Extraocular movements intact.      Conjunctiva/sclera: Conjunctivae normal.      Pupils: Pupils are equal, round, and reactive to light.   Cardiovascular:      Rate and Rhythm: Normal rate and regular rhythm.      Pulses: Normal pulses.      Heart sounds: Normal heart sounds.   Pulmonary:      Breath sounds: Normal breath sounds. No wheezing or rales.      Comments: Some decreased air entry and movement   No gross wheezes or rales noted on exam  Abdominal:      Palpations: Abdomen is soft. There is no mass.      Tenderness: There is no abdominal tenderness.   Genitourinary:     Comments: Biopsy of scrotum, site pink without evidence of  infection or drainage  Musculoskeletal:      Right lower leg: Edema present.      Left lower leg: Edema present.      Comments: +2 pitting edema bilaterally noted   Skin:     General: Skin is warm and dry.      Coloration: Skin is not jaundiced.      Comments: flushed   Neurological:      General: No focal deficit present.      Mental Status: He is alert and oriented to person, place, and time.      Cranial Nerves: No cranial nerve deficit.      Motor: No weakness.      Coordination: Finger-Nose-Finger Test abnormal.      Comments: Abnormal depth perception         Objective:  I have reviewed all medications, laboratory results, and imaging pertinent for today's encounter    Assessment/Plan:  Pt is an 83 y/o M w/ a PMHx of ICM s/p ICD, MI, diastolic heart failure, CAD s/p PCI, COPD (centrilobular emphysema), pulmonary hypertension, HTN, HLD, persistent Afib on ATC with xarelto, CVA, infrarenal AAA, obesity, and prostate cancer who presented s/p fall due to septic shock from MSSA bacteremia of unclear source. Pt course also complicated by ISAIAS which seems to have stabilized and hyperbilirubinemia due to sepsis but much improved with new hypoxemic resp failure requiring HFNC possibly due to new pneumonia vs ARDS from ongoing infection vs COPD exacerbation. Pt currently appears well and is stable.     Neuro/Psych/Pain Ctrl/Sedation:  #Acute on chronic back pain  #Possible stroke  #Hx of CVA  #Fall, prior to admission  #Ataxia  #Acute debilitation 2/2 critical illness  - Concern for possible septic emboli 2/2 gram positive bacteremia due to change in depth perception, unable to obtain MRI related to ICD device incompatibility  - Given patient's back pain possible Epidural abscess but unable to get MRI as above. No notable jeet abnormalities on CT scans and in process of obtaining NM scan of spine and other areas today after discussion with ID  - Neuro checks  - No vegetation or note of lead infection on PAOLO and will  hold off on neurology consult as patient appears neurologically intact today  - CAM-ICU  - Delirium precautions  - Multimodal pain management: acetaminophen, robaxin, lidocaine patch, ice  - Oxycodone as needed  - Out of bed to chair  - PT/OT as toleraated     Respiratory/ENT:  #Acute on chronic hypoxic respiratory failure  #COPD without acute exacerbation  #MSSA PNA  #Hemoptysis  - Concern for possible septic emboli and complicated by Anti-coagulation  - Possible new PNA vs hypervolemia vs ARDS  - Home inhalers, singulair, flonase  - C/W Duonebs and add IV methylpred today for short course for possible COPD exacerbation  - Oxygen therapy as needed to maintain SPO2 greater than 90% and wean Oximyzer as tolerated  - Pulmonary hygiene  - BiPAP at night, may use home device     Cardiovascular:  #Septic shock - resolved  #Infrarenal AAA, increased in size from previou  #ICM s/p AICD  #HTN  #Chronic diastolic heart failure  #Atrial fibrillation  #HLD  - C/W home amiodarone, statin  - Continue holding imdur, lisinopril, jardiance, hydrochlorothiazide  - C/w metoprolol at reduced dosing, adjust as needed  - No vegetation or lead infection on PAOLO  - Continue holding home xarelto given renal fxn and hemoptysis  -Hold heparin gtt for today and consider re-challenge tomorrow  - Follow up with vascular OP regarding infrarenal AAA    Renal/Volume Status (Intra & Extravascular):  #ISAIAS in setting of hypovolemia and sepsis -slightly worse today possibly due to cardio-renal syndrome given patient has been grossly positive the last few days and with 4 chamber dilation on PAOLO  Baseline Creat ~0.9-1.1  - Will hold diuresis today and will re-evlauate for further diuresis tomorrow  - Strict I&Os  - Avoid nephrotoxic agents    GI:  #Elevated AST, hyperbilirubinemia in setting of sepsis  #Diarrhea  - Advance diet as tolerated  - Discontinue PPI as no indication and can add PRN pepcid for GERD     Infectious Disease:  #Sepsis 2/2 MSSA  bacteremia  #Possible Enterocolitis although diarrhea at this point likely antibiotic associated at this time  #Leukocytosis  #Rule out Lead infection  - Concern for device infection, endocarditis  but PAOLO negative  - Stool pathogen, negative  - Blood culture 1/10 & 1/12, +MSSA; Pending repeat culture results from 1/14  - Check blood cultures Q48Hrs until cleared x 2 days  - Cefazolin (Day 5 of Abx)  - ID following, in process of obtaining NM scan of spine and other areas today after discussion with ID  - possible explanations include possible seeding event from scrotal biopsy but then seeding to unclear location but limited by inability to get MRI to r/o epidural abscess and brain mycotic aneurysms due to incompatible AICD with MRI  - Given questionable abscess of left upper lobe will consult thoracic surgery for input     Heme/Onc:  #Thrombocytopenia in setting of sepsis  #Basal cell carcinoma, scrotum  - SCDs given hemoptysis  - Trend CBC  - Monitor for s/s of bleeding  - Transfuse for Hgb less than 7 or symptomatic anemia  - Follow up OP for completion of MOHS procedure    Endocrine  - Monitor for s/s of hypo/ hyperglycemia      Ethics/Code Status:  - FULL     :  DVT Prophylaxis: SCDs  GI Prophylaxis: no indication  Bowel Regimen: none  Diet: cardiac  CVC: none  [place]: none  Reyes: none  Restraints: none  Dispo: ICU care today until O2 requirements improve    Critical Care Time:  35 minutes  Critical Care Activities: Management and titration of high flow oxygen systems, coordination of consultation services involved within the patient's care, and time spent with patient explaining current diagnosis and treatment plan and answering any questions.    Rodriguez Sapp MD

## 2025-01-15 NOTE — CONSULTS
[facility] Nephrology  Consult Note           Reason for Consult: Acute kidney injury possible chronic kidney disease  Requesting Physician:  Dr. Rodriguez Sapp MD      Chief Complaint: Mechanical fall  History Obtained From:  patient, electronic medical record    History of Present Ilness:    84 y.o. year old male who presented to the hospital for further evaluation and management of s/p mechanical fall with difficult to ambulate came to the emergency department where clinical laboratory assessment did show that the patient blood pressure is 60/60 and patient did have a fever of 38 he was resuscitated with fluids and admitted for further evaluation and because of persistent hypotension the patient was transferred to critical care bed    Hospital course was significant for CT abdomen pelvis with IV contrast done at 1/10/2025 that did not show active disease with no bowel obstruction a complex cyst on the right renal cyst at the day creatinine was 1.6 and GFR of 4, 3 days later creatinine remained at 1.5 increased to 2.3 yesterday and today 2.5  Yesterday patient was significantly hemodynamically unstable with a stretch of hypotension blood pressure in the 80s and 90s systolic over 40s diastolic with net negative fluid balance last 24 hours 2.6 L     past Medical History:    Past Medical History:   Diagnosis Date    Allergic     Arthritis     Cancer (Multi)     Cataract     CHF (congestive heart failure)     COPD (chronic obstructive pulmonary disease) (Multi)     Encounter for follow-up examination after completed treatment for conditions other than malignant neoplasm 12/27/2021    Hospital discharge follow-up    Heart disease     Hypertension     Ischemic cardiomyopathy     Ischemic cardiomyopathy with implantable cardioverter-defibrillator (ICD)    Myocardial infarction (Multi)     Pain in unspecified hip     Joint pain, hip    Personal history of other diseases of the musculoskeletal system and connective  tissue     History of low back pain    Personal history of other endocrine, nutritional and metabolic disease     History of hyperlipidemia    Personal history of other specified conditions 12/21/2021    History of elevated prostate specific antigen (PSA)    Presence of coronary angioplasty implant and graft     Stented coronary artery    Unspecified atrial flutter (Multi)     Paroxysmal atrial flutter        Past Surgical History:    Past Surgical History:   Procedure Laterality Date    BACK SURGERY      CARDIAC CATHETERIZATION      CORONARY STENT PLACEMENT      CT ABDOMEN ANGIOGRAM W AND/OR WO IV CONTRAST  10/29/2021    CT ABDOMEN ANGIOGRAM W AND/OR WO IV CONTRAST 10/29/2021 [facility]    CT ABDOMEN ANGIOGRAM W AND/OR WO IV CONTRAST  10/03/2022    CT ABDOMEN ANGIOGRAM W AND/OR WO IV CONTRAST 10/3/2022 [facility]    EYE SURGERY      JOINT REPLACEMENT      OTHER SURGICAL HISTORY  10/12/2021    Renal angioplasty and stenting    OTHER SURGICAL HISTORY  10/12/2021    Cataract surgery    OTHER SURGICAL HISTORY  12/21/2021    Hip replacement    OTHER SURGICAL HISTORY  01/04/2022    Complete colonoscopy    OTHER SURGICAL HISTORY  12/21/2021    Back surgery    OTHER SURGICAL HISTORY  12/21/2021    Abdominal aortic aneurysm repair    OTHER SURGICAL HISTORY  12/21/2021    Hip replacement    OTHER SURGICAL HISTORY  12/21/2021    Surgery    TONSILLECTOMY      VASECTOMY          Home Medications:    No current facility-administered medications on file prior to encounter.     Current Outpatient Medications on File Prior to Encounter   Medication Sig Dispense Refill    albuterol (Proventil HFA) 90 mcg/actuation inhaler Inhale 1 puff every 4 hours if needed.      amiodarone (Pacerone) 200 mg tablet Take 0.5 tablets (100 mg) by mouth once daily. 90 tablet 1    aspirin 81 mg chewable tablet Chew 1 tablet (81 mg). TAKE 1 TABLET MONDAY THROUGH FRIDAY AT BEDTIME      atorvastatin (Lipitor) 40 mg tablet Take 1 tablet  (40 mg) by mouth once daily at bedtime. 90 tablet 1    budesonide-glycopyr-formoterol (Breztri Aerosphere) 160-9-4.8 mcg/actuation HFA aerosol inhaler Inhale 2 puffs 2 times a day.      empagliflozin (Jardiance) 10 mg Take 1 tablet (10 mg) by mouth once daily. 90 tablet 3    ezetimibe (Zetia) 10 mg tablet Take 1 tablet (10 mg) by mouth once daily. 90 tablet 1    fluticasone (Flonase) 50 mcg/actuation nasal spray USE 2 SPRAYS IN EACH NOSTRIL EVERY DAY 48 g 3    glucosamine HCl 1,500 mg tablet Take 1 tablet by mouth once daily.      hydroCHLOROthiazide (HYDRODiuril) 25 mg tablet TAKE 1 TABLET MON WED FRI ONLY      ipratropium (Atrovent) 42 mcg (0.06 %) nasal spray Administer 2 sprays into each nostril 3 times a day. 15 mL 3    isosorbide mononitrate ER (Imdur) 30 mg 24 hr tablet Take 1 tablet (30 mg) by mouth once daily. 1 tablet daily 90 tablet 3    lisinopril 40 mg tablet TAKE 1 TABLET EVERY DAY 90 tablet 3    magnesium oxide (MagOx) 400 mg (241.3 mg magnesium) tablet Take 1 tablet (400 mg) by mouth 2 times a day.      metoprolol succinate XL (Toprol-XL) 100 mg 24 hr tablet 1/2 tablet in the morning, 1 full tablet at night      metoprolol tartrate (Lopressor) 25 mg tablet Take 1 tablet daily for palpitations only 90 tablet 1    montelukast (Singulair) 10 mg tablet TAKE 1 TABLET EVERY EVENING 90 tablet 3    omega 3-dha-epa-fish oil (Fish OiL) 1,000 mg (120 mg-180 mg) capsule Take 1 capsule (1,000 mg) by mouth 2 times a day.      rivaroxaban (Xarelto) 20 mg tablet 1 tablet daily 90 tablet 3    amoxicillin (Amoxil) 500 mg capsule Take 4 capsules (2,000 mg) by mouth see administration instructions. TAKE 1 HOUR BEFORE DENTAL APPOINTMENT (Patient not taking: Reported on 1/10/2025)      nitroglycerin (Nitrostat) 0.4 mg SL tablet Place 1 tablet (0.4 mg) under the tongue every 5 minutes if needed for chest pain. 25 tablet 5       Allergies:  Levofloxacin    Social History:    Social History     Socioeconomic History     Marital status:      Spouse name: Not on file    Number of children: Not on file    Years of education: Not on file    Highest education level: Not on file   Occupational History    Not on file   Tobacco Use    Smoking status: Former     Current packs/day: 1.50     Average packs/day: 1.5 packs/day for 15.0 years (22.5 ttl pk-yrs)     Types: Cigarettes     Passive exposure: Never    Smokeless tobacco: Never   Vaping Use    Vaping status: Never Used   Substance and Sexual Activity    Alcohol use: Yes     Alcohol/week: 3.0 standard drinks of alcohol     Types: 3 Standard drinks or equivalent per week     Comment: occassional    Drug use: Never    Sexual activity: Not Currently     Partners: Female   Other Topics Concern    Not on file   Social History Narrative    Not on file     Social Drivers of Health     Financial Resource Strain: Patient Declined (1/11/2025)    Overall Financial Resource Strain (CARDIA)     Difficulty of Paying Living Expenses: Patient declined   Food Insecurity: No Food Insecurity (1/11/2025)    Hunger Vital Sign     Worried About Running Out of Food in the Last Year: Never true     Ran Out of Food in the Last Year: Never true   Transportation Needs: No Transportation Needs (1/11/2025)    PRAPARE - Transportation     Lack of Transportation (Medical): No     Lack of Transportation (Non-Medical): No   Physical Activity: Not on file   Stress: Not on file   Social Connections: Not on file   Intimate Partner Violence: Not At Risk (1/11/2025)    Humiliation, Afraid, Rape, and Kick questionnaire     Fear of Current or Ex-Partner: No     Emotionally Abused: No     Physically Abused: No     Sexually Abused: No   Housing Stability: Low Risk  (1/11/2025)    Housing Stability Vital Sign     Unable to Pay for Housing in the Last Year: No     Number of Times Moved in the Last Year: 1     Homeless in the Last Year: No       Family History:   Family History   Problem Relation Name Age of Onset    Liver  "disease Mother Kelle Jones     Arthritis Mother Kelle Jones     Coronary artery disease Father Guicho Jones     Atrial fibrillation Father Guicho Jones     Heart disease Father Guicho Jones     Cancer Sister Sally Jones        Review of Systems:   Patient is alert oriented follow command in no apparent pain or distress denied any fever or chills no productive nonproductive cough no nausea emesis or diarrhea no dysuria no near syncope or syncope no chest pain no dyspnea orthopnea paroxysmal nocturnal dyspnea no any other organ system related symptoms      Physical exam:   Constitutional:  VITALS:  /58   Pulse 60   Temp 36 °C (96.8 °F) (Temporal)   Resp 20   Ht 1.676 m (5' 6\")   Wt 103 kg (227 lb 1.2 oz)   SpO2 96%   BMI 36.65 kg/m²      Wt Readings from Last 3 Encounters:   01/14/25 103 kg (227 lb 1.2 oz)   11/25/24 105 kg (231 lb)   11/04/24 103 kg (226 lb 12.8 oz)      Gen: alert, awake, nad  Head: atraumatic, normocephalic  Skin: no rash, turgor wnl  Heent:  eomi, mmm  Neck: no bruits or jvd noted, thyroid normal  Lungs:  clear to auscultation  Heart:  regular rate and rhythm, no murmurs  Abdomen:  +bs, soft, nt, nd, no hepatosplenomegaly   Extremities: no edema  Psychiatric: mood and affect appropriate; judgement and insight intact  Musculoskeletal:  Rom, muscular strength intact; digits, nails normal    Data/  CBC:   Lab Results   Component Value Date    WBC 10.1 01/15/2025    RBC 3.31 (L) 01/15/2025    HGB 10.3 (L) 01/15/2025    HCT 31.2 (L) 01/15/2025    MCV 94 01/15/2025    MCH 31.1 01/15/2025    MCHC 33.0 01/15/2025    RDW 16.4 (H) 01/15/2025     (L) 01/15/2025     BMP:    Lab Results   Component Value Date     (L) 01/15/2025    K 2.9 (LL) 01/15/2025     01/15/2025    CO2 24 01/15/2025    BUN 64 (H) 01/15/2025    CREATININE 2.60 (H) 01/15/2025    CALCIUM 8.2 (L) 01/15/2025    GLUCOSE 101 (H) 01/15/2025     CT chest abdomen " pelvis wo IV contrast  Narrative: Interpreted By:  Doug Arroyo,   STUDY:  CT CHEST ABDOMEN PELVIS WO CONTRAST;  1/14/2025 3:43 pm      INDICATION:  Signs/Symptoms:persistent bactermia r/o new suspicious fluid  collection (cannot get with contrast due to ISAIAS).      COMPARISON:  CT abdomen and pelvis 01/10/2025      ACCESSION NUMBER(S):  [id number]      ORDERING CLINICIAN:  EMERSON DONOHUE      TECHNIQUE:  Axial non-contrast CT images of the chest, abdomen and pelvis with  coronal and sagittal reconstructed images.      FINDINGS:  Lack of intravenous contrast limits evaluation of vessel, solid  organs and bowel.      BONES: Right hip arthroplasty. The bones are diffusely demineralized.  No acute fractures or suspicious osseous lesions.      CHEST:  LOWER NECK: Unremarkable.  VESSELS: Atherosclerotic calcification of the aortic arch and its  branches. Pulmonary artery diameter of 3.6 cm, nonspecific but can be  seen with pulmonary hypertension. HEART: Mild cardiomegaly. Trace  pericardial effusion, likely physiologic. Dense coronary artery  calcifications and stents in place. Aortic annulus calcification.  LYMPH NODES: Unremarkable. MEDIASTINUM/ESOPHAGUS: Unremarkable.  LUNGS AND LARGE AIRWAYS: Moderate-to-severe apical predominant  centrilobular emphysema. There are multifocal consolidations  throughout the lungs which are suspicious for an infectious process.  In the left upper lobe, there is suggestion of an air-fluid level in  the medial consolidation (205/39) which may represent superinfection  of a bullae, development of abscess, or necrotizing infection.  PLEURA: No pleural effusion or pneumothorax. CHEST WALL: Right chest  wall pacemaker.      ABDOMEN:  ABDOMINAL WALL: Generator in the left anterior abdominal wall. Mild  body wall edema. LIVER: Scattered calcified granulomas.  BILE DUCTS: Unremarkable.  GALLBLADDER: Cholelithiasis. Vicarious excretion of contrast.  PANCREAS:Unremarkable.  SPLEEN:  Unremarkable.  ADRENALS: Unremarkable.  KIDNEYS and URETERS: Bilateral simple renal cysts. The left lower  pole exophytic cyst has a thin septation with thin calcification  (201/112), similar to prior. No nephrolithiasis or  hydroureteronephrosis. VESSELS: Atherosclerotic calcification of the  aorta and its branches. Redemonstration of the bilobed abdominal  aortic aneurysm with the inferior component measuring up to 6 cm  (201/120). LYMPH NODES: Unremarkable.  RETROPERITONEUM/PERITONEUM: Small volume free fluid in the pericolic  gutters. BOWEL: Mild fluid distention of distal small bowel loops.  Colonic diverticulosis. Mild fluid distention of colonic bowel loops.      PELVIS:  REPRODUCTIVE ORGANS: Not well evaluated due to streak artifact from  right hip arthroplasty. BLADDER: Unremarkable.      Impression: Thoracic findings:  1. Background of moderate-to-severe pulmonary emphysema. There are  multifocal consolidations throughout the lungs which are suspicious  for multifocal pneumonia. Additionally, in the medial left upper  lobe, one of the consolidations demonstrates a subtle air-fluid level  which may represent a developing abscess, necrotizing pneumonia, or  superinfection of a bulla. Recommend posttreatment chest CT in 3  months to ensure resolution.  2. Mild cardiomegaly.  3. Dilated main pulmonary artery which can be seen with pulmonary  hypertension.      Abdomen/pelvis findings:  1. No new collections.  2. Similar mild fluid distention of distal small bowel loops and  colonic bowel loops which can be seen with mild enterocolitis. No  significant bowel wall thickening.  3. Redemonstration of the bilobed abdominal aortic aneurysm measuring  up to 6 cm. As before, recommend vascular surgery consultation for  management guidance.  4. Other findings remain unchanged.      MACRO:  None      Signed by: Doug Arroyo 1/15/2025 2:28 AM  Dictation workstation:   [id number]    LFT:   Lab Results   Component Value  "Date    AST 33 01/15/2025    ALT 4 (L) 01/15/2025    ALKPHOS 64 01/15/2025    BILITOT 0.7 01/15/2025      Urinalysis: No results found for: \"LYNN\", \"PROTUR\", \"GLUCOSEU\", \"BLOODU\", \"KETONESU\", \"BILIRUBINU\", \"NITRITEU\", \"LEUKOCYTESU\", \"UTPCR\"   Imaging: CT chest abdomen pelvis wo IV contrast  Narrative: Interpreted By:  Doug Arroyo,   STUDY:  CT CHEST ABDOMEN PELVIS WO CONTRAST;  1/14/2025 3:43 pm      INDICATION:  Signs/Symptoms:persistent bactermia r/o new suspicious fluid  collection (cannot get with contrast due to ISAIAS).      COMPARISON:  CT abdomen and pelvis 01/10/2025      ACCESSION NUMBER(S):  [id number]      ORDERING CLINICIAN:  EMERSON DONOHUE      TECHNIQUE:  Axial non-contrast CT images of the chest, abdomen and pelvis with  coronal and sagittal reconstructed images.      FINDINGS:  Lack of intravenous contrast limits evaluation of vessel, solid  organs and bowel.      BONES: Right hip arthroplasty. The bones are diffusely demineralized.  No acute fractures or suspicious osseous lesions.      CHEST:  LOWER NECK: Unremarkable.  VESSELS: Atherosclerotic calcification of the aortic arch and its  branches. Pulmonary artery diameter of 3.6 cm, nonspecific but can be  seen with pulmonary hypertension. HEART: Mild cardiomegaly. Trace  pericardial effusion, likely physiologic. Dense coronary artery  calcifications and stents in place. Aortic annulus calcification.  LYMPH NODES: Unremarkable. MEDIASTINUM/ESOPHAGUS: Unremarkable.  LUNGS AND LARGE AIRWAYS: Moderate-to-severe apical predominant  centrilobular emphysema. There are multifocal consolidations  throughout the lungs which are suspicious for an infectious process.  In the left upper lobe, there is suggestion of an air-fluid level in  the medial consolidation (205/39) which may represent superinfection  of a bullae, development of abscess, or necrotizing infection.  PLEURA: No pleural effusion or pneumothorax. CHEST WALL: Right chest  wall pacemaker.   "    ABDOMEN:  ABDOMINAL WALL: Generator in the left anterior abdominal wall. Mild  body wall edema. LIVER: Scattered calcified granulomas.  BILE DUCTS: Unremarkable.  GALLBLADDER: Cholelithiasis. Vicarious excretion of contrast.  PANCREAS:Unremarkable.  SPLEEN: Unremarkable.  ADRENALS: Unremarkable.  KIDNEYS and URETERS: Bilateral simple renal cysts. The left lower  pole exophytic cyst has a thin septation with thin calcification  (201/112), similar to prior. No nephrolithiasis or  hydroureteronephrosis. VESSELS: Atherosclerotic calcification of the  aorta and its branches. Redemonstration of the bilobed abdominal  aortic aneurysm with the inferior component measuring up to 6 cm  (201/120). LYMPH NODES: Unremarkable.  RETROPERITONEUM/PERITONEUM: Small volume free fluid in the pericolic  gutters. BOWEL: Mild fluid distention of distal small bowel loops.  Colonic diverticulosis. Mild fluid distention of colonic bowel loops.      PELVIS:  REPRODUCTIVE ORGANS: Not well evaluated due to streak artifact from  right hip arthroplasty. BLADDER: Unremarkable.      Impression: Thoracic findings:  1. Background of moderate-to-severe pulmonary emphysema. There are  multifocal consolidations throughout the lungs which are suspicious  for multifocal pneumonia. Additionally, in the medial left upper  lobe, one of the consolidations demonstrates a subtle air-fluid level  which may represent a developing abscess, necrotizing pneumonia, or  superinfection of a bulla. Recommend posttreatment chest CT in 3  months to ensure resolution.  2. Mild cardiomegaly.  3. Dilated main pulmonary artery which can be seen with pulmonary  hypertension.      Abdomen/pelvis findings:  1. No new collections.  2. Similar mild fluid distention of distal small bowel loops and  colonic bowel loops which can be seen with mild enterocolitis. No  significant bowel wall thickening.  3. Redemonstration of the bilobed abdominal aortic aneurysm measuring  up to 6  cm. As before, recommend vascular surgery consultation for  management guidance.  4. Other findings remain unchanged.      MACRO:  None      Signed by: Doug Arroyo 1/15/2025 2:28 AM  Dictation workstation:   [id number]           Assessment/  84 y.o. year old male who presented to the hospital for further evaluation and management of s/p mechanical fall with difficult to ambulate came to the emergency department where clinical laboratory assessment did show that the patient blood pressure is 60/60 and patient did have a fever of 38 he was resuscitated with fluids and admitted for further evaluation and because of persistent hypotension the patient was transferred to critical care bed    Hospital course was significant for CT abdomen pelvis with IV contrast done at 1/10/2025 that did not show active disease with no bowel obstruction a complex cyst on the right renal cyst at the day creatinine was 1.6 and GFR of 4, 3 days later creatinine remained at 1.5 increased to 2.3 yesterday and today 2.5  Yesterday patient was significantly hemodynamically unstable with a stretch of hypotension blood pressure in the 80s and 90s systolic over 40s diastolic with net negative fluid balance last 24 hours 2.6 L    Renal ultrasounds right kidney 12.8 left kidney 12.3 with no obstructive uropathy    Acute kidney injury based on the above clinical laboratory assessment prerenal azotemia versus aggravated renal insult possible contribution of contrast and aggravation by contrast exposure this type of injury is usually benign for recovery is expected  Whether the admission 1.5 creatinine reflects patient baseline or not is not clear at the time of dictation to address the presence of chronic kidney disease    Thrombocytopenia but adequate platelet  Hide likely kidney involvement in collagen vascular disease or thrombotic microangiopathy    Plan   Daily clinical and laboratory assessment   avoid rapid fluid volume shift  Avoid  nephrotoxic exposure  No new therapeutic intervention required from the kidney standpoint for today  Outpatient follow up from renal standpoint: Dr. Campos    Thank you for the consultation.  Please do not hesitate to call with questions.    Jason Campos MD

## 2025-01-15 NOTE — PROGRESS NOTES
Occupational Therapy                 Therapy Communication Note    Patient Name: Guicho Jones  MRN: [id number]  Department: [facility]  Room: 05/05-A  Today's Date: 1/15/2025     Discipline: Occupational Therapy    OT Missed Visit: Yes      Missed Visit Reason: Patient in a medical procedure (1150: patient off floor for testing. will reattempt per schedule)

## 2025-01-15 NOTE — CONSULTS
Inpatient consult to Thoracic Surgery  Consult performed by: Suzanne Freedman, APRN-CNP  Consult ordered by: Rodriguez Sapp MD        Reason For Consult  Possible left upper lobe air fluid level in patient persistent MSSA bacteremia eval for intervention of possible abscess    History Of Present Illness  Pt is an 85 y/o M w/ a PMHx of ICM s/p ICD, MI, diastolic heart failure, CAD s/p PCI, COPD (centrilobular emphysema), pulmonary hypertension, HTN, HLD, persistent Afib on ATC with xarelto, CVA, infrarenal AAA (6.0), obesity, and prostate cancer who presented s/p fall due to septic shock from MSSA bacteremia. Pt course also complicated by ISAIAS and hyperbilirubinemia due to sepsis.  ED course:   On arrival to the emergency department was hypotensive with BP 60s/30s and febrile with temp 38.6, given fluids per sepsis protocol and treated with Zosyn.   Labs: glucose 148, potassium 3.2, Creat 1.67, AST 54, bili 2.2, lactate 2.7, , Troponin 121 repeat 128, WBC 13.9, plt 100, flu/ covid/ RSV  negative.  CT head: negative  CT a/p: liquid stool t/o colon, no significant wall thickening or evidence of colitis, slight interval change in bilobed infrarenal AAA without evidence of dissection or acute aortic abnormality (6.1x6.2 cm)  1/10: Admit to the ICU for hypotension 2/2 to hypovolemia and concern for clinical decompensation.   1/11: VSS. Febrile, Blood cultures, +GPC. States had biopsy of scrotum day prior to hospitalization and needs to have MOHS procedure, pending. Site of biopsy without s/s of infection/ drainage. RN staff noted upper extremity weakness with abnormality in depth perception. On initial exam bilateral upper extremity weakness, no loss of sensation, right hand tingling (at baseline), and difficulty with depth perception. MRI ordered of cervical/ thoracic/ lumbar spine. Spoke with rep from [employer] to verify device compatibility and reported that old device still present in abdomen  is not MRI compatible but new device in chest wall is, MRI was canceled. Noted improvement in neurological exam and movement/ strength of upper extremities but persistent limitation with depth perception.     1/12: T max 39.2, WBC 10.9. decrease in volume of stool output, endorses abdominal cramping but no tenderness. Back pain with spasms, reports no increase from baseline and no point tenderness.   Echo: EF 55%, no source of embolus, no definite vegetation, normal RV systolic function, LA mod dilated, RA mild to mod dilated, mild MR, mild TR, mild AR, small PFO, mod dilatation of ascending aorta, mod increased septal and mildly increased posterior LV wall thickness  1/13: Pt overnight wore his BiPAP and being transitioned to NC this AM. Pt otherwise Afebrile and normocardic but with low normal Bps with MAP near 65mmHg. Pt has been NPO for PAOLO. Pt with UOP of 700cc in 24 hours and is net+1.6L in 24 hours and net +3.3L since admission with slightly improved Cr to 1.54 this AM and improved bilirubin. Blood Cultures still + from 1/12 for GPC clusters. Pt currently with no complaints other than some minor back pain.   1/14:Pt overnight with increasing creatinine to 2.33 from 1.54 with UOP of 1.3L and net +186 in 24 hours and net +3.4L since admission. Pt also noted to have some hemoptysis after PAOLO yesterday and into today and worsening O2 requirements as he is now on Oximyzer at 8-10lpm.   1/15: CT c/a/p showing multiple consolidations concerning for possible multifocal PNA with left upper lobe with a small possible air fluid level concerning for a possible abscess. Thoracic surgery consulted for further evaluation.    At the time of consultation patient is in no acute distress, remains afebrile and hemodynamically stable. Dr. Delong updated and imaging reviewed. Patient noted to have bilateral nodules not just left side. If it’s a abscess, recommending antibiotic therapy and bronchial hygiene. If concern for  malignancy, patient will need workup including PET-CT and PFT. Of note, patient might be candidate for SBRT based on PET-CT. He will be scheduled to follow up with Dr. Delong as outpatient whenever he is recovered from this episode. Thank you for the consult.   Past Medical History  He has a past medical history of Allergic, Arthritis, Cancer (Multi), Cataract, CHF (congestive heart failure), COPD (chronic obstructive pulmonary disease) (Multi), Encounter for follow-up examination after completed treatment for conditions other than malignant neoplasm (12/27/2021), Heart disease, Hypertension, Ischemic cardiomyopathy, Myocardial infarction (Multi), Pain in unspecified hip, Personal history of other diseases of the musculoskeletal system and connective tissue, Personal history of other endocrine, nutritional and metabolic disease, Personal history of other specified conditions (12/21/2021), Presence of coronary angioplasty implant and graft, and Unspecified atrial flutter (Multi).    Surgical History  He has a past surgical history that includes Other surgical history (10/12/2021); Other surgical history (10/12/2021); Other surgical history (12/21/2021); Other surgical history (01/04/2022); Other surgical history (12/21/2021); Other surgical history (12/21/2021); Other surgical history (12/21/2021); Other surgical history (12/21/2021); CT angio abdomen w and or wo IV IV contrast (10/29/2021); CT angio abdomen w and or wo IV IV contrast (10/03/2022); Cardiac catheterization; Vasectomy; Back surgery; Eye surgery; Joint replacement; Coronary stent placement; and Tonsillectomy.     Social History  He reports that he has quit smoking. His smoking use included cigarettes. He has a 22.5 pack-year smoking history. He has never been exposed to tobacco smoke. He has never used smokeless tobacco. He reports current alcohol use of about 3.0 standard drinks of alcohol per week. He reports that he does not use drugs.    Family  "History  Family History   Problem Relation Name Age of Onset    Liver disease Mother Ysabel. Campbell oJnes     Arthritis Mother Ysabel. Campbell Jones     Coronary artery disease Father Guicho Jones     Atrial fibrillation Father Guicho Jones     Heart disease Father Guicho Jones     Cancer Sister Sally Jones         Allergies  Levofloxacin    Review of Systems   Constitutional:  Positive for fatigue.   Respiratory:  Positive for shortness of breath.    Gastrointestinal:  Positive for diarrhea, nausea and vomiting.   All other systems reviewed and are negative.       Physical Exam  Vitals and nursing note reviewed.   Constitutional:       General: He is not in acute distress.     Appearance: He is obese.   HENT:      Head: Normocephalic and atraumatic.   Eyes:      Pupils: Pupils are equal, round, and reactive to light. Pupils are equal.   Cardiovascular:      Rate and Rhythm: Normal rate and regular rhythm.   Pulmonary:      Breath sounds: Decreased air movement present.   Chest:      Chest wall: No tenderness.   Abdominal:      Palpations: Abdomen is soft.   Musculoskeletal:      Right lower leg: Edema present.      Left lower leg: Edema present.   Skin:     General: Skin is warm and dry.   Neurological:      General: No focal deficit present.      Comments: Abnormal depth perception    Psychiatric:         Attention and Perception: Attention normal.         Mood and Affect: Mood normal.         Speech: Speech normal.          Last Recorded Vitals  Blood pressure 109/58, pulse 60, temperature 36 °C (96.8 °F), temperature source Temporal, resp. rate 20, height 1.676 m (5' 6\"), weight 103 kg (227 lb 1.2 oz), SpO2 96%.    Relevant Results  Scheduled medications  acetaminophen, 650 mg, oral, q6h  amiodarone, 100 mg, oral, Daily  aspirin, 81 mg, oral, Nightly  atorvastatin, 40 mg, oral, Nightly  ceFAZolin, 2 g, intravenous, q8h  [Held by provider] empagliflozin, 10 mg, oral, " Daily  ezetimibe, 10 mg, oral, Daily  fluticasone, 2 spray, Each Nostril, Daily  [Held by provider] tiotropium, 2 puff, inhalation, Daily   And  [Held by provider] fluticasone furoate-vilanteroL, 1 puff, inhalation, Daily  [Held by provider] hydroCHLOROthiazide, 25 mg, oral, Once per day on Monday Wednesday Friday  ipratropium-albuteroL, 3 mL, nebulization, TID  [Held by provider] isosorbide mononitrate ER, 30 mg, oral, Daily  lidocaine, 1 patch, transdermal, Daily  [Held by provider] lisinopril, 40 mg, oral, Daily  melatonin, 5 mg, oral, Nightly  methocarbamol, 500 mg, oral, q8h SHABNAM  methylPREDNISolone sodium succinate (PF), 40 mg, intravenous, q24h  [Held by provider] metoprolol succinate XL, 100 mg, oral, Nightly  [Held by provider] metoprolol succinate XL, 50 mg, oral, Daily  metoprolol tartrate, 50 mg, oral, BID  montelukast, 10 mg, oral, q PM      Continuous medications  [Held by provider] heparin, 0-4,000 Units/hr, Last Rate: Stopped (01/14/25 1231)      PRN medications  PRN medications: famotidine, [Held by provider] heparin, ipratropium-albuteroL, ondansetron, oxyCODONE, oxygen   Results for orders placed or performed during the hospital encounter of 01/10/25 (from the past 24 hours)   Basic Metabolic Panel   Result Value Ref Range    Glucose 110 (H) 74 - 99 mg/dL    Sodium 133 (L) 136 - 145 mmol/L    Potassium 3.7 3.5 - 5.3 mmol/L    Chloride 101 98 - 107 mmol/L    Bicarbonate 23 21 - 32 mmol/L    Anion Gap 13 10 - 20 mmol/L    Urea Nitrogen 61 (H) 6 - 23 mg/dL    Creatinine 2.53 (H) 0.50 - 1.30 mg/dL    eGFR 24 (L) >60 mL/min/1.73m*2    Calcium 8.2 (L) 8.6 - 10.3 mg/dL   Magnesium   Result Value Ref Range    Magnesium 2.43 (H) 1.60 - 2.40 mg/dL   Phosphorus   Result Value Ref Range    Phosphorus 3.3 2.5 - 4.9 mg/dL   Blood Gas Venous Full Panel   Result Value Ref Range    POCT pH, Venous 7.34 7.33 - 7.43 pH    POCT pCO2, Venous 42 41 - 51 mm Hg    POCT pO2, Venous 29 (L) 35 - 45 mm Hg    POCT SO2, Venous  54 45 - 75 %    POCT Oxy Hemoglobin, Venous 52.4 45.0 - 75.0 %    POCT Hematocrit Calculated, Venous 35.0 (L) 41.0 - 52.0 %    POCT Sodium, Venous 132 (L) 136 - 145 mmol/L    POCT Potassium, Venous 4.5 3.5 - 5.3 mmol/L    POCT Chloride, Venous 100 98 - 107 mmol/L    POCT Ionized Calicum, Venous 1.15 1.10 - 1.33 mmol/L    POCT Glucose, Venous 111 (H) 74 - 99 mg/dL    POCT Lactate, Venous 1.4 0.4 - 2.0 mmol/L    POCT Base Excess, Venous -3.0 (L) -2.0 - 3.0 mmol/L    POCT HCO3 Calculated, Venous 22.7 22.0 - 26.0 mmol/L    POCT Hemoglobin, Venous 11.6 (L) 13.5 - 17.5 g/dL    POCT Anion Gap, Venous 14.0 10.0 - 25.0 mmol/L    Patient Temperature      FiO2 10 %   Magnesium   Result Value Ref Range    Magnesium 2.32 1.60 - 2.40 mg/dL   CBC   Result Value Ref Range    WBC 10.1 4.4 - 11.3 x10*3/uL    nRBC 0.0 0.0 - 0.0 /100 WBCs    RBC 3.31 (L) 4.50 - 5.90 x10*6/uL    Hemoglobin 10.3 (L) 13.5 - 17.5 g/dL    Hematocrit 31.2 (L) 41.0 - 52.0 %    MCV 94 80 - 100 fL    MCH 31.1 26.0 - 34.0 pg    MCHC 33.0 32.0 - 36.0 g/dL    RDW 16.4 (H) 11.5 - 14.5 %    Platelets 124 (L) 150 - 450 x10*3/uL   Comprehensive Metabolic Panel   Result Value Ref Range    Glucose 101 (H) 74 - 99 mg/dL    Sodium 135 (L) 136 - 145 mmol/L    Potassium 2.9 (LL) 3.5 - 5.3 mmol/L    Chloride 102 98 - 107 mmol/L    Bicarbonate 24 21 - 32 mmol/L    Anion Gap 12 10 - 20 mmol/L    Urea Nitrogen 64 (H) 6 - 23 mg/dL    Creatinine 2.60 (H) 0.50 - 1.30 mg/dL    eGFR 23 (L) >60 mL/min/1.73m*2    Calcium 8.2 (L) 8.6 - 10.3 mg/dL    Albumin 2.9 (L) 3.4 - 5.0 g/dL    Alkaline Phosphatase 64 33 - 136 U/L    Total Protein 5.8 (L) 6.4 - 8.2 g/dL    AST 33 9 - 39 U/L    Bilirubin, Total 0.7 0.0 - 1.2 mg/dL    ALT 4 (L) 10 - 52 U/L   Phosphorus   Result Value Ref Range    Phosphorus 3.6 2.5 - 4.9 mg/dL   SST TOP   Result Value Ref Range    Extra Tube Hold for add-ons.      *Note: Due to a large number of results and/or encounters for the requested time period, some results  have not been displayed. A complete set of results can be found in Results Review.    NM bone 3 phase    Result Date: 1/15/2025  Interpreted By:  Luis Mclaughlin, STUDY: NM BONE 3 PHASE;  1/15/2025 12:44 pm   INDICATION: Signs/Symptoms:unclear source of MSSA bacteremia possible epidural abscess but cannot get MRI.     COMPARISON: CT of the chest, abdomen, and pelvis dated 01/14/2023.   ACCESSION NUMBER(S): [id number]   ORDERING CLINICIAN: EMERSON DONOHUE   TECHNIQUE: DIVISION OF NUCLEAR MEDICINE BONE SCAN, TRIPHASIC   The patient received an intravenous dose of  27.1 millicuries of Tc-99m MDP. Immediate dynamic planar perfusion images of thorax were acquired followed by static planar early blood pool images. After approximately 3h, multiple delayed static planar images of the thorax were then acquired. Delayed Anterior and posterior planar images of the skeleton from skull vertex to feet were also obtained. Additional SPECT/CT images of abdomen were acquired.   FINDINGS: There is no abnormal radiotracer distribution on perfusion imaging and no abnormal radiotracer accumulation on blood pool imaging.   On delayed imaging there is increased radiotracer accumulation within the left L2-L3 facet joint and left L3 lamina. Additionally, there is heterogenous increased radiotracer deposition within the bilateral shoulders and sternoclavicular joints which is favored to be degenerative in nature. Increased radiotracer at the left 2nd chondrocostal junction is favored to be degenerative in nature. There is heterogenous increased radiotracer deposition within the paranasal sinuses; correlate with concern for sinusitis.       1. Focally increased radiotracer deposition involving the left L2-L3 facet joint and left L3 lamina which may relate to degenerative changes. However, if clinical suspicion for an infectious process persists, Indium 111 WBC scan may be obtained for evaluation of an infectious process. 2. Heterogenous  radiotracer uptake of the paranasal sinuses which may be seen in the setting of underlying sinusitis. 3. Additional heterogenous radiotracer deposition as above favored to represent degenerative changes.     I personally reviewed the images/study and I agree with the findings as stated.  This study was interpreted at [facility], [place], [place].   MACRO: None     Dictation workstation:   [id number]    CT chest abdomen pelvis wo IV contrast    Result Date: 1/15/2025  Interpreted By:  Doug Arroyo, STUDY: CT CHEST ABDOMEN PELVIS WO CONTRAST;  1/14/2025 3:43 pm   INDICATION: Signs/Symptoms:persistent bactermia r/o new suspicious fluid collection (cannot get with contrast due to ISAIAS).   COMPARISON: CT abdomen and pelvis 01/10/2025   ACCESSION NUMBER(S): [id number]   ORDERING CLINICIAN: EMERSON DONOHUE   TECHNIQUE: Axial non-contrast CT images of the chest, abdomen and pelvis with coronal and sagittal reconstructed images.   FINDINGS: Lack of intravenous contrast limits evaluation of vessel, solid organs and bowel.   BONES: Right hip arthroplasty. The bones are diffusely demineralized. No acute fractures or suspicious osseous lesions.   CHEST: LOWER NECK: Unremarkable. VESSELS: Atherosclerotic calcification of the aortic arch and its branches. Pulmonary artery diameter of 3.6 cm, nonspecific but can be seen with pulmonary hypertension. HEART: Mild cardiomegaly. Trace pericardial effusion, likely physiologic. Dense coronary artery calcifications and stents in place. Aortic annulus calcification. LYMPH NODES: Unremarkable. MEDIASTINUM/ESOPHAGUS: Unremarkable. LUNGS AND LARGE AIRWAYS: Moderate-to-severe apical predominant centrilobular emphysema. There are multifocal consolidations throughout the lungs which are suspicious for an infectious process. In the left upper lobe, there is suggestion of an air-fluid level in the medial consolidation (205/39) which may represent  superinfection of a bullae, development of abscess, or necrotizing infection. PLEURA: No pleural effusion or pneumothorax. CHEST WALL: Right chest wall pacemaker.   ABDOMEN: ABDOMINAL WALL: Generator in the left anterior abdominal wall. Mild body wall edema. LIVER: Scattered calcified granulomas. BILE DUCTS: Unremarkable. GALLBLADDER: Cholelithiasis. Vicarious excretion of contrast. PANCREAS:Unremarkable. SPLEEN: Unremarkable. ADRENALS: Unremarkable. KIDNEYS and URETERS: Bilateral simple renal cysts. The left lower pole exophytic cyst has a thin septation with thin calcification (201/112), similar to prior. No nephrolithiasis or hydroureteronephrosis. VESSELS: Atherosclerotic calcification of the aorta and its branches. Redemonstration of the bilobed abdominal aortic aneurysm with the inferior component measuring up to 6 cm (201/120). LYMPH NODES: Unremarkable. RETROPERITONEUM/PERITONEUM: Small volume free fluid in the pericolic gutters. BOWEL: Mild fluid distention of distal small bowel loops. Colonic diverticulosis. Mild fluid distention of colonic bowel loops.   PELVIS: REPRODUCTIVE ORGANS: Not well evaluated due to streak artifact from right hip arthroplasty. BLADDER: Unremarkable.       Thoracic findings: 1. Background of moderate-to-severe pulmonary emphysema. There are multifocal consolidations throughout the lungs which are suspicious for multifocal pneumonia. Additionally, in the medial left upper lobe, one of the consolidations demonstrates a subtle air-fluid level which may represent a developing abscess, necrotizing pneumonia, or superinfection of a bulla. Recommend posttreatment chest CT in 3 months to ensure resolution. 2. Mild cardiomegaly. 3. Dilated main pulmonary artery which can be seen with pulmonary hypertension.   Abdomen/pelvis findings: 1. No new collections. 2. Similar mild fluid distention of distal small bowel loops and colonic bowel loops which can be seen with mild enterocolitis. No  significant bowel wall thickening. 3. Redemonstration of the bilobed abdominal aortic aneurysm measuring up to 6 cm. As before, recommend vascular surgery consultation for management guidance. 4. Other findings remain unchanged.   MACRO: None   Signed by: Doug Arroyo 1/15/2025 2:28 AM Dictation workstation:   [id number]    US renal complete    Result Date: 1/14/2025  Interpreted By:  Milind Parada, STUDY: US RENAL COMPLETE;  1/14/2025 12:57 pm   INDICATION: Signs/Symptoms:ISAIAS r/o hydro.     COMPARISON: CT Abdomen and pelvis, abdomen and pelvis with contrast 10 January 2025   ACCESSION NUMBER(S): [id number]   ORDERING CLINICIAN: EMERSON DONOHUE   TECHNIQUE: Transverse and longitudinal grayscale and color Doppler of both kidneys   FINDINGS: Kidney sizes (craniocaudal long axis, in cm) Right: 12.8 Left: 12.3   Hydronephrosis: Negative   Shadowing stone: Negative   Echogenicity: Within normal limits bilaterally   Other:  Large partially exophytic cysts bilaterally are unchanged   Urinary bladder: Limited views of the urinary bladder show no gross abnormality.       No change from CT 10 January 2025   No hydronephrosis on either side   MACRO: None   Signed by: Milind Parada 1/14/2025 1:03 PM Dictation workstation:   [id number]       Assessment/Plan   #Acute on chronic hypoxic respiratory failure  #COPD without acute exacerbation  #MSSA PNA  #Hemoptysis  CT chest showing questionable abscess of left upper lobe   -Dr. Delong updated and imaging reviewed. Patient noted to have bilateral nodules not just left side. If it’s a abscess, recommending antibiotic therapy and bronchial hygiene. If concern for malignancy, patient will need workup including PET-CT and PFT. Of note, patient might be candidate for SBRT based on PET-CT. He will be scheduled to follow up with Dr. Delong as outpatient whenever he is recovered from this episode. Thank you for the consult.   - Concern for possible septic emboli and complicated by  Anti-coagulation  - Possible new PNA vs hypervolemia vs ARDS  - Home inhalers, singulair, flonase  - C/W Duonebs and add IV methylpred today for short course for possible COPD exacerbation  - Oxygen therapy as needed to maintain SPO2 greater than 90% and wean Oximyzer as tolerated  - Pulmonary hygiene  - BiPAP at night, may use home device      #Acute on chronic back pain  #Possible stroke  #Hx of CVA  #Fall, prior to admission  #Ataxia  #Acute debilitation 2/2 critical illness  -Per ICU   - Concern for possible septic emboli 2/2 gram positive bacteremia due to change in depth perception, unable to obtain MRI related to ICD device incompatibility  - Given patient's back pain possible Epidural abscess but unable to get MRI as above. No notable jeet abnormalities on CT scans and in process of obtaining NM scan of spine and other areas today after discussion with ID  - Neuro checks  - No vegetation or note of lead infection on PAOLO and will hold off on neurology consult as patient appears neurologically intact today  - CAM-ICU  - Delirium precautions  - Multimodal pain management: acetaminophen, robaxin, lidocaine patch, ice  - Oxycodone as needed  - Out of bed to chair  - PT/OT as toleraated        #Septic shock - resolved  #Infrarenal AAA, increased in size from previou  #ICM s/p AICD  #HTN  #Chronic diastolic heart failure  #Atrial fibrillation  #HLD  -Per ICU   - C/W home amiodarone, statin  - Continue holding imdur, lisinopril, jardiance, hydrochlorothiazide  - C/w metoprolol at reduced dosing, adjust as needed  - No vegetation or lead infection on PAOLO  - Continue holding home xarelto given renal fxn and hemoptysis  -Hold heparin gtt for today and consider re-challenge tomorrow  - Follow up with vascular OP regarding infrarenal AAA       #ISAIAS in setting of hypovolemia and sepsis -slightly worse today possibly due to cardio-renal syndrome given patient has been grossly positive the last few days and with 4 chamber  dilation on PAOLO  Baseline Creat ~0.9-1.1  -Per ICU  - Will hold diuresis today and will re-evlauate for further diuresis tomorrow  - Strict I&Os  - Avoid nephrotoxic agents     #Sepsis 2/2 MSSA bacteremia  #Possible Enterocolitis although diarrhea at this point likely antibiotic associated at this time  #Leukocytosis  #Rule out Lead infection  -Per ICU  - Concern for device infection, endocarditis but PAOLO negative  - Stool pathogen, negative  - Blood culture 1/10 & 1/12, +MSSA; Pending repeat culture results from 1/14  - Check blood cultures Q48Hrs until cleared x 2 days  - Cefazolin (Day 5 of Abx)  - ID following, in process of obtaining NM scan of spine and other areas today after discussion with ID  - possible explanations include possible seeding event from scrotal biopsy but then seeding to unclear location but limited by inability to get MRI to r/o epidural abscess and brain mycotic aneurysms due to incompatible AICD with MRI  - Given questionable abscess of left upper lobe will consult thoracic surgery for input       #Thrombocytopenia in setting of sepsis  #Basal cell carcinoma, scrotum  -Per ICU   - SCDs given hemoptysis  - Trend CBC  - Monitor for s/s of bleeding  - Transfuse for Hgb less than 7 or symptomatic anemia  - Follow up OP for completion of MOHS procedure         I spent 60 minutes in the professional and overall care of this patient.

## 2025-01-15 NOTE — PROGRESS NOTES
Physical Therapy                 Therapy Communication Note    Patient Name: Guicho Jones  MRN: [id number]  Department: [facility]  Room: 05/05-A  Today's Date: 1/15/2025     Discipline: Physical Therapy    PT Missed Visit: Yes     Missed Visit Reason: Missed Visit Reason: Patient in a medical procedure    Missed Time: Attempt    Comment:

## 2025-01-15 NOTE — PROGRESS NOTES
?  HISTORY OF PRESENT ILLNESS:         HPI 84-year-old male started having nausea vomiting and diarrhea after eating pizza on Wednesday. Tells me that his grandchildren have been ill with nausea/vomiting/diarrhea as well. CT abdomen and pelvis and CT head both noted -patient does have 6 x 6 cm aortic aneurysm, which is apparently changed in size compared to last image, currently without evidence of dissection.  He was admitted to the intensive care unit for further evaluation and treatment due to hypotension and hypovolemia     Flu, COVID, RSV all negative  Pathogen panel pending  C. difficile PCR normal  Repeat blood culture from 1/11/2025 pending     Of note, patient has AICD     Unfortunately, on 1/10/2025, patient's blood cultures x 2 positive for Staphylococcus aureus    Pt reported negative PAOLO  No fevers      Current Medications:    Current Facility-Administered Medications   Medication Dose Route Frequency Provider Last Rate Last Admin    acetaminophen (Tylenol) tablet 650 mg  650 mg oral q6h NEAL MesserCNP   650 mg at 01/14/25 1627    amiodarone (Pacerone) tablet 100 mg  100 mg oral Daily SAJI Weathers-CNP   100 mg at 01/14/25 0834    aspirin chewable tablet 81 mg  81 mg oral Nightly SAJI Weathers-CNP   81 mg at 01/13/25 2040    atorvastatin (Lipitor) tablet 40 mg  40 mg oral Nightly SAJI Weathers-CNP   40 mg at 01/13/25 2040    ceFAZolin (Ancef) 2 g in dextrose (iso)  mL  2 g intravenous q8h Roseanna Be, PharmD   Stopped at 01/14/25 1449    [Held by provider] empagliflozin (Jardiance) tablet 10 mg  10 mg oral Daily SAJI Weathers-CNP   10 mg at 01/11/25 0900    ezetimibe (Zetia) tablet 10 mg  10 mg oral Daily SAJI Weathers-CNP   10 mg at 01/14/25 0834    fluticasone (Flonase) nasal spray 2 spray  2 spray Each Nostril Daily NEAL WeathersCNP   2 spray at 01/14/25 0850    [Held by provider] tiotropium (Spiriva Respimat) 2.5  mcg/actuation inhaler 2 puff  2 puff inhalation Daily VICKY Weathers   2 puff at 01/14/25 0850    And    [Held by provider] fluticasone furoate-vilanteroL (Breo Ellipta) 200-25 mcg/dose inhaler 1 puff  1 puff inhalation Daily VICKY Weathers   1 puff at 01/14/25 0850    [Held by provider] heparin 25,000 Units in dextrose 5% 250 mL (100 Units/mL) infusion (premix)  0-4,000 Units/hr intravenous Continuous VICKY Messer   Stopped at 01/14/25 1231    [Held by provider] heparin bolus from bag 2,000-4,000 Units  2,000-4,000 Units intravenous q4h PRN VICKY Messer   2,000 Units at 01/12/25 1515    [Held by provider] hydroCHLOROthiazide (HYDRODiuril) tablet 25 mg  25 mg oral Once per day on Monday Wednesday Friday VICKY Weathers        ipratropium-albuteroL (Duo-Neb) 0.5-2.5 mg/3 mL nebulizer solution 3 mL  3 mL nebulization q4h Rodriguez Sapp MD        [Held by provider] isosorbide mononitrate ER (Imdur) 24 hr tablet 30 mg  30 mg oral Daily VICKY Weathers        lidocaine 4 % patch 1 patch  1 patch transdermal Daily VICKY Messer   1 patch at 01/14/25 1627    [Held by provider] lisinopril tablet 40 mg  40 mg oral Daily VICKY Weathers        melatonin tablet 5 mg  5 mg oral Nightly VICKY Messer   5 mg at 01/13/25 2040    methocarbamol (Robaxin) tablet 500 mg  500 mg oral q8h [facility] Jeanne Maurer PA-C   500 mg at 01/14/25 1627    [Held by provider] metoprolol succinate XL (Toprol-XL) 24 hr tablet 100 mg  100 mg oral Nightly VICKY Weathers        [Held by provider] metoprolol succinate XL (Toprol-XL) 24 hr tablet 50 mg  50 mg oral Daily VICKY Weathers   50 mg at 01/11/25 0900    metoprolol tartrate (Lopressor) tablet 50 mg  50 mg oral BID VICKY Messer   50 mg at 01/14/25 0834    montelukast (Singulair) tablet 10 mg  10 mg oral q PM Jen Pulido,  "APRN-CNP   10 mg at 01/13/25 2040    ondansetron (Zofran) injection 4 mg  4 mg intravenous q6h PRN Lizett Lincoln APRN-CNP   4 mg at 01/12/25 2017    oxyCODONE (Roxicodone) immediate release tablet 2.5 mg  2.5 mg oral q6h PRN Lizett Lincoln APRN-CNP   2.5 mg at 01/13/25 1401    oxygen (O2) therapy   inhalation Continuous PRN - O2/gases Herlinda Mitchell, APRN-CNP   10 L/min at 01/14/25 1400        Allergies:    Allergies   Allergen Reactions    Levofloxacin Palpitations     Rapid Heart Rate - Severe per pt.          Review of Systems  14 system review is negative other than HPI     /55   Pulse 60   Temp 36.4 °C (97.5 °F) (Temporal)   Resp 19   Ht 1.676 m (5' 6\")   Wt 103 kg (227 lb 1.2 oz)   SpO2 93%   BMI 36.65 kg/m²    Physical Exam:  General: Patient appears ok at the present time. NAD  Skin: no new rashes  HEENT:  Neck is supple, No subconjunctival hemorrhages, no oral exudates  Heart: S1 S2  Lungs: diminished bases  Abdomen: soft, ND, NTTP,  Extrem: No edema, non tender           DATA:    .  Lab Results   Component Value Date    WBC 10.1 01/14/2025    HGB 10.5 (L) 01/14/2025    HCT 32.2 (L) 01/14/2025    MCV 97 01/14/2025    PLT 93 (L) 01/14/2025     Results from last 7 days   Lab Units 01/14/25  1728 01/14/25  1018 01/14/25  0416   SODIUM mmol/L 133*   < > 135*   POTASSIUM mmol/L 3.7   < > 2.7*   CHLORIDE mmol/L 101   < > 103   CO2 mmol/L 23   < > 22   BUN mg/dL 61*   < > 60*   CREATININE mg/dL 2.53*   < > 2.33*   CALCIUM mg/dL 8.2*   < > 7.9*   PROTEIN TOTAL g/dL  --   --  5.7*   BILIRUBIN TOTAL mg/dL  --   --  0.8   ALK PHOS U/L  --   --  52   ALT U/L  --   --  11   AST U/L  --   --  36   GLUCOSE mg/dL 110*   < > 113*    < > = values in this interval not displayed.   Susceptibility data from last 90 days.  Collected Specimen Info Organism Clindamycin Erythromycin Oxacillin Tetracycline Trimethoprim/Sulfamethoxazole Vancomycin   01/12/25 Blood culture from Peripheral Venipuncture " Staphylococcus aureus         01/12/25 Blood culture from Peripheral Venipuncture Staphylococcus aureus         01/10/25 Blood culture from Peripheral Venipuncture Staphylococcus aureus         01/10/25 Blood culture from Peripheral Venipuncture Methicillin Susceptible Staphylococcus aureus (MSSA)  S  S  S  S  S  S   Susceptibility data from last 90 days.  Collected Specimen Info Organism Clindamycin Erythromycin Oxacillin Tetracycline Trimethoprim/Sulfamethoxazole Vancomycin   01/12/25 Blood culture from Peripheral Venipuncture Staphylococcus aureus         01/12/25 Blood culture from Peripheral Venipuncture Staphylococcus aureus         01/10/25 Blood culture from Peripheral Venipuncture Staphylococcus aureus         01/10/25 Blood culture from Peripheral Venipuncture Methicillin Susceptible Staphylococcus aureus (MSSA)  S  S  S  S  S  S           IMPRESSION:        Problem List Items Addressed This Visit          Cardiac and Vasculature    AICD (automatic cardioverter/defibrillator) present    Relevant Orders    Transesophageal Echo (PAOLO)    Ischemic cardiomyopathy    Relevant Medications    isosorbide mononitrate ER (Imdur) 24 hr tablet 30 mg    metoprolol succinate XL (Toprol-XL) 24 hr tablet 100 mg    metoprolol succinate XL (Toprol-XL) 24 hr tablet 50 mg    metoprolol tartrate (Lopressor) tablet 50 mg    Other Relevant Orders    Transthoracic Echo (TTE) Complete (Completed)    Transesophageal Echo (PAOLO)    Chronic diastolic congestive heart failure, NYHA class 2    Relevant Medications    isosorbide mononitrate ER (Imdur) 24 hr tablet 30 mg    metoprolol succinate XL (Toprol-XL) 24 hr tablet 100 mg    metoprolol succinate XL (Toprol-XL) 24 hr tablet 50 mg    metoprolol tartrate (Lopressor) tablet 50 mg    Other Relevant Orders    Transthoracic Echo (TTE) Complete (Completed)    Transesophageal Echo (PAOLO)    Pulmonary hypertension (Multi)    Relevant Orders    Transthoracic Echo (TTE) Complete (Completed)     Transesophageal Echo (PAOLO)       Gastrointestinal and Abdominal    * (Principal) Diarrhea, unspecified type - Primary     Other Visit Diagnoses       Hypotension, unspecified hypotension type        Elevated troponin        Hypokalemia        Renal insufficiency        Anemia, unspecified type        Fall, initial encounter        Medication induced coagulopathy (Multi)        Relevant Medications    aspirin chewable tablet 81 mg    heparin 25,000 Units in dextrose 5% 250 mL (100 Units/mL) infusion (premix)    heparin bolus from bag 2,000-4,000 Units    albumin human 5 % infusion 25 g (Completed)    Bacteremia due to Gram-positive bacteria        Relevant Orders    Transthoracic Echo (TTE) Complete (Completed)    Transesophageal Echo (PAOLO)    Weakness of both arms        Lumbar pain            Concerned of endovascular infection given presentation and risk factors  PLAN:   PAOLO unrevealing  Continue antibiotic therapy  Repeat blood cultures until clearance    We may also to image aneurysm further if possible., Possible spinal imaging as well Discussed with ICU       Marc Lomeli MD

## 2025-01-16 ENCOUNTER — APPOINTMENT (OUTPATIENT)
Dept: RADIOLOGY | Facility: HOSPITAL | Age: 85
DRG: 871 | End: 2025-01-16
Payer: MEDICARE

## 2025-01-16 LAB
ALBUMIN SERPL BCP-MCNC: 2.9 G/DL (ref 3.4–5)
ALP SERPL-CCNC: 65 U/L (ref 33–136)
ALT SERPL W P-5'-P-CCNC: 3 U/L (ref 10–52)
ANA SER QL HEP2 SUBST: NEGATIVE
ANION GAP SERPL CALC-SCNC: 14 MMOL/L (ref 10–20)
AST SERPL W P-5'-P-CCNC: 34 U/L (ref 9–39)
BACTERIA BLD AEROBE CULT: ABNORMAL
BACTERIA BLD AEROBE CULT: ABNORMAL
BACTERIA BLD CULT: ABNORMAL
BACTERIA BLD CULT: ABNORMAL
BILIRUB SERPL-MCNC: 0.6 MG/DL (ref 0–1.2)
BUN SERPL-MCNC: 65 MG/DL (ref 6–23)
C3 SERPL-MCNC: 157 MG/DL (ref 87–200)
C4 SERPL-MCNC: 31 MG/DL (ref 10–50)
CALCIUM SERPL-MCNC: 8.2 MG/DL (ref 8.6–10.3)
CHLORIDE SERPL-SCNC: 105 MMOL/L (ref 98–107)
CO2 SERPL-SCNC: 21 MMOL/L (ref 21–32)
CREAT SERPL-MCNC: 2.19 MG/DL (ref 0.5–1.3)
EGFRCR SERPLBLD CKD-EPI 2021: 29 ML/MIN/1.73M*2
ERYTHROCYTE [DISTWIDTH] IN BLOOD BY AUTOMATED COUNT: 16.3 % (ref 11.5–14.5)
GLUCOSE SERPL-MCNC: 143 MG/DL (ref 74–99)
GRAM STN SPEC: ABNORMAL
GRAM STN SPEC: ABNORMAL
HCT VFR BLD AUTO: 31.7 % (ref 41–52)
HGB BLD-MCNC: 10.8 G/DL (ref 13.5–17.5)
MAGNESIUM SERPL-MCNC: 2.35 MG/DL (ref 1.6–2.4)
MCH RBC QN AUTO: 31.7 PG (ref 26–34)
MCHC RBC AUTO-ENTMCNC: 34.1 G/DL (ref 32–36)
MCV RBC AUTO: 93 FL (ref 80–100)
NRBC BLD-RTO: 0 /100 WBCS (ref 0–0)
PHOSPHATE SERPL-MCNC: 3.5 MG/DL (ref 2.5–4.9)
PLATELET # BLD AUTO: 172 X10*3/UL (ref 150–450)
POTASSIUM SERPL-SCNC: 3.1 MMOL/L (ref 3.5–5.3)
PROT SERPL-MCNC: 6.1 G/DL (ref 6.4–8.2)
RBC # BLD AUTO: 3.41 X10*6/UL (ref 4.5–5.9)
SODIUM SERPL-SCNC: 137 MMOL/L (ref 136–145)
WBC # BLD AUTO: 12.3 X10*3/UL (ref 4.4–11.3)

## 2025-01-16 PROCEDURE — A9547 IN111 OXYQUINOLINE: HCPCS | Performed by: STUDENT IN AN ORGANIZED HEALTH CARE EDUCATION/TRAINING PROGRAM

## 2025-01-16 PROCEDURE — 97530 THERAPEUTIC ACTIVITIES: CPT | Mod: GP,CQ

## 2025-01-16 PROCEDURE — 2020000001 HC ICU ROOM DAILY

## 2025-01-16 PROCEDURE — 84100 ASSAY OF PHOSPHORUS: CPT | Performed by: NURSE PRACTITIONER

## 2025-01-16 PROCEDURE — 2500000004 HC RX 250 GENERAL PHARMACY W/ HCPCS (ALT 636 FOR OP/ED): Performed by: HOSPITALIST

## 2025-01-16 PROCEDURE — 85027 COMPLETE CBC AUTOMATED: CPT | Performed by: NURSE PRACTITIONER

## 2025-01-16 PROCEDURE — 99291 CRITICAL CARE FIRST HOUR: CPT | Performed by: STUDENT IN AN ORGANIZED HEALTH CARE EDUCATION/TRAINING PROGRAM

## 2025-01-16 PROCEDURE — 3430000001 HC RX 343 DIAGNOSTIC RADIOPHARMACEUTICALS: Performed by: STUDENT IN AN ORGANIZED HEALTH CARE EDUCATION/TRAINING PROGRAM

## 2025-01-16 PROCEDURE — 2500000004 HC RX 250 GENERAL PHARMACY W/ HCPCS (ALT 636 FOR OP/ED): Performed by: STUDENT IN AN ORGANIZED HEALTH CARE EDUCATION/TRAINING PROGRAM

## 2025-01-16 PROCEDURE — 2500000001 HC RX 250 WO HCPCS SELF ADMINISTERED DRUGS (ALT 637 FOR MEDICARE OP): Performed by: HOSPITALIST

## 2025-01-16 PROCEDURE — 86160 COMPLEMENT ANTIGEN: CPT | Mod: ELYLAB | Performed by: STUDENT IN AN ORGANIZED HEALTH CARE EDUCATION/TRAINING PROGRAM

## 2025-01-16 PROCEDURE — 80053 COMPREHEN METABOLIC PANEL: CPT | Performed by: NURSE PRACTITIONER

## 2025-01-16 PROCEDURE — 2500000002 HC RX 250 W HCPCS SELF ADMINISTERED DRUGS (ALT 637 FOR MEDICARE OP, ALT 636 FOR OP/ED): Performed by: HOSPITALIST

## 2025-01-16 PROCEDURE — 94640 AIRWAY INHALATION TREATMENT: CPT

## 2025-01-16 PROCEDURE — 2500000005 HC RX 250 GENERAL PHARMACY W/O HCPCS: Performed by: NURSE PRACTITIONER

## 2025-01-16 PROCEDURE — 2500000001 HC RX 250 WO HCPCS SELF ADMINISTERED DRUGS (ALT 637 FOR MEDICARE OP)

## 2025-01-16 PROCEDURE — 83516 IMMUNOASSAY NONANTIBODY: CPT | Performed by: STUDENT IN AN ORGANIZED HEALTH CARE EDUCATION/TRAINING PROGRAM

## 2025-01-16 PROCEDURE — 78804 RP LOCLZJ TUM WHBDY 2+D IMG: CPT

## 2025-01-16 PROCEDURE — 83735 ASSAY OF MAGNESIUM: CPT | Performed by: NURSE PRACTITIONER

## 2025-01-16 PROCEDURE — 36415 COLL VENOUS BLD VENIPUNCTURE: CPT | Performed by: NURSE PRACTITIONER

## 2025-01-16 PROCEDURE — 36415 COLL VENOUS BLD VENIPUNCTURE: CPT | Performed by: STUDENT IN AN ORGANIZED HEALTH CARE EDUCATION/TRAINING PROGRAM

## 2025-01-16 PROCEDURE — 99232 SBSQ HOSP IP/OBS MODERATE 35: CPT | Performed by: INTERNAL MEDICINE

## 2025-01-16 PROCEDURE — 97110 THERAPEUTIC EXERCISES: CPT | Mod: GP,CQ

## 2025-01-16 PROCEDURE — 2500000002 HC RX 250 W HCPCS SELF ADMINISTERED DRUGS (ALT 637 FOR MEDICARE OP, ALT 636 FOR OP/ED)

## 2025-01-16 PROCEDURE — 2500000005 HC RX 250 GENERAL PHARMACY W/O HCPCS: Performed by: HOSPITALIST

## 2025-01-16 PROCEDURE — 97535 SELF CARE MNGMENT TRAINING: CPT | Mod: GO

## 2025-01-16 PROCEDURE — 2500000001 HC RX 250 WO HCPCS SELF ADMINISTERED DRUGS (ALT 637 FOR MEDICARE OP): Performed by: NURSE PRACTITIONER

## 2025-01-16 PROCEDURE — 2500000004 HC RX 250 GENERAL PHARMACY W/ HCPCS (ALT 636 FOR OP/ED): Mod: JZ

## 2025-01-16 RX ORDER — HEPARIN SODIUM 5000 [USP'U]/ML
5000 INJECTION, SOLUTION INTRAVENOUS; SUBCUTANEOUS EVERY 8 HOURS
Status: DISCONTINUED | OUTPATIENT
Start: 2025-01-16 | End: 2025-01-17

## 2025-01-16 RX ORDER — POTASSIUM CHLORIDE 14.9 MG/ML
20 INJECTION INTRAVENOUS
Status: COMPLETED | OUTPATIENT
Start: 2025-01-16 | End: 2025-01-16

## 2025-01-16 RX ORDER — POTASSIUM CHLORIDE 1.5 G/1.58G
20 POWDER, FOR SOLUTION ORAL ONCE
Status: COMPLETED | OUTPATIENT
Start: 2025-01-16 | End: 2025-01-16

## 2025-01-16 RX ORDER — INDIUM IN-111 OXYQUINOLINE 1 UG/ML
507 SOLUTION INTRAVENOUS
Status: COMPLETED | OUTPATIENT
Start: 2025-01-16 | End: 2025-01-16

## 2025-01-16 RX ADMIN — ACETAMINOPHEN 650 MG: 325 TABLET ORAL at 20:50

## 2025-01-16 RX ADMIN — HEPARIN SODIUM 5000 UNITS: 5000 INJECTION INTRAVENOUS; SUBCUTANEOUS at 14:33

## 2025-01-16 RX ADMIN — POTASSIUM CHLORIDE 20 MEQ: 14.9 INJECTION, SOLUTION INTRAVENOUS at 08:34

## 2025-01-16 RX ADMIN — Medication 5 MG: at 20:50

## 2025-01-16 RX ADMIN — MONTELUKAST SODIUM 10 MG: 10 TABLET, FILM COATED ORAL at 20:50

## 2025-01-16 RX ADMIN — IPRATROPIUM BROMIDE AND ALBUTEROL SULFATE 3 ML: 2.5; .5 SOLUTION RESPIRATORY (INHALATION) at 13:08

## 2025-01-16 RX ADMIN — IPRATROPIUM BROMIDE AND ALBUTEROL SULFATE 3 ML: 2.5; .5 SOLUTION RESPIRATORY (INHALATION) at 19:54

## 2025-01-16 RX ADMIN — Medication 10 L/MIN: at 08:00

## 2025-01-16 RX ADMIN — ACETAMINOPHEN 650 MG: 325 TABLET ORAL at 08:30

## 2025-01-16 RX ADMIN — CEFAZOLIN SODIUM 2 G: 2 INJECTION, SOLUTION INTRAVENOUS at 05:41

## 2025-01-16 RX ADMIN — AMIODARONE HYDROCHLORIDE 100 MG: 200 TABLET ORAL at 08:29

## 2025-01-16 RX ADMIN — METHOCARBAMOL TABLETS 500 MG: 500 TABLET, COATED ORAL at 14:33

## 2025-01-16 RX ADMIN — LIDOCAINE 4% 1 PATCH: 40 PATCH TOPICAL at 14:34

## 2025-01-16 RX ADMIN — POTASSIUM CHLORIDE 20 MEQ: 14.9 INJECTION, SOLUTION INTRAVENOUS at 06:46

## 2025-01-16 RX ADMIN — CEFAZOLIN SODIUM 2 G: 2 INJECTION, SOLUTION INTRAVENOUS at 20:50

## 2025-01-16 RX ADMIN — METOPROLOL TARTRATE 50 MG: 50 TABLET, FILM COATED ORAL at 08:29

## 2025-01-16 RX ADMIN — POTASSIUM CHLORIDE 20 MEQ: 1.5 POWDER, FOR SOLUTION ORAL at 06:46

## 2025-01-16 RX ADMIN — ATORVASTATIN CALCIUM 40 MG: 20 TABLET, FILM COATED ORAL at 20:50

## 2025-01-16 RX ADMIN — ACETAMINOPHEN 650 MG: 325 TABLET ORAL at 14:33

## 2025-01-16 RX ADMIN — METHYLPREDNISOLONE SODIUM SUCCINATE 40 MG: 40 INJECTION, POWDER, FOR SOLUTION INTRAMUSCULAR; INTRAVENOUS at 10:47

## 2025-01-16 RX ADMIN — IPRATROPIUM BROMIDE AND ALBUTEROL SULFATE 3 ML: 2.5; .5 SOLUTION RESPIRATORY (INHALATION) at 07:01

## 2025-01-16 RX ADMIN — HEPARIN SODIUM 5000 UNITS: 5000 INJECTION INTRAVENOUS; SUBCUTANEOUS at 22:47

## 2025-01-16 RX ADMIN — METHOCARBAMOL TABLETS 500 MG: 500 TABLET, COATED ORAL at 21:20

## 2025-01-16 RX ADMIN — INDIUM IN-111 OXYQUINOLINE 0.51 MILLICURIE: 1 SOLUTION INTRAVENOUS at 12:30

## 2025-01-16 RX ADMIN — CEFAZOLIN SODIUM 2 G: 2 INJECTION, SOLUTION INTRAVENOUS at 14:20

## 2025-01-16 RX ADMIN — METHOCARBAMOL TABLETS 500 MG: 500 TABLET, COATED ORAL at 05:41

## 2025-01-16 RX ADMIN — EZETIMIBE 10 MG: 10 TABLET ORAL at 08:29

## 2025-01-16 RX ADMIN — METOPROLOL TARTRATE 50 MG: 50 TABLET, FILM COATED ORAL at 20:50

## 2025-01-16 RX ADMIN — ASPIRIN 81 MG: 81 TABLET, CHEWABLE ORAL at 20:50

## 2025-01-16 ASSESSMENT — PAIN DESCRIPTION - ORIENTATION
ORIENTATION: LOWER
ORIENTATION: LOWER

## 2025-01-16 ASSESSMENT — PAIN - FUNCTIONAL ASSESSMENT
PAIN_FUNCTIONAL_ASSESSMENT: 0-10

## 2025-01-16 ASSESSMENT — PAIN DESCRIPTION - LOCATION
LOCATION: BACK
LOCATION: BACK

## 2025-01-16 ASSESSMENT — PAIN SCALES - GENERAL
PAINLEVEL_OUTOF10: 1
PAINLEVEL_OUTOF10: 0 - NO PAIN
PAINLEVEL_OUTOF10: 5 - MODERATE PAIN
PAINLEVEL_OUTOF10: 1
PAINLEVEL_OUTOF10: 5 - MODERATE PAIN
PAINLEVEL_OUTOF10: 2
PAINLEVEL_OUTOF10: 0 - NO PAIN
PAINLEVEL_OUTOF10: 0 - NO PAIN
PAINLEVEL_OUTOF10: 2

## 2025-01-16 ASSESSMENT — COGNITIVE AND FUNCTIONAL STATUS - GENERAL
MOVING TO AND FROM BED TO CHAIR: A LOT
DRESSING REGULAR LOWER BODY CLOTHING: A LOT
TURNING FROM BACK TO SIDE WHILE IN FLAT BAD: A LOT
CLIMB 3 TO 5 STEPS WITH RAILING: TOTAL
CLIMB 3 TO 5 STEPS WITH RAILING: A LOT
WALKING IN HOSPITAL ROOM: A LOT
DAILY ACTIVITIY SCORE: 17
DRESSING REGULAR LOWER BODY CLOTHING: A LOT
TOILETING: A LOT
MOBILITY SCORE: 17
EATING MEALS: A LITTLE
MOVING TO AND FROM BED TO CHAIR: A LITTLE
WALKING IN HOSPITAL ROOM: A LOT
DRESSING REGULAR UPPER BODY CLOTHING: A LITTLE
MOBILITY SCORE: 11
MOVING FROM LYING ON BACK TO SITTING ON SIDE OF FLAT BED WITH BEDRAILS: A LOT
PERSONAL GROOMING: A LOT
PERSONAL GROOMING: A LITTLE
STANDING UP FROM CHAIR USING ARMS: A LOT
TURNING FROM BACK TO SIDE WHILE IN FLAT BAD: A LITTLE
EATING MEALS: A LITTLE
HELP NEEDED FOR BATHING: A LOT
STANDING UP FROM CHAIR USING ARMS: A LITTLE
HELP NEEDED FOR BATHING: A LITTLE
DAILY ACTIVITIY SCORE: 13
DRESSING REGULAR UPPER BODY CLOTHING: A LOT
TOILETING: A LITTLE

## 2025-01-16 ASSESSMENT — PAIN DESCRIPTION - DESCRIPTORS
DESCRIPTORS: ACHING;SORE
DESCRIPTORS: ACHING;SORE

## 2025-01-16 ASSESSMENT — ACTIVITIES OF DAILY LIVING (ADL): HOME_MANAGEMENT_TIME_ENTRY: 12

## 2025-01-16 NOTE — PROGRESS NOTES
Occupational Therapy    OT Treatment    Patient Name: Guicho Jones  MRN: [id number]  Department: [facility]  Room: 05/05  Today's Date: 1/16/2025  Time Calculation  Start Time: 0924  Stop Time: 1006  Time Calculation (min): 42 min      Assessment:  Pt progressing toward goal with improved attention to functional task completion. Updated OT POC to reflect progress with OT treatment and included goals to address balance and activity tolerance for self care performance.   Prognosis: Good  Evaluation/Treatment Tolerance: Patient tolerated treatment well, Patient limited by fatigue  Medical Staff Made Aware: Yes  End of Session Communication: Bedside nurse  End of Session Patient Position: Alarm off, not on at start of session (at [facility] requesting increased time; nursing notified; call light within reach)  OT Assessment Results: Decreased ADL status, Decreased endurance, Decreased functional mobility    Plan:  Treatment Interventions: ADL retraining, Functional transfer training, Endurance training, Patient/family training  OT Frequency: 2 times per week  OT Discharge Recommendations: Moderate intensity level of continued care  OT Recommended Transfer Status: Maximum assist, Assist of 2  OT - OK to Discharge: Yes    Subjective   Previous Visit Info:  OT Last Visit  OT Received On: 01/16/25  General:  General  Reason for Referral: Decline in self care performance  Referred By: PT/OT referred by Salazar 1/10/25  Past Medical History Relevant to Rehab: COPD, HTN, MI, CVA, AFib, CAD, Intrarenal AA,  Co-Treatment: PT  Co-Treatment Reason: Maximize pt safety  Prior to Session Communication: Bedside nurse  Patient Position Received: Bed, 3 rail up, Alarm off, not on at start of session  General Comment: Pt to ED 1/10 c/o decreased energy, nausea, vomiting, and diarrhea. Pt slid out of bed last night and was helped back to bed by his wife. In the morning, pt was unable to move d/t weakness. 1/10: flu A/B (-), Covid (-), XR  "chest (-), CT abd/pelvis (+) chronic AAA, CT head/C-spine (-) acute, (+) c3-c7 spondylosis, 1/11: Echo (-), Creatinine elevated (1.54) as of 1/13/25  Precautions:  Medical Precautions: Fall precautions, Oxygen therapy device and L/min    Vital Signs (Past 2hrs)        Date/Time Vitals Session Patient Position Pulse Resp SpO2 BP MAP (mmHg)    01/16/25  PreOT supine 60  92 119/56                         Vital Signs Comment: 8L oxymizer; SPO2 88% with activity and recovery >90% with PLB.     Pain:  Pain Assessment  Pain Assessment: 0-10  0-10 (Numeric) Pain Score: 5 - Moderate pain  Pain Location:  (\"body aches\")    Objective    Cognition:  Cognition  Arousal/Alertness: Appropriate responses to stimuli  Following Commands: Follows multistep commands with increased time  Safety Judgment:  (cues for safety)  Cognition Comments: no noted confusion  Attention: Within Functional Limits     Activities of Daily Living: Grooming  Grooming Level of Assistance: Close supervision  Grooming Where Assessed: Edge of bed  Grooming Comments: for grooming and washing face; good sitting balance with ADL activity; limited by fatigue with increased time.    Toileting  Toileting Level of Assistance: Maximum assistance for clothing management; decreased stand balance; Minimal verbal cues  Where Assessed: Bedside commode    Functional Standing Tolerance:  Time: 1 min  Activity: Stand supported at FWW level; fatigue with seated rest breaks between multiple performances of sit to stand; cues for safety and hand placement.  Bed Mobility/Transfers: Bed Mobility  Bed Mobility: Yes  Bed Mobility 1  Bed Mobility 1: Supine to sitting  Level of Assistance 1: Moderate assistance, +2, Minimal verbal cues, Minimal tactile cues  Bed Mobility Comments 1: HOB elevated    Transfers  Transfer: Yes  Transfer 1  Technique 1: Sit to stand, Stand to sit  Transfer Device 1: Walker  Transfer Level of Assistance 1: Minimum assistance, +2, Minimal verbal cues, " Minimal tactile cues  Transfers 2  Transfer From 2: Bed to  Transfer to 2: Chair with arms  Transfer Device 2: Walker  Transfer Level of Assistance 2: Moderate assistance, Minimal verbal cues, Minimal tactile cues    Toilet Transfers  Toilet Transfer From: Recliner  Toilet Transfer Type: To  Toilet Transfer to: Standard bedside commode  Toilet Transfers: Moderate assistance, Verbal cues    Outcome Measures:[facility] Daily Activity  Putting on and taking off regular lower body clothing: A lot  Bathing (including washing, rinsing, drying): A lot  Putting on and taking off regular upper body clothing: A lot  Toileting, which includes using toilet, bedpan or urinal: A lot  Taking care of personal grooming such as brushing teeth: A lot  Eating Meals: A little  Daily Activity - Total Score: 13      Education Documentation  ADL and Functional Transfer Training, taught by Elizabeth Anthony, OT at 1/16/2025 12:10 PM.  Learner: Patient  Readiness: Acceptance  Method: Explanation, Demonstration  Response: Verbalizes Understanding, Demonstrated Understanding, Needs Reinforcement    Goals:  Encounter Problems       Encounter Problems (Active)       OT Goals       pt will dress LB with modified indep (Progressing)       Start:  01/13/25    Expected End:  01/27/25            Pt will transfer to bed, chair, toilet with min A (Progressing)       Start:  01/13/25    Expected End:  01/27/25            Pt will attend to ADL task x 5 minutes with less than 3 vc's to redirect (Met)       Start:  01/13/25    Expected End:  01/27/25    Resolved:  01/16/25         Pt will complete grooming with SBA (Progressing)       Start:  01/13/25    Expected End:  01/27/25            Pt demo improved stand balance  >3 min for increased independence with toileting, hygiene, and clothing management tasks.  (Progressing)       Start:  01/16/25    Expected End:  01/27/25                Pt tolerate >10 min functional activity tolerance for ADL/IADL without c/o  fatigue; apply ECT/WS techniques without cues.  (Progressing)       Start:  01/16/25    Expected End:  01/27/25

## 2025-01-16 NOTE — CARE PLAN
Problem: Fall/Injury  Goal: Not fall by end of shift  1/16/2025 1053 by Maricel Clements RN  Outcome: Progressing  1/16/2025 1053 by Maricel Clements RN  Outcome: Progressing  Goal: Be free from injury by end of the shift  1/16/2025 1053 by Maricel Clements RN  Outcome: Progressing  1/16/2025 1053 by Maricel Clements RN  Outcome: Progressing  Goal: Verbalize understanding of personal risk factors for fall in the hospital  1/16/2025 1053 by Maricel Clements RN  Outcome: Progressing  1/16/2025 1053 by Maricel Clements RN  Outcome: Progressing  Goal: Verbalize understanding of risk factor reduction measures to prevent injury from fall in the home  1/16/2025 1053 by Maricel Clements RN  Outcome: Progressing  1/16/2025 1053 by Maricel Clements RN  Outcome: Progressing  Goal: Use assistive devices by end of the shift  1/16/2025 1053 by Maricel Clements RN  Outcome: Progressing  1/16/2025 1053 by Maricel Clements RN  Outcome: Progressing  Goal: Pace activities to prevent fatigue by end of the shift  1/16/2025 1053 by Maricel Clements RN  Outcome: Progressing  1/16/2025 1053 by Maricel Clements RN  Outcome: Progressing     Problem: Pain - Adult  Goal: Verbalizes/displays adequate comfort level or baseline comfort level  1/16/2025 1053 by Maricel Clements RN  Outcome: Progressing  1/16/2025 1053 by Maricel Clements RN  Outcome: Progressing     Problem: Safety - Adult  Goal: Free from fall injury  1/16/2025 1053 by Maricel Clements RN  Outcome: Progressing  1/16/2025 1053 by Maricel Clements RN  Outcome: Progressing     Problem: Discharge Planning  Goal: Discharge to home or other facility with appropriate resources  1/16/2025 1053 by Maricel Clements RN  Outcome: Progressing  1/16/2025 1053 by Maricel Clements RN  Outcome: Progressing     Problem: Chronic Conditions and Co-morbidities  Goal: Patient's chronic conditions and co-morbidity symptoms are monitored and maintained or improved  1/16/2025 1053 by Maricel Clements  RN  Outcome: Progressing  1/16/2025 1053 by Maricel Clements RN  Outcome: Progressing     Problem: Skin  Goal: Decreased wound size/increased tissue granulation at next dressing change  1/16/2025 1053 by Maricel Clements RN  Outcome: Progressing  Flowsheets (Taken 1/16/2025 1053)  Decreased wound size/increased tissue granulation at next dressing change: Promote sleep for wound healing  1/16/2025 1053 by Maricel Clements RN  Outcome: Progressing  Flowsheets (Taken 1/16/2025 1053)  Decreased wound size/increased tissue granulation at next dressing change: Promote sleep for wound healing  Goal: Participates in plan/prevention/treatment measures  1/16/2025 1053 by Maricel Clements RN  Outcome: Progressing  Flowsheets (Taken 1/16/2025 1053)  Participates in plan/prevention/treatment measures:   Discuss with provider PT/OT consult   Elevate heels   Increase activity/out of bed for meals  1/16/2025 1053 by Maricel Clements RN  Outcome: Progressing  Flowsheets (Taken 1/16/2025 1053)  Participates in plan/prevention/treatment measures:   Discuss with provider PT/OT consult   Elevate heels   Increase activity/out of bed for meals  Goal: Prevent/manage excess moisture  1/16/2025 1053 by Maricel Clements RN  Outcome: Progressing  Flowsheets (Taken 1/16/2025 1053)  Prevent/manage excess moisture:   Monitor for/manage infection if present   Cleanse incontinence/protect with barrier cream   Follow provider orders for dressing changes  1/16/2025 1053 by Maricel Clements RN  Outcome: Progressing  Flowsheets (Taken 1/16/2025 1053)  Prevent/manage excess moisture:   Monitor for/manage infection if present   Cleanse incontinence/protect with barrier cream   Follow provider orders for dressing changes  Goal: Prevent/minimize sheer/friction injuries  1/16/2025 1053 by Maricel Clements RN  Outcome: Progressing  Flowsheets (Taken 1/16/2025 1053)  Prevent/minimize sheer/friction injuries:   Complete micro-shifts as needed if patient  unable. Adjust patient position to relieve pressure points, not a full turn   Increase activity/out of bed for meals   Use pull sheet   HOB 30 degrees or less   Turn/reposition every 2 hours/use positioning/transfer devices  1/16/2025 1053 by Maricel Clements RN  Outcome: Progressing  Flowsheets (Taken 1/16/2025 1053)  Prevent/minimize sheer/friction injuries:   Complete micro-shifts as needed if patient unable. Adjust patient position to relieve pressure points, not a full turn   Increase activity/out of bed for meals   Use pull sheet   HOB 30 degrees or less   Turn/reposition every 2 hours/use positioning/transfer devices  Goal: Promote/optimize nutrition  1/16/2025 1053 by Maricel Clements RN  Outcome: Progressing  Flowsheets (Taken 1/16/2025 1053)  Promote/optimize nutrition:   Assist with feeding   Monitor/record intake including meals   Consume > 50% meals/supplements   Offer water/supplements/favorite foods   Discuss with provider if NPO > 2 days   Reassess MST if dietician not consulted  1/16/2025 1053 by Maricel Clements RN  Outcome: Progressing  Flowsheets (Taken 1/16/2025 1053)  Promote/optimize nutrition:   Assist with feeding   Monitor/record intake including meals   Consume > 50% meals/supplements   Offer water/supplements/favorite foods   Discuss with provider if NPO > 2 days   Reassess MST if dietician not consulted  Goal: Promote skin healing  1/16/2025 1053 by Maricel Clements RN  Outcome: Progressing  Flowsheets (Taken 1/16/2025 1053)  Promote skin healing:   Assess skin/pad under line(s)/device(s)   Turn/reposition every 2 hours/use positioning/transfer devices   Ensure correct size (line/device) and apply per  instructions   Rotate device position/do not position patient on device  1/16/2025 1053 by Maricel Clements RN  Outcome: Progressing  Flowsheets (Taken 1/16/2025 1053)  Promote skin healing:   Assess skin/pad under line(s)/device(s)   Turn/reposition every 2 hours/use  positioning/transfer devices   Ensure correct size (line/device) and apply per  instructions   Rotate device position/do not position patient on device

## 2025-01-16 NOTE — PROGRESS NOTES
Physical Therapy    Physical Therapy    Physical Therapy Treatment    Patient Name: Guicho Jones  MRN: [id number]  Today's Date: 1/16/2025  Time Calculation  Start Time: 0925  Stop Time: 1005  Time Calculation (min): 40 min       Assessment/Plan   PT Assessment  PT Assessment Results: Decreased strength, Decreased range of motion, Decreased endurance, Impaired balance, Decreased mobility  Rehab Prognosis: Good  Barriers to Discharge Home: Physical needs, Caregiver assistance  Evaluation/Treatment Tolerance: Patient limited by fatigue  End of Session Communication: Bedside nurse  Assessment Comment: Pt would benefit from continued therapy to improve ROM, strength, balance, and functional mobility.  End of Session Patient Position:  (Pt up on BSC with call bell and nursing informed.)     PT Plan  Treatment/Interventions: Bed mobility, Transfer training, Gait training, Balance training, Strengthening, Therapeutic exercise, Home exercise program  PT Plan: Ongoing PT  PT Frequency: 3 times per week  PT Discharge Recommendations: Moderate intensity level of continued care  PT Recommended Transfer Status: Assist x2, Assistive device      Current Problem:  1. Diarrhea, unspecified type        2. Hypotension, unspecified hypotension type        3. Elevated troponin        4. Hypokalemia        5. Renal insufficiency        6. Anemia, unspecified type        7. Fall, initial encounter  CANCELED: Cardiac device check - MRI    CANCELED: Cardiac device check - MRI      8. Medication induced coagulopathy (Multi)        9. Bacteremia due to Gram-positive bacteria  Transthoracic Echo (TTE) Complete    Transthoracic Echo (TTE) Complete    Transesophageal Echo (PAOLO)    Transesophageal Echo (PAOLO)    CANCELED: Cardiac device check - MRI    CANCELED: Cardiac device check - MRI      10. Pulmonary hypertension (Multi)  Transthoracic Echo (TTE) Complete    Transthoracic Echo (TTE) Complete    Transesophageal Echo (PAOLO)    Transesophageal  Echo (PAOLO)      11. Chronic diastolic congestive heart failure, NYHA class 2  Transthoracic Echo (TTE) Complete    Transthoracic Echo (TTE) Complete    Transesophageal Echo (PAOLO)    Transesophageal Echo (PAOLO)      12. Ischemic cardiomyopathy  Transthoracic Echo (TTE) Complete    Transthoracic Echo (TTE) Complete    Transesophageal Echo (PAOLO)    Transesophageal Echo (PAOOL)      13. Weakness of both arms  CANCELED: Cardiac device check - MRI    CANCELED: Cardiac device check - MRI      14. Lumbar pain  CANCELED: Cardiac device check - MRI    CANCELED: Cardiac device check - MRI      15. AICD (automatic cardioverter/defibrillator) present  Transesophageal Echo (PAOLO)    Transesophageal Echo (PAOLO)          General Visit Information:   PT  Visit  PT Received On: 01/16/25  General  Reason for Referral: Impaired mobility  PT Missed Visit: Yes  Missed Visit Reason: Patient in a medical procedure  Co-Treatment: OT  Co-Treatment Reason: Maximize pt and staff safety  Prior to Session Communication: Bedside nurse  Patient Position Received: Bed, 3 rail up  General Comment: Pt laying in bed- agreed to therapy.  Subjective     Precautions:  Precautions  Medical Precautions: Fall precautions    Vital Signs:  Vital Signs  SpO2: 92 %  Vital Signs Comment:  (8L oxzimer)  Objective     Pain:  Pain Assessment  Pain Assessment: 0-10  0-10 (Numeric) Pain Score: 5 - Moderate pain  Pain Location:  (body aches)                        Treatments:  Therapeutic Exercise  Therapeutic Exercise Performed: Yes  Therapeutic Exercise Activity 1: Seated exercises on EOB- AP, LAQ, pillow squeezes x 10 reps. (Pt performed supine exercises- AP, QS, GS, SAQ. SLR, hip abd/add x10 AROM.)        Bed Mobility  Bed Mobility: Yes  Bed Mobility 1  Bed Mobility 1: Supine to sitting  Level of Assistance 1: Moderate assistance, +2 (Tactile/ v/c for proper technique/ hand placement.)  Bed Mobility Comments 1: HOB elevated  Ambulation/Gait Training  Ambulation/Gait  Training Performed: Yes  Ambulation/Gait Training 1  Surface 1: Level tile  Device 1: Rolling walker  Assistance 1: Moderate assistance  Quality of Gait 1: Inconsistent stride length, Decreased step length, Wide base of support  Comments/Distance (ft) 1:  (Pt ambulates with WW from bed to recliner with WBOS with small, slow uneven steps with step through lashanda 5 ft.  with mod A x1.)  Transfers  Transfer: Yes  Transfer 1  Transfer From 1: Chair with arms to  Transfer to 1: Commode-standard  Technique 1: Sit to stand, Stand to sit  Transfer Device 1: Walker  Transfer Level of Assistance 1: Minimum assistance, +2  Trials/Comments 1:  (Pt transfers with WW with min A x2 with v/c/tactile cues for proper hand placement.)  Transfers 2  Transfer From 2: Bed to  Transfer to 2: Chair with arms  Transfer Device 2: Walker  Transfer Level of Assistance 2: Moderate assistance, +1 to manage equipment  Trials/Comments 2:  (Pt transfers from bed to recliner with mod A x2- 1 to manage equipment with minimal cues for proper technique/ hand placement.)          Outcome Measures:  [facility] Basic Mobility  Turning from your back to your side while in a flat bed without using bedrails: A lot  Moving from lying on your back to sitting on the side of a flat bed without using bedrails: A lot  Moving to and from bed to chair (including a wheelchair): A lot  Standing up from a chair using your arms (e.g. wheelchair or bedside chair): A lot  To walk in hospital room: A lot  Climbing 3-5 steps with railing: Total  Basic Mobility - Total Score: 11  Education Documentation  No documentation found.  Education Comments  No comments found.        EDUCATION:     Encounter Problems       Encounter Problems (Active)       PT Problem       Pt will completed supine <> sit bed mobility from flat bed with Min A  (Progressing)       Start:  01/13/25    Expected End:  01/27/25            Pt will demonstrate sit to stand transfers with WW + Min A (Progressing)        Start:  01/13/25    Expected End:  01/27/25            Pt. will ambulate 30 with WW + Min A  (Progressing)       Start:  01/13/25    Expected End:  01/27/25            Pt will maintain balance while reaching outside PADMINI in sitting with WW/ B LE support, single UE support and Min A (Progressing)       Start:  01/13/25    Expected End:  01/27/25            Pt will independently complete B LE strengthening Hep (Progressing)       Start:  01/13/25    Expected End:  01/27/25               Pain - Adult

## 2025-01-16 NOTE — CONSULTS
Social Work Note  [facility] has responded in [facility] that they have accepted the pt. Updates have been sent to [facility]. The pt continues to receive care in the MICU.

## 2025-01-16 NOTE — PROGRESS NOTES
?  HISTORY OF PRESENT ILLNESS:         HPI 84-year-old male started having nausea vomiting and diarrhea after eating pizza on Wednesday. Tells me that his grandchildren have been ill with nausea/vomiting/diarrhea as well. CT abdomen and pelvis and CT head both noted -patient does have 6 x 6 cm aortic aneurysm, which is apparently changed in size compared to last image, currently without evidence of dissection.  He was admitted to the intensive care unit for further evaluation and treatment due to hypotension and hypovolemia     Flu, COVID, RSV all negative  Pathogen panel pending  C. difficile PCR normal  Repeat blood culture from 1/11/2025 pending     Of note, patient has AICD     Unfortunately, on 1/10/2025, patient's blood cultures x 2 positive for Staphylococcus aureus    Pt reported negative PAOLO  No fevers  Back pain which he says  appears improving    Current Medications:    Current Facility-Administered Medications   Medication Dose Route Frequency Provider Last Rate Last Admin    acetaminophen (Tylenol) tablet 650 mg  650 mg oral q6h SAJI Messer-CNP   650 mg at 01/15/25 2033    amiodarone (Pacerone) tablet 100 mg  100 mg oral Daily SAJI Weathers-CNP   100 mg at 01/15/25 0930    aspirin chewable tablet 81 mg  81 mg oral Nightly SAJI Weathers-CNP   81 mg at 01/15/25 2033    atorvastatin (Lipitor) tablet 40 mg  40 mg oral Nightly SAJI Weathers-CNP   40 mg at 01/15/25 2032    ceFAZolin (Ancef) 2 g in dextrose (iso)  mL  2 g intravenous q8h Roseanna Be PharmD 0 mL/hr at 01/15/25 1442 2 g at 01/15/25 2032    [Held by provider] empagliflozin (Jardiance) tablet 10 mg  10 mg oral Daily SAJI Weathers-CNP   10 mg at 01/11/25 0900    ezetimibe (Zetia) tablet 10 mg  10 mg oral Daily SAJI Weathers-CNP   10 mg at 01/15/25 0930    famotidine (Pepcid) tablet 20 mg  20 mg oral BID PRN Rodriguez Sapp MD        fluticasone (Flonase) nasal spray 2  spray  2 spray Each Nostril Daily VICKY Weathers   2 spray at 01/15/25 1045    [Held by provider] tiotropium (Spiriva Respimat) 2.5 mcg/actuation inhaler 2 puff  2 puff inhalation Daily VICKY Weathers   2 puff at 01/14/25 0850    And    [Held by provider] fluticasone furoate-vilanteroL (Breo Ellipta) 200-25 mcg/dose inhaler 1 puff  1 puff inhalation Daily VICKY Weathers   1 puff at 01/14/25 0850    [Held by provider] heparin 25,000 Units in dextrose 5% 250 mL (100 Units/mL) infusion (premix)  0-4,000 Units/hr intravenous Continuous VICKY Messer   Stopped at 01/14/25 1231    [Held by provider] heparin bolus from bag 2,000-4,000 Units  2,000-4,000 Units intravenous q4h PRN VICKY Messer   2,000 Units at 01/12/25 1515    [Held by provider] hydroCHLOROthiazide (HYDRODiuril) tablet 25 mg  25 mg oral Once per day on Monday Wednesday Friday VICKY Weathers        ipratropium-albuteroL (Duo-Neb) 0.5-2.5 mg/3 mL nebulizer solution 3 mL  3 mL nebulization q4h PRN VICKY Nicole        ipratropium-albuteroL (Duo-Neb) 0.5-2.5 mg/3 mL nebulizer solution 3 mL  3 mL nebulization TID VICKY Nicole   3 mL at 01/15/25 1959    [Held by provider] isosorbide mononitrate ER (Imdur) 24 hr tablet 30 mg  30 mg oral Daily VICKY Weathers        lidocaine 4 % patch 1 patch  1 patch transdermal Daily VICKY Messer   1 patch at 01/15/25 1508    [Held by provider] lisinopril tablet 40 mg  40 mg oral Daily VICKY Weathers        melatonin tablet 5 mg  5 mg oral Nightly NEAL MesserCNP   5 mg at 01/15/25 2032    methocarbamol (Robaxin) tablet 500 mg  500 mg oral q8h [facility] Jeanne Maurer PA-C   500 mg at 01/15/25 1332    methylPREDNISolone sod succinate (SOLU-Medrol) 40 mg/mL injection 40 mg  40 mg intravenous q24h Rodriguez Sapp MD   40 mg at 01/15/25 1045    [Held by provider]  "metoprolol succinate XL (Toprol-XL) 24 hr tablet 100 mg  100 mg oral Nightly VICKY Weahters        [Held by provider] metoprolol succinate XL (Toprol-XL) 24 hr tablet 50 mg  50 mg oral Daily VICKY Weathers   50 mg at 01/11/25 0900    metoprolol tartrate (Lopressor) tablet 50 mg  50 mg oral BID VICKY Messer   50 mg at 01/15/25 2033    montelukast (Singulair) tablet 10 mg  10 mg oral q PM VICKY Weathers   10 mg at 01/15/25 2032    ondansetron (Zofran) injection 4 mg  4 mg intravenous q6h PRN VICKY Messer   4 mg at 01/12/25 2017    oxyCODONE (Roxicodone) immediate release tablet 2.5 mg  2.5 mg oral q6h PRN VICKY Messer   2.5 mg at 01/13/25 1401    oxygen (O2) therapy   inhalation Continuous PRN - O2/gases VICKY Nicole   10 L/min at 01/15/25 1959        Allergies:    Allergies   Allergen Reactions    Levofloxacin Palpitations     Rapid Heart Rate - Severe per pt.          Review of Systems  14 system review is negative other than HPI     /66   Pulse 60   Temp 36.1 °C (97 °F) (Temporal)   Resp 21   Ht 1.676 m (5' 6\")   Wt 103 kg (227 lb 1.2 oz)   SpO2 93%   BMI 36.65 kg/m²    Physical Exam:  General: Patient appears ok at the present time. NAD  Skin: no new rashes  HEENT:  Neck is supple, No subconjunctival hemorrhages, no oral exudates  Heart: S1 S2  Lungs: diminished bases  Abdomen: soft, ND, NTTP,  Extrem: No edema, non tender           DATA:    .  Lab Results   Component Value Date    WBC 10.1 01/15/2025    HGB 10.3 (L) 01/15/2025    HCT 31.2 (L) 01/15/2025    MCV 94 01/15/2025     (L) 01/15/2025     Results from last 7 days   Lab Units 01/15/25  0426   SODIUM mmol/L 135*   POTASSIUM mmol/L 2.9*   CHLORIDE mmol/L 102   CO2 mmol/L 24   BUN mg/dL 64*   CREATININE mg/dL 2.60*   CALCIUM mg/dL 8.2*   PROTEIN TOTAL g/dL 5.8*   BILIRUBIN TOTAL mg/dL 0.7   ALK PHOS U/L 64   ALT U/L 4*   AST U/L 33 "   GLUCOSE mg/dL 101*   Susceptibility data from last 90 days.  Collected Specimen Info Organism Clindamycin Erythromycin Oxacillin Tetracycline Trimethoprim/Sulfamethoxazole Vancomycin   01/12/25 Blood culture from Peripheral Venipuncture Staphylococcus aureus         01/12/25 Blood culture from Peripheral Venipuncture Staphylococcus aureus         01/10/25 Blood culture from Peripheral Venipuncture Staphylococcus aureus         01/10/25 Blood culture from Peripheral Venipuncture Methicillin Susceptible Staphylococcus aureus (MSSA)  S  S  S  S  S  S   Susceptibility data from last 90 days.  Collected Specimen Info Organism Clindamycin Erythromycin Oxacillin Tetracycline Trimethoprim/Sulfamethoxazole Vancomycin   01/12/25 Blood culture from Peripheral Venipuncture Staphylococcus aureus         01/12/25 Blood culture from Peripheral Venipuncture Staphylococcus aureus         01/10/25 Blood culture from Peripheral Venipuncture Staphylococcus aureus         01/10/25 Blood culture from Peripheral Venipuncture Methicillin Susceptible Staphylococcus aureus (MSSA)  S  S  S  S  S  S           IMPRESSION:        Problem List Items Addressed This Visit          Cardiac and Vasculature    AICD (automatic cardioverter/defibrillator) present    Relevant Orders    Transesophageal Echo (PAOLO)    Ischemic cardiomyopathy    Relevant Medications    isosorbide mononitrate ER (Imdur) 24 hr tablet 30 mg    metoprolol succinate XL (Toprol-XL) 24 hr tablet 100 mg    metoprolol succinate XL (Toprol-XL) 24 hr tablet 50 mg    metoprolol tartrate (Lopressor) tablet 50 mg    Other Relevant Orders    Transthoracic Echo (TTE) Complete (Completed)    Transesophageal Echo (PAOLO)    Chronic diastolic congestive heart failure, NYHA class 2    Relevant Medications    isosorbide mononitrate ER (Imdur) 24 hr tablet 30 mg    metoprolol succinate XL (Toprol-XL) 24 hr tablet 100 mg    metoprolol succinate XL (Toprol-XL) 24 hr tablet 50 mg    metoprolol  tartrate (Lopressor) tablet 50 mg    Other Relevant Orders    Transthoracic Echo (TTE) Complete (Completed)    Transesophageal Echo (PAOLO)    Pulmonary hypertension (Multi)    Relevant Orders    Transthoracic Echo (TTE) Complete (Completed)    Transesophageal Echo (PAOLO)       Gastrointestinal and Abdominal    * (Principal) Diarrhea, unspecified type - Primary     Other Visit Diagnoses       Hypotension, unspecified hypotension type        Elevated troponin        Hypokalemia        Renal insufficiency        Anemia, unspecified type        Fall, initial encounter        Medication induced coagulopathy (Multi)        Relevant Medications    aspirin chewable tablet 81 mg    heparin 25,000 Units in dextrose 5% 250 mL (100 Units/mL) infusion (premix)    heparin bolus from bag 2,000-4,000 Units    albumin human 5 % infusion 25 g (Completed)    Bacteremia due to Gram-positive bacteria        Relevant Orders    Transthoracic Echo (TTE) Complete (Completed)    Transesophageal Echo (PAOLO)    Weakness of both arms        Lumbar pain            Concerned of endovascular infection given presentation and risk factors  CT showing multifocal consolidations.      PLAN:   PAOLO unrevealing  Continue antibiotic therapy  Repeat blood cultures until clearance    We may also to image aneurysm further if possible., Possible spinal imaging as well.  MRI could not be performed because of cardiac device.  Other testing will be more suboptimal CTs cannot be performed currently with renal failure, so excluding an epidural abscess with imaging not possible.  No long tract neurological findings at this time    Awaiting white cell scan    Discussed with ICU           Marc Lomeli MD

## 2025-01-16 NOTE — PROGRESS NOTES
Renal Progress Note  Assessment/  84 y.o. year old male who presented to the hospital for further evaluation and management of s/p mechanical fall with difficult to ambulate came to the emergency department where clinical laboratory assessment did show that the patient blood pressure is 60/60 and patient did have a fever of 38 he was resuscitated with fluids and admitted for further evaluation and because of persistent hypotension the patient was transferred to critical care bed     Hospital course was significant for CT abdomen pelvis with IV contrast done at 1/10/2025 that did not show active disease with no bowel obstruction a complex cyst on the right renal cyst at the day creatinine was 1.6 and GFR of 4, 3 days later creatinine remained at 1.5 increased to 2.3 yesterday and today 2.5  Yesterday patient was significantly hemodynamically unstable with a stretch of hypotension blood pressure in the 80s and 90s systolic over 40s diastolic with net negative fluid balance last 24 hours 2.6 L     Renal ultrasounds right kidney 12.8 left kidney 12.3 with no obstructive uropathy     Acute kidney injury based on the above clinical laboratory assessment prerenal azotemia versus aggravated renal insult possible contribution of contrast and aggravation by contrast exposure this type of injury is usually benign for recovery is expected  Whether the admission 1.5 creatinine reflects patient baseline or not is not clear at the time of dictation to address the presence of chronic kidney disease     Thrombocytopenia but adequate platelet  Hide likely kidney involvement in collagen vascular disease or thrombotic microangiopathy     Plan   Discussed with critical care team and Dr. Sapp patient is recovering his kidney function with less aggressive diuresis continue current plan of management clinical laboratory assess the patient tomorrow   outpatient follow up from renal standpoint: Dr. Campos      Subjective:   Admit Date:  "1/10/2025    Interval History: Patient seen and examined uneventful night expressed feeling better compared to yesterday      Medications:   Scheduled Meds:acetaminophen, 650 mg, oral, q6h  amiodarone, 100 mg, oral, Daily  aspirin, 81 mg, oral, Nightly  atorvastatin, 40 mg, oral, Nightly  ceFAZolin, 2 g, intravenous, q8h  [Held by provider] empagliflozin, 10 mg, oral, Daily  ezetimibe, 10 mg, oral, Daily  fluticasone, 2 spray, Each Nostril, Daily  [Held by provider] tiotropium, 2 puff, inhalation, Daily   And  [Held by provider] fluticasone furoate-vilanteroL, 1 puff, inhalation, Daily  [Held by provider] hydroCHLOROthiazide, 25 mg, oral, Once per day on Monday Wednesday Friday  ipratropium-albuteroL, 3 mL, nebulization, TID  [Held by provider] isosorbide mononitrate ER, 30 mg, oral, Daily  lidocaine, 1 patch, transdermal, Daily  [Held by provider] lisinopril, 40 mg, oral, Daily  melatonin, 5 mg, oral, Nightly  methocarbamol, 500 mg, oral, q8h SHABNAM  methylPREDNISolone sodium succinate (PF), 40 mg, intravenous, q24h  [Held by provider] metoprolol succinate XL, 100 mg, oral, Nightly  [Held by provider] metoprolol succinate XL, 50 mg, oral, Daily  metoprolol tartrate, 50 mg, oral, BID  montelukast, 10 mg, oral, q PM      Continuous Infusions:[Held by provider] heparin, 0-4,000 Units/hr, Last Rate: Stopped (01/14/25 1231)        CBC:   Lab Results   Component Value Date    HGB 10.8 (L) 01/16/2025    HGB 10.3 (L) 01/15/2025    WBC 12.3 (H) 01/16/2025    WBC 10.1 01/15/2025     01/16/2025     (L) 01/15/2025      Anemia:  No results found for: \"FERRITIN\", \"IRON\", \"TIBC\"   BMP:    Lab Results   Component Value Date     01/16/2025     (L) 01/15/2025    K 3.1 (L) 01/16/2025    K 2.9 (LL) 01/15/2025     01/16/2025     01/15/2025    CO2 21 01/16/2025    CO2 24 01/15/2025    BUN 65 (H) 01/16/2025    BUN 64 (H) 01/15/2025    CREATININE 2.19 (H) 01/16/2025    CREATININE 2.60 (H) 01/15/2025    " "  Bone disease:   Lab Results   Component Value Date    PHOS 3.5 01/16/2025      Urinalysis:  No results found for: \"LYNN\", \"PROTUR\", \"GLUCOSEU\", \"BLOODU\", \"KETONESU\", \"BILIRUBINU\", \"NITRITEU\", \"LEUKOCYTESU\", \"UTPCR\"     Objective:   Vitals: /60   Pulse 60   Temp 36 °C (96.8 °F) (Temporal)   Resp 19   Ht 1.676 m (5' 6\")   Wt 102 kg (225 lb 12 oz)   SpO2 91%   BMI 36.44 kg/m²    Wt Readings from Last 3 Encounters:   01/16/25 102 kg (225 lb 12 oz)   11/25/24 105 kg (231 lb)   11/04/24 103 kg (226 lb 12.8 oz)      24HR INTAKE/OUTPUT:    Intake/Output Summary (Last 24 hours) at 1/16/2025 1154  Last data filed at 1/16/2025 1114  Gross per 24 hour   Intake 1280 ml   Output 1900 ml   Net -620 ml     Admission weight:  Weight: 99.8 kg (220 lb)      Constitutional:  Alert, awake, no apparent distress   Skin:normal, no rash  HEENT:sclera anicteric.  Head atraumatic normocephalic  Neck:supple with no thyromegally  Cardiovascular:  S1, S2 without m/r/g   Respiratory:  CTA B without w/r/r   Abdomen: +bs, soft, nt  Ext: no LE edema  Musculoskeletal:Intact  Neuro:Alert and oriented with no deficit      Electronically signed by Jason Campos MD on 1/16/2025 at 11:54 AM            "

## 2025-01-16 NOTE — PROGRESS NOTES
[facility] Critical Care Medicine Progress Note    Date:  1/16/2025  Patient:  Guicho Jones  YOB: 1940  MRN:  [id number]   Admit Date:  1/10/2025  ========================================================================================================    Chief Complaint   Patient presents with    Trauma     HIA     Interval ICU Events:  1/10: Admit to the ICU for hypotension 2/2 to hypovolemia and concern for clinical decompensation.   1/11: VSS. Febrile, Blood cultures, +GPC. States had biopsy of scrotum day prior to hospitalization and needs to have MOHS procedure, pending. Site of biopsy without s/s of infection/ drainage. RN staff noted upper extremity weakness with abnormality in depth perception. On initial exam bilateral upper extremity weakness, no loss of sensation, right hand tingling (at baseline), and difficulty with depth perception. MRI ordered of cervical/ thoracic/ lumbar spine. Spoke with rep from [employer] to verify device compatibility and reported that old device still present in abdomen is not MRI compatible but new device in chest wall is, MRI was canceled. Noted improvement in neurological exam and movement/ strength of upper extremities but persistent limitation with depth perception.     1/12: T max 39.2, WBC 10.9. decrease in volume of stool output, endorses abdominal cramping but no tenderness. Back pain with spasms, reports no increase from baseline and no point tenderness.   Echo: EF 55%, no source of embolus, no definite vegetation, normal RV systolic function, LA mod dilated, RA mild to mod dilated, mild MR, mild TR, mild AR, small PFO, mod dilatation of ascending aorta, mod increased septal and mildly increased posterior LV wall thickness  1/13: Pt overnight wore his BiPAP and being transitioned to NC this AM. Pt otherwise Afebrile and normocardic but with low normal Bps with MAP near 65mmHg. Pt has been NPO for PAOLO. Pt with UOP of 700cc in 24 hours and is  net+1.6L in 24 hours and net +3.3L since admission with slightly improved Cr to 1.54 this AM and improved bilirubin. Blood Cultures still + from 1/12 for GPC clusters. Pt currently with no complaints other than some minor back pain.   1/14: Pt overnight . Pt overnight with increasing creatinine to 2.33 from 1.54 with UOP of 1.3L and net +186 in 24 hours and net +3.4L since admission. Pt also noted to have some hemoptysis after PAOLO yesterday and into today and worsening O2 requirements as he is now on Oximyzer at 8-10lpm.   1/15: pt overnight tolerated his CPAP and was transitioned to oximyzer at 10lpm. Pt with CT not showing and gross evidence of intraabdominal infection but multiple consolidations concerning for possible multifocal PNA with left upper lobe with a small possible air fluid level concerning for a possible abscess.  Pt with a slightly increased Cr of 2.60 and K of 2.9 and UOP of 4L in 24 hours and net negative 2.6L with a nearly net even admission fluid balance. Pt otherwise has been afebrile and hemodynamically stable but still with some diarrhea that is honey colored and loose in consistency. Pt planned for first part of tagged WBC scan today.   1/16: pt overnight had some increased O2 requirements to 15LPM of oximyzer and tolerated his BiPAP but is back down to 10LPM this morning. Pt had blood drawn for next part of Tagged WBC scan. Pt with UOP of 1.9L and is net negative 1.3L and improved Cr of 2.19 this morning. Pt's WBC did increase to 12K this morning but steroids were started yesterday. Patient without gross hemoptysis but with some dried blood occasionally when coughing.    Medical History:  Past Medical History:   Diagnosis Date    Allergic     Arthritis     Cancer (Multi)     Cataract     CHF (congestive heart failure)     COPD (chronic obstructive pulmonary disease) (Multi)     Encounter for follow-up examination after completed treatment for conditions other than malignant neoplasm  12/27/2021    Hospital discharge follow-up    Heart disease     Hypertension     Ischemic cardiomyopathy     Ischemic cardiomyopathy with implantable cardioverter-defibrillator (ICD)    Myocardial infarction (Multi)     Pain in unspecified hip     Joint pain, hip    Personal history of other diseases of the musculoskeletal system and connective tissue     History of low back pain    Personal history of other endocrine, nutritional and metabolic disease     History of hyperlipidemia    Personal history of other specified conditions 12/21/2021    History of elevated prostate specific antigen (PSA)    Presence of coronary angioplasty implant and graft     Stented coronary artery    Unspecified atrial flutter (Multi)     Paroxysmal atrial flutter     Past Surgical History:   Procedure Laterality Date    BACK SURGERY      CARDIAC CATHETERIZATION      CORONARY STENT PLACEMENT      CT ABDOMEN ANGIOGRAM W AND/OR WO IV CONTRAST  10/29/2021    CT ABDOMEN ANGIOGRAM W AND/OR WO IV CONTRAST 10/29/2021 [facility]    CT ABDOMEN ANGIOGRAM W AND/OR WO IV CONTRAST  10/03/2022    CT ABDOMEN ANGIOGRAM W AND/OR WO IV CONTRAST 10/3/2022 [facility]    EYE SURGERY      JOINT REPLACEMENT      OTHER SURGICAL HISTORY  10/12/2021    Renal angioplasty and stenting    OTHER SURGICAL HISTORY  10/12/2021    Cataract surgery    OTHER SURGICAL HISTORY  12/21/2021    Hip replacement    OTHER SURGICAL HISTORY  01/04/2022    Complete colonoscopy    OTHER SURGICAL HISTORY  12/21/2021    Back surgery    OTHER SURGICAL HISTORY  12/21/2021    Abdominal aortic aneurysm repair    OTHER SURGICAL HISTORY  12/21/2021    Hip replacement    OTHER SURGICAL HISTORY  12/21/2021    Surgery    TONSILLECTOMY      VASECTOMY       Medications Prior to Admission   Medication Sig Dispense Refill Last Dose/Taking    albuterol (Proventil HFA) 90 mcg/actuation inhaler Inhale 1 puff every 4 hours if needed.   1/9/2025    amiodarone (Pacerone) 200 mg tablet  Take 0.5 tablets (100 mg) by mouth once daily. 90 tablet 1 1/9/2025    aspirin 81 mg chewable tablet Chew 1 tablet (81 mg). TAKE 1 TABLET MONDAY THROUGH FRIDAY AT BEDTIME   1/9/2025    atorvastatin (Lipitor) 40 mg tablet Take 1 tablet (40 mg) by mouth once daily at bedtime. 90 tablet 1 1/9/2025    budesonide-glycopyr-formoterol (Breztri Aerosphere) 160-9-4.8 mcg/actuation HFA aerosol inhaler Inhale 2 puffs 2 times a day.   1/10/2025    empagliflozin (Jardiance) 10 mg Take 1 tablet (10 mg) by mouth once daily. 90 tablet 3 1/9/2025    ezetimibe (Zetia) 10 mg tablet Take 1 tablet (10 mg) by mouth once daily. 90 tablet 1 1/9/2025    fluticasone (Flonase) 50 mcg/actuation nasal spray USE 2 SPRAYS IN EACH NOSTRIL EVERY DAY 48 g 3 1/10/2025    glucosamine HCl 1,500 mg tablet Take 1 tablet by mouth once daily.   1/9/2025    hydroCHLOROthiazide (HYDRODiuril) 25 mg tablet TAKE 1 TABLET MON WED FRI ONLY   1/9/2025    ipratropium (Atrovent) 42 mcg (0.06 %) nasal spray Administer 2 sprays into each nostril 3 times a day. 15 mL 3 1/9/2025    isosorbide mononitrate ER (Imdur) 30 mg 24 hr tablet Take 1 tablet (30 mg) by mouth once daily. 1 tablet daily 90 tablet 3 1/9/2025    lisinopril 40 mg tablet TAKE 1 TABLET EVERY DAY 90 tablet 3 1/9/2025    magnesium oxide (MagOx) 400 mg (241.3 mg magnesium) tablet Take 1 tablet (400 mg) by mouth 2 times a day.   1/9/2025    metoprolol succinate XL (Toprol-XL) 100 mg 24 hr tablet 1/2 tablet in the morning, 1 full tablet at night   1/9/2025    metoprolol tartrate (Lopressor) 25 mg tablet Take 1 tablet daily for palpitations only 90 tablet 1 1/9/2025    montelukast (Singulair) 10 mg tablet TAKE 1 TABLET EVERY EVENING 90 tablet 3 1/9/2025    omega 3-dha-epa-fish oil (Fish OiL) 1,000 mg (120 mg-180 mg) capsule Take 1 capsule (1,000 mg) by mouth 2 times a day.   1/9/2025    rivaroxaban (Xarelto) 20 mg tablet 1 tablet daily 90 tablet 3 1/9/2025    amoxicillin (Amoxil) 500 mg capsule Take 4 capsules  (2,000 mg) by mouth see administration instructions. TAKE 1 HOUR BEFORE DENTAL APPOINTMENT (Patient not taking: Reported on 1/10/2025)   More than a month    nitroglycerin (Nitrostat) 0.4 mg SL tablet Place 1 tablet (0.4 mg) under the tongue every 5 minutes if needed for chest pain. 25 tablet 5 Unknown     Levofloxacin  Social History     Tobacco Use    Smoking status: Former     Current packs/day: 1.50     Average packs/day: 1.5 packs/day for 15.0 years (22.5 ttl pk-yrs)     Types: Cigarettes     Passive exposure: Never    Smokeless tobacco: Never   Vaping Use    Vaping status: Never Used   Substance Use Topics    Alcohol use: Yes     Alcohol/week: 3.0 standard drinks of alcohol     Types: 3 Standard drinks or equivalent per week     Comment: occassional    Drug use: Never     Family History   Problem Relation Name Age of Onset    Liver disease Mother Ysabel. Campbell Jones     Arthritis Mother Ysabel. Campbell Jones     Coronary artery disease Father Guicho Jones     Atrial fibrillation Father Guicho Jones     Heart disease Father Guicho Jones     Cancer Sister Sally Jones        Review of Systems:  14 point review of systems was completed and negative except for those specially mention in my HPI    Physical Exam:    Heart Rate:  [60]   Temp:  [36 °C (96.8 °F)-36.1 °C (97 °F)]   Resp:  [17-24]   BP: (109-146)/(56-77)   Weight:  [102 kg (225 lb 12 oz)]   SpO2:  [91 %-99 %]     Physical Exam  Constitutional:       General: He is not in acute distress.     Appearance: He is obese. He is not ill-appearing or toxic-appearing.      Comments: On oximyzer   HENT:      Head: Normocephalic.      Mouth/Throat:      Pharynx: Oropharynx is clear.   Eyes:      Extraocular Movements: Extraocular movements intact.      Conjunctiva/sclera: Conjunctivae normal.      Pupils: Pupils are equal, round, and reactive to light.   Cardiovascular:      Rate and Rhythm: Normal rate and regular rhythm.       Pulses: Normal pulses.      Heart sounds: Normal heart sounds.   Pulmonary:      Breath sounds: Normal breath sounds. No wheezing or rales.      Comments: Some decreased air entry and movement   Slight wheeze today  Abdominal:      Palpations: Abdomen is soft. There is no mass.      Tenderness: There is no abdominal tenderness.   Genitourinary:     Comments: Biopsy of scrotum, site pink without evidence of infection or drainage  Musculoskeletal:      Right lower leg: No edema.      Left lower leg: No edema.      Comments: Improved Lower extremity edema   Skin:     General: Skin is warm and dry.      Coloration: Skin is not jaundiced.      Comments: flushed   Neurological:      General: No focal deficit present.      Mental Status: He is alert and oriented to person, place, and time.      Cranial Nerves: No cranial nerve deficit.      Motor: No weakness.      Comments: Finger-nose test normal today         Objective:  I have reviewed all medications, laboratory results, and imaging pertinent for today's encounter    Assessment/Plan:  Pt is an 85 y/o M w/ a PMHx of ICM s/p ICD, MI, diastolic heart failure, CAD s/p PCI, COPD (centrilobular emphysema), pulmonary hypertension, HTN, HLD, persistent Afib on ATC with xarelto, CVA, infrarenal AAA, obesity, and prostate cancer who presented s/p fall due to septic shock from MSSA bacteremia of unclear source. Pt course also complicated by ISAIAS which seems to have stabilized and hyperbilirubinemia due to sepsis but much improved with new hypoxemic resp failure requiring HFNC possibly due to new pneumonia vs ARDS from ongoing infection vs COPD exacerbation. Pt currently stable. Concerning prognosis but appears well.     Neuro/Psych/Pain Ctrl/Sedation:  #Acute on chronic back pain  #Possible stroke  #Hx of CVA  #Fall, prior to admission  #Ataxia  #Acute debilitation 2/2 critical illness  - Concern for possible septic emboli 2/2 gram positive bacteremia due to change in depth  perception, unable to obtain MRI related to ICD device incompatibility  - Given patient's back pain possible Epidural abscess but unable to get MRI as above. No notable jeet abnormalities on CT scans and in process of obtaining NM scan of spine and other areas today after discussion with ID  - Neuro checks  - No vegetation or note of lead infection on PAOLO and will hold off on neurology consult as patient appears neurologically intact today  - CAM-ICU  - Delirium precautions  - Multimodal pain management: acetaminophen, robaxin, lidocaine patch, ice  - Oxycodone as needed  - Out of bed to chair  - PT/OT as toleraated     Respiratory/ENT:  #Acute on chronic hypoxic respiratory failure  #COPD without acute exacerbation  #MSSA PNA  #Hemoptysis  - Concern for possible septic emboli and complicated by Anti-coagulation  - Possible new PNA vs hypervolemia vs ARDS vs new vasculitis (although lower suspicion for new vasculitis)  - Home inhalers, singulair, flonase  - C/W Duonebs and steroids (Day 2 of 5) for possible COPD exacerbation  - Oxygen therapy as needed to maintain SPO2 greater than 90% and wean Oximyzer as tolerated  - Pulmonary hygiene  - BiPAP at night, may use home device  - Sent off ANCAs, DARYL,and will send Anti-GBM and Complements     Cardiovascular:  #Septic shock - resolved  #Infrarenal AAA, increased in size from previou  #ICM s/p AICD  #HTN  #Chronic diastolic heart failure  #Atrial fibrillation  #HLD  - C/W home amiodarone, statin  - Continue holding imdur, lisinopril, jardiance, hydrochlorothiazide  - C/w metoprolol at reduced dosing, adjust as needed  - No vegetation or lead infection on PAOLO  - Will restart heparin subcutaneous today and if tolerates will start heparin gtt tomorrow  - Follow up with vascular OP regarding infrarenal AAA    Renal/Volume Status (Intra & Extravascular):  #ISAIAS in setting of hypovolemia and sepsis -improved today due to possible BINU vs cardio-renal syndrome given patient has  been grossly positive the last few days and with 4 chamber dilation on PAOLO  Baseline Creat ~0.9-1.1  - Will hold diuresis today and allow for auto diuretics as renal function improves  - Strict I&Os  - Avoid nephrotoxic agents    GI:  #Elevated AST, hyperbilirubinemia in setting of sepsis  #Diarrhea  - Advance diet as tolerated  - Discontinue PPI as no indication and can add PRN pepcid for GERD     Infectious Disease:  #Sepsis 2/2 MSSA bacteremia  #Possible Enterocolitis although diarrhea at this point likely antibiotic associated at this time  #Leukocytosis  #Rule out Lead infection  - Concern for device infection, endocarditis  but PAOLO negative  - Stool pathogen, negative  - Blood culture 1/10 & 1/12, +MSSA; negative blood cultures from 1/14 and also pending results from 1/15  - Cefazolin (Day 6 of Abx)  - ID following, in process of obtaining NM scan of spine and other areas today after discussion with ID - will get last part of test today  - possible explanations include possible seeding event from scrotal biopsy but then seeding to unclear location but limited by inability to get MRI to r/o epidural abscess and brain mycotic aneurysms due to incompatible AICD with MRI  - No intervention per thoracic surgery and will continue IV abx per ID     Heme/Onc:  #Thrombocytopenia in setting of sepsis  #Basal cell carcinoma, scrotum  - Will challenge with HSQ today  - Trend CBC  - Monitor for s/s of bleeding  - Transfuse for Hgb less than 7 or symptomatic anemia  - Follow up OP for completion of MOHS procedure    Endocrine  - Monitor for s/s of hypo/ hyperglycemia      Ethics/Code Status:  - FULL     :  DVT Prophylaxis: considering HSQ today to challenge  GI Prophylaxis: no indication  Bowel Regimen: none  Diet: Cardiac  CVC: none  [facility]: none  Reyes: none  Restraints: none  Dispo: ICU care today until O2 requirements improve    Critical Care Time:  35 minutes  Critical Care Activities: Management and  titration of high flow oxygen systems, coordination of consultation services involved within the patient's care, and time spent with patient explaining current diagnosis and treatment plan and answering any questions.    Rodriguez Sapp MD

## 2025-01-17 ENCOUNTER — APPOINTMENT (OUTPATIENT)
Dept: RADIOLOGY | Facility: HOSPITAL | Age: 85
DRG: 871 | End: 2025-01-17
Payer: MEDICARE

## 2025-01-17 LAB
ALBUMIN SERPL BCP-MCNC: 2.8 G/DL (ref 3.4–5)
ALP SERPL-CCNC: 68 U/L (ref 33–136)
ALT SERPL W P-5'-P-CCNC: 7 U/L (ref 10–52)
ANCA AB PATTERN SER IF-IMP: NORMAL
ANCA IGG TITR SER IF: NORMAL {TITER}
ANION GAP SERPL CALC-SCNC: 11 MMOL/L (ref 10–20)
AST SERPL W P-5'-P-CCNC: 44 U/L (ref 9–39)
BILIRUB SERPL-MCNC: 0.6 MG/DL (ref 0–1.2)
BUN SERPL-MCNC: 66 MG/DL (ref 6–23)
CALCIUM SERPL-MCNC: 8.3 MG/DL (ref 8.6–10.3)
CHLORIDE SERPL-SCNC: 109 MMOL/L (ref 98–107)
CO2 SERPL-SCNC: 21 MMOL/L (ref 21–32)
CREAT SERPL-MCNC: 1.71 MG/DL (ref 0.5–1.3)
EGFRCR SERPLBLD CKD-EPI 2021: 39 ML/MIN/1.73M*2
ERYTHROCYTE [DISTWIDTH] IN BLOOD BY AUTOMATED COUNT: 16.6 % (ref 11.5–14.5)
GLUCOSE SERPL-MCNC: 125 MG/DL (ref 74–99)
HCT VFR BLD AUTO: 31.9 % (ref 41–52)
HGB BLD-MCNC: 10.5 G/DL (ref 13.5–17.5)
HOLD SPECIMEN: NORMAL
HOLD SPECIMEN: NORMAL
MAGNESIUM SERPL-MCNC: 2.28 MG/DL (ref 1.6–2.4)
MCH RBC QN AUTO: 31.2 PG (ref 26–34)
MCHC RBC AUTO-ENTMCNC: 32.9 G/DL (ref 32–36)
MCV RBC AUTO: 95 FL (ref 80–100)
MYELOPEROXIDASE AB SER-ACNC: 0 AU/ML (ref 0–19)
NRBC BLD-RTO: 0 /100 WBCS (ref 0–0)
PHOSPHATE SERPL-MCNC: 4.2 MG/DL (ref 2.5–4.9)
PLATELET # BLD AUTO: 212 X10*3/UL (ref 150–450)
POTASSIUM SERPL-SCNC: 3.4 MMOL/L (ref 3.5–5.3)
PROT SERPL-MCNC: 6.1 G/DL (ref 6.4–8.2)
PROTEINASE3 AB SER-ACNC: 1 AU/ML (ref 0–19)
RBC # BLD AUTO: 3.37 X10*6/UL (ref 4.5–5.9)
SODIUM SERPL-SCNC: 138 MMOL/L (ref 136–145)
UFH PPP CHRO-ACNC: 0.6 IU/ML
UFH PPP CHRO-ACNC: 1.5 IU/ML
WBC # BLD AUTO: 13.2 X10*3/UL (ref 4.4–11.3)

## 2025-01-17 PROCEDURE — 2500000005 HC RX 250 GENERAL PHARMACY W/O HCPCS: Performed by: HOSPITALIST

## 2025-01-17 PROCEDURE — 85520 HEPARIN ASSAY: CPT | Performed by: STUDENT IN AN ORGANIZED HEALTH CARE EDUCATION/TRAINING PROGRAM

## 2025-01-17 PROCEDURE — 84100 ASSAY OF PHOSPHORUS: CPT | Performed by: NURSE PRACTITIONER

## 2025-01-17 PROCEDURE — 97535 SELF CARE MNGMENT TRAINING: CPT | Mod: GO

## 2025-01-17 PROCEDURE — 2500000001 HC RX 250 WO HCPCS SELF ADMINISTERED DRUGS (ALT 637 FOR MEDICARE OP): Performed by: HOSPITALIST

## 2025-01-17 PROCEDURE — 36415 COLL VENOUS BLD VENIPUNCTURE: CPT | Performed by: NURSE PRACTITIONER

## 2025-01-17 PROCEDURE — 99233 SBSQ HOSP IP/OBS HIGH 50: CPT | Performed by: STUDENT IN AN ORGANIZED HEALTH CARE EDUCATION/TRAINING PROGRAM

## 2025-01-17 PROCEDURE — 36415 COLL VENOUS BLD VENIPUNCTURE: CPT | Performed by: STUDENT IN AN ORGANIZED HEALTH CARE EDUCATION/TRAINING PROGRAM

## 2025-01-17 PROCEDURE — 80053 COMPREHEN METABOLIC PANEL: CPT | Performed by: NURSE PRACTITIONER

## 2025-01-17 PROCEDURE — 2500000005 HC RX 250 GENERAL PHARMACY W/O HCPCS: Performed by: STUDENT IN AN ORGANIZED HEALTH CARE EDUCATION/TRAINING PROGRAM

## 2025-01-17 PROCEDURE — 97110 THERAPEUTIC EXERCISES: CPT | Mod: GP

## 2025-01-17 PROCEDURE — 2500000002 HC RX 250 W HCPCS SELF ADMINISTERED DRUGS (ALT 637 FOR MEDICARE OP, ALT 636 FOR OP/ED)

## 2025-01-17 PROCEDURE — 85027 COMPLETE CBC AUTOMATED: CPT | Performed by: NURSE PRACTITIONER

## 2025-01-17 PROCEDURE — 99232 SBSQ HOSP IP/OBS MODERATE 35: CPT | Performed by: INTERNAL MEDICINE

## 2025-01-17 PROCEDURE — 2500000001 HC RX 250 WO HCPCS SELF ADMINISTERED DRUGS (ALT 637 FOR MEDICARE OP): Performed by: NURSE PRACTITIONER

## 2025-01-17 PROCEDURE — 2500000001 HC RX 250 WO HCPCS SELF ADMINISTERED DRUGS (ALT 637 FOR MEDICARE OP): Performed by: STUDENT IN AN ORGANIZED HEALTH CARE EDUCATION/TRAINING PROGRAM

## 2025-01-17 PROCEDURE — 83735 ASSAY OF MAGNESIUM: CPT | Performed by: NURSE PRACTITIONER

## 2025-01-17 PROCEDURE — 97116 GAIT TRAINING THERAPY: CPT | Mod: GP

## 2025-01-17 PROCEDURE — 2500000002 HC RX 250 W HCPCS SELF ADMINISTERED DRUGS (ALT 637 FOR MEDICARE OP, ALT 636 FOR OP/ED): Performed by: HOSPITALIST

## 2025-01-17 PROCEDURE — 94640 AIRWAY INHALATION TREATMENT: CPT

## 2025-01-17 PROCEDURE — 1200000002 HC GENERAL ROOM WITH TELEMETRY DAILY

## 2025-01-17 PROCEDURE — 2500000001 HC RX 250 WO HCPCS SELF ADMINISTERED DRUGS (ALT 637 FOR MEDICARE OP)

## 2025-01-17 PROCEDURE — 2500000005 HC RX 250 GENERAL PHARMACY W/O HCPCS: Performed by: NURSE PRACTITIONER

## 2025-01-17 PROCEDURE — 2500000004 HC RX 250 GENERAL PHARMACY W/ HCPCS (ALT 636 FOR OP/ED): Mod: JZ

## 2025-01-17 PROCEDURE — 2500000004 HC RX 250 GENERAL PHARMACY W/ HCPCS (ALT 636 FOR OP/ED): Performed by: STUDENT IN AN ORGANIZED HEALTH CARE EDUCATION/TRAINING PROGRAM

## 2025-01-17 PROCEDURE — 2500000002 HC RX 250 W HCPCS SELF ADMINISTERED DRUGS (ALT 637 FOR MEDICARE OP, ALT 636 FOR OP/ED): Performed by: INTERNAL MEDICINE

## 2025-01-17 RX ORDER — HEPARIN SODIUM 10000 [USP'U]/100ML
0-4500 INJECTION, SOLUTION INTRAVENOUS CONTINUOUS
Status: DISCONTINUED | OUTPATIENT
Start: 2025-01-17 | End: 2025-01-20

## 2025-01-17 RX ORDER — POTASSIUM CHLORIDE 1.5 G/1.58G
40 POWDER, FOR SOLUTION ORAL ONCE
Status: COMPLETED | OUTPATIENT
Start: 2025-01-17 | End: 2025-01-17

## 2025-01-17 RX ORDER — SPIRONOLACTONE 25 MG/1
25 TABLET ORAL
Status: DISCONTINUED | OUTPATIENT
Start: 2025-01-17 | End: 2025-01-26 | Stop reason: HOSPADM

## 2025-01-17 RX ADMIN — METHYLPREDNISOLONE SODIUM SUCCINATE 40 MG: 40 INJECTION, POWDER, FOR SOLUTION INTRAMUSCULAR; INTRAVENOUS at 09:53

## 2025-01-17 RX ADMIN — SPIRONOLACTONE 25 MG: 25 TABLET ORAL at 14:13

## 2025-01-17 RX ADMIN — Medication 3 L/MIN: at 14:12

## 2025-01-17 RX ADMIN — MONTELUKAST SODIUM 10 MG: 10 TABLET, FILM COATED ORAL at 20:13

## 2025-01-17 RX ADMIN — Medication 5 MG: at 20:13

## 2025-01-17 RX ADMIN — HEPARIN SODIUM 5000 UNITS: 5000 INJECTION INTRAVENOUS; SUBCUTANEOUS at 06:35

## 2025-01-17 RX ADMIN — CEFAZOLIN SODIUM 2 G: 2 INJECTION, SOLUTION INTRAVENOUS at 14:12

## 2025-01-17 RX ADMIN — ACETAMINOPHEN 650 MG: 325 TABLET ORAL at 14:49

## 2025-01-17 RX ADMIN — ACETAMINOPHEN 650 MG: 325 TABLET ORAL at 08:28

## 2025-01-17 RX ADMIN — IPRATROPIUM BROMIDE AND ALBUTEROL SULFATE 3 ML: 2.5; .5 SOLUTION RESPIRATORY (INHALATION) at 12:17

## 2025-01-17 RX ADMIN — POTASSIUM CHLORIDE 40 MEQ: 1.5 POWDER, FOR SOLUTION ORAL at 08:26

## 2025-01-17 RX ADMIN — AMIODARONE HYDROCHLORIDE 100 MG: 200 TABLET ORAL at 08:26

## 2025-01-17 RX ADMIN — METOPROLOL TARTRATE 50 MG: 50 TABLET, FILM COATED ORAL at 20:13

## 2025-01-17 RX ADMIN — HEPARIN SODIUM 1800 UNITS/HR: 10000 INJECTION, SOLUTION INTRAVENOUS at 09:31

## 2025-01-17 RX ADMIN — EZETIMIBE 10 MG: 10 TABLET ORAL at 08:26

## 2025-01-17 RX ADMIN — ATORVASTATIN CALCIUM 40 MG: 20 TABLET, FILM COATED ORAL at 20:13

## 2025-01-17 RX ADMIN — HEPARIN SODIUM 1500 UNITS/HR: 10000 INJECTION, SOLUTION INTRAVENOUS at 20:33

## 2025-01-17 RX ADMIN — Medication 3 L/MIN: at 18:00

## 2025-01-17 RX ADMIN — CEFAZOLIN SODIUM 2 G: 2 INJECTION, SOLUTION INTRAVENOUS at 20:35

## 2025-01-17 RX ADMIN — METHOCARBAMOL TABLETS 500 MG: 500 TABLET, COATED ORAL at 22:00

## 2025-01-17 RX ADMIN — Medication 6 L/MIN: at 07:00

## 2025-01-17 RX ADMIN — ASPIRIN 81 MG: 81 TABLET, CHEWABLE ORAL at 20:13

## 2025-01-17 RX ADMIN — CEFAZOLIN SODIUM 2 G: 2 INJECTION, SOLUTION INTRAVENOUS at 04:49

## 2025-01-17 RX ADMIN — ACETAMINOPHEN 650 MG: 325 TABLET ORAL at 20:35

## 2025-01-17 RX ADMIN — METHOCARBAMOL TABLETS 500 MG: 500 TABLET, COATED ORAL at 06:35

## 2025-01-17 RX ADMIN — LIDOCAINE 4% 1 PATCH: 40 PATCH TOPICAL at 14:13

## 2025-01-17 RX ADMIN — METOPROLOL TARTRATE 50 MG: 50 TABLET, FILM COATED ORAL at 08:26

## 2025-01-17 RX ADMIN — METHOCARBAMOL TABLETS 500 MG: 500 TABLET, COATED ORAL at 14:13

## 2025-01-17 ASSESSMENT — COGNITIVE AND FUNCTIONAL STATUS - GENERAL
MOVING FROM LYING ON BACK TO SITTING ON SIDE OF FLAT BED WITH BEDRAILS: A LITTLE
WALKING IN HOSPITAL ROOM: A LITTLE
TOILETING: A LOT
DRESSING REGULAR LOWER BODY CLOTHING: A LOT
MOVING FROM LYING ON BACK TO SITTING ON SIDE OF FLAT BED WITH BEDRAILS: A LOT
TOILETING: A LITTLE
STANDING UP FROM CHAIR USING ARMS: A LITTLE
PERSONAL GROOMING: A LITTLE
EATING MEALS: A LITTLE
DAILY ACTIVITIY SCORE: 17
CLIMB 3 TO 5 STEPS WITH RAILING: A LOT
DRESSING REGULAR LOWER BODY CLOTHING: A LOT
MOBILITY SCORE: 15
DAILY ACTIVITIY SCORE: 14
TURNING FROM BACK TO SIDE WHILE IN FLAT BAD: A LOT
HELP NEEDED FOR BATHING: A LITTLE
CLIMB 3 TO 5 STEPS WITH RAILING: TOTAL
STANDING UP FROM CHAIR USING ARMS: A LITTLE
PERSONAL GROOMING: A LITTLE
WALKING IN HOSPITAL ROOM: A LOT
TURNING FROM BACK TO SIDE WHILE IN FLAT BAD: A LITTLE
MOBILITY SCORE: 14
HELP NEEDED FOR BATHING: A LOT
MOVING TO AND FROM BED TO CHAIR: A LITTLE
DRESSING REGULAR UPPER BODY CLOTHING: A LITTLE
DRESSING REGULAR UPPER BODY CLOTHING: A LOT
MOVING TO AND FROM BED TO CHAIR: A LOT
EATING MEALS: A LITTLE

## 2025-01-17 ASSESSMENT — PAIN SCALES - GENERAL
PAINLEVEL_OUTOF10: 4
PAINLEVEL_OUTOF10: 0 - NO PAIN
PAINLEVEL_OUTOF10: 1
PAINLEVEL_OUTOF10: 1
PAINLEVEL_OUTOF10: 0 - NO PAIN
PAINLEVEL_OUTOF10: 0 - NO PAIN

## 2025-01-17 ASSESSMENT — PAIN - FUNCTIONAL ASSESSMENT
PAIN_FUNCTIONAL_ASSESSMENT: 0-10

## 2025-01-17 ASSESSMENT — PAIN DESCRIPTION - LOCATION
LOCATION: BACK
LOCATION: BACK

## 2025-01-17 ASSESSMENT — PAIN DESCRIPTION - ORIENTATION
ORIENTATION: LOWER
ORIENTATION: LOWER

## 2025-01-17 ASSESSMENT — PAIN DESCRIPTION - DESCRIPTORS
DESCRIPTORS: SORE;ACHING
DESCRIPTORS: ACHING;SORE

## 2025-01-17 ASSESSMENT — ACTIVITIES OF DAILY LIVING (ADL): HOME_MANAGEMENT_TIME_ENTRY: 10

## 2025-01-17 NOTE — PROGRESS NOTES
[facility] Critical Care Medicine Progress Note    Date:  1/17/2025  Patient:  Guicho Jones  YOB: 1940  MRN:  [id number]   Admit Date:  1/10/2025  ========================================================================================================    Chief Complaint   Patient presents with    Trauma     HIA     Interval ICU Events:  1/10: Admit to the ICU for hypotension 2/2 to hypovolemia and concern for clinical decompensation.   1/11: VSS. Febrile, Blood cultures, +GPC. States had biopsy of scrotum day prior to hospitalization and needs to have MOHS procedure, pending. Site of biopsy without s/s of infection/ drainage. RN staff noted upper extremity weakness with abnormality in depth perception. On initial exam bilateral upper extremity weakness, no loss of sensation, right hand tingling (at baseline), and difficulty with depth perception. MRI ordered of cervical/ thoracic/ lumbar spine. Spoke with rep from [employer] to verify device compatibility and reported that old device still present in abdomen is not MRI compatible but new device in chest wall is, MRI was canceled. Noted improvement in neurological exam and movement/ strength of upper extremities but persistent limitation with depth perception.     1/12: T max 39.2, WBC 10.9. decrease in volume of stool output, endorses abdominal cramping but no tenderness. Back pain with spasms, reports no increase from baseline and no point tenderness.   Echo: EF 55%, no source of embolus, no definite vegetation, normal RV systolic function, LA mod dilated, RA mild to mod dilated, mild MR, mild TR, mild AR, small PFO, mod dilatation of ascending aorta, mod increased septal and mildly increased posterior LV wall thickness  1/13: Pt overnight wore his BiPAP and being transitioned to NC this AM. Pt otherwise Afebrile and normocardic but with low normal Bps with MAP near 65mmHg. Pt has been NPO for PAOLO. Pt with UOP of 700cc in 24 hours and is  net+1.6L in 24 hours and net +3.3L since admission with slightly improved Cr to 1.54 this AM and improved bilirubin. Blood Cultures still + from 1/12 for GPC clusters. Pt currently with no complaints other than some minor back pain.   1/14: Pt overnight . Pt overnight with increasing creatinine to 2.33 from 1.54 with UOP of 1.3L and net +186 in 24 hours and net +3.4L since admission. Pt also noted to have some hemoptysis after PAOLO yesterday and into today and worsening O2 requirements as he is now on Oximyzer at 8-10lpm.   1/15: pt overnight tolerated his CPAP and was transitioned to oximyzer at 10lpm. Pt with CT not showing and gross evidence of intraabdominal infection but multiple consolidations concerning for possible multifocal PNA with left upper lobe with a small possible air fluid level concerning for a possible abscess.  Pt with a slightly increased Cr of 2.60 and K of 2.9 and UOP of 4L in 24 hours and net negative 2.6L with a nearly net even admission fluid balance. Pt otherwise has been afebrile and hemodynamically stable but still with some diarrhea that is honey colored and loose in consistency. Pt planned for first part of tagged WBC scan today.   1/16: pt overnight had some increased O2 requirements to 15LPM of oximyzer and tolerated his BiPAP but is back down to 10LPM this morning. Pt had blood drawn for next part of Tagged WBC scan. Pt with UOP of 1.9L and is net negative 1.3L and improved Cr of 2.19 this morning. Pt's WBC did increase to 12K this morning but steroids were started yesterday. Patient without gross hemoptysis but with some dried blood occasionally when coughing.  1/17: pt yesterday weaned to 6L NC and doing well without any further hemoptysis. Pt continue with good UOP of 1.4L and net ever with improved Cr to 1.7 today. Pt otherwise currently without any complaints and afebrile and hemodynamically stable over night. Pt WBC increased slightly to 13K today. Pending last part of tagged  WBC scan today.     Medical History:  Past Medical History:   Diagnosis Date    Allergic     Arthritis     Cancer (Multi)     Cataract     CHF (congestive heart failure)     COPD (chronic obstructive pulmonary disease) (Multi)     Encounter for follow-up examination after completed treatment for conditions other than malignant neoplasm 12/27/2021    Hospital discharge follow-up    Heart disease     Hypertension     Ischemic cardiomyopathy     Ischemic cardiomyopathy with implantable cardioverter-defibrillator (ICD)    Myocardial infarction (Multi)     Pain in unspecified hip     Joint pain, hip    Personal history of other diseases of the musculoskeletal system and connective tissue     History of low back pain    Personal history of other endocrine, nutritional and metabolic disease     History of hyperlipidemia    Personal history of other specified conditions 12/21/2021    History of elevated prostate specific antigen (PSA)    Presence of coronary angioplasty implant and graft     Stented coronary artery    Unspecified atrial flutter (Multi)     Paroxysmal atrial flutter     Past Surgical History:   Procedure Laterality Date    BACK SURGERY      CARDIAC CATHETERIZATION      CORONARY STENT PLACEMENT      CT ABDOMEN ANGIOGRAM W AND/OR WO IV CONTRAST  10/29/2021    CT ABDOMEN ANGIOGRAM W AND/OR WO IV CONTRAST 10/29/2021 [facility]    CT ABDOMEN ANGIOGRAM W AND/OR WO IV CONTRAST  10/03/2022    CT ABDOMEN ANGIOGRAM W AND/OR WO IV CONTRAST 10/3/2022 [facility]    EYE SURGERY      JOINT REPLACEMENT      OTHER SURGICAL HISTORY  10/12/2021    Renal angioplasty and stenting    OTHER SURGICAL HISTORY  10/12/2021    Cataract surgery    OTHER SURGICAL HISTORY  12/21/2021    Hip replacement    OTHER SURGICAL HISTORY  01/04/2022    Complete colonoscopy    OTHER SURGICAL HISTORY  12/21/2021    Back surgery    OTHER SURGICAL HISTORY  12/21/2021    Abdominal aortic aneurysm repair    OTHER SURGICAL HISTORY   12/21/2021    Hip replacement    OTHER SURGICAL HISTORY  12/21/2021    Surgery    TONSILLECTOMY      VASECTOMY       Medications Prior to Admission   Medication Sig Dispense Refill Last Dose/Taking    albuterol (Proventil HFA) 90 mcg/actuation inhaler Inhale 1 puff every 4 hours if needed.   1/9/2025    amiodarone (Pacerone) 200 mg tablet Take 0.5 tablets (100 mg) by mouth once daily. 90 tablet 1 1/9/2025    aspirin 81 mg chewable tablet Chew 1 tablet (81 mg). TAKE 1 TABLET MONDAY THROUGH FRIDAY AT BEDTIME   1/9/2025    atorvastatin (Lipitor) 40 mg tablet Take 1 tablet (40 mg) by mouth once daily at bedtime. 90 tablet 1 1/9/2025    budesonide-glycopyr-formoterol (Breztri Aerosphere) 160-9-4.8 mcg/actuation HFA aerosol inhaler Inhale 2 puffs 2 times a day.   1/10/2025    empagliflozin (Jardiance) 10 mg Take 1 tablet (10 mg) by mouth once daily. 90 tablet 3 1/9/2025    ezetimibe (Zetia) 10 mg tablet Take 1 tablet (10 mg) by mouth once daily. 90 tablet 1 1/9/2025    fluticasone (Flonase) 50 mcg/actuation nasal spray USE 2 SPRAYS IN EACH NOSTRIL EVERY DAY 48 g 3 1/10/2025    glucosamine HCl 1,500 mg tablet Take 1 tablet by mouth once daily.   1/9/2025    hydroCHLOROthiazide (HYDRODiuril) 25 mg tablet TAKE 1 TABLET MON WED FRI ONLY   1/9/2025    ipratropium (Atrovent) 42 mcg (0.06 %) nasal spray Administer 2 sprays into each nostril 3 times a day. 15 mL 3 1/9/2025    isosorbide mononitrate ER (Imdur) 30 mg 24 hr tablet Take 1 tablet (30 mg) by mouth once daily. 1 tablet daily 90 tablet 3 1/9/2025    lisinopril 40 mg tablet TAKE 1 TABLET EVERY DAY 90 tablet 3 1/9/2025    magnesium oxide (MagOx) 400 mg (241.3 mg magnesium) tablet Take 1 tablet (400 mg) by mouth 2 times a day.   1/9/2025    metoprolol succinate XL (Toprol-XL) 100 mg 24 hr tablet 1/2 tablet in the morning, 1 full tablet at night   1/9/2025    metoprolol tartrate (Lopressor) 25 mg tablet Take 1 tablet daily for palpitations only 90 tablet 1 1/9/2025     montelukast (Singulair) 10 mg tablet TAKE 1 TABLET EVERY EVENING 90 tablet 3 1/9/2025    omega 3-dha-epa-fish oil (Fish OiL) 1,000 mg (120 mg-180 mg) capsule Take 1 capsule (1,000 mg) by mouth 2 times a day.   1/9/2025    rivaroxaban (Xarelto) 20 mg tablet 1 tablet daily 90 tablet 3 1/9/2025    amoxicillin (Amoxil) 500 mg capsule Take 4 capsules (2,000 mg) by mouth see administration instructions. TAKE 1 HOUR BEFORE DENTAL APPOINTMENT (Patient not taking: Reported on 1/10/2025)   More than a month    nitroglycerin (Nitrostat) 0.4 mg SL tablet Place 1 tablet (0.4 mg) under the tongue every 5 minutes if needed for chest pain. 25 tablet 5 Unknown     Levofloxacin  Social History     Tobacco Use    Smoking status: Former     Current packs/day: 1.50     Average packs/day: 1.5 packs/day for 15.0 years (22.5 ttl pk-yrs)     Types: Cigarettes     Passive exposure: Never    Smokeless tobacco: Never   Vaping Use    Vaping status: Never Used   Substance Use Topics    Alcohol use: Yes     Alcohol/week: 3.0 standard drinks of alcohol     Types: 3 Standard drinks or equivalent per week     Comment: occassional    Drug use: Never     Family History   Problem Relation Name Age of Onset    Liver disease Mother Ysabel. Campbell Jones     Arthritis Mother Ysabel. Campbell Jones     Coronary artery disease Father Guicho Jones     Atrial fibrillation Father Guicho Jones     Heart disease Father Guicho Jones     Cancer Sister Sally Jones        Review of Systems:  14 point review of systems was completed and negative except for those specially mention in my HPI    Physical Exam:    Heart Rate:  [48-60]   Temp:  [35.7 °C (96.3 °F)-36.7 °C (98.1 °F)]   Resp:  [15-29]   BP: ()/(56-77)   Weight:  [102 kg (225 lb 5 oz)]   SpO2:  [89 %-97 %]     Physical Exam  Constitutional:       General: He is not in acute distress.     Appearance: He is obese. He is not ill-appearing or toxic-appearing.      Comments: On  oximyzer   HENT:      Head: Normocephalic.      Mouth/Throat:      Pharynx: Oropharynx is clear.   Eyes:      Extraocular Movements: Extraocular movements intact.      Conjunctiva/sclera: Conjunctivae normal.      Pupils: Pupils are equal, round, and reactive to light.   Cardiovascular:      Rate and Rhythm: Normal rate and regular rhythm.      Pulses: Normal pulses.      Heart sounds: Normal heart sounds.   Pulmonary:      Breath sounds: Normal breath sounds. No wheezing or rales.      Comments: Some decreased air entry and movement   Slight wheeze today  Abdominal:      Palpations: Abdomen is soft. There is no mass.      Tenderness: There is no abdominal tenderness.   Genitourinary:     Comments: Biopsy of scrotum, site pink without evidence of infection or drainage  Musculoskeletal:      Right lower leg: No edema.      Left lower leg: No edema.      Comments: Improved Lower extremity edema   Skin:     General: Skin is warm and dry.      Coloration: Skin is not jaundiced.      Comments: flushed   Neurological:      General: No focal deficit present.      Mental Status: He is alert and oriented to person, place, and time.      Cranial Nerves: No cranial nerve deficit.      Motor: No weakness.      Comments: Finger-nose test normal today         Objective:  I have reviewed all medications, laboratory results, and imaging pertinent for today's encounter    Assessment/Plan:  Pt is an 83 y/o M w/ a PMHx of ICM s/p ICD, MI, diastolic heart failure, CAD s/p PCI, COPD (centrilobular emphysema), pulmonary hypertension, HTN, HLD, persistent Afib on ATC with xarelto, CVA, infrarenal AAA, obesity, and prostate cancer who presented s/p fall due to septic shock from MSSA bacteremia of unclear source. Pt course also complicated by ISAIAS which seems to have stabilized and hyperbilirubinemia due to sepsis but much improved with new hypoxemic resp failure requiring HFNC possibly due to new pneumonia vs ARDS from ongoing infection vs  COPD exacerbation. Pt currently stable. Pt appears much improved over the last few days.    Neuro/Psych/Pain Ctrl/Sedation:  #Acute on chronic back pain  #Possible stroke  #Hx of CVA  #Fall, prior to admission  #Ataxia  #Acute debilitation 2/2 critical illness  - Concern for possible septic emboli 2/2 gram positive bacteremia due to change in depth perception, unable to obtain MRI related to ICD device incompatibility  - Given patient's back pain possible Epidural abscess but unable to get MRI as above. No notable jeet abnormalities on CT scans and in process of obtaining NM scan of spine and other areas today after discussion with ID  - Neuro checks  - No vegetation or note of lead infection on PAOLO and will hold off on neurology consult as patient appears neurologically intact today  - CAM-ICU  - Delirium precautions  - Multimodal pain management: acetaminophen, robaxin, lidocaine patch, ice  - Oxycodone as needed  - Out of bed to chair  - PT/OT as toleraated     Respiratory/ENT:  #Acute on chronic hypoxic respiratory failure  #COPD - possible exacerbation  #MSSA PNA  #Hemoptysis  - Concern for possible septic emboli and complicated by Anti-coagulation  - Possible new PNA vs hypervolemia vs ARDS vs COPD exaccerbation  - Home inhalers, singulair, flonase  - C/W Duonebs and steroids (Day 3 of 5) for possible COPD exacerbation and will transition to PO steroids tomorrow for 2 more days  - Oxygen therapy as needed to maintain SPO2 greater than 90% and wean Oximyzer as tolerated  - Pulmonary hygiene  - BiPAP at night, may use home device  - Vasculitis workup negative and low suspicion at this point     Cardiovascular:  #Septic shock - resolved  #Infrarenal AAA, increased in size from previou  #ICM s/p AICD  #HTN  #Chronic diastolic heart failure  #Atrial fibrillation  #HLD  - C/W home amiodarone, statin  - Continue holding imdur, lisinopril, jardiance, hydrochlorothiazide - can begin to restart tomorrow if continues to  improve  - C/w metoprolol at reduced dosing, adjust as needed  - No vegetation or lead infection on PAOLO  - will restart heparin gtt today as patient without hemoptysis on HSQ  - Follow up with vascular OP regarding infrarenal AAA    Renal/Volume Status (Intra & Extravascular):  #ISAIAS in setting of hypovolemia and sepsis -improved today due to possible BINU vs cardio-renal syndrome given patient has been grossly positive the last few days and with 4 chamber dilation on PAOLO  Baseline Creat ~0.9-1.1  - Will continue to hold diuresis today and allow for auto diuretics as renal function improves  - Strict I&Os  - Avoid nephrotoxic agents    GI:  #Elevated AST, hyperbilirubinemia in setting of sepsis  #Diarrhea  - Advance diet as tolerated  - Discontinue PPI as no indication and can add PRN pepcid for GERD     Infectious Disease:  #Sepsis 2/2 MSSA bacteremia  #Possible Enterocolitis although diarrhea at this point likely antibiotic associated at this time  #Leukocytosis - likely reactive due to steroids currently  #Rule out Lead infection  - Concern for device infection, endocarditis  but PAOLO negative  - Stool pathogen, negative  - Blood culture 1/10 & 1/12, +MSSA; negative blood cultures from 1/14 and 15th and will repeat blood cultures tomorrow  - If three days without positive blood cultures can get PICC for long course of Abx  - Cefazolin (Day 7 of Abx)  - ID following, in process of obtaining NM scan of spine and other areas today after discussion with ID - will get last part of test today  - possible explanations include possible seeding event from scrotal biopsy but then seeding to unclear location but limited by inability to get MRI to r/o epidural abscess and brain mycotic aneurysms due to incompatible AICD with MRI  - No intervention per thoracic surgery and will continue IV abx per ID     Heme/Onc:  #Thrombocytopenia in setting of sepsis  #Basal cell carcinoma, scrotum  - Heparin gtt challenge today  - Trend  CBC  - Monitor for s/s of bleeding  - Transfuse for Hgb less than 7 or symptomatic anemia  - Follow up OP for completion of MOHS procedure    Endocrine  - Monitor for s/s of hypo/ hyperglycemia      Ethics/Code Status:  - FULL     :  DVT Prophylaxis: heparin gtt  GI Prophylaxis: no indication  Bowel Regimen: none  Diet: Cardiac  CVC: none  [place]: none  [place]: none  Restraints: none  Dispo: Possible transfer today after NM test results as patient much improved     Rodriguez Sapp MD

## 2025-01-17 NOTE — PROGRESS NOTES
?  HISTORY OF PRESENT ILLNESS:         HPI 84-year-old male started having nausea vomiting and diarrhea after eating pizza on Wednesday. Tells me that his grandchildren have been ill with nausea/vomiting/diarrhea as well. CT abdomen and pelvis and CT head both noted -patient does have 6 x 6 cm aortic aneurysm, which is apparently changed in size compared to last image, currently without evidence of dissection.  He was admitted to the intensive care unit for further evaluation and treatment due to hypotension and hypovolemia     Flu, COVID, RSV all negative  Pathogen panel pending  C. difficile PCR normal  Repeat blood culture from 1/11/2025 pending     Of note, patient has AICD     Unfortunately, on 1/10/2025, patient's blood cultures x 2 positive for Staphylococcus aureus    Pt reported negative PAOLO  No fevers  Back pain which he says  appears improving    Current Medications:    Current Facility-Administered Medications   Medication Dose Route Frequency Provider Last Rate Last Admin    acetaminophen (Tylenol) tablet 650 mg  650 mg oral q6h SAJI Messer-CNP   650 mg at 01/16/25 1433    amiodarone (Pacerone) tablet 100 mg  100 mg oral Daily SAJI Weathers-CNP   100 mg at 01/16/25 0829    aspirin chewable tablet 81 mg  81 mg oral Nightly SAJI Weathers-CNP   81 mg at 01/15/25 2033    atorvastatin (Lipitor) tablet 40 mg  40 mg oral Nightly Jen Pulido APRN-CNP   40 mg at 01/15/25 2032    ceFAZolin (Ancef) 2 g in dextrose (iso)  mL  2 g intravenous q8h Roseanna Be, PharmD   Stopped at 01/16/25 1501    [Held by provider] empagliflozin (Jardiance) tablet 10 mg  10 mg oral Daily SAJI Weathers-CNP   10 mg at 01/11/25 0900    ezetimibe (Zetia) tablet 10 mg  10 mg oral Daily SAJI Weathers-CNP   10 mg at 01/16/25 0829    famotidine (Pepcid) tablet 20 mg  20 mg oral BID PRN Rodriguez Sapp MD        fluticasone (Flonase) nasal spray 2 spray  2 spray  Each Nostril Daily VICKY Weathers   2 spray at 01/15/25 1045    [Held by provider] tiotropium (Spiriva Respimat) 2.5 mcg/actuation inhaler 2 puff  2 puff inhalation Daily VICKY Weathers   2 puff at 01/14/25 0850    And    [Held by provider] fluticasone furoate-vilanteroL (Breo Ellipta) 200-25 mcg/dose inhaler 1 puff  1 puff inhalation Daily VICKY Weathers   1 puff at 01/14/25 0850    heparin (porcine) injection 5,000 Units  5,000 Units subcutaneous q8h Rodriguez Sapp MD   5,000 Units at 01/16/25 1433    [Held by provider] heparin 25,000 Units in dextrose 5% 250 mL (100 Units/mL) infusion (premix)  0-4,000 Units/hr intravenous Continuous VICKY Messer   Stopped at 01/14/25 1231    [Held by provider] heparin bolus from bag 2,000-4,000 Units  2,000-4,000 Units intravenous q4h PRN VICKY Messer   2,000 Units at 01/12/25 1515    [Held by provider] hydroCHLOROthiazide (HYDRODiuril) tablet 25 mg  25 mg oral Once per day on Monday Wednesday Friday VICKY Weathers        ipratropium-albuteroL (Duo-Neb) 0.5-2.5 mg/3 mL nebulizer solution 3 mL  3 mL nebulization q4h PRN VICKY Nicole        ipratropium-albuteroL (Duo-Neb) 0.5-2.5 mg/3 mL nebulizer solution 3 mL  3 mL nebulization TID VICKY Nicole   3 mL at 01/16/25 1308    [Held by provider] isosorbide mononitrate ER (Imdur) 24 hr tablet 30 mg  30 mg oral Daily VICKY Weathers        lidocaine 4 % patch 1 patch  1 patch transdermal Daily VICKY Messer   1 patch at 01/16/25 1434    [Held by provider] lisinopril tablet 40 mg  40 mg oral Daily SAJI Weathers-CNP        melatonin tablet 5 mg  5 mg oral Nightly NEAL MesserCNP   5 mg at 01/15/25 2032    methocarbamol (Robaxin) tablet 500 mg  500 mg oral q8h [facility] Jeanne Maurer PA-C   500 mg at 01/16/25 1433    methylPREDNISolone sod succinate (SOLU-Medrol) 40 mg/mL  "injection 40 mg  40 mg intravenous q24h Rodriguez Sapp MD   40 mg at 01/16/25 1047    [Held by provider] metoprolol succinate XL (Toprol-XL) 24 hr tablet 100 mg  100 mg oral Nightly VICKY Weathers        [Held by provider] metoprolol succinate XL (Toprol-XL) 24 hr tablet 50 mg  50 mg oral Daily VICKY Weathers   50 mg at 01/11/25 0900    metoprolol tartrate (Lopressor) tablet 50 mg  50 mg oral BID VICKY Messer   50 mg at 01/16/25 0829    montelukast (Singulair) tablet 10 mg  10 mg oral q PM VICKY Weathers   10 mg at 01/15/25 2032    ondansetron (Zofran) injection 4 mg  4 mg intravenous q6h PRN VICKY Messer   4 mg at 01/12/25 2017    oxyCODONE (Roxicodone) immediate release tablet 2.5 mg  2.5 mg oral q6h PRN VICKY Messer   2.5 mg at 01/13/25 1401    oxygen (O2) therapy   inhalation Continuous PRN - O2/gases VICKY Nicole   6 L/min at 01/16/25 1400        Allergies:    Allergies   Allergen Reactions    Levofloxacin Palpitations     Rapid Heart Rate - Severe per pt.          Review of Systems  14 system review is negative other than HPI     /67   Pulse 60   Temp 35.7 °C (96.3 °F) (Temporal)   Resp 24   Ht 1.676 m (5' 6\")   Wt 102 kg (225 lb 12 oz)   SpO2 90%   BMI 36.44 kg/m²    Physical Exam:  General: Patient appears ok at the present time. NAD  Skin: no new rashes  HEENT:  Neck is supple, No subconjunctival hemorrhages, no oral exudates  Heart: S1 S2  Lungs: diminished bases  Abdomen: soft, ND, NTTP,  Extrem: No edema, non tender           DATA:    .  Lab Results   Component Value Date    WBC 12.3 (H) 01/16/2025    HGB 10.8 (L) 01/16/2025    HCT 31.7 (L) 01/16/2025    MCV 93 01/16/2025     01/16/2025     Results from last 7 days   Lab Units 01/16/25  0430   SODIUM mmol/L 137   POTASSIUM mmol/L 3.1*   CHLORIDE mmol/L 105   CO2 mmol/L 21   BUN mg/dL 65*   CREATININE mg/dL 2.19*   CALCIUM " mg/dL 8.2*   PROTEIN TOTAL g/dL 6.1*   BILIRUBIN TOTAL mg/dL 0.6   ALK PHOS U/L 65   ALT U/L 3*   AST U/L 34   GLUCOSE mg/dL 143*   Susceptibility data from last 90 days.  Collected Specimen Info Organism Clindamycin Erythromycin Oxacillin Tetracycline Trimethoprim/Sulfamethoxazole Vancomycin   01/12/25 Blood culture from Peripheral Venipuncture Staphylococcus aureus         01/12/25 Blood culture from Peripheral Venipuncture Staphylococcus aureus         01/10/25 Blood culture from Peripheral Venipuncture Staphylococcus aureus         01/10/25 Blood culture from Peripheral Venipuncture Methicillin Susceptible Staphylococcus aureus (MSSA)  S  S  S  S  S  S   Susceptibility data from last 90 days.  Collected Specimen Info Organism Clindamycin Erythromycin Oxacillin Tetracycline Trimethoprim/Sulfamethoxazole Vancomycin   01/12/25 Blood culture from Peripheral Venipuncture Staphylococcus aureus         01/12/25 Blood culture from Peripheral Venipuncture Staphylococcus aureus         01/10/25 Blood culture from Peripheral Venipuncture Staphylococcus aureus         01/10/25 Blood culture from Peripheral Venipuncture Methicillin Susceptible Staphylococcus aureus (MSSA)  S  S  S  S  S  S           IMPRESSION:        Problem List Items Addressed This Visit          Cardiac and Vasculature    AICD (automatic cardioverter/defibrillator) present    Relevant Orders    Transesophageal Echo (PAOLO)    Ischemic cardiomyopathy    Relevant Medications    isosorbide mononitrate ER (Imdur) 24 hr tablet 30 mg    metoprolol succinate XL (Toprol-XL) 24 hr tablet 100 mg    metoprolol succinate XL (Toprol-XL) 24 hr tablet 50 mg    metoprolol tartrate (Lopressor) tablet 50 mg    Other Relevant Orders    Transthoracic Echo (TTE) Complete (Completed)    Transesophageal Echo (PAOLO)    Chronic diastolic congestive heart failure, NYHA class 2    Relevant Medications    isosorbide mononitrate ER (Imdur) 24 hr tablet 30 mg    metoprolol succinate XL  (Toprol-XL) 24 hr tablet 100 mg    metoprolol succinate XL (Toprol-XL) 24 hr tablet 50 mg    metoprolol tartrate (Lopressor) tablet 50 mg    Other Relevant Orders    Transthoracic Echo (TTE) Complete (Completed)    Transesophageal Echo (PAOLO)    Pulmonary hypertension (Multi)    Relevant Orders    Transthoracic Echo (TTE) Complete (Completed)    Transesophageal Echo (PAOLO)       Gastrointestinal and Abdominal    * (Principal) Diarrhea, unspecified type - Primary     Other Visit Diagnoses       Hypotension, unspecified hypotension type        Elevated troponin        Hypokalemia        Renal insufficiency        Anemia, unspecified type        Fall, initial encounter        Medication induced coagulopathy (Multi)        Relevant Medications    aspirin chewable tablet 81 mg    heparin 25,000 Units in dextrose 5% 250 mL (100 Units/mL) infusion (premix)    heparin bolus from bag 2,000-4,000 Units    albumin human 5 % infusion 25 g (Completed)    heparin (porcine) injection 5,000 Units    Bacteremia due to Gram-positive bacteria        Relevant Orders    Transthoracic Echo (TTE) Complete (Completed)    Transesophageal Echo (PAOLO)    Weakness of both arms        Lumbar pain            Concerned of endovascular infection given presentation and risk factors  CT showing multifocal consolidations.      PLAN:   PAOLO unrevealing  Continue antibiotic therapy  Repeat blood cultures until clearance    We may also to image aneurysm further if possible., Possible spinal imaging as well.  MRI could not be performed because of cardiac device.  Other testing will be more suboptimal CTs cannot be performed currently with renal failure, so excluding an epidural abscess with imaging not possible.  No long tract neurological findings at this time    Awaiting white cell scan    Discussed with ICU           Marc Lomeli MD

## 2025-01-17 NOTE — PROGRESS NOTES
Physical Therapy    Physical Therapy Treatment    Patient Name: Guicho Jones  MRN: [id number]  Department: [facility]  Room: 05/05  Today's Date: 1/17/2025  Time Calculation  Start Time: 0924  Stop Time: 0948  Time Calculation (min): 24 min       Assessment/Plan   PT Assessment  End of Session Communication: Bedside nurse  Assessment Comment: Pt remains min/Mod A for functional mobility. Pt would benefit from continued therapy to improve strength, ROM, and functional movement.  End of Session Patient Position: Up in chair, Alarm off, not on at start of session (Nurse in room for IV management)  PT Plan  Inpatient/Swing Bed or Outpatient: Inpatient  PT Plan  Treatment/Interventions: Bed mobility, Transfer training, Gait training, Balance training, Strengthening, Therapeutic exercise, Home exercise program  PT Plan: Ongoing PT  PT Frequency: 3 times per week  PT Discharge Recommendations: Moderate intensity level of continued care  PT Recommended Transfer Status: Assist x2, Assistive device  PT - OK to Discharge: Once medically appropriate    General Visit Information:   PT  Visit  PT Received On: 01/17/25  Response to Previous Treatment: Patient with no complaints from previous session.  General  Reason for Referral: Impaired mobility  Referred By: PT/OT referred by Salazar 1/10/25  Past Medical History Relevant to Rehab: COPD, HTN, MI, CVA, AFib, CAD, Intrarenal AA,  Prior to Session Communication: Bedside nurse  Patient Position Received: Bed, 3 rail up, Alarm off, not on at start of session (Seated EOB)  General Comment: Pt sitting EOB eating breakfast. Awaiting medical clearance to be transfered to [place]    Subjective   Precautions:  Precautions  Hearing/Visual Limitations: [place]    Vital Signs   Pre PT:   HR 60  SpO2: 95% (3L)  Post PT:   HR 60  SpO2 90% (3L) , RN notified        Objective   Pain:  Pain Assessment  Pain Assessment: 0-10  0-10 (Numeric) Pain Score: 4  Cognition:  Cognition  Safety Judgment:   (Required cueing for safe hand placement during sit to stand transfers and safe use of walker)  Activity Tolerance:  Activity Tolerance  Endurance:  (Treatment limited d/t fatigue and SOB)  Treatments:  Therapeutic Exercise  Therapeutic Exercise Activity 1:  (Seated EOB: (HR, AP, LAQ)x 15 B)    Bed Mobility  Bed Mobility:  (Pt sitting EOB with HOB elevated to 45 degrees upon arrival to room.)    Ambulation/Gait Training  Ambulation/Gait Training Performed:  (Ambulated 15 feet around bed to chair + Min A + WW. 2nd person used for moving lines.Pt needed mod cueing to stay close to walker, no evidence of learning. B knees stay flexed during walking. Forward flexed posture. Decreased step length, foot flat gait.)  Transfers  Transfer:  (Sit <> stand: Min A + WW. Required cueing to push up off bed vs walker. Able to initiate indep. Needed assistance gaining momentum and with anterior weight shift.)    Outcome Measures:  [facility] Basic Mobility  Turning from your back to your side while in a flat bed without using bedrails: A lot  Moving from lying on your back to sitting on the side of a flat bed without using bedrails: A lot  Moving to and from bed to chair (including a wheelchair): A little  Standing up from a chair using your arms (e.g. wheelchair or bedside chair): A little  To walk in hospital room: A little  Climbing 3-5 steps with railing: Total  Basic Mobility - Total Score: 14    OP EDUCATION:  Outpatient Education  Individual(s) Educated: Patient  Education Provided: Body Mechanics, Fall Risk, Home Exercise Program, POC, Posture  Risk and Benefits Discussed with Patient/Caregiver/Other: yes  Patient/Caregiver Demonstrated Understanding: yes  Plan of Care Discussed and Agreed Upon: yes  Patient Response to Education: Patient/Caregiver Verbalized Understanding of Information, Patient/Caregiver Performed Return Demonstration of Exercises/Activities    Encounter Problems       Encounter Problems (Active)       PT  Problem       Pt will completed supine <> sit bed mobility from flat bed with Min A  (Not Progressing)       Start:  01/13/25    Expected End:  01/27/25            Pt will demonstrate sit to stand transfers with WW + Min A (Progressing)       Start:  01/13/25    Expected End:  01/27/25            Pt. will ambulate 30 with WW + Min A  (Progressing)       Start:  01/13/25    Expected End:  01/27/25            Pt will maintain balance while reaching outside PADMINI in sitting with WW/ B LE support, single UE support and Min A (Not Progressing)       Start:  01/13/25    Expected End:  01/27/25            Pt will independently complete B LE strengthening Hep (Progressing)       Start:  01/13/25    Expected End:  01/27/25               Pain - Adult

## 2025-01-17 NOTE — CARE PLAN
Problem: Fall/Injury  Goal: Not fall by end of shift  Outcome: Progressing  Goal: Be free from injury by end of the shift  Outcome: Progressing  Goal: Verbalize understanding of personal risk factors for fall in the hospital  Outcome: Progressing  Goal: Verbalize understanding of risk factor reduction measures to prevent injury from fall in the home  Outcome: Progressing  Goal: Use assistive devices by end of the shift  Outcome: Progressing  Goal: Pace activities to prevent fatigue by end of the shift  Outcome: Progressing     Problem: Pain - Adult  Goal: Verbalizes/displays adequate comfort level or baseline comfort level  Outcome: Progressing     Problem: Safety - Adult  Goal: Free from fall injury  Outcome: Progressing     Problem: Discharge Planning  Goal: Discharge to home or other facility with appropriate resources  Outcome: Progressing     Problem: Chronic Conditions and Co-morbidities  Goal: Patient's chronic conditions and co-morbidity symptoms are monitored and maintained or improved  Outcome: Progressing     Problem: Skin  Goal: Decreased wound size/increased tissue granulation at next dressing change  Outcome: Progressing  Flowsheets (Taken 1/17/2025 1100)  Decreased wound size/increased tissue granulation at next dressing change: Promote sleep for wound healing  Goal: Participates in plan/prevention/treatment measures  Outcome: Progressing  Flowsheets (Taken 1/17/2025 1100)  Participates in plan/prevention/treatment measures:   Discuss with provider PT/OT consult   Elevate heels   Increase activity/out of bed for meals  Goal: Prevent/manage excess moisture  Outcome: Progressing  Flowsheets (Taken 1/17/2025 1100)  Prevent/manage excess moisture:   Cleanse incontinence/protect with barrier cream   Monitor for/manage infection if present  Goal: Prevent/minimize sheer/friction injuries  Outcome: Progressing  Flowsheets (Taken 1/17/2025 1100)  Prevent/minimize sheer/friction injuries:   Complete  micro-shifts as needed if patient unable. Adjust patient position to relieve pressure points, not a full turn   Increase activity/out of bed for meals   Use pull sheet   HOB 30 degrees or less   Turn/reposition every 2 hours/use positioning/transfer devices  Goal: Promote/optimize nutrition  Outcome: Progressing  Flowsheets (Taken 1/17/2025 1100)  Promote/optimize nutrition:   Monitor/record intake including meals   Consume > 50% meals/supplements   Offer water/supplements/favorite foods  Goal: Promote skin healing  Outcome: Progressing  Flowsheets (Taken 1/17/2025 1100)  Promote skin healing:   Assess skin/pad under line(s)/device(s)   Turn/reposition every 2 hours/use positioning/transfer devices   Ensure correct size (line/device) and apply per  instructions   Rotate device position/do not position patient on device

## 2025-01-17 NOTE — PROGRESS NOTES
Occupational Therapy    OT Treatment    Patient Name: Guicho Jones  MRN: [id number]  Department: [facility]  Room: 05/05  Today's Date: 1/17/2025  Time Calculation  Start Time: 0956  Stop Time: 1006  Time Calculation (min): 10 min        Assessment:  OT Assessment: Session focused on increasing IND with grooming/hygiene tasks. Pt tolerated session well. Respiratory present at end of session.  Evaluation/Treatment Tolerance: Patient tolerated treatment well  Medical Staff Made Aware: Yes  End of Session Communication: Bedside nurse  End of Session Patient Position: Up in chair, Alarm off, not on at start of session (LUE elevated on pillow)  Evaluation/Treatment Tolerance: Patient tolerated treatment well  Medical Staff Made Aware: Yes  Plan:  Treatment Interventions: ADL retraining, Functional transfer training, Endurance training, Patient/family training  OT Frequency: 2 times per week  OT Discharge Recommendations: Moderate intensity level of continued care  OT Recommended Transfer Status: Maximum assist, Assist of 2  OT - OK to Discharge: Yes  Treatment Interventions: ADL retraining, Functional transfer training, Endurance training, Patient/family training    Subjective   Previous Visit Info:  OT Last Visit  OT Received On: 01/17/25  General:  General  Reason for Referral: ADL impairment  Referred By: PT/OT referred by Salazar 1/10/25  Past Medical History Relevant to Rehab: COPD, HTN, MI, CVA, AFib, CAD, Intrarenal AA,  Family/Caregiver Present: No  Prior to Session Communication: Bedside nurse  Patient Position Received: Up in chair, Alarm off, not on at start of session (IV, tele, 3L O2)  General Comment: Pt pleasant and agreeable to participate in OT with encouragement.  Precautions:  Hearing/Visual Limitations: [place]    Vital Signs (Past 2hrs)        Date/Time Vitals Session Patient Position Pulse Resp SpO2 BP MAP (mmHg)    01/17/25 0924 --  --  --  --  --  --  --     01/17/25 1000 --  --  60  27  95 %   141/77  116     01/17/25 1100 --  --  60  22  91 %  144/72  106                         Pain:  Pain Assessment  Pain Assessment: 0-10  0-10 (Numeric) Pain Score: 0 - No pain    Objective    Cognition:  Cognition  Orientation Level: Oriented X4    Activities of Daily Living: Grooming  Grooming Comments: Pt completed oral hygiene and washed face while seated in recliner with S/U for retrieval of items and assist placing toothpaste on toothbrush. Pt then able to brush teeth. Increased time to complete tasks.    Outcome Measures:[facility] Daily Activity  Putting on and taking off regular lower body clothing: A lot  Bathing (including washing, rinsing, drying): A lot  Putting on and taking off regular upper body clothing: A lot  Toileting, which includes using toilet, bedpan or urinal: A lot  Taking care of personal grooming such as brushing teeth: A little  Eating Meals: A little  Daily Activity - Total Score: 14      Education Documentation  ADL Training, taught by Janneth Nugent, OT at 1/17/2025 11:03 AM.  Learner: Patient  Readiness: Acceptance  Method: Explanation, Demonstration  Response: Verbalizes Understanding, Needs Reinforcement, Demonstrated Understanding      Goals:  Encounter Problems       Encounter Problems (Active)       OT Goals       pt will dress LB with modified indep (Progressing)       Start:  01/13/25    Expected End:  01/27/25            Pt will transfer to bed, chair, toilet with min A (Progressing)       Start:  01/13/25    Expected End:  01/27/25            Pt will attend to ADL task x 5 minutes with less than 3 vc's to redirect (Met)       Start:  01/13/25    Expected End:  01/27/25    Resolved:  01/16/25         Pt will complete grooming with SBA (Progressing)       Start:  01/13/25    Expected End:  01/27/25            Pt demo improved stand balance  >3 min for increased independence with toileting, hygiene, and clothing management tasks.  (Progressing)       Start:  01/16/25    Expected End:   01/27/25                Pt tolerate >10 min functional activity tolerance for ADL/IADL without c/o fatigue; apply ECT/WS techniques without cues.  (Progressing)       Start:  01/16/25    Expected End:  01/27/25

## 2025-01-17 NOTE — PROGRESS NOTES
Renal Progress Note  Assessment/  84 y.o. year old male who presented to the hospital for further evaluation and management of s/p mechanical fall with difficult to ambulate came to the emergency department where clinical laboratory assessment did show that the patient blood pressure is 60/60 and patient did have a fever of 38 he was resuscitated with fluids and admitted for further evaluation and because of persistent hypotension the patient was transferred to critical care bed     Hospital course was significant for CT abdomen pelvis with IV contrast done at 1/10/2025 that did not show active disease with no bowel obstruction a complex cyst on the right renal cyst at the day creatinine was 1.6 and GFR of 4, 3 days later creatinine remained at 1.5 increased to 2.3 yesterday and today 2.5  Yesterday patient was significantly hemodynamically unstable with a stretch of hypotension blood pressure in the 80s and 90s systolic over 40s diastolic with net negative fluid balance last 24 hours 2.6 L     Renal ultrasounds right kidney 12.8 left kidney 12.3 with no obstructive uropathy     Acute kidney injury based on the above clinical laboratory assessment prerenal azotemia versus aggravated renal insult possible contribution of contrast and aggravation by contrast exposure this type of injury is usually benign for recovery is expected  Whether the admission 1.5 creatinine reflects patient baseline or not is not clear at the time of dictation to address the presence of chronic kidney disease     Thrombocytopenia but adequate platelet  Hide likely kidney involvement in collagen vascular disease or thrombotic microangiopathy     Plan   Discussed with critical care team and Dr. Sapp patient is recovering his kidney function with less aggressive diuresis continue current plan of management replace potassium normal magnesium no hyponatremia will add Aldactone 25 mg daily clinical laboratory assess the patient tomorrow  "    Subjective:   Admit Date: 1/10/2025    Interval History: Patient seen and examined uneventful night no uremic related or fluid volume overload related symptoms alert oriented follow command in no apparent pain or distress      Medications:   Scheduled Meds:acetaminophen, 650 mg, oral, q6h  amiodarone, 100 mg, oral, Daily  aspirin, 81 mg, oral, Nightly  atorvastatin, 40 mg, oral, Nightly  ceFAZolin, 2 g, intravenous, q8h  [Held by provider] empagliflozin, 10 mg, oral, Daily  ezetimibe, 10 mg, oral, Daily  fluticasone, 2 spray, Each Nostril, Daily  [Held by provider] tiotropium, 2 puff, inhalation, Daily   And  [Held by provider] fluticasone furoate-vilanteroL, 1 puff, inhalation, Daily  [Held by provider] hydroCHLOROthiazide, 25 mg, oral, Once per day on Monday Wednesday Friday  ipratropium-albuteroL, 3 mL, nebulization, TID  [Held by provider] isosorbide mononitrate ER, 30 mg, oral, Daily  lidocaine, 1 patch, transdermal, Daily  [Held by provider] lisinopril, 40 mg, oral, Daily  melatonin, 5 mg, oral, Nightly  methocarbamol, 500 mg, oral, q8h SHABNAM  methylPREDNISolone sodium succinate (PF), 40 mg, intravenous, q24h  [Held by provider] metoprolol succinate XL, 100 mg, oral, Nightly  [Held by provider] metoprolol succinate XL, 50 mg, oral, Daily  metoprolol tartrate, 50 mg, oral, BID  montelukast, 10 mg, oral, q PM      Continuous Infusions:[Held by provider] heparin, 0-4,000 Units/hr, Last Rate: Stopped (01/14/25 1231)  heparin, 0-4,500 Units/hr, Last Rate: 1,800 Units/hr (01/17/25 0931)        CBC:   Lab Results   Component Value Date    HGB 10.5 (L) 01/17/2025    HGB 10.8 (L) 01/16/2025    WBC 13.2 (H) 01/17/2025    WBC 12.3 (H) 01/16/2025     01/17/2025     01/16/2025      Anemia:  No results found for: \"FERRITIN\", \"IRON\", \"TIBC\"   BMP:    Lab Results   Component Value Date     01/17/2025     01/16/2025    K 3.4 (L) 01/17/2025    K 3.1 (L) 01/16/2025     (H) 01/17/2025     " "01/16/2025    CO2 21 01/17/2025    CO2 21 01/16/2025    BUN 66 (H) 01/17/2025    BUN 65 (H) 01/16/2025    CREATININE 1.71 (H) 01/17/2025    CREATININE 2.19 (H) 01/16/2025      Bone disease:   Lab Results   Component Value Date    PHOS 4.2 01/17/2025      Urinalysis:  No results found for: \"LYNN\", \"PROTUR\", \"GLUCOSEU\", \"BLOODU\", \"KETONESU\", \"BILIRUBINU\", \"NITRITEU\", \"LEUKOCYTESU\", \"UTPCR\"     Objective:   Vitals: /72   Pulse 60   Temp 35.7 °C (96.3 °F) (Temporal)   Resp 22   Ht 1.676 m (5' 6\")   Wt 102 kg (225 lb 5 oz)   SpO2 91%   BMI 36.37 kg/m²    Wt Readings from Last 3 Encounters:   01/17/25 102 kg (225 lb 5 oz)   11/25/24 105 kg (231 lb)   11/04/24 103 kg (226 lb 12.8 oz)      24HR INTAKE/OUTPUT:    Intake/Output Summary (Last 24 hours) at 1/17/2025 1152  Last data filed at 1/17/2025 0800  Gross per 24 hour   Intake 1260 ml   Output 1450 ml   Net -190 ml     Admission weight:  Weight: 99.8 kg (220 lb)      Constitutional:  Alert, awake, no apparent distress   Skin:normal, no rash  HEENT:sclera anicteric.  Head atraumatic normocephalic  Neck:supple with no thyromegally  Cardiovascular:  S1, S2 without m/r/g   Respiratory:  CTA B without w/r/r   Abdomen: +bs, soft, nt  Ext: no LE edema  Musculoskeletal:Intact  Neuro:Alert and oriented with no deficit      Electronically signed by Jason Campos MD on 1/17/2025 at 11:52 AM            "

## 2025-01-18 ENCOUNTER — APPOINTMENT (OUTPATIENT)
Dept: RADIOLOGY | Facility: HOSPITAL | Age: 85
DRG: 871 | End: 2025-01-18
Payer: MEDICARE

## 2025-01-18 LAB
ALBUMIN SERPL BCP-MCNC: 2.8 G/DL (ref 3.4–5)
ALP SERPL-CCNC: 62 U/L (ref 33–136)
ALT SERPL W P-5'-P-CCNC: 4 U/L (ref 10–52)
ANION GAP SERPL CALC-SCNC: 9 MMOL/L (ref 10–20)
AST SERPL W P-5'-P-CCNC: 33 U/L (ref 9–39)
BACTERIA BLD CULT: NORMAL
BACTERIA BLD CULT: NORMAL
BILIRUB SERPL-MCNC: 0.7 MG/DL (ref 0–1.2)
BUN SERPL-MCNC: 64 MG/DL (ref 6–23)
CALCIUM SERPL-MCNC: 8.2 MG/DL (ref 8.6–10.3)
CHLORIDE SERPL-SCNC: 110 MMOL/L (ref 98–107)
CO2 SERPL-SCNC: 23 MMOL/L (ref 21–32)
CREAT SERPL-MCNC: 1.42 MG/DL (ref 0.5–1.3)
EGFRCR SERPLBLD CKD-EPI 2021: 48 ML/MIN/1.73M*2
ERYTHROCYTE [DISTWIDTH] IN BLOOD BY AUTOMATED COUNT: 16.5 % (ref 11.5–14.5)
GLUCOSE SERPL-MCNC: 113 MG/DL (ref 74–99)
HCT VFR BLD AUTO: 31.6 % (ref 41–52)
HGB BLD-MCNC: 10.3 G/DL (ref 13.5–17.5)
HOLD SPECIMEN: NORMAL
HOLD SPECIMEN: NORMAL
MAGNESIUM SERPL-MCNC: 2.02 MG/DL (ref 1.6–2.4)
MCH RBC QN AUTO: 31.2 PG (ref 26–34)
MCHC RBC AUTO-ENTMCNC: 32.6 G/DL (ref 32–36)
MCV RBC AUTO: 96 FL (ref 80–100)
NRBC BLD-RTO: 0 /100 WBCS (ref 0–0)
PHOSPHATE SERPL-MCNC: 3.3 MG/DL (ref 2.5–4.9)
PLATELET # BLD AUTO: 233 X10*3/UL (ref 150–450)
POTASSIUM SERPL-SCNC: 3.6 MMOL/L (ref 3.5–5.3)
PROT SERPL-MCNC: 6.1 G/DL (ref 6.4–8.2)
RBC # BLD AUTO: 3.3 X10*6/UL (ref 4.5–5.9)
SODIUM SERPL-SCNC: 138 MMOL/L (ref 136–145)
UFH PPP CHRO-ACNC: 0.6 IU/ML
UFH PPP CHRO-ACNC: 0.7 IU/ML
UFH PPP CHRO-ACNC: 0.8 IU/ML
UFH PPP CHRO-ACNC: 0.9 IU/ML
UFH PPP CHRO-ACNC: 1.1 IU/ML
WBC # BLD AUTO: 14.3 X10*3/UL (ref 4.4–11.3)

## 2025-01-18 PROCEDURE — 1200000002 HC GENERAL ROOM WITH TELEMETRY DAILY

## 2025-01-18 PROCEDURE — 83735 ASSAY OF MAGNESIUM: CPT | Performed by: STUDENT IN AN ORGANIZED HEALTH CARE EDUCATION/TRAINING PROGRAM

## 2025-01-18 PROCEDURE — 99232 SBSQ HOSP IP/OBS MODERATE 35: CPT | Performed by: INTERNAL MEDICINE

## 2025-01-18 PROCEDURE — 94640 AIRWAY INHALATION TREATMENT: CPT

## 2025-01-18 PROCEDURE — 99233 SBSQ HOSP IP/OBS HIGH 50: CPT | Performed by: INTERNAL MEDICINE

## 2025-01-18 PROCEDURE — 2500000004 HC RX 250 GENERAL PHARMACY W/ HCPCS (ALT 636 FOR OP/ED): Mod: JZ | Performed by: STUDENT IN AN ORGANIZED HEALTH CARE EDUCATION/TRAINING PROGRAM

## 2025-01-18 PROCEDURE — 85520 HEPARIN ASSAY: CPT | Performed by: INTERNAL MEDICINE

## 2025-01-18 PROCEDURE — 2500000001 HC RX 250 WO HCPCS SELF ADMINISTERED DRUGS (ALT 637 FOR MEDICARE OP): Performed by: STUDENT IN AN ORGANIZED HEALTH CARE EDUCATION/TRAINING PROGRAM

## 2025-01-18 PROCEDURE — 97530 THERAPEUTIC ACTIVITIES: CPT | Mod: GP,CQ | Performed by: PHYSICAL THERAPY ASSISTANT

## 2025-01-18 PROCEDURE — 36415 COLL VENOUS BLD VENIPUNCTURE: CPT | Performed by: FAMILY MEDICINE

## 2025-01-18 PROCEDURE — 80053 COMPREHEN METABOLIC PANEL: CPT | Performed by: STUDENT IN AN ORGANIZED HEALTH CARE EDUCATION/TRAINING PROGRAM

## 2025-01-18 PROCEDURE — 85027 COMPLETE CBC AUTOMATED: CPT | Performed by: STUDENT IN AN ORGANIZED HEALTH CARE EDUCATION/TRAINING PROGRAM

## 2025-01-18 PROCEDURE — 84100 ASSAY OF PHOSPHORUS: CPT | Performed by: STUDENT IN AN ORGANIZED HEALTH CARE EDUCATION/TRAINING PROGRAM

## 2025-01-18 PROCEDURE — 2500000002 HC RX 250 W HCPCS SELF ADMINISTERED DRUGS (ALT 637 FOR MEDICARE OP, ALT 636 FOR OP/ED): Performed by: STUDENT IN AN ORGANIZED HEALTH CARE EDUCATION/TRAINING PROGRAM

## 2025-01-18 PROCEDURE — 2500000005 HC RX 250 GENERAL PHARMACY W/O HCPCS: Performed by: STUDENT IN AN ORGANIZED HEALTH CARE EDUCATION/TRAINING PROGRAM

## 2025-01-18 PROCEDURE — 85520 HEPARIN ASSAY: CPT | Performed by: FAMILY MEDICINE

## 2025-01-18 RX ORDER — LIDOCAINE HYDROCHLORIDE 10 MG/ML
5 INJECTION, SOLUTION INFILTRATION; PERINEURAL ONCE
Status: COMPLETED | OUTPATIENT
Start: 2025-01-18 | End: 2025-01-20

## 2025-01-18 RX ADMIN — METOPROLOL TARTRATE 50 MG: 50 TABLET, FILM COATED ORAL at 08:13

## 2025-01-18 RX ADMIN — FLUTICASONE PROPIONATE 2 SPRAY: 50 SPRAY, METERED NASAL at 08:14

## 2025-01-18 RX ADMIN — IPRATROPIUM BROMIDE AND ALBUTEROL SULFATE 3 ML: 2.5; .5 SOLUTION RESPIRATORY (INHALATION) at 07:18

## 2025-01-18 RX ADMIN — ACETAMINOPHEN 650 MG: 325 TABLET ORAL at 20:43

## 2025-01-18 RX ADMIN — CEFAZOLIN SODIUM 2 G: 2 INJECTION, SOLUTION INTRAVENOUS at 14:11

## 2025-01-18 RX ADMIN — HEPARIN SODIUM 900 UNITS/HR: 10000 INJECTION, SOLUTION INTRAVENOUS at 18:36

## 2025-01-18 RX ADMIN — IPRATROPIUM BROMIDE AND ALBUTEROL SULFATE 3 ML: 2.5; .5 SOLUTION RESPIRATORY (INHALATION) at 13:15

## 2025-01-18 RX ADMIN — ACETAMINOPHEN 650 MG: 325 TABLET ORAL at 08:52

## 2025-01-18 RX ADMIN — Medication 5 MG: at 20:44

## 2025-01-18 RX ADMIN — ATORVASTATIN CALCIUM 40 MG: 20 TABLET, FILM COATED ORAL at 20:44

## 2025-01-18 RX ADMIN — METHOCARBAMOL TABLETS 500 MG: 500 TABLET, COATED ORAL at 06:02

## 2025-01-18 RX ADMIN — METHOCARBAMOL TABLETS 500 MG: 500 TABLET, COATED ORAL at 14:11

## 2025-01-18 RX ADMIN — LIDOCAINE 4% 1 PATCH: 40 PATCH TOPICAL at 14:11

## 2025-01-18 RX ADMIN — CEFAZOLIN SODIUM 2 G: 2 INJECTION, SOLUTION INTRAVENOUS at 06:02

## 2025-01-18 RX ADMIN — ASPIRIN 81 MG: 81 TABLET, CHEWABLE ORAL at 20:43

## 2025-01-18 RX ADMIN — ACETAMINOPHEN 650 MG: 325 TABLET ORAL at 15:00

## 2025-01-18 RX ADMIN — IPRATROPIUM BROMIDE AND ALBUTEROL SULFATE 3 ML: 2.5; .5 SOLUTION RESPIRATORY (INHALATION) at 20:05

## 2025-01-18 RX ADMIN — AMIODARONE HYDROCHLORIDE 100 MG: 200 TABLET ORAL at 08:13

## 2025-01-18 RX ADMIN — PREDNISONE 50 MG: 20 TABLET ORAL at 08:13

## 2025-01-18 RX ADMIN — ACETAMINOPHEN 650 MG: 325 TABLET ORAL at 04:00

## 2025-01-18 RX ADMIN — EZETIMIBE 10 MG: 10 TABLET ORAL at 08:13

## 2025-01-18 RX ADMIN — MONTELUKAST SODIUM 10 MG: 10 TABLET, FILM COATED ORAL at 20:43

## 2025-01-18 RX ADMIN — CEFAZOLIN SODIUM 2 G: 2 INJECTION, SOLUTION INTRAVENOUS at 20:44

## 2025-01-18 RX ADMIN — METHOCARBAMOL TABLETS 500 MG: 500 TABLET, COATED ORAL at 22:17

## 2025-01-18 ASSESSMENT — COGNITIVE AND FUNCTIONAL STATUS - GENERAL
CLIMB 3 TO 5 STEPS WITH RAILING: A LOT
TURNING FROM BACK TO SIDE WHILE IN FLAT BAD: A LITTLE
MOVING FROM LYING ON BACK TO SITTING ON SIDE OF FLAT BED WITH BEDRAILS: A LITTLE
DRESSING REGULAR LOWER BODY CLOTHING: A LOT
EATING MEALS: A LITTLE
STANDING UP FROM CHAIR USING ARMS: A LITTLE
CLIMB 3 TO 5 STEPS WITH RAILING: A LOT
MOVING TO AND FROM BED TO CHAIR: A LOT
DAILY ACTIVITIY SCORE: 17
EATING MEALS: A LITTLE
MOBILITY SCORE: 15
TURNING FROM BACK TO SIDE WHILE IN FLAT BAD: A LITTLE
DRESSING REGULAR UPPER BODY CLOTHING: A LITTLE
TOILETING: A LITTLE
MOVING TO AND FROM BED TO CHAIR: A LITTLE
PERSONAL GROOMING: A LITTLE
WALKING IN HOSPITAL ROOM: A LOT
WALKING IN HOSPITAL ROOM: A LOT
DRESSING REGULAR UPPER BODY CLOTHING: A LITTLE
TOILETING: A LITTLE
PERSONAL GROOMING: A LITTLE
MOBILITY SCORE: 15
CLIMB 3 TO 5 STEPS WITH RAILING: TOTAL
MOVING FROM LYING ON BACK TO SITTING ON SIDE OF FLAT BED WITH BEDRAILS: A LITTLE
MOBILITY SCORE: 15
TURNING FROM BACK TO SIDE WHILE IN FLAT BAD: A LITTLE
MOVING FROM LYING ON BACK TO SITTING ON SIDE OF FLAT BED WITH BEDRAILS: A LITTLE
DRESSING REGULAR LOWER BODY CLOTHING: A LOT
WALKING IN HOSPITAL ROOM: A LOT
DAILY ACTIVITIY SCORE: 17
STANDING UP FROM CHAIR USING ARMS: A LITTLE
HELP NEEDED FOR BATHING: A LITTLE
STANDING UP FROM CHAIR USING ARMS: A LITTLE
MOVING TO AND FROM BED TO CHAIR: A LOT
HELP NEEDED FOR BATHING: A LITTLE

## 2025-01-18 ASSESSMENT — PAIN SCALES - GENERAL
PAINLEVEL_OUTOF10: 2
PAINLEVEL_OUTOF10: 2

## 2025-01-18 ASSESSMENT — PAIN DESCRIPTION - LOCATION: LOCATION: GENERALIZED

## 2025-01-18 NOTE — CARE PLAN
The patient's goals for the shift include Patient will remain safe and free from falls    The clinical goals for the shift include patient will remain HDS throughout shift.

## 2025-01-18 NOTE — CARE PLAN
The patient's goals for the shift include Patient will remain safe and free from falls    The clinical goals for the shift include patient will remain HDS throughout shift.      Problem: Fall/Injury  Goal: Not fall by end of shift  Outcome: Progressing  Goal: Be free from injury by end of the shift  Outcome: Progressing  Goal: Verbalize understanding of personal risk factors for fall in the hospital  Outcome: Progressing  Goal: Verbalize understanding of risk factor reduction measures to prevent injury from fall in the home  Outcome: Progressing  Goal: Use assistive devices by end of the shift  Outcome: Progressing  Goal: Pace activities to prevent fatigue by end of the shift  Outcome: Progressing

## 2025-01-18 NOTE — PROGRESS NOTES
[facility] has accepted pt.  [facility] ins. Team to obtain ins. [employer].

## 2025-01-18 NOTE — PROGRESS NOTES
"    Physical Therapy    Physical Therapy Treatment    Patient Name: Guicho Jones  MRN: [id number]  Today's Date: 1/18/2025  Time Calculation  Start Time: 1140  Stop Time: 1206  Time Calculation (min): 26 min     1004/1004-B    Assessment/Plan   PT Assessment  PT Assessment Results: Decreased strength, Decreased range of motion, Decreased endurance, Impaired balance, Decreased mobility  Rehab Prognosis: Good  Barriers to Discharge Home: Physical needs, Caregiver assistance  Evaluation/Treatment Tolerance: Patient limited by fatigue  End of Session Communication: Bedside nurse  Assessment Comment: Pt remains min/Mod A for functional mobility. Pt would benefit from continued therapy to improve strength, ROM, and functional movement.  End of Session Patient Position: Up in chair (pillow placed in chair to aide with sit to stand ease, chair alarm intact)  PT Plan  Inpatient/Swing Bed or Outpatient: Inpatient  PT Plan  Treatment/Interventions: Transfer training, Gait training, Positioning  PT Plan: Ongoing PT  PT Frequency: 3 times per week  General Visit Information:   PT  Visit  PT Received On: 01/18/25  General  Family/Caregiver Present: Yes (pt. wife and son present.)  Prior to Session Communication: Bedside nurse  Patient Position Received: Bed, 2 rail up  General Comment:  (Pt. agreeable to participate with therapy tx.)    Precautions:  Precautions  Hearing/Visual Limitations: [place]  Medical Precautions: Fall precautions  Precautions Comment:  (fall, purwick intact, iv)    Pain:  Pain Assessment  0-10 (Numeric) Pain Score: 2 (pt. stating had low back pain but \"It's much improved now\")    Treatments:  Bed Mobility  Bed Mobility: Yes (rolls to left wth use of rail and min/cg)  Ambulation/Gait Training  Ambulation/Gait Training Performed: Yes (standing along side bed pre gait activity with fww support weight shifting, marching, heelraises progressing to sidestepping trials with min x 1 , 5' x 1, 8' x 1, 3' x " 1)  Transfers  Transfer: Yes (supine to sit with min x 1 and use of rail, sit to stand with mod x 1 with placement and positioning instructions, fair static sit with bue supports, bed to chair with walker and mod x 1)    Outcome Measures:     [facility] Basic Mobility  Turning from your back to your side while in a flat bed without using bedrails: A little  Moving from lying on your back to sitting on the side of a flat bed without using bedrails: A little  Moving to and from bed to chair (including a wheelchair): A little  Standing up from a chair using your arms (e.g. wheelchair or bedside chair): A little  To walk in hospital room: A lot  Climbing 3-5 steps with railing: Total  Basic Mobility - Total Score: 15      Education Documentation  Handouts, taught by Alessia Rodriguez PTA at 1/18/2025  1:00 PM.  Learner: Family, Patient  Readiness: Acceptance  Method: Explanation, Demonstration, Teach-back  Response: Demonstrated Understanding    Body Mechanics, taught by Alessia Rodriguez PTA at 1/18/2025  1:00 PM.  Learner: Family, Patient  Readiness: Acceptance  Method: Explanation, Demonstration, Teach-back  Response: Demonstrated Understanding    Home Exercise Program, taught by Alessia Rodriguez PTA at 1/18/2025  1:00 PM.  Learner: Family, Patient  Readiness: Acceptance  Method: Explanation, Demonstration, Teach-back  Response: Demonstrated Understanding    Mobility Training, taught by Alessia Rodriguez PTA at 1/18/2025  1:00 PM.  Learner: Family, Patient  Readiness: Acceptance  Method: Explanation, Demonstration, Teach-back  Response: Demonstrated Understanding    ADL Training, taught by Alessia Rodriguez PTA at 1/18/2025  1:00 PM.  Learner: Family, Patient  Readiness: Acceptance  Method: Explanation, Demonstration, Teach-back  Response: Demonstrated Understanding    Education Comments  No comments found.       EDUCATION:  Outpatient Education  Individual(s) Educated: Patient, Spouse, Child  Education Provided: Body Mechanics, Fall  Risk, Home Exercise Program, POC, Posture  Risk and Benefits Discussed with Patient/Caregiver/Other: yes  Patient/Caregiver Demonstrated Understanding: yes  Plan of Care Discussed and Agreed Upon: yes  Patient Response to Education: Patient/Caregiver Verbalized Understanding of Information, Patient/Caregiver Performed Return Demonstration of Exercises/Activities  Education Comment:  (pt. well motivated to participate displaying good return demo. of transfer education and standing activity with walker.)  Encounter Problems       Encounter Problems (Active)       PT Problem       Pt will completed supine <> sit bed mobility from flat bed with Min A  (Progressing)       Start:  01/13/25    Expected End:  01/27/25            Pt will demonstrate sit to stand transfers with WW + Min A (Progressing)       Start:  01/13/25    Expected End:  01/27/25            Pt. will ambulate 30 with WW + Min A  (Progressing)       Start:  01/13/25    Expected End:  01/27/25            Pt will maintain balance while reaching outside PADMINI in sitting with WW/ B LE support, single UE support and Min A (Progressing)       Start:  01/13/25    Expected End:  01/27/25            Pt will independently complete B LE strengthening Hep (Progressing)       Start:  01/13/25    Expected End:  01/27/25               Pain - Adult

## 2025-01-18 NOTE — PROGRESS NOTES
?  HISTORY OF PRESENT ILLNESS:         HPI 84-year-old male started having nausea vomiting and diarrhea after eating pizza on PTA.Tells me that his grandchildren have been ill with nausea/vomiting/diarrhea as well. CT abdomen and pelvis and CT head both noted -patient does have 6 x 6 cm aortic aneurysm, which is apparently changed in size compared to last image, currently without evidence of dissection.  He was admitted to the intensive care unit for further evaluation and treatment due to hypotension and hypovolemia     Flu, COVID, RSV all negative  Pathogen panel pending  C. difficile PCR normal  Repeat blood culture from 1/11/2025 pending     Of note, patient has AICD     Unfortunately, on 1/10/2025, patient's blood cultures x 2 positive for Staphylococcus aureus    Pt reported negative PAOLO  No fevers  Back pain which he says  appears improving  Transferred out of ICU    Current Medications:    Current Facility-Administered Medications   Medication Dose Route Frequency Provider Last Rate Last Admin    acetaminophen (Tylenol) tablet 650 mg  650 mg oral q6h Rodriguez Sapp MD   650 mg at 01/18/25 1500    amiodarone (Pacerone) tablet 100 mg  100 mg oral Daily Rodriguez Sapp MD   100 mg at 01/18/25 0813    aspirin chewable tablet 81 mg  81 mg oral Nightly Rodriguez Sapp MD   81 mg at 01/17/25 2013    atorvastatin (Lipitor) tablet 40 mg  40 mg oral Nightly Rodriguez Sapp MD   40 mg at 01/17/25 2013    ceFAZolin (Ancef) 2 g in dextrose (iso)  mL  2 g intravenous q8h Rodriguez Sapp MD   Stopped at 01/18/25 1454    [Held by provider] empagliflozin (Jardiance) tablet 10 mg  10 mg oral Daily Rodriguez Sapp MD   10 mg at 01/11/25 0900    ezetimibe (Zetia) tablet 10 mg  10 mg oral Daily Rodriguez Sapp MD   10 mg at 01/18/25 0813    famotidine (Pepcid) tablet 20 mg  20 mg oral BID PRN Rodriguez Sapp MD        fluticasone (Flonase) nasal spray 2 spray  2 spray Each  Nostril Daily Rodriguez Sapp MD   2 spray at 01/18/25 0814    [Held by provider] tiotropium (Spiriva Respimat) 2.5 mcg/actuation inhaler 2 puff  2 puff inhalation Daily Rodriguez Sapp MD   2 puff at 01/14/25 0850    And    [Held by provider] fluticasone furoate-vilanteroL (Breo Ellipta) 200-25 mcg/dose inhaler 1 puff  1 puff inhalation Daily Rodriguez Sapp MD   1 puff at 01/14/25 0850    heparin 25,000 Units in dextrose 5% 250 mL (100 Units/mL) infusion (premix)  0-4,500 Units/hr intravenous Continuous Rodriguez Sapp MD   Stopped at 01/18/25 0644    heparin bolus from bag 3,000-6,000 Units  3,000-6,000 Units intravenous q4h PRN Rodriguez Sapp MD        [Held by provider] hydroCHLOROthiazide (HYDRODiuril) tablet 25 mg  25 mg oral Once per day on Monday Wednesday Friday Rodriguez Sapp MD        ipratropium-albuteroL (Duo-Neb) 0.5-2.5 mg/3 mL nebulizer solution 3 mL  3 mL nebulization q4h PRN Rodriguez Sapp MD        ipratropium-albuteroL (Duo-Neb) 0.5-2.5 mg/3 mL nebulizer solution 3 mL  3 mL nebulization TID Rodriguez Sapp MD   3 mL at 01/18/25 1315    [Held by provider] isosorbide mononitrate ER (Imdur) 24 hr tablet 30 mg  30 mg oral Daily Rodriguez Sapp MD        lidocaine 4 % patch 1 patch  1 patch transdermal Daily Rodriguez Sapp MD   1 patch at 01/18/25 1411    [Held by provider] lisinopril tablet 40 mg  40 mg oral Daily Rodriguez Sapp MD        melatonin tablet 5 mg  5 mg oral Nightly Rodriguez Sapp MD   5 mg at 01/17/25 2013    methocarbamol (Robaxin) tablet 500 mg  500 mg oral q8h SHABNAM Rodriguez Sapp MD   500 mg at 01/18/25 1411    [Held by provider] metoprolol succinate XL (Toprol-XL) 24 hr tablet 100 mg  100 mg oral Nightly Rodriguez Sapp MD        [Held by provider] metoprolol succinate XL (Toprol-XL) 24 hr tablet 50 mg  50 mg oral Daily Rodriguez Sapp MD   50 mg at 01/11/25 0900    metoprolol tartrate  "(Lopressor) tablet 50 mg  50 mg oral BID Rodriguez Sapp MD   50 mg at 01/18/25 0813    montelukast (Singulair) tablet 10 mg  10 mg oral q PM Rodriguez Sapp MD   10 mg at 01/17/25 2013    ondansetron (Zofran) injection 4 mg  4 mg intravenous q6h PRN Rodriguez Sapp MD   4 mg at 01/12/25 2017    oxyCODONE (Roxicodone) immediate release tablet 2.5 mg  2.5 mg oral q6h PRN Rodriguez Sapp MD   2.5 mg at 01/13/25 1401    oxygen (O2) therapy   inhalation Continuous PRN - O2/gases Rodriguez Sapp MD   3 L/min at 01/17/25 1800    predniSONE (Deltasone) tablet 50 mg  50 mg oral Daily Rodriguez Sapp MD   50 mg at 01/18/25 0813    spironolactone (Aldactone) tablet 25 mg  25 mg oral q48h Rodriguez Sapp MD   25 mg at 01/17/25 1413        Allergies:    Allergies   Allergen Reactions    Levofloxacin Palpitations     Rapid Heart Rate - Severe per pt.          Review of Systems  14 system review is negative other than HPI     /67   Pulse 60   Temp 36.6 °C (97.9 °F)   Resp 20   Ht 1.676 m (5' 6\")   Wt 105 kg (232 lb 2.3 oz)   SpO2 90%   BMI 37.47 kg/m²    Physical Exam:  General: Patient appears ok at the present time. NAD  Skin: no new rashes  HEENT:  Neck is supple, No subconjunctival hemorrhages, no oral exudates  Heart: S1 S2  Lungs: diminished bases  Abdomen: soft, ND, NTTP,  Extrem: No edema, non tender           DATA:    .  Lab Results   Component Value Date    WBC 14.3 (H) 01/18/2025    HGB 10.3 (L) 01/18/2025    HCT 31.6 (L) 01/18/2025    MCV 96 01/18/2025     01/18/2025     Results from last 7 days   Lab Units 01/18/25  0555   SODIUM mmol/L 138   POTASSIUM mmol/L 3.6   CHLORIDE mmol/L 110*   CO2 mmol/L 23   BUN mg/dL 64*   CREATININE mg/dL 1.42*   CALCIUM mg/dL 8.2*   PROTEIN TOTAL g/dL 6.1*   BILIRUBIN TOTAL mg/dL 0.7   ALK PHOS U/L 62   ALT U/L 4*   AST U/L 33   GLUCOSE mg/dL 113*   Susceptibility data from last 90 days.  Collected Specimen Info Organism " Clindamycin Erythromycin Oxacillin Tetracycline Trimethoprim/Sulfamethoxazole Vancomycin   01/12/25 Blood culture from Peripheral Venipuncture Staphylococcus aureus         01/12/25 Blood culture from Peripheral Venipuncture Staphylococcus aureus         01/10/25 Blood culture from Peripheral Venipuncture Staphylococcus aureus         01/10/25 Blood culture from Peripheral Venipuncture Methicillin Susceptible Staphylococcus aureus (MSSA)  S  S  S  S  S  S   Susceptibility data from last 90 days.  Collected Specimen Info Organism Clindamycin Erythromycin Oxacillin Tetracycline Trimethoprim/Sulfamethoxazole Vancomycin   01/12/25 Blood culture from Peripheral Venipuncture Staphylococcus aureus         01/12/25 Blood culture from Peripheral Venipuncture Staphylococcus aureus         01/10/25 Blood culture from Peripheral Venipuncture Staphylococcus aureus         01/10/25 Blood culture from Peripheral Venipuncture Methicillin Susceptible Staphylococcus aureus (MSSA)  S  S  S  S  S  S           IMPRESSION:        Problem List Items Addressed This Visit          Cardiac and Vasculature    AICD (automatic cardioverter/defibrillator) present    Relevant Orders    Transesophageal Echo (PAOLO)    Ischemic cardiomyopathy    Relevant Medications    isosorbide mononitrate ER (Imdur) 24 hr tablet 30 mg    metoprolol succinate XL (Toprol-XL) 24 hr tablet 100 mg    metoprolol succinate XL (Toprol-XL) 24 hr tablet 50 mg    metoprolol tartrate (Lopressor) tablet 50 mg    Other Relevant Orders    Transthoracic Echo (TTE) Complete (Completed)    Transesophageal Echo (PAOLO)    Chronic diastolic congestive heart failure, NYHA class 2    Relevant Medications    isosorbide mononitrate ER (Imdur) 24 hr tablet 30 mg    metoprolol succinate XL (Toprol-XL) 24 hr tablet 100 mg    metoprolol succinate XL (Toprol-XL) 24 hr tablet 50 mg    metoprolol tartrate (Lopressor) tablet 50 mg    Other Relevant Orders    Transthoracic Echo (TTE) Complete  (Completed)    Transesophageal Echo (PAOLO)    Pulmonary hypertension (Multi)    Relevant Orders    Transthoracic Echo (TTE) Complete (Completed)    Transesophageal Echo (PAOLO)       Gastrointestinal and Abdominal    * (Principal) Diarrhea, unspecified type - Primary     Other Visit Diagnoses       Hypotension, unspecified hypotension type        Elevated troponin        Hypokalemia        Renal insufficiency        Anemia, unspecified type        Fall, initial encounter        Medication induced coagulopathy (Multi)        Relevant Medications    aspirin chewable tablet 81 mg    albumin human 5 % infusion 25 g (Completed)    heparin bolus from bag 8,160 Units (Completed)    heparin 25,000 Units in dextrose 5% 250 mL (100 Units/mL) infusion (premix)    heparin bolus from bag 3,000-6,000 Units    Bacteremia due to Gram-positive bacteria        Relevant Orders    Transthoracic Echo (TTE) Complete (Completed)    Transesophageal Echo (PAOLO)    Weakness of both arms        Lumbar pain            Concerned of endovascular infection given presentation and risk factors     PAOLO unrevealing  CT showing multifocal consolidations possible lung abscess or necrotizing infection not initially seen on admission.      PLAN:    ,  MRI could not be performed because of cardiac device.  Other testing will be more suboptimal CTs cannot be performed currently with renal failure, so excluding an epidural abscess with imaging not possible.  No long tract neurological findings at this time  Aneurysm could not be imaged any further given ISAIAS and need for contrast CTA    WBC reassuringly scan not indicating uptake aneurysm or spine but it is lower yield     Would likely treat 6 total weeks past sterilization of blood cultures given the multiple potential sources of infection, limitations in our testing    Ok for PICC            Marc Lomeli MD

## 2025-01-18 NOTE — PROGRESS NOTES
?  HISTORY OF PRESENT ILLNESS:         HPI 84-year-old male started having nausea vomiting and diarrhea after eating pizza on Wednesday. Tells me that his grandchildren have been ill with nausea/vomiting/diarrhea as well. CT abdomen and pelvis and CT head both noted -patient does have 6 x 6 cm aortic aneurysm, which is apparently changed in size compared to last image, currently without evidence of dissection.  He was admitted to the intensive care unit for further evaluation and treatment due to hypotension and hypovolemia     Flu, COVID, RSV all negative  Pathogen panel pending  C. difficile PCR normal  Repeat blood culture from 1/11/2025 pending     Of note, patient has AICD     Unfortunately, on 1/10/2025, patient's blood cultures x 2 positive for Staphylococcus aureus    Pt reported negative PAOLO  No fevers  Back pain which he says  appears improving    Current Medications:    Current Facility-Administered Medications   Medication Dose Route Frequency Provider Last Rate Last Admin    acetaminophen (Tylenol) tablet 650 mg  650 mg oral q6h Rodriguez Sapp MD   650 mg at 01/17/25 2035    amiodarone (Pacerone) tablet 100 mg  100 mg oral Daily Rodriguez Sapp MD   100 mg at 01/17/25 0826    aspirin chewable tablet 81 mg  81 mg oral Nightly Rodriguez Sapp MD   81 mg at 01/17/25 2013    atorvastatin (Lipitor) tablet 40 mg  40 mg oral Nightly Rodriguez Sapp MD   40 mg at 01/17/25 2013    ceFAZolin (Ancef) 2 g in dextrose (iso)  mL  2 g intravenous q8h Rodriguez Sapp MD 0 mL/hr at 01/17/25 1449 2 g at 01/17/25 2035    [Held by provider] empagliflozin (Jardiance) tablet 10 mg  10 mg oral Daily Rodriguez Sapp MD   10 mg at 01/11/25 0900    ezetimibe (Zetia) tablet 10 mg  10 mg oral Daily Rodriguez Sapp MD   10 mg at 01/17/25 0826    famotidine (Pepcid) tablet 20 mg  20 mg oral BID PRN Rodriguez Sapp MD        fluticasone (Flonase) nasal spray 2 spray  2 spray Each  Nostril Daily Rodriguez Sapp MD   2 spray at 01/15/25 1045    [Held by provider] tiotropium (Spiriva Respimat) 2.5 mcg/actuation inhaler 2 puff  2 puff inhalation Daily Rodriguez Sapp MD   2 puff at 01/14/25 0850    And    [Held by provider] fluticasone furoate-vilanteroL (Breo Ellipta) 200-25 mcg/dose inhaler 1 puff  1 puff inhalation Daily Rodriguez Sapp MD   1 puff at 01/14/25 0850    heparin 25,000 Units in dextrose 5% 250 mL (100 Units/mL) infusion (premix)  0-4,500 Units/hr intravenous Continuous Rodriguez Sapp MD 15 mL/hr at 01/17/25 2033 1,500 Units/hr at 01/17/25 2033    heparin bolus from bag 3,000-6,000 Units  3,000-6,000 Units intravenous q4h PRN Rodriguez Sapp MD        [Held by provider] hydroCHLOROthiazide (HYDRODiuril) tablet 25 mg  25 mg oral Once per day on Monday Wednesday Friday Rodriguez Sapp MD        ipratropium-albuteroL (Duo-Neb) 0.5-2.5 mg/3 mL nebulizer solution 3 mL  3 mL nebulization q4h PRN Rodriguez Sapp MD        ipratropium-albuteroL (Duo-Neb) 0.5-2.5 mg/3 mL nebulizer solution 3 mL  3 mL nebulization TID Rodriguez Sapp MD   3 mL at 01/17/25 1217    [Held by provider] isosorbide mononitrate ER (Imdur) 24 hr tablet 30 mg  30 mg oral Daily Rodriguez Sapp MD        lidocaine 4 % patch 1 patch  1 patch transdermal Daily Rodriguez Sapp MD   1 patch at 01/17/25 1413    [Held by provider] lisinopril tablet 40 mg  40 mg oral Daily Rodriguez Sapp MD        melatonin tablet 5 mg  5 mg oral Nightly Rodriguez Sapp MD   5 mg at 01/17/25 2013    methocarbamol (Robaxin) tablet 500 mg  500 mg oral q8h Counts include 234 beds at the [facility] Rodriguez Sapp MD   500 mg at 01/17/25 1413    [Held by provider] metoprolol succinate XL (Toprol-XL) 24 hr tablet 100 mg  100 mg oral Nightly Rodriguez Sapp MD        [Held by provider] metoprolol succinate XL (Toprol-XL) 24 hr tablet 50 mg  50 mg oral Daily Rodriguez Sapp MD   50 mg at  "01/11/25 0900    metoprolol tartrate (Lopressor) tablet 50 mg  50 mg oral BID Rodriguez Sapp MD   50 mg at 01/17/25 2013    montelukast (Singulair) tablet 10 mg  10 mg oral q PM Rodriguez Sapp MD   10 mg at 01/17/25 2013    ondansetron (Zofran) injection 4 mg  4 mg intravenous q6h PRN Rodriguez Sapp MD   4 mg at 01/12/25 2017    oxyCODONE (Roxicodone) immediate release tablet 2.5 mg  2.5 mg oral q6h PRN Rodriguez Sapp MD   2.5 mg at 01/13/25 1401    oxygen (O2) therapy   inhalation Continuous PRN - O2/gases Rodriguez Sapp MD   3 L/min at 01/17/25 1800    [START ON 1/18/2025] predniSONE (Deltasone) tablet 50 mg  50 mg oral Daily Rodriguez Sapp MD        spironolactone (Aldactone) tablet 25 mg  25 mg oral q48h Rodriguez Sapp MD   25 mg at 01/17/25 1413        Allergies:    Allergies   Allergen Reactions    Levofloxacin Palpitations     Rapid Heart Rate - Severe per pt.          Review of Systems  14 system review is negative other than HPI     /70 (BP Location: Left arm, Patient Position: Lying)   Pulse 60   Temp 37.1 °C (98.8 °F) (Temporal)   Resp 22   Ht 1.676 m (5' 6\")   Wt 102 kg (225 lb 5 oz)   SpO2 93%   BMI 36.37 kg/m²    Physical Exam:  General: Patient appears ok at the present time. NAD  Skin: no new rashes  HEENT:  Neck is supple, No subconjunctival hemorrhages, no oral exudates  Heart: S1 S2  Lungs: diminished bases  Abdomen: soft, ND, NTTP,  Extrem: No edema, non tender           DATA:    .  Lab Results   Component Value Date    WBC 13.2 (H) 01/17/2025    HGB 10.5 (L) 01/17/2025    HCT 31.9 (L) 01/17/2025    MCV 95 01/17/2025     01/17/2025     Results from last 7 days   Lab Units 01/17/25  0432   SODIUM mmol/L 138   POTASSIUM mmol/L 3.4*   CHLORIDE mmol/L 109*   CO2 mmol/L 21   BUN mg/dL 66*   CREATININE mg/dL 1.71*   CALCIUM mg/dL 8.3*   PROTEIN TOTAL g/dL 6.1*   BILIRUBIN TOTAL mg/dL 0.6   ALK PHOS U/L 68   ALT U/L 7*   AST U/L 44* "   GLUCOSE mg/dL 125*   Susceptibility data from last 90 days.  Collected Specimen Info Organism Clindamycin Erythromycin Oxacillin Tetracycline Trimethoprim/Sulfamethoxazole Vancomycin   01/12/25 Blood culture from Peripheral Venipuncture Staphylococcus aureus         01/12/25 Blood culture from Peripheral Venipuncture Staphylococcus aureus         01/10/25 Blood culture from Peripheral Venipuncture Staphylococcus aureus         01/10/25 Blood culture from Peripheral Venipuncture Methicillin Susceptible Staphylococcus aureus (MSSA)  S  S  S  S  S  S   Susceptibility data from last 90 days.  Collected Specimen Info Organism Clindamycin Erythromycin Oxacillin Tetracycline Trimethoprim/Sulfamethoxazole Vancomycin   01/12/25 Blood culture from Peripheral Venipuncture Staphylococcus aureus         01/12/25 Blood culture from Peripheral Venipuncture Staphylococcus aureus         01/10/25 Blood culture from Peripheral Venipuncture Staphylococcus aureus         01/10/25 Blood culture from Peripheral Venipuncture Methicillin Susceptible Staphylococcus aureus (MSSA)  S  S  S  S  S  S           IMPRESSION:        Problem List Items Addressed This Visit          Cardiac and Vasculature    AICD (automatic cardioverter/defibrillator) present    Relevant Orders    Transesophageal Echo (PAOLO)    Ischemic cardiomyopathy    Relevant Medications    isosorbide mononitrate ER (Imdur) 24 hr tablet 30 mg    metoprolol succinate XL (Toprol-XL) 24 hr tablet 100 mg    metoprolol succinate XL (Toprol-XL) 24 hr tablet 50 mg    metoprolol tartrate (Lopressor) tablet 50 mg    Other Relevant Orders    Transthoracic Echo (TTE) Complete (Completed)    Transesophageal Echo (PAOLO)    Chronic diastolic congestive heart failure, NYHA class 2    Relevant Medications    isosorbide mononitrate ER (Imdur) 24 hr tablet 30 mg    metoprolol succinate XL (Toprol-XL) 24 hr tablet 100 mg    metoprolol succinate XL (Toprol-XL) 24 hr tablet 50 mg    metoprolol  tartrate (Lopressor) tablet 50 mg    Other Relevant Orders    Transthoracic Echo (TTE) Complete (Completed)    Transesophageal Echo (PAOLO)    Pulmonary hypertension (Multi)    Relevant Orders    Transthoracic Echo (TTE) Complete (Completed)    Transesophageal Echo (PAOLO)       Gastrointestinal and Abdominal    * (Principal) Diarrhea, unspecified type - Primary     Other Visit Diagnoses       Hypotension, unspecified hypotension type        Elevated troponin        Hypokalemia        Renal insufficiency        Anemia, unspecified type        Fall, initial encounter        Medication induced coagulopathy (Multi)        Relevant Medications    aspirin chewable tablet 81 mg    albumin human 5 % infusion 25 g (Completed)    heparin bolus from bag 8,160 Units (Completed)    heparin 25,000 Units in dextrose 5% 250 mL (100 Units/mL) infusion (premix)    heparin bolus from bag 3,000-6,000 Units    Bacteremia due to Gram-positive bacteria        Relevant Orders    Transthoracic Echo (TTE) Complete (Completed)    Transesophageal Echo (PAOLO)    Weakness of both arms        Lumbar pain            Concerned of endovascular infection given presentation and risk factors  CT showing multifocal consolidations possible lung abscess or necrotizing infection not initially seen on admission.      PLAN:   PAOLO unrevealing  Continue antibiotic therapy      We may also to image aneurysm further if possible., Possible spinal imaging as well.  MRI could not be performed because of cardiac device.  Other testing will be more suboptimal CTs cannot be performed currently with renal failure, so excluding an epidural abscess with imaging not possible.  No long tract neurological findings at this time    WBC scan not indicating uptake aneurysm or spine    Would likely treat 6 total weeks past sterilization of blood cultures given the multiple potential sources of infection, limitations in our testing            Marc Lomeli MD

## 2025-01-18 NOTE — PROGRESS NOTES
Renal Progress Note  Assessment/  84 y.o. year old male who presented to the hospital for further evaluation and management of s/p mechanical fall with difficult to ambulate came to the emergency department where clinical laboratory assessment did show that the patient blood pressure is 60/60 and patient did have a fever of 38 he was resuscitated with fluids and admitted for further evaluation and because of persistent hypotension the patient was transferred to critical care bed     Hospital course was significant for CT abdomen pelvis with IV contrast done at 1/10/2025 that did not show active disease with no bowel obstruction a complex cyst on the right renal cyst at the day creatinine was 1.6 and GFR of 4, 3 days later creatinine remained at 1.5 increased to 2.3 yesterday and today 2.5  Yesterday patient was significantly hemodynamically unstable with a stretch of hypotension blood pressure in the 80s and 90s systolic over 40s diastolic with net negative fluid balance last 24 hours 2.6 L     Renal ultrasounds right kidney 12.8 left kidney 12.3 with no obstructive uropathy     Acute kidney injury based on the above clinical laboratory assessment prerenal azotemia versus aggravated renal insult possible contribution of contrast and aggravation by contrast exposure this type of injury is usually benign for recovery is expected  Whether the admission 1.5 creatinine reflects patient baseline or not is not clear at the time of dictation to address the presence of chronic kidney disease     Thrombocytopenia but adequate platelet  Hide likely kidney involvement in collagen vascular disease or thrombotic microangiopathy     Plan   Function improving, no changes from renal standpoint   Disposition being worked on, precert    Subjective:   Admit Date: 1/10/2025    Interval History: Scr trending down, K better as well, transferred out of ICU       Medications:   Scheduled Meds:acetaminophen, 650 mg, oral, q6h  amiodarone,  "100 mg, oral, Daily  aspirin, 81 mg, oral, Nightly  atorvastatin, 40 mg, oral, Nightly  ceFAZolin, 2 g, intravenous, q8h  [Held by provider] empagliflozin, 10 mg, oral, Daily  ezetimibe, 10 mg, oral, Daily  fluticasone, 2 spray, Each Nostril, Daily  [Held by provider] tiotropium, 2 puff, inhalation, Daily   And  [Held by provider] fluticasone furoate-vilanteroL, 1 puff, inhalation, Daily  [Held by provider] hydroCHLOROthiazide, 25 mg, oral, Once per day on Monday Wednesday Friday  ipratropium-albuteroL, 3 mL, nebulization, TID  [Held by provider] isosorbide mononitrate ER, 30 mg, oral, Daily  lidocaine, 1 patch, transdermal, Daily  [Held by provider] lisinopril, 40 mg, oral, Daily  melatonin, 5 mg, oral, Nightly  methocarbamol, 500 mg, oral, q8h SHABNAM  [Held by provider] metoprolol succinate XL, 100 mg, oral, Nightly  [Held by provider] metoprolol succinate XL, 50 mg, oral, Daily  metoprolol tartrate, 50 mg, oral, BID  montelukast, 10 mg, oral, q PM  predniSONE, 50 mg, oral, Daily  spironolactone, 25 mg, oral, q48h      Continuous Infusions:heparin, 0-4,500 Units/hr, Last Rate: Stopped (01/18/25 0644)        CBC:   Lab Results   Component Value Date    HGB 10.3 (L) 01/18/2025    HGB 10.5 (L) 01/17/2025    WBC 14.3 (H) 01/18/2025    WBC 13.2 (H) 01/17/2025     01/18/2025     01/17/2025      Anemia:  No results found for: \"FERRITIN\", \"IRON\", \"TIBC\"   BMP:    Lab Results   Component Value Date     01/18/2025     01/17/2025    K 3.6 01/18/2025    K 3.4 (L) 01/17/2025     (H) 01/18/2025     (H) 01/17/2025    CO2 23 01/18/2025    CO2 21 01/17/2025    BUN 64 (H) 01/18/2025    BUN 66 (H) 01/17/2025    CREATININE 1.42 (H) 01/18/2025    CREATININE 1.71 (H) 01/17/2025      Bone disease:   Lab Results   Component Value Date    PHOS 3.3 01/18/2025      Urinalysis:  No results found for: \"LYNN\", \"PROTUR\", \"GLUCOSEU\", \"BLOODU\", \"KETONESU\", \"BILIRUBINU\", \"NITRITEU\", \"LEUKOCYTESU\", \"UTPCR\" " "    Objective:   Vitals: /72   Pulse 59   Temp 36.5 °C (97.7 °F)   Resp 20   Ht 1.676 m (5' 6\")   Wt 105 kg (232 lb 2.3 oz)   SpO2 95%   BMI 37.47 kg/m²    Wt Readings from Last 3 Encounters:   01/18/25 105 kg (232 lb 2.3 oz)   11/25/24 105 kg (231 lb)   11/04/24 103 kg (226 lb 12.8 oz)      24HR INTAKE/OUTPUT:    Intake/Output Summary (Last 24 hours) at 1/18/2025 1246  Last data filed at 1/18/2025 0927  Gross per 24 hour   Intake 775.05 ml   Output 2250 ml   Net -1474.95 ml     Admission weight:  Weight: 99.8 kg (220 lb)      General: alert, in no apparent distress  HEENT: normocephalic, atraumatic, anicteric  Lungs: non-labored respirations, clear to auscultation bilaterally  Heart: regular rate and rhythm, no murmurs or rubs  Abdomen: soft, non-tender, non-distended  Ext: no peripheral edema  Neuro: alert, follows commands        Electronically signed by Aron Monique MD on 1/18/2025 at 12:46 PM            "

## 2025-01-18 NOTE — PROGRESS NOTES
ADMISSION DATE: 1/10/2025  HOSPITAL DAY: 8    SUBJECTIVE:  Patient was seen at bedside.  Uneventful night.  Patient has MSSA bacteremia with unknown source.  Currently infectious diseases treating him with IV cefazolin.  Patient might need longer antibiotic.  ISAIAS is resolving.    OBJECTIVE:  Vitals:    01/18/25 0509 01/18/25 0510 01/18/25 0718 01/18/25 0726   BP:  141/67  142/72   BP Location:  Right arm     Patient Position:  Lying     Pulse:  60  59   Resp:  20     Temp:  36.3 °C (97.3 °F)  36.5 °C (97.7 °F)   TempSrc:  Temporal     SpO2:  94% 95% 95%   Weight: 105 kg (232 lb 2.3 oz)      Height:            Intake/Output Summary (Last 24 hours) at 1/18/2025 1133  Last data filed at 1/18/2025 0927  Gross per 24 hour   Intake 1015.05 ml   Output 2250 ml   Net -1234.95 ml      Wt Readings from Last 10 Encounters:   01/18/25 105 kg (232 lb 2.3 oz)   11/25/24 105 kg (231 lb)   11/04/24 103 kg (226 lb 12.8 oz)   08/21/24 101 kg (222 lb 6.4 oz)   08/19/24 101 kg (222 lb 12.8 oz)   06/05/24 101 kg (223 lb 6.4 oz)   05/02/24 102 kg (225 lb)   04/24/24 101 kg (223 lb)   04/01/24 101 kg (223 lb 9.6 oz)   03/13/24 102 kg (224 lb)       PHYSICAL EXAM:  Gen: Alert, awake, Oriented X 3. Not in any acute distress   HEENT:  Atraumatic, PERRL.  Conjunctivae clear.   Moist nasal mucous membranes. oropharynx non erythematous,   Neck:  Supple without thyromegaly or lymphadenopathy.  Lungs:  Clear to auscultation without rales, rhonchi, or rub.  Heart:  RRR, S1, S2, without M.  Abdomen:  Soft, non tender, no organ enlargement, bruit. Bowel sounds present . No CVA tenderness.  Extremities:  No edema. No calf swelling or tenderness.    Skin:  No rash, ecchymosis or erythema.    CURRENT ACTIVE MEDS:  acetaminophen, 650 mg, oral, q6h  amiodarone, 100 mg, oral, Daily  aspirin, 81 mg, oral, Nightly  atorvastatin, 40 mg, oral, Nightly  ceFAZolin, 2 g, intravenous, q8h  [Held by provider] empagliflozin, 10 mg, oral, Daily  ezetimibe, 10 mg,  oral, Daily  fluticasone, 2 spray, Each Nostril, Daily  [Held by provider] tiotropium, 2 puff, inhalation, Daily   And  [Held by provider] fluticasone furoate-vilanteroL, 1 puff, inhalation, Daily  [Held by provider] hydroCHLOROthiazide, 25 mg, oral, Once per day on Monday Wednesday Friday  ipratropium-albuteroL, 3 mL, nebulization, TID  [Held by provider] isosorbide mononitrate ER, 30 mg, oral, Daily  lidocaine, 1 patch, transdermal, Daily  [Held by provider] lisinopril, 40 mg, oral, Daily  melatonin, 5 mg, oral, Nightly  methocarbamol, 500 mg, oral, q8h SHABNAM  [Held by provider] metoprolol succinate XL, 100 mg, oral, Nightly  [Held by provider] metoprolol succinate XL, 50 mg, oral, Daily  metoprolol tartrate, 50 mg, oral, BID  montelukast, 10 mg, oral, q PM  predniSONE, 50 mg, oral, Daily  spironolactone, 25 mg, oral, q48h      LAB RESULTS:   CBC:   Results from last 7 days   Lab Units 01/18/25  0555 01/17/25 0432 01/16/25 0430   WBC AUTO x10*3/uL 14.3* 13.2* 12.3*   RBC AUTO x10*6/uL 3.30* 3.37* 3.41*   HEMOGLOBIN g/dL 10.3* 10.5* 10.8*   HEMATOCRIT % 31.6* 31.9* 31.7*   MCV fL 96 95 93   MCH pg 31.2 31.2 31.7   MCHC g/dL 32.6 32.9 34.1   RDW % 16.5* 16.6* 16.3*   PLATELETS AUTO x10*3/uL 233 212 172     CMP:    Results from last 7 days   Lab Units 01/18/25  0555 01/17/25 0432 01/16/25  0430   SODIUM mmol/L 138 138 137   POTASSIUM mmol/L 3.6 3.4* 3.1*   CHLORIDE mmol/L 110* 109* 105   CO2 mmol/L 23 21 21   BUN mg/dL 64* 66* 65*   CREATININE mg/dL 1.42* 1.71* 2.19*   GLUCOSE mg/dL 113* 125* 143*   PROTEIN TOTAL g/dL 6.1* 6.1* 6.1*   CALCIUM mg/dL 8.2* 8.3* 8.2*   BILIRUBIN TOTAL mg/dL 0.7 0.6 0.6   ALK PHOS U/L 62 68 65   AST U/L 33 44* 34   ALT U/L 4* 7* 3*     BMP:    Results from last 7 days   Lab Units 01/18/25  0555 01/17/25  0432 01/16/25  0430   SODIUM mmol/L 138 138 137   POTASSIUM mmol/L 3.6 3.4* 3.1*   CHLORIDE mmol/L 110* 109* 105   CO2 mmol/L 23 21 21   BUN mg/dL 64* 66* 65*   CREATININE mg/dL 1.42*  "1.71* 2.19*   CALCIUM mg/dL 8.2* 8.3* 8.2*   GLUCOSE mg/dL 113* 125* 143*     Magnesium:  Results from last 7 days   Lab Units 01/18/25  0555 01/17/25  0432 01/16/25  0430   MAGNESIUM mg/dL 2.02 2.28 2.35     Lab Results   Component Value Date    LDLCALC 39 06/10/2024     No results found for: \"HGBA1C\"  Lab Results   Component Value Date    LDLCALC 39 06/10/2024    CREATININE 1.42 (H) 01/18/2025       Lab Results   Component Value Date    TSH 1.15 03/13/2024      Lab Results   Component Value Date    INR 1.7 (H) 01/10/2025    INR 1.9 (H) 03/09/2024    PROTIME 19.7 (H) 01/10/2025    PROTIME 21.4 (H) 03/09/2024       CULTURES & SUSCEPTIBILITIES:   Susceptibility data from last 90 days.  Collected Specimen Info Organism Clindamycin Erythromycin Oxacillin Tetracycline Trimethoprim/Sulfamethoxazole Vancomycin   01/12/25 Blood culture from Peripheral Venipuncture Staphylococcus aureus         01/12/25 Blood culture from Peripheral Venipuncture Staphylococcus aureus         01/10/25 Blood culture from Peripheral Venipuncture Staphylococcus aureus         01/10/25 Blood culture from Peripheral Venipuncture Methicillin Susceptible Staphylococcus aureus (MSSA)  S  S  S  S  S  S         IMAGING STUDIES:  I have reviewed following imaging studies.  I agree with with the impression and incorporated those results into my MDM   === 01/10/25 ===  XR CHEST 1 VIEW  1.  Increased prominence of the markings particularly right perihilar  markings may at least in part be related to difference in patient  position.    === 01/10/25 ===  CT CHEST ABDOMEN PELVIS WO CONTRAST  Thoracic findings:  1. Background of moderate-to-severe pulmonary emphysema. There are  multifocal consolidations throughout the lungs which are suspicious  for multifocal pneumonia. Additionally, in the medial left upper  lobe, one of the consolidations demonstrates a subtle air-fluid level  which may represent a developing abscess, necrotizing pneumonia, " or  superinfection of a bulla. Recommend posttreatment chest CT in 3  months to ensure resolution.  2. Mild cardiomegaly.  3. Dilated main pulmonary artery which can be seen with pulmonary  hypertension.    Abdomen/pelvis findings:  1. No new collections.  2. Similar mild fluid distention of distal small bowel loops and  colonic bowel loops which can be seen with mild enterocolitis. No  significant bowel wall thickening.  3. Redemonstration of the bilobed abdominal aortic aneurysm measuring  up to 6 cm. As before, recommend vascular surgery consultation for  management guidance.  4. Other findings remain unchanged.    === 01/10/25 ===  US RENAL COMPLETE  No hydronephrosis on either side    LAST EKG  Encounter Date: 01/10/25   ECG 12 lead   Result Value    Ventricular Rate 60    Atrial Rate 60    QRS Duration 122    QT Interval 572    QTC Calculation(Bazett) 572    R Axis 28    T Axis 110    QRS Count 10    Q Onset 218    T Offset 504    QTC Fredericia 572       PROBLEMS ON ADMISSION:  Hypokalemia [E87.6]  Renal insufficiency [N28.9]  Elevated troponin [R79.89]  Fall, initial encounter [W19.XXXA]  Hypotension, unspecified hypotension type [I95.9]  Anemia, unspecified type [D64.9]  Diarrhea, unspecified type [R19.7]  Medication induced coagulopathy (Multi) [D68.9, T50.905A]    HOSPITAL PROBLEM LIST     Abdominal aortic aneurysm (CMS-HCC)    CAD (coronary artery disease)    Essential hypertension    Ischemic cardiomyopathy    Mixed hyperlipidemia    Persistent atrial fibrillation (Multi)    Chronic diastolic congestive heart failure, NYHA class 2    High risk medication use    Prostate cancer (Multi)    ASSESSMENT AND PLAN FOR 1/18/2025  Patient has MSSA bacteriemia.  Subsequent blood cultures are negative.  Infectious disease is on board.  We are waiting for the third culture to be negative then patient will get PICC.  I had a discussion with Dr. Lomeli this morning.  He will kindly add the PICC orders.  Patient  will be on IV cefazolin for about 6 weeks.  Meanwhile he has generalized weakness.  His AMPAC score is low and he will be going to [facility].  We are waiting for final authorization from the insurance company.  Patient initially started hypotension multiple medications on hold.  His blood pressure is still in the lower side.  We will not start the medications unless it is needed and we will start them in gradually.  Rest of the medical therapy will be continued as per admission orders.          P.S: This note was completed using Dragon voice recognition technology and may include unintended errors with respect to translation of words, typographical errors or grammar errors which may not have been identified while finalizing the chart.

## 2025-01-19 VITALS
OXYGEN SATURATION: 91 % | DIASTOLIC BLOOD PRESSURE: 69 MMHG | HEIGHT: 66 IN | HEART RATE: 60 BPM | WEIGHT: 231.48 LBS | TEMPERATURE: 97.5 F | SYSTOLIC BLOOD PRESSURE: 133 MMHG | BODY MASS INDEX: 37.2 KG/M2 | RESPIRATION RATE: 18 BRPM

## 2025-01-19 LAB
ALBUMIN SERPL BCP-MCNC: 2.6 G/DL (ref 3.4–5)
ALP SERPL-CCNC: 50 U/L (ref 33–136)
ALT SERPL W P-5'-P-CCNC: 3 U/L (ref 10–52)
ANION GAP SERPL CALC-SCNC: 10 MMOL/L (ref 10–20)
AST SERPL W P-5'-P-CCNC: 19 U/L (ref 9–39)
ATRIAL RATE: 60 BPM
ATRIAL RATE: 64 BPM
BACTERIA BLD CULT: NORMAL
BACTERIA BLD CULT: NORMAL
BASOPHILS # BLD AUTO: 0.02 X10*3/UL (ref 0–0.1)
BASOPHILS NFR BLD AUTO: 0.1 %
BILIRUB SERPL-MCNC: 0.5 MG/DL (ref 0–1.2)
BM IGG SER IF-ACNC: 0 AU/ML (ref 0–19)
BUN SERPL-MCNC: 75 MG/DL (ref 6–23)
CALCIUM SERPL-MCNC: 8 MG/DL (ref 8.6–10.3)
CHLORIDE SERPL-SCNC: 110 MMOL/L (ref 98–107)
CO2 SERPL-SCNC: 23 MMOL/L (ref 21–32)
CREAT SERPL-MCNC: 1.22 MG/DL (ref 0.5–1.3)
EGFRCR SERPLBLD CKD-EPI 2021: 58 ML/MIN/1.73M*2
EOSINOPHIL # BLD AUTO: 0.01 X10*3/UL (ref 0–0.4)
EOSINOPHIL NFR BLD AUTO: 0.1 %
ERYTHROCYTE [DISTWIDTH] IN BLOOD BY AUTOMATED COUNT: 16.2 % (ref 11.5–14.5)
GLUCOSE SERPL-MCNC: 150 MG/DL (ref 74–99)
HCT VFR BLD AUTO: 27.2 % (ref 41–52)
HGB BLD-MCNC: 9.1 G/DL (ref 13.5–17.5)
HOLD SPECIMEN: NORMAL
IMM GRANULOCYTES # BLD AUTO: 0.77 X10*3/UL (ref 0–0.5)
IMM GRANULOCYTES NFR BLD AUTO: 4.9 % (ref 0–0.9)
LYMPHOCYTES # BLD AUTO: 1.01 X10*3/UL (ref 0.8–3)
LYMPHOCYTES NFR BLD AUTO: 6.4 %
MAGNESIUM SERPL-MCNC: 1.77 MG/DL (ref 1.6–2.4)
MCH RBC QN AUTO: 32.5 PG (ref 26–34)
MCHC RBC AUTO-ENTMCNC: 33.5 G/DL (ref 32–36)
MCV RBC AUTO: 97 FL (ref 80–100)
MONOCYTES # BLD AUTO: 1.03 X10*3/UL (ref 0.05–0.8)
MONOCYTES NFR BLD AUTO: 6.5 %
NEUTROPHILS # BLD AUTO: 13.01 X10*3/UL (ref 1.6–5.5)
NEUTROPHILS NFR BLD AUTO: 82 %
NRBC BLD-RTO: 0.2 /100 WBCS (ref 0–0)
P AXIS: 38 DEGREES
P OFFSET: 214 MS
P ONSET: 183 MS
PHOSPHATE SERPL-MCNC: 2.4 MG/DL (ref 2.5–4.9)
PLATELET # BLD AUTO: 225 X10*3/UL (ref 150–450)
POTASSIUM SERPL-SCNC: 3.2 MMOL/L (ref 3.5–5.3)
PR INTERVAL: 82 MS
PROT SERPL-MCNC: 5.5 G/DL (ref 6.4–8.2)
Q ONSET: 218 MS
Q ONSET: 224 MS
QRS COUNT: 10 BEATS
QRS COUNT: 11 BEATS
QRS DURATION: 110 MS
QRS DURATION: 122 MS
QT INTERVAL: 526 MS
QT INTERVAL: 572 MS
QTC CALCULATION(BAZETT): 542 MS
QTC CALCULATION(BAZETT): 572 MS
QTC FREDERICIA: 537 MS
QTC FREDERICIA: 572 MS
R AXIS: 18 DEGREES
R AXIS: 28 DEGREES
RBC # BLD AUTO: 2.8 X10*6/UL (ref 4.5–5.9)
SODIUM SERPL-SCNC: 140 MMOL/L (ref 136–145)
T AXIS: 110 DEGREES
T AXIS: 74 DEGREES
T OFFSET: 487 MS
T OFFSET: 504 MS
UFH PPP CHRO-ACNC: 0.6 IU/ML
VENTRICULAR RATE: 60 BPM
VENTRICULAR RATE: 64 BPM
WBC # BLD AUTO: 15.9 X10*3/UL (ref 4.4–11.3)

## 2025-01-19 PROCEDURE — 84100 ASSAY OF PHOSPHORUS: CPT | Performed by: STUDENT IN AN ORGANIZED HEALTH CARE EDUCATION/TRAINING PROGRAM

## 2025-01-19 PROCEDURE — 99233 SBSQ HOSP IP/OBS HIGH 50: CPT | Performed by: INTERNAL MEDICINE

## 2025-01-19 PROCEDURE — 2500000002 HC RX 250 W HCPCS SELF ADMINISTERED DRUGS (ALT 637 FOR MEDICARE OP, ALT 636 FOR OP/ED): Performed by: STUDENT IN AN ORGANIZED HEALTH CARE EDUCATION/TRAINING PROGRAM

## 2025-01-19 PROCEDURE — 2500000004 HC RX 250 GENERAL PHARMACY W/ HCPCS (ALT 636 FOR OP/ED): Performed by: STUDENT IN AN ORGANIZED HEALTH CARE EDUCATION/TRAINING PROGRAM

## 2025-01-19 PROCEDURE — 2500000001 HC RX 250 WO HCPCS SELF ADMINISTERED DRUGS (ALT 637 FOR MEDICARE OP): Performed by: STUDENT IN AN ORGANIZED HEALTH CARE EDUCATION/TRAINING PROGRAM

## 2025-01-19 PROCEDURE — 85520 HEPARIN ASSAY: CPT | Performed by: INTERNAL MEDICINE

## 2025-01-19 PROCEDURE — 1200000002 HC GENERAL ROOM WITH TELEMETRY DAILY

## 2025-01-19 PROCEDURE — 2500000005 HC RX 250 GENERAL PHARMACY W/O HCPCS: Performed by: STUDENT IN AN ORGANIZED HEALTH CARE EDUCATION/TRAINING PROGRAM

## 2025-01-19 PROCEDURE — 94640 AIRWAY INHALATION TREATMENT: CPT

## 2025-01-19 PROCEDURE — 83735 ASSAY OF MAGNESIUM: CPT | Performed by: STUDENT IN AN ORGANIZED HEALTH CARE EDUCATION/TRAINING PROGRAM

## 2025-01-19 PROCEDURE — 36415 COLL VENOUS BLD VENIPUNCTURE: CPT | Performed by: STUDENT IN AN ORGANIZED HEALTH CARE EDUCATION/TRAINING PROGRAM

## 2025-01-19 PROCEDURE — 80053 COMPREHEN METABOLIC PANEL: CPT | Performed by: STUDENT IN AN ORGANIZED HEALTH CARE EDUCATION/TRAINING PROGRAM

## 2025-01-19 PROCEDURE — 85025 COMPLETE CBC W/AUTO DIFF WBC: CPT | Performed by: INTERNAL MEDICINE

## 2025-01-19 PROCEDURE — 2500000002 HC RX 250 W HCPCS SELF ADMINISTERED DRUGS (ALT 637 FOR MEDICARE OP, ALT 636 FOR OP/ED): Performed by: INTERNAL MEDICINE

## 2025-01-19 RX ORDER — POTASSIUM CHLORIDE 20 MEQ/1
20 TABLET, EXTENDED RELEASE ORAL 2 TIMES DAILY
Status: DISCONTINUED | OUTPATIENT
Start: 2025-01-19 | End: 2025-01-26 | Stop reason: HOSPADM

## 2025-01-19 RX ADMIN — SPIRONOLACTONE 25 MG: 25 TABLET ORAL at 11:55

## 2025-01-19 RX ADMIN — METHOCARBAMOL TABLETS 500 MG: 500 TABLET, COATED ORAL at 13:12

## 2025-01-19 RX ADMIN — METOPROLOL TARTRATE 50 MG: 50 TABLET, FILM COATED ORAL at 08:01

## 2025-01-19 RX ADMIN — PREDNISONE 50 MG: 20 TABLET ORAL at 08:01

## 2025-01-19 RX ADMIN — Medication 5 MG: at 20:20

## 2025-01-19 RX ADMIN — AMIODARONE HYDROCHLORIDE 100 MG: 200 TABLET ORAL at 08:01

## 2025-01-19 RX ADMIN — ATORVASTATIN CALCIUM 40 MG: 20 TABLET, FILM COATED ORAL at 20:20

## 2025-01-19 RX ADMIN — POTASSIUM CHLORIDE 20 MEQ: 1500 TABLET, EXTENDED RELEASE ORAL at 13:12

## 2025-01-19 RX ADMIN — IPRATROPIUM BROMIDE AND ALBUTEROL SULFATE 3 ML: 2.5; .5 SOLUTION RESPIRATORY (INHALATION) at 13:10

## 2025-01-19 RX ADMIN — CEFAZOLIN SODIUM 2 G: 2 INJECTION, SOLUTION INTRAVENOUS at 13:12

## 2025-01-19 RX ADMIN — ASPIRIN 81 MG: 81 TABLET, CHEWABLE ORAL at 20:20

## 2025-01-19 RX ADMIN — LIDOCAINE 4% 1 PATCH: 40 PATCH TOPICAL at 15:02

## 2025-01-19 RX ADMIN — ACETAMINOPHEN 650 MG: 325 TABLET ORAL at 08:55

## 2025-01-19 RX ADMIN — ACETAMINOPHEN 650 MG: 325 TABLET ORAL at 15:02

## 2025-01-19 RX ADMIN — METHOCARBAMOL TABLETS 500 MG: 500 TABLET, COATED ORAL at 21:18

## 2025-01-19 RX ADMIN — CEFAZOLIN SODIUM 2 G: 2 INJECTION, SOLUTION INTRAVENOUS at 20:31

## 2025-01-19 RX ADMIN — IPRATROPIUM BROMIDE AND ALBUTEROL SULFATE 3 ML: 2.5; .5 SOLUTION RESPIRATORY (INHALATION) at 06:55

## 2025-01-19 RX ADMIN — METHOCARBAMOL TABLETS 500 MG: 500 TABLET, COATED ORAL at 05:36

## 2025-01-19 RX ADMIN — EZETIMIBE 10 MG: 10 TABLET ORAL at 08:01

## 2025-01-19 RX ADMIN — MONTELUKAST SODIUM 10 MG: 10 TABLET, FILM COATED ORAL at 20:20

## 2025-01-19 RX ADMIN — HEPARIN SODIUM 9 UNITS/HR: 10000 INJECTION, SOLUTION INTRAVENOUS at 22:34

## 2025-01-19 RX ADMIN — POTASSIUM CHLORIDE 20 MEQ: 1500 TABLET, EXTENDED RELEASE ORAL at 20:20

## 2025-01-19 RX ADMIN — FLUTICASONE PROPIONATE 2 SPRAY: 50 SPRAY, METERED NASAL at 08:05

## 2025-01-19 RX ADMIN — ACETAMINOPHEN 650 MG: 325 TABLET ORAL at 21:18

## 2025-01-19 RX ADMIN — CEFAZOLIN SODIUM 2 G: 2 INJECTION, SOLUTION INTRAVENOUS at 05:36

## 2025-01-19 ASSESSMENT — COGNITIVE AND FUNCTIONAL STATUS - GENERAL
CLIMB 3 TO 5 STEPS WITH RAILING: A LOT
PERSONAL GROOMING: A LITTLE
TURNING FROM BACK TO SIDE WHILE IN FLAT BAD: A LITTLE
EATING MEALS: A LITTLE
MOVING FROM LYING ON BACK TO SITTING ON SIDE OF FLAT BED WITH BEDRAILS: A LITTLE
WALKING IN HOSPITAL ROOM: A LOT
MOBILITY SCORE: 15
DAILY ACTIVITIY SCORE: 17
DRESSING REGULAR UPPER BODY CLOTHING: A LITTLE
STANDING UP FROM CHAIR USING ARMS: A LITTLE
HELP NEEDED FOR BATHING: A LITTLE
MOVING TO AND FROM BED TO CHAIR: A LOT
DRESSING REGULAR LOWER BODY CLOTHING: A LOT
TOILETING: A LITTLE

## 2025-01-19 ASSESSMENT — PAIN DESCRIPTION - LOCATION: LOCATION: GENERALIZED

## 2025-01-19 ASSESSMENT — PAIN SCALES - GENERAL
PAINLEVEL_OUTOF10: 0 - NO PAIN

## 2025-01-19 ASSESSMENT — PAIN - FUNCTIONAL ASSESSMENT: PAIN_FUNCTIONAL_ASSESSMENT: 0-10

## 2025-01-19 NOTE — PROGRESS NOTES
ADMISSION DATE: 1/10/2025  HOSPITAL DAY: 9    SUBJECTIVE:  Patient was seen at bedside.  Uneventful night.  Patient has MSSA bacteremia with unknown source.  Currently infectious diseases treating him with IV cefazolin.  Patient might need longer antibiotic.  ISAIAS is resolving.    OBJECTIVE:  Vitals:    01/18/25 2338 01/19/25 0600 01/19/25 0655 01/19/25 0744   BP: 113/58   130/60   BP Location:    Right arm   Patient Position:    Lying   Pulse: 58   62   Resp: 20   21   Temp: 36.7 °C (98.1 °F)   36.3 °C (97.3 °F)   TempSrc:    Temporal   SpO2: 92%  92% 93%   Weight:  105 kg (231 lb 7.7 oz)     Height:            Intake/Output Summary (Last 24 hours) at 1/19/2025 1202  Last data filed at 1/18/2025 2030  Gross per 24 hour   Intake 240 ml   Output 1000 ml   Net -760 ml      Wt Readings from Last 10 Encounters:   01/19/25 105 kg (231 lb 7.7 oz)   11/25/24 105 kg (231 lb)   11/04/24 103 kg (226 lb 12.8 oz)   08/21/24 101 kg (222 lb 6.4 oz)   08/19/24 101 kg (222 lb 12.8 oz)   06/05/24 101 kg (223 lb 6.4 oz)   05/02/24 102 kg (225 lb)   04/24/24 101 kg (223 lb)   04/01/24 101 kg (223 lb 9.6 oz)   03/13/24 102 kg (224 lb)       PHYSICAL EXAM:  Gen: Alert, awake, Oriented X 3. Not in any acute distress   HEENT:  Atraumatic, PERRL.  Conjunctivae clear.   Moist nasal mucous membranes. oropharynx non erythematous,   Neck:  Supple without thyromegaly or lymphadenopathy.  Lungs:  Clear to auscultation without rales, rhonchi, or rub.  Heart:  RRR, S1, S2, without M.  Abdomen:  Soft, non tender, no organ enlargement, bruit. Bowel sounds present . No CVA tenderness.  Extremities:  No edema. No calf swelling or tenderness.    Skin:  No rash, ecchymosis or erythema.    CURRENT ACTIVE MEDS:  acetaminophen, 650 mg, oral, q6h  amiodarone, 100 mg, oral, Daily  aspirin, 81 mg, oral, Nightly  atorvastatin, 40 mg, oral, Nightly  ceFAZolin, 2 g, intravenous, q8h  [Held by provider] empagliflozin, 10 mg, oral, Daily  ezetimibe, 10 mg, oral,  Daily  fluticasone, 2 spray, Each Nostril, Daily  [Held by provider] tiotropium, 2 puff, inhalation, Daily   And  [Held by provider] fluticasone furoate-vilanteroL, 1 puff, inhalation, Daily  [Held by provider] hydroCHLOROthiazide, 25 mg, oral, Once per day on Monday Wednesday Friday  ipratropium-albuteroL, 3 mL, nebulization, TID  [Held by provider] isosorbide mononitrate ER, 30 mg, oral, Daily  lidocaine, 5 mL, infiltration, Once  lidocaine, 1 patch, transdermal, Daily  [Held by provider] lisinopril, 40 mg, oral, Daily  melatonin, 5 mg, oral, Nightly  methocarbamol, 500 mg, oral, q8h SHABNAM  [Held by provider] metoprolol succinate XL, 100 mg, oral, Nightly  [Held by provider] metoprolol succinate XL, 50 mg, oral, Daily  metoprolol tartrate, 50 mg, oral, BID  montelukast, 10 mg, oral, q PM  spironolactone, 25 mg, oral, q48h      LAB RESULTS:   CBC:   Results from last 7 days   Lab Units 01/19/25 0624 01/18/25 0555 01/17/25  0432   WBC AUTO x10*3/uL 15.9* 14.3* 13.2*   RBC AUTO x10*6/uL 2.80* 3.30* 3.37*   HEMOGLOBIN g/dL 9.1* 10.3* 10.5*   HEMATOCRIT % 27.2* 31.6* 31.9*   MCV fL 97 96 95   MCH pg 32.5 31.2 31.2   MCHC g/dL 33.5 32.6 32.9   RDW % 16.2* 16.5* 16.6*   PLATELETS AUTO x10*3/uL 225 233 212     CMP:    Results from last 7 days   Lab Units 01/19/25 0624 01/18/25  0555 01/17/25  0432   SODIUM mmol/L 140 138 138   POTASSIUM mmol/L 3.2* 3.6 3.4*   CHLORIDE mmol/L 110* 110* 109*   CO2 mmol/L 23 23 21   BUN mg/dL 75* 64* 66*   CREATININE mg/dL 1.22 1.42* 1.71*   GLUCOSE mg/dL 150* 113* 125*   PROTEIN TOTAL g/dL 5.5* 6.1* 6.1*   CALCIUM mg/dL 8.0* 8.2* 8.3*   BILIRUBIN TOTAL mg/dL 0.5 0.7 0.6   ALK PHOS U/L 50 62 68   AST U/L 19 33 44*   ALT U/L 3* 4* 7*     BMP:    Results from last 7 days   Lab Units 01/19/25  0624 01/18/25  0555 01/17/25  0432   SODIUM mmol/L 140 138 138   POTASSIUM mmol/L 3.2* 3.6 3.4*   CHLORIDE mmol/L 110* 110* 109*   CO2 mmol/L 23 23 21   BUN mg/dL 75* 64* 66*   CREATININE mg/dL 1.22  "1.42* 1.71*   CALCIUM mg/dL 8.0* 8.2* 8.3*   GLUCOSE mg/dL 150* 113* 125*     Magnesium:  Results from last 7 days   Lab Units 01/19/25  0624 01/18/25  0555 01/17/25  0432   MAGNESIUM mg/dL 1.77 2.02 2.28     Lab Results   Component Value Date    LDLCALC 39 06/10/2024     No results found for: \"HGBA1C\"  Lab Results   Component Value Date    LDLCALC 39 06/10/2024    CREATININE 1.22 01/19/2025       Lab Results   Component Value Date    TSH 1.15 03/13/2024      Lab Results   Component Value Date    INR 1.7 (H) 01/10/2025    INR 1.9 (H) 03/09/2024    PROTIME 19.7 (H) 01/10/2025    PROTIME 21.4 (H) 03/09/2024       CULTURES & SUSCEPTIBILITIES:   Susceptibility data from last 90 days.  Collected Specimen Info Organism Clindamycin Erythromycin Oxacillin Tetracycline Trimethoprim/Sulfamethoxazole Vancomycin   01/12/25 Blood culture from Peripheral Venipuncture Staphylococcus aureus         01/12/25 Blood culture from Peripheral Venipuncture Staphylococcus aureus         01/10/25 Blood culture from Peripheral Venipuncture Staphylococcus aureus         01/10/25 Blood culture from Peripheral Venipuncture Methicillin Susceptible Staphylococcus aureus (MSSA)  S  S  S  S  S  S       IMAGING STUDIES:  I have reviewed following imaging studies.  I agree with with the impression and incorporated those results into my MDM   === 01/10/25 ===  XR CHEST 1 VIEW  1.  Increased prominence of the markings particularly right perihilar  markings may at least in part be related to difference in patient  position.    === 01/10/25 ===  CT CHEST ABDOMEN PELVIS WO CONTRAST  Thoracic findings:  1. Background of moderate-to-severe pulmonary emphysema. There are  multifocal consolidations throughout the lungs which are suspicious  for multifocal pneumonia. Additionally, in the medial left upper  lobe, one of the consolidations demonstrates a subtle air-fluid level  which may represent a developing abscess, necrotizing pneumonia, or  superinfection " of a bulla. Recommend posttreatment chest CT in 3  months to ensure resolution.  2. Mild cardiomegaly.  3. Dilated main pulmonary artery which can be seen with pulmonary  hypertension.    Abdomen/pelvis findings:  1. No new collections.  2. Similar mild fluid distention of distal small bowel loops and  colonic bowel loops which can be seen with mild enterocolitis. No  significant bowel wall thickening.  3. Redemonstration of the bilobed abdominal aortic aneurysm measuring  up to 6 cm. As before, recommend vascular surgery consultation for  management guidance.  4. Other findings remain unchanged.    === 01/10/25 ===  US RENAL COMPLETE  No hydronephrosis on either side    LAST EKG  Encounter Date: 01/10/25   ECG 12 lead   Result Value    Ventricular Rate 60    Atrial Rate 60    QRS Duration 122    QT Interval 572    QTC Calculation(Bazett) 572    R Axis 28    T Axis 110    QRS Count 10    Q Onset 218    T Offset 504    QTC Fredericia 572       PROBLEMS ON ADMISSION:  Hypokalemia [E87.6]  Renal insufficiency [N28.9]  Elevated troponin [R79.89]  Fall, initial encounter [W19.XXXA]  Hypotension, unspecified hypotension type [I95.9]  Anemia, unspecified type [D64.9]  Diarrhea, unspecified type [R19.7]  Medication induced coagulopathy (Multi) [D68.9, T50.905A]    HOSPITAL PROBLEM LIST     Abdominal aortic aneurysm (CMS-HCC)    CAD (coronary artery disease)    Essential hypertension    Ischemic cardiomyopathy    Mixed hyperlipidemia    Persistent atrial fibrillation (Multi)    Chronic diastolic congestive heart failure, NYHA class 2    High risk medication use    Prostate cancer (Multi)    ASSESSMENT AND PLAN FOR 1/19/2025  Patient has MSSA bacteriemia.  Subsequent blood cultures are negative.  Infectious disease is on board.  We are waiting for the third culture to be negative then patient will get PICC.  I had a discussion with Dr. Lomeli this morning.  He will kindly add the PICC orders.  Patient will be on IV  cefazolin for about 6 weeks.  Meanwhile he has generalized weakness.  His AMPAC score is low and he will be going to [facility].  We are waiting for final authorization from the insurance company.  Patient initially started hypotension multiple medications on hold.  His blood pressure is still in the lower side.  We will not start the medications unless it is needed and we will start them in gradually.  Rest of the medical therapy will be continued as per admission orders.  Patient has chronic hypokalemia.  I will add oral potassium from today.        P.S: This note was completed using Dragon voice recognition technology and may include unintended errors with respect to translation of words, typographical errors or grammar errors which may not have been identified while finalizing the chart.

## 2025-01-19 NOTE — PROGRESS NOTES
Renal Progress Note  Assessment/  84 y.o. year old male who presented to the hospital for further evaluation and management of s/p mechanical fall with difficult to ambulate came to the emergency department where clinical laboratory assessment did show that the patient blood pressure is 60/60 and patient did have a fever of 38 he was resuscitated with fluids and admitted for further evaluation and because of persistent hypotension the patient was transferred to critical care bed     Hospital course was significant for CT abdomen pelvis with IV contrast done at 1/10/2025 that did not show active disease with no bowel obstruction a complex cyst on the right renal cyst at the day creatinine was 1.6 and GFR of 4, 3 days later creatinine remained at 1.5 increased to 2.3 yesterday and today 2.5  Yesterday patient was significantly hemodynamically unstable with a stretch of hypotension blood pressure in the 80s and 90s systolic over 40s diastolic with net negative fluid balance last 24 hours 2.6 L     Renal ultrasounds right kidney 12.8 left kidney 12.3 with no obstructive uropathy     Acute kidney injury based on the above clinical laboratory assessment prerenal azotemia versus aggravated renal insult possible contribution of contrast and aggravation by contrast exposure this type of injury is usually benign for recovery is expected  Whether the admission 1.5 creatinine reflects patient baseline or not is not clear at the time of dictation to address the presence of chronic kidney disease     Thrombocytopenia but adequate platelet  Hide likely kidney involvement in collagen vascular disease or thrombotic microangiopathy     Plan   Function improving, no changes from renal standpoint   Replete K PRN   Disposition being worked on, precert    Subjective:   Admit Date: 1/10/2025    Interval History: function continues to improve., K low today       Medications:   Scheduled Meds:acetaminophen, 650 mg, oral, q6h  amiodarone,  "100 mg, oral, Daily  aspirin, 81 mg, oral, Nightly  atorvastatin, 40 mg, oral, Nightly  ceFAZolin, 2 g, intravenous, q8h  [Held by provider] empagliflozin, 10 mg, oral, Daily  ezetimibe, 10 mg, oral, Daily  fluticasone, 2 spray, Each Nostril, Daily  [Held by provider] tiotropium, 2 puff, inhalation, Daily   And  [Held by provider] fluticasone furoate-vilanteroL, 1 puff, inhalation, Daily  [Held by provider] hydroCHLOROthiazide, 25 mg, oral, Once per day on Monday Wednesday Friday  ipratropium-albuteroL, 3 mL, nebulization, TID  [Held by provider] isosorbide mononitrate ER, 30 mg, oral, Daily  lidocaine, 5 mL, infiltration, Once  lidocaine, 1 patch, transdermal, Daily  [Held by provider] lisinopril, 40 mg, oral, Daily  melatonin, 5 mg, oral, Nightly  methocarbamol, 500 mg, oral, q8h SHABNAM  [Held by provider] metoprolol succinate XL, 100 mg, oral, Nightly  [Held by provider] metoprolol succinate XL, 50 mg, oral, Daily  metoprolol tartrate, 50 mg, oral, BID  montelukast, 10 mg, oral, q PM  potassium chloride CR, 20 mEq, oral, BID  spironolactone, 25 mg, oral, q48h      Continuous Infusions:heparin, 0-4,500 Units/hr, Last Rate: 900 Units/hr (01/18/25 1836)        CBC:   Lab Results   Component Value Date    HGB 9.1 (L) 01/19/2025    HGB 10.3 (L) 01/18/2025    WBC 15.9 (H) 01/19/2025    WBC 14.3 (H) 01/18/2025     01/19/2025     01/18/2025      Anemia:  No results found for: \"FERRITIN\", \"IRON\", \"TIBC\"   BMP:    Lab Results   Component Value Date     01/19/2025     01/18/2025    K 3.2 (L) 01/19/2025    K 3.6 01/18/2025     (H) 01/19/2025     (H) 01/18/2025    CO2 23 01/19/2025    CO2 23 01/18/2025    BUN 75 (H) 01/19/2025    BUN 64 (H) 01/18/2025    CREATININE 1.22 01/19/2025    CREATININE 1.42 (H) 01/18/2025      Bone disease:   Lab Results   Component Value Date    PHOS 2.4 (L) 01/19/2025      Urinalysis:  No results found for: \"LYNN\", \"PROTUR\", \"GLUCOSEU\", \"BLOODU\", \"KETONESU\", " "\"BILIRUBINU\", \"NITRITEU\", \"LEUKOCYTESU\", \"UTPCR\"     Objective:   Vitals: /74 (BP Location: Left arm, Patient Position: Lying)   Pulse 63   Temp 36.5 °C (97.7 °F) (Temporal)   Resp 20   Ht 1.676 m (5' 6\")   Wt 105 kg (231 lb 7.7 oz)   SpO2 94%   BMI 37.36 kg/m²    Wt Readings from Last 3 Encounters:   01/19/25 105 kg (231 lb 7.7 oz)   11/25/24 105 kg (231 lb)   11/04/24 103 kg (226 lb 12.8 oz)      24HR INTAKE/OUTPUT:    Intake/Output Summary (Last 24 hours) at 1/19/2025 1343  Last data filed at 1/18/2025 2030  Gross per 24 hour   Intake 240 ml   Output 1000 ml   Net -760 ml     Admission weight:  Weight: 99.8 kg (220 lb)      General: alert, in no apparent distress  HEENT: normocephalic, atraumatic, anicteric  Lungs: non-labored respirations, clear to auscultation bilaterally  Heart: regular rate and rhythm, no murmurs or rubs  Abdomen: soft, non-tender, non-distended  Ext: no peripheral edema  Neuro: alert, follows commands        Electronically signed by Aron Monique MD on 1/19/2025 at 1:43 PM            "

## 2025-01-19 NOTE — CARE PLAN
The patient's goals for the shift include Patient will remain safe and free from falls    The clinical goals for the shift include HDS

## 2025-01-20 ENCOUNTER — APPOINTMENT (OUTPATIENT)
Dept: RADIOLOGY | Facility: HOSPITAL | Age: 85
DRG: 871 | End: 2025-01-20
Payer: MEDICARE

## 2025-01-20 ENCOUNTER — ANESTHESIA (OUTPATIENT)
Dept: GASTROENTEROLOGY | Facility: HOSPITAL | Age: 85
End: 2025-01-20
Payer: MEDICARE

## 2025-01-20 ENCOUNTER — APPOINTMENT (OUTPATIENT)
Dept: GASTROENTEROLOGY | Facility: HOSPITAL | Age: 85
DRG: 871 | End: 2025-01-20
Payer: MEDICARE

## 2025-01-20 ENCOUNTER — ANESTHESIA EVENT (OUTPATIENT)
Dept: GASTROENTEROLOGY | Facility: HOSPITAL | Age: 85
End: 2025-01-20
Payer: MEDICARE

## 2025-01-20 LAB
ABO GROUP (TYPE) IN BLOOD: NORMAL
ABO GROUP (TYPE) IN BLOOD: NORMAL
ALBUMIN SERPL BCP-MCNC: 2.5 G/DL (ref 3.4–5)
ALP SERPL-CCNC: 44 U/L (ref 33–136)
ALT SERPL W P-5'-P-CCNC: 8 U/L (ref 10–52)
ANION GAP SERPL CALC-SCNC: 8 MMOL/L (ref 10–20)
ANTIBODY SCREEN: NORMAL
AST SERPL W P-5'-P-CCNC: 19 U/L (ref 9–39)
BASOPHILS # BLD AUTO: 0.02 X10*3/UL (ref 0–0.1)
BASOPHILS NFR BLD AUTO: 0.1 %
BILIRUB SERPL-MCNC: 0.5 MG/DL (ref 0–1.2)
BUN SERPL-MCNC: 77 MG/DL (ref 6–23)
CALCIUM SERPL-MCNC: 7.9 MG/DL (ref 8.6–10.3)
CHLORIDE SERPL-SCNC: 112 MMOL/L (ref 98–107)
CO2 SERPL-SCNC: 25 MMOL/L (ref 21–32)
CREAT SERPL-MCNC: 1.04 MG/DL (ref 0.5–1.3)
EGFRCR SERPLBLD CKD-EPI 2021: 70 ML/MIN/1.73M*2
EJECTION FRACTION: 63 %
EOSINOPHIL # BLD AUTO: 0.04 X10*3/UL (ref 0–0.4)
EOSINOPHIL NFR BLD AUTO: 0.2 %
ERYTHROCYTE [DISTWIDTH] IN BLOOD BY AUTOMATED COUNT: 16.4 % (ref 11.5–14.5)
GLUCOSE SERPL-MCNC: 115 MG/DL (ref 74–99)
HCT VFR BLD AUTO: 23.5 % (ref 41–52)
HGB BLD-MCNC: 7.5 G/DL (ref 13.5–17.5)
HOLD SPECIMEN: NORMAL
IMM GRANULOCYTES # BLD AUTO: 1.02 X10*3/UL (ref 0–0.5)
IMM GRANULOCYTES NFR BLD AUTO: 5.3 % (ref 0–0.9)
LYMPHOCYTES # BLD AUTO: 1.55 X10*3/UL (ref 0.8–3)
LYMPHOCYTES NFR BLD AUTO: 8.1 %
MAGNESIUM SERPL-MCNC: 1.68 MG/DL (ref 1.6–2.4)
MCH RBC QN AUTO: 31.6 PG (ref 26–34)
MCHC RBC AUTO-ENTMCNC: 31.9 G/DL (ref 32–36)
MCV RBC AUTO: 99 FL (ref 80–100)
MONOCYTES # BLD AUTO: 1.34 X10*3/UL (ref 0.05–0.8)
MONOCYTES NFR BLD AUTO: 7 %
NEUTROPHILS # BLD AUTO: 15.27 X10*3/UL (ref 1.6–5.5)
NEUTROPHILS NFR BLD AUTO: 79.3 %
NRBC BLD-RTO: 0.8 /100 WBCS (ref 0–0)
PHOSPHATE SERPL-MCNC: 2.7 MG/DL (ref 2.5–4.9)
PLATELET # BLD AUTO: 238 X10*3/UL (ref 150–450)
POTASSIUM SERPL-SCNC: 3.3 MMOL/L (ref 3.5–5.3)
PROT SERPL-MCNC: 4.9 G/DL (ref 6.4–8.2)
RBC # BLD AUTO: 2.37 X10*6/UL (ref 4.5–5.9)
RBC MORPH BLD: NORMAL
RH FACTOR (ANTIGEN D): NORMAL
RH FACTOR (ANTIGEN D): NORMAL
RIGHT VENTRICLE PEAK SYSTOLIC PRESSURE: 29.8 MMHG
SODIUM SERPL-SCNC: 142 MMOL/L (ref 136–145)
UFH PPP CHRO-ACNC: 0.5 IU/ML
WBC # BLD AUTO: 19.2 X10*3/UL (ref 4.4–11.3)

## 2025-01-20 PROCEDURE — 85025 COMPLETE CBC W/AUTO DIFF WBC: CPT | Performed by: INTERNAL MEDICINE

## 2025-01-20 PROCEDURE — 2500000004 HC RX 250 GENERAL PHARMACY W/ HCPCS (ALT 636 FOR OP/ED): Mod: JZ | Performed by: STUDENT IN AN ORGANIZED HEALTH CARE EDUCATION/TRAINING PROGRAM

## 2025-01-20 PROCEDURE — 36415 COLL VENOUS BLD VENIPUNCTURE: CPT | Performed by: STUDENT IN AN ORGANIZED HEALTH CARE EDUCATION/TRAINING PROGRAM

## 2025-01-20 PROCEDURE — 0DB68ZX EXCISION OF STOMACH, VIA NATURAL OR ARTIFICIAL OPENING ENDOSCOPIC, DIAGNOSTIC: ICD-10-PCS | Performed by: INTERNAL MEDICINE

## 2025-01-20 PROCEDURE — 2720000007 HC OR 272 NO HCPCS

## 2025-01-20 PROCEDURE — 88305 TISSUE EXAM BY PATHOLOGIST: CPT | Mod: TC,ELYLAB,WESLAB | Performed by: INTERNAL MEDICINE

## 2025-01-20 PROCEDURE — 36430 TRANSFUSION BLD/BLD COMPNT: CPT

## 2025-01-20 PROCEDURE — 2500000004 HC RX 250 GENERAL PHARMACY W/ HCPCS (ALT 636 FOR OP/ED): Performed by: INTERNAL MEDICINE

## 2025-01-20 PROCEDURE — 3700000002 HC GENERAL ANESTHESIA TIME - EACH INCREMENTAL 1 MINUTE

## 2025-01-20 PROCEDURE — 36569 INSJ PICC 5 YR+ W/O IMAGING: CPT | Performed by: RADIOLOGY

## 2025-01-20 PROCEDURE — 2500000002 HC RX 250 W HCPCS SELF ADMINISTERED DRUGS (ALT 637 FOR MEDICARE OP, ALT 636 FOR OP/ED): Performed by: STUDENT IN AN ORGANIZED HEALTH CARE EDUCATION/TRAINING PROGRAM

## 2025-01-20 PROCEDURE — 2500000004 HC RX 250 GENERAL PHARMACY W/ HCPCS (ALT 636 FOR OP/ED): Performed by: NURSE PRACTITIONER

## 2025-01-20 PROCEDURE — 02HV33Z INSERTION OF INFUSION DEVICE INTO SUPERIOR VENA CAVA, PERCUTANEOUS APPROACH: ICD-10-PCS | Performed by: RADIOLOGY

## 2025-01-20 PROCEDURE — 86901 BLOOD TYPING SEROLOGIC RH(D): CPT | Performed by: INTERNAL MEDICINE

## 2025-01-20 PROCEDURE — 99222 1ST HOSP IP/OBS MODERATE 55: CPT | Performed by: NURSE PRACTITIONER

## 2025-01-20 PROCEDURE — 94640 AIRWAY INHALATION TREATMENT: CPT

## 2025-01-20 PROCEDURE — 86923 COMPATIBILITY TEST ELECTRIC: CPT

## 2025-01-20 PROCEDURE — 2500000001 HC RX 250 WO HCPCS SELF ADMINISTERED DRUGS (ALT 637 FOR MEDICARE OP): Performed by: STUDENT IN AN ORGANIZED HEALTH CARE EDUCATION/TRAINING PROGRAM

## 2025-01-20 PROCEDURE — 2500000005 HC RX 250 GENERAL PHARMACY W/O HCPCS: Performed by: NURSE ANESTHETIST, CERTIFIED REGISTERED

## 2025-01-20 PROCEDURE — 99233 SBSQ HOSP IP/OBS HIGH 50: CPT | Performed by: INTERNAL MEDICINE

## 2025-01-20 PROCEDURE — 80053 COMPREHEN METABOLIC PANEL: CPT | Performed by: STUDENT IN AN ORGANIZED HEALTH CARE EDUCATION/TRAINING PROGRAM

## 2025-01-20 PROCEDURE — 43255 EGD CONTROL BLEEDING ANY: CPT | Performed by: INTERNAL MEDICINE

## 2025-01-20 PROCEDURE — C1751 CATH, INF, PER/CENT/MIDLINE: HCPCS

## 2025-01-20 PROCEDURE — 2500000002 HC RX 250 W HCPCS SELF ADMINISTERED DRUGS (ALT 637 FOR MEDICARE OP, ALT 636 FOR OP/ED): Performed by: INTERNAL MEDICINE

## 2025-01-20 PROCEDURE — 1200000002 HC GENERAL ROOM WITH TELEMETRY DAILY

## 2025-01-20 PROCEDURE — 2500000004 HC RX 250 GENERAL PHARMACY W/ HCPCS (ALT 636 FOR OP/ED): Performed by: NURSE ANESTHETIST, CERTIFIED REGISTERED

## 2025-01-20 PROCEDURE — 3700000001 HC GENERAL ANESTHESIA TIME - INITIAL BASE CHARGE

## 2025-01-20 PROCEDURE — P9016 RBC LEUKOCYTES REDUCED: HCPCS

## 2025-01-20 PROCEDURE — 2500000005 HC RX 250 GENERAL PHARMACY W/O HCPCS: Performed by: STUDENT IN AN ORGANIZED HEALTH CARE EDUCATION/TRAINING PROGRAM

## 2025-01-20 PROCEDURE — 43239 EGD BIOPSY SINGLE/MULTIPLE: CPT | Performed by: INTERNAL MEDICINE

## 2025-01-20 PROCEDURE — 84100 ASSAY OF PHOSPHORUS: CPT | Performed by: STUDENT IN AN ORGANIZED HEALTH CARE EDUCATION/TRAINING PROGRAM

## 2025-01-20 PROCEDURE — 99232 SBSQ HOSP IP/OBS MODERATE 35: CPT | Performed by: INTERNAL MEDICINE

## 2025-01-20 PROCEDURE — 85520 HEPARIN ASSAY: CPT | Performed by: INTERNAL MEDICINE

## 2025-01-20 PROCEDURE — 36573 INSJ PICC RS&I 5 YR+: CPT | Performed by: RADIOLOGY

## 2025-01-20 PROCEDURE — 83735 ASSAY OF MAGNESIUM: CPT | Performed by: STUDENT IN AN ORGANIZED HEALTH CARE EDUCATION/TRAINING PROGRAM

## 2025-01-20 PROCEDURE — 0W3P8ZZ CONTROL BLEEDING IN GASTROINTESTINAL TRACT, VIA NATURAL OR ARTIFICIAL OPENING ENDOSCOPIC: ICD-10-PCS | Performed by: INTERNAL MEDICINE

## 2025-01-20 PROCEDURE — 2780000003 HC OR 278 NO HCPCS

## 2025-01-20 RX ORDER — ETOMIDATE 2 MG/ML
INJECTION INTRAVENOUS AS NEEDED
Status: DISCONTINUED | OUTPATIENT
Start: 2025-01-20 | End: 2025-01-20

## 2025-01-20 RX ORDER — EPINEPHRINE 0.1 MG/ML
INJECTION INTRACARDIAC; INTRAVENOUS AS NEEDED
Status: DISCONTINUED | OUTPATIENT
Start: 2025-01-20 | End: 2025-01-20 | Stop reason: HOSPADM

## 2025-01-20 RX ORDER — PANTOPRAZOLE SODIUM 40 MG/10ML
40 INJECTION, POWDER, LYOPHILIZED, FOR SOLUTION INTRAVENOUS 2 TIMES DAILY
Status: DISCONTINUED | OUTPATIENT
Start: 2025-01-20 | End: 2025-01-23

## 2025-01-20 RX ORDER — PROPOFOL 10 MG/ML
INJECTION, EMULSION INTRAVENOUS AS NEEDED
Status: DISCONTINUED | OUTPATIENT
Start: 2025-01-20 | End: 2025-01-20

## 2025-01-20 RX ADMIN — IPRATROPIUM BROMIDE AND ALBUTEROL SULFATE 3 ML: 2.5; .5 SOLUTION RESPIRATORY (INHALATION) at 07:36

## 2025-01-20 RX ADMIN — MONTELUKAST SODIUM 10 MG: 10 TABLET, FILM COATED ORAL at 20:25

## 2025-01-20 RX ADMIN — CEFAZOLIN SODIUM 2 G: 2 INJECTION, SOLUTION INTRAVENOUS at 13:30

## 2025-01-20 RX ADMIN — ATORVASTATIN CALCIUM 40 MG: 20 TABLET, FILM COATED ORAL at 20:24

## 2025-01-20 RX ADMIN — POTASSIUM CHLORIDE 20 MEQ: 1500 TABLET, EXTENDED RELEASE ORAL at 20:24

## 2025-01-20 RX ADMIN — ETOMIDATE 10 MG: 40 INJECTION, SOLUTION INTRAVENOUS at 17:22

## 2025-01-20 RX ADMIN — Medication 5 MG: at 20:25

## 2025-01-20 RX ADMIN — LIDOCAINE HYDROCHLORIDE 1 ML: 10 INJECTION, SOLUTION INFILTRATION; PERINEURAL at 10:07

## 2025-01-20 RX ADMIN — PANTOPRAZOLE SODIUM 40 MG: 40 INJECTION, POWDER, FOR SOLUTION INTRAVENOUS at 12:39

## 2025-01-20 RX ADMIN — ETOMIDATE 10 MG: 40 INJECTION, SOLUTION INTRAVENOUS at 17:21

## 2025-01-20 RX ADMIN — POTASSIUM CHLORIDE 20 MEQ: 1500 TABLET, EXTENDED RELEASE ORAL at 09:10

## 2025-01-20 RX ADMIN — CEFAZOLIN SODIUM 2 G: 2 INJECTION, SOLUTION INTRAVENOUS at 20:26

## 2025-01-20 RX ADMIN — ACETAMINOPHEN 650 MG: 325 TABLET ORAL at 20:24

## 2025-01-20 RX ADMIN — METHOCARBAMOL TABLETS 500 MG: 500 TABLET, COATED ORAL at 21:50

## 2025-01-20 RX ADMIN — FLUTICASONE PROPIONATE 2 SPRAY: 50 SPRAY, METERED NASAL at 09:11

## 2025-01-20 RX ADMIN — IPRATROPIUM BROMIDE AND ALBUTEROL SULFATE 3 ML: 2.5; .5 SOLUTION RESPIRATORY (INHALATION) at 19:46

## 2025-01-20 RX ADMIN — METHOCARBAMOL TABLETS 500 MG: 500 TABLET, COATED ORAL at 05:14

## 2025-01-20 RX ADMIN — ACETAMINOPHEN 650 MG: 325 TABLET ORAL at 09:10

## 2025-01-20 RX ADMIN — PROPOFOL 50 MG: 10 INJECTION, EMULSION INTRAVENOUS at 17:27

## 2025-01-20 RX ADMIN — AMIODARONE HYDROCHLORIDE 100 MG: 200 TABLET ORAL at 09:10

## 2025-01-20 RX ADMIN — ACETAMINOPHEN 650 MG: 325 TABLET ORAL at 03:16

## 2025-01-20 RX ADMIN — PROPOFOL 50 MG: 10 INJECTION, EMULSION INTRAVENOUS at 17:23

## 2025-01-20 RX ADMIN — Medication 3 L/MIN: at 07:39

## 2025-01-20 RX ADMIN — CEFAZOLIN SODIUM 2 G: 2 INJECTION, SOLUTION INTRAVENOUS at 04:50

## 2025-01-20 RX ADMIN — PROPOFOL 50 MG: 10 INJECTION, EMULSION INTRAVENOUS at 17:36

## 2025-01-20 RX ADMIN — METOPROLOL TARTRATE 50 MG: 50 TABLET, FILM COATED ORAL at 09:10

## 2025-01-20 RX ADMIN — ASPIRIN 81 MG: 81 TABLET, CHEWABLE ORAL at 20:25

## 2025-01-20 RX ADMIN — METOPROLOL TARTRATE 50 MG: 50 TABLET, FILM COATED ORAL at 20:25

## 2025-01-20 RX ADMIN — IPRATROPIUM BROMIDE AND ALBUTEROL SULFATE 3 ML: 2.5; .5 SOLUTION RESPIRATORY (INHALATION) at 13:11

## 2025-01-20 RX ADMIN — PANTOPRAZOLE SODIUM 40 MG: 40 INJECTION, POWDER, FOR SOLUTION INTRAVENOUS at 20:26

## 2025-01-20 RX ADMIN — EZETIMIBE 10 MG: 10 TABLET ORAL at 09:10

## 2025-01-20 RX ADMIN — EPINEPHRINE 1 MG: 0.1 INJECTION INTRAVENOUS at 17:30

## 2025-01-20 RX ADMIN — PROPOFOL 50 MG: 10 INJECTION, EMULSION INTRAVENOUS at 17:32

## 2025-01-20 SDOH — HEALTH STABILITY: MENTAL HEALTH: CURRENT SMOKER: 0

## 2025-01-20 ASSESSMENT — COGNITIVE AND FUNCTIONAL STATUS - GENERAL
CLIMB 3 TO 5 STEPS WITH RAILING: A LITTLE
TOILETING: A LITTLE
DAILY ACTIVITIY SCORE: 20
DRESSING REGULAR LOWER BODY CLOTHING: A LITTLE
STANDING UP FROM CHAIR USING ARMS: A LITTLE
WALKING IN HOSPITAL ROOM: A LITTLE
MOVING TO AND FROM BED TO CHAIR: A LITTLE
MOBILITY SCORE: 19
DRESSING REGULAR UPPER BODY CLOTHING: A LITTLE
HELP NEEDED FOR BATHING: A LITTLE
TURNING FROM BACK TO SIDE WHILE IN FLAT BAD: A LITTLE

## 2025-01-20 ASSESSMENT — PAIN SCALES - GENERAL
PAINLEVEL_OUTOF10: 0 - NO PAIN
PAINLEVEL_OUTOF10: 0 - NO PAIN
PAIN_LEVEL: 0
PAINLEVEL_OUTOF10: 0 - NO PAIN

## 2025-01-20 ASSESSMENT — PAIN - FUNCTIONAL ASSESSMENT: PAIN_FUNCTIONAL_ASSESSMENT: 0-10

## 2025-01-20 NOTE — PROGRESS NOTES
Updated referral sent to [place] , currently awaiting ins. Precert/[employer].    Update:  ins. Precert has been obtained and good through 1/17.  Will await final discharge orders.

## 2025-01-20 NOTE — PROGRESS NOTES
Infectious Disease Progress Note       1/20/2025    Patient is a followup regarding MSSA bacteremia with possible endocarditis, currently on cefazolin x 6 weeks course    Patient had GI consult for melena with acute blood loss anemia.  Underwent emergent EGD today with transfusion.    Care transition notes reviewed.  Patient pre-CERT also went through but due to unforeseen circumstance of GI bleed, no current plans for discharge    Patient is weak in general but no complaints.  Seen with nurse at bedside.  Minimal abdominal cramping at this time.  Gastric ulcer found during EGD  Lab Results   Component Value Date    WBC 19.2 (H) 01/20/2025    HGB 7.5 (L) 01/20/2025    HCT 23.5 (L) 01/20/2025    MCV 99 01/20/2025     01/20/2025     Lab Results   Component Value Date    GLUCOSE 115 (H) 01/20/2025    CALCIUM 7.9 (L) 01/20/2025     01/20/2025    K 3.3 (L) 01/20/2025    CO2 25 01/20/2025     (H) 01/20/2025    BUN 77 (H) 01/20/2025    CREATININE 1.04 01/20/2025       WBC trends are being monitored. Antibiotic doses are being adjusted per most recent renal labs.     Vitals:    01/20/25 1749   BP: 125/68   Pulse: 64   Resp:    Temp:    SpO2: 96%     Patient is awake and alert, laying on his back  NAD  Neck supple  Heart S1S2  Chest: Equal expansion, bilaterally clear to auscultation  Abdomen: soft tender in general   Extrem: no pain to palpation  Skin: no rashes, no diaphoresis  Neuro: CNS intact  Affect appropriate and patient is interactive          Patient Active Problem List   Diagnosis    Abdominal aortic aneurysm (CMS-HCC)    AICD (automatic cardioverter/defibrillator) present    Allergic rhinitis, seasonal    CAD (coronary artery disease)    Centrilobular emphysema (Multi)    Essential hypertension    Hyperuricemia    Infarction of right basal ganglia (Multi)    Ischemic cardiomyopathy    Mixed hyperlipidemia    Persistent atrial fibrillation (Multi)    Renal artery stenosis (CMS-HCC)    Tinea  cruris    Anticoagulant long-term use    Chronic diastolic congestive heart failure, NYHA class 2    High risk medication use    Tremor    Prostate cancer (Multi)    Blepharitis of both eyes    Epiretinal membrane (ERM) of right eye    Drusen (degenerative) of retina    Dermatochalasis of right upper eyelid    Dermatochalasis of left upper eyelid    Degenerative retinal drusen of both eyes    Choroidal nevus, right eye    Lumbosacral spondylosis without myelopathy    Floaters    Spinal stenosis, lumbar region with neurogenic claudication    Pseudophakia of both eyes    Pinguecula    Obesity, morbid (Multi)    Vitreomacular traction syndrome of right eye    COPD exacerbation (Multi)    MGD (meibomian gland dysfunction)    CVA (cerebral vascular accident) (Multi)    Primary osteoarthritis of right knee    Never smoked cigarettes    Pulmonary hypertension (Multi)    Hidradenitis suppurativa of anus    Diarrhea, unspecified type           ASSESSMENT:  New GI bleed, status post emergent EGD today with acute blood loss anemia  MSSA bacteremia with possible endocarditis.  Patient has AICD.  Blood cultures on 1/10/2025 were positive for Staphylococcus aureus.  PAOLO reported to be negative.    PLAN:  Patient being treated for probable endocarditis with 6 weeks IV cefazolin 2 g IV every 8 hours from an infectious disease standpoint  Estimated Creatinine Clearance: 59 mL/min (by C-G formula based on SCr of 1.04 mg/dL).  Patient will need weekly CBC BMP CRP after discharge to be sent to the infectious disease clinic  Follow-up in 3 weeks with infectious disease  Follow-up within 1 to 2 weeks with primary care physician  PICC line will need to be pulled at the end of therapy    Patient is not ready for discharge based on acute GI bleed      Imaging and labs were reviewed per medical records and any ID pertinent labs were also addressed  Time spent before, during and after care today, including coordination of care >40  masha Clark, DO

## 2025-01-20 NOTE — CONSULTS
"Nutrition Initial Assessment:   Nutrition Assessment    Reason for Assessment: Length of stay    Patient is a 84 y.o. male presenting with not feeling well. Admitted to critical care with septic shock 2/2 MSSA pneumonia/bacteremia with acute on chronic hypoxic respiratory failure and ISAIAS. Transition to regular medical floor with plans for SNF placement.    Seeing pt due to LOS day 10. Currently NPO for procedure. Pt eating fair to well previously. % of all meals. No meals under 50% over the last 2 days. No concern with appetite. Rec; ensure plus HP 1x daily for additional supplementation (350 kcals and 22g of protein). No weight changes, weight trending upwards but nothing significant to note.     Nutrition History:  Energy Intake: Fair 50-75 %, Good > 75 %  Food and Nutrient History: Eating well, appetite is increasing over the past few days. Currently NPO for procedure. No significant wt changes to note. Willing to do ensure plus HP 1x daily for added supplementation.  Food Allergies/Intolerances:  None  GI Symptoms: None  Oral Problems: None     Anthropometrics:  Height: 167.6 cm (5' 6\")   Weight: 105 kg (230 lb 9.6 oz)   BMI (Calculated): 37.24    Weight Change %:  Weight History / % Weight Change: Wt trending upwards.    Nutrition Focused Physical Exam Findings:  Subcutaneous Fat Loss:   Orbital Fat Pads: Mild-Moderate (slight dark circles and slight hollowing)  Buccal Fat Pads: Well nourished (full, rounded cheeks)  Muscle Wasting:  Temporalis: Mild-Moderate (slight depression)  Pectoralis (Clavicular Region): Well nourished (clavicle not visible)  Edema:  Edema: none  Physical Findings:  Hair: Negative  Eyes: Negative  Mouth: Negative    Nutrition Significant Labs:  CBC Trend:   Results from last 7 days   Lab Units 01/20/25  0621 01/19/25  0624 01/18/25  0555 01/17/25  0432   WBC AUTO x10*3/uL 19.2* 15.9* 14.3* 13.2*   RBC AUTO x10*6/uL 2.37* 2.80* 3.30* 3.37*   HEMOGLOBIN g/dL 7.5* 9.1* 10.3* 10.5* "   HEMATOCRIT % 23.5* 27.2* 31.6* 31.9*   MCV fL 99 97 96 95   PLATELETS AUTO x10*3/uL 238 225 233 212    , BMP Trend:   Results from last 7 days   Lab Units 01/20/25  0621 01/19/25  0624 01/18/25  0555 01/17/25  0432   GLUCOSE mg/dL 115* 150* 113* 125*   CALCIUM mg/dL 7.9* 8.0* 8.2* 8.3*   SODIUM mmol/L 142 140 138 138   POTASSIUM mmol/L 3.3* 3.2* 3.6 3.4*   CO2 mmol/L 25 23 23 21   CHLORIDE mmol/L 112* 110* 110* 109*   BUN mg/dL 77* 75* 64* 66*   CREATININE mg/dL 1.04 1.22 1.42* 1.71*     Nutrition Specific Medications:  acetaminophen, 650 mg, oral, q6h  amiodarone, 100 mg, oral, Daily  aspirin, 81 mg, oral, Nightly  atorvastatin, 40 mg, oral, Nightly  ceFAZolin, 2 g, intravenous, q8h  [Held by provider] empagliflozin, 10 mg, oral, Daily  ezetimibe, 10 mg, oral, Daily  fluticasone, 2 spray, Each Nostril, Daily  [Held by provider] tiotropium, 2 puff, inhalation, Daily   And  [Held by provider] fluticasone furoate-vilanteroL, 1 puff, inhalation, Daily  [Held by provider] hydroCHLOROthiazide, 25 mg, oral, Once per day on Monday Wednesday Friday  ipratropium-albuteroL, 3 mL, nebulization, TID  [Held by provider] isosorbide mononitrate ER, 30 mg, oral, Daily  lidocaine, 1 patch, transdermal, Daily  [Held by provider] lisinopril, 40 mg, oral, Daily  melatonin, 5 mg, oral, Nightly  methocarbamol, 500 mg, oral, q8h SHABNAM  [Held by provider] metoprolol succinate XL, 100 mg, oral, Nightly  [Held by provider] metoprolol succinate XL, 50 mg, oral, Daily  metoprolol tartrate, 50 mg, oral, BID  montelukast, 10 mg, oral, q PM  pantoprazole, 40 mg, intravenous, BID  potassium chloride CR, 20 mEq, oral, BID  spironolactone, 25 mg, oral, q48h       I/O:   Last BM Date: 01/20/25; Stool Appearance: Loose (01/20/25 1100)    Dietary Orders (From admission, onward)       Start     Ordered    01/20/25 1155  Oral nutritional supplements  Until discontinued        Question Answer Comment   Deliver with Breakfast    Select supplement: Ensure  Plus High Protein        01/20/25 1155    01/20/25 1058  NPO Diet; Effective now  Diet effective now         01/20/25 1057    01/10/25 2145  May Participate in Room Service  ( ROOM SERVICE MAY PARTICIPATE)  Once        Question:  .  Answer:  Yes    01/10/25 2144                     Estimated Needs:   Total Energy Estimated Needs (kCal): 2000 kCal  Method for Estimating Needs: 25kcals/kg ABW  Total Protein Estimated Needs (g): 80 g  Method for Estimating Needs: 1g/kg ABW  Total Fluid Estimated Needs (mL): 2000 mL  Method for Estimating Needs: 1ml/kcal or per MD        Nutrition Diagnosis        Nutrition Diagnosis  Patient has Nutrition Diagnosis: Yes  Diagnosis Status (1): New  Nutrition Diagnosis 1: Increased nutrient needs  Related to (1): septic shock , mssa pneumonia/bacteremia  As Evidenced by (1): weakness/falls       Nutrition Interventions/Recommendations         Nutrition Prescription:  Individualized Nutrition Prescription Provided for : Individualized nutrition prescription of 2000 kcals and 80g of protein to be provided with diet order. Continue with cardiac diet order. Ensure plus HP 1x daily.        Nutrition Interventions:   Interventions: Meals and snacks, Medical food supplement  Meals and Snacks: Fat-modified diet, Mineral-modified diet  Goal: cosume 3 meals daily  Medical Food Supplement: Commercial beverage  Goal: ensure plus HP 1x daily (350 kcals and 22g pro)    Nutrition Monitoring and Evaluation   Food/Nutrient Related History Monitoring  Monitoring and Evaluation Plan: Energy intake  Energy Intake: Estimated energy intake  Criteria: Continue to monitor PO intakes, goal of >50% of all meals.  Additional Plans: ensure plus HP 1x  daily.    Body Composition/Growth/Weight History  Monitoring and Evaluation Plan: Weight  Weight: Weight change  Criteria: Continue to monitor wt status and wt changes.    Biochemical Data, Medical Tests and Procedures  Monitoring and Evaluation Plan:  Electrolyte/renal panel  Electrolyte and Renal Panel: Sodium, Potassium, Phosphorus    Time Spent (min): 45 minutes

## 2025-01-20 NOTE — ANESTHESIA POSTPROCEDURE EVALUATION
Patient: Guicho Jones    Procedure Summary       Date: 01/20/25 Room / Location: [facility]    Anesthesia Start: 1717 Anesthesia Stop: 1739    Procedure: EGD Diagnosis:       Melena      Blood loss anemia    Scheduled Providers: Quintin Mejia MD; Kacy Larson MD; Krupa Torres RN; Alexa Dowling Responsible Provider: Trell Rhodes DO    Anesthesia Type: MAC ASA Status: 4 - Emergent            Anesthesia Type: MAC    Vitals Value Taken Time   /71 01/20/25 1739   Pulse 60 01/20/25 1739   Resp 12 01/20/25 1739   SpO2 100 01/20/25 1739       Anesthesia Post Evaluation    Patient location during evaluation: bedside  Patient participation: complete - patient participated  Level of consciousness: awake and alert  Pain score: 0  Pain management: adequate  Airway patency: patent  Cardiovascular status: acceptable  Respiratory status: acceptable  Hydration status: acceptable  Postoperative Nausea and Vomiting: none        There were no known notable events for this encounter.

## 2025-01-20 NOTE — PROGRESS NOTES
Physical Therapy                 Therapy Communication Note    Patient Name: Guicho Jones  MRN: [id number]  Department: [facility]  Room: [facility]1004-  Today's Date: 1/20/2025     Discipline: Physical Therapy    PT Missed Visit: Yes     Missed Visit Reason: Missed Visit Reason: Patient in a medical procedure (pt off floor , will reattempt as appropriate)    Missed Time: Attempt    Comment:

## 2025-01-20 NOTE — PROGRESS NOTES
Renal Progress Note  Assessment/  84 y.o. year old male who presented to the hospital for further evaluation and management of s/p mechanical fall with difficult to ambulate came to the emergency department where clinical laboratory assessment did show that the patient blood pressure is 60/60 and patient did have a fever of 38 he was resuscitated with fluids and admitted for further evaluation and because of persistent hypotension the patient was transferred to critical care bed     Hospital course was significant for CT abdomen pelvis with IV contrast done at 1/10/2025 that did not show active disease with no bowel obstruction a complex cyst on the right renal cyst at the day creatinine was 1.6 and GFR of 4, 3 days later creatinine remained at 1.5 increased to 2.3 yesterday and today 2.5  Yesterday patient was significantly hemodynamically unstable with a stretch of hypotension blood pressure in the 80s and 90s systolic over 40s diastolic with net negative fluid balance last 24 hours 2.6 L     Renal ultrasounds right kidney 12.8 left kidney 12.3 with no obstructive uropathy     Acute kidney injury based on the above clinical laboratory assessment prerenal azotemia versus aggravated renal insult possible contribution of contrast and aggravation by contrast exposure this type of injury is usually benign for recovery is expected  Whether the admission 1.5 creatinine reflects patient baseline or not is not clear at the time of dictation to address the presence of chronic kidney disease     Thrombocytopenia but adequate platelet  Hide likely kidney involvement in collagen vascular disease or thrombotic microangiopathy     Plan   Function normalized, will sign off, please re-consult PRN     Subjective:   Admit Date: 1/10/2025    Interval History: Scr wnl at this time, getting PICC line today       Medications:   Scheduled Meds:acetaminophen, 650 mg, oral, q6h  amiodarone, 100 mg, oral, Daily  aspirin, 81 mg, oral,  "Nightly  atorvastatin, 40 mg, oral, Nightly  ceFAZolin, 2 g, intravenous, q8h  [Held by provider] empagliflozin, 10 mg, oral, Daily  ezetimibe, 10 mg, oral, Daily  fluticasone, 2 spray, Each Nostril, Daily  [Held by provider] tiotropium, 2 puff, inhalation, Daily   And  [Held by provider] fluticasone furoate-vilanteroL, 1 puff, inhalation, Daily  [Held by provider] hydroCHLOROthiazide, 25 mg, oral, Once per day on Monday Wednesday Friday  ipratropium-albuteroL, 3 mL, nebulization, TID  [Held by provider] isosorbide mononitrate ER, 30 mg, oral, Daily  lidocaine, 1 patch, transdermal, Daily  [Held by provider] lisinopril, 40 mg, oral, Daily  melatonin, 5 mg, oral, Nightly  methocarbamol, 500 mg, oral, q8h SHABNAM  [Held by provider] metoprolol succinate XL, 100 mg, oral, Nightly  [Held by provider] metoprolol succinate XL, 50 mg, oral, Daily  metoprolol tartrate, 50 mg, oral, BID  montelukast, 10 mg, oral, q PM  pantoprazole, 40 mg, intravenous, BID  potassium chloride CR, 20 mEq, oral, BID  spironolactone, 25 mg, oral, q48h      Continuous Infusions:       CBC:   Lab Results   Component Value Date    HGB 7.5 (L) 01/20/2025    HGB 9.1 (L) 01/19/2025    WBC 19.2 (H) 01/20/2025    WBC 15.9 (H) 01/19/2025     01/20/2025     01/19/2025      Anemia:  No results found for: \"FERRITIN\", \"IRON\", \"TIBC\"   BMP:    Lab Results   Component Value Date     01/20/2025     01/19/2025    K 3.3 (L) 01/20/2025    K 3.2 (L) 01/19/2025     (H) 01/20/2025     (H) 01/19/2025    CO2 25 01/20/2025    CO2 23 01/19/2025    BUN 77 (H) 01/20/2025    BUN 75 (H) 01/19/2025    CREATININE 1.04 01/20/2025    CREATININE 1.22 01/19/2025      Bone disease:   Lab Results   Component Value Date    PHOS 2.7 01/20/2025      Urinalysis:  No results found for: \"LYNN\", \"PROTUR\", \"GLUCOSEU\", \"BLOODU\", \"KETONESU\", \"BILIRUBINU\", \"NITRITEU\", \"LEUKOCYTESU\", \"UTPCR\"     Objective:   Vitals: /54 (BP Location: Right arm, Patient " "Position: Lying)   Pulse 61   Temp 36 °C (96.8 °F) (Temporal)   Resp 19   Ht 1.676 m (5' 6\")   Wt 105 kg (230 lb 9.6 oz)   SpO2 97%   BMI 37.22 kg/m²    Wt Readings from Last 3 Encounters:   01/20/25 105 kg (230 lb 9.6 oz)   11/25/24 105 kg (231 lb)   11/04/24 103 kg (226 lb 12.8 oz)      24HR INTAKE/OUTPUT:    Intake/Output Summary (Last 24 hours) at 1/20/2025 1150  Last data filed at 1/19/2025 2000  Gross per 24 hour   Intake 120 ml   Output 1300 ml   Net -1180 ml     Admission weight:  Weight: 99.8 kg (220 lb)      General: alert, in no apparent distress  HEENT: normocephalic, atraumatic, anicteric  Lungs: non-labored respirations, clear to auscultation bilaterally  Heart: regular rate and rhythm  Abdomen: soft, non-tender, non-distended  Ext: no peripheral edema  Neuro: alert, follows commands      Electronically signed by Aron Monique MD on 1/20/2025 at 11:50 AM            "

## 2025-01-20 NOTE — PROGRESS NOTES
Occupational Therapy                 Therapy Communication Note    Patient Name: Guicho Jones  MRN: [id number]  Department: [facility]  Room: [facility]/1004-B  Today's Date: 1/20/2025     Discipline: Occupational Therapy    OT Missed Visit: Yes     Missed Visit Reason: Missed Visit Reason: Other (Comment) (Pt off floor for testing, will re-attempt as able.)    Second attempt: Pt back from PICC placement, but is scheduled to receive blood and then go for another procedure.     Missed Time: Attempt at 1031and 1357

## 2025-01-20 NOTE — PROCEDURES
3F, single PICC, cephalic, DARIA on 1/20/25  Pre-Procedure Checklist:  Emergent Line Insertion: No  Type of Line to be Placed: PICC  Consent Obtained: Yes  Emergency Medication Necessary: No  Patient Identified with 2 Independent Identifiers: Yes  Review of Allergies, Anticoagulation, Relevant Labs, ECG/Telemetry: Yes  Risks/Benefits/Alternatives Discussed with Patient/POA/Legal Representative: Yes  Stop Sign on Door: Yes  Time Out Performed: Yes  Catheter Exchange: No    Positioning Checklist:  All People, Including Patient, in the Room with Cap and Mask: Yes   Fluoroscopy Used to Identify Vessel and Guide Insertion: Yes   Sterile Cover Used: Yes   Full Barrier Precautions Followed (Mask, Cap, Gown, Gloves): Yes   Hands Washed: Yes   Monitors Attached with Sound Alarms On: Yes  Full Body Sterile Drape (Head-to-Toe) Used to Cover Patient: Yes   Trendelenburg Position (For IJ and Subclavian): No   CHG Skin Prep Used and Allowed to Air Dry to Skin Procedure: Yes     Procedure Checklist:  Blood Aspirated From All Lumens, All Ports Subsequently Flushed: Yes   Catheter Caps Placed on All Lumens; Lumens Clamped: Yes   Maintain Guidewire Control Throughout, Ensuring Guidewire Removal: Yes   Maintain Sterile Field Throughout Insertion: Yes   Catheter Secured: Yes   Confirmatory Test of Venous Placement: Non-Pulsatile Blood     Post Procedure Checklist:  Date and Time Written on Dressing: Yes   Sharp and Wire Count and Safe Disposal of all Sharps/Wires: Yes   Sterile Dressing Applied Per Protocol: Yes   X-ray Ordered or ECG Image: Yes     PICC Insertion Details:  See LDA for further details  PICC line expires in 1 year on 2/20/25  Additional Details: Line was inserted using Modified Seldinger's Technique.   Placed by: Camille Webb RN  PICC ready for immediate use.

## 2025-01-20 NOTE — CARE PLAN
The patient's goals for the shift include Patient will remain safe and free from falls    The clinical goals for the shift include Remain HDS

## 2025-01-20 NOTE — PROGRESS NOTES
Physical Therapy                 Therapy Communication Note    Patient Name: Guicho Jones  MRN: [id number]  Department: [facility]  Room: [address] [place] [address]  Today's Date: 1/20/2025     Discipline: Physical Therapy    PT Missed Visit: Yes     Missed Visit Reason: Missed Visit Reason: Patient in a medical procedure (pt off floor , will reattempt as appropriate)    Missed Time: Attempt 13:51    Comment:

## 2025-01-20 NOTE — CONSULTS
Department of Internal Medicine  Gastroenterology  Consult note    IMPRESSION/RECOMMENDATIONS  Guicho Jones is a 84 y.o. male with a PMH of   ICM s/p ICD, MI, diastolic heart failure, CAD s/p PCI, COPD (centrilobular emphysema), pulmonary hypertension, HTN, HLD, persistent Afib on ATC with xarelto, CVA, infrarenal AAA, obesity, and prostate cancer who presented to [facility] emergency department 1/10/2025 by EMS for fall.  Admitted to critical care with septic shock 2/2 MSSA pneumonia/bacteremia with acute on chronic hypoxic respiratory failure and ISAIAS.  Transition to regular medical floor with plans for SNF placement.  Patient had 1 large melenic bowel movement 1/20/2025 while on heparin drip.  Hgb 10.5->9.1->7.5.    Melena  Acute blood loss anemia Hgb 10.5->9.1->7.5  Anticoagulated heparin gtt currently on hold  Long term anticoagulation Xarelto - on hold    -Hold heparin drip  -N.p.o.  -Recommend EGD today  -Pantoprazole 40 mg IV twice daily  -Trend H&H and transfuse PRBC for Hgb <7-8.0  -Monitor stool color and consistency and document  -Further recommendations pending results of EGD    Discuss with Dr. Castillo and Dr. Mejia     Reason for Consult: GIB    History of Present Illness   Guicho Jones is a 84 y.o. male with a PMH of   ICM s/p ICD, MI, diastolic heart failure, CAD s/p PCI, COPD (centrilobular emphysema), pulmonary hypertension, HTN, HLD, persistent Afib on ATC with xarelto, CVA, infrarenal AAA, obesity, and prostate cancer who presented to [facility] emergency department 1/10/2025 by EMS for fall. On arrival to the emergency department was hypotensive with BP 60s/30s and febrile with temp 38.6. Patient reported having N/V/D for several hours prior to admission. CT abdomen and pelvis and CT head reviewed. Patient was admitted to critical care unit with hypotension, ISAIAS 2/2 hypovolemia, leukocytosis and treated for septic shock. Hospitalization complicated by acute on chronic hypoxic respiratory failure  2/2 MSSA pneumonia/bacteremia with hemoptysis and ISAIAS 2/2 hypovolemia/hypotension and contrast spoke in the setting of CKD.  Patient was eventually transferred to the regular medical floor 1/18/2025.     Flu, COVID, RSV all negative.  Stool pathogen PCR negative.  C. difficile negative.  Blood cultures positive for Staphylococcus aureus x2.  CT chest showing multifocal consolidations possible lung abscess or necrotizing infection.  PAOLO unrevealing.  Patient unable to have MRI performed due to cardiac device.  Other sources of infection could be epidural abscess, or endovascular infection.  WBC scan not indicating uptake aneurysm or spine.  Repeat blood cultures are now negative.  ID on board recommending 6 weeks IV antibiotics (cefazolin).  Will need PICC line.  Patient has generalized weakness and has been approved for skilled nursing facility at [facility].    According to nursing, patient had a large black tarry bowel movement this morning after breakfast. He reported abdominal discomfort associated with bowel urgency. No nausea or diarrhea but did  report decreased appetite. Patient was on heparin gtt which was discontinued after episode of melena around 9 A.M. Patient ate <25% of his breakfast. Had one bite of potato, some pudding and cranberry juice. Patient denies h/o GIB. He has been hospitalized since January 10, 2025, was on PPI for GI prophylaxis but this was discontinued on January 15, 2025. Home medications Xarelto and baby aspirin daily, has been getting baby aspirin daily and Xarelto has been on hold while hospitalized.  Patient's Hgb dropped from 9.1-->7.5 overnight.  Hgb average 10.5 during hospital stay.      ENDOSCOPIC REVIEW  EGD: never, had PAOLO this hospitalization   Colonoscopy: Unable to find record.  According to the patient, he had a colonoscopy greater than 5 years ago with Dr. Barnett at [facility] which was normal.    Allergies  Levofloxacin    Current Medication    Current  Facility-Administered Medications:     acetaminophen (Tylenol) tablet 650 mg, 650 mg, oral, q6h, Rodriguez Sapp MD, 650 mg at 01/20/25 0910    alteplase (Cathflo Activase) injection 2 mg, 2 mg, intra-catheter, PRN, Marc Lomeli MD    amiodarone (Pacerone) tablet 100 mg, 100 mg, oral, Daily, Rodriguez Sapp MD, 100 mg at 01/20/25 0910    aspirin chewable tablet 81 mg, 81 mg, oral, Nightly, Rodriguez Sapp MD, 81 mg at 01/19/25 2020    atorvastatin (Lipitor) tablet 40 mg, 40 mg, oral, Nightly, Rodriguez Sapp MD, 40 mg at 01/19/25 2020    ceFAZolin (Ancef) 2 g in dextrose (iso)  mL, 2 g, intravenous, q8h, Rodriguez Sapp MD, Stopped at 01/20/25 0550    [Held by provider] empagliflozin (Jardiance) tablet 10 mg, 10 mg, oral, Daily, Rodriguez Sapp MD, 10 mg at 01/11/25 0900    ezetimibe (Zetia) tablet 10 mg, 10 mg, oral, Daily, Rodriguez Sapp MD, 10 mg at 01/20/25 0910    famotidine (Pepcid) tablet 20 mg, 20 mg, oral, BID PRN, Rodriguez Sapp MD    fluticasone (Flonase) nasal spray 2 spray, 2 spray, Each Nostril, Daily, Rodriguez Sapp MD, 2 spray at 01/20/25 0911    [Held by provider] tiotropium (Spiriva Respimat) 2.5 mcg/actuation inhaler 2 puff, 2 puff, inhalation, Daily, 2 puff at 01/14/25 0850 **AND** [Held by provider] fluticasone furoate-vilanteroL (Breo Ellipta) 200-25 mcg/dose inhaler 1 puff, 1 puff, inhalation, Daily, Rodriguez Sapp MD, 1 puff at 01/14/25 0850    [Held by provider] hydroCHLOROthiazide (HYDRODiuril) tablet 25 mg, 25 mg, oral, Once per day on Monday Wednesday Friday, Rodriguez Sapp MD    ipratropium-albuteroL (Duo-Neb) 0.5-2.5 mg/3 mL nebulizer solution 3 mL, 3 mL, nebulization, q4h PRN, Rodriguez Sapp MD    ipratropium-albuteroL (Duo-Neb) 0.5-2.5 mg/3 mL nebulizer solution 3 mL, 3 mL, nebulization, TID, Rodriguez Sapp MD, 3 mL at 01/20/25 0736    [Held by provider] isosorbide mononitrate ER (Imdur)  24 hr tablet 30 mg, 30 mg, oral, Daily, Rodriguez Sapp MD    lidocaine (Xylocaine) 10 mg/mL (1 %) injection 5 mL, 5 mL, infiltration, Once, Marc Lomeli MD    lidocaine 4 % patch 1 patch, 1 patch, transdermal, Daily, Rodriguez Sapp MD, 1 patch at 01/19/25 1502    [Held by provider] lisinopril tablet 40 mg, 40 mg, oral, Daily, Rodriguez Sapp MD    melatonin tablet 5 mg, 5 mg, oral, Nightly, Rodriguez Sapp MD, 5 mg at 01/19/25 2020    methocarbamol (Robaxin) tablet 500 mg, 500 mg, oral, q8h SHABNAM, Rodriguez Sapp MD, 500 mg at 01/20/25 0514    [Held by provider] metoprolol succinate XL (Toprol-XL) 24 hr tablet 100 mg, 100 mg, oral, Nightly, Rodriguez Sapp MD    [Held by provider] metoprolol succinate XL (Toprol-XL) 24 hr tablet 50 mg, 50 mg, oral, Daily, Rodriguez Sapp MD, 50 mg at 01/11/25 0900    metoprolol tartrate (Lopressor) tablet 50 mg, 50 mg, oral, BID, Rodriguez Sapp MD, 50 mg at 01/20/25 0910    montelukast (Singulair) tablet 10 mg, 10 mg, oral, q PM, Rodriguez Sapp MD, 10 mg at 01/19/25 2020    ondansetron (Zofran) injection 4 mg, 4 mg, intravenous, q6h PRN, Rodriguez Sapp MD, 4 mg at 01/12/25 2017    oxyCODONE (Roxicodone) immediate release tablet 2.5 mg, 2.5 mg, oral, q6h PRN, Rodriguez Sapp MD, 2.5 mg at 01/13/25 1401    oxygen (O2) therapy, , inhalation, Continuous PRN - O2/gases, Rodriguez Sapp MD, 3 L/min at 01/20/25 0739    potassium chloride CR (Klor-Con M20) ER tablet 20 mEq, 20 mEq, oral, BID, Shahram Escalante MD, 20 mEq at 01/20/25 0910    spironolactone (Aldactone) tablet 25 mg, 25 mg, oral, q48h, Rodriguez Sapp MD, 25 mg at 01/19/25 1155    Past Medical History  Active Ambulatory Problems     Diagnosis Date Noted    Abdominal aortic aneurysm (CMS-[facility]) 09/29/2023    AICD (automatic cardioverter/defibrillator) present 09/29/2023    Allergic rhinitis, seasonal 09/29/2023    CAD (coronary artery disease)  10/19/2016    Centrilobular emphysema (Multi) 09/29/2023    Essential hypertension 09/29/2023    Hyperuricemia 09/29/2023    Infarction of right basal ganglia (Multi) 09/29/2023    Ischemic cardiomyopathy 09/29/2023    Mixed hyperlipidemia 09/29/2023    Persistent atrial fibrillation (Multi) 09/29/2023    Renal artery stenosis (CMS-HCC) 09/29/2023    Tinea cruris 09/29/2023    Anticoagulant long-term use 09/29/2023    Chronic diastolic congestive heart failure, NYHA class 2 09/29/2023    High risk medication use 09/29/2023    Tremor 09/29/2023    Prostate cancer (Multi) 09/29/2023    Blepharitis of both eyes 01/28/2015    Epiretinal membrane (ERM) of right eye 03/15/2016    Drusen (degenerative) of retina 01/28/2015    Dermatochalasis of right upper eyelid 08/17/2015    Dermatochalasis of left upper eyelid 08/17/2015    Degenerative retinal drusen of both eyes 08/17/2015    Choroidal nevus, right eye 01/28/2015    Lumbosacral spondylosis without myelopathy 07/23/2015    Floaters 08/17/2015    Spinal stenosis, lumbar region with neurogenic claudication 07/17/2018    Pseudophakia of both eyes 01/14/2016    Pinguecula 01/28/2015    Obesity, morbid (Multi) 10/19/2016    Vitreomacular traction syndrome of right eye 01/14/2016    COPD exacerbation (Multi) 10/19/2016    MGD (meibomian gland dysfunction) 01/28/2015    CVA (cerebral vascular accident) (Multi) 10/09/2023    Primary osteoarthritis of right knee 11/13/2023    Never smoked cigarettes 02/01/2024    Pulmonary hypertension (Multi) 02/02/2024    Hidradenitis suppurativa of anus 11/25/2024     Resolved Ambulatory Problems     Diagnosis Date Noted    NSVT (nonsustained ventricular tachycardia) (Multi) 09/29/2023    Paroxysmal ventricular tachycardia (Multi) 09/29/2023    Rosacea 09/29/2023    Shortness of breath 09/29/2023    Acute pain of both hips 09/29/2023    Acute pain of right knee 09/29/2023    Acute sinusitis 09/29/2023    Anemia 10/19/2016    Hypokalemia  10/19/2016    Chest pain 10/30/2016    Diastolic congestive heart failure, NYHA class 2 10/09/2023    BMI 36.0-36.9,adult 10/09/2023    Cutaneous candidiasis 10/09/2023    VT (ventricular tachycardia) (Multi) 03/13/2024     Past Medical History:   Diagnosis Date    Allergic     Arthritis     Cancer (Multi)     Cataract     CHF (congestive heart failure)     COPD (chronic obstructive pulmonary disease) (Multi)     Encounter for follow-up examination after completed treatment for conditions other than malignant neoplasm 12/27/2021    Heart disease     Hypertension     Myocardial infarction (Multi)     Pain in unspecified hip     Personal history of other diseases of the musculoskeletal system and connective tissue     Personal history of other endocrine, nutritional and metabolic disease     Personal history of other specified conditions 12/21/2021    Presence of coronary angioplasty implant and graft     Unspecified atrial flutter (Multi)        Past Surgical History  Past Surgical History:   Procedure Laterality Date    BACK SURGERY      CARDIAC CATHETERIZATION      CORONARY STENT PLACEMENT      CT ABDOMEN ANGIOGRAM W AND/OR WO IV CONTRAST  10/29/2021    CT ABDOMEN ANGIOGRAM W AND/OR WO IV CONTRAST 10/29/2021 [facility]    CT ABDOMEN ANGIOGRAM W AND/OR WO IV CONTRAST  10/03/2022    CT ABDOMEN ANGIOGRAM W AND/OR WO IV CONTRAST 10/3/2022 [facility]    EYE SURGERY      JOINT REPLACEMENT      OTHER SURGICAL HISTORY  10/12/2021    Renal angioplasty and stenting    OTHER SURGICAL HISTORY  10/12/2021    Cataract surgery    OTHER SURGICAL HISTORY  12/21/2021    Hip replacement    OTHER SURGICAL HISTORY  01/04/2022    Complete colonoscopy    OTHER SURGICAL HISTORY  12/21/2021    Back surgery    OTHER SURGICAL HISTORY  12/21/2021    Abdominal aortic aneurysm repair    OTHER SURGICAL HISTORY  12/21/2021    Hip replacement    OTHER SURGICAL HISTORY  12/21/2021    Surgery    TONSILLECTOMY      VASECTOMY    "      Family History  Family History   Problem Relation Name Age of Onset    Liver disease Mother Ysabel. Campbell Jones     Arthritis Mother Kelle Jones     Coronary artery disease Father Guicho Jones     Atrial fibrillation Father Guicho Jones     Heart disease Father Guicho Jones     Cancer Sister Sally Jones       No family history of GI malignancies    Social History  TOBACCO:  reports that he has quit smoking. His smoking use included cigarettes. He has a 22.5 pack-year smoking history. He has never been exposed to tobacco smoke. He has never used smokeless tobacco.  ETOH:  reports current alcohol use of about 3.0 standard drinks of alcohol per week.  DRUGS:  reports no history of drug use.    Review of Systems  Review of systems negative unless otherwise stated above.    PHYSICAL EXAM  VS: /54 (BP Location: Right arm, Patient Position: Lying)   Pulse 61   Temp 36 °C (96.8 °F) (Temporal)   Resp 19   Ht 1.676 m (5' 6\")   Wt 105 kg (230 lb 9.6 oz)   SpO2 97%   BMI 37.22 kg/m²  Body mass index is 37.22 kg/m².  Gen: Alert, awake, Oriented X 3. Not in any acute distress   HEENT:  Atraumatic, PERRL.  Conjunctivae clear.   Moist nasal mucous membranes. oropharynx non erythematous,   Neck:  Supple without thyromegaly or lymphadenopathy.  Lungs:  Clear to auscultation without rales, rhonchi, or rub.  Heart:  RRR, S1, S2, without M.  Abdomen:  Soft, non tender, no organ enlargement, bruit. Bowel sounds present . No CVA tenderness.  Extremities:  No edema. No calf swelling or tenderness.    Skin:  No rash, ecchymosis or erythema.    DATA  Recent blood work and relevant radiology and endoscopic studies were reviewed and discussed with the patient   Results from last 7 days   Lab Units 01/20/25  0621   WBC AUTO x10*3/uL 19.2*   RBC AUTO x10*6/uL 2.37*   HEMOGLOBIN g/dL 7.5*   HEMATOCRIT % 23.5*   MCV fL 99   MCHC g/dL 31.9*   RDW % 16.4*   PLATELETS AUTO x10*3/uL 238 "       Results from last 72 hours   Lab Units 01/20/25  0621   SODIUM mmol/L 142   POTASSIUM mmol/L 3.3*   CHLORIDE mmol/L 112*   CO2 mmol/L 25   BUN mg/dL 77*   CREATININE mg/dL 1.04   CALCIUM mg/dL 7.9*   PROTEIN TOTAL g/dL 4.9*   BILIRUBIN TOTAL mg/dL 0.5   ALK PHOS U/L 44   AST U/L 19   ALT U/L 8*       RADIOLOGY REVIEW  === 01/10/25 ===    CT CHEST ABDOMEN PELVIS WO CONTRAST    - Impression -  Thoracic findings:  1. Background of moderate-to-severe pulmonary emphysema. There are  multifocal consolidations throughout the lungs which are suspicious  for multifocal pneumonia. Additionally, in the medial left upper  lobe, one of the consolidations demonstrates a subtle air-fluid level  which may represent a developing abscess, necrotizing pneumonia, or  superinfection of a bulla. Recommend posttreatment chest CT in 3  months to ensure resolution.  2. Mild cardiomegaly.  3. Dilated main pulmonary artery which can be seen with pulmonary  hypertension.    Abdomen/pelvis findings:  1. No new collections.  2. Similar mild fluid distention of distal small bowel loops and  colonic bowel loops which can be seen with mild enterocolitis. No  significant bowel wall thickening.  3. Redemonstration of the bilobed abdominal aortic aneurysm measuring  up to 6 cm. As before, recommend vascular surgery consultation for  management guidance.  4. Other findings remain unchanged.    MACRO:  None    Signed by: Doug Arroyo 1/15/2025 2:28 AM  Dictation workstation:   [id number]       (Electronically signed byVICKY Augustin on 1/20/2025 at 10:59 AM)

## 2025-01-20 NOTE — ANESTHESIA PREPROCEDURE EVALUATION
Patient: Guicho Jones    Procedure Information       Date/Time: 01/20/25 1861    Scheduled providers: Quintin Mejia MD; Kacy Larson MD; Jaqui Peterson; Krupa Torres RN    Procedure: EGD    Location: [facility]            Relevant Problems   Cardiac   (+) AICD (automatic cardioverter/defibrillator) present   (+) Abdominal aortic aneurysm (CMS-HCC)   (+) CAD (coronary artery disease)   (+) Essential hypertension   (+) Mixed hyperlipidemia   (+) Persistent atrial fibrillation (Multi)   (+) Renal artery stenosis (CMS-HCC)      Pulmonary   (+) COPD exacerbation (Multi)   (+) Centrilobular emphysema (Multi)   (+) Pulmonary hypertension (Multi)      Neuro   (+) CVA (cerebral vascular accident) (Multi)      /Renal   (+) Prostate cancer (Multi)      Endocrine   (+) Obesity, morbid (Multi)      Hematology   (+) Anticoagulant long-term use      Musculoskeletal   (+) Lumbosacral spondylosis without myelopathy   (+) Primary osteoarthritis of right knee   (+) Spinal stenosis, lumbar region with neurogenic claudication      ID   (+) Hidradenitis suppurativa of anus   (+) Tinea cruris       Clinical information reviewed:                   NPO Detail:  No data recorded     Physical Exam    Airway  Mallampati: II     Cardiovascular   Rhythm: regular  Rate: normal     Dental    Pulmonary - normal exam     Abdominal - normal exam             Anesthesia Plan    History of general anesthesia?: yes  History of complications of general anesthesia?: no    ASA 4 - emergent     MAC     The patient is not a current smoker.  Patient was not previously instructed to abstain from smoking on day of procedure.  Patient did not smoke on day of procedure.    intravenous induction   Postoperative administration of opioids is intended.  Anesthetic plan and risks discussed with patient.  Use of blood products discussed with patient who.

## 2025-01-21 LAB
ALBUMIN SERPL BCP-MCNC: 2.6 G/DL (ref 3.4–5)
ALP SERPL-CCNC: 50 U/L (ref 33–136)
ALT SERPL W P-5'-P-CCNC: 9 U/L (ref 10–52)
ANION GAP SERPL CALC-SCNC: 8 MMOL/L (ref 10–20)
AST SERPL W P-5'-P-CCNC: 22 U/L (ref 9–39)
BASOPHILS # BLD AUTO: 0.02 X10*3/UL (ref 0–0.1)
BASOPHILS NFR BLD AUTO: 0.1 %
BILIRUB SERPL-MCNC: 0.6 MG/DL (ref 0–1.2)
BLOOD EXPIRATION DATE: NORMAL
BUN SERPL-MCNC: 56 MG/DL (ref 6–23)
CALCIUM SERPL-MCNC: 7.8 MG/DL (ref 8.6–10.3)
CHLORIDE SERPL-SCNC: 115 MMOL/L (ref 98–107)
CO2 SERPL-SCNC: 23 MMOL/L (ref 21–32)
CREAT SERPL-MCNC: 0.94 MG/DL (ref 0.5–1.3)
DISPENSE STATUS: NORMAL
EGFRCR SERPLBLD CKD-EPI 2021: 79 ML/MIN/1.73M*2
EOSINOPHIL # BLD AUTO: 0.14 X10*3/UL (ref 0–0.4)
EOSINOPHIL NFR BLD AUTO: 0.8 %
ERYTHROCYTE [DISTWIDTH] IN BLOOD BY AUTOMATED COUNT: 18 % (ref 11.5–14.5)
GLUCOSE SERPL-MCNC: 93 MG/DL (ref 74–99)
HCT VFR BLD AUTO: 24.1 % (ref 41–52)
HGB BLD-MCNC: 7.8 G/DL (ref 13.5–17.5)
IMM GRANULOCYTES # BLD AUTO: 0.85 X10*3/UL (ref 0–0.5)
IMM GRANULOCYTES NFR BLD AUTO: 5 % (ref 0–0.9)
LYMPHOCYTES # BLD AUTO: 1.14 X10*3/UL (ref 0.8–3)
LYMPHOCYTES NFR BLD AUTO: 6.7 %
MAGNESIUM SERPL-MCNC: 1.56 MG/DL (ref 1.6–2.4)
MCH RBC QN AUTO: 31.5 PG (ref 26–34)
MCHC RBC AUTO-ENTMCNC: 32.4 G/DL (ref 32–36)
MCV RBC AUTO: 97 FL (ref 80–100)
MONOCYTES # BLD AUTO: 1.12 X10*3/UL (ref 0.05–0.8)
MONOCYTES NFR BLD AUTO: 6.6 %
NEUTROPHILS # BLD AUTO: 13.65 X10*3/UL (ref 1.6–5.5)
NEUTROPHILS NFR BLD AUTO: 80.8 %
NRBC BLD-RTO: 0.5 /100 WBCS (ref 0–0)
PHOSPHATE SERPL-MCNC: 3.7 MG/DL (ref 2.5–4.9)
PLATELET # BLD AUTO: 183 X10*3/UL (ref 150–450)
POTASSIUM SERPL-SCNC: 3.9 MMOL/L (ref 3.5–5.3)
PRODUCT BLOOD TYPE: 6200
PRODUCT CODE: NORMAL
PROT SERPL-MCNC: 4.8 G/DL (ref 6.4–8.2)
RBC # BLD AUTO: 2.48 X10*6/UL (ref 4.5–5.9)
SODIUM SERPL-SCNC: 142 MMOL/L (ref 136–145)
UNIT ABO: NORMAL
UNIT NUMBER: NORMAL
UNIT RH: NORMAL
UNIT VOLUME: 350
WBC # BLD AUTO: 16.9 X10*3/UL (ref 4.4–11.3)
XM INTEP: NORMAL

## 2025-01-21 PROCEDURE — 84075 ASSAY ALKALINE PHOSPHATASE: CPT | Performed by: STUDENT IN AN ORGANIZED HEALTH CARE EDUCATION/TRAINING PROGRAM

## 2025-01-21 PROCEDURE — 2500000001 HC RX 250 WO HCPCS SELF ADMINISTERED DRUGS (ALT 637 FOR MEDICARE OP): Performed by: STUDENT IN AN ORGANIZED HEALTH CARE EDUCATION/TRAINING PROGRAM

## 2025-01-21 PROCEDURE — 99232 SBSQ HOSP IP/OBS MODERATE 35: CPT | Performed by: INTERNAL MEDICINE

## 2025-01-21 PROCEDURE — 2500000002 HC RX 250 W HCPCS SELF ADMINISTERED DRUGS (ALT 637 FOR MEDICARE OP, ALT 636 FOR OP/ED): Performed by: STUDENT IN AN ORGANIZED HEALTH CARE EDUCATION/TRAINING PROGRAM

## 2025-01-21 PROCEDURE — 94640 AIRWAY INHALATION TREATMENT: CPT

## 2025-01-21 PROCEDURE — 99233 SBSQ HOSP IP/OBS HIGH 50: CPT | Performed by: INTERNAL MEDICINE

## 2025-01-21 PROCEDURE — 97530 THERAPEUTIC ACTIVITIES: CPT | Mod: GP,CQ

## 2025-01-21 PROCEDURE — 2500000001 HC RX 250 WO HCPCS SELF ADMINISTERED DRUGS (ALT 637 FOR MEDICARE OP): Performed by: NURSE PRACTITIONER

## 2025-01-21 PROCEDURE — 84100 ASSAY OF PHOSPHORUS: CPT | Performed by: STUDENT IN AN ORGANIZED HEALTH CARE EDUCATION/TRAINING PROGRAM

## 2025-01-21 PROCEDURE — 83735 ASSAY OF MAGNESIUM: CPT | Performed by: STUDENT IN AN ORGANIZED HEALTH CARE EDUCATION/TRAINING PROGRAM

## 2025-01-21 PROCEDURE — 2500000005 HC RX 250 GENERAL PHARMACY W/O HCPCS: Performed by: STUDENT IN AN ORGANIZED HEALTH CARE EDUCATION/TRAINING PROGRAM

## 2025-01-21 PROCEDURE — 2500000004 HC RX 250 GENERAL PHARMACY W/ HCPCS (ALT 636 FOR OP/ED): Mod: JZ | Performed by: STUDENT IN AN ORGANIZED HEALTH CARE EDUCATION/TRAINING PROGRAM

## 2025-01-21 PROCEDURE — 1200000002 HC GENERAL ROOM WITH TELEMETRY DAILY

## 2025-01-21 PROCEDURE — 85025 COMPLETE CBC W/AUTO DIFF WBC: CPT | Performed by: INTERNAL MEDICINE

## 2025-01-21 PROCEDURE — 99232 SBSQ HOSP IP/OBS MODERATE 35: CPT | Performed by: NURSE PRACTITIONER

## 2025-01-21 PROCEDURE — 2500000002 HC RX 250 W HCPCS SELF ADMINISTERED DRUGS (ALT 637 FOR MEDICARE OP, ALT 636 FOR OP/ED): Performed by: INTERNAL MEDICINE

## 2025-01-21 PROCEDURE — 97116 GAIT TRAINING THERAPY: CPT | Mod: GP,CQ

## 2025-01-21 PROCEDURE — 2500000004 HC RX 250 GENERAL PHARMACY W/ HCPCS (ALT 636 FOR OP/ED): Performed by: NURSE PRACTITIONER

## 2025-01-21 RX ORDER — SUCRALFATE 1 G/1
1 TABLET ORAL EVERY 6 HOURS SCHEDULED
Status: DISCONTINUED | OUTPATIENT
Start: 2025-01-21 | End: 2025-01-26 | Stop reason: HOSPADM

## 2025-01-21 RX ADMIN — IPRATROPIUM BROMIDE AND ALBUTEROL SULFATE 3 ML: 2.5; .5 SOLUTION RESPIRATORY (INHALATION) at 20:31

## 2025-01-21 RX ADMIN — METHOCARBAMOL TABLETS 500 MG: 500 TABLET, COATED ORAL at 20:51

## 2025-01-21 RX ADMIN — POTASSIUM CHLORIDE 20 MEQ: 1500 TABLET, EXTENDED RELEASE ORAL at 08:14

## 2025-01-21 RX ADMIN — EZETIMIBE 10 MG: 10 TABLET ORAL at 08:14

## 2025-01-21 RX ADMIN — ACETAMINOPHEN 650 MG: 325 TABLET ORAL at 08:19

## 2025-01-21 RX ADMIN — POTASSIUM CHLORIDE 20 MEQ: 1500 TABLET, EXTENDED RELEASE ORAL at 20:17

## 2025-01-21 RX ADMIN — Medication 5 MG: at 20:17

## 2025-01-21 RX ADMIN — IPRATROPIUM BROMIDE AND ALBUTEROL SULFATE 3 ML: 2.5; .5 SOLUTION RESPIRATORY (INHALATION) at 12:57

## 2025-01-21 RX ADMIN — ATORVASTATIN CALCIUM 40 MG: 20 TABLET, FILM COATED ORAL at 20:17

## 2025-01-21 RX ADMIN — PANTOPRAZOLE SODIUM 40 MG: 40 INJECTION, POWDER, FOR SOLUTION INTRAVENOUS at 20:17

## 2025-01-21 RX ADMIN — MONTELUKAST SODIUM 10 MG: 10 TABLET, FILM COATED ORAL at 20:17

## 2025-01-21 RX ADMIN — ACETAMINOPHEN 650 MG: 325 TABLET ORAL at 20:50

## 2025-01-21 RX ADMIN — FLUTICASONE PROPIONATE 2 SPRAY: 50 SPRAY, METERED NASAL at 08:14

## 2025-01-21 RX ADMIN — METOPROLOL TARTRATE 50 MG: 50 TABLET, FILM COATED ORAL at 08:14

## 2025-01-21 RX ADMIN — SPIRONOLACTONE 25 MG: 25 TABLET ORAL at 12:10

## 2025-01-21 RX ADMIN — METHOCARBAMOL TABLETS 500 MG: 500 TABLET, COATED ORAL at 13:00

## 2025-01-21 RX ADMIN — METHOCARBAMOL TABLETS 500 MG: 500 TABLET, COATED ORAL at 05:37

## 2025-01-21 RX ADMIN — AMIODARONE HYDROCHLORIDE 100 MG: 200 TABLET ORAL at 08:14

## 2025-01-21 RX ADMIN — IPRATROPIUM BROMIDE AND ALBUTEROL SULFATE 3 ML: 2.5; .5 SOLUTION RESPIRATORY (INHALATION) at 07:18

## 2025-01-21 RX ADMIN — CEFAZOLIN SODIUM 2 G: 2 INJECTION, SOLUTION INTRAVENOUS at 20:51

## 2025-01-21 RX ADMIN — CEFAZOLIN SODIUM 2 G: 2 INJECTION, SOLUTION INTRAVENOUS at 05:38

## 2025-01-21 RX ADMIN — ASPIRIN 81 MG: 81 TABLET, CHEWABLE ORAL at 20:17

## 2025-01-21 RX ADMIN — SUCRALFATE 1 G: 1 TABLET ORAL at 18:12

## 2025-01-21 RX ADMIN — METOPROLOL TARTRATE 50 MG: 50 TABLET, FILM COATED ORAL at 20:17

## 2025-01-21 RX ADMIN — CEFAZOLIN SODIUM 2 G: 2 INJECTION, SOLUTION INTRAVENOUS at 13:00

## 2025-01-21 RX ADMIN — PANTOPRAZOLE SODIUM 40 MG: 40 INJECTION, POWDER, FOR SOLUTION INTRAVENOUS at 08:14

## 2025-01-21 RX ADMIN — Medication 3 L/MIN: at 20:31

## 2025-01-21 ASSESSMENT — COGNITIVE AND FUNCTIONAL STATUS - GENERAL
WALKING IN HOSPITAL ROOM: A LOT
TURNING FROM BACK TO SIDE WHILE IN FLAT BAD: A LITTLE
CLIMB 3 TO 5 STEPS WITH RAILING: TOTAL
DRESSING REGULAR UPPER BODY CLOTHING: A LITTLE
MOVING FROM LYING ON BACK TO SITTING ON SIDE OF FLAT BED WITH BEDRAILS: A LITTLE
MOBILITY SCORE: 15
TOILETING: A LITTLE
CLIMB 3 TO 5 STEPS WITH RAILING: TOTAL
DAILY ACTIVITIY SCORE: 20
HELP NEEDED FOR BATHING: A LITTLE
TURNING FROM BACK TO SIDE WHILE IN FLAT BAD: A LITTLE
MOVING TO AND FROM BED TO CHAIR: A LITTLE
TURNING FROM BACK TO SIDE WHILE IN FLAT BAD: A LITTLE
STANDING UP FROM CHAIR USING ARMS: A LITTLE
MOVING TO AND FROM BED TO CHAIR: A LITTLE
WALKING IN HOSPITAL ROOM: A LOT
DRESSING REGULAR UPPER BODY CLOTHING: A LITTLE
DRESSING REGULAR LOWER BODY CLOTHING: A LITTLE
DRESSING REGULAR LOWER BODY CLOTHING: A LITTLE
TOILETING: A LITTLE
DAILY ACTIVITIY SCORE: 20
HELP NEEDED FOR BATHING: A LITTLE
MOVING FROM LYING ON BACK TO SITTING ON SIDE OF FLAT BED WITH BEDRAILS: A LITTLE
STANDING UP FROM CHAIR USING ARMS: A LITTLE
MOVING FROM LYING ON BACK TO SITTING ON SIDE OF FLAT BED WITH BEDRAILS: A LITTLE
STANDING UP FROM CHAIR USING ARMS: A LITTLE
MOBILITY SCORE: 15
MOBILITY SCORE: 15
MOVING TO AND FROM BED TO CHAIR: A LITTLE
WALKING IN HOSPITAL ROOM: A LOT
CLIMB 3 TO 5 STEPS WITH RAILING: TOTAL

## 2025-01-21 ASSESSMENT — PAIN SCALES - GENERAL
PAINLEVEL_OUTOF10: 0 - NO PAIN
PAINLEVEL_OUTOF10: 0 - NO PAIN

## 2025-01-21 ASSESSMENT — PAIN - FUNCTIONAL ASSESSMENT: PAIN_FUNCTIONAL_ASSESSMENT: 0-10

## 2025-01-21 NOTE — PROGRESS NOTES
Department of Internal Medicine  Gastroenterology  Progress note      Subjective  GI is following for GIB    1 dark/green stool noted yesterday post EGD.  Patient tolerating clear liquids.  No abdominal pain no nausea/vomiting.     EGD results discussed with the patient and wife at bedside at length.  All questions answered and patient/wife verbalized understanding.      Current Medication    Current Facility-Administered Medications:     acetaminophen (Tylenol) tablet 650 mg, 650 mg, oral, q6h, Rodriguez Sapp MD, 650 mg at 01/21/25 0819    alteplase (Cathflo Activase) injection 2 mg, 2 mg, intra-catheter, PRN, Marc Lomeli MD    amiodarone (Pacerone) tablet 100 mg, 100 mg, oral, Daily, Rodriguez Sapp MD, 100 mg at 01/21/25 0814    aspirin chewable tablet 81 mg, 81 mg, oral, Nightly, Rodriguez Sapp MD, 81 mg at 01/20/25 2025    atorvastatin (Lipitor) tablet 40 mg, 40 mg, oral, Nightly, Rodriguez Sapp MD, 40 mg at 01/20/25 2024    ceFAZolin (Ancef) 2 g in dextrose (iso)  mL, 2 g, intravenous, q8h, Rodriguez Sapp MD, Stopped at 01/21/25 0626    [Held by provider] empagliflozin (Jardiance) tablet 10 mg, 10 mg, oral, Daily, Rodriguez Sapp MD, 10 mg at 01/11/25 0900    ezetimibe (Zetia) tablet 10 mg, 10 mg, oral, Daily, Rodriguez Sapp MD, 10 mg at 01/21/25 0814    famotidine (Pepcid) tablet 20 mg, 20 mg, oral, BID PRN, Rodriguez Sapp MD    fluticasone (Flonase) nasal spray 2 spray, 2 spray, Each Nostril, Daily, Rodriguez Sapp MD, 2 spray at 01/21/25 0814    [Held by provider] tiotropium (Spiriva Respimat) 2.5 mcg/actuation inhaler 2 puff, 2 puff, inhalation, Daily, 2 puff at 01/14/25 0850 **AND** [Held by provider] fluticasone furoate-vilanteroL (Breo Ellipta) 200-25 mcg/dose inhaler 1 puff, 1 puff, inhalation, Daily, Rodriguez Sapp MD, 1 puff at 01/14/25 0850    [Held by provider] hydroCHLOROthiazide (HYDRODiuril) tablet 25 mg, 25 mg,  oral, Once per day on Monday Wednesday Friday, Rodriguez Sapp MD    ipratropium-albuteroL (Duo-Neb) 0.5-2.5 mg/3 mL nebulizer solution 3 mL, 3 mL, nebulization, q4h PRN, Rodriguez Sapp MD    ipratropium-albuteroL (Duo-Neb) 0.5-2.5 mg/3 mL nebulizer solution 3 mL, 3 mL, nebulization, TID, Rodriguez Sapp MD, 3 mL at 01/21/25 0718    [Held by provider] isosorbide mononitrate ER (Imdur) 24 hr tablet 30 mg, 30 mg, oral, Daily, Rodriguez Sapp MD    lidocaine 4 % patch 1 patch, 1 patch, transdermal, Daily, Rodriguez Sapp MD, 1 patch at 01/19/25 1502    [Held by provider] lisinopril tablet 40 mg, 40 mg, oral, Daily, Rodriguez Sapp MD    melatonin tablet 5 mg, 5 mg, oral, Nightly, Rodriguez Sapp MD, 5 mg at 01/20/25 2025    methocarbamol (Robaxin) tablet 500 mg, 500 mg, oral, q8h SHABNAM, Rodriguez Sapp MD, 500 mg at 01/21/25 0537    [Held by provider] metoprolol succinate XL (Toprol-XL) 24 hr tablet 100 mg, 100 mg, oral, Nightly, Rodriguez Sapp MD    [Held by provider] metoprolol succinate XL (Toprol-XL) 24 hr tablet 50 mg, 50 mg, oral, Daily, Rodriguez Sapp MD, 50 mg at 01/11/25 0900    metoprolol tartrate (Lopressor) tablet 50 mg, 50 mg, oral, BID, Rodriguez Sapp MD, 50 mg at 01/21/25 0814    montelukast (Singulair) tablet 10 mg, 10 mg, oral, q PM, Rodriguez Sapp MD, 10 mg at 01/20/25 2025    ondansetron (Zofran) injection 4 mg, 4 mg, intravenous, q6h PRN, Rodriguez Sapp MD, 4 mg at 01/12/25 2017    oxyCODONE (Roxicodone) immediate release tablet 2.5 mg, 2.5 mg, oral, q6h PRN, Rodriguez Sapp MD, 2.5 mg at 01/13/25 1401    oxygen (O2) therapy, , inhalation, Continuous PRN - O2/gases, Rodriguez Sapp MD, 3 L/min at 01/20/25 0739    pantoprazole (ProtoNix) injection 40 mg, 40 mg, intravenous, BID, Vicenta Payne, APRN-CNP, 40 mg at 01/21/25 0814    potassium chloride CR (Klor-Con M20) ER tablet 20 mEq, 20 mEq, oral, BID,  Shahram Escalante MD, 20 mEq at 01/21/25 0814    spironolactone (Aldactone) tablet 25 mg, 25 mg, oral, q48h, Rodriguez Sapp MD, 25 mg at 01/21/25 1210    Past Medical History  Active Ambulatory Problems     Diagnosis Date Noted    Abdominal aortic aneurysm (CMS-HCC) 09/29/2023    AICD (automatic cardioverter/defibrillator) present 09/29/2023    Allergic rhinitis, seasonal 09/29/2023    CAD (coronary artery disease) 10/19/2016    Centrilobular emphysema (Multi) 09/29/2023    Essential hypertension 09/29/2023    Hyperuricemia 09/29/2023    Infarction of right basal ganglia (Multi) 09/29/2023    Ischemic cardiomyopathy 09/29/2023    Mixed hyperlipidemia 09/29/2023    Persistent atrial fibrillation (Multi) 09/29/2023    Renal artery stenosis (CMS-HCC) 09/29/2023    Tinea cruris 09/29/2023    Anticoagulant long-term use 09/29/2023    Chronic diastolic congestive heart failure, NYHA class 2 09/29/2023    High risk medication use 09/29/2023    Tremor 09/29/2023    Prostate cancer (Multi) 09/29/2023    Blepharitis of both eyes 01/28/2015    Epiretinal membrane (ERM) of right eye 03/15/2016    Drusen (degenerative) of retina 01/28/2015    Dermatochalasis of right upper eyelid 08/17/2015    Dermatochalasis of left upper eyelid 08/17/2015    Degenerative retinal drusen of both eyes 08/17/2015    Choroidal nevus, right eye 01/28/2015    Lumbosacral spondylosis without myelopathy 07/23/2015    Floaters 08/17/2015    Spinal stenosis, lumbar region with neurogenic claudication 07/17/2018    Pseudophakia of both eyes 01/14/2016    Pinguecula 01/28/2015    Obesity, morbid (Multi) 10/19/2016    Vitreomacular traction syndrome of right eye 01/14/2016    COPD exacerbation (Multi) 10/19/2016    MGD (meibomian gland dysfunction) 01/28/2015    CVA (cerebral vascular accident) (Multi) 10/09/2023    Primary osteoarthritis of right knee 11/13/2023    Never smoked cigarettes 02/01/2024    Pulmonary hypertension (Multi) 02/02/2024     "Hidradenitis suppurativa of anus 11/25/2024     Resolved Ambulatory Problems     Diagnosis Date Noted    NSVT (nonsustained ventricular tachycardia) (Multi) 09/29/2023    Paroxysmal ventricular tachycardia (Multi) 09/29/2023    Rosacea 09/29/2023    Shortness of breath 09/29/2023    Acute pain of both hips 09/29/2023    Acute pain of right knee 09/29/2023    Acute sinusitis 09/29/2023    Anemia 10/19/2016    Hypokalemia 10/19/2016    Chest pain 10/30/2016    Diastolic congestive heart failure, NYHA class 2 10/09/2023    BMI 36.0-36.9,adult 10/09/2023    Cutaneous candidiasis 10/09/2023    VT (ventricular tachycardia) (Multi) 03/13/2024     Past Medical History:   Diagnosis Date    Allergic     Arthritis     Cancer (Multi)     Cataract     CHF (congestive heart failure)     COPD (chronic obstructive pulmonary disease) (Multi)     Encounter for follow-up examination after completed treatment for conditions other than malignant neoplasm 12/27/2021    Heart disease     Hypertension     Myocardial infarction (Multi)     Pain in unspecified hip     Personal history of other diseases of the musculoskeletal system and connective tissue     Personal history of other endocrine, nutritional and metabolic disease     Personal history of other specified conditions 12/21/2021    Presence of coronary angioplasty implant and graft     Unspecified atrial flutter (Multi)        PHYSICAL EXAM  VS: /56 (BP Location: Left arm, Patient Position: Lying)   Pulse 60   Temp 36.4 °C (97.5 °F) (Temporal)   Resp 19   Ht 1.676 m (5' 6\")   Wt 104 kg (230 lb)   SpO2 99%   BMI 37.12 kg/m²  Body mass index is 37.12 kg/m².  Physical Exam  No acute distress, alert and oriented, nonlabored breathing, abdomen soft  DATA  Recent blood work and relevant radiology and endoscopic studies were reviewed and discussed with the patient   Results from last 7 days   Lab Units 01/21/25  0733   WBC AUTO x10*3/uL 16.9*   RBC AUTO x10*6/uL 2.48* "   HEMOGLOBIN g/dL 7.8*   HEMATOCRIT % 24.1*   MCV fL 97   MCHC g/dL 32.4   RDW % 18.0*   PLATELETS AUTO x10*3/uL 183       Results from last 72 hours   Lab Units 01/21/25  0733   SODIUM mmol/L 142   POTASSIUM mmol/L 3.9   CHLORIDE mmol/L 115*   CO2 mmol/L 23   BUN mg/dL 56*   CREATININE mg/dL 0.94   CALCIUM mg/dL 7.8*   PROTEIN TOTAL g/dL 4.8*   BILIRUBIN TOTAL mg/dL 0.6   ALK PHOS U/L 50   AST U/L 22   ALT U/L 9*                       ENDOSCOPIC REVIEW  EGD 1/20/2025:  Impression  The cricopharynx, upper third of the esophagus, middle third of the esophagus, lower third of the esophagus and GE junction appeared normal.  Single ulcer in the fundus of the stomach with flat pigmented spot (Shahbaz IIC); placed 3 clips successfully; injected epinephrine to address bleeding; hemostasis achieved  Severe edematous, erythematous, ulcerated mucosa with erosion in the stomach, consistent with gastritis; performed cold forceps biopsy to rule out H. pylori  The duodenal bulb and 2nd part of the duodenum appeared normal.      IMPRESSION/RECOMMENDATIONS  Guicho Jones is a 84 y.o. male with a PMH of   ICM s/p ICD, MI, diastolic heart failure, CAD s/p PCI, COPD (centrilobular emphysema), pulmonary hypertension, HTN, HLD, persistent Afib on ATC with xarelto, CVA, infrarenal AAA, obesity, and prostate cancer who presented to [facility] emergency department 1/10/2025 by EMS for fall.  Admitted to critical care with septic shock 2/2 MSSA pneumonia/bacteremia with acute on chronic hypoxic respiratory failure and ISAIAS.  Transition to regular medical floor with plans for SNF placement.  Patient had large melena x1 on 1/20/2025 while on heparin drip.  Hgb 10.5->9.1->7.5.     Patient EGD yesterday noting single 20 mm x 15 mm large deep benign-appearing ulcer in the fundus of the stomach with flat pigmented spot, 3 clips successfully placed, injected epinephrine to address bleeding, hemostasis achieved, gastritis also noted    Melena 2/2  large ulcer in the fundus of the stomach.  1 dark/green stool noted yesterday post EGD  Acute blood loss anemia - Hgb 10.5->9.1->7.5->7.8 s/p 1uprbcs yesterday  Anticoagulated heparin gtt currently on hold  Long term anticoagulation Xarelto - on hold       PLAN  Await pathology results   Repeat EGD in 2 months, due: 3/21/2025   Continue pantoprazole 40 mg po bid 8 weeks  Start sucralfate 1 g TID 8 weeks  No anticoagulation 48 -72 hours post EGD 1/20  Ok to resume clear liquid diet  If recurrent active bleed consult IR   -trend hgb and hct  -monitor consistency and color of stool. Monitor for signs of overt GI bleeding  -tranfuse for hgb < 8.0  -avoid NSAIDs                     Discussed with Dr. Castillo.  GI will continue to follow    (Electronically signed bySAJI Adorno-CNP on 1/21/2025 at 12:26 PM)

## 2025-01-21 NOTE — CARE PLAN
The patient's goals for the shift include sleep.    The clinical goals for the shift include comfort and safety.    Over the shift, the patient did not make progress toward the following goals. Barriers to progression include disease process. Recommendations to address these barriers include monitor labs, treat abnormals per physician orders.    Problem: Nutrition  Goal: Lab values WNL  Outcome: Not Progressing

## 2025-01-21 NOTE — PROGRESS NOTES
Physical Therapy    Physical Therapy Treatment    Patient Name: Guicho Jones  MRN: [id number]  Today's Date: 1/21/2025  Time Calculation  Start Time: 1120  Stop Time: 1148  Time Calculation (min): 28 min     1004/1004-B    Assessment/Plan   PT Assessment  PT Assessment Results: Decreased mobility, Decreased strength  Rehab Prognosis: Good  Barriers to Discharge Home: Physical needs, Caregiver assistance  Evaluation/Treatment Tolerance: Patient tolerated treatment well  Medical Staff Made Aware: Yes  End of Session Communication: Bedside nurse  Assessment Comment: pt is participating well and motivated to increase activity , would benefit from continued therapy to improved functional mobilityand safety  End of Session Patient Position: Alarm on, Up in chair     Treatment/Interventions: Transfer training, Gait training, Positioning  PT Plan: Ongoing PT  PT Frequency: 3 times per week  PT Discharge Recommendations: Moderate intensity level of continued care    PT Recommended Transfer Status: Assist x2, Assistive device    General Visit Information:   PT  Visit  PT Received On: 01/21/25  General  PT Missed Visit: Yes  Missed Visit Reason: Patient in a medical procedure (pt off floor , will reattempt as appropriate)  Family/Caregiver Present: Yes  Caregiver Feedback: spouse present and supportive  Prior to Session Communication: Bedside nurse (cleared to participate)  Patient Position Received: Bed, 3 rail up, Alarm off, not on at start of session  General Comment: agreeable to particpate and sit up in chair   Pt and spouse stating that pt plans to go to [place] and that their daughter works there    General Observations:   General Observation: 4L O2 , external catheter , tele    Subjective     Precautions:  Precautions  Hearing/Visual Limitations: Knik  Medical Precautions: Fall precautions  Precautions Comment:  (fall, purwick intact, iv)    Vital Signs:  Vital Signs  Heart Rate: 60  SpO2: 98 % (with 4L O2 in  place)  Objective     Pain:  Pain Assessment  Pain Assessment: 0-10  0-10 (Numeric) Pain Score:  (pt denies pain at rest , however c/o sore soreness in scrotum at site of biospy and some abdominal disomfort with certain movements)  Pain Interventions: Repositioned    Cognition:  Cognition  Overall Cognitive Status: Within Functional Limits    Postural Control:       Balance:   Static Sitting Balance  Static Sitting-Comment/Number of Minutes:  (Pt sat EOB with CGA + B feet and UE support.)  Dynamic Sitting Balance  Dynamic Sitting-Comments:  (CGA/Min A to prevent retropulsion with strength testing at EOB. B LE/UE support used.)  Static Standing Balance  Static Standing-Comment/Number of Minutes: Pt stood EOB with max A x 2 and WW. Assist needed to hold upright and anteriorly weight shift.  Dynamic Standing Balance  Dynamic Standing-Comments:  (4 side steps to R toward chair w/ Max x 2 and WW. Unsteady with weight shifting, Increased steadiness with WW.)    Extremity/Trunk Assessments:                Activity Tolerance:  Activity Tolerance  Endurance: Decreased tolerance for upright activites    Treatments:  Therapeutic Exercise  Therapeutic Exercise Activity 1: reviewed ther ex , pt only performed AP secondary to lunch arrived        Bed Mobility  Bed Mobility: Yes  Bed Mobility 1  Bed Mobility Comments 1: transfers supine --> sit with Mod A and head of bed elevated , pt able to reach for railing to assist getting to edge of bed  Ambulation/Gait Training  Ambulation/Gait Training Performed: Yes  Ambulation/Gait Training 1  Surface 1: Level tile  Device 1: Rolling walker  Comments/Distance (ft) 1: ambulating  2ft with WW and Min A , vc for upright posture and to keep WW close  Transfers  Transfer: Yes  Transfer 1  Technique 1: Sit to stand, Stand to sit  Trials/Comments 1: transfers sit <--> stand with WW and Mod A, vc for technique,  hand placement and safety pt receptive to instruction , able to perform safely with  vc  Stairs  Stairs: No       Outcome Measures:     [facility] Basic Mobility  Turning from your back to your side while in a flat bed without using bedrails: A little  Moving from lying on your back to sitting on the side of a flat bed without using bedrails: A little  Moving to and from bed to chair (including a wheelchair): A little  Standing up from a chair using your arms (e.g. wheelchair or bedside chair): A little  To walk in hospital room: A lot  Climbing 3-5 steps with railing: Total  Basic Mobility - Total Score: 15    EDUCATION:  Outpatient Education  Individual(s) Educated: Patient (and spouse)  Education Provided: Fall Risk, Home Exercise Program, Home Safety  Risk and Benefits Discussed with Patient/Caregiver/Other: yes  Patient/Caregiver Demonstrated Understanding: yes  Plan of Care Discussed and Agreed Upon: yes  Patient Response to Education: Patient/Caregiver Verbalized Understanding of Information  Education Comment:  (pt. well motivated to participate displaying good return demo. of transfer education and standing activity with walker.)    Encounter Problems       Encounter Problems (Active)       PT Problem       Pt will completed supine <> sit bed mobility from flat bed with Min A  (Progressing)       Start:  01/13/25    Expected End:  01/27/25            Pt will demonstrate sit to stand transfers with WW + Min A (Progressing)       Start:  01/13/25    Expected End:  01/27/25            Pt. will ambulate 30 with WW + Min A  (Progressing)       Start:  01/13/25    Expected End:  01/27/25            Pt will maintain balance while reaching outside PADMINI in sitting with WW/ B LE support, single UE support and Min A (Progressing)       Start:  01/13/25    Expected End:  01/27/25            Pt will independently complete B LE strengthening Hep (Progressing)       Start:  01/13/25    Expected End:  01/27/25               Pain - Adult

## 2025-01-21 NOTE — PROGRESS NOTES
Occupational Therapy                 Therapy Communication Note    Patient Name: Guicho Jones  MRN: [id number]  Department: [facility]  Room: 1004/1004-B  Today's Date: 1/21/2025     Discipline: Occupational Therapy    OT Missed Visit: Yes     Missed Visit Reason: Missed Visit Reason: Patient refused (Pt politely declining therapy at this time d/t fatigue, stating he just got back into bed. Agreeable to attempt treatment tomorrow.)    Missed Time: Attempt

## 2025-01-22 LAB
ALBUMIN SERPL BCP-MCNC: 2.5 G/DL (ref 3.4–5)
ALP SERPL-CCNC: 48 U/L (ref 33–136)
ALT SERPL W P-5'-P-CCNC: 7 U/L (ref 10–52)
ANION GAP SERPL CALC-SCNC: 8 MMOL/L (ref 10–20)
AST SERPL W P-5'-P-CCNC: 18 U/L (ref 9–39)
BILIRUB SERPL-MCNC: 1 MG/DL (ref 0–1.2)
BLOOD EXPIRATION DATE: NORMAL
BUN SERPL-MCNC: 35 MG/DL (ref 6–23)
CALCIUM SERPL-MCNC: 7.7 MG/DL (ref 8.6–10.3)
CHLORIDE SERPL-SCNC: 114 MMOL/L (ref 98–107)
CO2 SERPL-SCNC: 23 MMOL/L (ref 21–32)
CREAT SERPL-MCNC: 0.84 MG/DL (ref 0.5–1.3)
DISPENSE STATUS: NORMAL
EGFRCR SERPLBLD CKD-EPI 2021: 85 ML/MIN/1.73M*2
ERYTHROCYTE [DISTWIDTH] IN BLOOD BY AUTOMATED COUNT: 17.7 % (ref 11.5–14.5)
GLUCOSE SERPL-MCNC: 93 MG/DL (ref 74–99)
HCT VFR BLD AUTO: 23.1 % (ref 41–52)
HGB BLD-MCNC: 7.4 G/DL (ref 13.5–17.5)
MAGNESIUM SERPL-MCNC: 1.52 MG/DL (ref 1.6–2.4)
MCH RBC QN AUTO: 31.6 PG (ref 26–34)
MCHC RBC AUTO-ENTMCNC: 32 G/DL (ref 32–36)
MCV RBC AUTO: 99 FL (ref 80–100)
NRBC BLD-RTO: 0.2 /100 WBCS (ref 0–0)
PHOSPHATE SERPL-MCNC: 3.4 MG/DL (ref 2.5–4.9)
PLATELET # BLD AUTO: 152 X10*3/UL (ref 150–450)
POTASSIUM SERPL-SCNC: 4.1 MMOL/L (ref 3.5–5.3)
PRODUCT BLOOD TYPE: 9500
PRODUCT CODE: NORMAL
PROT SERPL-MCNC: 4.9 G/DL (ref 6.4–8.2)
RBC # BLD AUTO: 2.34 X10*6/UL (ref 4.5–5.9)
SODIUM SERPL-SCNC: 141 MMOL/L (ref 136–145)
UNIT ABO: NORMAL
UNIT NUMBER: NORMAL
UNIT RH: NORMAL
UNIT VOLUME: 350
WBC # BLD AUTO: 12.9 X10*3/UL (ref 4.4–11.3)
XM INTEP: NORMAL

## 2025-01-22 PROCEDURE — 2500000002 HC RX 250 W HCPCS SELF ADMINISTERED DRUGS (ALT 637 FOR MEDICARE OP, ALT 636 FOR OP/ED): Performed by: INTERNAL MEDICINE

## 2025-01-22 PROCEDURE — 2500000001 HC RX 250 WO HCPCS SELF ADMINISTERED DRUGS (ALT 637 FOR MEDICARE OP): Performed by: NURSE PRACTITIONER

## 2025-01-22 PROCEDURE — 2500000002 HC RX 250 W HCPCS SELF ADMINISTERED DRUGS (ALT 637 FOR MEDICARE OP, ALT 636 FOR OP/ED): Performed by: STUDENT IN AN ORGANIZED HEALTH CARE EDUCATION/TRAINING PROGRAM

## 2025-01-22 PROCEDURE — 99232 SBSQ HOSP IP/OBS MODERATE 35: CPT | Performed by: NURSE PRACTITIONER

## 2025-01-22 PROCEDURE — 1200000002 HC GENERAL ROOM WITH TELEMETRY DAILY

## 2025-01-22 PROCEDURE — 99232 SBSQ HOSP IP/OBS MODERATE 35: CPT | Performed by: INTERNAL MEDICINE

## 2025-01-22 PROCEDURE — 2500000001 HC RX 250 WO HCPCS SELF ADMINISTERED DRUGS (ALT 637 FOR MEDICARE OP): Performed by: STUDENT IN AN ORGANIZED HEALTH CARE EDUCATION/TRAINING PROGRAM

## 2025-01-22 PROCEDURE — 80053 COMPREHEN METABOLIC PANEL: CPT | Performed by: STUDENT IN AN ORGANIZED HEALTH CARE EDUCATION/TRAINING PROGRAM

## 2025-01-22 PROCEDURE — P9016 RBC LEUKOCYTES REDUCED: HCPCS

## 2025-01-22 PROCEDURE — 85027 COMPLETE CBC AUTOMATED: CPT | Performed by: STUDENT IN AN ORGANIZED HEALTH CARE EDUCATION/TRAINING PROGRAM

## 2025-01-22 PROCEDURE — 94640 AIRWAY INHALATION TREATMENT: CPT

## 2025-01-22 PROCEDURE — 84100 ASSAY OF PHOSPHORUS: CPT | Performed by: STUDENT IN AN ORGANIZED HEALTH CARE EDUCATION/TRAINING PROGRAM

## 2025-01-22 PROCEDURE — 2500000004 HC RX 250 GENERAL PHARMACY W/ HCPCS (ALT 636 FOR OP/ED): Mod: JZ | Performed by: STUDENT IN AN ORGANIZED HEALTH CARE EDUCATION/TRAINING PROGRAM

## 2025-01-22 PROCEDURE — 99233 SBSQ HOSP IP/OBS HIGH 50: CPT | Performed by: INTERNAL MEDICINE

## 2025-01-22 PROCEDURE — 2500000005 HC RX 250 GENERAL PHARMACY W/O HCPCS: Performed by: STUDENT IN AN ORGANIZED HEALTH CARE EDUCATION/TRAINING PROGRAM

## 2025-01-22 PROCEDURE — 2500000004 HC RX 250 GENERAL PHARMACY W/ HCPCS (ALT 636 FOR OP/ED): Performed by: NURSE PRACTITIONER

## 2025-01-22 PROCEDURE — 36430 TRANSFUSION BLD/BLD COMPNT: CPT

## 2025-01-22 PROCEDURE — 83735 ASSAY OF MAGNESIUM: CPT | Performed by: STUDENT IN AN ORGANIZED HEALTH CARE EDUCATION/TRAINING PROGRAM

## 2025-01-22 RX ADMIN — CEFAZOLIN SODIUM 2 G: 2 INJECTION, SOLUTION INTRAVENOUS at 21:21

## 2025-01-22 RX ADMIN — METOPROLOL TARTRATE 50 MG: 50 TABLET, FILM COATED ORAL at 08:47

## 2025-01-22 RX ADMIN — POTASSIUM CHLORIDE 20 MEQ: 1500 TABLET, EXTENDED RELEASE ORAL at 21:21

## 2025-01-22 RX ADMIN — Medication 5 MG: at 21:20

## 2025-01-22 RX ADMIN — SUCRALFATE 1 G: 1 TABLET ORAL at 12:17

## 2025-01-22 RX ADMIN — EZETIMIBE 10 MG: 10 TABLET ORAL at 08:47

## 2025-01-22 RX ADMIN — METOPROLOL TARTRATE 50 MG: 50 TABLET, FILM COATED ORAL at 21:20

## 2025-01-22 RX ADMIN — FLUTICASONE PROPIONATE 2 SPRAY: 50 SPRAY, METERED NASAL at 08:48

## 2025-01-22 RX ADMIN — CEFAZOLIN SODIUM 2 G: 2 INJECTION, SOLUTION INTRAVENOUS at 05:42

## 2025-01-22 RX ADMIN — Medication 2 L/MIN: at 19:36

## 2025-01-22 RX ADMIN — Medication 3 L/MIN: at 07:05

## 2025-01-22 RX ADMIN — CEFAZOLIN SODIUM 2 G: 2 INJECTION, SOLUTION INTRAVENOUS at 14:38

## 2025-01-22 RX ADMIN — IPRATROPIUM BROMIDE AND ALBUTEROL SULFATE 3 ML: 2.5; .5 SOLUTION RESPIRATORY (INHALATION) at 07:05

## 2025-01-22 RX ADMIN — METHOCARBAMOL TABLETS 500 MG: 500 TABLET, COATED ORAL at 05:41

## 2025-01-22 RX ADMIN — SUCRALFATE 1 G: 1 TABLET ORAL at 05:41

## 2025-01-22 RX ADMIN — POTASSIUM CHLORIDE 20 MEQ: 1500 TABLET, EXTENDED RELEASE ORAL at 08:47

## 2025-01-22 RX ADMIN — PANTOPRAZOLE SODIUM 40 MG: 40 INJECTION, POWDER, FOR SOLUTION INTRAVENOUS at 21:09

## 2025-01-22 RX ADMIN — IPRATROPIUM BROMIDE AND ALBUTEROL SULFATE 3 ML: 2.5; .5 SOLUTION RESPIRATORY (INHALATION) at 19:35

## 2025-01-22 RX ADMIN — METHOCARBAMOL TABLETS 500 MG: 500 TABLET, COATED ORAL at 14:38

## 2025-01-22 RX ADMIN — PANTOPRAZOLE SODIUM 40 MG: 40 INJECTION, POWDER, FOR SOLUTION INTRAVENOUS at 08:47

## 2025-01-22 RX ADMIN — AMIODARONE HYDROCHLORIDE 100 MG: 200 TABLET ORAL at 08:47

## 2025-01-22 RX ADMIN — SUCRALFATE 1 G: 1 TABLET ORAL at 18:28

## 2025-01-22 RX ADMIN — IPRATROPIUM BROMIDE AND ALBUTEROL SULFATE 3 ML: 2.5; .5 SOLUTION RESPIRATORY (INHALATION) at 12:59

## 2025-01-22 RX ADMIN — ACETAMINOPHEN 650 MG: 325 TABLET ORAL at 21:21

## 2025-01-22 RX ADMIN — ASPIRIN 81 MG: 81 TABLET, CHEWABLE ORAL at 21:20

## 2025-01-22 RX ADMIN — METHOCARBAMOL TABLETS 500 MG: 500 TABLET, COATED ORAL at 21:21

## 2025-01-22 RX ADMIN — ACETAMINOPHEN 650 MG: 325 TABLET ORAL at 08:47

## 2025-01-22 RX ADMIN — ACETAMINOPHEN 650 MG: 325 TABLET ORAL at 14:38

## 2025-01-22 RX ADMIN — Medication 3 L/MIN: at 12:59

## 2025-01-22 RX ADMIN — MONTELUKAST SODIUM 10 MG: 10 TABLET, FILM COATED ORAL at 21:21

## 2025-01-22 RX ADMIN — ATORVASTATIN CALCIUM 40 MG: 20 TABLET, FILM COATED ORAL at 21:20

## 2025-01-22 ASSESSMENT — COGNITIVE AND FUNCTIONAL STATUS - GENERAL
WALKING IN HOSPITAL ROOM: A LOT
STANDING UP FROM CHAIR USING ARMS: A LITTLE
TURNING FROM BACK TO SIDE WHILE IN FLAT BAD: A LITTLE
DAILY ACTIVITIY SCORE: 20
DAILY ACTIVITIY SCORE: 20
MOVING FROM LYING ON BACK TO SITTING ON SIDE OF FLAT BED WITH BEDRAILS: A LITTLE
DAILY ACTIVITIY SCORE: 20
WALKING IN HOSPITAL ROOM: A LOT
MOVING TO AND FROM BED TO CHAIR: A LITTLE
DRESSING REGULAR UPPER BODY CLOTHING: A LITTLE
HELP NEEDED FOR BATHING: A LITTLE
MOBILITY SCORE: 15
TOILETING: A LITTLE
DRESSING REGULAR LOWER BODY CLOTHING: A LITTLE
MOBILITY SCORE: 15
MOBILITY SCORE: 15
TURNING FROM BACK TO SIDE WHILE IN FLAT BAD: A LITTLE
TOILETING: A LITTLE
HELP NEEDED FOR BATHING: A LITTLE
STANDING UP FROM CHAIR USING ARMS: A LITTLE
WALKING IN HOSPITAL ROOM: A LOT
CLIMB 3 TO 5 STEPS WITH RAILING: TOTAL
STANDING UP FROM CHAIR USING ARMS: A LITTLE
CLIMB 3 TO 5 STEPS WITH RAILING: TOTAL
DRESSING REGULAR UPPER BODY CLOTHING: A LITTLE
MOVING FROM LYING ON BACK TO SITTING ON SIDE OF FLAT BED WITH BEDRAILS: A LITTLE
DRESSING REGULAR LOWER BODY CLOTHING: A LITTLE
DRESSING REGULAR LOWER BODY CLOTHING: A LITTLE
MOVING TO AND FROM BED TO CHAIR: A LITTLE
TURNING FROM BACK TO SIDE WHILE IN FLAT BAD: A LITTLE
TOILETING: A LITTLE
CLIMB 3 TO 5 STEPS WITH RAILING: TOTAL
MOVING FROM LYING ON BACK TO SITTING ON SIDE OF FLAT BED WITH BEDRAILS: A LITTLE
DRESSING REGULAR UPPER BODY CLOTHING: A LITTLE
HELP NEEDED FOR BATHING: A LITTLE
MOVING TO AND FROM BED TO CHAIR: A LITTLE

## 2025-01-22 ASSESSMENT — PAIN SCALES - GENERAL: PAINLEVEL_OUTOF10: 0 - NO PAIN

## 2025-01-22 NOTE — PROGRESS NOTES
Department of Internal Medicine  Gastroenterology  Progress note      Subjective  GI is following for GIB    S/p EGD 1/20/2025 with single ulcer in the fundus with flat pigmented spot. Clip x3, injected with epi also noted ulcerated gastritis.     Patient seen and examined at bedside.  Still short of breath with minimal activity.  O2 per nasal cannula.  Vital signs stable.  Patient tolerating clear liquids.  No abdominal pain,  nausea/vomiting.  BM this am was loose greenish black..  Hgb 7.4.  Patient will receive 1 unit PRBC today.    Planning for discharge to skilled nursing facility for physical therapy, [facility].     Current Medication    Current Facility-Administered Medications:     acetaminophen (Tylenol) tablet 650 mg, 650 mg, oral, q6h, Rodriguez Sapp MD, 650 mg at 01/22/25 0847    alteplase (Cathflo Activase) injection 2 mg, 2 mg, intra-catheter, PRN, Marc Lomeli MD    amiodarone (Pacerone) tablet 100 mg, 100 mg, oral, Daily, Rodriguez Sapp MD, 100 mg at 01/22/25 0847    aspirin chewable tablet 81 mg, 81 mg, oral, Nightly, Rodriguez Sapp MD, 81 mg at 01/21/25 2017    atorvastatin (Lipitor) tablet 40 mg, 40 mg, oral, Nightly, Rodriguez Sapp MD, 40 mg at 01/21/25 2017    ceFAZolin (Ancef) 2 g in dextrose (iso)  mL, 2 g, intravenous, q8h, Rodriguez Sapp MD, Stopped at 01/22/25 0602    [Held by provider] empagliflozin (Jardiance) tablet 10 mg, 10 mg, oral, Daily, Rodriguez Sapp MD, 10 mg at 01/11/25 0900    ezetimibe (Zetia) tablet 10 mg, 10 mg, oral, Daily, Rodriguez Sapp MD, 10 mg at 01/22/25 0847    famotidine (Pepcid) tablet 20 mg, 20 mg, oral, BID PRN, Rodriguez Sapp MD    fluticasone (Flonase) nasal spray 2 spray, 2 spray, Each Nostril, Daily, Rodriguez Sapp MD, 2 spray at 01/22/25 0863    [Held by provider] tiotropium (Spiriva Respimat) 2.5 mcg/actuation inhaler 2 puff, 2 puff, inhalation, Daily, 2 puff at  01/14/25 0850 **AND** [Held by provider] fluticasone furoate-vilanteroL (Breo Ellipta) 200-25 mcg/dose inhaler 1 puff, 1 puff, inhalation, Daily, Rodriguez Sapp MD, 1 puff at 01/14/25 0850    [Held by provider] hydroCHLOROthiazide (HYDRODiuril) tablet 25 mg, 25 mg, oral, Once per day on Monday Wednesday Friday, Rodriguez Sapp MD    ipratropium-albuteroL (Duo-Neb) 0.5-2.5 mg/3 mL nebulizer solution 3 mL, 3 mL, nebulization, q4h PRN, Rodriguez Sapp MD    ipratropium-albuteroL (Duo-Neb) 0.5-2.5 mg/3 mL nebulizer solution 3 mL, 3 mL, nebulization, TID, Rodriguez Sapp MD, 3 mL at 01/22/25 0705    [Held by provider] isosorbide mononitrate ER (Imdur) 24 hr tablet 30 mg, 30 mg, oral, Daily, Rodriguez Sapp MD    lidocaine 4 % patch 1 patch, 1 patch, transdermal, Daily, Rodriguez Sapp MD, 1 patch at 01/19/25 1502    [Held by provider] lisinopril tablet 40 mg, 40 mg, oral, Daily, Rodriguez Sapp MD    melatonin tablet 5 mg, 5 mg, oral, Nightly, Rodriguez Sapp MD, 5 mg at 01/21/25 2017    methocarbamol (Robaxin) tablet 500 mg, 500 mg, oral, q8h SHABNAM, Rodriguez Sapp MD, 500 mg at 01/22/25 0541    [Held by provider] metoprolol succinate XL (Toprol-XL) 24 hr tablet 100 mg, 100 mg, oral, Nightly, Rodriguez Sapp MD    [Held by provider] metoprolol succinate XL (Toprol-XL) 24 hr tablet 50 mg, 50 mg, oral, Daily, Rodriguez Sapp MD, 50 mg at 01/11/25 0900    metoprolol tartrate (Lopressor) tablet 50 mg, 50 mg, oral, BID, Rodriguez Sapp MD, 50 mg at 01/22/25 0847    montelukast (Singulair) tablet 10 mg, 10 mg, oral, q PM, Rodriguez Sapp MD, 10 mg at 01/21/25 2017    ondansetron (Zofran) injection 4 mg, 4 mg, intravenous, q6h PRN, Rodriguez Sapp MD, 4 mg at 01/12/25 2017    oxyCODONE (Roxicodone) immediate release tablet 2.5 mg, 2.5 mg, oral, q6h PRN, Rodriguez Sapp MD, 2.5 mg at 01/13/25 1401    oxygen (O2) therapy, , inhalation,  Continuous PRN - O2/gases, Rodriguez Sapp MD, 3 L/min at 01/22/25 0705    pantoprazole (ProtoNix) injection 40 mg, 40 mg, intravenous, BID, Vicenta Payne, APRN-CNP, 40 mg at 01/22/25 0847    potassium chloride CR (Klor-Con M20) ER tablet 20 mEq, 20 mEq, oral, BID, Shahram Escalante MD, 20 mEq at 01/22/25 0847    spironolactone (Aldactone) tablet 25 mg, 25 mg, oral, q48h, Rodriguez Sapp MD, 25 mg at 01/21/25 1210    sucralfate (Carafate) tablet 1 g, 1 g, oral, q6h [facility], Jud Leal, APRN-CNP, 1 g at 01/22/25 0541    Past Medical History  Active Ambulatory Problems     Diagnosis Date Noted    Abdominal aortic aneurysm (CMS-HCC) 09/29/2023    AICD (automatic cardioverter/defibrillator) present 09/29/2023    Allergic rhinitis, seasonal 09/29/2023    CAD (coronary artery disease) 10/19/2016    Centrilobular emphysema (Multi) 09/29/2023    Essential hypertension 09/29/2023    Hyperuricemia 09/29/2023    Infarction of right basal ganglia (Multi) 09/29/2023    Ischemic cardiomyopathy 09/29/2023    Mixed hyperlipidemia 09/29/2023    Persistent atrial fibrillation (Multi) 09/29/2023    Renal artery stenosis (CMS-HCC) 09/29/2023    Tinea cruris 09/29/2023    Anticoagulant long-term use 09/29/2023    Chronic diastolic congestive heart failure, NYHA class 2 09/29/2023    High risk medication use 09/29/2023    Tremor 09/29/2023    Prostate cancer (Multi) 09/29/2023    Blepharitis of both eyes 01/28/2015    Epiretinal membrane (ERM) of right eye 03/15/2016    Drusen (degenerative) of retina 01/28/2015    Dermatochalasis of right upper eyelid 08/17/2015    Dermatochalasis of left upper eyelid 08/17/2015    Degenerative retinal drusen of both eyes 08/17/2015    Choroidal nevus, right eye 01/28/2015    Lumbosacral spondylosis without myelopathy 07/23/2015    Floaters 08/17/2015    Spinal stenosis, lumbar region with neurogenic claudication 07/17/2018    Pseudophakia of both eyes 01/14/2016    Marko  "01/28/2015    Obesity, morbid (Multi) 10/19/2016    Vitreomacular traction syndrome of right eye 01/14/2016    COPD exacerbation (Multi) 10/19/2016    MGD (meibomian gland dysfunction) 01/28/2015    CVA (cerebral vascular accident) (Multi) 10/09/2023    Primary osteoarthritis of right knee 11/13/2023    Never smoked cigarettes 02/01/2024    Pulmonary hypertension (Multi) 02/02/2024    Hidradenitis suppurativa of anus 11/25/2024     Resolved Ambulatory Problems     Diagnosis Date Noted    NSVT (nonsustained ventricular tachycardia) (Multi) 09/29/2023    Paroxysmal ventricular tachycardia (Multi) 09/29/2023    Rosacea 09/29/2023    Shortness of breath 09/29/2023    Acute pain of both hips 09/29/2023    Acute pain of right knee 09/29/2023    Acute sinusitis 09/29/2023    Anemia 10/19/2016    Hypokalemia 10/19/2016    Chest pain 10/30/2016    Diastolic congestive heart failure, NYHA class 2 10/09/2023    BMI 36.0-36.9,adult 10/09/2023    Cutaneous candidiasis 10/09/2023    VT (ventricular tachycardia) (Multi) 03/13/2024     Past Medical History:   Diagnosis Date    Allergic     Arthritis     Cancer (Multi)     Cataract     CHF (congestive heart failure)     COPD (chronic obstructive pulmonary disease) (Multi)     Encounter for follow-up examination after completed treatment for conditions other than malignant neoplasm 12/27/2021    Heart disease     Hypertension     Myocardial infarction (Multi)     Pain in unspecified hip     Personal history of other diseases of the musculoskeletal system and connective tissue     Personal history of other endocrine, nutritional and metabolic disease     Personal history of other specified conditions 12/21/2021    Presence of coronary angioplasty implant and graft     Unspecified atrial flutter (Multi)        PHYSICAL EXAM  VS: /58   Pulse 60   Temp 36.2 °C (97.2 °F)   Resp 18   Ht 1.676 m (5' 6\")   Wt 106 kg (232 lb 14.4 oz)   SpO2 94%   BMI 37.59 kg/m²  Body mass index " is 37.59 kg/m².    Physical Exam  No acute distress, alert and oriented, mild labored breathing, abdomen soft, nontender.     DATA  Recent blood work and relevant radiology and endoscopic studies were reviewed and discussed with the patient   Results from last 7 days   Lab Units 01/22/25  0542   WBC AUTO x10*3/uL 12.9*   RBC AUTO x10*6/uL 2.34*   HEMOGLOBIN g/dL 7.4*   HEMATOCRIT % 23.1*   MCV fL 99   MCHC g/dL 32.0   RDW % 17.7*   PLATELETS AUTO x10*3/uL 152       Results from last 72 hours   Lab Units 01/22/25  0542   SODIUM mmol/L 141   POTASSIUM mmol/L 4.1   CHLORIDE mmol/L 114*   CO2 mmol/L 23   BUN mg/dL 35*   CREATININE mg/dL 0.84   CALCIUM mg/dL 7.7*   PROTEIN TOTAL g/dL 4.9*   BILIRUBIN TOTAL mg/dL 1.0   ALK PHOS U/L 48   AST U/L 18   ALT U/L 7*       ENDOSCOPIC REVIEW  EGD 1/20/2025:  Impression  The cricopharynx, upper third of the esophagus, middle third of the esophagus, lower third of the esophagus and GE junction appeared normal.  Single ulcer in the fundus of the stomach with flat pigmented spot (Shahbaz IIC); placed 3 clips successfully; injected epinephrine to address bleeding; hemostasis achieved  Severe edematous, erythematous, ulcerated mucosa with erosion in the stomach, consistent with gastritis; performed cold forceps biopsy to rule out H. pylori  The duodenal bulb and 2nd part of the duodenum appeared normal.      IMPRESSION/RECOMMENDATIONS  Guicho Jones is a 84 y.o. male with a PMH of   ICM s/p ICD, MI, diastolic heart failure, CAD s/p PCI, COPD (centrilobular emphysema), pulmonary hypertension, HTN, HLD, persistent Afib on ATC with xarelto, CVA, infrarenal AAA, obesity, and prostate cancer who presented to [facility] emergency department 1/10/2025 by EMS for fall.  Admitted to critical care with septic shock 2/2 MSSA pneumonia/bacteremia with acute on chronic hypoxic respiratory failure and ISAIAS.  Transition to regular medical floor with plans for SNF placement.  Patient had large melena x1  on 1/20/2025 while on heparin drip.  Hgb 10.5->9.1->7.5.     Patient EGD yesterday noting single 20 mm x 15 mm large deep benign-appearing ulcer in the fundus of the stomach with flat pigmented spot, 3 clips successfully placed, injected epinephrine to address bleeding, hemostasis achieved, gastritis also noted    Melena 2/2 large ulcer in the fundus of the stomach.  1 dark/green stool noted yesterday post EGD  Acute blood loss anemia - Hgb 10.5->9.1->7.5->7.8 s/p 1uprbcs yesterday  Anticoagulated heparin gtt currently on hold  Long term anticoagulation Xarelto - on hold       PLAN  -Await pathology results  -Follow up with GI in 2 weeks to review pathology results.   -Repeat EGD in 2 months, scheduled for 3/24/2025 at [facility] with Dr. Mejia   -Continue pantoprazole 40 mg p.o. twice daily at discharge  -Continue sucralfate 1 g 3 times daily x 8 weeks  -No anticoagulation for 48-72 hours post EGD done 1/20/2025  -Clear liquid diet  -Consult IR if recurrent active GIB  -Trend H&H and transfuse PRBC for Hgb <8.0  -Monitor stool color and consistency and document.  Notify GI with any recurrent melena, hematochezia.  -Avoid NSAIDs    Discussed with Dr. Castillo.  GI will continue to follow    (Electronically signed bySAJI Augustin-CNP on 1/22/2025 at 8:52 AM)

## 2025-01-22 NOTE — PROGRESS NOTES
ADMISSION DATE: 1/10/2025  HOSPITAL DAY: 12    SUBJECTIVE:  Patient was seen at bedside.  Had another black-greenish stool.  His H&H did not increment as expected after 1 units of PRBC yesterday.  Otherwise uneventful night.    OBJECTIVE:  Vitals:    01/21/25 2031 01/21/25 2300 01/22/25 0600 01/22/25 0729   BP:  114/57  123/58   BP Location:  Left arm     Patient Position:  Lying     Pulse:  60  60   Resp:  18     Temp:  36.5 °C (97.7 °F)  36.2 °C (97.2 °F)   TempSrc:  Temporal     SpO2: 93% 91%  94%   Weight:   106 kg (232 lb 14.4 oz)    Height:            Intake/Output Summary (Last 24 hours) at 1/22/2025 1033  Last data filed at 1/22/2025 0846  Gross per 24 hour   Intake 850 ml   Output 1200 ml   Net -350 ml      Wt Readings from Last 10 Encounters:   01/22/25 106 kg (232 lb 14.4 oz)   11/25/24 105 kg (231 lb)   11/04/24 103 kg (226 lb 12.8 oz)   08/21/24 101 kg (222 lb 6.4 oz)   08/19/24 101 kg (222 lb 12.8 oz)   06/05/24 101 kg (223 lb 6.4 oz)   05/02/24 102 kg (225 lb)   04/24/24 101 kg (223 lb)   04/01/24 101 kg (223 lb 9.6 oz)   03/13/24 102 kg (224 lb)       PHYSICAL EXAM:  Gen: Alert, awake, Oriented X 3. Not in any acute distress   HEENT:  Atraumatic, PERRL.  Conjunctivae clear.   Moist nasal mucous membranes. oropharynx non erythematous,   Neck:  Supple without thyromegaly or lymphadenopathy.  Lungs:  Clear to auscultation without rales, rhonchi, or rub.  Heart:  RRR, S1, S2, without M.  Abdomen:  Soft, non tender, no organ enlargement, bruit. Bowel sounds present . No CVA tenderness.  Extremities:  No edema. No calf swelling or tenderness.    Skin:  No rash, ecchymosis or erythema.    CURRENT ACTIVE MEDS:  acetaminophen, 650 mg, oral, q6h  amiodarone, 100 mg, oral, Daily  aspirin, 81 mg, oral, Nightly  atorvastatin, 40 mg, oral, Nightly  ceFAZolin, 2 g, intravenous, q8h  [Held by provider] empagliflozin, 10 mg, oral, Daily  ezetimibe, 10 mg, oral, Daily  fluticasone, 2 spray, Each Nostril,  Daily  tiotropium, 2 puff, inhalation, Daily   And  fluticasone furoate-vilanteroL, 1 puff, inhalation, Daily  [Held by provider] hydroCHLOROthiazide, 25 mg, oral, Once per day on Monday Wednesday Friday  ipratropium-albuteroL, 3 mL, nebulization, TID  [Held by provider] isosorbide mononitrate ER, 30 mg, oral, Daily  lidocaine, 1 patch, transdermal, Daily  [Held by provider] lisinopril, 40 mg, oral, Daily  melatonin, 5 mg, oral, Nightly  methocarbamol, 500 mg, oral, q8h SHABNAM  [Held by provider] metoprolol succinate XL, 100 mg, oral, Nightly  [Held by provider] metoprolol succinate XL, 50 mg, oral, Daily  metoprolol tartrate, 50 mg, oral, BID  montelukast, 10 mg, oral, q PM  pantoprazole, 40 mg, intravenous, BID  potassium chloride CR, 20 mEq, oral, BID  spironolactone, 25 mg, oral, q48h  sucralfate, 1 g, oral, q6h SHABNAM      LAB RESULTS:   CBC:   Results from last 7 days   Lab Units 01/22/25  0542 01/21/25  0733 01/20/25  0621   WBC AUTO x10*3/uL 12.9* 16.9* 19.2*   RBC AUTO x10*6/uL 2.34* 2.48* 2.37*   HEMOGLOBIN g/dL 7.4* 7.8* 7.5*   HEMATOCRIT % 23.1* 24.1* 23.5*   MCV fL 99 97 99   MCH pg 31.6 31.5 31.6   MCHC g/dL 32.0 32.4 31.9*   RDW % 17.7* 18.0* 16.4*   PLATELETS AUTO x10*3/uL 152 183 238     CMP:    Results from last 7 days   Lab Units 01/22/25  0542 01/21/25  0733 01/20/25  0621   SODIUM mmol/L 141 142 142   POTASSIUM mmol/L 4.1 3.9 3.3*   CHLORIDE mmol/L 114* 115* 112*   CO2 mmol/L 23 23 25   BUN mg/dL 35* 56* 77*   CREATININE mg/dL 0.84 0.94 1.04   GLUCOSE mg/dL 93 93 115*   PROTEIN TOTAL g/dL 4.9* 4.8* 4.9*   CALCIUM mg/dL 7.7* 7.8* 7.9*   BILIRUBIN TOTAL mg/dL 1.0 0.6 0.5   ALK PHOS U/L 48 50 44   AST U/L 18 22 19   ALT U/L 7* 9* 8*     BMP:    Results from last 7 days   Lab Units 01/22/25  0542 01/21/25  0733 01/20/25  0621   SODIUM mmol/L 141 142 142   POTASSIUM mmol/L 4.1 3.9 3.3*   CHLORIDE mmol/L 114* 115* 112*   CO2 mmol/L 23 23 25   BUN mg/dL 35* 56* 77*   CREATININE mg/dL 0.84 0.94 1.04   CALCIUM  mg/dL 7.7* 7.8* 7.9*   GLUCOSE mg/dL 93 93 115*     Magnesium:  Results from last 7 days   Lab Units 01/22/25  0542 01/21/25  0733 01/20/25  0621   MAGNESIUM mg/dL 1.52* 1.56* 1.68          Lab Results   Component Value Date    LDLCALC 39 06/10/2024     Lab Results   Component Value Date    LDLCALC 39 06/10/2024    CREATININE 0.84 01/22/2025       Lab Results   Component Value Date    TSH 1.15 03/13/2024      Lab Results   Component Value Date    INR 1.7 (H) 01/10/2025    INR 1.9 (H) 03/09/2024    PROTIME 19.7 (H) 01/10/2025    PROTIME 21.4 (H) 03/09/2024       CULTURES & SUSCEPTIBILITIES:   Susceptibility data from last 90 days.  Collected Specimen Info Organism Clindamycin Erythromycin Oxacillin Tetracycline Trimethoprim/Sulfamethoxazole Vancomycin   01/12/25 Blood culture from Peripheral Venipuncture Staphylococcus aureus         01/12/25 Blood culture from Peripheral Venipuncture Staphylococcus aureus         01/10/25 Blood culture from Peripheral Venipuncture Staphylococcus aureus         01/10/25 Blood culture from Peripheral Venipuncture Methicillin Susceptible Staphylococcus aureus (MSSA)  S  S  S  S  S  S       IMAGING STUDIES:  I have reviewed following imaging studies.  I agree with the impression and incorporated those results into my MDM     === 01/10/25 ===  XR CHEST 1 VIEW  1.  Increased prominence of the markings particularly right perihilar  markings may at least in part be related to difference in patient  position.    === 01/10/25 ===  CT CHEST ABDOMEN PELVIS WO CONTRAST  1. Background of moderate-to-severe pulmonary emphysema. There are  multifocal consolidations throughout the lungs which are suspicious  for multifocal pneumonia. Additionally, in the medial left upper  lobe, one of the consolidations demonstrates a subtle air-fluid level  which may represent a developing abscess, necrotizing pneumonia, or  superinfection of a bulla. Recommend posttreatment chest CT in 3  months to ensure  resolution.  2. Mild cardiomegaly.  3. Dilated main pulmonary artery which can be seen with pulmonary  hypertension.  Abdomen/pelvis findings:  1. No new collections.  2. Similar mild fluid distention of distal small bowel loops and  colonic bowel loops which can be seen with mild enterocolitis. No  significant bowel wall thickening.  3. Redemonstration of the bilobed abdominal aortic aneurysm measuring  up to 6 cm. As before, recommend vascular surgery consultation for  management guidance.    === 01/10/25 ===  US RENAL COMPLETE  No change from CT 10 January 2025  No hydronephrosis on either side    LAST EKG  Encounter Date: 01/10/25   ECG 12 lead   Result Value    Ventricular Rate 60    Atrial Rate 60    QRS Duration 122    QT Interval 572    QTC Calculation(Bazett) 572    R Axis 28    T Axis 110    QRS Count 10    Q Onset 218    T Offset 504    QTC Fredericia 572     PROBLEMS ON ADMISSION:  Hypokalemia [E87.6]  Renal insufficiency [N28.9]  Elevated troponin [R79.89]  Fall, initial encounter [W19.XXXA]  Hypotension, unspecified hypotension type [I95.9]  Anemia, unspecified type [D64.9]  Diarrhea, unspecified type [R19.7]  Medication induced coagulopathy (Multi) [D68.9, T50.905A]    HOSPITAL PROBLEM LIST     Abdominal aortic aneurysm (CMS-HCC)    CAD (coronary artery disease)    Essential hypertension    Ischemic cardiomyopathy    Mixed hyperlipidemia    Persistent atrial fibrillation (Multi)    Chronic diastolic congestive heart failure, NYHA class 2    High risk medication use    Prostate cancer (Multi)    ASSESSMENT AND PLAN FOR 1/22/2025  Patient has MSSA bacteremia for which he is getting IV cefazolin.  He got a PICC.  He was about to be discharged but then patient had low H&H with black tarry stool.  He was on heparin drip at that time because of his atrial fibrillation.  Heparin drip was stopped.  We did not start Xarelto.  Patient was taken to the endoscopy and found to have large stomach ulcer which was  clipped.  Meanwhile patient did receive 1 units of PRBC but his H&H did not increment as expected.  This morning patient had another black stool during bowel movement.  I will transfuse him another units of PRBC.  We had a discussion with nurse practitioner of GI service; patient is appropriately on IV Protonix and GI may take him down again for repeat endoscopy.  I will continue the rest of the medical therapy as before.  Patient is on aspirin.  No DVT prophylaxis.  Xarelto is on hold.        P.S: This note was completed using Dragon voice recognition technology and may include unintended errors with respect to translation of words, typographical errors or grammar errors which may not have been identified while finalizing the chart.

## 2025-01-22 NOTE — PROGRESS NOTES
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  Underwent emergent EGD today with transfusion.     Care transition notes reviewed.  Patient pre-CERT also went through but due to unforeseen circumstance of GI bleed, no current plans for discharge     Patient is weak in general but no complaints.  Seen with nurse at bedside.  Minimal abdominal cramping at this time.  Gastric ulcer found during EGD    Seen with nurse, patient has been resting for the most part  WBCs trending down.  No family at bedside    Lab Results   Component Value Date    WBC 16.9 (H) 01/21/2025    HGB 7.8 (L) 01/21/2025    HCT 24.1 (L) 01/21/2025    MCV 97 01/21/2025     01/21/2025     Lab Results   Component Value Date    GLUCOSE 93 01/21/2025    CALCIUM 7.8 (L) 01/21/2025     01/21/2025    K 3.9 01/21/2025    CO2 23 01/21/2025     (H) 01/21/2025    BUN 56 (H) 01/21/2025    CREATININE 0.94 01/21/2025       WBC trends are being monitored. Antibiotic doses are being adjusted per most recent renal labs.     Vitals:    01/21/25 2300   BP: 114/57   Pulse: 60   Resp: 18   Temp: 36.5 °C (97.7 °F)   SpO2: 91%           Patient Active Problem List   Diagnosis    Abdominal aortic aneurysm (CMS-HCC)    AICD (automatic cardioverter/defibrillator) present    Allergic rhinitis, seasonal    CAD (coronary artery disease)    Centrilobular emphysema (Multi)    Essential hypertension    Hyperuricemia    Infarction of right basal ganglia (Multi)    Ischemic cardiomyopathy    Mixed hyperlipidemia    Persistent atrial fibrillation (Multi)    Renal artery stenosis (CMS-HCC)    Tinea cruris    Anticoagulant long-term use    Chronic diastolic congestive heart failure, NYHA class 2    High risk medication use    Tremor    Prostate cancer (Multi)    Blepharitis of both eyes     Epiretinal membrane (ERM) of right eye    Drusen (degenerative) of retina    Dermatochalasis of right upper eyelid    Dermatochalasis of left upper eyelid    Degenerative retinal drusen of both eyes    Choroidal nevus, right eye    Lumbosacral spondylosis without myelopathy    Floaters    Spinal stenosis, lumbar region with neurogenic claudication    Pseudophakia of both eyes    Pinguecula    Obesity, morbid (Multi)    Vitreomacular traction syndrome of right eye    COPD exacerbation (Multi)    MGD (meibomian gland dysfunction)    CVA (cerebral vascular accident) (Multi)    Primary osteoarthritis of right knee    Never smoked cigarettes    Pulmonary hypertension (Multi)    Hidradenitis suppurativa of anus    Diarrhea, unspecified type     Neck supple  Heart S1S2  Chest: Equal expansion  Abdomen: soft, ND  Extrem: no pain to palpation  Skin: no rashes, no diaphoresis  Neuro: CNS intact      ASSESSMENT:  New GI bleed, status post emergent EGD today with acute blood loss anemia  MSSA bacteremia with possible endocarditis.  Patient has AICD.  Blood cultures on 1/10/2025 were positive for Staphylococcus aureus.  PAOLO reported to be negative.     PLAN:  Plan is still same from ID perspective    Patient being treated for probable endocarditis with 6 weeks IV cefazolin 2 g IV every 8 hours from an infectious disease standpoint    Patient will need weekly CBC BMP CRP after discharge to be sent to the infectious disease clinic  Follow-up in 3 weeks with infectious disease  Follow-up within 1 to 2 weeks with primary care physician  PICC line will need to be pulled at the end of therapy    Case discussed with primary    Imaging and labs were reviewed per medical records and any ID pertinent labs were also addressed  Time spent before, during and after care today, including coordination of care >40 min      Beth Clark DO

## 2025-01-22 NOTE — PROGRESS NOTES
Physical Therapy                 Therapy Communication Note    Patient Name: Guicho Jones  MRN: [id number]  Department: [facility]  Room: 1004/1004-B  Today's Date: 1/22/2025 9:55 a    Discipline: Physical Therapy          Comment:  Attempted treatment at 9:00, pt was sitting EOB eating breakfast, he politely asked PT to come back after he was done eating. Checked back in at 9:55, pt just got back in bed after using BSC and asked for time to rest before PT. Will follow up as appropriate.

## 2025-01-22 NOTE — CARE PLAN
Problem: Fall/Injury  Goal: Not fall by end of shift  Outcome: Progressing  Goal: Be free from injury by end of the shift  Outcome: Progressing  Goal: Verbalize understanding of personal risk factors for fall in the hospital  Outcome: Progressing  Goal: Verbalize understanding of risk factor reduction measures to prevent injury from fall in the home  Outcome: Progressing  Goal: Use assistive devices by end of the shift  Outcome: Progressing  Goal: Pace activities to prevent fatigue by end of the shift  Outcome: Progressing     Problem: Pain - Adult  Goal: Verbalizes/displays adequate comfort level or baseline comfort level  Outcome: Progressing     Problem: Discharge Planning  Goal: Discharge to home or other facility with appropriate resources  Outcome: Progressing     Problem: Chronic Conditions and Co-morbidities  Goal: Patient's chronic conditions and co-morbidity symptoms are monitored and maintained or improved  Outcome: Progressing     Problem: Skin  Goal: Participates in plan/prevention/treatment measures  Outcome: Progressing  Goal: Prevent/manage excess moisture  Outcome: Progressing  Goal: Prevent/minimize sheer/friction injuries  Outcome: Progressing  Goal: Promote/optimize nutrition  Outcome: Progressing  Goal: Promote skin healing  Outcome: Progressing     Problem: Heart Failure  Goal: Improved gas exchange this shift  Outcome: Progressing  Goal: Improved urinary output this shift  Outcome: Progressing  Goal: Reduction in peripheral edema within 24 hours  Outcome: Progressing  Goal: Report improvement of dyspnea/breathlessness this shift  Outcome: Progressing  Goal: Weight from fluid excess reduced over 2-3 days, then stabilize  Outcome: Progressing  Goal: Increase self care and/or family involvement in 24 hours  Outcome: Progressing     Problem: Nutrition  Goal: Oral intake greater than 50%  Outcome: Progressing  Goal: Consume prescribed supplement  Outcome: Progressing  Goal: Adequate PO fluid  intake  Outcome: Progressing  Goal: Lab values WNL  Outcome: Progressing  Goal: Electrolytes WNL  Outcome: Progressing  Goal: Maintain stable weight  Outcome: Progressing     Problem: Risk for Decreased Cardiac Output  Goal: Patient will maintain vital signs within normal limits, adequate urine output, and adequate tissue perfusion.  Patient will maintain an asymptomatic cardiac rhythm without signs and symptoms of decreased cardiac output.  Outcome: Progressing   The patient's goals for the shift include rest    The clinical goals for the shift include Patient will remain free from injury throughout shift

## 2025-01-22 NOTE — PROGRESS NOTES
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  Underwent emergent EGD today with transfusion.     Care transition notes reviewed.  Patient pre-CERT also went through but due to unforeseen circumstance of GI bleed, no current plans for discharge     Patient is weak in general but no complaints.  Seen with nurse at bedside.  Minimal abdominal cramping at this time.  Gastric ulcer found during EGD    Seen with nurse, patient has been resting for the most part  WBCs trending down.  No family at bedside    Lab Results   Component Value Date    WBC 12.9 (H) 01/22/2025    HGB 7.4 (L) 01/22/2025    HCT 23.1 (L) 01/22/2025    MCV 99 01/22/2025     01/22/2025     Lab Results   Component Value Date    GLUCOSE 93 01/22/2025    CALCIUM 7.7 (L) 01/22/2025     01/22/2025    K 4.1 01/22/2025    CO2 23 01/22/2025     (H) 01/22/2025    BUN 35 (H) 01/22/2025    CREATININE 0.84 01/22/2025       WBC trends are being monitored. Antibiotic doses are being adjusted per most recent renal labs.     Vitals:    01/22/25 1607   BP: 113/56   Pulse:    Resp: 18   Temp: 36.5 °C (97.7 °F)   SpO2: 95%           Patient Active Problem List   Diagnosis    Abdominal aortic aneurysm (CMS-HCC)    AICD (automatic cardioverter/defibrillator) present    Allergic rhinitis, seasonal    CAD (coronary artery disease)    Centrilobular emphysema (Multi)    Essential hypertension    Hyperuricemia    Infarction of right basal ganglia (Multi)    Ischemic cardiomyopathy    Mixed hyperlipidemia    Persistent atrial fibrillation (Multi)    Renal artery stenosis (CMS-HCC)    Tinea cruris    Anticoagulant long-term use    Chronic diastolic congestive heart failure, NYHA class 2    High risk medication use    Tremor    Prostate cancer (Multi)    Blepharitis of both eyes     Epiretinal membrane (ERM) of right eye    Drusen (degenerative) of retina    Dermatochalasis of right upper eyelid    Dermatochalasis of left upper eyelid    Degenerative retinal drusen of both eyes    Choroidal nevus, right eye    Lumbosacral spondylosis without myelopathy    Floaters    Spinal stenosis, lumbar region with neurogenic claudication    Pseudophakia of both eyes    Pinguecula    Obesity, morbid (Multi)    Vitreomacular traction syndrome of right eye    COPD exacerbation (Multi)    MGD (meibomian gland dysfunction)    CVA (cerebral vascular accident) (Multi)    Primary osteoarthritis of right knee    Never smoked cigarettes    Pulmonary hypertension (Multi)    Hidradenitis suppurativa of anus    Diarrhea, unspecified type     Neck supple  Heart S1S2  Chest: Equal expansion  Abdomen: soft, ND  Extrem: no pain to palpation  Skin: no rashes, no diaphoresis  Neuro: CNS intact      ASSESSMENT:  New GI bleed, status post emergent EGD today with acute blood loss anemia  MSSA bacteremia with possible endocarditis.  Patient has AICD.  Blood cultures on 1/10/2025 were positive for Staphylococcus aureus.  PAOLO reported to be negative.     PLAN:  Plan is still same from ID perspective    Patient being treated for probable endocarditis with 6 weeks IV cefazolin 2 g IV every 8 hours from an infectious disease standpoint    Patient will need weekly CBC BMP CRP after discharge to be sent to the infectious disease clinic  Follow-up in 3 weeks with infectious disease  Follow-up within 1 to 2 weeks with primary care physician  PICC line will need to be pulled at the end of therapy    Case discussed with primary    Imaging and labs were reviewed per medical records and any ID pertinent labs were also addressed  Time spent before, during and after care today, including coordination of care >40 min      Beth Clark DO

## 2025-01-23 PROBLEM — R78.81 BACTEREMIA: Status: ACTIVE | Noted: 2025-01-23

## 2025-01-23 LAB
ALBUMIN SERPL BCP-MCNC: 2.5 G/DL (ref 3.4–5)
ALP SERPL-CCNC: 47 U/L (ref 33–136)
ALT SERPL W P-5'-P-CCNC: 5 U/L (ref 10–52)
ANION GAP SERPL CALC-SCNC: 9 MMOL/L (ref 10–20)
AST SERPL W P-5'-P-CCNC: 17 U/L (ref 9–39)
BASOPHILS # BLD AUTO: 0.02 X10*3/UL (ref 0–0.1)
BASOPHILS NFR BLD AUTO: 0.2 %
BILIRUB SERPL-MCNC: 0.9 MG/DL (ref 0–1.2)
BUN SERPL-MCNC: 22 MG/DL (ref 6–23)
CALCIUM SERPL-MCNC: 7.6 MG/DL (ref 8.6–10.3)
CHLORIDE SERPL-SCNC: 112 MMOL/L (ref 98–107)
CO2 SERPL-SCNC: 22 MMOL/L (ref 21–32)
CREAT SERPL-MCNC: 0.74 MG/DL (ref 0.5–1.3)
EGFRCR SERPLBLD CKD-EPI 2021: 89 ML/MIN/1.73M*2
EOSINOPHIL # BLD AUTO: 0.09 X10*3/UL (ref 0–0.4)
EOSINOPHIL NFR BLD AUTO: 0.8 %
ERYTHROCYTE [DISTWIDTH] IN BLOOD BY AUTOMATED COUNT: 18 % (ref 11.5–14.5)
FERRITIN SERPL-MCNC: 501 NG/ML (ref 20–300)
GLUCOSE SERPL-MCNC: 85 MG/DL (ref 74–99)
HCT VFR BLD AUTO: 24.6 % (ref 41–52)
HGB BLD-MCNC: 7.9 G/DL (ref 13.5–17.5)
HOLD SPECIMEN: NORMAL
IMM GRANULOCYTES # BLD AUTO: 0.44 X10*3/UL (ref 0–0.5)
IMM GRANULOCYTES NFR BLD AUTO: 3.9 % (ref 0–0.9)
IRON SATN MFR SERPL: 15 % (ref 25–45)
IRON SERPL-MCNC: 32 UG/DL (ref 35–150)
LYMPHOCYTES # BLD AUTO: 1.08 X10*3/UL (ref 0.8–3)
LYMPHOCYTES NFR BLD AUTO: 9.5 %
MAGNESIUM SERPL-MCNC: 1.41 MG/DL (ref 1.6–2.4)
MCH RBC QN AUTO: 31.7 PG (ref 26–34)
MCHC RBC AUTO-ENTMCNC: 32.1 G/DL (ref 32–36)
MCV RBC AUTO: 99 FL (ref 80–100)
MONOCYTES # BLD AUTO: 0.89 X10*3/UL (ref 0.05–0.8)
MONOCYTES NFR BLD AUTO: 7.8 %
NEUTROPHILS # BLD AUTO: 8.83 X10*3/UL (ref 1.6–5.5)
NEUTROPHILS NFR BLD AUTO: 77.8 %
NRBC BLD-RTO: 0 /100 WBCS (ref 0–0)
PHOSPHATE SERPL-MCNC: 2.9 MG/DL (ref 2.5–4.9)
PLATELET # BLD AUTO: 139 X10*3/UL (ref 150–450)
POTASSIUM SERPL-SCNC: 4.1 MMOL/L (ref 3.5–5.3)
PROT SERPL-MCNC: 5.1 G/DL (ref 6.4–8.2)
RBC # BLD AUTO: 2.49 X10*6/UL (ref 4.5–5.9)
SODIUM SERPL-SCNC: 139 MMOL/L (ref 136–145)
TIBC SERPL-MCNC: 213 UG/DL (ref 240–445)
UIBC SERPL-MCNC: 181 UG/DL (ref 110–370)
WBC # BLD AUTO: 11.4 X10*3/UL (ref 4.4–11.3)

## 2025-01-23 PROCEDURE — 2500000004 HC RX 250 GENERAL PHARMACY W/ HCPCS (ALT 636 FOR OP/ED): Mod: JZ | Performed by: STUDENT IN AN ORGANIZED HEALTH CARE EDUCATION/TRAINING PROGRAM

## 2025-01-23 PROCEDURE — 2500000002 HC RX 250 W HCPCS SELF ADMINISTERED DRUGS (ALT 637 FOR MEDICARE OP, ALT 636 FOR OP/ED): Performed by: INTERNAL MEDICINE

## 2025-01-23 PROCEDURE — 2500000001 HC RX 250 WO HCPCS SELF ADMINISTERED DRUGS (ALT 637 FOR MEDICARE OP): Performed by: NURSE PRACTITIONER

## 2025-01-23 PROCEDURE — 83540 ASSAY OF IRON: CPT | Performed by: NURSE PRACTITIONER

## 2025-01-23 PROCEDURE — 2500000001 HC RX 250 WO HCPCS SELF ADMINISTERED DRUGS (ALT 637 FOR MEDICARE OP): Performed by: STUDENT IN AN ORGANIZED HEALTH CARE EDUCATION/TRAINING PROGRAM

## 2025-01-23 PROCEDURE — 97530 THERAPEUTIC ACTIVITIES: CPT | Mod: GP,CQ

## 2025-01-23 PROCEDURE — 99233 SBSQ HOSP IP/OBS HIGH 50: CPT | Performed by: FAMILY MEDICINE

## 2025-01-23 PROCEDURE — 99232 SBSQ HOSP IP/OBS MODERATE 35: CPT | Performed by: NURSE PRACTITIONER

## 2025-01-23 PROCEDURE — 1200000002 HC GENERAL ROOM WITH TELEMETRY DAILY

## 2025-01-23 PROCEDURE — 85025 COMPLETE CBC W/AUTO DIFF WBC: CPT | Performed by: INTERNAL MEDICINE

## 2025-01-23 PROCEDURE — 84100 ASSAY OF PHOSPHORUS: CPT | Performed by: STUDENT IN AN ORGANIZED HEALTH CARE EDUCATION/TRAINING PROGRAM

## 2025-01-23 PROCEDURE — 82728 ASSAY OF FERRITIN: CPT | Performed by: NURSE PRACTITIONER

## 2025-01-23 PROCEDURE — P9016 RBC LEUKOCYTES REDUCED: HCPCS

## 2025-01-23 PROCEDURE — 2500000004 HC RX 250 GENERAL PHARMACY W/ HCPCS (ALT 636 FOR OP/ED): Performed by: NURSE PRACTITIONER

## 2025-01-23 PROCEDURE — 94640 AIRWAY INHALATION TREATMENT: CPT

## 2025-01-23 PROCEDURE — 2500000005 HC RX 250 GENERAL PHARMACY W/O HCPCS: Performed by: STUDENT IN AN ORGANIZED HEALTH CARE EDUCATION/TRAINING PROGRAM

## 2025-01-23 PROCEDURE — 83735 ASSAY OF MAGNESIUM: CPT | Performed by: STUDENT IN AN ORGANIZED HEALTH CARE EDUCATION/TRAINING PROGRAM

## 2025-01-23 PROCEDURE — 2500000002 HC RX 250 W HCPCS SELF ADMINISTERED DRUGS (ALT 637 FOR MEDICARE OP, ALT 636 FOR OP/ED): Performed by: STUDENT IN AN ORGANIZED HEALTH CARE EDUCATION/TRAINING PROGRAM

## 2025-01-23 PROCEDURE — 36430 TRANSFUSION BLD/BLD COMPNT: CPT

## 2025-01-23 PROCEDURE — 2500000001 HC RX 250 WO HCPCS SELF ADMINISTERED DRUGS (ALT 637 FOR MEDICARE OP): Performed by: FAMILY MEDICINE

## 2025-01-23 PROCEDURE — 80053 COMPREHEN METABOLIC PANEL: CPT | Performed by: STUDENT IN AN ORGANIZED HEALTH CARE EDUCATION/TRAINING PROGRAM

## 2025-01-23 PROCEDURE — 97535 SELF CARE MNGMENT TRAINING: CPT | Mod: GO

## 2025-01-23 RX ORDER — PANTOPRAZOLE SODIUM 40 MG/1
40 TABLET, DELAYED RELEASE ORAL 2 TIMES DAILY
Status: DISCONTINUED | OUTPATIENT
Start: 2025-01-23 | End: 2025-01-26 | Stop reason: HOSPADM

## 2025-01-23 RX ADMIN — CEFAZOLIN SODIUM 2 G: 2 INJECTION, SOLUTION INTRAVENOUS at 15:08

## 2025-01-23 RX ADMIN — SUCRALFATE 1 G: 1 TABLET ORAL at 23:54

## 2025-01-23 RX ADMIN — FLUTICASONE FUROATE AND VILANTEROL TRIFENATATE 1 PUFF: 200; 25 POWDER RESPIRATORY (INHALATION) at 08:00

## 2025-01-23 RX ADMIN — IPRATROPIUM BROMIDE AND ALBUTEROL SULFATE 3 ML: 2.5; .5 SOLUTION RESPIRATORY (INHALATION) at 12:52

## 2025-01-23 RX ADMIN — METOPROLOL TARTRATE 50 MG: 50 TABLET, FILM COATED ORAL at 09:05

## 2025-01-23 RX ADMIN — PANTOPRAZOLE SODIUM 40 MG: 40 TABLET, DELAYED RELEASE ORAL at 20:18

## 2025-01-23 RX ADMIN — METHOCARBAMOL TABLETS 500 MG: 500 TABLET, COATED ORAL at 05:08

## 2025-01-23 RX ADMIN — Medication 2 L/MIN: at 07:12

## 2025-01-23 RX ADMIN — ATORVASTATIN CALCIUM 40 MG: 20 TABLET, FILM COATED ORAL at 20:18

## 2025-01-23 RX ADMIN — TIOTROPIUM BROMIDE INHALATION SPRAY 2 PUFF: 3.12 SPRAY, METERED RESPIRATORY (INHALATION) at 08:00

## 2025-01-23 RX ADMIN — POTASSIUM CHLORIDE 20 MEQ: 1500 TABLET, EXTENDED RELEASE ORAL at 09:06

## 2025-01-23 RX ADMIN — SUCRALFATE 1 G: 1 TABLET ORAL at 05:08

## 2025-01-23 RX ADMIN — IPRATROPIUM BROMIDE AND ALBUTEROL SULFATE 3 ML: 2.5; .5 SOLUTION RESPIRATORY (INHALATION) at 07:12

## 2025-01-23 RX ADMIN — ACETAMINOPHEN 650 MG: 325 TABLET ORAL at 15:08

## 2025-01-23 RX ADMIN — LIDOCAINE 4% 1 PATCH: 40 PATCH TOPICAL at 15:08

## 2025-01-23 RX ADMIN — SUCRALFATE 1 G: 1 TABLET ORAL at 17:39

## 2025-01-23 RX ADMIN — CEFAZOLIN SODIUM 2 G: 2 INJECTION, SOLUTION INTRAVENOUS at 04:59

## 2025-01-23 RX ADMIN — ACETAMINOPHEN 650 MG: 325 TABLET ORAL at 22:49

## 2025-01-23 RX ADMIN — EZETIMIBE 10 MG: 10 TABLET ORAL at 09:06

## 2025-01-23 RX ADMIN — AMIODARONE HYDROCHLORIDE 100 MG: 200 TABLET ORAL at 09:06

## 2025-01-23 RX ADMIN — SUCRALFATE 1 G: 1 TABLET ORAL at 11:34

## 2025-01-23 RX ADMIN — MONTELUKAST SODIUM 10 MG: 10 TABLET, FILM COATED ORAL at 20:18

## 2025-01-23 RX ADMIN — FLUTICASONE PROPIONATE 2 SPRAY: 50 SPRAY, METERED NASAL at 09:08

## 2025-01-23 RX ADMIN — METHOCARBAMOL TABLETS 500 MG: 500 TABLET, COATED ORAL at 22:49

## 2025-01-23 RX ADMIN — CEFAZOLIN SODIUM 2 G: 2 INJECTION, SOLUTION INTRAVENOUS at 22:48

## 2025-01-23 RX ADMIN — METHOCARBAMOL TABLETS 500 MG: 500 TABLET, COATED ORAL at 15:08

## 2025-01-23 RX ADMIN — ASPIRIN 81 MG: 81 TABLET, CHEWABLE ORAL at 20:18

## 2025-01-23 RX ADMIN — PANTOPRAZOLE SODIUM 40 MG: 40 INJECTION, POWDER, FOR SOLUTION INTRAVENOUS at 09:46

## 2025-01-23 RX ADMIN — Medication 2 L/MIN: at 12:52

## 2025-01-23 RX ADMIN — POTASSIUM CHLORIDE 20 MEQ: 1500 TABLET, EXTENDED RELEASE ORAL at 20:18

## 2025-01-23 RX ADMIN — SPIRONOLACTONE 25 MG: 25 TABLET ORAL at 11:34

## 2025-01-23 RX ADMIN — IPRATROPIUM BROMIDE AND ALBUTEROL SULFATE 3 ML: 2.5; .5 SOLUTION RESPIRATORY (INHALATION) at 21:21

## 2025-01-23 RX ADMIN — METOPROLOL TARTRATE 50 MG: 50 TABLET, FILM COATED ORAL at 20:18

## 2025-01-23 RX ADMIN — Medication 5 MG: at 20:18

## 2025-01-23 RX ADMIN — ACETAMINOPHEN 650 MG: 325 TABLET ORAL at 09:06

## 2025-01-23 ASSESSMENT — COGNITIVE AND FUNCTIONAL STATUS - GENERAL
TOILETING: A LITTLE
MOBILITY SCORE: 14
MOVING FROM LYING ON BACK TO SITTING ON SIDE OF FLAT BED WITH BEDRAILS: A LITTLE
EATING MEALS: A LITTLE
MOVING TO AND FROM BED TO CHAIR: A LITTLE
HELP NEEDED FOR BATHING: A LITTLE
TURNING FROM BACK TO SIDE WHILE IN FLAT BAD: A LITTLE
DRESSING REGULAR LOWER BODY CLOTHING: A LOT
HELP NEEDED FOR BATHING: A LOT
CLIMB 3 TO 5 STEPS WITH RAILING: TOTAL
STANDING UP FROM CHAIR USING ARMS: A LOT
WALKING IN HOSPITAL ROOM: A LOT
PERSONAL GROOMING: A LITTLE
STANDING UP FROM CHAIR USING ARMS: A LITTLE
CLIMB 3 TO 5 STEPS WITH RAILING: TOTAL
MOBILITY SCORE: 15
MOVING FROM LYING ON BACK TO SITTING ON SIDE OF FLAT BED WITH BEDRAILS: A LITTLE
TURNING FROM BACK TO SIDE WHILE IN FLAT BAD: A LITTLE
WALKING IN HOSPITAL ROOM: A LOT
DRESSING REGULAR UPPER BODY CLOTHING: A LOT
TOILETING: A LOT
DAILY ACTIVITIY SCORE: 14
MOVING TO AND FROM BED TO CHAIR: A LITTLE
DRESSING REGULAR UPPER BODY CLOTHING: A LITTLE
DRESSING REGULAR LOWER BODY CLOTHING: A LITTLE
DAILY ACTIVITIY SCORE: 20

## 2025-01-23 ASSESSMENT — PAIN - FUNCTIONAL ASSESSMENT
PAIN_FUNCTIONAL_ASSESSMENT: 0-10
PAIN_FUNCTIONAL_ASSESSMENT: 0-10

## 2025-01-23 ASSESSMENT — PAIN SCALES - GENERAL
PAINLEVEL_OUTOF10: 0 - NO PAIN
PAINLEVEL_OUTOF10: 0 - NO PAIN

## 2025-01-23 ASSESSMENT — ACTIVITIES OF DAILY LIVING (ADL): HOME_MANAGEMENT_TIME_ENTRY: 13

## 2025-01-23 NOTE — PROGRESS NOTES
Occupational Therapy    OT Treatment    Patient Name: Guicho Jones  MRN: [id number]  Department: [facility]  Room: [facility]/1004-B  Today's Date: 1/23/2025  Time Calculation  Start Time: 1303  Stop Time: 1329  Time Calculation (min): 26 min        Assessment:  OT Assessment: Session focused on increasing IND with functionla mobility, transfers, and toileting tasks. Pt tolerated session fairly, reported increased fatigue during and after treatment.  Evaluation/Treatment Tolerance: Patient limited by fatigue  Medical Staff Made Aware: Yes  End of Session Communication: Bedside nurse  End of Session Patient Position: Bed, 3 rail up, Alarm off, not on at start of session  Evaluation/Treatment Tolerance: Patient limited by fatigue  Medical Staff Made Aware: Yes  Plan:  Treatment Interventions: ADL retraining, Functional transfer training, Endurance training, Patient/family training  OT Frequency: 2 times per week  OT Discharge Recommendations: Moderate intensity level of continued care  OT Recommended Transfer Status: Minimal assist, Moderate assist, Assist of 2  OT - OK to Discharge: Yes  Treatment Interventions: ADL retraining, Functional transfer training, Endurance training, Patient/family training    Subjective   Previous Visit Info:  OT Last Visit  OT Received On: 01/23/25  General:  General  Reason for Referral: ADL impairment  Referred By: PT/OT referred by Salazar 1/10/25  Past Medical History Relevant to Rehab: COPD, HTN, MI, CVA, AFib, CAD, Intrarenal AA,  OT Missed Visit: Yes  Missed Visit Reason: Patient refused (Pt sleeping on arrival, woke to OT's voice. Pt politely declining therapy at this time stating he wants to rest. Will re-attempt as able. RN notified.)  Family/Caregiver Present: No  Co-Treatment: PT  Co-Treatment Reason: co-treatment performed with PTA as pt has h/o refusal. One unit billed for OT.  Prior to Session Communication: Bedside nurse  Patient Position Received: Bed, 3 rail up, Alarm on  (seated EOB. O2, Purewick)  General Comment: Pt pleasant and agreeable to participate in therapy.  Precautions:  Medical Precautions: Fall precautions, Oxygen therapy device and L/min          Pain:  Pain Assessment  Pain Assessment: 0-10  0-10 (Numeric) Pain Score:  (did not give numerical rating)  Pain Location:  (back)    Objective    Cognition:  Cognition  Overall Cognitive Status: Within Functional Limits     Activities of Daily Living: Grooming  Grooming Comments: Pt stood at sink to wash hands with heavy UE reliance on sink, forward flexed posture with MOD Ax1. Cues to correct posture.    Toileting  Where Assessed: Toilet  Toileting Comments: Pt required MAX A to complete toileting hygiene in stance at grab bars.     Bed Mobility/Transfers: Bed Mobility  Bed Mobility: Yes (Transition from sit EOB to supine with MOD Ax2 to manage BLE in bed and lower trunk. Also required assist for repositioning in bed.)    Transfers  Transfer: Yes (Sit to stand from bed with MIN Ax2 using FWW. Sit to stand from low toilet using grab bars with MOD A.)      Functional Mobility:  Functional Mobility  Functional Mobility Performed: Yes (Pt completed functional mobility throughout room and bathroom mod household distances using FWW with MOD Ax2. Pt with foward flexed posture, heavy UE reliance on FWW. Chair follow for safety.)  Sitting Balance:  Static Sitting Balance  Static Sitting-Comment/Number of Minutes: good  Standing Balance:  Static Standing Balance  Static Standing-Comment/Number of Minutes: poor +  Dynamic Standing Balance  Dynamic Standing-Comments: poor +    Outcome Measures:[facility] Daily Activity  Putting on and taking off regular lower body clothing: A lot  Bathing (including washing, rinsing, drying): A lot  Putting on and taking off regular upper body clothing: A lot  Toileting, which includes using toilet, bedpan or urinal: A lot  Taking care of personal grooming such as brushing teeth: A little  Eating Meals: A  little  Daily Activity - Total Score: 14      Education Documentation  ADL Training, taught by Janneth Nugent, OT at 1/23/2025  3:16 PM.  Learner: Patient  Readiness: Acceptance  Method: Explanation, Demonstration  Response: Verbalizes Understanding, Demonstrated Understanding, Needs Reinforcement    IP EDUCATION:  Education  Individual(s) Educated: Patient  Education Comment: Techniques and safety during functional mobility/transfers    Goals:  Encounter Problems       Encounter Problems (Active)       OT Goals       pt will dress LB with modified indep (Progressing)       Start:  01/13/25    Expected End:  01/27/25            Pt will transfer to bed, chair, toilet with min A (Progressing)       Start:  01/13/25    Expected End:  01/27/25            Pt will attend to ADL task x 5 minutes with less than 3 vc's to redirect (Met)       Start:  01/13/25    Expected End:  01/27/25    Resolved:  01/16/25         Pt will complete grooming with SBA (Progressing)       Start:  01/13/25    Expected End:  01/27/25            Pt demo improved stand balance  >3 min for increased independence with toileting, hygiene, and clothing management tasks.  (Progressing)       Start:  01/16/25    Expected End:  01/27/25                Pt tolerate >10 min functional activity tolerance for ADL/IADL without c/o fatigue; apply ECT/WS techniques without cues.  (Progressing)       Start:  01/16/25    Expected End:  01/27/25

## 2025-01-23 NOTE — PROGRESS NOTES
ADMISSION DATE: 1/10/2025  HOSPITAL DAY: 11    SUBJECTIVE:  Patient was seen at bedside.  Uneventful night.  Patient has MSSA bacteremia with unknown source.  Currently infectious diseases treating him with IV cefazolin.  Patient might need longer antibiotic.  ISAIAS is resolving.    OBJECTIVE:  Vitals:    01/22/25 1400 01/22/25 1607 01/22/25 1935 01/22/25 1940   BP: 116/56 113/56  130/62   BP Location:    Left arm   Patient Position:    Lying   Pulse: 60 60  60   Resp: 18 18  17   Temp: 36.2 °C (97.2 °F) 36.5 °C (97.7 °F)  36.1 °C (97 °F)   TempSrc: Temporal Temporal  Temporal   SpO2:  95% 96% 98%   Weight:       Height:            Intake/Output Summary (Last 24 hours) at 1/22/2025 2043  Last data filed at 1/22/2025 1400  Gross per 24 hour   Intake 900 ml   Output --   Net 900 ml      Wt Readings from Last 10 Encounters:   01/22/25 106 kg (232 lb 14.4 oz)   11/25/24 105 kg (231 lb)   11/04/24 103 kg (226 lb 12.8 oz)   08/21/24 101 kg (222 lb 6.4 oz)   08/19/24 101 kg (222 lb 12.8 oz)   06/05/24 101 kg (223 lb 6.4 oz)   05/02/24 102 kg (225 lb)   04/24/24 101 kg (223 lb)   04/01/24 101 kg (223 lb 9.6 oz)   03/13/24 102 kg (224 lb)       PHYSICAL EXAM:  Gen: Alert, awake, Oriented X 3. Not in any acute distress   HEENT:  Atraumatic, PERRL.  Conjunctivae clear.   Moist nasal mucous membranes. oropharynx non erythematous,   Neck:  Supple without thyromegaly or lymphadenopathy.  Lungs:  Clear to auscultation without rales, rhonchi, or rub.  Heart:  RRR, S1, S2, without M.  Abdomen:  Soft, non tender, no organ enlargement, bruit. Bowel sounds present . No CVA tenderness.  Extremities:  No edema. No calf swelling or tenderness.    Skin:  No rash, ecchymosis or erythema.    CURRENT ACTIVE MEDS:  acetaminophen, 650 mg, oral, q6h  amiodarone, 100 mg, oral, Daily  aspirin, 81 mg, oral, Nightly  atorvastatin, 40 mg, oral, Nightly  ceFAZolin, 2 g, intravenous, q8h  [Held by provider] empagliflozin, 10 mg, oral, Daily  ezetimibe,  10 mg, oral, Daily  fluticasone, 2 spray, Each Nostril, Daily  tiotropium, 2 puff, inhalation, Daily   And  fluticasone furoate-vilanteroL, 1 puff, inhalation, Daily  [Held by provider] hydroCHLOROthiazide, 25 mg, oral, Once per day on Monday Wednesday Friday  ipratropium-albuteroL, 3 mL, nebulization, TID  [Held by provider] isosorbide mononitrate ER, 30 mg, oral, Daily  lidocaine, 1 patch, transdermal, Daily  [Held by provider] lisinopril, 40 mg, oral, Daily  melatonin, 5 mg, oral, Nightly  methocarbamol, 500 mg, oral, q8h SHABNAM  [Held by provider] metoprolol succinate XL, 100 mg, oral, Nightly  [Held by provider] metoprolol succinate XL, 50 mg, oral, Daily  metoprolol tartrate, 50 mg, oral, BID  montelukast, 10 mg, oral, q PM  pantoprazole, 40 mg, intravenous, BID  potassium chloride CR, 20 mEq, oral, BID  spironolactone, 25 mg, oral, q48h  sucralfate, 1 g, oral, q6h SHABNAM      LAB RESULTS:   CBC:   Results from last 7 days   Lab Units 01/22/25  0542 01/21/25  0733 01/20/25  0621   WBC AUTO x10*3/uL 12.9* 16.9* 19.2*   RBC AUTO x10*6/uL 2.34* 2.48* 2.37*   HEMOGLOBIN g/dL 7.4* 7.8* 7.5*   HEMATOCRIT % 23.1* 24.1* 23.5*   MCV fL 99 97 99   MCH pg 31.6 31.5 31.6   MCHC g/dL 32.0 32.4 31.9*   RDW % 17.7* 18.0* 16.4*   PLATELETS AUTO x10*3/uL 152 183 238     CMP:    Results from last 7 days   Lab Units 01/22/25  0542 01/21/25  0733 01/20/25  0621   SODIUM mmol/L 141 142 142   POTASSIUM mmol/L 4.1 3.9 3.3*   CHLORIDE mmol/L 114* 115* 112*   CO2 mmol/L 23 23 25   BUN mg/dL 35* 56* 77*   CREATININE mg/dL 0.84 0.94 1.04   GLUCOSE mg/dL 93 93 115*   PROTEIN TOTAL g/dL 4.9* 4.8* 4.9*   CALCIUM mg/dL 7.7* 7.8* 7.9*   BILIRUBIN TOTAL mg/dL 1.0 0.6 0.5   ALK PHOS U/L 48 50 44   AST U/L 18 22 19   ALT U/L 7* 9* 8*     BMP:    Results from last 7 days   Lab Units 01/22/25  0542 01/21/25  0733 01/20/25  0621   SODIUM mmol/L 141 142 142   POTASSIUM mmol/L 4.1 3.9 3.3*   CHLORIDE mmol/L 114* 115* 112*   CO2 mmol/L 23 23 25   BUN mg/dL  "35* 56* 77*   CREATININE mg/dL 0.84 0.94 1.04   CALCIUM mg/dL 7.7* 7.8* 7.9*   GLUCOSE mg/dL 93 93 115*     Magnesium:  Results from last 7 days   Lab Units 01/22/25  0542 01/21/25  0733 01/20/25  0621   MAGNESIUM mg/dL 1.52* 1.56* 1.68     Lab Results   Component Value Date    LDLCALC 39 06/10/2024     No results found for: \"HGBA1C\"  Lab Results   Component Value Date    LDLCALC 39 06/10/2024    CREATININE 0.84 01/22/2025       Lab Results   Component Value Date    TSH 1.15 03/13/2024      Lab Results   Component Value Date    INR 1.7 (H) 01/10/2025    INR 1.9 (H) 03/09/2024    PROTIME 19.7 (H) 01/10/2025    PROTIME 21.4 (H) 03/09/2024       CULTURES & SUSCEPTIBILITIES:   Susceptibility data from last 90 days.  Collected Specimen Info Organism Clindamycin Erythromycin Oxacillin Tetracycline Trimethoprim/Sulfamethoxazole Vancomycin   01/12/25 Blood culture from Peripheral Venipuncture Staphylococcus aureus         01/12/25 Blood culture from Peripheral Venipuncture Staphylococcus aureus         01/10/25 Blood culture from Peripheral Venipuncture Staphylococcus aureus         01/10/25 Blood culture from Peripheral Venipuncture Methicillin Susceptible Staphylococcus aureus (MSSA)  S  S  S  S  S  S       IMAGING STUDIES:  I have reviewed following imaging studies.  I agree with with the impression and incorporated those results into my MDM   === 01/10/25 ===  XR CHEST 1 VIEW  1.  Increased prominence of the markings particularly right perihilar  markings may at least in part be related to difference in patient  position.    === 01/10/25 ===  CT CHEST ABDOMEN PELVIS WO CONTRAST  Thoracic findings:  1. Background of moderate-to-severe pulmonary emphysema. There are  multifocal consolidations throughout the lungs which are suspicious  for multifocal pneumonia. Additionally, in the medial left upper  lobe, one of the consolidations demonstrates a subtle air-fluid level  which may represent a developing abscess, necrotizing " pneumonia, or  superinfection of a bulla. Recommend posttreatment chest CT in 3  months to ensure resolution.  2. Mild cardiomegaly.  3. Dilated main pulmonary artery which can be seen with pulmonary  hypertension.    Abdomen/pelvis findings:  1. No new collections.  2. Similar mild fluid distention of distal small bowel loops and  colonic bowel loops which can be seen with mild enterocolitis. No  significant bowel wall thickening.  3. Redemonstration of the bilobed abdominal aortic aneurysm measuring  up to 6 cm. As before, recommend vascular surgery consultation for  management guidance.  4. Other findings remain unchanged.    === 01/10/25 ===  US RENAL COMPLETE  No hydronephrosis on either side    LAST EKG  Encounter Date: 01/10/25   ECG 12 lead   Result Value    Ventricular Rate 60    Atrial Rate 60    QRS Duration 122    QT Interval 572    QTC Calculation(Bazett) 572    R Axis 28    T Axis 110    QRS Count 10    Q Onset 218    T Offset 504    QTC Fredericia 572       PROBLEMS ON ADMISSION:  Hypokalemia [E87.6]  Renal insufficiency [N28.9]  Elevated troponin [R79.89]  Fall, initial encounter [W19.XXXA]  Hypotension, unspecified hypotension type [I95.9]  Anemia, unspecified type [D64.9]  Diarrhea, unspecified type [R19.7]  Medication induced coagulopathy (Multi) [D68.9, T50.905A]    HOSPITAL PROBLEM LIST     Abdominal aortic aneurysm (CMS-HCC)    CAD (coronary artery disease)    Essential hypertension    Ischemic cardiomyopathy    Mixed hyperlipidemia    Persistent atrial fibrillation (Multi)    Chronic diastolic congestive heart failure, NYHA class 2    High risk medication use    Prostate cancer (Multi)    ASSESSMENT AND PLAN FOR 1/21/2025  Patient has MSSA bacteriemia.  Subsequent blood cultures are negative.  Infectious disease is on board.  We are waiting for the third culture to be negative then patient will get PICC.  I had a discussion with Dr. Lomeli this morning.  He will kindly add the PICC orders.   Patient will be on IV cefazolin for about 6 weeks.  Meanwhile he has generalized weakness.  His AMPAC score is low and he will be going to [facility].  We are waiting for final authorization from the insurance company.  Patient initially started hypotension multiple medications on hold.  His blood pressure is still in the lower side.  We will not start the medications unless it is needed and we will start them in gradually.  Rest of the medical therapy will be continued as per admission orders.  Patient has chronic hypokalemia.  I will add oral potassium from today.        P.S: This note was completed using Dragon voice recognition technology and may include unintended errors with respect to translation of words, typographical errors or grammar errors which may not have been identified while finalizing the chart.

## 2025-01-23 NOTE — PROGRESS NOTES
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  Underwent emergent EGD today with transfusion.     Patient is weak in general but no complaints.  Seen with nurse at bedside.  Resting at the present time minimal abdominal cramping at this time.  Gastric ulcer found during EGD    Seen with nurse, patient has been resting       Lab Results   Component Value Date    WBC 12.9 (H) 01/22/2025    HGB 7.4 (L) 01/22/2025    HCT 23.1 (L) 01/22/2025    MCV 99 01/22/2025     01/22/2025     Lab Results   Component Value Date    GLUCOSE 93 01/22/2025    CALCIUM 7.7 (L) 01/22/2025     01/22/2025    K 4.1 01/22/2025    CO2 23 01/22/2025     (H) 01/22/2025    BUN 35 (H) 01/22/2025    CREATININE 0.84 01/22/2025       WBC trends are being monitored. Antibiotic doses are being adjusted per most recent renal labs.     Vitals:    01/23/25 0034   BP: 112/58   Pulse: 60   Resp: 17   Temp: 36.5 °C (97.7 °F)   SpO2: 95%           Patient Active Problem List   Diagnosis    Abdominal aortic aneurysm (CMS-HCC)    AICD (automatic cardioverter/defibrillator) present    Allergic rhinitis, seasonal    CAD (coronary artery disease)    Centrilobular emphysema (Multi)    Essential hypertension    Hyperuricemia    Infarction of right basal ganglia (Multi)    Ischemic cardiomyopathy    Mixed hyperlipidemia    Persistent atrial fibrillation (Multi)    Renal artery stenosis (CMS-HCC)    Tinea cruris    Anticoagulant long-term use    Chronic diastolic congestive heart failure, NYHA class 2    High risk medication use    Tremor    Prostate cancer (Multi)    Blepharitis of both eyes    Epiretinal membrane (ERM) of right eye    Drusen (degenerative) of retina    Dermatochalasis of right upper eyelid    Dermatochalasis of left upper eyelid    Degenerative retinal drusen  of both eyes    Choroidal nevus, right eye    Lumbosacral spondylosis without myelopathy    Floaters    Spinal stenosis, lumbar region with neurogenic claudication    Pseudophakia of both eyes    Pinguecula    Obesity, morbid (Multi)    Vitreomacular traction syndrome of right eye    COPD exacerbation (Multi)    MGD (meibomian gland dysfunction)    CVA (cerebral vascular accident) (Multi)    Primary osteoarthritis of right knee    Never smoked cigarettes    Pulmonary hypertension (Multi)    Hidradenitis suppurativa of anus    Diarrhea, unspecified type     Neck supple  Heart S1S2  Chest: Equal expansion  Abdomen: soft, ND  Extrem: no pain to palpation  Skin: no rashes, no diaphoresis  Neuro: CNS intact      ASSESSMENT:  New GI bleed, status post emergent EGD today with acute blood loss anemia  MSSA bacteremia with possible endocarditis.  Patient has AICD.  Blood cultures on 1/10/2025 were positive for Staphylococcus aureus.  PAOLO reported to be negative.     PLAN:  Plan is still same from ID perspective    Patient being treated for probable endocarditis with 6 weeks IV cefazolin 2 g IV every 8 hours from an infectious disease standpoint    Patient will need weekly CBC BMP CRP after discharge to be sent to the infectious disease clinic  Follow-up in 3 weeks with infectious disease  Follow-up within 1 to 2 weeks with primary care physician  PICC line will need to be pulled at the end of therapy    Case discussed with primary    Imaging and labs were reviewed per medical records and any ID pertinent labs were also addressed  Time spent before, during and after care today, including coordination of care >40 min      Beth Clark DO

## 2025-01-23 NOTE — PROGRESS NOTES
Occupational Therapy                 Therapy Communication Note    Patient Name: Guicho Jones  MRN: [id number]  Department: [facility]  Room: [facility]– [place]/1004-B  Today's Date: 1/23/2025     Discipline: Occupational Therapy    OT Missed Visit: Yes     Missed Visit Reason: Missed Visit Reason: Patient refused (Pt sleeping on arrival, woke to OT's voice. Pt politely declining therapy at this time stating he wants to rest. Will re-attempt as able. RN notified.)    Missed Time: Attempt at 1018

## 2025-01-23 NOTE — PROGRESS NOTES
"Guicho Jones is a 84 y.o. male on day 13 of admission presenting with Diarrhea, unspecified type.    Subjective   Hospital follow-up.  Patient seen at the bedside.  States doing a little bit better today.  No current complaints.  Had brown bowel movement loose this morning.  Hemoglobin reviewed from this morning.  Up to 7.9 from 7.5 following another unit of PRBCs.  Gastroenterology follow-up is noted.  N.p.o. after midnight possible repeat EGD in the a.m.  Likely transfuse PRBCs today given hemoglobin less than 8 with comorbid conditions.  Discharge planning.  Will require IV antibiotics per infectious disease.  Continue other current medical therapy in the interim.       Objective     Physical Exam     Last Recorded Vitals  Blood pressure 115/56, pulse 60, temperature 36.2 °C (97.2 °F), resp. rate 16, height 1.676 m (5' 6\"), weight 107 kg (235 lb 10.8 oz), SpO2 99%.  Intake/Output last 3 Shifts:  I/O last 3 completed shifts:  In: 1000 (9.4 mL/kg) [P.O.:350; Blood:350; IV Piggyback:300]  Out: 600 (5.6 mL/kg) [Urine:600 (0.2 mL/kg/hr)]  Weight: 106.9 kg     Relevant Results  Recent Results (from the past 72 hours)   Magnesium    Collection Time: 01/21/25  7:33 AM   Result Value Ref Range    Magnesium 1.56 (L) 1.60 - 2.40 mg/dL   Comprehensive Metabolic Panel    Collection Time: 01/21/25  7:33 AM   Result Value Ref Range    Glucose 93 74 - 99 mg/dL    Sodium 142 136 - 145 mmol/L    Potassium 3.9 3.5 - 5.3 mmol/L    Chloride 115 (H) 98 - 107 mmol/L    Bicarbonate 23 21 - 32 mmol/L    Anion Gap 8 (L) 10 - 20 mmol/L    Urea Nitrogen 56 (H) 6 - 23 mg/dL    Creatinine 0.94 0.50 - 1.30 mg/dL    eGFR 79 >60 mL/min/1.73m*2    Calcium 7.8 (L) 8.6 - 10.3 mg/dL    Albumin 2.6 (L) 3.4 - 5.0 g/dL    Alkaline Phosphatase 50 33 - 136 U/L    Total Protein 4.8 (L) 6.4 - 8.2 g/dL    AST 22 9 - 39 U/L    Bilirubin, Total 0.6 0.0 - 1.2 mg/dL    ALT 9 (L) 10 - 52 U/L   Phosphorus    Collection Time: 01/21/25  7:33 AM   Result Value " Ref Range    Phosphorus 3.7 2.5 - 4.9 mg/dL   CBC and Auto Differential    Collection Time: 01/21/25  7:33 AM   Result Value Ref Range    WBC 16.9 (H) 4.4 - 11.3 x10*3/uL    nRBC 0.5 (H) 0.0 - 0.0 /100 WBCs    RBC 2.48 (L) 4.50 - 5.90 x10*6/uL    Hemoglobin 7.8 (L) 13.5 - 17.5 g/dL    Hematocrit 24.1 (L) 41.0 - 52.0 %    MCV 97 80 - 100 fL    MCH 31.5 26.0 - 34.0 pg    MCHC 32.4 32.0 - 36.0 g/dL    RDW 18.0 (H) 11.5 - 14.5 %    Platelets 183 150 - 450 x10*3/uL    Neutrophils % 80.8 40.0 - 80.0 %    Immature Granulocytes %, Automated 5.0 (H) 0.0 - 0.9 %    Lymphocytes % 6.7 13.0 - 44.0 %    Monocytes % 6.6 2.0 - 10.0 %    Eosinophils % 0.8 0.0 - 6.0 %    Basophils % 0.1 0.0 - 2.0 %    Neutrophils Absolute 13.65 (H) 1.60 - 5.50 x10*3/uL    Immature Granulocytes Absolute, Automated 0.85 (H) 0.00 - 0.50 x10*3/uL    Lymphocytes Absolute 1.14 0.80 - 3.00 x10*3/uL    Monocytes Absolute 1.12 (H) 0.05 - 0.80 x10*3/uL    Eosinophils Absolute 0.14 0.00 - 0.40 x10*3/uL    Basophils Absolute 0.02 0.00 - 0.10 x10*3/uL   Magnesium    Collection Time: 01/22/25  5:42 AM   Result Value Ref Range    Magnesium 1.52 (L) 1.60 - 2.40 mg/dL   CBC    Collection Time: 01/22/25  5:42 AM   Result Value Ref Range    WBC 12.9 (H) 4.4 - 11.3 x10*3/uL    nRBC 0.2 (H) 0.0 - 0.0 /100 WBCs    RBC 2.34 (L) 4.50 - 5.90 x10*6/uL    Hemoglobin 7.4 (L) 13.5 - 17.5 g/dL    Hematocrit 23.1 (L) 41.0 - 52.0 %    MCV 99 80 - 100 fL    MCH 31.6 26.0 - 34.0 pg    MCHC 32.0 32.0 - 36.0 g/dL    RDW 17.7 (H) 11.5 - 14.5 %    Platelets 152 150 - 450 x10*3/uL   Comprehensive Metabolic Panel    Collection Time: 01/22/25  5:42 AM   Result Value Ref Range    Glucose 93 74 - 99 mg/dL    Sodium 141 136 - 145 mmol/L    Potassium 4.1 3.5 - 5.3 mmol/L    Chloride 114 (H) 98 - 107 mmol/L    Bicarbonate 23 21 - 32 mmol/L    Anion Gap 8 (L) 10 - 20 mmol/L    Urea Nitrogen 35 (H) 6 - 23 mg/dL    Creatinine 0.84 0.50 - 1.30 mg/dL    eGFR 85 >60 mL/min/1.73m*2    Calcium 7.7 (L)  8.6 - 10.3 mg/dL    Albumin 2.5 (L) 3.4 - 5.0 g/dL    Alkaline Phosphatase 48 33 - 136 U/L    Total Protein 4.9 (L) 6.4 - 8.2 g/dL    AST 18 9 - 39 U/L    Bilirubin, Total 1.0 0.0 - 1.2 mg/dL    ALT 7 (L) 10 - 52 U/L   Phosphorus    Collection Time: 01/22/25  5:42 AM   Result Value Ref Range    Phosphorus 3.4 2.5 - 4.9 mg/dL   Prepare RBC: 1 Units    Collection Time: 01/22/25 10:32 AM   Result Value Ref Range    PRODUCT CODE H9836I71     Unit Number [id number]     Unit ABO O     Unit RH NEG     XM INTEP COMP     Dispense Status TR     Blood Expiration Date 2/3/2025 11:59:00 PM EST     PRODUCT BLOOD TYPE 9500     UNIT VOLUME 350    Magnesium    Collection Time: 01/23/25  4:59 AM   Result Value Ref Range    Magnesium 1.41 (L) 1.60 - 2.40 mg/dL   Comprehensive Metabolic Panel    Collection Time: 01/23/25  4:59 AM   Result Value Ref Range    Glucose 85 74 - 99 mg/dL    Sodium 139 136 - 145 mmol/L    Potassium 4.1 3.5 - 5.3 mmol/L    Chloride 112 (H) 98 - 107 mmol/L    Bicarbonate 22 21 - 32 mmol/L    Anion Gap 9 (L) 10 - 20 mmol/L    Urea Nitrogen 22 6 - 23 mg/dL    Creatinine 0.74 0.50 - 1.30 mg/dL    eGFR 89 >60 mL/min/1.73m*2    Calcium 7.6 (L) 8.6 - 10.3 mg/dL    Albumin 2.5 (L) 3.4 - 5.0 g/dL    Alkaline Phosphatase 47 33 - 136 U/L    Total Protein 5.1 (L) 6.4 - 8.2 g/dL    AST 17 9 - 39 U/L    Bilirubin, Total 0.9 0.0 - 1.2 mg/dL    ALT 5 (L) 10 - 52 U/L   Phosphorus    Collection Time: 01/23/25  4:59 AM   Result Value Ref Range    Phosphorus 2.9 2.5 - 4.9 mg/dL   CBC and Auto Differential    Collection Time: 01/23/25  4:59 AM   Result Value Ref Range    WBC 11.4 (H) 4.4 - 11.3 x10*3/uL    nRBC 0.0 0.0 - 0.0 /100 WBCs    RBC 2.49 (L) 4.50 - 5.90 x10*6/uL    Hemoglobin 7.9 (L) 13.5 - 17.5 g/dL    Hematocrit 24.6 (L) 41.0 - 52.0 %    MCV 99 80 - 100 fL    MCH 31.7 26.0 - 34.0 pg    MCHC 32.1 32.0 - 36.0 g/dL    RDW 18.0 (H) 11.5 - 14.5 %    Platelets 139 (L) 150 - 450 x10*3/uL    Neutrophils % 77.8 40.0 - 80.0  %    Immature Granulocytes %, Automated 3.9 (H) 0.0 - 0.9 %    Lymphocytes % 9.5 13.0 - 44.0 %    Monocytes % 7.8 2.0 - 10.0 %    Eosinophils % 0.8 0.0 - 6.0 %    Basophils % 0.2 0.0 - 2.0 %    Neutrophils Absolute 8.83 (H) 1.60 - 5.50 x10*3/uL    Immature Granulocytes Absolute, Automated 0.44 0.00 - 0.50 x10*3/uL    Lymphocytes Absolute 1.08 0.80 - 3.00 x10*3/uL    Monocytes Absolute 0.89 (H) 0.05 - 0.80 x10*3/uL    Eosinophils Absolute 0.09 0.00 - 0.40 x10*3/uL    Basophils Absolute 0.02 0.00 - 0.10 x10*3/uL   Iron and TIBC    Collection Time: 01/23/25  4:59 AM   Result Value Ref Range    Iron 32 (L) 35 - 150 ug/dL    UIBC 181 110 - 370 ug/dL    TIBC 213 (L) 240 - 445 ug/dL    % Saturation 15 (L) 25 - 45 %   Ferritin    Collection Time: 01/23/25  4:59 AM   Result Value Ref Range    Ferritin 501 (H) 20 - 300 ng/mL             This patient has a central line   Reason for the central line remaining today? Parenteral medication               acetaminophen, 650 mg, oral, q6h  amiodarone, 100 mg, oral, Daily  aspirin, 81 mg, oral, Nightly  atorvastatin, 40 mg, oral, Nightly  ceFAZolin, 2 g, intravenous, q8h  [Held by provider] empagliflozin, 10 mg, oral, Daily  ezetimibe, 10 mg, oral, Daily  fluticasone, 2 spray, Each Nostril, Daily  tiotropium, 2 puff, inhalation, Daily   And  fluticasone furoate-vilanteroL, 1 puff, inhalation, Daily  [Held by provider] hydroCHLOROthiazide, 25 mg, oral, Once per day on Monday Wednesday Friday  ipratropium-albuteroL, 3 mL, nebulization, TID  [Held by provider] isosorbide mononitrate ER, 30 mg, oral, Daily  lidocaine, 1 patch, transdermal, Daily  [Held by provider] lisinopril, 40 mg, oral, Daily  melatonin, 5 mg, oral, Nightly  methocarbamol, 500 mg, oral, q8h SHABNAM  [Held by provider] metoprolol succinate XL, 100 mg, oral, Nightly  [Held by provider] metoprolol succinate XL, 50 mg, oral, Daily  metoprolol tartrate, 50 mg, oral, BID  montelukast, 10 mg, oral, q PM  pantoprazole, 40 mg,  oral, BID  potassium chloride CR, 20 mEq, oral, BID  spironolactone, 25 mg, oral, q48h  sucralfate, 1 g, oral, q6h SHABNAM        ECG 12 lead    Result Date: 1/19/2025  Sinus rhythm with short NH Nonspecific ST and T wave abnormality Prolonged QT Abnormal ECG When compared with ECG of 09-MAR-2024 12:54, Sinus rhythm has replaced Electronic ventricular pacemaker Vent. rate has decreased BY  33 BPM See ED provider note for full interpretation and clinical correlation Confirmed by Thania Welch ([id number]) on 1/19/2025 9:42:07 PM    ECG 12 lead    Result Date: 1/19/2025  Sinus rhythm with AV dissociation and Wide QRS rhythm Nonspecific intraventricular conduction delay Nonspecific ST and T wave abnormality Abnormal ECG When compared with ECG of 10-DOREEN-2025 13:04, (unconfirmed) Wide QRS rhythm has replaced Sinus rhythm See ED provider note for full interpretation and clinical correlation Confirmed by Thania Welch ([id number]) on 1/19/2025 9:39:52 PM    Transthoracic Echo (TTE) Complete    Result Date: 1/11/2025          [place] [id number]  Tel [phone] Fax [phone] TRANSTHORACIC ECHOCARDIOGRAM REPORT Patient Name:       ASHLY Castano Physician:    [id number] Trell Turpin DO Study Date:         1/11/2025            Ordering Provider:    [id number] MARSHAL MI MRN/PID:            [id number]             Fellow: Accession#:         [id number]         Nurse: Date of Birth/Age:  1940 / 84 years Sonographer:          Cici Fong                                                                [facility] Gender Assigned at  M                    Additional Staff: Birth: Height:             167.64 cm            Admit Date:           1/10/2025 Weight:             102.97 kg            Admission Status:     Inpatient -                                                                 Routine BSA / BMI:          2.11 m2 / 36.64      Department Location:  [facility]                     kg/m2 Blood Pressure: 110 /51 mmHg Study Type:    TRANSTHORACIC ECHO (TTE) COMPLETE Diagnosis/ICD: Bacteremia-R78.81 Indication:    Bacteremia CPT Codes:     Echo Complete w Full [id number] Patient History: Pacer/Defib:       AICD Pertinent History: A-Fib, AAA, CAD, HTN, Hyperlipidemia, Cardiomyopathy and CVA. Study Detail: The following Echo studies were performed: 2D, M-Mode, Doppler and               color flow. Technically challenging study due to patient lying in               supine position. Definity used as a contrast agent for endocardial               border definition and agitated saline used as a contrast agent for               intraseptal flow evaluation. Total contrast used for this               procedure was 2 mL via IV push. The patient was awake.  PHYSICIAN INTERPRETATION: Left Ventricle: The left ventricular systolic function is normal, with a visually estimated ejection fraction of 55%. There is mild to moderate concentric left ventricular hypertrophy. There are no regional wall motion abnormalities. The left ventricular cavity size is normal. There is moderately increased septal and mildly increased posterior left ventricular wall thickness. Spectral Doppler shows a normal pattern of left ventricular diastolic filling. LV Wall Scoring: All segments are normal. Left Atrium: The left atrium is moderately dilated. There is a small patent foramen ovale with predominantly left to right shunting across the atrial septum. A patent foramen ovale was visualized using color Doppler. Right Ventricle: The right ventricle is normal in size. There is normal right ventricular global systolic function. A device is visualized in the right ventricle. Right Atrium: The right atrium is mild to moderately dilated. There is a device visualized in the right atrium. Aortic  Valve: The aortic valve appears structurally normal. The aortic valve dimensionless index is 0.70. There is mild aortic valve regurgitation. The peak instantaneous gradient of the aortic valve is 11 mmHg. The mean gradient of the aortic valve is 7 mmHg. Mitral Valve: The mitral valve is normal in structure. There is no evidence of mitral valve stenosis. There is normal mitral valve leaflet mobility. There is mild mitral valve regurgitation. Tricuspid Valve: The tricuspid valve is structurally normal. There is normal tricuspid valve leaflet mobility. There is mild tricuspid regurgitation. Pulmonic Valve: The pulmonic valve is structurally normal. There is no indication of pulmonic valve regurgitation. Pericardium: No pericardial effusion noted. Aorta: The aortic root is normal. There is moderate dilatation of the ascending aorta. Systemic Veins: The inferior vena cava appears mildly dilated.  CONCLUSIONS:  1. The left ventricular systolic function is normal, with a visually estimated ejection fraction of 55%.  2. No cardiac source of embolus visualized.  3. No definite valvular vegetations were visualized.  4. There is normal right ventricular global systolic function.  5. The left atrium is moderately dilated.  6. The right atrium is mild to moderately dilated.  7. There is no evidence of mitral valve stenosis.  8. Mild mitral valve regurgitation.  9. Mild tricuspid regurgitation is visualized. 10. Mild aortic valve regurgitation. 11. The inferior vena cava appears mildly dilated. 12. Small patent foramen ovale. 13. There is moderate dilatation of the ascending aorta. 14. There is moderately increased septal and mildly increased posterior left ventricular wall thickness. 15. If strong concern for vegetation and/or source of septicemia a transesophageal echo would be necessary for better assessment. RECOMMENDATIONS: Technically suboptimal and limited study, therefore accuracy of above interpretation could be  substantially diminished. Clinical correlation is advised. Consider additional imaging modalities if clinically indicated. Transthoracic echo has low negative predictive value for mass, vegetation, or embolic source. Consider PAOLO or MRI if clinically indicated.  QUANTITATIVE DATA SUMMARY:  2D MEASUREMENTS:            Normal Ranges: Ao Root d:       3.32 cm    (2.0-3.7cm) LAs:             5.48 cm    (2.7-4.0cm) IVSd:            1.37 cm    (0.6-1.1cm) LVPWd:           1.20 cm    (0.6-1.1cm) LVIDd:           4.93 cm    (3.9-5.9cm) LVIDs:           3.57 cm LV Mass Index:   119.3 g/m2 LV % FS          27.6 %  LA VOLUME:                    Normal Ranges: LA Vol A4C:        107.6 ml   (22+/-6mL/m2) LA Vol A2C:        111.9 ml LA Vol BP:         114.7 ml LA Vol Index A4C:  51.0ml/m2 LA Vol Index A2C:  53.0 ml/m2 LA Vol Index BP:   54.4 ml/m2 LA Area A4C:       31.9 cm2 LA Area A2C:       31.1 cm2 LA Major Axis A4C: 8.0 cm LA Major Axis A2C: 7.4 cm LA Volume Index:   51.7 ml/m2  RA VOLUME BY A/L METHOD:            Normal Ranges: RA Vol A4C:              80.9 ml    (8.3-19.5ml) RA Vol Index A4C:        38.3 ml/m2 RA Area A4C:             25.9 cm2 RA Major Axis A4C:       7.0 cm  AORTA MEASUREMENTS:         Normal Ranges: Asc Ao, d:          4.57 cm (2.1-3.4cm)  LV SYSTOLIC FUNCTION BY 2D PLANIMETRY (MOD):                      Normal Ranges: EF-A4C View:    50 % (>=55%) EF-A2C View:    53 % EF-Biplane:     48 % EF-Visual:      55 % LV EF Reported: 55 %  LV DIASTOLIC FUNCTION:           Normal Ranges: MV Peak E:             1.12 m/s  (0.7-1.2 m/s) MV Peak A:             0.66 m/s  (0.42-0.7 m/s) E/A Ratio:             1.69      (1.0-2.2) MV e'                  0.093 m/s (>8.0) MV lateral e'          0.12 m/s MV medial e'           0.07 m/s E/e' Ratio:            12.09     (<8.0)  MITRAL VALVE:          Normal Ranges: MV DT:        232 msec (150-240msec)  AORTIC VALVE:                      Normal Ranges: AoV Vmax:                 1.67 m/s  (<=1.7m/s) AoV Peak P.2 mmHg (<20mmHg) AoV Mean P.0 mmHg  (1.7-11.5mmHg) LVOT Max Angel:            1.19 m/s  (<=1.1m/s) AoV VTI:                 37.90 cm  (18-25cm) LVOT VTI:                26.60 cm LVOT Diameter:           2.07 cm   (1.8-2.4cm) AoV Area, VTI:           2.36 cm2  (2.5-5.5cm2) AoV Area,Vmax:           2.40 cm2  (2.5-4.5cm2) AoV Dimensionless Index: 0.70  RIGHT VENTRICLE: RV Basal 4.13 cm RV Mid   3.25 cm RV Major 8.0 cm TAPSE:   25.3 mm RV s'    0.23 m/s  TRICUSPID VALVE/RVSP:          Normal Ranges: Peak TR Velocity:     3.26 m/s RV Syst Pressure:     46 mmHg  (< 30mmHg) IVC Diam:             2.33 cm  PULMONIC VALVE:          Normal Ranges: PV Accel Time:  82 msec  (>120ms) PV Max Angel:     1.2 m/s  (0.6-0.9m/s) PV Max P.2 mmHg  [id number] Trell Turpin DO Electronically signed on 2025 at 12:40:02 PM  Wall Scoring  ** Final **     CT head wo IV contrast    Result Date: 1/10/2025  Interpreted By:  Rachna Umanzor, STUDY: CT HEAD WO IV CONTRAST; CT CERVICAL SPINE WO IV CONTRAST;  1/10/2025 2:47 pm   INDICATION: Signs/Symptoms:fall.   COMPARISON: None.   ACCESSION NUMBER(S): [id number]; [id number]   ORDERING CLINICIAN: RACQUEL CROOKS   TECHNIQUE: Noncontrast axial CT scan of head was performed. Angled reformats in brain and bone windows were generated. The images were reviewed in bone, brain, blood and soft tissue windows.   Axial CT images of the cervical spine are obtained. Axial, coronal and sagittal reconstructions are provided for review.   FINDINGS: CT HEAD:   CSF Spaces:The ventricles, sulci and basal cisterns are normal in configuration and diffusely prominent, consistent with volume loss. There is no extraaxial fluid collection.   Parenchyma: Periventricular, subcortical, and deep white matter areas of hypodensity are noted. Findings likely due to microvascular ischemic disease. Small circumscribed hypodensity in the left brook and similar  appearing hypodensities noted bilateral basal ganglia, are likely lacunar infarcts. Atherosclerotic disease noted intracranial internal carotid and bilateral vertebral arteries.The grey-white differentiation is intact. There is no mass effect or midline shift.  There is no intracranial hemorrhage.   Calvarium: The calvarium is unremarkable, no fracture.   Orbits: The visualized bony orbits are intact. There has been previous cataract surgery bilaterally.   Paranasal Sinuses/Mastoids: Visualized paranasal sinuses and mastoids are clear.   Soft tissues: Unremarkable.   CT CERVICAL SPINE: Images are slightly degraded by patient size and patient motion.   Fractures: There is no evidence for an acute fracture of the cervical spine, allowing for motion artifacts.   Vertebral Alignment: Slight reversal of normal lordosis in the upper cervical spine, may be positional. No spondylolisthesis.   Craniocervical Junction: The odontoid process and craniocervical junction are intact.   Vertebrae/Disc Spaces: Bones appear demineralized. Severe disc space narrowing C3 through C7 levels noted, with endplate osteophytes, consistent with spondylosis. Multilevel facet osteophytes also noted. Multiple levels of mild to moderate canal narrowing secondary to degenerative changes. No severe canal stenosis. Neural foraminal narrowing secondary to uncovertebral and facet osteophytes appears moderate to severe bilateral C4-5, C5-6 and C6-C7 levels.   Prevertebral/Paraspinal Soft Tissues: No prevertebral edema. No acute soft tissue abnormality. Carotid athero sclerotic disease is noted. Partially imaged leads from a right chest wall pacemaker are noted. Lung apices clear. Emphysema is present.       1. No evidence of acute intracranial abnormality. Mild cerebral volume loss noted and microvascular ischemic changes. 2. No evidence of acute cervical spine fracture. Multilevel cervical spondylosis, relatively severe from C3 level through C7 level  as discussed above.       Signed by: Rachna Umanzor 1/10/2025 3:31 PM Dictation workstation:   [id number]    CT cervical spine wo IV contrast    Result Date: 1/10/2025  Interpreted By:  Rachna Umanzor, STUDY: CT HEAD WO IV CONTRAST; CT CERVICAL SPINE WO IV CONTRAST;  1/10/2025 2:47 pm   INDICATION: Signs/Symptoms:fall.   COMPARISON: None.   ACCESSION NUMBER(S): [id number]; [id number]   ORDERING CLINICIAN: RACQUEL CROOKS   TECHNIQUE: Noncontrast axial CT scan of head was performed. Angled reformats in brain and bone windows were generated. The images were reviewed in bone, brain, blood and soft tissue windows.   Axial CT images of the cervical spine are obtained. Axial, coronal and sagittal reconstructions are provided for review.   FINDINGS: CT HEAD:   CSF Spaces:The ventricles, sulci and basal cisterns are normal in configuration and diffusely prominent, consistent with volume loss. There is no extraaxial fluid collection.   Parenchyma: Periventricular, subcortical, and deep white matter areas of hypodensity are noted. Findings likely due to microvascular ischemic disease. Small circumscribed hypodensity in the left brook and similar appearing hypodensities noted bilateral basal ganglia, are likely lacunar infarcts. Atherosclerotic disease noted intracranial internal carotid and bilateral vertebral arteries.The grey-white differentiation is intact. There is no mass effect or midline shift.  There is no intracranial hemorrhage.   Calvarium: The calvarium is unremarkable, no fracture.   Orbits: The visualized bony orbits are intact. There has been previous cataract surgery bilaterally.   Paranasal Sinuses/Mastoids: Visualized paranasal sinuses and mastoids are clear.   Soft tissues: Unremarkable.   CT CERVICAL SPINE: Images are slightly degraded by patient size and patient motion.   Fractures: There is no evidence for an acute fracture of the cervical spine, allowing for motion artifacts.   Vertebral Alignment: Slight  reversal of normal lordosis in the upper cervical spine, may be positional. No spondylolisthesis.   Craniocervical Junction: The odontoid process and craniocervical junction are intact.   Vertebrae/Disc Spaces: Bones appear demineralized. Severe disc space narrowing C3 through C7 levels noted, with endplate osteophytes, consistent with spondylosis. Multilevel facet osteophytes also noted. Multiple levels of mild to moderate canal narrowing secondary to degenerative changes. No severe canal stenosis. Neural foraminal narrowing secondary to uncovertebral and facet osteophytes appears moderate to severe bilateral C4-5, C5-6 and C6-C7 levels.   Prevertebral/Paraspinal Soft Tissues: No prevertebral edema. No acute soft tissue abnormality. Carotid athero sclerotic disease is noted. Partially imaged leads from a right chest wall pacemaker are noted. Lung apices clear. Emphysema is present.       1. No evidence of acute intracranial abnormality. Mild cerebral volume loss noted and microvascular ischemic changes. 2. No evidence of acute cervical spine fracture. Multilevel cervical spondylosis, relatively severe from C3 level through C7 level as discussed above.       Signed by: Rachna Umanzor 1/10/2025 3:31 PM Dictation workstation:   [id number]    CT abdomen pelvis w IV contrast    Result Date: 1/10/2025  Interpreted By:  Rachna Umanzor, STUDY:   CT ABDOMEN PELVIS W IV CONTRAST;  1/10/2025 2:45 pm   INDICATION: Signs/Symptoms:diarrhea.   COMPARISON: 10/03/2022   ACCESSION NUMBER(S): [id number]   ORDERING CLINICIAN: RACQUEL CROOKS   TECHNIQUE: CT of the abdomen and pelvis was performed.  Standard contiguous axial images were obtained at 3 mm slice thickness through the abdomen and pelvis. Coronal and sagittal reconstructions at 3 mm slice thickness were performed.   75 ML Omnipaque 350contrast administered intravenously without immediate complication.   FINDINGS: LOWER CHEST: Dilated main pulmonary artery, likely pulmonary  hypertension. Mildly dilated ascending aorta 4.3 cm. Visualized aorta shows no evidence of dissection. There is severe coronary artery calcification. Pacing leads noted in the right atrium and right ventricle. Heart size upper normal. No pleural or pericardial effusion. There is emphysema and mild atelectasis in the lung bases.   ABDOMEN:   LIVER: Lobular liver with features of steatosis and a lobular contour, possible cirrhosis. Overall size is mildly enlarged. No focal lesion is evident.   GALLBLADDER: Gallstones present. No gallbladder wall thickening or pericholecystic fluid.   BILE DUCTS: Normal caliber.   PANCREAS: The pancreas appears within normal limits, no evidence of pancreatitis or mass.   SPLEEN: Normal size. Homogeneous enhancement.   ADRENAL GLANDS: Within normal limits.   KIDNEYS AND URETERS: Normal size kidneys. No hydronephrosis. No calculi. Renal hypodensities bilaterally, most of which are likely cysts. There is minimal thin calcification along the rim of a posterior left renal cyst which is 9.5 cm and has no other definite complex feature. Posterior cyst of the right kidney is slightly more complex appearing, particularly in the lower aspect, I can not tell if this might be artifact. There are some slight streak artifacts from the right arm being down.   PELVIS:   BLADDER: Limited assessment, streak artifact from right hip arthroplasty. Bladder appears grossly unremarkable.   REPRODUCTIVE ORGANS: Right hip arthroplasty streak artifact obscures the prostate gland.   BOWEL: The stomach is unremarkable. Normal caliber small bowel without inflammatory changes. Liquid stool in the colon suggests diarrhea. No significant wall thickening or pericolonic fat stranding. Few scattered colonic diverticula are noted. Appendix is normal.   VESSELS: Diffuse atherosclerotic disease. Bilobed infrarenal abdominal aortic aneurysm is present, not definitely saccular although has a lobulated appearance and is mildly  tortuous. The more proximal portion measures 5.0 cm transverse and 4.6 cm in AP diameter. The more inferiorly located portion of aneurysm transverse diameter 6.1 cm, AP diameter 6.2 cm. Both of the above areas are larger than 2022, if measured in a similar manner of the upper portion was 4.6 x 4.5 cm, in the lower portion has a transverse diameter of 5.7 x 6 cm. No dissection. 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 There appears to be a renal artery stent on the left.   PERITONEUM/RETROPERITONEUM/LYMPH NODES: There is no free or loculated intraperitoneal or retroperitoneal fluid collection, no free intraperitoneal air. No adenopathy.   BONES AND ABDOMINAL WALL: No evidence of acute fracture. No suspicious osseous lesions are identified. Degenerative changes in the spine at multiple levels. Right total hip arthroplasty is unremarkable in the included portions. The abdominal wall soft tissues appear normal. Left abdominal wall electronic device may be a medication pump.       1.  Liquid stool throughout the colon, consistent with history of diarrhea, nonspecific. No significant wall thickening of the colon or definite evidence of colitis. No obstruction. 2. Slight interval increase in bilobed infrarenal abdominal aortic aneurysm(s) as compared with 2022, without evidence of dissection or acute aortic abnormality, maximum caliber of the largest distal aortic aneurysm segment is 6.1 x 6.2 cm. Vascular follow-up recommended. 3. There is a slightly more complex appearance of a right renal cyst than on the prior study, favor artifact secondary to arms down positioning however may consider an ultrasound to ensure there are no complex features. 4. Other nonacute ancillary findings are unchanged .     Signed by: Rachna Umanzor 1/10/2025 3:25 PM Dictation workstation:   [id number]    XR chest 1 view    Result Date: 1/10/2025  Interpreted By:   Cameron Eaton, STUDY: XR CHEST 1 VIEW;  1/10/2025 1:13 pm   INDICATION: Signs/Symptoms:Chest Pain.     COMPARISON: Selected images from October 3, 2022 CT abdomen and pelvis and March 9, 2024 chest radiograph   ACCESSION NUMBER(S): [id number]   ORDERING CLINICIAN: RACQUEL CROOKS   FINDINGS: AP radiograph of the chest was provided.   Apical lordotic position. Multiple overlapping radiopaque structures.   CARDIOMEDIASTINAL SILHOUETTE: Cardiomediastinal silhouette is normal in size and configuration.   LUNGS: Minimal increased prominence of the markings especially right perihilar region. No well-delineated javon edema or consolidative pneumonia.   ABDOMEN: No remarkable upper abdominal findings.   BONES: No acute osseous changes.       1.  Increased prominence of the markings particularly right perihilar markings may at least in part be related to difference in patient position.       MACRO: None   Signed by: Cameron Eaton 1/10/2025 1:43 PM Dictation workstation:   [id number]       Assessment/Plan   Assessment & Plan  Diarrhea, unspecified type    Abdominal aortic aneurysm (CMS-HCC)    CAD (coronary artery disease)    Essential hypertension    Ischemic cardiomyopathy    Mixed hyperlipidemia    Persistent atrial fibrillation (Multi)    Chronic diastolic congestive heart failure, NYHA class 2    High risk medication use    Prostate cancer (Multi)    Bacteremia            I spent   minutes in the professional and overall care of this patient.      Baldo Almazan MD

## 2025-01-23 NOTE — PROGRESS NOTES
ADMISSION DATE: 1/10/2025  HOSPITAL DAY: 10    SUBJECTIVE:  Patient was seen at bedside.  Uneventful night.  Patient has MSSA bacteremia with unknown source.  Currently infectious diseases treating him with IV cefazolin.  Patient might need longer antibiotic.  ISAIAS is resolving.    OBJECTIVE:  Vitals:    01/22/25 1400 01/22/25 1607 01/22/25 1935 01/22/25 1940   BP: 116/56 113/56  130/62   BP Location:    Left arm   Patient Position:    Lying   Pulse: 60 60  60   Resp: 18 18  17   Temp: 36.2 °C (97.2 °F) 36.5 °C (97.7 °F)  36.1 °C (97 °F)   TempSrc: Temporal Temporal  Temporal   SpO2:  95% 96% 98%   Weight:       Height:            Intake/Output Summary (Last 24 hours) at 1/22/2025 2042  Last data filed at 1/22/2025 1400  Gross per 24 hour   Intake 900 ml   Output --   Net 900 ml      Wt Readings from Last 10 Encounters:   01/22/25 106 kg (232 lb 14.4 oz)   11/25/24 105 kg (231 lb)   11/04/24 103 kg (226 lb 12.8 oz)   08/21/24 101 kg (222 lb 6.4 oz)   08/19/24 101 kg (222 lb 12.8 oz)   06/05/24 101 kg (223 lb 6.4 oz)   05/02/24 102 kg (225 lb)   04/24/24 101 kg (223 lb)   04/01/24 101 kg (223 lb 9.6 oz)   03/13/24 102 kg (224 lb)       PHYSICAL EXAM:  Gen: Alert, awake, Oriented X 3. Not in any acute distress   HEENT:  Atraumatic, PERRL.  Conjunctivae clear.   Moist nasal mucous membranes. oropharynx non erythematous,   Neck:  Supple without thyromegaly or lymphadenopathy.  Lungs:  Clear to auscultation without rales, rhonchi, or rub.  Heart:  RRR, S1, S2, without M.  Abdomen:  Soft, non tender, no organ enlargement, bruit. Bowel sounds present . No CVA tenderness.  Extremities:  No edema. No calf swelling or tenderness.    Skin:  No rash, ecchymosis or erythema.    CURRENT ACTIVE MEDS:  acetaminophen, 650 mg, oral, q6h  amiodarone, 100 mg, oral, Daily  aspirin, 81 mg, oral, Nightly  atorvastatin, 40 mg, oral, Nightly  ceFAZolin, 2 g, intravenous, q8h  [Held by provider] empagliflozin, 10 mg, oral, Daily  ezetimibe,  10 mg, oral, Daily  fluticasone, 2 spray, Each Nostril, Daily  tiotropium, 2 puff, inhalation, Daily   And  fluticasone furoate-vilanteroL, 1 puff, inhalation, Daily  [Held by provider] hydroCHLOROthiazide, 25 mg, oral, Once per day on Monday Wednesday Friday  ipratropium-albuteroL, 3 mL, nebulization, TID  [Held by provider] isosorbide mononitrate ER, 30 mg, oral, Daily  lidocaine, 1 patch, transdermal, Daily  [Held by provider] lisinopril, 40 mg, oral, Daily  melatonin, 5 mg, oral, Nightly  methocarbamol, 500 mg, oral, q8h SHABNAM  [Held by provider] metoprolol succinate XL, 100 mg, oral, Nightly  [Held by provider] metoprolol succinate XL, 50 mg, oral, Daily  metoprolol tartrate, 50 mg, oral, BID  montelukast, 10 mg, oral, q PM  pantoprazole, 40 mg, intravenous, BID  potassium chloride CR, 20 mEq, oral, BID  spironolactone, 25 mg, oral, q48h  sucralfate, 1 g, oral, q6h SHABNAM      LAB RESULTS:   CBC:   Results from last 7 days   Lab Units 01/22/25  0542 01/21/25  0733 01/20/25  0621   WBC AUTO x10*3/uL 12.9* 16.9* 19.2*   RBC AUTO x10*6/uL 2.34* 2.48* 2.37*   HEMOGLOBIN g/dL 7.4* 7.8* 7.5*   HEMATOCRIT % 23.1* 24.1* 23.5*   MCV fL 99 97 99   MCH pg 31.6 31.5 31.6   MCHC g/dL 32.0 32.4 31.9*   RDW % 17.7* 18.0* 16.4*   PLATELETS AUTO x10*3/uL 152 183 238     CMP:    Results from last 7 days   Lab Units 01/22/25  0542 01/21/25  0733 01/20/25  0621   SODIUM mmol/L 141 142 142   POTASSIUM mmol/L 4.1 3.9 3.3*   CHLORIDE mmol/L 114* 115* 112*   CO2 mmol/L 23 23 25   BUN mg/dL 35* 56* 77*   CREATININE mg/dL 0.84 0.94 1.04   GLUCOSE mg/dL 93 93 115*   PROTEIN TOTAL g/dL 4.9* 4.8* 4.9*   CALCIUM mg/dL 7.7* 7.8* 7.9*   BILIRUBIN TOTAL mg/dL 1.0 0.6 0.5   ALK PHOS U/L 48 50 44   AST U/L 18 22 19   ALT U/L 7* 9* 8*     BMP:    Results from last 7 days   Lab Units 01/22/25  0542 01/21/25  0733 01/20/25  0621   SODIUM mmol/L 141 142 142   POTASSIUM mmol/L 4.1 3.9 3.3*   CHLORIDE mmol/L 114* 115* 112*   CO2 mmol/L 23 23 25   BUN mg/dL  "35* 56* 77*   CREATININE mg/dL 0.84 0.94 1.04   CALCIUM mg/dL 7.7* 7.8* 7.9*   GLUCOSE mg/dL 93 93 115*     Magnesium:  Results from last 7 days   Lab Units 01/22/25  0542 01/21/25  0733 01/20/25  0621   MAGNESIUM mg/dL 1.52* 1.56* 1.68     Lab Results   Component Value Date    LDLCALC 39 06/10/2024     No results found for: \"HGBA1C\"  Lab Results   Component Value Date    LDLCALC 39 06/10/2024    CREATININE 0.84 01/22/2025       Lab Results   Component Value Date    TSH 1.15 03/13/2024      Lab Results   Component Value Date    INR 1.7 (H) 01/10/2025    INR 1.9 (H) 03/09/2024    PROTIME 19.7 (H) 01/10/2025    PROTIME 21.4 (H) 03/09/2024       CULTURES & SUSCEPTIBILITIES:   Susceptibility data from last 90 days.  Collected Specimen Info Organism Clindamycin Erythromycin Oxacillin Tetracycline Trimethoprim/Sulfamethoxazole Vancomycin   01/12/25 Blood culture from Peripheral Venipuncture Staphylococcus aureus         01/12/25 Blood culture from Peripheral Venipuncture Staphylococcus aureus         01/10/25 Blood culture from Peripheral Venipuncture Staphylococcus aureus         01/10/25 Blood culture from Peripheral Venipuncture Methicillin Susceptible Staphylococcus aureus (MSSA)  S  S  S  S  S  S       IMAGING STUDIES:  I have reviewed following imaging studies.  I agree with with the impression and incorporated those results into my MDM   === 01/10/25 ===  XR CHEST 1 VIEW  1.  Increased prominence of the markings particularly right perihilar  markings may at least in part be related to difference in patient  position.    === 01/10/25 ===  CT CHEST ABDOMEN PELVIS WO CONTRAST  Thoracic findings:  1. Background of moderate-to-severe pulmonary emphysema. There are  multifocal consolidations throughout the lungs which are suspicious  for multifocal pneumonia. Additionally, in the medial left upper  lobe, one of the consolidations demonstrates a subtle air-fluid level  which may represent a developing abscess, necrotizing " pneumonia, or  superinfection of a bulla. Recommend posttreatment chest CT in 3  months to ensure resolution.  2. Mild cardiomegaly.  3. Dilated main pulmonary artery which can be seen with pulmonary  hypertension.    Abdomen/pelvis findings:  1. No new collections.  2. Similar mild fluid distention of distal small bowel loops and  colonic bowel loops which can be seen with mild enterocolitis. No  significant bowel wall thickening.  3. Redemonstration of the bilobed abdominal aortic aneurysm measuring  up to 6 cm. As before, recommend vascular surgery consultation for  management guidance.  4. Other findings remain unchanged.    === 01/10/25 ===  US RENAL COMPLETE  No hydronephrosis on either side    LAST EKG  Encounter Date: 01/10/25   ECG 12 lead   Result Value    Ventricular Rate 60    Atrial Rate 60    QRS Duration 122    QT Interval 572    QTC Calculation(Bazett) 572    R Axis 28    T Axis 110    QRS Count 10    Q Onset 218    T Offset 504    QTC Fredericia 572       PROBLEMS ON ADMISSION:  Hypokalemia [E87.6]  Renal insufficiency [N28.9]  Elevated troponin [R79.89]  Fall, initial encounter [W19.XXXA]  Hypotension, unspecified hypotension type [I95.9]  Anemia, unspecified type [D64.9]  Diarrhea, unspecified type [R19.7]  Medication induced coagulopathy (Multi) [D68.9, T50.905A]    HOSPITAL PROBLEM LIST     Abdominal aortic aneurysm (CMS-HCC)    CAD (coronary artery disease)    Essential hypertension    Ischemic cardiomyopathy    Mixed hyperlipidemia    Persistent atrial fibrillation (Multi)    Chronic diastolic congestive heart failure, NYHA class 2    High risk medication use    Prostate cancer (Multi)    ASSESSMENT AND PLAN FOR 1/20/2025  Patient has MSSA bacteriemia.  Subsequent blood cultures are negative.  Infectious disease is on board.  We are waiting for the third culture to be negative then patient will get PICC.  I had a discussion with Dr. Lomeli this morning.  He will kindly add the PICC orders.   Patient will be on IV cefazolin for about 6 weeks.  Meanwhile he has generalized weakness.  His AMPAC score is low and he will be going to [facility].  We are waiting for final authorization from the insurance company.  Patient initially started hypotension multiple medications on hold.  His blood pressure is still in the lower side.  We will not start the medications unless it is needed and we will start them in gradually.  Rest of the medical therapy will be continued as per admission orders.  Patient has chronic hypokalemia.  I will add oral potassium from today.        P.S: This note was completed using Dragon voice recognition technology and may include unintended errors with respect to translation of words, typographical errors or grammar errors which may not have been identified while finalizing the chart.

## 2025-01-23 NOTE — PROGRESS NOTES
"Physical Therapy    Physical Therapy Treatment    Patient Name: Guicho Jones  MRN: [id number]  Today's Date: 1/23/2025  Time Calculation  Start Time: 1302  Stop Time: 1328  Time Calculation (min): 26 min     [id number]/1004-B    Assessment/Plan   End of Session Communication: Bedside nurse    End of Session Patient Position:  (Patient requested to return to bed after treatment. Call light/tray in reach. No alarm at start/end of treatment.)    PT Plan  Inpatient/Swing Bed or Outpatient: Inpatient  Treatment/Interventions: Transfer training, Gait training, Positioning  PT Plan: Ongoing PT  PT Frequency: 3 times per week  PT Discharge Recommendations: Moderate intensity level of continued care    PT Recommended Transfer Status: Assist x2, Assistive device    General Visit Information:   PT  Visit  PT Received On: 01/23/25    General  Co-Treatment Reason:  (co-treatment performed with OT as patient has h/o refusal.  One unit billed for PT)  Prior to Session Communication:  (Cleared by RN for PT)  Patient Position Received:  (Patient presents sitting EOB, agreeable to PT)    General Comment:  (Patient came to ED with decreased energy, nausea, vomiting, and diarrhea. Pt slid out of bed  and was helped back to bed by his wife. In the morning,he was unable to move d/t weakness.)    General Observations:   General Observation:  (3L 02 (removed during amb and returned after); external male catheter)    Subjective  \"I need to get another unit of blood\"  Per RN there are no current orders for transfusion.     Precautions:  Precautions  Hearing/Visual Limitations: Circle  Medical Precautions: Fall precautions    Pain:  Pain Assessment  Pain Assessment: 0-10  0-10 (Numeric) Pain Score:  (no numerical rating provided)  Pain Location:  (back)    Cognition:  Cognition  Overall Cognitive Status: Within Functional Limits (pleasant and cooperative)    Balance:   Static Standing Balance  Static Standing-Level of Assistance:  (P+ static " stand)  Static Standing-Comment/Number of Minutes:  (2 minutes standing at sink while performing ADL's. Heavy lean on sink for support, fwd flexed posture. C/o fatigue and back pain.)    Dynamic Standing Balance  Dynamic Standing-Balance:  (Fair-/Poor+ dynamic stand.  Balance decreased with fatigue and increased distance.)      Activity Tolerance:  Activity Tolerance  Endurance: Decreased tolerance for upright activites       Bed Mobility  Bed Mobility:  (sit->supine: mod x2  Assist to lift LE's and second person to guide trunk down onto mattress.  Assist also needed for repositioning in bed.)    Ambulation/Gait Training  Ambulation/Gait Training Performed:  (Patient amb 15'x2 with ww and mod x2.  Chair follow for safety. Slow guillermina, fwd flexed posture.  Tends to advance ww to far ahead.  Cues given to stand tall and remain inside walkers PADMINI.  Patient reports increased back pain/fatigue.)    Transfers  Transfer:  (Two stands performed (EOB; low toilet).  Standing from EOB patient required min x2.  Cues for hand placement.  He required mod x1 to stand from low toilet seat with patient using bathroom rails to pull up from)         Outcome Measures:   [facility] Basic Mobility  Turning from your back to your side while in a flat bed without using bedrails: A little  Moving from lying on your back to sitting on the side of a flat bed without using bedrails: A little  Moving to and from bed to chair (including a wheelchair): A little  Standing up from a chair using your arms (e.g. wheelchair or bedside chair): A lot  To walk in hospital room: A lot  Climbing 3-5 steps with railing: Total  Basic Mobility - Total Score: 14             Education Documentation  Body Mechanics, taught by Jeanne Caputo PTA at 1/23/2025  2:08 PM.  Learner: Patient  Readiness: Acceptance  Method: Explanation, Demonstration  Response: Needs Reinforcement      Mobility Training, taught by Jeanne Caputo PTA at 1/23/2025  2:08 PM.  Learner:  Patient  Readiness: Acceptance  Method: Explanation, Demonstration  Response: Needs Reinforcement        Encounter Problems       Encounter Problems (Active)       PT Problem       Pt will completed supine <> sit bed mobility from flat bed with Min A  (Progressing)       Start:  01/13/25    Expected End:  01/27/25            Pt will demonstrate sit to stand transfers with WW + Min A (Progressing)       Start:  01/13/25    Expected End:  01/27/25            Pt. will ambulate 30 with WW + Min A  (Progressing)       Start:  01/13/25    Expected End:  01/27/25            Pt will maintain balance while reaching outside PADMINI in sitting with WW/ B LE support, single UE support and Min A (Not Progressing)       Start:  01/13/25    Expected End:  01/27/25            Pt will independently complete B LE strengthening Hep (Not Progressing)       Start:  01/13/25    Expected End:  01/27/25               Pain - Adult

## 2025-01-23 NOTE — PROGRESS NOTES
"  Department of Internal Medicine  Gastroenterology  Progress note      Subjective  GI is following for GIB    S/p EGD 1/20/2025 with single ulcer in the fundus with flat pigmented spot. Clip x3, injected with epi also noted ulcerated gastritis.     Patient seen and examined at bedside.  Feeling better. Less SOB, still tired and weak. Working with physical therapy. O2 per nasal cannula.  Vital signs stable.  Patient tolerating clear liquids, would like \"real food\".  No abdominal pain,  nausea/vomiting. Loose brown BM this morning. Hgb 7.4->7.9 after one unit of blood yesterday.      Planning for discharge to skilled nursing facility for physical therapy, [facility].     Current Medication    Current Facility-Administered Medications:     acetaminophen (Tylenol) tablet 650 mg, 650 mg, oral, q6h, Rodriguez Sapp MD, 650 mg at 01/23/25 0906    alteplase (Cathflo Activase) injection 2 mg, 2 mg, intra-catheter, PRN, Marc Lomeli MD    amiodarone (Pacerone) tablet 100 mg, 100 mg, oral, Daily, Rodriguez Sapp MD, 100 mg at 01/23/25 0906    aspirin chewable tablet 81 mg, 81 mg, oral, Nightly, Rodriguez Sapp MD, 81 mg at 01/22/25 2120    atorvastatin (Lipitor) tablet 40 mg, 40 mg, oral, Nightly, Rodriguez Sapp MD, 40 mg at 01/22/25 2120    ceFAZolin (Ancef) 2 g in dextrose (iso)  mL, 2 g, intravenous, q8h, Rodriguez Sapp MD, Stopped at 01/23/25 0508    [Held by provider] empagliflozin (Jardiance) tablet 10 mg, 10 mg, oral, Daily, Rodriguez Sapp MD, 10 mg at 01/11/25 0900    ezetimibe (Zetia) tablet 10 mg, 10 mg, oral, Daily, Rodriguez Sapp MD, 10 mg at 01/23/25 0906    famotidine (Pepcid) tablet 20 mg, 20 mg, oral, BID PRN, Rodriguez Sapp MD    fluticasone (Flonase) nasal spray 2 spray, 2 spray, Each Nostril, Daily, Rodriguez Sapp MD, 2 spray at 01/23/25 0908    tiotropium (Spiriva Respimat) 2.5 mcg/actuation inhaler 2 puff, 2 puff, " inhalation, Daily, 2 puff at 01/23/25 0800 **AND** fluticasone furoate-vilanteroL (Breo Ellipta) 200-25 mcg/dose inhaler 1 puff, 1 puff, inhalation, Daily, Shahram Escalante MD, 1 puff at 01/23/25 0800    [Held by provider] hydroCHLOROthiazide (HYDRODiuril) tablet 25 mg, 25 mg, oral, Once per day on Monday Wednesday Friday, Rodriguez Sapp MD    ipratropium-albuteroL (Duo-Neb) 0.5-2.5 mg/3 mL nebulizer solution 3 mL, 3 mL, nebulization, q4h PRN, Rodriguez Sapp MD    ipratropium-albuteroL (Duo-Neb) 0.5-2.5 mg/3 mL nebulizer solution 3 mL, 3 mL, nebulization, TID, Rodriguez Sapp MD, 3 mL at 01/23/25 1252    [Held by provider] isosorbide mononitrate ER (Imdur) 24 hr tablet 30 mg, 30 mg, oral, Daily, Rodriguez Sapp MD    lidocaine 4 % patch 1 patch, 1 patch, transdermal, Daily, Rodriguez Sapp MD, 1 patch at 01/19/25 1502    [Held by provider] lisinopril tablet 40 mg, 40 mg, oral, Daily, Rodriguez Sapp MD    melatonin tablet 5 mg, 5 mg, oral, Nightly, Rodriguez Sapp MD, 5 mg at 01/22/25 2120    methocarbamol (Robaxin) tablet 500 mg, 500 mg, oral, q8h SHABNAM, Rodriguez Sapp MD, 500 mg at 01/23/25 0508    [Held by provider] metoprolol succinate XL (Toprol-XL) 24 hr tablet 100 mg, 100 mg, oral, Nightly, Rodriguez Sapp MD    [Held by provider] metoprolol succinate XL (Toprol-XL) 24 hr tablet 50 mg, 50 mg, oral, Daily, Rodriguez Sapp MD, 50 mg at 01/11/25 0900    metoprolol tartrate (Lopressor) tablet 50 mg, 50 mg, oral, BID, Rodriguez Sapp MD, 50 mg at 01/23/25 0905    montelukast (Singulair) tablet 10 mg, 10 mg, oral, q PM, Rodriguez Sapp MD, 10 mg at 01/22/25 2121    ondansetron (Zofran) injection 4 mg, 4 mg, intravenous, q6h PRN, Rodriguez Sapp MD, 4 mg at 01/12/25 2017    oxyCODONE (Roxicodone) immediate release tablet 2.5 mg, 2.5 mg, oral, q6h PRN, Rodriguez Sapp MD, 2.5 mg at 01/13/25 1401    oxygen (O2) therapy, , inhalation,  Continuous PRN - O2/gases, Rodriguez Sapp MD, 2 L/min at 01/23/25 1252    pantoprazole (ProtoNix) injection 40 mg, 40 mg, intravenous, BID, Vicenta Payne, APRN-CNP, 40 mg at 01/23/25 0946    potassium chloride CR (Klor-Con M20) ER tablet 20 mEq, 20 mEq, oral, BID, Shahram Escalante MD, 20 mEq at 01/23/25 0906    spironolactone (Aldactone) tablet 25 mg, 25 mg, oral, q48h, Rodriguez Sapp MD, 25 mg at 01/23/25 1134    sucralfate (Carafate) tablet 1 g, 1 g, oral, q6h SHABNAM, Jud Leal, APRN-CNP, 1 g at 01/23/25 1134    Past Medical History  Active Ambulatory Problems     Diagnosis Date Noted    Abdominal aortic aneurysm (CMS-HCC) 09/29/2023    AICD (automatic cardioverter/defibrillator) present 09/29/2023    Allergic rhinitis, seasonal 09/29/2023    CAD (coronary artery disease) 10/19/2016    Centrilobular emphysema (Multi) 09/29/2023    Essential hypertension 09/29/2023    Hyperuricemia 09/29/2023    Infarction of right basal ganglia (Multi) 09/29/2023    Ischemic cardiomyopathy 09/29/2023    Mixed hyperlipidemia 09/29/2023    Persistent atrial fibrillation (Multi) 09/29/2023    Renal artery stenosis (CMS-[facility]) 09/29/2023    Tinea cruris 09/29/2023    Anticoagulant long-term use 09/29/2023    Chronic diastolic congestive heart failure, NYHA class 2 09/29/2023    High risk medication use 09/29/2023    Tremor 09/29/2023    Prostate cancer (Multi) 09/29/2023    Blepharitis of both eyes 01/28/2015    Epiretinal membrane (ERM) of right eye 03/15/2016    Drusen (degenerative) of retina 01/28/2015    Dermatochalasis of right upper eyelid 08/17/2015    Dermatochalasis of left upper eyelid 08/17/2015    Degenerative retinal drusen of both eyes 08/17/2015    Choroidal nevus, right eye 01/28/2015    Lumbosacral spondylosis without myelopathy 07/23/2015    Floaters 08/17/2015    Spinal stenosis, lumbar region with neurogenic claudication 07/17/2018    Pseudophakia of both eyes 01/14/2016    Marko  "01/28/2015    Obesity, morbid (Multi) 10/19/2016    Vitreomacular traction syndrome of right eye 01/14/2016    COPD exacerbation (Multi) 10/19/2016    MGD (meibomian gland dysfunction) 01/28/2015    CVA (cerebral vascular accident) (Multi) 10/09/2023    Primary osteoarthritis of right knee 11/13/2023    Never smoked cigarettes 02/01/2024    Pulmonary hypertension (Multi) 02/02/2024    Hidradenitis suppurativa of anus 11/25/2024     Resolved Ambulatory Problems     Diagnosis Date Noted    NSVT (nonsustained ventricular tachycardia) (Multi) 09/29/2023    Paroxysmal ventricular tachycardia (Multi) 09/29/2023    Rosacea 09/29/2023    Shortness of breath 09/29/2023    Acute pain of both hips 09/29/2023    Acute pain of right knee 09/29/2023    Acute sinusitis 09/29/2023    Anemia 10/19/2016    Hypokalemia 10/19/2016    Chest pain 10/30/2016    Diastolic congestive heart failure, NYHA class 2 10/09/2023    BMI 36.0-36.9,adult 10/09/2023    Cutaneous candidiasis 10/09/2023    VT (ventricular tachycardia) (Multi) 03/13/2024     Past Medical History:   Diagnosis Date    Allergic     Arthritis     Cancer (Multi)     Cataract     CHF (congestive heart failure)     COPD (chronic obstructive pulmonary disease) (Multi)     Encounter for follow-up examination after completed treatment for conditions other than malignant neoplasm 12/27/2021    Heart disease     Hypertension     Myocardial infarction (Multi)     Pain in unspecified hip     Personal history of other diseases of the musculoskeletal system and connective tissue     Personal history of other endocrine, nutritional and metabolic disease     Personal history of other specified conditions 12/21/2021    Presence of coronary angioplasty implant and graft     Unspecified atrial flutter (Multi)        PHYSICAL EXAM  VS: /60   Pulse 60   Temp 36.3 °C (97.3 °F) (Temporal)   Resp 16   Ht 1.676 m (5' 6\")   Wt 107 kg (235 lb 10.8 oz)   SpO2 95%   BMI 38.04 kg/m²  Body " mass index is 38.04 kg/m².    No acute distress, alert and oriented, mild labored breathing with activity, abdomen soft, nontender.     DATA  Recent blood work and relevant radiology and endoscopic studies were reviewed and discussed with the patient   Results from last 7 days   Lab Units 01/23/25  0459   WBC AUTO x10*3/uL 11.4*   RBC AUTO x10*6/uL 2.49*   HEMOGLOBIN g/dL 7.9*   HEMATOCRIT % 24.6*   MCV fL 99   MCHC g/dL 32.1   RDW % 18.0*   PLATELETS AUTO x10*3/uL 139*       Results from last 72 hours   Lab Units 01/23/25  0459   SODIUM mmol/L 139   POTASSIUM mmol/L 4.1   CHLORIDE mmol/L 112*   CO2 mmol/L 22   BUN mg/dL 22   CREATININE mg/dL 0.74   CALCIUM mg/dL 7.6*   PROTEIN TOTAL g/dL 5.1*   BILIRUBIN TOTAL mg/dL 0.9   ALK PHOS U/L 47   AST U/L 17   ALT U/L 5*       ENDOSCOPIC REVIEW  EGD 1/20/2025:  Impression  The cricopharynx, upper third of the esophagus, middle third of the esophagus, lower third of the esophagus and GE junction appeared normal.  Single ulcer in the fundus of the stomach with flat pigmented spot (Shahbaz IIC); placed 3 clips successfully; injected epinephrine to address bleeding; hemostasis achieved  Severe edematous, erythematous, ulcerated mucosa with erosion in the stomach, consistent with gastritis; performed cold forceps biopsy to rule out H. pylori  The duodenal bulb and 2nd part of the duodenum appeared normal.      IMPRESSION/RECOMMENDATIONS  Guicho Jones is a 84 y.o. male with a PMH of   ICM s/p ICD, MI, diastolic heart failure, CAD s/p PCI, COPD (centrilobular emphysema), pulmonary hypertension, HTN, HLD, persistent Afib on ATC with xarelto, CVA, infrarenal AAA, obesity, and prostate cancer who presented to [facility] emergency department 1/10/2025 by EMS for fall.  Admitted to critical care with septic shock 2/2 MSSA pneumonia/bacteremia with acute on chronic hypoxic respiratory failure and ISAIAS.  Transition to regular medical floor with plans for SNF placement.  Patient had large  melena x1 on 1/20/2025 while on heparin drip.  Hgb 10.5->9.1->7.5.     Patient EGD yesterday noting single 20 mm x 15 mm large deep benign-appearing ulcer in the fundus of the stomach with flat pigmented spot, 3 clips successfully placed, injected epinephrine to address bleeding, hemostasis achieved, gastritis also noted    Melena 2/2 large ulcer in the fundus of the stomach.    Acute blood loss anemia - Hgb 10.5->9.1->7.5. Stable Hgb 7.4-7.9. Received a total of 2 units of RBCs this admission.   Long term anticoagulation Xarelto - on hold. Currently only on Aspirin      PLAN  -Consider transfusing RBCs for Hgb <8.0 in ischemic cardiomyopathy patient with COPD  -Consider iron infusions.  -Continue pantoprazole 40 mg p.o. twice daily at discharge  -Continue sucralfate 1 g 3 times daily x 8 weeks  -No anticoagulation for 48-72 hours post EGD done 1/20/2025  -Clear liquid diet, advance to soft diet today  -Make NPO after midnight for consideration of EGD tomorrow.   -Consider IR if recurrent active GIB  -Trend H&H and transfuse PRBC for Hgb <8.0  -Monitor stool color and consistency and document.  Notify GI with any recurrent melena, hematochezia.  -Avoid NSAIDs - currently on Aspirin 81 mg daily  -Await pathology results  -Follow up with GI in 2 weeks to review pathology results.   -Repeat EGD in 2 months, scheduled for 3/24/2025 at [facility] with Dr. Mejia     Discussed with Dr. Castillo.  GI will continue to follow    (Electronically signed byVICKY Augustin on 1/23/2025 at 1:04 PM)

## 2025-01-24 ENCOUNTER — APPOINTMENT (OUTPATIENT)
Dept: RADIOLOGY | Facility: HOSPITAL | Age: 85
DRG: 871 | End: 2025-01-24
Payer: MEDICARE

## 2025-01-24 LAB
ALBUMIN SERPL BCP-MCNC: 2.5 G/DL (ref 3.4–5)
ALP SERPL-CCNC: 46 U/L (ref 33–136)
ALT SERPL W P-5'-P-CCNC: 4 U/L (ref 10–52)
ANION GAP SERPL CALC-SCNC: 8 MMOL/L (ref 10–20)
AST SERPL W P-5'-P-CCNC: 15 U/L (ref 9–39)
BASOPHILS # BLD AUTO: 0.02 X10*3/UL (ref 0–0.1)
BASOPHILS NFR BLD AUTO: 0.2 %
BILIRUB SERPL-MCNC: 0.8 MG/DL (ref 0–1.2)
BLOOD EXPIRATION DATE: NORMAL
BUN SERPL-MCNC: 17 MG/DL (ref 6–23)
CALCIUM SERPL-MCNC: 7.6 MG/DL (ref 8.6–10.3)
CHLORIDE SERPL-SCNC: 112 MMOL/L (ref 98–107)
CO2 SERPL-SCNC: 22 MMOL/L (ref 21–32)
CREAT SERPL-MCNC: 0.77 MG/DL (ref 0.5–1.3)
DISPENSE STATUS: NORMAL
EGFRCR SERPLBLD CKD-EPI 2021: 88 ML/MIN/1.73M*2
EOSINOPHIL # BLD AUTO: 0.06 X10*3/UL (ref 0–0.4)
EOSINOPHIL NFR BLD AUTO: 0.6 %
ERYTHROCYTE [DISTWIDTH] IN BLOOD BY AUTOMATED COUNT: 17.9 % (ref 11.5–14.5)
GLUCOSE BLD MANUAL STRIP-MCNC: 89 MG/DL (ref 74–99)
GLUCOSE SERPL-MCNC: 83 MG/DL (ref 74–99)
HCT VFR BLD AUTO: 25.7 % (ref 41–52)
HGB BLD-MCNC: 8.2 G/DL (ref 13.5–17.5)
IMM GRANULOCYTES # BLD AUTO: 0.22 X10*3/UL (ref 0–0.5)
IMM GRANULOCYTES NFR BLD AUTO: 2.3 % (ref 0–0.9)
LABORATORY COMMENT REPORT: NORMAL
LYMPHOCYTES # BLD AUTO: 0.95 X10*3/UL (ref 0.8–3)
LYMPHOCYTES NFR BLD AUTO: 9.9 %
MAGNESIUM SERPL-MCNC: 1.38 MG/DL (ref 1.6–2.4)
MCH RBC QN AUTO: 30.9 PG (ref 26–34)
MCHC RBC AUTO-ENTMCNC: 31.9 G/DL (ref 32–36)
MCV RBC AUTO: 97 FL (ref 80–100)
MONOCYTES # BLD AUTO: 0.79 X10*3/UL (ref 0.05–0.8)
MONOCYTES NFR BLD AUTO: 8.3 %
NEUTROPHILS # BLD AUTO: 7.51 X10*3/UL (ref 1.6–5.5)
NEUTROPHILS NFR BLD AUTO: 78.7 %
NRBC BLD-RTO: 0 /100 WBCS (ref 0–0)
PATH REPORT.COMMENTS IMP SPEC: NORMAL
PATH REPORT.FINAL DX SPEC: NORMAL
PATH REPORT.GROSS SPEC: NORMAL
PATH REPORT.RELEVANT HX SPEC: NORMAL
PATH REPORT.TOTAL CANCER: NORMAL
PHOSPHATE SERPL-MCNC: 3 MG/DL (ref 2.5–4.9)
PLATELET # BLD AUTO: 134 X10*3/UL (ref 150–450)
POTASSIUM SERPL-SCNC: 4.2 MMOL/L (ref 3.5–5.3)
PRODUCT BLOOD TYPE: 6200
PRODUCT CODE: NORMAL
PROT SERPL-MCNC: 5 G/DL (ref 6.4–8.2)
RBC # BLD AUTO: 2.65 X10*6/UL (ref 4.5–5.9)
SODIUM SERPL-SCNC: 138 MMOL/L (ref 136–145)
UNIT ABO: NORMAL
UNIT NUMBER: NORMAL
UNIT RH: NORMAL
UNIT VOLUME: 350
WBC # BLD AUTO: 9.6 X10*3/UL (ref 4.4–11.3)
XM INTEP: NORMAL

## 2025-01-24 PROCEDURE — 2500000001 HC RX 250 WO HCPCS SELF ADMINISTERED DRUGS (ALT 637 FOR MEDICARE OP): Performed by: STUDENT IN AN ORGANIZED HEALTH CARE EDUCATION/TRAINING PROGRAM

## 2025-01-24 PROCEDURE — 1200000002 HC GENERAL ROOM WITH TELEMETRY DAILY

## 2025-01-24 PROCEDURE — 82947 ASSAY GLUCOSE BLOOD QUANT: CPT

## 2025-01-24 PROCEDURE — 2500000001 HC RX 250 WO HCPCS SELF ADMINISTERED DRUGS (ALT 637 FOR MEDICARE OP): Performed by: FAMILY MEDICINE

## 2025-01-24 PROCEDURE — 2500000002 HC RX 250 W HCPCS SELF ADMINISTERED DRUGS (ALT 637 FOR MEDICARE OP, ALT 636 FOR OP/ED): Performed by: STUDENT IN AN ORGANIZED HEALTH CARE EDUCATION/TRAINING PROGRAM

## 2025-01-24 PROCEDURE — 84100 ASSAY OF PHOSPHORUS: CPT | Performed by: STUDENT IN AN ORGANIZED HEALTH CARE EDUCATION/TRAINING PROGRAM

## 2025-01-24 PROCEDURE — 2550000001 HC RX 255 CONTRASTS: Performed by: FAMILY MEDICINE

## 2025-01-24 PROCEDURE — 99232 SBSQ HOSP IP/OBS MODERATE 35: CPT | Performed by: NURSE PRACTITIONER

## 2025-01-24 PROCEDURE — 74177 CT ABD & PELVIS W/CONTRAST: CPT | Performed by: RADIOLOGY

## 2025-01-24 PROCEDURE — 2500000001 HC RX 250 WO HCPCS SELF ADMINISTERED DRUGS (ALT 637 FOR MEDICARE OP): Performed by: NURSE PRACTITIONER

## 2025-01-24 PROCEDURE — 74177 CT ABD & PELVIS W/CONTRAST: CPT

## 2025-01-24 PROCEDURE — 85025 COMPLETE CBC W/AUTO DIFF WBC: CPT | Performed by: INTERNAL MEDICINE

## 2025-01-24 PROCEDURE — 2500000005 HC RX 250 GENERAL PHARMACY W/O HCPCS: Performed by: STUDENT IN AN ORGANIZED HEALTH CARE EDUCATION/TRAINING PROGRAM

## 2025-01-24 PROCEDURE — 2500000004 HC RX 250 GENERAL PHARMACY W/ HCPCS (ALT 636 FOR OP/ED): Performed by: INTERNAL MEDICINE

## 2025-01-24 PROCEDURE — 83735 ASSAY OF MAGNESIUM: CPT | Performed by: STUDENT IN AN ORGANIZED HEALTH CARE EDUCATION/TRAINING PROGRAM

## 2025-01-24 PROCEDURE — 99233 SBSQ HOSP IP/OBS HIGH 50: CPT | Performed by: FAMILY MEDICINE

## 2025-01-24 PROCEDURE — 80053 COMPREHEN METABOLIC PANEL: CPT | Performed by: STUDENT IN AN ORGANIZED HEALTH CARE EDUCATION/TRAINING PROGRAM

## 2025-01-24 PROCEDURE — 99232 SBSQ HOSP IP/OBS MODERATE 35: CPT | Performed by: INTERNAL MEDICINE

## 2025-01-24 PROCEDURE — 2500000002 HC RX 250 W HCPCS SELF ADMINISTERED DRUGS (ALT 637 FOR MEDICARE OP, ALT 636 FOR OP/ED): Performed by: INTERNAL MEDICINE

## 2025-01-24 PROCEDURE — 94640 AIRWAY INHALATION TREATMENT: CPT

## 2025-01-24 PROCEDURE — 2500000004 HC RX 250 GENERAL PHARMACY W/ HCPCS (ALT 636 FOR OP/ED): Mod: JZ | Performed by: STUDENT IN AN ORGANIZED HEALTH CARE EDUCATION/TRAINING PROGRAM

## 2025-01-24 PROCEDURE — 97116 GAIT TRAINING THERAPY: CPT | Mod: GP,CQ

## 2025-01-24 RX ORDER — SUCRALFATE 1 G/1
1 TABLET ORAL EVERY 6 HOURS SCHEDULED
Start: 2025-01-24

## 2025-01-24 RX ORDER — FLUCONAZOLE 2 MG/ML
200 INJECTION, SOLUTION INTRAVENOUS EVERY 24 HOURS
Status: DISCONTINUED | OUTPATIENT
Start: 2025-01-24 | End: 2025-01-25

## 2025-01-24 RX ORDER — SPIRONOLACTONE 25 MG/1
25 TABLET ORAL
Start: 2025-01-25

## 2025-01-24 RX ORDER — POTASSIUM CHLORIDE 20 MEQ/1
20 TABLET, EXTENDED RELEASE ORAL 2 TIMES DAILY
Start: 2025-01-24

## 2025-01-24 RX ORDER — CEFAZOLIN SODIUM 2 G/100ML
2 INJECTION, SOLUTION INTRAVENOUS EVERY 8 HOURS
Start: 2025-01-24

## 2025-01-24 RX ORDER — PANTOPRAZOLE SODIUM 40 MG/1
40 TABLET, DELAYED RELEASE ORAL 2 TIMES DAILY
Start: 2025-01-24

## 2025-01-24 RX ADMIN — TIOTROPIUM BROMIDE INHALATION SPRAY 2 PUFF: 3.12 SPRAY, METERED RESPIRATORY (INHALATION) at 08:21

## 2025-01-24 RX ADMIN — SUCRALFATE 1 G: 1 TABLET ORAL at 05:53

## 2025-01-24 RX ADMIN — FLUTICASONE PROPIONATE 2 SPRAY: 50 SPRAY, METERED NASAL at 08:23

## 2025-01-24 RX ADMIN — SUCRALFATE 1 G: 1 TABLET ORAL at 10:17

## 2025-01-24 RX ADMIN — IPRATROPIUM BROMIDE AND ALBUTEROL SULFATE 3 ML: 2.5; .5 SOLUTION RESPIRATORY (INHALATION) at 06:49

## 2025-01-24 RX ADMIN — POTASSIUM CHLORIDE 20 MEQ: 1500 TABLET, EXTENDED RELEASE ORAL at 21:18

## 2025-01-24 RX ADMIN — MONTELUKAST SODIUM 10 MG: 10 TABLET, FILM COATED ORAL at 21:18

## 2025-01-24 RX ADMIN — METOPROLOL TARTRATE 50 MG: 50 TABLET, FILM COATED ORAL at 21:18

## 2025-01-24 RX ADMIN — METOPROLOL TARTRATE 50 MG: 50 TABLET, FILM COATED ORAL at 08:22

## 2025-01-24 RX ADMIN — IPRATROPIUM BROMIDE AND ALBUTEROL SULFATE 3 ML: 2.5; .5 SOLUTION RESPIRATORY (INHALATION) at 20:51

## 2025-01-24 RX ADMIN — SUCRALFATE 1 G: 1 TABLET ORAL at 15:28

## 2025-01-24 RX ADMIN — ASPIRIN 81 MG: 81 TABLET, CHEWABLE ORAL at 21:18

## 2025-01-24 RX ADMIN — PANTOPRAZOLE SODIUM 40 MG: 40 TABLET, DELAYED RELEASE ORAL at 21:18

## 2025-01-24 RX ADMIN — IOHEXOL 75 ML: 350 INJECTION, SOLUTION INTRAVENOUS at 14:34

## 2025-01-24 RX ADMIN — METHOCARBAMOL TABLETS 500 MG: 500 TABLET, COATED ORAL at 23:15

## 2025-01-24 RX ADMIN — ACETAMINOPHEN 650 MG: 325 TABLET ORAL at 15:28

## 2025-01-24 RX ADMIN — ACETAMINOPHEN 650 MG: 325 TABLET ORAL at 23:14

## 2025-01-24 RX ADMIN — PANTOPRAZOLE SODIUM 40 MG: 40 TABLET, DELAYED RELEASE ORAL at 05:53

## 2025-01-24 RX ADMIN — FLUCONAZOLE IN SODIUM CHLORIDE 200 MG: 2 INJECTION, SOLUTION INTRAVENOUS at 17:34

## 2025-01-24 RX ADMIN — SUCRALFATE 1 G: 1 TABLET ORAL at 23:15

## 2025-01-24 RX ADMIN — ACETAMINOPHEN 650 MG: 325 TABLET ORAL at 10:13

## 2025-01-24 RX ADMIN — CEFAZOLIN SODIUM 2 G: 2 INJECTION, SOLUTION INTRAVENOUS at 15:27

## 2025-01-24 RX ADMIN — ATORVASTATIN CALCIUM 40 MG: 20 TABLET, FILM COATED ORAL at 21:18

## 2025-01-24 RX ADMIN — POTASSIUM CHLORIDE 20 MEQ: 1500 TABLET, EXTENDED RELEASE ORAL at 08:22

## 2025-01-24 RX ADMIN — CEFAZOLIN SODIUM 2 G: 2 INJECTION, SOLUTION INTRAVENOUS at 23:14

## 2025-01-24 RX ADMIN — FLUTICASONE FUROATE AND VILANTEROL TRIFENATATE 1 PUFF: 200; 25 POWDER RESPIRATORY (INHALATION) at 08:21

## 2025-01-24 RX ADMIN — Medication 5 MG: at 21:17

## 2025-01-24 RX ADMIN — CEFAZOLIN SODIUM 2 G: 2 INJECTION, SOLUTION INTRAVENOUS at 05:22

## 2025-01-24 RX ADMIN — EZETIMIBE 10 MG: 10 TABLET ORAL at 08:23

## 2025-01-24 RX ADMIN — METHOCARBAMOL TABLETS 500 MG: 500 TABLET, COATED ORAL at 05:53

## 2025-01-24 RX ADMIN — METHOCARBAMOL TABLETS 500 MG: 500 TABLET, COATED ORAL at 15:28

## 2025-01-24 RX ADMIN — LIDOCAINE 4% 1 PATCH: 40 PATCH TOPICAL at 10:14

## 2025-01-24 RX ADMIN — AMIODARONE HYDROCHLORIDE 100 MG: 200 TABLET ORAL at 08:22

## 2025-01-24 RX ADMIN — ACETAMINOPHEN 650 MG: 325 TABLET ORAL at 05:53

## 2025-01-24 ASSESSMENT — COGNITIVE AND FUNCTIONAL STATUS - GENERAL
CLIMB 3 TO 5 STEPS WITH RAILING: TOTAL
DRESSING REGULAR LOWER BODY CLOTHING: A LITTLE
STANDING UP FROM CHAIR USING ARMS: A LITTLE
MOVING TO AND FROM BED TO CHAIR: A LITTLE
TURNING FROM BACK TO SIDE WHILE IN FLAT BAD: A LITTLE
PERSONAL GROOMING: A LITTLE
TURNING FROM BACK TO SIDE WHILE IN FLAT BAD: A LITTLE
MOBILITY SCORE: 15
MOBILITY SCORE: 17
WALKING IN HOSPITAL ROOM: A LITTLE
DRESSING REGULAR UPPER BODY CLOTHING: A LITTLE
CLIMB 3 TO 5 STEPS WITH RAILING: TOTAL
HELP NEEDED FOR BATHING: A LITTLE
DAILY ACTIVITIY SCORE: 19
STANDING UP FROM CHAIR USING ARMS: A LITTLE
STANDING UP FROM CHAIR USING ARMS: A LITTLE
MOVING FROM LYING ON BACK TO SITTING ON SIDE OF FLAT BED WITH BEDRAILS: A LITTLE
TOILETING: A LITTLE
TURNING FROM BACK TO SIDE WHILE IN FLAT BAD: A LITTLE
MOVING TO AND FROM BED TO CHAIR: A LITTLE
TOILETING: A LITTLE
MOBILITY SCORE: 16
CLIMB 3 TO 5 STEPS WITH RAILING: TOTAL
DRESSING REGULAR LOWER BODY CLOTHING: A LITTLE
DAILY ACTIVITIY SCORE: 19
HELP NEEDED FOR BATHING: A LITTLE
PERSONAL GROOMING: A LITTLE
MOVING TO AND FROM BED TO CHAIR: A LITTLE
MOVING FROM LYING ON BACK TO SITTING ON SIDE OF FLAT BED WITH BEDRAILS: A LITTLE
WALKING IN HOSPITAL ROOM: A LITTLE
WALKING IN HOSPITAL ROOM: A LOT
DRESSING REGULAR UPPER BODY CLOTHING: A LITTLE

## 2025-01-24 ASSESSMENT — PAIN SCALES - GENERAL
PAINLEVEL_OUTOF10: 0 - NO PAIN
PAINLEVEL_OUTOF10: 0 - NO PAIN

## 2025-01-24 ASSESSMENT — PAIN - FUNCTIONAL ASSESSMENT: PAIN_FUNCTIONAL_ASSESSMENT: 0-10

## 2025-01-24 NOTE — CARE PLAN
The patient's goals for the shift include Patient will remain safe and free from falls    The clinical goals for the shift include Patient will remain free from injury throughout shift      Problem: Fall/Injury  Goal: Not fall by end of shift  Outcome: Progressing     Problem: Pain - Adult  Goal: Verbalizes/displays adequate comfort level or baseline comfort level  Outcome: Progressing     Problem: Discharge Planning  Goal: Discharge to home or other facility with appropriate resources  Outcome: Progressing     Problem: Chronic Conditions and Co-morbidities  Goal: Patient's chronic conditions and co-morbidity symptoms are monitored and maintained or improved  Outcome: Progressing     Problem: Skin  Goal: Prevent/minimize sheer/friction injuries  Outcome: Progressing     Problem: Skin  Goal: Promote/optimize nutrition  Outcome: Progressing      Problem: Nutrition  Goal: Adequate PO fluid intake  Outcome: Progressing

## 2025-01-24 NOTE — DISCHARGE SUMMARY
Discharge Diagnosis  Diarrhea, unspecified type    Issues Requiring Follow-Up  IV antibiotics for 6 weeks.  Weekly CBC BMP and CRP  Xarelto was on hold from GI bleed.  Okay to resume 2 to 3 days after EGD  Test Results Pending At Discharge  Pending Labs       Order Current Status    Surgical Pathology Exam In process            Hospital Course    Came with MSSA bacteremia. Was in MICU. Currently on IV Cefazolin. Was about to be discharged to [facility]. But had upper GI bleed. Low H&H. Got 2 units of PRBC. GI took him for EGD and clipped a large GI ulcer. Got PICC. ID wants IV Cefazolin X 6 weeks.     Admitted to the ICU for sepsis. Found to have MSSA bacteria. Unclear source. Is on IV antibiotics. Infectious disease is following. Pressures have returned to normal. Acute kidney injury is resolved. Blood cultures have been negative for the last two days. They are awaiting a third blood culture result. Will need pick line for Prolonged antibiotics per Infectious disease.      Guicho Jones is a 84 y.o. year old male patient with Past Medical History of  ICM s/p ICD, MI, diastolic heart failure, CAD s/p PCI, COPD (centrilobular emphysema), pulmonary hypertension, HTN, HLD, persistent Afib on ATC with xarelto, CVA, infrarenal AAA, obesity, and prostate cancer who presented to [facility] emergency department by EMS for fall. Reports on Wednesday after eating pizza began to have nausea, vomiting, and diarrhea. Nausea and vomiting improved but had persistent diarrhea. Last night at approx 0130 slipped out of the bed when attempting to get up to the bathroom. Wife was able to assist back into the bed but this morning was unable to ambulate due to weakness prompting family to call EMS. Denies sick contacts and no other family members with similar symptoms.     Patient has prolonged ICU course.  Bacteremia.  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  Infectious disease following.  Will require 6 weeks of IV Ancef.   Developed GI bleed.  Seen by gastroenterology.  Underwent emergent EGD.  Clipped a gastric ulcer.  Had blood loss anemia.  Required a total of 3 units PRBCs.  Hemoglobin stable.  Tolerating soft diet.  Continue 40 mg twice daily proton pump inhibitor.  Continue sucralfate 1 g 3 times daily for 8 weeks.  Resume anticoagulation 72 hours after EGD.    1/25/25  Addendum  Patient was appropriate for discharge yesterday however became concerning about possible fungal infection in the ulcer.  To rule out perforation he underwent stat CT abdomen no acute.  Gastroenterology and infectious disease evaluation and discussion with pathology.  Noted that fungal elements are likely colonization.  However, to be sure, he will be treated with IV antifungal for 5 days.  Today he is feeling well.  Tolerating diet.  Hemoglobin hematocrit remains stable.  At this point it is okay to resume his Xarelto.  We will proceed with discharge to [facility] to complete his course of IV antibiotics.      Pertinent Physical Exam At Time of Discharge  Physical Exam    Home Medications     Medication List      START taking these medications     alteplase 2 mg injection; Commonly known as: Cathflo Activase; 2 mL (2   mg) by intra-catheter route once daily as needed (line care).   ceFAZolin IVPB; Commonly known as: Ancef; Infuse 100 mL (2 g) over 30   minutes into a venous catheter every 8 hours.   oxygen gas therapy; Commonly known as: O2; Inhale 2 L/min once every 24   hours.   pantoprazole 40 mg EC tablet; Commonly known as: ProtoNix; Take 1 tablet   (40 mg) by mouth 2 times a day. Do not crush, chew, or split.   potassium chloride CR 20 mEq ER tablet; Commonly known as: Klor-Con M20;   Take 1 tablet (20 mEq) by mouth 2 times a day. Do not crush or chew.   spironolactone 25 mg tablet; Commonly known as: Aldactone; Take 1 tablet   (25 mg) by mouth every other day.; Start taking on: January 25, 2025   sucralfate 1 gram tablet;  Commonly known as: Carafate; Take 1 tablet (1   g) by mouth every 6 hours.     CONTINUE taking these medications     amiodarone 200 mg tablet; Commonly known as: Pacerone; Take 0.5 tablets   (100 mg) by mouth once daily.   aspirin 81 mg chewable tablet   atorvastatin 40 mg tablet; Commonly known as: Lipitor; Take 1 tablet (40   mg) by mouth once daily at bedtime.   Breztri Aerosphere 160-9-4.8 mcg/actuation HFA aerosol inhaler; Generic   drug: budesonide-glycopyr-formoterol   empagliflozin 10 mg; Commonly known as: Jardiance; Take 1 tablet (10 mg)   by mouth once daily.   ezetimibe 10 mg tablet; Commonly known as: Zetia; Take 1 tablet (10 mg)   by mouth once daily.   Fish OiL 1,000 (120-180) mg capsule; Generic drug: omega 3-dha-epa-fish   oil   fluticasone 50 mcg/actuation nasal spray; Commonly known as: Flonase;   USE 2 SPRAYS IN EACH NOSTRIL EVERY DAY   glucosamine HCl 1,500 mg tablet   ipratropium 42 mcg (0.06 %) nasal spray; Commonly known as: Atrovent;   Administer 2 sprays into each nostril 3 times a day.   isosorbide mononitrate ER 30 mg 24 hr tablet; Commonly known as: Imdur;   Take 1 tablet (30 mg) by mouth once daily. 1 tablet daily   lisinopril 40 mg tablet; TAKE 1 TABLET EVERY DAY   MagOx 400 mg (241.3 mg magnesium) tablet; Generic drug: magnesium oxide   metoprolol succinate  mg 24 hr tablet; Commonly known as:   Toprol-XL; 1/2 tablet in the morning, 1 full tablet at night   montelukast 10 mg tablet; Commonly known as: Singulair; TAKE 1 TABLET   EVERY EVENING   nitroglycerin 0.4 mg SL tablet; Commonly known as: Nitrostat; Place 1   tablet (0.4 mg) under the tongue every 5 minutes if needed for chest pain.   Proventil HFA 90 mcg/actuation inhaler; Generic drug: albuterol     STOP taking these medications     amoxicillin 500 mg capsule; Commonly known as: Amoxil   hydroCHLOROthiazide 25 mg tablet; Commonly known as: HYDRODiuril   metoprolol tartrate 25 mg tablet; Commonly known as: Lopressor    rivaroxaban 20 mg tablet; Commonly known as: Xarelto       Outpatient Follow-Up  Future Appointments   Date Time Provider Department Center   1/31/2025  2:00 PM Nia Delong MD [id number] [place]   2/3/2025  9:15 AM Shahram Escalante MD [id number] [place]   2/17/2025 11:00 AM Hugh Degroot MD [id number] [place]   3/24/2025  2:00 PM Quintin Mejia MD [id number] [place]   5/5/2025  8:45 AM Joce Bassett MD [id number] [place]       Baldo Almazan MD

## 2025-01-24 NOTE — PROGRESS NOTES
Received message from GI that pathology report shows a possible fungus, so stat CT scan order was placed. ID has been trying to find out from pathology what fungi they are thinking patient may have. ID placed patient on empirical anti fungal Fluconazole, however has since singed off leaving the continuation of the Fluconazole to GI. Patient's precert expires tonight at midnight, patient will need new precert started tomorrow or whenever final antibiotic order is in place. Patient will more than likely be stuck here through mid next week.

## 2025-01-24 NOTE — CARE PLAN
The patient's goals for the shift include Patient will remain safe and free from falls    The clinical goals for the shift include Pt will remain free from bleeding throughout shift    Over the shift, the patient did not make progress toward the following goals. Barriers to progression include history of GI bleed. Recommendations to address these barriers include monitor stools and hemoglobin, educate pt.

## 2025-01-24 NOTE — PROGRESS NOTES
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  Underwent emergent EGD today with transfusion.     Patient is weak in general but no complaints.  Seen with nurse at bedside.  Resting at the present time minimal abdominal cramping at this time.  Gastric ulcer found during EGD        Lab Results   Component Value Date    WBC 9.6 01/24/2025    HGB 8.2 (L) 01/24/2025    HCT 25.7 (L) 01/24/2025    MCV 97 01/24/2025     (L) 01/24/2025     Lab Results   Component Value Date    GLUCOSE 83 01/24/2025    CALCIUM 7.6 (L) 01/24/2025     01/24/2025    K 4.2 01/24/2025    CO2 22 01/24/2025     (H) 01/24/2025    BUN 17 01/24/2025    CREATININE 0.77 01/24/2025       WBC trends are being monitored. Antibiotic doses are being adjusted per most recent renal labs.     Vitals:    01/24/25 0649   BP:    Pulse:    Resp:    Temp:    SpO2: 95%           Patient Active Problem List   Diagnosis    Abdominal aortic aneurysm (CMS-HCC)    AICD (automatic cardioverter/defibrillator) present    Allergic rhinitis, seasonal    CAD (coronary artery disease)    Centrilobular emphysema (Multi)    Essential hypertension    Hyperuricemia    Infarction of right basal ganglia (Multi)    Ischemic cardiomyopathy    Mixed hyperlipidemia    Persistent atrial fibrillation (Multi)    Renal artery stenosis (CMS-HCC)    Tinea cruris    Anticoagulant long-term use    Chronic diastolic congestive heart failure, NYHA class 2    High risk medication use    Tremor    Prostate cancer (Multi)    Blepharitis of both eyes    Epiretinal membrane (ERM) of right eye    Drusen (degenerative) of retina    Dermatochalasis of right upper eyelid    Dermatochalasis of left upper eyelid    Degenerative retinal drusen of both eyes    Choroidal nevus, right eye    Lumbosacral spondylosis  "without myelopathy    Floaters    Spinal stenosis, lumbar region with neurogenic claudication    Pseudophakia of both eyes    Pinguecula    Obesity, morbid (Multi)    Vitreomacular traction syndrome of right eye    COPD exacerbation (Multi)    MGD (meibomian gland dysfunction)    CVA (cerebral vascular accident) (Multi)    Primary osteoarthritis of right knee    Never smoked cigarettes    Pulmonary hypertension (Multi)    Hidradenitis suppurativa of anus    Diarrhea, unspecified type    Bacteremia       ASSESSMENT:  New GI bleed, status post emergent EGD today with acute blood loss anemia  MSSA bacteremia with possible endocarditis.  Patient has AICD.  Blood cultures on 1/10/2025 were positive for Staphylococcus aureus.  PAOLO reported to be negative.     PLAN:  Plan is still same from ID perspective    Patient being treated for probable endocarditis with 6 weeks IV cefazolin 2 g IV every 8 hours from an infectious disease standpoint    Patient will need weekly CBC BMP CRP after discharge to be sent to the infectious disease clinic  Follow-up in 3 weeks with infectious disease  Follow-up within 1 to 2 weeks with primary care physician  PICC line will need to be pulled at the end of therapy    NEW:  Contacted by epic chat between  and gastroenterology.  Apparently, pathologist spoke to GI and there was mention of possibility of \"fungal infection\" contributing to large gastric ulcer. Stat CT also pending. There is a pathology report that was completed today that states \"stomach biopsy ulcerated gastric mucosa containing fungal organisms -(see comment).... Under the comment it states inflammation involves fibromuscular tissue suggesting the possibility of perforation. Helicobacter not present    Paged pathology to discuss -  then discussed by epic chat:   Per Dr Newell: \"Hard to tell to be honest. We can sort of say it looks a bit like Brooke, but without culture we have no idea actually what it would be. , " "which will probably be out Monday, though I'm not sure that's going to help a whole lot.\" ....\"I do not think anybody actually ever cultures these things, but if you wanted to do that then you to have to get some the center micro directly. I am happy to give the PAS slide to the microbiologist to examine on Monday\" No further information provided.     Called and spoke to GI APRN CNP - Unclear if there was esophageal thrush and unclear exactly what was discussed regarding the fungal infection.     Will empirically start Fluconazole but will defer further work up and length of therapy to GI since they had the conversation with pathology and I have minimal information at this point. ID will continue treatment for MSSA endocarditis.       Beth Clark, DO    "

## 2025-01-24 NOTE — PROGRESS NOTES
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  Underwent emergent EGD today with transfusion.     Patient is weak in general but no complaints.  Seen with nurse at bedside.  Resting at the present time minimal abdominal cramping at this time.  Gastric ulcer found during EGD      Complains of a little bit of bloating overall but no diarrhea no bloody stools no nausea or vomiting      Lab Results   Component Value Date    WBC 11.4 (H) 01/23/2025    HGB 7.9 (L) 01/23/2025    HCT 24.6 (L) 01/23/2025    MCV 99 01/23/2025     (L) 01/23/2025     Lab Results   Component Value Date    GLUCOSE 85 01/23/2025    CALCIUM 7.6 (L) 01/23/2025     01/23/2025    K 4.1 01/23/2025    CO2 22 01/23/2025     (H) 01/23/2025    BUN 22 01/23/2025    CREATININE 0.74 01/23/2025       WBC trends are being monitored. Antibiotic doses are being adjusted per most recent renal labs.     Vitals:    01/23/25 2115   BP: 121/67   Pulse: 60   Resp: 20   Temp: 36.2 °C (97.2 °F)   SpO2: 97%           Patient Active Problem List   Diagnosis    Abdominal aortic aneurysm (CMS-HCC)    AICD (automatic cardioverter/defibrillator) present    Allergic rhinitis, seasonal    CAD (coronary artery disease)    Centrilobular emphysema (Multi)    Essential hypertension    Hyperuricemia    Infarction of right basal ganglia (Multi)    Ischemic cardiomyopathy    Mixed hyperlipidemia    Persistent atrial fibrillation (Multi)    Renal artery stenosis (CMS-HCC)    Tinea cruris    Anticoagulant long-term use    Chronic diastolic congestive heart failure, NYHA class 2    High risk medication use    Tremor    Prostate cancer (Multi)    Blepharitis of both eyes    Epiretinal membrane (ERM) of right eye    Drusen (degenerative) of retina    Dermatochalasis of right upper eyelid     Dermatochalasis of left upper eyelid    Degenerative retinal drusen of both eyes    Choroidal nevus, right eye    Lumbosacral spondylosis without myelopathy    Floaters    Spinal stenosis, lumbar region with neurogenic claudication    Pseudophakia of both eyes    Pinguecula    Obesity, morbid (Multi)    Vitreomacular traction syndrome of right eye    COPD exacerbation (Multi)    MGD (meibomian gland dysfunction)    CVA (cerebral vascular accident) (Multi)    Primary osteoarthritis of right knee    Never smoked cigarettes    Pulmonary hypertension (Multi)    Hidradenitis suppurativa of anus    Diarrhea, unspecified type    Bacteremia       Lying in bed on his left side  Neck supple  Heart S1S2  Chest: Equal expansion  Abdomen: soft, ND  Extrem: no pain to palpation  Skin: no rashes, no diaphoresis  Neuro: CNS intact      ASSESSMENT:  New GI bleed, status post emergent EGD today with acute blood loss anemia  MSSA bacteremia with possible endocarditis.  Patient has AICD.  Blood cultures on 1/10/2025 were positive for Staphylococcus aureus.  PAOLO reported to be negative.     PLAN:  Plan is still same from ID perspective    Patient being treated for probable endocarditis with 6 weeks IV cefazolin 2 g IV every 8 hours from an infectious disease standpoint    Patient will need weekly CBC BMP CRP after discharge to be sent to the infectious disease clinic  Follow-up in 3 weeks with infectious disease  Follow-up within 1 to 2 weeks with primary care physician  PICC line will need to be pulled at the end of therapy    Case discussed with primary    Imaging and labs were reviewed per medical records and any ID pertinent labs were also addressed  Time spent before, during and after care today, including coordination of care >40 min      Beth Clark DO

## 2025-01-24 NOTE — PROGRESS NOTES
"Guicho Jones is a 84 y.o. male on day 14 of admission presenting with Diarrhea, unspecified type.    Subjective   Patient seen at the bedside.  States feels well today.  No current complaints.  Yesterday had received an additional 1 unit PRBCs.  Tolerated transfusion.  Hemoglobin improved this morning.  Tolerating soft diet.  No further gastrointestinal bleeding.  At this point patient stable for transfer to skilled nursing facility to complete his course of IV antibiotics for 6 weeks.       Objective     Physical Exam  Vitals and nursing note reviewed.   Constitutional:       Appearance: Normal appearance.   HENT:      Head: Normocephalic and atraumatic.   Eyes:      Extraocular Movements: Extraocular movements intact.      Conjunctiva/sclera: Conjunctivae normal.      Pupils: Pupils are equal, round, and reactive to light.   Cardiovascular:      Rate and Rhythm: Normal rate and regular rhythm.      Heart sounds: Normal heart sounds.   Pulmonary:      Effort: Pulmonary effort is normal.      Breath sounds: Normal breath sounds.   Abdominal:      General: Abdomen is flat. Bowel sounds are normal.      Palpations: Abdomen is soft.   Musculoskeletal:         General: Normal range of motion.   Skin:     General: Skin is warm and dry.   Neurological:      General: No focal deficit present.      Mental Status: He is alert and oriented to person, place, and time. Mental status is at baseline.   Psychiatric:         Mood and Affect: Mood normal.         Behavior: Behavior normal.          Last Recorded Vitals  Blood pressure 111/61, pulse 60, temperature 36.9 °C (98.4 °F), resp. rate 18, height 1.676 m (5' 6\"), weight 105 kg (231 lb), SpO2 95%.  Intake/Output last 3 Shifts:  I/O last 3 completed shifts:  In: 750 (7.2 mL/kg) [Blood:350; IV Piggyback:400]  Out: 1650 (15.7 mL/kg) [Urine:1650 (0.4 mL/kg/hr)]  Weight: 104.8 kg     Relevant Results  Recent Results (from the past 72 hours)   Magnesium    Collection Time: " 01/22/25  5:42 AM   Result Value Ref Range    Magnesium 1.52 (L) 1.60 - 2.40 mg/dL   CBC    Collection Time: 01/22/25  5:42 AM   Result Value Ref Range    WBC 12.9 (H) 4.4 - 11.3 x10*3/uL    nRBC 0.2 (H) 0.0 - 0.0 /100 WBCs    RBC 2.34 (L) 4.50 - 5.90 x10*6/uL    Hemoglobin 7.4 (L) 13.5 - 17.5 g/dL    Hematocrit 23.1 (L) 41.0 - 52.0 %    MCV 99 80 - 100 fL    MCH 31.6 26.0 - 34.0 pg    MCHC 32.0 32.0 - 36.0 g/dL    RDW 17.7 (H) 11.5 - 14.5 %    Platelets 152 150 - 450 x10*3/uL   Comprehensive Metabolic Panel    Collection Time: 01/22/25  5:42 AM   Result Value Ref Range    Glucose 93 74 - 99 mg/dL    Sodium 141 136 - 145 mmol/L    Potassium 4.1 3.5 - 5.3 mmol/L    Chloride 114 (H) 98 - 107 mmol/L    Bicarbonate 23 21 - 32 mmol/L    Anion Gap 8 (L) 10 - 20 mmol/L    Urea Nitrogen 35 (H) 6 - 23 mg/dL    Creatinine 0.84 0.50 - 1.30 mg/dL    eGFR 85 >60 mL/min/1.73m*2    Calcium 7.7 (L) 8.6 - 10.3 mg/dL    Albumin 2.5 (L) 3.4 - 5.0 g/dL    Alkaline Phosphatase 48 33 - 136 U/L    Total Protein 4.9 (L) 6.4 - 8.2 g/dL    AST 18 9 - 39 U/L    Bilirubin, Total 1.0 0.0 - 1.2 mg/dL    ALT 7 (L) 10 - 52 U/L   Phosphorus    Collection Time: 01/22/25  5:42 AM   Result Value Ref Range    Phosphorus 3.4 2.5 - 4.9 mg/dL   Prepare RBC: 1 Units    Collection Time: 01/22/25 10:32 AM   Result Value Ref Range    PRODUCT CODE H5556G25     Unit Number [id number]     Unit ABO O     Unit RH NEG     XM INTEP COMP     Dispense Status TR     Blood Expiration Date 2/3/2025 11:59:00 PM EST     PRODUCT BLOOD TYPE 9500     UNIT VOLUME 350    Magnesium    Collection Time: 01/23/25  4:59 AM   Result Value Ref Range    Magnesium 1.41 (L) 1.60 - 2.40 mg/dL   Comprehensive Metabolic Panel    Collection Time: 01/23/25  4:59 AM   Result Value Ref Range    Glucose 85 74 - 99 mg/dL    Sodium 139 136 - 145 mmol/L    Potassium 4.1 3.5 - 5.3 mmol/L    Chloride 112 (H) 98 - 107 mmol/L    Bicarbonate 22 21 - 32 mmol/L    Anion Gap 9 (L) 10 - 20 mmol/L     Urea Nitrogen 22 6 - 23 mg/dL    Creatinine 0.74 0.50 - 1.30 mg/dL    eGFR 89 >60 mL/min/1.73m*2    Calcium 7.6 (L) 8.6 - 10.3 mg/dL    Albumin 2.5 (L) 3.4 - 5.0 g/dL    Alkaline Phosphatase 47 33 - 136 U/L    Total Protein 5.1 (L) 6.4 - 8.2 g/dL    AST 17 9 - 39 U/L    Bilirubin, Total 0.9 0.0 - 1.2 mg/dL    ALT 5 (L) 10 - 52 U/L   Phosphorus    Collection Time: 01/23/25  4:59 AM   Result Value Ref Range    Phosphorus 2.9 2.5 - 4.9 mg/dL   CBC and Auto Differential    Collection Time: 01/23/25  4:59 AM   Result Value Ref Range    WBC 11.4 (H) 4.4 - 11.3 x10*3/uL    nRBC 0.0 0.0 - 0.0 /100 WBCs    RBC 2.49 (L) 4.50 - 5.90 x10*6/uL    Hemoglobin 7.9 (L) 13.5 - 17.5 g/dL    Hematocrit 24.6 (L) 41.0 - 52.0 %    MCV 99 80 - 100 fL    MCH 31.7 26.0 - 34.0 pg    MCHC 32.1 32.0 - 36.0 g/dL    RDW 18.0 (H) 11.5 - 14.5 %    Platelets 139 (L) 150 - 450 x10*3/uL    Neutrophils % 77.8 40.0 - 80.0 %    Immature Granulocytes %, Automated 3.9 (H) 0.0 - 0.9 %    Lymphocytes % 9.5 13.0 - 44.0 %    Monocytes % 7.8 2.0 - 10.0 %    Eosinophils % 0.8 0.0 - 6.0 %    Basophils % 0.2 0.0 - 2.0 %    Neutrophils Absolute 8.83 (H) 1.60 - 5.50 x10*3/uL    Immature Granulocytes Absolute, Automated 0.44 0.00 - 0.50 x10*3/uL    Lymphocytes Absolute 1.08 0.80 - 3.00 x10*3/uL    Monocytes Absolute 0.89 (H) 0.05 - 0.80 x10*3/uL    Eosinophils Absolute 0.09 0.00 - 0.40 x10*3/uL    Basophils Absolute 0.02 0.00 - 0.10 x10*3/uL   Iron and TIBC    Collection Time: 01/23/25  4:59 AM   Result Value Ref Range    Iron 32 (L) 35 - 150 ug/dL    UIBC 181 110 - 370 ug/dL    TIBC 213 (L) 240 - 445 ug/dL    % Saturation 15 (L) 25 - 45 %   Ferritin    Collection Time: 01/23/25  4:59 AM   Result Value Ref Range    Ferritin 501 (H) 20 - 300 ng/mL   Prepare RBC: 1 Units    Collection Time: 01/23/25  4:27 PM   Result Value Ref Range    PRODUCT CODE K2700I24     Unit Number [id number]     Unit ABO A     Unit RH POS     XM INTEP COMP     Dispense Status IS     Blood  Expiration Date 2/5/2025 11:59:00 PM EST     PRODUCT BLOOD TYPE 6200     UNIT VOLUME 350    Lavender Top    Collection Time: 01/23/25  4:53 PM   Result Value Ref Range    Extra Tube Hold for add-ons.    Magnesium    Collection Time: 01/24/25  5:20 AM   Result Value Ref Range    Magnesium 1.38 (L) 1.60 - 2.40 mg/dL   Comprehensive Metabolic Panel    Collection Time: 01/24/25  5:20 AM   Result Value Ref Range    Glucose 83 74 - 99 mg/dL    Sodium 138 136 - 145 mmol/L    Potassium 4.2 3.5 - 5.3 mmol/L    Chloride 112 (H) 98 - 107 mmol/L    Bicarbonate 22 21 - 32 mmol/L    Anion Gap 8 (L) 10 - 20 mmol/L    Urea Nitrogen 17 6 - 23 mg/dL    Creatinine 0.77 0.50 - 1.30 mg/dL    eGFR 88 >60 mL/min/1.73m*2    Calcium 7.6 (L) 8.6 - 10.3 mg/dL    Albumin 2.5 (L) 3.4 - 5.0 g/dL    Alkaline Phosphatase 46 33 - 136 U/L    Total Protein 5.0 (L) 6.4 - 8.2 g/dL    AST 15 9 - 39 U/L    Bilirubin, Total 0.8 0.0 - 1.2 mg/dL    ALT 4 (L) 10 - 52 U/L   Phosphorus    Collection Time: 01/24/25  5:20 AM   Result Value Ref Range    Phosphorus 3.0 2.5 - 4.9 mg/dL   CBC and Auto Differential    Collection Time: 01/24/25  5:20 AM   Result Value Ref Range    WBC 9.6 4.4 - 11.3 x10*3/uL    nRBC 0.0 0.0 - 0.0 /100 WBCs    RBC 2.65 (L) 4.50 - 5.90 x10*6/uL    Hemoglobin 8.2 (L) 13.5 - 17.5 g/dL    Hematocrit 25.7 (L) 41.0 - 52.0 %    MCV 97 80 - 100 fL    MCH 30.9 26.0 - 34.0 pg    MCHC 31.9 (L) 32.0 - 36.0 g/dL    RDW 17.9 (H) 11.5 - 14.5 %    Platelets 134 (L) 150 - 450 x10*3/uL    Neutrophils % 78.7 40.0 - 80.0 %    Immature Granulocytes %, Automated 2.3 (H) 0.0 - 0.9 %    Lymphocytes % 9.9 13.0 - 44.0 %    Monocytes % 8.3 2.0 - 10.0 %    Eosinophils % 0.6 0.0 - 6.0 %    Basophils % 0.2 0.0 - 2.0 %    Neutrophils Absolute 7.51 (H) 1.60 - 5.50 x10*3/uL    Immature Granulocytes Absolute, Automated 0.22 0.00 - 0.50 x10*3/uL    Lymphocytes Absolute 0.95 0.80 - 3.00 x10*3/uL    Monocytes Absolute 0.79 0.05 - 0.80 x10*3/uL    Eosinophils Absolute 0.06  0.00 - 0.40 x10*3/uL    Basophils Absolute 0.02 0.00 - 0.10 x10*3/uL   POCT GLUCOSE    Collection Time: 01/24/25  6:36 AM   Result Value Ref Range    POCT Glucose 89 74 - 99 mg/dL                          acetaminophen, 650 mg, oral, q6h  amiodarone, 100 mg, oral, Daily  aspirin, 81 mg, oral, Nightly  atorvastatin, 40 mg, oral, Nightly  ceFAZolin, 2 g, intravenous, q8h  [Held by provider] empagliflozin, 10 mg, oral, Daily  ezetimibe, 10 mg, oral, Daily  fluticasone, 2 spray, Each Nostril, Daily  tiotropium, 2 puff, inhalation, Daily   And  fluticasone furoate-vilanteroL, 1 puff, inhalation, Daily  [Held by provider] hydroCHLOROthiazide, 25 mg, oral, Once per day on Monday Wednesday Friday  ipratropium-albuteroL, 3 mL, nebulization, TID  [Held by provider] isosorbide mononitrate ER, 30 mg, oral, Daily  lidocaine, 1 patch, transdermal, Daily  [Held by provider] lisinopril, 40 mg, oral, Daily  melatonin, 5 mg, oral, Nightly  methocarbamol, 500 mg, oral, q8h SHABNAM  [Held by provider] metoprolol succinate XL, 100 mg, oral, Nightly  [Held by provider] metoprolol succinate XL, 50 mg, oral, Daily  metoprolol tartrate, 50 mg, oral, BID  montelukast, 10 mg, oral, q PM  pantoprazole, 40 mg, oral, BID  potassium chloride CR, 20 mEq, oral, BID  spironolactone, 25 mg, oral, q48h  sucralfate, 1 g, oral, q6h SHABNAM        ECG 12 lead    Result Date: 1/19/2025  Sinus rhythm with short AL Nonspecific ST and T wave abnormality Prolonged QT Abnormal ECG When compared with ECG of 09-MAR-2024 12:54, Sinus rhythm has replaced Electronic ventricular pacemaker Vent. rate has decreased BY  33 BPM See ED provider note for full interpretation and clinical correlation Confirmed by Thania Welch ([id number]) on 1/19/2025 9:42:07 PM    ECG 12 lead    Result Date: 1/19/2025  Sinus rhythm with AV dissociation and Wide QRS rhythm Nonspecific intraventricular conduction delay Nonspecific ST and T wave abnormality Abnormal ECG When compared with ECG of  10-DOREEN-2025 13:04, (unconfirmed) Wide QRS rhythm has replaced Sinus rhythm See ED provider note for full interpretation and clinical correlation Confirmed by Thania Welch ([id number]) on 1/19/2025 9:39:52 PM    Transthoracic Echo (TTE) Complete    Result Date: 1/11/2025          [place] [id number]  Tel [phone] Fax [phone] TRANSTHORACIC ECHOCARDIOGRAM REPORT Patient Name:       ASHLY Castano Physician:    [id number] Trell Turpin DO Study Date:         1/11/2025            Ordering Provider:    [id number] MARSHAL MI MRN/PID:            [id number]             Fellow: Accession#:         [id number]         Nurse: Date of Birth/Age:  1940 / 84 years Sonographer:          Cici Fong RDCS Gender Assigned at  M                    Additional Staff: Birth: Height:             167.64 cm            Admit Date:           1/10/2025 Weight:             102.97 kg            Admission Status:     Inpatient -                                                                Routine BSA / BMI:          2.11 m2 / 36.64      Department Location:  [facility]                     kg/m2 Blood Pressure: 110 /51 mmHg Study Type:    TRANSTHORACIC ECHO (TTE) COMPLETE Diagnosis/ICD: Bacteremia-R78.81 Indication:    Bacteremia CPT Codes:     Echo Complete w Full [id number] Patient History: Pacer/Defib:       AICD Pertinent History: A-Fib, AAA, CAD, HTN, Hyperlipidemia, Cardiomyopathy and CVA. Study Detail: The following Echo studies were performed: 2D, M-Mode, Doppler and               color flow. Technically challenging study due to patient lying in               supine position. Definity used as a contrast agent for endocardial               border definition and agitated saline  used as a contrast agent for               intraseptal flow evaluation. Total contrast used for this               procedure was 2 mL via IV push. The patient was awake.  PHYSICIAN INTERPRETATION: Left Ventricle: The left ventricular systolic function is normal, with a visually estimated ejection fraction of 55%. There is mild to moderate concentric left ventricular hypertrophy. There are no regional wall motion abnormalities. The left ventricular cavity size is normal. There is moderately increased septal and mildly increased posterior left ventricular wall thickness. Spectral Doppler shows a normal pattern of left ventricular diastolic filling. LV Wall Scoring: All segments are normal. Left Atrium: The left atrium is moderately dilated. There is a small patent foramen ovale with predominantly left to right shunting across the atrial septum. A patent foramen ovale was visualized using color Doppler. Right Ventricle: The right ventricle is normal in size. There is normal right ventricular global systolic function. A device is visualized in the right ventricle. Right Atrium: The right atrium is mild to moderately dilated. There is a device visualized in the right atrium. Aortic Valve: The aortic valve appears structurally normal. The aortic valve dimensionless index is 0.70. There is mild aortic valve regurgitation. The peak instantaneous gradient of the aortic valve is 11 mmHg. The mean gradient of the aortic valve is 7 mmHg. Mitral Valve: The mitral valve is normal in structure. There is no evidence of mitral valve stenosis. There is normal mitral valve leaflet mobility. There is mild mitral valve regurgitation. Tricuspid Valve: The tricuspid valve is structurally normal. There is normal tricuspid valve leaflet mobility. There is mild tricuspid regurgitation. Pulmonic Valve: The pulmonic valve is structurally normal. There is no indication of pulmonic valve regurgitation. Pericardium: No pericardial effusion noted.  Aorta: The aortic root is normal. There is moderate dilatation of the ascending aorta. Systemic Veins: The inferior vena cava appears mildly dilated.  CONCLUSIONS:  1. The left ventricular systolic function is normal, with a visually estimated ejection fraction of 55%.  2. No cardiac source of embolus visualized.  3. No definite valvular vegetations were visualized.  4. There is normal right ventricular global systolic function.  5. The left atrium is moderately dilated.  6. The right atrium is mild to moderately dilated.  7. There is no evidence of mitral valve stenosis.  8. Mild mitral valve regurgitation.  9. Mild tricuspid regurgitation is visualized. 10. Mild aortic valve regurgitation. 11. The inferior vena cava appears mildly dilated. 12. Small patent foramen ovale. 13. There is moderate dilatation of the ascending aorta. 14. There is moderately increased septal and mildly increased posterior left ventricular wall thickness. 15. If strong concern for vegetation and/or source of septicemia a transesophageal echo would be necessary for better assessment. RECOMMENDATIONS: Technically suboptimal and limited study, therefore accuracy of above interpretation could be substantially diminished. Clinical correlation is advised. Consider additional imaging modalities if clinically indicated. Transthoracic echo has low negative predictive value for mass, vegetation, or embolic source. Consider PAOLO or MRI if clinically indicated.  QUANTITATIVE DATA SUMMARY:  2D MEASUREMENTS:            Normal Ranges: Ao Root d:       3.32 cm    (2.0-3.7cm) LAs:             5.48 cm    (2.7-4.0cm) IVSd:            1.37 cm    (0.6-1.1cm) LVPWd:           1.20 cm    (0.6-1.1cm) LVIDd:           4.93 cm    (3.9-5.9cm) LVIDs:           3.57 cm LV Mass Index:   119.3 g/m2 LV % FS          27.6 %  LA VOLUME:                    Normal Ranges: LA Vol A4C:        107.6 ml   (22+/-6mL/m2) LA Vol A2C:        111.9 ml LA Vol BP:         114.7 ml LA  Vol Index A4C:  51.0ml/m2 LA Vol Index A2C:  53.0 ml/m2 LA Vol Index BP:   54.4 ml/m2 LA Area A4C:       31.9 cm2 LA Area A2C:       31.1 cm2 LA Major Axis A4C: 8.0 cm LA Major Axis A2C: 7.4 cm LA Volume Index:   51.7 ml/m2  RA VOLUME BY A/L METHOD:            Normal Ranges: RA Vol A4C:              80.9 ml    (8.3-19.5ml) RA Vol Index A4C:        38.3 ml/m2 RA Area A4C:             25.9 cm2 RA Major Axis A4C:       7.0 cm  AORTA MEASUREMENTS:         Normal Ranges: Asc Ao, d:          4.57 cm (2.1-3.4cm)  LV SYSTOLIC FUNCTION BY 2D PLANIMETRY (MOD):                      Normal Ranges: EF-A4C View:    50 % (>=55%) EF-A2C View:    53 % EF-Biplane:     48 % EF-Visual:      55 % LV EF Reported: 55 %  LV DIASTOLIC FUNCTION:           Normal Ranges: MV Peak E:             1.12 m/s  (0.7-1.2 m/s) MV Peak A:             0.66 m/s  (0.42-0.7 m/s) E/A Ratio:             1.69      (1.0-2.2) MV e'                  0.093 m/s (>8.0) MV lateral e'          0.12 m/s MV medial e'           0.07 m/s E/e' Ratio:            12.09     (<8.0)  MITRAL VALVE:          Normal Ranges: MV DT:        232 msec (150-240msec)  AORTIC VALVE:                      Normal Ranges: AoV Vmax:                1.67 m/s  (<=1.7m/s) AoV Peak P.2 mmHg (<20mmHg) AoV Mean P.0 mmHg  (1.7-11.5mmHg) LVOT Max Angel:            1.19 m/s  (<=1.1m/s) AoV VTI:                 37.90 cm  (18-25cm) LVOT VTI:                26.60 cm LVOT Diameter:           2.07 cm   (1.8-2.4cm) AoV Area, VTI:           2.36 cm2  (2.5-5.5cm2) AoV Area,Vmax:           2.40 cm2  (2.5-4.5cm2) AoV Dimensionless Index: 0.70  RIGHT VENTRICLE: RV Basal 4.13 cm RV Mid   3.25 cm RV Major 8.0 cm TAPSE:   25.3 mm RV s'    0.23 m/s  TRICUSPID VALVE/RVSP:          Normal Ranges: Peak TR Velocity:     3.26 m/s RV Syst Pressure:     46 mmHg  (< 30mmHg) IVC Diam:             2.33 cm  PULMONIC VALVE:          Normal Ranges: PV Accel Time:  82 msec  (>120ms) PV Max Angel:      1.2 m/s  (0.6-0.9m/s) PV Max P.2 mmHg  [id number] Trell Turpin DO Electronically signed on 2025 at 12:40:02 PM  Wall Scoring  ** Final **     CT head wo IV contrast    Result Date: 1/10/2025  Interpreted By:  Rachna Umanzor, STUDY: CT HEAD WO IV CONTRAST; CT CERVICAL SPINE WO IV CONTRAST;  1/10/2025 2:47 pm   INDICATION: Signs/Symptoms:fall.   COMPARISON: None.   ACCESSION NUMBER(S): [id number]; [id number]   ORDERING CLINICIAN: RACQUEL CROOKS   TECHNIQUE: Noncontrast axial CT scan of head was performed. Angled reformats in brain and bone windows were generated. The images were reviewed in bone, brain, blood and soft tissue windows.   Axial CT images of the cervical spine are obtained. Axial, coronal and sagittal reconstructions are provided for review.   FINDINGS: CT HEAD:   CSF Spaces:The ventricles, sulci and basal cisterns are normal in configuration and diffusely prominent, consistent with volume loss. There is no extraaxial fluid collection.   Parenchyma: Periventricular, subcortical, and deep white matter areas of hypodensity are noted. Findings likely due to microvascular ischemic disease. Small circumscribed hypodensity in the left brook and similar appearing hypodensities noted bilateral basal ganglia, are likely lacunar infarcts. Atherosclerotic disease noted intracranial internal carotid and bilateral vertebral arteries.The grey-white differentiation is intact. There is no mass effect or midline shift.  There is no intracranial hemorrhage.   Calvarium: The calvarium is unremarkable, no fracture.   Orbits: The visualized bony orbits are intact. There has been previous cataract surgery bilaterally.   Paranasal Sinuses/Mastoids: Visualized paranasal sinuses and mastoids are clear.   Soft tissues: Unremarkable.   CT CERVICAL SPINE: Images are slightly degraded by patient size and patient motion.   Fractures: There is no evidence for an acute fracture of the cervical spine, allowing for motion  artifacts.   Vertebral Alignment: Slight reversal of normal lordosis in the upper cervical spine, may be positional. No spondylolisthesis.   Craniocervical Junction: The odontoid process and craniocervical junction are intact.   Vertebrae/Disc Spaces: Bones appear demineralized. Severe disc space narrowing C3 through C7 levels noted, with endplate osteophytes, consistent with spondylosis. Multilevel facet osteophytes also noted. Multiple levels of mild to moderate canal narrowing secondary to degenerative changes. No severe canal stenosis. Neural foraminal narrowing secondary to uncovertebral and facet osteophytes appears moderate to severe bilateral C4-5, C5-6 and C6-C7 levels.   Prevertebral/Paraspinal Soft Tissues: No prevertebral edema. No acute soft tissue abnormality. Carotid athero sclerotic disease is noted. Partially imaged leads from a right chest wall pacemaker are noted. Lung apices clear. Emphysema is present.       1. No evidence of acute intracranial abnormality. Mild cerebral volume loss noted and microvascular ischemic changes. 2. No evidence of acute cervical spine fracture. Multilevel cervical spondylosis, relatively severe from C3 level through C7 level as discussed above.       Signed by: Rachna Umanzor 1/10/2025 3:31 PM Dictation workstation:   [id number]    CT cervical spine wo IV contrast    Result Date: 1/10/2025  Interpreted By:  Rachna Umanzor, STUDY: CT HEAD WO IV CONTRAST; CT CERVICAL SPINE WO IV CONTRAST;  1/10/2025 2:47 pm   INDICATION: Signs/Symptoms:fall.   COMPARISON: None.   ACCESSION NUMBER(S): [id number]; [id number]   ORDERING CLINICIAN: RACQUEL CROOKS   TECHNIQUE: Noncontrast axial CT scan of head was performed. Angled reformats in brain and bone windows were generated. The images were reviewed in bone, brain, blood and soft tissue windows.   Axial CT images of the cervical spine are obtained. Axial, coronal and sagittal reconstructions are provided for review.   FINDINGS: CT HEAD:    CSF Spaces:The ventricles, sulci and basal cisterns are normal in configuration and diffusely prominent, consistent with volume loss. There is no extraaxial fluid collection.   Parenchyma: Periventricular, subcortical, and deep white matter areas of hypodensity are noted. Findings likely due to microvascular ischemic disease. Small circumscribed hypodensity in the left brook and similar appearing hypodensities noted bilateral basal ganglia, are likely lacunar infarcts. Atherosclerotic disease noted intracranial internal carotid and bilateral vertebral arteries.The grey-white differentiation is intact. There is no mass effect or midline shift.  There is no intracranial hemorrhage.   Calvarium: The calvarium is unremarkable, no fracture.   Orbits: The visualized bony orbits are intact. There has been previous cataract surgery bilaterally.   Paranasal Sinuses/Mastoids: Visualized paranasal sinuses and mastoids are clear.   Soft tissues: Unremarkable.   CT CERVICAL SPINE: Images are slightly degraded by patient size and patient motion.   Fractures: There is no evidence for an acute fracture of the cervical spine, allowing for motion artifacts.   Vertebral Alignment: Slight reversal of normal lordosis in the upper cervical spine, may be positional. No spondylolisthesis.   Craniocervical Junction: The odontoid process and craniocervical junction are intact.   Vertebrae/Disc Spaces: Bones appear demineralized. Severe disc space narrowing C3 through C7 levels noted, with endplate osteophytes, consistent with spondylosis. Multilevel facet osteophytes also noted. Multiple levels of mild to moderate canal narrowing secondary to degenerative changes. No severe canal stenosis. Neural foraminal narrowing secondary to uncovertebral and facet osteophytes appears moderate to severe bilateral C4-5, C5-6 and C6-C7 levels.   Prevertebral/Paraspinal Soft Tissues: No prevertebral edema. No acute soft tissue abnormality. Carotid athero  sclerotic disease is noted. Partially imaged leads from a right chest wall pacemaker are noted. Lung apices clear. Emphysema is present.       1. No evidence of acute intracranial abnormality. Mild cerebral volume loss noted and microvascular ischemic changes. 2. No evidence of acute cervical spine fracture. Multilevel cervical spondylosis, relatively severe from C3 level through C7 level as discussed above.       Signed by: Rachna Umanzor 1/10/2025 3:31 PM Dictation workstation:   [id number]    CT abdomen pelvis w IV contrast    Result Date: 1/10/2025  Interpreted By:  Rachna Umanzor, STUDY:   CT ABDOMEN PELVIS W IV CONTRAST;  1/10/2025 2:45 pm   INDICATION: Signs/Symptoms:diarrhea.   COMPARISON: 10/03/2022   ACCESSION NUMBER(S): [id number]   ORDERING CLINICIAN: RACQUEL CROOKS   TECHNIQUE: CT of the abdomen and pelvis was performed.  Standard contiguous axial images were obtained at 3 mm slice thickness through the abdomen and pelvis. Coronal and sagittal reconstructions at 3 mm slice thickness were performed.   75 ML Omnipaque 350contrast administered intravenously without immediate complication.   FINDINGS: LOWER CHEST: Dilated main pulmonary artery, likely pulmonary hypertension. Mildly dilated ascending aorta 4.3 cm. Visualized aorta shows no evidence of dissection. There is severe coronary artery calcification. Pacing leads noted in the right atrium and right ventricle. Heart size upper normal. No pleural or pericardial effusion. There is emphysema and mild atelectasis in the lung bases.   ABDOMEN:   LIVER: Lobular liver with features of steatosis and a lobular contour, possible cirrhosis. Overall size is mildly enlarged. No focal lesion is evident.   GALLBLADDER: Gallstones present. No gallbladder wall thickening or pericholecystic fluid.   BILE DUCTS: Normal caliber.   PANCREAS: The pancreas appears within normal limits, no evidence of pancreatitis or mass.   SPLEEN: Normal size. Homogeneous enhancement.   ADRENAL  GLANDS: Within normal limits.   KIDNEYS AND URETERS: Normal size kidneys. No hydronephrosis. No calculi. Renal hypodensities bilaterally, most of which are likely cysts. There is minimal thin calcification along the rim of a posterior left renal cyst which is 9.5 cm and has no other definite complex feature. Posterior cyst of the right kidney is slightly more complex appearing, particularly in the lower aspect, I can not tell if this might be artifact. There are some slight streak artifacts from the right arm being down.   PELVIS:   BLADDER: Limited assessment, streak artifact from right hip arthroplasty. Bladder appears grossly unremarkable.   REPRODUCTIVE ORGANS: Right hip arthroplasty streak artifact obscures the prostate gland.   BOWEL: The stomach is unremarkable. Normal caliber small bowel without inflammatory changes. Liquid stool in the colon suggests diarrhea. No significant wall thickening or pericolonic fat stranding. Few scattered colonic diverticula are noted. Appendix is normal.   VESSELS: Diffuse atherosclerotic disease. Bilobed infrarenal abdominal aortic aneurysm is present, not definitely saccular although has a lobulated appearance and is mildly tortuous. The more proximal portion measures 5.0 cm transverse and 4.6 cm in AP diameter. The more inferiorly located portion of aneurysm transverse diameter 6.1 cm, AP diameter 6.2 cm. Both of the above areas are larger than 2022, if measured in a similar manner of the upper portion was 4.6 x 4.5 cm, in the lower portion has a transverse diameter of 5.7 x 6 cm. No dissection. 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 There appears to be a renal artery stent on the left.   PERITONEUM/RETROPERITONEUM/LYMPH NODES: There is no free or loculated intraperitoneal or retroperitoneal fluid collection, no free intraperitoneal air. No adenopathy.   BONES AND ABDOMINAL WALL: No  evidence of acute fracture. No suspicious osseous lesions are identified. Degenerative changes in the spine at multiple levels. Right total hip arthroplasty is unremarkable in the included portions. The abdominal wall soft tissues appear normal. Left abdominal wall electronic device may be a medication pump.       1.  Liquid stool throughout the colon, consistent with history of diarrhea, nonspecific. No significant wall thickening of the colon or definite evidence of colitis. No obstruction. 2. Slight interval increase in bilobed infrarenal abdominal aortic aneurysm(s) as compared with 2022, without evidence of dissection or acute aortic abnormality, maximum caliber of the largest distal aortic aneurysm segment is 6.1 x 6.2 cm. Vascular follow-up recommended. 3. There is a slightly more complex appearance of a right renal cyst than on the prior study, favor artifact secondary to arms down positioning however may consider an ultrasound to ensure there are no complex features. 4. Other nonacute ancillary findings are unchanged .     Signed by: Rachna Umanzor 1/10/2025 3:25 PM Dictation workstation:   [id number]    XR chest 1 view    Result Date: 1/10/2025  Interpreted By:  Cameron Eaton, STUDY: XR CHEST 1 VIEW;  1/10/2025 1:13 pm   INDICATION: Signs/Symptoms:Chest Pain.     COMPARISON: Selected images from October 3, 2022 CT abdomen and pelvis and March 9, 2024 chest radiograph   ACCESSION NUMBER(S): [id number]   ORDERING CLINICIAN: RACQUEL CROOKS   FINDINGS: AP radiograph of the chest was provided.   Apical lordotic position. Multiple overlapping radiopaque structures.   CARDIOMEDIASTINAL SILHOUETTE: Cardiomediastinal silhouette is normal in size and configuration.   LUNGS: Minimal increased prominence of the markings especially right perihilar region. No well-delineated javon edema or consolidative pneumonia.   ABDOMEN: No remarkable upper abdominal findings.   BONES: No acute osseous changes.       1.  Increased  prominence of the markings particularly right perihilar markings may at least in part be related to difference in patient position.       MACRO: None   Signed by: Cameron Eaton 1/10/2025 1:43 PM Dictation workstation:   [id number]       Assessment/Plan   Assessment & Plan  Diarrhea, unspecified type    Abdominal aortic aneurysm (CMS-HCC)    CAD (coronary artery disease)    Essential hypertension    Ischemic cardiomyopathy    Mixed hyperlipidemia    Persistent atrial fibrillation (Multi)    Chronic diastolic congestive heart failure, NYHA class 2    High risk medication use    Prostate cancer (Multi)    Bacteremia            I spent   minutes in the professional and overall care of this patient.      Baldo Almazan MD

## 2025-01-24 NOTE — PROGRESS NOTES
Department of Internal Medicine  Gastroenterology  Progress note      Subjective  GI is following for GIB    S/p EGD 1/20/2025 with single ulcer in the fundus with flat pigmented spot. Clip x3, injected with epi also noted ulcerated gastritis.     Patient seen and examined at bedside.  Feeling much better.  He is out of bed and sitting in recliner chair.  Color better.  No shortness of breath at rest.  Still wearing O2 at nasal cannula.  Hemodynamically stable.  No fever.  Patient tolerating regular diet.  No abdominal pain,  nausea/vomiting. Loose brown BM this morning. Hgb 7.4->7.9-> 8.2 after 1 unit PRBCs yesterday. Plan is for discharge to [facility] at 2 PM.      NOTE Dr. Milind Newell (pathologist) contacted Dr. Ribeiro in regards to gastric biopsy done Monday, 1/20/2025 with EGD.  Stomach biopsy showed ulcerated gastric mucosa containing fungal organisms.  Inflammation involves fibromuscular tissue, suggesting the possibility of perforation.  H. pylori is not identified.    Current Medication    Current Facility-Administered Medications:     acetaminophen (Tylenol) tablet 650 mg, 650 mg, oral, q6h, Rodriguez Sapp MD, 650 mg at 01/24/25 1013    alteplase (Cathflo Activase) injection 2 mg, 2 mg, intra-catheter, PRN, Marc Lomeli MD    amiodarone (Pacerone) tablet 100 mg, 100 mg, oral, Daily, Rodriguez Sapp MD, 100 mg at 01/24/25 0822    aspirin chewable tablet 81 mg, 81 mg, oral, Nightly, Rodriguez Sapp MD, 81 mg at 01/23/25 2018    atorvastatin (Lipitor) tablet 40 mg, 40 mg, oral, Nightly, Rodriguez Sapp MD, 40 mg at 01/23/25 2018    ceFAZolin (Ancef) 2 g in dextrose (iso)  mL, 2 g, intravenous, q8h, Rodriguez Sapp MD, Stopped at 01/24/25 0559    [Held by provider] empagliflozin (Jardiance) tablet 10 mg, 10 mg, oral, Daily, Rodriguez Sapp MD, 10 mg at 01/11/25 0900    ezetimibe (Zetia) tablet 10 mg, 10 mg, oral, Daily, Rodriguez Sapp MD, 10  mg at 01/24/25 0823    famotidine (Pepcid) tablet 20 mg, 20 mg, oral, BID PRN, Rodriguez Sapp MD    fluticasone (Flonase) nasal spray 2 spray, 2 spray, Each Nostril, Daily, Rodriguez Sapp MD, 2 spray at 01/24/25 0823    tiotropium (Spiriva Respimat) 2.5 mcg/actuation inhaler 2 puff, 2 puff, inhalation, Daily, 2 puff at 01/24/25 0821 **AND** fluticasone furoate-vilanteroL (Breo Ellipta) 200-25 mcg/dose inhaler 1 puff, 1 puff, inhalation, Daily, Shahram Escalante MD, 1 puff at 01/24/25 0821    [Held by provider] hydroCHLOROthiazide (HYDRODiuril) tablet 25 mg, 25 mg, oral, Once per day on Monday Wednesday Friday, Rodriguez Sapp MD    ipratropium-albuteroL (Duo-Neb) 0.5-2.5 mg/3 mL nebulizer solution 3 mL, 3 mL, nebulization, q4h PRN, Rodriguez Sapp MD    ipratropium-albuteroL (Duo-Neb) 0.5-2.5 mg/3 mL nebulizer solution 3 mL, 3 mL, nebulization, TID, Rodriguez Sapp MD, 3 mL at 01/24/25 0649    [Held by provider] isosorbide mononitrate ER (Imdur) 24 hr tablet 30 mg, 30 mg, oral, Daily, Rodriguez Sapp MD    lidocaine 4 % patch 1 patch, 1 patch, transdermal, Daily, Rodriguez Sapp MD, 1 patch at 01/24/25 1014    [Held by provider] lisinopril tablet 40 mg, 40 mg, oral, Daily, Rodriguez Sapp MD    melatonin tablet 5 mg, 5 mg, oral, Nightly, Rodriguez Sapp MD, 5 mg at 01/23/25 2018    methocarbamol (Robaxin) tablet 500 mg, 500 mg, oral, q8h SHABNAM, Rodriguez Sapp MD, 500 mg at 01/24/25 0553    [Held by provider] metoprolol succinate XL (Toprol-XL) 24 hr tablet 100 mg, 100 mg, oral, Nightly, Rodriguez Sapp MD    [Held by provider] metoprolol succinate XL (Toprol-XL) 24 hr tablet 50 mg, 50 mg, oral, Daily, Rodriguez Sapp MD, 50 mg at 01/11/25 0900    metoprolol tartrate (Lopressor) tablet 50 mg, 50 mg, oral, BID, Rodriguez Sapp MD, 50 mg at 01/24/25 0822    montelukast (Singulair) tablet 10 mg, 10 mg, oral, q PM, Rodriguez Sapp MD, 10  mg at 01/23/25 2018    ondansetron (Zofran) injection 4 mg, 4 mg, intravenous, q6h PRN, Rodriguez Sapp MD, 4 mg at 01/12/25 2017    oxyCODONE (Roxicodone) immediate release tablet 2.5 mg, 2.5 mg, oral, q6h PRN, Rodriguez Sapp MD, 2.5 mg at 01/13/25 1401    oxygen (O2) therapy, , inhalation, Continuous PRN - O2/gases, Rodriguez Sapp MD, 2 L/min at 01/23/25 1252    pantoprazole (ProtoNix) EC tablet 40 mg, 40 mg, oral, BID, Baldo Almazan MD, 40 mg at 01/24/25 0553    potassium chloride CR (Klor-Con M20) ER tablet 20 mEq, 20 mEq, oral, BID, Shahram Escalante MD, 20 mEq at 01/24/25 0822    spironolactone (Aldactone) tablet 25 mg, 25 mg, oral, q48h, Rodriguez Sapp MD, 25 mg at 01/23/25 1134    sucralfate (Carafate) tablet 1 g, 1 g, oral, q6h [facility], Jud Leal, APRN-CNP, 1 g at 01/24/25 0553    Past Medical History  Active Ambulatory Problems     Diagnosis Date Noted    Abdominal aortic aneurysm (CMS-HCC) 09/29/2023    AICD (automatic cardioverter/defibrillator) present 09/29/2023    Allergic rhinitis, seasonal 09/29/2023    CAD (coronary artery disease) 10/19/2016    Centrilobular emphysema (Multi) 09/29/2023    Essential hypertension 09/29/2023    Hyperuricemia 09/29/2023    Infarction of right basal ganglia (Multi) 09/29/2023    Ischemic cardiomyopathy 09/29/2023    Mixed hyperlipidemia 09/29/2023    Persistent atrial fibrillation (Multi) 09/29/2023    Renal artery stenosis (CMS-HCC) 09/29/2023    Tinea cruris 09/29/2023    Anticoagulant long-term use 09/29/2023    Chronic diastolic congestive heart failure, NYHA class 2 09/29/2023    High risk medication use 09/29/2023    Tremor 09/29/2023    Prostate cancer (Multi) 09/29/2023    Blepharitis of both eyes 01/28/2015    Epiretinal membrane (ERM) of right eye 03/15/2016    Drusen (degenerative) of retina 01/28/2015    Dermatochalasis of right upper eyelid 08/17/2015    Dermatochalasis of left upper eyelid 08/17/2015    Degenerative  retinal drusen of both eyes 08/17/2015    Choroidal nevus, right eye 01/28/2015    Lumbosacral spondylosis without myelopathy 07/23/2015    Floaters 08/17/2015    Spinal stenosis, lumbar region with neurogenic claudication 07/17/2018    Pseudophakia of both eyes 01/14/2016    Pinguecula 01/28/2015    Obesity, morbid (Multi) 10/19/2016    Vitreomacular traction syndrome of right eye 01/14/2016    COPD exacerbation (Multi) 10/19/2016    MGD (meibomian gland dysfunction) 01/28/2015    CVA (cerebral vascular accident) (Multi) 10/09/2023    Primary osteoarthritis of right knee 11/13/2023    Never smoked cigarettes 02/01/2024    Pulmonary hypertension (Multi) 02/02/2024    Hidradenitis suppurativa of anus 11/25/2024     Resolved Ambulatory Problems     Diagnosis Date Noted    NSVT (nonsustained ventricular tachycardia) (Multi) 09/29/2023    Paroxysmal ventricular tachycardia (Multi) 09/29/2023    Rosacea 09/29/2023    Shortness of breath 09/29/2023    Acute pain of both hips 09/29/2023    Acute pain of right knee 09/29/2023    Acute sinusitis 09/29/2023    Anemia 10/19/2016    Hypokalemia 10/19/2016    Chest pain 10/30/2016    Diastolic congestive heart failure, NYHA class 2 10/09/2023    BMI 36.0-36.9,adult 10/09/2023    Cutaneous candidiasis 10/09/2023    VT (ventricular tachycardia) (Multi) 03/13/2024     Past Medical History:   Diagnosis Date    Allergic     Arthritis     Cancer (Multi)     Cataract     CHF (congestive heart failure)     COPD (chronic obstructive pulmonary disease) (Multi)     Encounter for follow-up examination after completed treatment for conditions other than malignant neoplasm 12/27/2021    Heart disease     Hypertension     Myocardial infarction (Multi)     Pain in unspecified hip     Personal history of other diseases of the musculoskeletal system and connective tissue     Personal history of other endocrine, nutritional and metabolic disease     Personal history of other specified conditions  "12/21/2021    Presence of coronary angioplasty implant and graft     Unspecified atrial flutter (Multi)        PHYSICAL EXAM  VS: /61   Pulse 60   Temp 36.9 °C (98.4 °F)   Resp 18   Ht 1.676 m (5' 6\")   Wt 105 kg (231 lb)   SpO2 95%   BMI 37.28 kg/m²  Body mass index is 37.28 kg/m².    No acute distress, alert and oriented, mild labored breathing with activity, abdomen soft, nontender.     DATA  Recent blood work and relevant radiology and endoscopic studies were reviewed and discussed with the patient   Results from last 7 days   Lab Units 01/24/25  0520   WBC AUTO x10*3/uL 9.6   RBC AUTO x10*6/uL 2.65*   HEMOGLOBIN g/dL 8.2*   HEMATOCRIT % 25.7*   MCV fL 97   MCHC g/dL 31.9*   RDW % 17.9*   PLATELETS AUTO x10*3/uL 134*       Results from last 72 hours   Lab Units 01/24/25  0520   SODIUM mmol/L 138   POTASSIUM mmol/L 4.2   CHLORIDE mmol/L 112*   CO2 mmol/L 22   BUN mg/dL 17   CREATININE mg/dL 0.77   CALCIUM mg/dL 7.6*   PROTEIN TOTAL g/dL 5.0*   BILIRUBIN TOTAL mg/dL 0.8   ALK PHOS U/L 46   AST U/L 15   ALT U/L 4*         ENDOSCOPIC REVIEW  EGD 1/20/2025:  Impression  The cricopharynx, upper third of the esophagus, middle third of the esophagus, lower third of the esophagus and GE junction appeared normal.  Single ulcer in the fundus of the stomach with flat pigmented spot (Shahbaz IIC); placed 3 clips successfully; injected epinephrine to address bleeding; hemostasis achieved  Severe edematous, erythematous, ulcerated mucosa with erosion in the stomach, consistent with gastritis; performed cold forceps biopsy to rule out H. pylori  The duodenal bulb and 2nd part of the duodenum appeared normal.    STOMACH, BIOPSY:  - ULCERATED GASTRIC MUCOSA CONTAINING FUNGAL ORGANISMS. Inflammation involves fibromuscular tissue, suggesting the possibility of perforation.  Helicobacter is not identified.      IMPRESSION/RECOMMENDATIONS  Guicho Jones is a 84 y.o. male with a PMH of   ICM s/p ICD, MI, diastolic " heart failure, CAD s/p PCI, COPD (centrilobular emphysema), pulmonary hypertension, HTN, HLD, persistent Afib on ATC with xarelto, CVA, infrarenal AAA, obesity, and prostate cancer who presented to [facility] emergency department 1/10/2025 by EMS for fall.  Admitted to critical care with septic shock 2/2 MSSA pneumonia/bacteremia with acute on chronic hypoxic respiratory failure and ISAIAS.  Transition to regular medical floor with plans for SNF placement.  Patient had large melena x1 on 1/20/2025 while on heparin drip.  Hgb 10.5->9.1->7.5.     Patient EGD 1/20/25 noting single 20 mm x 15 mm large deep benign-appearing ulcer in the fundus of the stomach with flat pigmented spot, 3 clips successfully placed, injected epinephrine to address bleeding, hemostasis achieved, gastritis also noted    NOTE: Dr. Milind Newell (pathologist) contacted Dr. Mejia in regards to gastric biopsy done Monday, 1/20/2025 with EGD.  Stomach biopsy showed ulcerated gastric mucosa containing fungal organisms.  Inflammation involves fibromuscular tissue, suggesting the possibility of perforation.  H. pylori is not identified.    Melena 2/2 large ulcer in the fundus of the stomach.  Pathology shows ulcerated gastric mucosa containing fungal organisms.  Inflammation involves fibromuscular tissue suggesting the possibility of perforation.  H. pylori negative.  Acute blood loss anemia - Hgb 10.5->9.1->7.5.  Hgb 8.2 after 1 unit PRBCs yesterday.. Received a total of 3 units of RBCs this admission.   Long term anticoagulation Xarelto - on hold. Currently only on Aspirin      1/24/25 Feeling much better.  He is out of bed and sitting in recliner chair.  Color better.  No shortness of breath at rest.  Still wearing O2 at nasal cannula.  Hemodynamically stable.  No fever.  Patient tolerating regular diet.  No abdominal pain,  nausea/vomiting. Had brown BM this morning. Hgb 7.4->7.9-> 8.2 after 1 unit PRBCs yesterday.   Pathology resulted today.  Ulcerated  gastric mucosa containing fungal organism ulceration was very deep and inflammation involves fibromuscular tissue, suggesting the possibility of perforation.    -Recommend CT abdomen to be done now  -Continue pantoprazole 40 mg p.o. twice daily at discharge  -Continue sucralfate 1 g 3 times daily x 8 weeks  -Consider IR if recurrent active GIB  -Trend H&H and transfuse PRBC for Hgb <8.0  -Monitor stool color and consistency and document.  Notify GI with any recurrent melena, hematochezia.  -Avoid NSAIDs - currently on Aspirin 81 mg daily  -Pathology results reviewed with Dr. Mejia.  Recommendation was for stat CT to evaluate for gastric perforation.  -Started on empiric fluconazole per ID, ID will defer further workup and length of therapy to GI  -Repeat EGD in 2 months, scheduled for 3/24/2025 at [facility] with Dr. Mejia   Addendum:  === 01/10/25 ===  CT ABDOMEN PELVIS W IV CONTRAST  - Impression -  1.  Interval placement of 2 clips region gastric fundus likely  associated with the described ulceration.  2. Otherwise no changes from prior exam or acute findings.  3. A bilobed abdominal aortic aneurysm the largest measurement of  which is in the AP direction in the lower part measuring 61 mm in AP  dimension  4. Cholelithiasis without acute cholecystitis.  5. Enlarged prostate with significant mural thickening of the urinary  bladder the. Correlate clinically for cystitis.      -Stat CT with no evidence of gastric perforation.  -Make patient n.p.o. at midnight 1/26/2025 for possible EGD on Monday if ID requests new biopsy for culture to evaluate type of gastric fungus..      Discussed with Dr. Khanna.  GI will continue to follow    (Electronically signed bySAJI Aguustin-CNP on 1/24/2025 at 10:17 AM)

## 2025-01-24 NOTE — PROGRESS NOTES
Pt. Will discharge this dtae to [facility] at 2:00 pm via ambulance.  Spoke with spouse who is aware and in agreement to transfer.    Update:  discharge for today is being cancelled.  Ins. Precert does  today.  Pt. Will require new ins. Precert/humana filipe moore.

## 2025-01-24 NOTE — PROGRESS NOTES
"Physical Therapy    Physical Therapy Treatment    Patient Name: Guicho Jones  MRN: [id number]  Today's Date: 1/24/2025  Time Calculation  Start Time: 1107  Stop Time: 1125  Time Calculation (min): 18 min     1004/1004-B    Assessment/Plan   End of Session Communication: Bedside nurse    Assessment Comment: pt is participating well and motivated to increase activity , would benefit from continued therapy to improved functional mobilityand safety    End of Session Patient Position:  (Patient requested to get back in bed, but agreeable to sit up in chair until after lunch. Call light/tray in reach. Chair alarm on.)    PT Plan  Inpatient/Swing Bed or Outpatient: Inpatient  Treatment/Interventions: Transfer training, Gait training, Positioning  PT Plan: Ongoing PT  PT Frequency: 3 times per week  PT Discharge Recommendations: Moderate intensity level of continued care    PT Recommended Transfer Status: Assist x2, Assistive device    General Visit Information:   PT  Visit  PT Received On: 01/24/25    General  Prior to Session Communication:  (Cleared by RN for PT)  Patient Position Received:  (Patient presents sitting up in chair, agreeable to PT.  He reports poss d/c today)    General Comment:  (Patient came to ED with decreased energy, nausea, vomiting, and diarrhea. Pt slid out of bed  and was helped back to bed by his wife. In the morning,he was unable to move d/t weakness.)    General Observations:   General Observation:  (2L 02, external male catheter, alarm)    Subjective \"My daughter is a PTA at [employer]. She'll make sure I'm doing my therapy\"    Precautions:  Precautions  Hearing/Visual Limitations:  ([place])  Medical Precautions: Fall precautions    Pain:  Pain Assessment  Pain Assessment: 0-10  0-10 (Numeric) Pain Score:  (No numerical rating given)  Pain Type: Chronic pain  Pain Location:  (low back)    Cognition:  Cognition  Overall Cognitive Status: Within Functional Limits  Safety/Judgement: Exceptions to " WFL    Postural Control:  Postural Control  Postural Control:  (kyphotic posture)    Balance:   Dynamic Standing Balance  Dynamic Standing-Balance:  Fair-/Poor+ dynamic stand.         Activity Tolerance:  Activity Tolerance  Endurance: Decreased tolerance for upright activites      Ambulation/Gait Training  Ambulation/Gait Training Performed:  (Patient amb 30' with ww and mod x1.  Chair follow for safety. Patient amb with slow guillermina, NBOS. He stands with fwd flexed posture, tends to advance ww to far ahead. Frequent cues given to lift head/shoulders and to remain inside walkers PADMINI.  He reports increased fatigue in arms and back after amb.     Transfers  Transfer:  (sit->stand: min x1  One stand performed from chair level. Patient demos good hand placement with stand. Assist needed for lifting and steadying.   stand->sit: min x1  Doesn't bring ww all the way back to chair with him. Decreaed eccentric control.)           Outcome Measures:   [facility] Basic Mobility  Turning from your back to your side while in a flat bed without using bedrails: A little  Moving from lying on your back to sitting on the side of a flat bed without using bedrails: A little  Moving to and from bed to chair (including a wheelchair): A little  Standing up from a chair using your arms (e.g. wheelchair or bedside chair): A little  To walk in hospital room: A lot  Climbing 3-5 steps with railing: Total  Basic Mobility - Total Score: 15           Education Documentation  Body Mechanics, taught by Jeanne Caputo PTA at 1/24/2025 11:39 AM.  Learner: Patient  Readiness: Acceptance  Method: Explanation, Demonstration  Response: Needs Reinforcement      Mobility Training, taught by Jeanne Caputo PTA at 1/24/2025 11:39 AM.  Learner: Patient  Readiness: Acceptance  Method: Explanation, Demonstration  Response: Needs Reinforcement      Encounter Problems       Encounter Problems (Active)       PT Problem       Pt will completed supine <> sit bed  mobility from flat bed with Min A  (Progressing)       Start:  01/13/25    Expected End:  01/27/25            Pt will demonstrate sit to stand transfers with WW + Min A (Progressing)       Start:  01/13/25    Expected End:  01/27/25            Pt. will ambulate 30 with WW + Min A  (Progressing)       Start:  01/13/25    Expected End:  01/27/25            Pt will maintain balance while reaching outside PADMINI in sitting with WW/ B LE support, single UE support and Min A (Not Progressing)       Start:  01/13/25    Expected End:  01/27/25            Pt will independently complete B LE strengthening Hep (Not Progressing)       Start:  01/13/25    Expected End:  01/27/25               Pain - Adult

## 2025-01-25 LAB
ALBUMIN SERPL BCP-MCNC: 2.6 G/DL (ref 3.4–5)
ALP SERPL-CCNC: 48 U/L (ref 33–136)
ALT SERPL W P-5'-P-CCNC: 4 U/L (ref 10–52)
ANION GAP SERPL CALC-SCNC: 10 MMOL/L (ref 10–20)
AST SERPL W P-5'-P-CCNC: 13 U/L (ref 9–39)
BASOPHILS # BLD AUTO: 0.01 X10*3/UL (ref 0–0.1)
BASOPHILS NFR BLD AUTO: 0.1 %
BILIRUB SERPL-MCNC: 0.8 MG/DL (ref 0–1.2)
BUN SERPL-MCNC: 14 MG/DL (ref 6–23)
CALCIUM SERPL-MCNC: 7.7 MG/DL (ref 8.6–10.3)
CHLORIDE SERPL-SCNC: 110 MMOL/L (ref 98–107)
CO2 SERPL-SCNC: 22 MMOL/L (ref 21–32)
CREAT SERPL-MCNC: 0.79 MG/DL (ref 0.5–1.3)
EGFRCR SERPLBLD CKD-EPI 2021: 87 ML/MIN/1.73M*2
EOSINOPHIL # BLD AUTO: 0.05 X10*3/UL (ref 0–0.4)
EOSINOPHIL NFR BLD AUTO: 0.5 %
ERYTHROCYTE [DISTWIDTH] IN BLOOD BY AUTOMATED COUNT: 17.5 % (ref 11.5–14.5)
GLUCOSE BLD MANUAL STRIP-MCNC: 111 MG/DL (ref 74–99)
GLUCOSE SERPL-MCNC: 80 MG/DL (ref 74–99)
HCT VFR BLD AUTO: 26.1 % (ref 41–52)
HGB BLD-MCNC: 8.3 G/DL (ref 13.5–17.5)
IMM GRANULOCYTES # BLD AUTO: 0.16 X10*3/UL (ref 0–0.5)
IMM GRANULOCYTES NFR BLD AUTO: 1.7 % (ref 0–0.9)
LYMPHOCYTES # BLD AUTO: 0.92 X10*3/UL (ref 0.8–3)
LYMPHOCYTES NFR BLD AUTO: 9.7 %
MAGNESIUM SERPL-MCNC: 1.3 MG/DL (ref 1.6–2.4)
MCH RBC QN AUTO: 30.9 PG (ref 26–34)
MCHC RBC AUTO-ENTMCNC: 31.8 G/DL (ref 32–36)
MCV RBC AUTO: 97 FL (ref 80–100)
MONOCYTES # BLD AUTO: 0.87 X10*3/UL (ref 0.05–0.8)
MONOCYTES NFR BLD AUTO: 9.2 %
NEUTROPHILS # BLD AUTO: 7.43 X10*3/UL (ref 1.6–5.5)
NEUTROPHILS NFR BLD AUTO: 78.8 %
NRBC BLD-RTO: 0 /100 WBCS (ref 0–0)
PHOSPHATE SERPL-MCNC: 3.1 MG/DL (ref 2.5–4.9)
PLATELET # BLD AUTO: 133 X10*3/UL (ref 150–450)
POTASSIUM SERPL-SCNC: 4.2 MMOL/L (ref 3.5–5.3)
PROT SERPL-MCNC: 5.3 G/DL (ref 6.4–8.2)
RBC # BLD AUTO: 2.69 X10*6/UL (ref 4.5–5.9)
SODIUM SERPL-SCNC: 138 MMOL/L (ref 136–145)
WBC # BLD AUTO: 9.4 X10*3/UL (ref 4.4–11.3)

## 2025-01-25 PROCEDURE — 2500000004 HC RX 250 GENERAL PHARMACY W/ HCPCS (ALT 636 FOR OP/ED): Mod: JZ | Performed by: STUDENT IN AN ORGANIZED HEALTH CARE EDUCATION/TRAINING PROGRAM

## 2025-01-25 PROCEDURE — 85025 COMPLETE CBC W/AUTO DIFF WBC: CPT | Performed by: INTERNAL MEDICINE

## 2025-01-25 PROCEDURE — 2500000001 HC RX 250 WO HCPCS SELF ADMINISTERED DRUGS (ALT 637 FOR MEDICARE OP): Performed by: STUDENT IN AN ORGANIZED HEALTH CARE EDUCATION/TRAINING PROGRAM

## 2025-01-25 PROCEDURE — 83735 ASSAY OF MAGNESIUM: CPT | Performed by: STUDENT IN AN ORGANIZED HEALTH CARE EDUCATION/TRAINING PROGRAM

## 2025-01-25 PROCEDURE — 99238 HOSP IP/OBS DSCHRG MGMT 30/<: CPT | Performed by: FAMILY MEDICINE

## 2025-01-25 PROCEDURE — 2500000002 HC RX 250 W HCPCS SELF ADMINISTERED DRUGS (ALT 637 FOR MEDICARE OP, ALT 636 FOR OP/ED): Performed by: STUDENT IN AN ORGANIZED HEALTH CARE EDUCATION/TRAINING PROGRAM

## 2025-01-25 PROCEDURE — 2500000001 HC RX 250 WO HCPCS SELF ADMINISTERED DRUGS (ALT 637 FOR MEDICARE OP): Performed by: NURSE PRACTITIONER

## 2025-01-25 PROCEDURE — 80053 COMPREHEN METABOLIC PANEL: CPT | Performed by: STUDENT IN AN ORGANIZED HEALTH CARE EDUCATION/TRAINING PROGRAM

## 2025-01-25 PROCEDURE — 1200000002 HC GENERAL ROOM WITH TELEMETRY DAILY

## 2025-01-25 PROCEDURE — 2500000002 HC RX 250 W HCPCS SELF ADMINISTERED DRUGS (ALT 637 FOR MEDICARE OP, ALT 636 FOR OP/ED): Performed by: INTERNAL MEDICINE

## 2025-01-25 PROCEDURE — 84100 ASSAY OF PHOSPHORUS: CPT | Performed by: STUDENT IN AN ORGANIZED HEALTH CARE EDUCATION/TRAINING PROGRAM

## 2025-01-25 PROCEDURE — 2500000001 HC RX 250 WO HCPCS SELF ADMINISTERED DRUGS (ALT 637 FOR MEDICARE OP): Performed by: FAMILY MEDICINE

## 2025-01-25 PROCEDURE — 82947 ASSAY GLUCOSE BLOOD QUANT: CPT

## 2025-01-25 PROCEDURE — 2500000005 HC RX 250 GENERAL PHARMACY W/O HCPCS: Performed by: STUDENT IN AN ORGANIZED HEALTH CARE EDUCATION/TRAINING PROGRAM

## 2025-01-25 PROCEDURE — 99233 SBSQ HOSP IP/OBS HIGH 50: CPT | Performed by: STUDENT IN AN ORGANIZED HEALTH CARE EDUCATION/TRAINING PROGRAM

## 2025-01-25 PROCEDURE — 99232 SBSQ HOSP IP/OBS MODERATE 35: CPT | Performed by: INTERNAL MEDICINE

## 2025-01-25 PROCEDURE — 2500000004 HC RX 250 GENERAL PHARMACY W/ HCPCS (ALT 636 FOR OP/ED): Performed by: INTERNAL MEDICINE

## 2025-01-25 PROCEDURE — 94640 AIRWAY INHALATION TREATMENT: CPT

## 2025-01-25 RX ADMIN — LIDOCAINE 4% 1 PATCH: 40 PATCH TOPICAL at 14:12

## 2025-01-25 RX ADMIN — PANTOPRAZOLE SODIUM 40 MG: 40 TABLET, DELAYED RELEASE ORAL at 21:51

## 2025-01-25 RX ADMIN — Medication 21 PERCENT: at 08:56

## 2025-01-25 RX ADMIN — MONTELUKAST SODIUM 10 MG: 10 TABLET, FILM COATED ORAL at 21:51

## 2025-01-25 RX ADMIN — METOPROLOL TARTRATE 50 MG: 50 TABLET, FILM COATED ORAL at 08:37

## 2025-01-25 RX ADMIN — POTASSIUM CHLORIDE 20 MEQ: 1500 TABLET, EXTENDED RELEASE ORAL at 08:37

## 2025-01-25 RX ADMIN — ACETAMINOPHEN 650 MG: 325 TABLET ORAL at 23:19

## 2025-01-25 RX ADMIN — AMIODARONE HYDROCHLORIDE 100 MG: 200 TABLET ORAL at 08:36

## 2025-01-25 RX ADMIN — EZETIMIBE 10 MG: 10 TABLET ORAL at 08:36

## 2025-01-25 RX ADMIN — POTASSIUM CHLORIDE 20 MEQ: 1500 TABLET, EXTENDED RELEASE ORAL at 21:50

## 2025-01-25 RX ADMIN — METHOCARBAMOL TABLETS 500 MG: 500 TABLET, COATED ORAL at 21:51

## 2025-01-25 RX ADMIN — PANTOPRAZOLE SODIUM 40 MG: 40 TABLET, DELAYED RELEASE ORAL at 06:13

## 2025-01-25 RX ADMIN — MICAFUNGIN SODIUM 100 MG: 100 INJECTION, POWDER, LYOPHILIZED, FOR SOLUTION INTRAVENOUS at 12:44

## 2025-01-25 RX ADMIN — IPRATROPIUM BROMIDE AND ALBUTEROL SULFATE 3 ML: 2.5; .5 SOLUTION RESPIRATORY (INHALATION) at 20:07

## 2025-01-25 RX ADMIN — SUCRALFATE 1 G: 1 TABLET ORAL at 17:14

## 2025-01-25 RX ADMIN — SUCRALFATE 1 G: 1 TABLET ORAL at 23:19

## 2025-01-25 RX ADMIN — Medication 21 L/MIN: at 13:02

## 2025-01-25 RX ADMIN — SUCRALFATE 1 G: 1 TABLET ORAL at 12:42

## 2025-01-25 RX ADMIN — SUCRALFATE 1 G: 1 TABLET ORAL at 06:14

## 2025-01-25 RX ADMIN — ACETAMINOPHEN 650 MG: 325 TABLET ORAL at 12:42

## 2025-01-25 RX ADMIN — ASPIRIN 81 MG: 81 TABLET, CHEWABLE ORAL at 21:51

## 2025-01-25 RX ADMIN — FLUTICASONE FUROATE AND VILANTEROL TRIFENATATE 1 PUFF: 200; 25 POWDER RESPIRATORY (INHALATION) at 06:15

## 2025-01-25 RX ADMIN — CEFAZOLIN SODIUM 2 G: 2 INJECTION, SOLUTION INTRAVENOUS at 06:14

## 2025-01-25 RX ADMIN — ACETAMINOPHEN 650 MG: 325 TABLET ORAL at 06:13

## 2025-01-25 RX ADMIN — TIOTROPIUM BROMIDE INHALATION SPRAY 2 PUFF: 3.12 SPRAY, METERED RESPIRATORY (INHALATION) at 06:15

## 2025-01-25 RX ADMIN — IPRATROPIUM BROMIDE AND ALBUTEROL SULFATE 3 ML: 2.5; .5 SOLUTION RESPIRATORY (INHALATION) at 08:54

## 2025-01-25 RX ADMIN — METHOCARBAMOL TABLETS 500 MG: 500 TABLET, COATED ORAL at 06:14

## 2025-01-25 RX ADMIN — IPRATROPIUM BROMIDE AND ALBUTEROL SULFATE 3 ML: 2.5; .5 SOLUTION RESPIRATORY (INHALATION) at 13:00

## 2025-01-25 RX ADMIN — Medication 5 MG: at 21:51

## 2025-01-25 RX ADMIN — METHOCARBAMOL TABLETS 500 MG: 500 TABLET, COATED ORAL at 14:13

## 2025-01-25 RX ADMIN — CEFAZOLIN SODIUM 2 G: 2 INJECTION, SOLUTION INTRAVENOUS at 14:12

## 2025-01-25 RX ADMIN — ACETAMINOPHEN 650 MG: 325 TABLET ORAL at 17:14

## 2025-01-25 RX ADMIN — CEFAZOLIN SODIUM 2 G: 2 INJECTION, SOLUTION INTRAVENOUS at 23:21

## 2025-01-25 RX ADMIN — METOPROLOL TARTRATE 50 MG: 50 TABLET, FILM COATED ORAL at 21:50

## 2025-01-25 RX ADMIN — ATORVASTATIN CALCIUM 40 MG: 20 TABLET, FILM COATED ORAL at 21:51

## 2025-01-25 ASSESSMENT — COGNITIVE AND FUNCTIONAL STATUS - GENERAL
DAILY ACTIVITIY SCORE: 20
DRESSING REGULAR UPPER BODY CLOTHING: A LITTLE
CLIMB 3 TO 5 STEPS WITH RAILING: TOTAL
MOBILITY SCORE: 17
HELP NEEDED FOR BATHING: A LITTLE
STANDING UP FROM CHAIR USING ARMS: A LITTLE
MOVING TO AND FROM BED TO CHAIR: A LITTLE
TOILETING: A LITTLE
TURNING FROM BACK TO SIDE WHILE IN FLAT BAD: A LITTLE
PERSONAL GROOMING: A LITTLE
WALKING IN HOSPITAL ROOM: A LITTLE

## 2025-01-25 ASSESSMENT — PAIN SCALES - GENERAL
PAINLEVEL_OUTOF10: 0 - NO PAIN
PAINLEVEL_OUTOF10: 0 - NO PAIN

## 2025-01-25 ASSESSMENT — PAIN - FUNCTIONAL ASSESSMENT: PAIN_FUNCTIONAL_ASSESSMENT: 0-10

## 2025-01-25 NOTE — PROGRESS NOTES
"discharge remains in from . secure messaged dr vilma almazan and pt's  Nurses of verification. Updated 7 day mar and GR sent to [facility]  Facility updated on iv medications of micafungin 100 mg IV x 5 days. and 6 weeks IV cefazolin 2 g IV every 8 hours. restarting precert .  [facility] sent request to start  precert as it had .     Per Christina Brown DSC  supervisor: \"this auth is still good through \"    Dr vilma Almazan update re: auth.     3:41 pm waiting response from facility to see if they can accept today.       "

## 2025-01-25 NOTE — CARE PLAN
The patient's goals for the shift include Patient will remain safe and free from falls    The clinical goals for the shift include Pt will remain free from bleeding throughout shift      Problem: Fall/Injury  Goal: Not fall by end of shift  Outcome: Progressing  Goal: Be free from injury by end of the shift  Outcome: Progressing  Goal: Verbalize understanding of personal risk factors for fall in the hospital  Outcome: Progressing  Goal: Verbalize understanding of risk factor reduction measures to prevent injury from fall in the home  Outcome: Progressing  Goal: Use assistive devices by end of the shift  Outcome: Progressing  Goal: Pace activities to prevent fatigue by end of the shift  Outcome: Progressing     Problem: Pain - Adult  Goal: Verbalizes/displays adequate comfort level or baseline comfort level  Outcome: Progressing     Problem: Discharge Planning  Goal: Discharge to home or other facility with appropriate resources  Outcome: Progressing     Problem: Chronic Conditions and Co-morbidities  Goal: Patient's chronic conditions and co-morbidity symptoms are monitored and maintained or improved  Outcome: Progressing     Problem: Skin  Goal: Participates in plan/prevention/treatment measures  Outcome: Progressing  Goal: Prevent/manage excess moisture  Outcome: Progressing  Goal: Prevent/minimize sheer/friction injuries  Outcome: Progressing  Goal: Promote/optimize nutrition  Outcome: Progressing  Goal: Promote skin healing  Outcome: Progressing     Problem: Heart Failure  Goal: Improved gas exchange this shift  Outcome: Progressing  Goal: Improved urinary output this shift  Outcome: Progressing  Goal: Reduction in peripheral edema within 24 hours  Outcome: Progressing  Goal: Report improvement of dyspnea/breathlessness this shift  Outcome: Progressing  Goal: Weight from fluid excess reduced over 2-3 days, then stabilize  Outcome: Progressing  Goal: Increase self care and/or family involvement in 24  hours  Outcome: Progressing     Problem: Nutrition  Goal: Oral intake greater than 50%  Outcome: Progressing  Goal: Consume prescribed supplement  Outcome: Progressing  Goal: Adequate PO fluid intake  Outcome: Progressing  Goal: Lab values WNL  Outcome: Progressing  Goal: Electrolytes WNL  Outcome: Progressing  Goal: Maintain stable weight  Outcome: Progressing     Problem: Risk for Decreased Cardiac Output  Goal: Patient will maintain vital signs within normal limits, adequate urine output, and adequate tissue perfusion.  Patient will maintain an asymptomatic cardiac rhythm without signs and symptoms of decreased cardiac output.  Outcome: Progressing

## 2025-01-25 NOTE — PROGRESS NOTES
"Infectious Disease Progress Note     1/25/2025    Patient is a followup regarding MSSA bacteremia with possible endocarditis, currently on cefazolin x 6 weeks course     Patient had GI consult for melena with acute blood loss anemia.  Underwent emergent EGD with transfusion  Gastric ulcer found during EGD    CT abdomen and pelvis with IV contrast done on 1/24/2025 shows  IMPRESSION:  1.  Interval placement of 2 clips region gastric fundus likely  associated with the described ulceration.  2. Otherwise no changes from prior exam or acute findings.  3. A bilobed abdominal aortic aneurysm the largest measurement of  which is in the AP direction in the lower part measuring 61 mm in AP  dimension  4. Cholelithiasis without acute cholecystitis.  5. Enlarged prostate with significant mural thickening of the urinary  bladder the. Correlate clinically for cystitis.      Yesterday, I was contacted by APRN for GI regarding pathology report with concern that \"fungal infection\" had contributed to large gastric ulcer.  There was a pathology report stating \"stomach biopsy ulcerated gastric mucosa containing fungal organisms -(see comment).... Under the comment it states inflammation involves fibromuscular tissue suggesting the possibility of perforation. Helicobacter not present    To clarify, I spoke to APRN and contacted pathologist to discuss.  Was unable to get much information.  Fluconazole was empirically started.      Spoke to GI attending physician today over the phone regarding patient.  It has been clarified that patient had no esophageal thrush.  Discussed at length.  It is more likely that this patient had colonization of yeast in the gastric ulcer rather than having fungus as a causative agent for the gastric ulcer considering there was no evidence of spread.    Lab Results   Component Value Date    WBC 9.4 01/25/2025    HGB 8.3 (L) 01/25/2025    HCT 26.1 (L) 01/25/2025    MCV 97 01/25/2025     (L) 01/25/2025 "     Lab Results   Component Value Date    GLUCOSE 80 01/25/2025    CALCIUM 7.7 (L) 01/25/2025     01/25/2025    K 4.2 01/25/2025    CO2 22 01/25/2025     (H) 01/25/2025    BUN 14 01/25/2025    CREATININE 0.79 01/25/2025       WBC trends are being monitored. Antibiotic doses are being adjusted per most recent renal labs.     Vitals:    01/25/25 0856   BP:    Pulse:    Resp:    Temp:    SpO2: 93%     Patient is currently resting  No new issues overnight    Patient Active Problem List   Diagnosis    Abdominal aortic aneurysm (CMS-[facility])    AICD (automatic cardioverter/defibrillator) present    Allergic rhinitis, seasonal    CAD (coronary artery disease)    Centrilobular emphysema (Multi)    Essential hypertension    Hyperuricemia    Infarction of right basal ganglia (Multi)    Ischemic cardiomyopathy    Mixed hyperlipidemia    Persistent atrial fibrillation (Multi)    Renal artery stenosis (CMS-[facility])    Tinea cruris    Anticoagulant long-term use    Chronic diastolic congestive heart failure, NYHA class 2    High risk medication use    Tremor    Prostate cancer (Multi)    Blepharitis of both eyes    Epiretinal membrane (ERM) of right eye    Drusen (degenerative) of retina    Dermatochalasis of right upper eyelid    Dermatochalasis of left upper eyelid    Degenerative retinal drusen of both eyes    Choroidal nevus, right eye    Lumbosacral spondylosis without myelopathy    Floaters    Spinal stenosis, lumbar region with neurogenic claudication    Pseudophakia of both eyes    Pinguecula    Obesity, morbid (Multi)    Vitreomacular traction syndrome of right eye    COPD exacerbation (Multi)    MGD (meibomian gland dysfunction)    CVA (cerebral vascular accident) (Multi)    Primary osteoarthritis of right knee    Never smoked cigarettes    Pulmonary hypertension (Multi)    Hidradenitis suppurativa of anus    Diarrhea, unspecified type    Bacteremia       ASSESSMENT:  New GI bleed, status post emergent EGD with  acute blood loss anemia  MSSA bacteremia with possible endocarditis.  Patient has AICD.  Blood cultures on 1/10/2025 were positive for Staphylococcus aureus.  PAOLO reported to be negative.  Colonization of open gastric ulcer with yeast     PLAN:  Patient being treated for probable endocarditis with 6 weeks IV cefazolin 2 g IV every 8 hours from an infectious disease standpoint  Patient has been empirically started on fluconazole.  He has been placed on amiodarone.  He is unable to take fluconazole and amiodarone together due to drug drug interaction.  Will change antifungal to micafungin IV x 5 days.  Okay to discharge when okay with the primary    Patient will need weekly CBC BMP CRP after discharge to be sent to the infectious disease clinic  Please ensure that patient's labs are faxed to [phone].   In case of any questions, please call the [facility] outpatient infusion center infectious disease office at [phone] Monday through Friday from 9 AM to 4 PM.  There is no answering service after hours or on weekends.  An epic chat message must be sent after hours, please direct that message to ALL Dr. Beth Clark, Dr Franko Lomeli, and Silvana Fortune  Thank you for your cooperation in this matter.  We will not be able to follow labs without this step.    Follow-up in 3 weeks with infectious disease  Follow-up within 1 to 2 weeks with primary care physician    PICC line will need to be pulled at the end of therapy        Beth Clark DO

## 2025-01-25 NOTE — PROGRESS NOTES
"Guicho Jones is a 84 y.o. male on day 15 of admission presenting with Diarrhea, unspecified type.    Subjective   Hospital follow-up.  Patient was seen at the bedside earlier this morning.  He is sitting up.  Breathing comfortably.  No distress.  Patient was scheduled for discharge yesterday to [place] for continued course of IV antibiotics for 6 weeks.  However there was concern for fungal infection noted in the ulceration.  Discharge was held.  However following further evaluation and discussion and review with gastroenterology and infectious disease it is determined that the fungal elements are possible colonization.  Decision was made for a 5-day course of IV antifungal for now.  Given his amiodarone dosing specific antifungals medication was used.  Patient is doing well.  Tolerating diet.  Hemodynamically stable.   Appreciate gastroenterology and infectious disease follow-up today.  Labs reviewed.  Hemodynamically stable.  Hemoglobin hematocrit stable.  Kidney function normal.  At this point we will proceed with discharge to [place] for continued course of Alisa IV antibiotics as previously decided pending precertification with his insurance.    Objective     Physical Exam     Last Recorded Vitals  Blood pressure 99/60, pulse 61, temperature 36.1 °C (97 °F), resp. rate 17, height 1.676 m (5' 6\"), weight 106 kg (232 lb 12.9 oz), SpO2 91%.  Intake/Output last 3 Shifts:  I/O last 3 completed shifts:  In: 1400 (13.3 mL/kg) [P.O.:350; Blood:350; IV Piggyback:700]  Out: 2150 (20.4 mL/kg) [Urine:2150 (0.6 mL/kg/hr)]  Weight: 105.6 kg     Relevant Results  Recent Results (from the past 72 hours)   Magnesium    Collection Time: 01/23/25  4:59 AM   Result Value Ref Range    Magnesium 1.41 (L) 1.60 - 2.40 mg/dL   Comprehensive Metabolic Panel    Collection Time: 01/23/25  4:59 AM   Result Value Ref Range    Glucose 85 74 - 99 mg/dL    Sodium 139 136 - 145 mmol/L    Potassium 4.1 3.5 - 5.3 mmol/L    Chloride 112 (H) " 98 - 107 mmol/L    Bicarbonate 22 21 - 32 mmol/L    Anion Gap 9 (L) 10 - 20 mmol/L    Urea Nitrogen 22 6 - 23 mg/dL    Creatinine 0.74 0.50 - 1.30 mg/dL    eGFR 89 >60 mL/min/1.73m*2    Calcium 7.6 (L) 8.6 - 10.3 mg/dL    Albumin 2.5 (L) 3.4 - 5.0 g/dL    Alkaline Phosphatase 47 33 - 136 U/L    Total Protein 5.1 (L) 6.4 - 8.2 g/dL    AST 17 9 - 39 U/L    Bilirubin, Total 0.9 0.0 - 1.2 mg/dL    ALT 5 (L) 10 - 52 U/L   Phosphorus    Collection Time: 01/23/25  4:59 AM   Result Value Ref Range    Phosphorus 2.9 2.5 - 4.9 mg/dL   CBC and Auto Differential    Collection Time: 01/23/25  4:59 AM   Result Value Ref Range    WBC 11.4 (H) 4.4 - 11.3 x10*3/uL    nRBC 0.0 0.0 - 0.0 /100 WBCs    RBC 2.49 (L) 4.50 - 5.90 x10*6/uL    Hemoglobin 7.9 (L) 13.5 - 17.5 g/dL    Hematocrit 24.6 (L) 41.0 - 52.0 %    MCV 99 80 - 100 fL    MCH 31.7 26.0 - 34.0 pg    MCHC 32.1 32.0 - 36.0 g/dL    RDW 18.0 (H) 11.5 - 14.5 %    Platelets 139 (L) 150 - 450 x10*3/uL    Neutrophils % 77.8 40.0 - 80.0 %    Immature Granulocytes %, Automated 3.9 (H) 0.0 - 0.9 %    Lymphocytes % 9.5 13.0 - 44.0 %    Monocytes % 7.8 2.0 - 10.0 %    Eosinophils % 0.8 0.0 - 6.0 %    Basophils % 0.2 0.0 - 2.0 %    Neutrophils Absolute 8.83 (H) 1.60 - 5.50 x10*3/uL    Immature Granulocytes Absolute, Automated 0.44 0.00 - 0.50 x10*3/uL    Lymphocytes Absolute 1.08 0.80 - 3.00 x10*3/uL    Monocytes Absolute 0.89 (H) 0.05 - 0.80 x10*3/uL    Eosinophils Absolute 0.09 0.00 - 0.40 x10*3/uL    Basophils Absolute 0.02 0.00 - 0.10 x10*3/uL   Iron and TIBC    Collection Time: 01/23/25  4:59 AM   Result Value Ref Range    Iron 32 (L) 35 - 150 ug/dL    UIBC 181 110 - 370 ug/dL    TIBC 213 (L) 240 - 445 ug/dL    % Saturation 15 (L) 25 - 45 %   Ferritin    Collection Time: 01/23/25  4:59 AM   Result Value Ref Range    Ferritin 501 (H) 20 - 300 ng/mL   Prepare RBC: 1 Units    Collection Time: 01/23/25  4:27 PM   Result Value Ref Range    PRODUCT CODE [id number]     Unit Number  [id number]     Unit ABO A     Unit RH POS     XM INTEP COMP     Dispense Status PT     Blood Expiration Date 2/5/2025 11:59:00 PM EST     PRODUCT BLOOD TYPE 6200     UNIT VOLUME 350    Lavender Top    Collection Time: 01/23/25  4:53 PM   Result Value Ref Range    Extra Tube Hold for add-ons.    Magnesium    Collection Time: 01/24/25  5:20 AM   Result Value Ref Range    Magnesium 1.38 (L) 1.60 - 2.40 mg/dL   Comprehensive Metabolic Panel    Collection Time: 01/24/25  5:20 AM   Result Value Ref Range    Glucose 83 74 - 99 mg/dL    Sodium 138 136 - 145 mmol/L    Potassium 4.2 3.5 - 5.3 mmol/L    Chloride 112 (H) 98 - 107 mmol/L    Bicarbonate 22 21 - 32 mmol/L    Anion Gap 8 (L) 10 - 20 mmol/L    Urea Nitrogen 17 6 - 23 mg/dL    Creatinine 0.77 0.50 - 1.30 mg/dL    eGFR 88 >60 mL/min/1.73m*2    Calcium 7.6 (L) 8.6 - 10.3 mg/dL    Albumin 2.5 (L) 3.4 - 5.0 g/dL    Alkaline Phosphatase 46 33 - 136 U/L    Total Protein 5.0 (L) 6.4 - 8.2 g/dL    AST 15 9 - 39 U/L    Bilirubin, Total 0.8 0.0 - 1.2 mg/dL    ALT 4 (L) 10 - 52 U/L   Phosphorus    Collection Time: 01/24/25  5:20 AM   Result Value Ref Range    Phosphorus 3.0 2.5 - 4.9 mg/dL   CBC and Auto Differential    Collection Time: 01/24/25  5:20 AM   Result Value Ref Range    WBC 9.6 4.4 - 11.3 x10*3/uL    nRBC 0.0 0.0 - 0.0 /100 WBCs    RBC 2.65 (L) 4.50 - 5.90 x10*6/uL    Hemoglobin 8.2 (L) 13.5 - 17.5 g/dL    Hematocrit 25.7 (L) 41.0 - 52.0 %    MCV 97 80 - 100 fL    MCH 30.9 26.0 - 34.0 pg    MCHC 31.9 (L) 32.0 - 36.0 g/dL    RDW 17.9 (H) 11.5 - 14.5 %    Platelets 134 (L) 150 - 450 x10*3/uL    Neutrophils % 78.7 40.0 - 80.0 %    Immature Granulocytes %, Automated 2.3 (H) 0.0 - 0.9 %    Lymphocytes % 9.9 13.0 - 44.0 %    Monocytes % 8.3 2.0 - 10.0 %    Eosinophils % 0.6 0.0 - 6.0 %    Basophils % 0.2 0.0 - 2.0 %    Neutrophils Absolute 7.51 (H) 1.60 - 5.50 x10*3/uL    Immature Granulocytes Absolute, Automated 0.22 0.00 - 0.50 x10*3/uL    Lymphocytes Absolute 0.95  0.80 - 3.00 x10*3/uL    Monocytes Absolute 0.79 0.05 - 0.80 x10*3/uL    Eosinophils Absolute 0.06 0.00 - 0.40 x10*3/uL    Basophils Absolute 0.02 0.00 - 0.10 x10*3/uL   POCT GLUCOSE    Collection Time: 01/24/25  6:36 AM   Result Value Ref Range    POCT Glucose 89 74 - 99 mg/dL   Magnesium    Collection Time: 01/25/25  5:38 AM   Result Value Ref Range    Magnesium 1.30 (L) 1.60 - 2.40 mg/dL   Comprehensive Metabolic Panel    Collection Time: 01/25/25  5:38 AM   Result Value Ref Range    Glucose 80 74 - 99 mg/dL    Sodium 138 136 - 145 mmol/L    Potassium 4.2 3.5 - 5.3 mmol/L    Chloride 110 (H) 98 - 107 mmol/L    Bicarbonate 22 21 - 32 mmol/L    Anion Gap 10 10 - 20 mmol/L    Urea Nitrogen 14 6 - 23 mg/dL    Creatinine 0.79 0.50 - 1.30 mg/dL    eGFR 87 >60 mL/min/1.73m*2    Calcium 7.7 (L) 8.6 - 10.3 mg/dL    Albumin 2.6 (L) 3.4 - 5.0 g/dL    Alkaline Phosphatase 48 33 - 136 U/L    Total Protein 5.3 (L) 6.4 - 8.2 g/dL    AST 13 9 - 39 U/L    Bilirubin, Total 0.8 0.0 - 1.2 mg/dL    ALT 4 (L) 10 - 52 U/L   Phosphorus    Collection Time: 01/25/25  5:38 AM   Result Value Ref Range    Phosphorus 3.1 2.5 - 4.9 mg/dL   CBC and Auto Differential    Collection Time: 01/25/25  5:38 AM   Result Value Ref Range    WBC 9.4 4.4 - 11.3 x10*3/uL    nRBC 0.0 0.0 - 0.0 /100 WBCs    RBC 2.69 (L) 4.50 - 5.90 x10*6/uL    Hemoglobin 8.3 (L) 13.5 - 17.5 g/dL    Hematocrit 26.1 (L) 41.0 - 52.0 %    MCV 97 80 - 100 fL    MCH 30.9 26.0 - 34.0 pg    MCHC 31.8 (L) 32.0 - 36.0 g/dL    RDW 17.5 (H) 11.5 - 14.5 %    Platelets 133 (L) 150 - 450 x10*3/uL    Neutrophils % 78.8 40.0 - 80.0 %    Immature Granulocytes %, Automated 1.7 (H) 0.0 - 0.9 %    Lymphocytes % 9.7 13.0 - 44.0 %    Monocytes % 9.2 2.0 - 10.0 %    Eosinophils % 0.5 0.0 - 6.0 %    Basophils % 0.1 0.0 - 2.0 %    Neutrophils Absolute 7.43 (H) 1.60 - 5.50 x10*3/uL    Immature Granulocytes Absolute, Automated 0.16 0.00 - 0.50 x10*3/uL    Lymphocytes Absolute 0.92 0.80 - 3.00 x10*3/uL     Monocytes Absolute 0.87 (H) 0.05 - 0.80 x10*3/uL    Eosinophils Absolute 0.05 0.00 - 0.40 x10*3/uL    Basophils Absolute 0.01 0.00 - 0.10 x10*3/uL             This patient has a central line   Reason for the central line remaining today? Parenteral medication                 acetaminophen, 650 mg, oral, q6h  amiodarone, 100 mg, oral, Daily  aspirin, 81 mg, oral, Nightly  atorvastatin, 40 mg, oral, Nightly  ceFAZolin, 2 g, intravenous, q8h  [Held by provider] empagliflozin, 10 mg, oral, Daily  ezetimibe, 10 mg, oral, Daily  fluticasone, 2 spray, Each Nostril, Daily  tiotropium, 2 puff, inhalation, Daily   And  fluticasone furoate-vilanteroL, 1 puff, inhalation, Daily  [Held by provider] hydroCHLOROthiazide, 25 mg, oral, Once per day on Monday Wednesday Friday  ipratropium-albuteroL, 3 mL, nebulization, TID  [Held by provider] isosorbide mononitrate ER, 30 mg, oral, Daily  lidocaine, 1 patch, transdermal, Daily  [Held by provider] lisinopril, 40 mg, oral, Daily  melatonin, 5 mg, oral, Nightly  methocarbamol, 500 mg, oral, q8h SHABNAM  [Held by provider] metoprolol succinate XL, 100 mg, oral, Nightly  [Held by provider] metoprolol succinate XL, 50 mg, oral, Daily  metoprolol tartrate, 50 mg, oral, BID  micafungin, 100 mg, intravenous, q24h  montelukast, 10 mg, oral, q PM  pantoprazole, 40 mg, oral, BID  potassium chloride CR, 20 mEq, oral, BID  spironolactone, 25 mg, oral, q48h  sucralfate, 1 g, oral, q6h SHABNAM        ECG 12 lead    Result Date: 1/19/2025  Sinus rhythm with short MA Nonspecific ST and T wave abnormality Prolonged QT Abnormal ECG When compared with ECG of 09-MAR-2024 12:54, Sinus rhythm has replaced Electronic ventricular pacemaker Vent. rate has decreased BY  33 BPM See ED provider note for full interpretation and clinical correlation Confirmed by Thania Welch ([id number]) on 1/19/2025 9:42:07 PM    ECG 12 lead    Result Date: 1/19/2025  Sinus rhythm with AV dissociation and Wide QRS rhythm Nonspecific  intraventricular conduction delay Nonspecific ST and T wave abnormality Abnormal ECG When compared with ECG of 10-DOREEN-2025 13:04, (unconfirmed) Wide QRS rhythm has replaced Sinus rhythm See ED provider note for full interpretation and clinical correlation Confirmed by Thania Welch ([id number]) on 1/19/2025 9:39:52 PM    Transthoracic Echo (TTE) Complete    Result Date: 1/11/2025          [place] [id number]  Tel [phone] Fax [phone] TRANSTHORACIC ECHOCARDIOGRAM REPORT Patient Name:       ASHLY LEE THAD Castano Physician:    [id number] Trell Turpin DO Study Date:         1/11/2025            Ordering Provider:    [id number] MARSHAL MI MRN/PID:            [id number]             Fellow: Accession#:         [id number]         Nurse: Date of Birth/Age:  1940 / 84 years Sonographer:          Cici Fong RDCS Gender Assigned at  M                    Additional Staff: Birth: Height:             167.64 cm            Admit Date:           1/10/2025 Weight:             102.97 kg            Admission Status:     Inpatient -                                                                Routine BSA / BMI:          2.11 m2 / 36.64      Department Location:  [facility]                     kg/m2 Blood Pressure: 110 /51 mmHg Study Type:    TRANSTHORACIC ECHO (TTE) COMPLETE Diagnosis/ICD: Bacteremia-R78.81 Indication:    Bacteremia CPT Codes:     Echo Complete w Full [id number] Patient History: Pacer/Defib:       AICD Pertinent History: A-Fib, AAA, CAD, HTN, Hyperlipidemia, Cardiomyopathy and CVA. Study Detail: The following Echo studies were performed: 2D, M-Mode, Doppler and               color flow. Technically challenging study due to patient lying in               supine  position. Definity used as a contrast agent for endocardial               border definition and agitated saline used as a contrast agent for               intraseptal flow evaluation. Total contrast used for this               procedure was 2 mL via IV push. The patient was awake.  PHYSICIAN INTERPRETATION: Left Ventricle: The left ventricular systolic function is normal, with a visually estimated ejection fraction of 55%. There is mild to moderate concentric left ventricular hypertrophy. There are no regional wall motion abnormalities. The left ventricular cavity size is normal. There is moderately increased septal and mildly increased posterior left ventricular wall thickness. Spectral Doppler shows a normal pattern of left ventricular diastolic filling. LV Wall Scoring: All segments are normal. Left Atrium: The left atrium is moderately dilated. There is a small patent foramen ovale with predominantly left to right shunting across the atrial septum. A patent foramen ovale was visualized using color Doppler. Right Ventricle: The right ventricle is normal in size. There is normal right ventricular global systolic function. A device is visualized in the right ventricle. Right Atrium: The right atrium is mild to moderately dilated. There is a device visualized in the right atrium. Aortic Valve: The aortic valve appears structurally normal. The aortic valve dimensionless index is 0.70. There is mild aortic valve regurgitation. The peak instantaneous gradient of the aortic valve is 11 mmHg. The mean gradient of the aortic valve is 7 mmHg. Mitral Valve: The mitral valve is normal in structure. There is no evidence of mitral valve stenosis. There is normal mitral valve leaflet mobility. There is mild mitral valve regurgitation. Tricuspid Valve: The tricuspid valve is structurally normal. There is normal tricuspid valve leaflet mobility. There is mild tricuspid regurgitation. Pulmonic Valve: The pulmonic valve is  structurally normal. There is no indication of pulmonic valve regurgitation. Pericardium: No pericardial effusion noted. Aorta: The aortic root is normal. There is moderate dilatation of the ascending aorta. Systemic Veins: The inferior vena cava appears mildly dilated.  CONCLUSIONS:  1. The left ventricular systolic function is normal, with a visually estimated ejection fraction of 55%.  2. No cardiac source of embolus visualized.  3. No definite valvular vegetations were visualized.  4. There is normal right ventricular global systolic function.  5. The left atrium is moderately dilated.  6. The right atrium is mild to moderately dilated.  7. There is no evidence of mitral valve stenosis.  8. Mild mitral valve regurgitation.  9. Mild tricuspid regurgitation is visualized. 10. Mild aortic valve regurgitation. 11. The inferior vena cava appears mildly dilated. 12. Small patent foramen ovale. 13. There is moderate dilatation of the ascending aorta. 14. There is moderately increased septal and mildly increased posterior left ventricular wall thickness. 15. If strong concern for vegetation and/or source of septicemia a transesophageal echo would be necessary for better assessment. RECOMMENDATIONS: Technically suboptimal and limited study, therefore accuracy of above interpretation could be substantially diminished. Clinical correlation is advised. Consider additional imaging modalities if clinically indicated. Transthoracic echo has low negative predictive value for mass, vegetation, or embolic source. Consider PAOLO or MRI if clinically indicated.  QUANTITATIVE DATA SUMMARY:  2D MEASUREMENTS:            Normal Ranges: Ao Root d:       3.32 cm    (2.0-3.7cm) LAs:             5.48 cm    (2.7-4.0cm) IVSd:            1.37 cm    (0.6-1.1cm) LVPWd:           1.20 cm    (0.6-1.1cm) LVIDd:           4.93 cm    (3.9-5.9cm) LVIDs:           3.57 cm LV Mass Index:   119.3 g/m2 LV % FS          27.6 %  LA VOLUME:                     Normal Ranges: LA Vol A4C:        107.6 ml   (22+/-6mL/m2) LA Vol A2C:        111.9 ml LA Vol BP:         114.7 ml LA Vol Index A4C:  51.0ml/m2 LA Vol Index A2C:  53.0 ml/m2 LA Vol Index BP:   54.4 ml/m2 LA Area A4C:       31.9 cm2 LA Area A2C:       31.1 cm2 LA Major Axis A4C: 8.0 cm LA Major Axis A2C: 7.4 cm LA Volume Index:   51.7 ml/m2  RA VOLUME BY A/L METHOD:            Normal Ranges: RA Vol A4C:              80.9 ml    (8.3-19.5ml) RA Vol Index A4C:        38.3 ml/m2 RA Area A4C:             25.9 cm2 RA Major Axis A4C:       7.0 cm  AORTA MEASUREMENTS:         Normal Ranges: Asc Ao, d:          4.57 cm (2.1-3.4cm)  LV SYSTOLIC FUNCTION BY 2D PLANIMETRY (MOD):                      Normal Ranges: EF-A4C View:    50 % (>=55%) EF-A2C View:    53 % EF-Biplane:     48 % EF-Visual:      55 % LV EF Reported: 55 %  LV DIASTOLIC FUNCTION:           Normal Ranges: MV Peak E:             1.12 m/s  (0.7-1.2 m/s) MV Peak A:             0.66 m/s  (0.42-0.7 m/s) E/A Ratio:             1.69      (1.0-2.2) MV e'                  0.093 m/s (>8.0) MV lateral e'          0.12 m/s MV medial e'           0.07 m/s E/e' Ratio:            12.09     (<8.0)  MITRAL VALVE:          Normal Ranges: MV DT:        232 msec (150-240msec)  AORTIC VALVE:                      Normal Ranges: AoV Vmax:                1.67 m/s  (<=1.7m/s) AoV Peak P.2 mmHg (<20mmHg) AoV Mean P.0 mmHg  (1.7-11.5mmHg) LVOT Max Angel:            1.19 m/s  (<=1.1m/s) AoV VTI:                 37.90 cm  (18-25cm) LVOT VTI:                26.60 cm LVOT Diameter:           2.07 cm   (1.8-2.4cm) AoV Area, VTI:           2.36 cm2  (2.5-5.5cm2) AoV Area,Vmax:           2.40 cm2  (2.5-4.5cm2) AoV Dimensionless Index: 0.70  RIGHT VENTRICLE: RV Basal 4.13 cm RV Mid   3.25 cm RV Major 8.0 cm TAPSE:   25.3 mm RV s'    0.23 m/s  TRICUSPID VALVE/RVSP:          Normal Ranges: Peak TR Velocity:     3.26 m/s RV Syst Pressure:     46 mmHg  (< 30mmHg) IVC  Diam:             2.33 cm  PULMONIC VALVE:          Normal Ranges: PV Accel Time:  82 msec  (>120ms) PV Max Angel:     1.2 m/s  (0.6-0.9m/s) PV Max P.2 mmHg  [id number] Trell Turpin DO Electronically signed on 2025 at 12:40:02 PM  Wall Scoring  ** Final **     CT head wo IV contrast    Result Date: 1/10/2025  Interpreted By:  Rachna Umanzor, STUDY: CT HEAD WO IV CONTRAST; CT CERVICAL SPINE WO IV CONTRAST;  1/10/2025 2:47 pm   INDICATION: Signs/Symptoms:fall.   COMPARISON: None.   ACCESSION NUMBER(S): [id number]; [id number]   ORDERING CLINICIAN: RACQUEL CROOKS   TECHNIQUE: Noncontrast axial CT scan of head was performed. Angled reformats in brain and bone windows were generated. The images were reviewed in bone, brain, blood and soft tissue windows.   Axial CT images of the cervical spine are obtained. Axial, coronal and sagittal reconstructions are provided for review.   FINDINGS: CT HEAD:   CSF Spaces:The ventricles, sulci and basal cisterns are normal in configuration and diffusely prominent, consistent with volume loss. There is no extraaxial fluid collection.   Parenchyma: Periventricular, subcortical, and deep white matter areas of hypodensity are noted. Findings likely due to microvascular ischemic disease. Small circumscribed hypodensity in the left brook and similar appearing hypodensities noted bilateral basal ganglia, are likely lacunar infarcts. Atherosclerotic disease noted intracranial internal carotid and bilateral vertebral arteries.The grey-white differentiation is intact. There is no mass effect or midline shift.  There is no intracranial hemorrhage.   Calvarium: The calvarium is unremarkable, no fracture.   Orbits: The visualized bony orbits are intact. There has been previous cataract surgery bilaterally.   Paranasal Sinuses/Mastoids: Visualized paranasal sinuses and mastoids are clear.   Soft tissues: Unremarkable.   CT CERVICAL SPINE: Images are slightly degraded by patient size and  patient motion.   Fractures: There is no evidence for an acute fracture of the cervical spine, allowing for motion artifacts.   Vertebral Alignment: Slight reversal of normal lordosis in the upper cervical spine, may be positional. No spondylolisthesis.   Craniocervical Junction: The odontoid process and craniocervical junction are intact.   Vertebrae/Disc Spaces: Bones appear demineralized. Severe disc space narrowing C3 through C7 levels noted, with endplate osteophytes, consistent with spondylosis. Multilevel facet osteophytes also noted. Multiple levels of mild to moderate canal narrowing secondary to degenerative changes. No severe canal stenosis. Neural foraminal narrowing secondary to uncovertebral and facet osteophytes appears moderate to severe bilateral C4-5, C5-6 and C6-C7 levels.   Prevertebral/Paraspinal Soft Tissues: No prevertebral edema. No acute soft tissue abnormality. Carotid athero sclerotic disease is noted. Partially imaged leads from a right chest wall pacemaker are noted. Lung apices clear. Emphysema is present.       1. No evidence of acute intracranial abnormality. Mild cerebral volume loss noted and microvascular ischemic changes. 2. No evidence of acute cervical spine fracture. Multilevel cervical spondylosis, relatively severe from C3 level through C7 level as discussed above.       Signed by: Rachna Umanzor 1/10/2025 3:31 PM Dictation workstation:   [id number]    CT cervical spine wo IV contrast    Result Date: 1/10/2025  Interpreted By:  Rachna Umanzor, STUDY: CT HEAD WO IV CONTRAST; CT CERVICAL SPINE WO IV CONTRAST;  1/10/2025 2:47 pm   INDICATION: Signs/Symptoms:fall.   COMPARISON: None.   ACCESSION NUMBER(S): [id number]; [id number]   ORDERING CLINICIAN: RACQUEL CROOKS   TECHNIQUE: Noncontrast axial CT scan of head was performed. Angled reformats in brain and bone windows were generated. The images were reviewed in bone, brain, blood and soft tissue windows.   Axial CT images of the  cervical spine are obtained. Axial, coronal and sagittal reconstructions are provided for review.   FINDINGS: CT HEAD:   CSF Spaces:The ventricles, sulci and basal cisterns are normal in configuration and diffusely prominent, consistent with volume loss. There is no extraaxial fluid collection.   Parenchyma: Periventricular, subcortical, and deep white matter areas of hypodensity are noted. Findings likely due to microvascular ischemic disease. Small circumscribed hypodensity in the left brook and similar appearing hypodensities noted bilateral basal ganglia, are likely lacunar infarcts. Atherosclerotic disease noted intracranial internal carotid and bilateral vertebral arteries.The grey-white differentiation is intact. There is no mass effect or midline shift.  There is no intracranial hemorrhage.   Calvarium: The calvarium is unremarkable, no fracture.   Orbits: The visualized bony orbits are intact. There has been previous cataract surgery bilaterally.   Paranasal Sinuses/Mastoids: Visualized paranasal sinuses and mastoids are clear.   Soft tissues: Unremarkable.   CT CERVICAL SPINE: Images are slightly degraded by patient size and patient motion.   Fractures: There is no evidence for an acute fracture of the cervical spine, allowing for motion artifacts.   Vertebral Alignment: Slight reversal of normal lordosis in the upper cervical spine, may be positional. No spondylolisthesis.   Craniocervical Junction: The odontoid process and craniocervical junction are intact.   Vertebrae/Disc Spaces: Bones appear demineralized. Severe disc space narrowing C3 through C7 levels noted, with endplate osteophytes, consistent with spondylosis. Multilevel facet osteophytes also noted. Multiple levels of mild to moderate canal narrowing secondary to degenerative changes. No severe canal stenosis. Neural foraminal narrowing secondary to uncovertebral and facet osteophytes appears moderate to severe bilateral C4-5, C5-6 and C6-C7  levels.   Prevertebral/Paraspinal Soft Tissues: No prevertebral edema. No acute soft tissue abnormality. Carotid athero sclerotic disease is noted. Partially imaged leads from a right chest wall pacemaker are noted. Lung apices clear. Emphysema is present.       1. No evidence of acute intracranial abnormality. Mild cerebral volume loss noted and microvascular ischemic changes. 2. No evidence of acute cervical spine fracture. Multilevel cervical spondylosis, relatively severe from C3 level through C7 level as discussed above.       Signed by: Rachna Umanzor 1/10/2025 3:31 PM Dictation workstation:   [id number]    CT abdomen pelvis w IV contrast    Result Date: 1/10/2025  Interpreted By:  Rachna Umanzor, STUDY:   CT ABDOMEN PELVIS W IV CONTRAST;  1/10/2025 2:45 pm   INDICATION: Signs/Symptoms:diarrhea.   COMPARISON: 10/03/2022   ACCESSION NUMBER(S): [id number]   ORDERING CLINICIAN: RACQUEL CROOKS   TECHNIQUE: CT of the abdomen and pelvis was performed.  Standard contiguous axial images were obtained at 3 mm slice thickness through the abdomen and pelvis. Coronal and sagittal reconstructions at 3 mm slice thickness were performed.   75 ML Omnipaque 350contrast administered intravenously without immediate complication.   FINDINGS: LOWER CHEST: Dilated main pulmonary artery, likely pulmonary hypertension. Mildly dilated ascending aorta 4.3 cm. Visualized aorta shows no evidence of dissection. There is severe coronary artery calcification. Pacing leads noted in the right atrium and right ventricle. Heart size upper normal. No pleural or pericardial effusion. There is emphysema and mild atelectasis in the lung bases.   ABDOMEN:   LIVER: Lobular liver with features of steatosis and a lobular contour, possible cirrhosis. Overall size is mildly enlarged. No focal lesion is evident.   GALLBLADDER: Gallstones present. No gallbladder wall thickening or pericholecystic fluid.   BILE DUCTS: Normal caliber.   PANCREAS: The pancreas  appears within normal limits, no evidence of pancreatitis or mass.   SPLEEN: Normal size. Homogeneous enhancement.   ADRENAL GLANDS: Within normal limits.   KIDNEYS AND URETERS: Normal size kidneys. No hydronephrosis. No calculi. Renal hypodensities bilaterally, most of which are likely cysts. There is minimal thin calcification along the rim of a posterior left renal cyst which is 9.5 cm and has no other definite complex feature. Posterior cyst of the right kidney is slightly more complex appearing, particularly in the lower aspect, I can not tell if this might be artifact. There are some slight streak artifacts from the right arm being down.   PELVIS:   BLADDER: Limited assessment, streak artifact from right hip arthroplasty. Bladder appears grossly unremarkable.   REPRODUCTIVE ORGANS: Right hip arthroplasty streak artifact obscures the prostate gland.   BOWEL: The stomach is unremarkable. Normal caliber small bowel without inflammatory changes. Liquid stool in the colon suggests diarrhea. No significant wall thickening or pericolonic fat stranding. Few scattered colonic diverticula are noted. Appendix is normal.   VESSELS: Diffuse atherosclerotic disease. Bilobed infrarenal abdominal aortic aneurysm is present, not definitely saccular although has a lobulated appearance and is mildly tortuous. The more proximal portion measures 5.0 cm transverse and 4.6 cm in AP diameter. The more inferiorly located portion of aneurysm transverse diameter 6.1 cm, AP diameter 6.2 cm. Both of the above areas are larger than 2022, if measured in a similar manner of the upper portion was 4.6 x 4.5 cm, in the lower portion has a transverse diameter of 5.7 x 6 cm. No dissection. 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 There appears to be a renal artery stent on the left.   PERITONEUM/RETROPERITONEUM/LYMPH NODES: There is no free or loculated  intraperitoneal or retroperitoneal fluid collection, no free intraperitoneal air. No adenopathy.   BONES AND ABDOMINAL WALL: No evidence of acute fracture. No suspicious osseous lesions are identified. Degenerative changes in the spine at multiple levels. Right total hip arthroplasty is unremarkable in the included portions. The abdominal wall soft tissues appear normal. Left abdominal wall electronic device may be a medication pump.       1.  Liquid stool throughout the colon, consistent with history of diarrhea, nonspecific. No significant wall thickening of the colon or definite evidence of colitis. No obstruction. 2. Slight interval increase in bilobed infrarenal abdominal aortic aneurysm(s) as compared with 2022, without evidence of dissection or acute aortic abnormality, maximum caliber of the largest distal aortic aneurysm segment is 6.1 x 6.2 cm. Vascular follow-up recommended. 3. There is a slightly more complex appearance of a right renal cyst than on the prior study, favor artifact secondary to arms down positioning however may consider an ultrasound to ensure there are no complex features. 4. Other nonacute ancillary findings are unchanged .     Signed by: Rachna Umanzor 1/10/2025 3:25 PM Dictation workstation:   [id number]    XR chest 1 view    Result Date: 1/10/2025  Interpreted By:  Cameron Eaton, STUDY: XR CHEST 1 VIEW;  1/10/2025 1:13 pm   INDICATION: Signs/Symptoms:Chest Pain.     COMPARISON: Selected images from October 3, 2022 CT abdomen and pelvis and March 9, 2024 chest radiograph   ACCESSION NUMBER(S): [id number]   ORDERING CLINICIAN: RACQUEL CROOKS   FINDINGS: AP radiograph of the chest was provided.   Apical lordotic position. Multiple overlapping radiopaque structures.   CARDIOMEDIASTINAL SILHOUETTE: Cardiomediastinal silhouette is normal in size and configuration.   LUNGS: Minimal increased prominence of the markings especially right perihilar region. No well-delineated javon edema or  consolidative pneumonia.   ABDOMEN: No remarkable upper abdominal findings.   BONES: No acute osseous changes.       1.  Increased prominence of the markings particularly right perihilar markings may at least in part be related to difference in patient position.       MACRO: None   Signed by: Cameron Eaton 1/10/2025 1:43 PM Dictation workstation:   [id number]       Assessment/Plan   Assessment & Plan  Diarrhea, unspecified type    Abdominal aortic aneurysm (CMS-HCC)    CAD (coronary artery disease)    Essential hypertension    Ischemic cardiomyopathy    Mixed hyperlipidemia    Persistent atrial fibrillation (Multi)    Chronic diastolic congestive heart failure, NYHA class 2    High risk medication use    Prostate cancer (Multi)    Bacteremia            I spent   minutes in the professional and overall care of this patient.      Baldo Almazan MD

## 2025-01-25 NOTE — PROGRESS NOTES
Subjective: Patient sitting up in bed and without issue.  No abdominal pain.  Apparently ID is deferring to us on biopsies showing fungal organisms.      Review of Systems  ROS Negative unless otherwise stated above.    Allergies  Allergies   Allergen Reactions    Levofloxacin Palpitations     Rapid Heart Rate - Severe per pt.       Medications  Current Outpatient Medications   Medication Instructions    albuterol (Proventil HFA) 90 mcg/actuation inhaler 1 puff, Every 4 hours PRN    alteplase (CATHFLO ACTIVASE) 2 mg, intra-catheter, Daily PRN    amiodarone (PACERONE) 100 mg, oral, Daily    amoxicillin (Amoxil) 500 mg capsule 4 capsules, See admin instructions    aspirin 81 mg    atorvastatin (LIPITOR) 40 mg, oral, Nightly    budesonide-glycopyr-formoterol (Breztri Aerosphere) 160-9-4.8 mcg/actuation HFA aerosol inhaler 2 puffs, 2 times daily RT    ceFAZolin (Ancef) IVPB 2 g, intravenous, Every 8 hours    empagliflozin (JARDIANCE) 10 mg, oral, Daily    ezetimibe (ZETIA) 10 mg, oral, Daily    fluticasone (Flonase) 50 mcg/actuation nasal spray 2 sprays, Each Nostril, Daily    glucosamine HCl 1,500 mg tablet 1 tablet, Daily    hydroCHLOROthiazide (HYDRODiuril) 25 mg tablet TAKE 1 TABLET MON WED FRI ONLY    ipratropium (Atrovent) 42 mcg (0.06 %) nasal spray 2 sprays, Each Nostril, 3 times daily    isosorbide mononitrate ER (IMDUR) 30 mg, oral, Daily, 1 tablet daily    lisinopril 40 mg, oral, Daily    magnesium oxide (MagOx) 400 mg (241.3 mg magnesium) tablet 1 tablet, 2 times daily    metoprolol succinate XL (Toprol-XL) 100 mg 24 hr tablet 1/2 tablet in the morning, 1 full tablet at night    metoprolol tartrate (Lopressor) 25 mg tablet Take 1 tablet daily for palpitations only    montelukast (SINGULAIR) 10 mg, oral, Every evening    nitroglycerin (NITROSTAT) 0.4 mg, sublingual, Every 5 min PRN    omega 3-dha-epa-fish oil (Fish OiL) 1,000 mg (120 mg-180 mg) capsule 1,000 mg, 2 times daily    oxygen (O2) 2 L/min,  inhalation, Every 24 hours    pantoprazole (PROTONIX) 40 mg, oral, 2 times daily, Do not crush, chew, or split.    potassium chloride CR (Klor-Con M20) 20 mEq ER tablet 20 mEq, oral, 2 times daily, Do not crush or chew.    rivaroxaban (Xarelto) 20 mg tablet 1 tablet daily    spironolactone (ALDACTONE) 25 mg, oral, Every 48 hours    sucralfate (CARAFATE) 1 g, oral, Every 6 hours scheduled        PHYSICAL EXAM:  Visit Vitals  /61   Pulse 61   Temp 36.5 °C (97.7 °F)   Resp 17        General: A&Ox3, NAD.  HEENT: AT/NC.   GI: Soft, NT/ND.   Extrem: No edema.   Skin: No Jaundice.   Neuro: No focal deficits.   Psych: Normal mood and affect.       Results from last 7 days   Lab Units 01/25/25  0538 01/24/25  0520 01/23/25  0459   WBC AUTO x10*3/uL 9.4 9.6 11.4*   HEMOGLOBIN g/dL 8.3* 8.2* 7.9*   HEMATOCRIT % 26.1* 25.7* 24.6*   PLATELETS AUTO x10*3/uL 133* 134* 139*     Results from last 7 days   Lab Units 01/25/25  0538 01/24/25  0520 01/23/25  0459   SODIUM mmol/L 138 138 139   POTASSIUM mmol/L 4.2 4.2 4.1   CHLORIDE mmol/L 110* 112* 112*   CO2 mmol/L 22 22 22   BUN mg/dL 14 17 22   CREATININE mg/dL 0.79 0.77 0.74   CALCIUM mg/dL 7.7* 7.6* 7.6*   PROTEIN TOTAL g/dL 5.3* 5.0* 5.1*   BILIRUBIN TOTAL mg/dL 0.8 0.8 0.9   ALK PHOS U/L 48 46 47   ALT U/L 4* 4* 5*   AST U/L 13 15 17   GLUCOSE mg/dL 80 83 85         ASSESSMENT/PLAN:  Guicho Jones is an 83yo male with a PMH of ICM s/p ICD, CAD/MI s/p PCI, HFpEF, COPD, pulmonary HTN, HTN, HLD, atrial fibrillation (Xarelto), CVA, AAA, obesity, prostate cancer who presented with fall, diarrhea, N/V with concern for MSSA bacteremia/endocarditis and GI bleed/PUD s/p EGD 1/20/2025 that showed gastric ulcer with flat pigmented spot s/p epi and 3 clips.  Gastric biopsies were obtained and showed fungal organisms.  Per pictures and endoscopic report, no visible thrush was visualized.  The fungal organisms were simply noted on random stomach biopsies to rule out H. pylori.  Fungal  organisms are sometimes seen in gastric ulcers, per literature review, as Candida is a normal commensal organism in the gut.  Some reports suggest that it is a innocent bystander, other reports suggest that it may be a potential culprit.  In the latter patients, patients were treated with fluconazole for 3 weeks.  Ideally, it would be helpful for more input from ID; otherwise, would plan to just treat with fluconazole for 3 weeks.  Doubt further EGD is warranted at this time.      Zia Ruano,   GI Attending

## 2025-01-26 VITALS
HEART RATE: 63 BPM | BODY MASS INDEX: 37.13 KG/M2 | WEIGHT: 231.04 LBS | OXYGEN SATURATION: 92 % | SYSTOLIC BLOOD PRESSURE: 132 MMHG | RESPIRATION RATE: 16 BRPM | TEMPERATURE: 97.5 F | DIASTOLIC BLOOD PRESSURE: 64 MMHG | HEIGHT: 66 IN

## 2025-01-26 LAB
ALBUMIN SERPL BCP-MCNC: 2.6 G/DL (ref 3.4–5)
ALP SERPL-CCNC: 47 U/L (ref 33–136)
ALT SERPL W P-5'-P-CCNC: 4 U/L (ref 10–52)
ANION GAP SERPL CALC-SCNC: 9 MMOL/L (ref 10–20)
AST SERPL W P-5'-P-CCNC: 13 U/L (ref 9–39)
BILIRUB SERPL-MCNC: 0.7 MG/DL (ref 0–1.2)
BUN SERPL-MCNC: 12 MG/DL (ref 6–23)
CALCIUM SERPL-MCNC: 7.8 MG/DL (ref 8.6–10.3)
CHLORIDE SERPL-SCNC: 110 MMOL/L (ref 98–107)
CO2 SERPL-SCNC: 22 MMOL/L (ref 21–32)
CREAT SERPL-MCNC: 0.74 MG/DL (ref 0.5–1.3)
EGFRCR SERPLBLD CKD-EPI 2021: 89 ML/MIN/1.73M*2
ERYTHROCYTE [DISTWIDTH] IN BLOOD BY AUTOMATED COUNT: 17 % (ref 11.5–14.5)
GLUCOSE SERPL-MCNC: 86 MG/DL (ref 74–99)
HCT VFR BLD AUTO: 25.7 % (ref 41–52)
HGB BLD-MCNC: 8.2 G/DL (ref 13.5–17.5)
MAGNESIUM SERPL-MCNC: 1.3 MG/DL (ref 1.6–2.4)
MCH RBC QN AUTO: 31.1 PG (ref 26–34)
MCHC RBC AUTO-ENTMCNC: 31.9 G/DL (ref 32–36)
MCV RBC AUTO: 97 FL (ref 80–100)
NRBC BLD-RTO: 0 /100 WBCS (ref 0–0)
PHOSPHATE SERPL-MCNC: 3.5 MG/DL (ref 2.5–4.9)
PLATELET # BLD AUTO: 132 X10*3/UL (ref 150–450)
POTASSIUM SERPL-SCNC: 4.3 MMOL/L (ref 3.5–5.3)
PROT SERPL-MCNC: 5.5 G/DL (ref 6.4–8.2)
RBC # BLD AUTO: 2.64 X10*6/UL (ref 4.5–5.9)
SODIUM SERPL-SCNC: 137 MMOL/L (ref 136–145)
WBC # BLD AUTO: 8 X10*3/UL (ref 4.4–11.3)

## 2025-01-26 PROCEDURE — 2500000002 HC RX 250 W HCPCS SELF ADMINISTERED DRUGS (ALT 637 FOR MEDICARE OP, ALT 636 FOR OP/ED): Performed by: STUDENT IN AN ORGANIZED HEALTH CARE EDUCATION/TRAINING PROGRAM

## 2025-01-26 PROCEDURE — 94640 AIRWAY INHALATION TREATMENT: CPT

## 2025-01-26 PROCEDURE — 2500000004 HC RX 250 GENERAL PHARMACY W/ HCPCS (ALT 636 FOR OP/ED): Performed by: INTERNAL MEDICINE

## 2025-01-26 PROCEDURE — 80053 COMPREHEN METABOLIC PANEL: CPT | Performed by: STUDENT IN AN ORGANIZED HEALTH CARE EDUCATION/TRAINING PROGRAM

## 2025-01-26 PROCEDURE — 99232 SBSQ HOSP IP/OBS MODERATE 35: CPT | Performed by: STUDENT IN AN ORGANIZED HEALTH CARE EDUCATION/TRAINING PROGRAM

## 2025-01-26 PROCEDURE — 85027 COMPLETE CBC AUTOMATED: CPT | Performed by: STUDENT IN AN ORGANIZED HEALTH CARE EDUCATION/TRAINING PROGRAM

## 2025-01-26 PROCEDURE — 2500000004 HC RX 250 GENERAL PHARMACY W/ HCPCS (ALT 636 FOR OP/ED): Mod: JZ | Performed by: STUDENT IN AN ORGANIZED HEALTH CARE EDUCATION/TRAINING PROGRAM

## 2025-01-26 PROCEDURE — 84100 ASSAY OF PHOSPHORUS: CPT | Performed by: STUDENT IN AN ORGANIZED HEALTH CARE EDUCATION/TRAINING PROGRAM

## 2025-01-26 PROCEDURE — 2500000001 HC RX 250 WO HCPCS SELF ADMINISTERED DRUGS (ALT 637 FOR MEDICARE OP): Performed by: NURSE PRACTITIONER

## 2025-01-26 PROCEDURE — 2500000005 HC RX 250 GENERAL PHARMACY W/O HCPCS: Performed by: STUDENT IN AN ORGANIZED HEALTH CARE EDUCATION/TRAINING PROGRAM

## 2025-01-26 PROCEDURE — 2500000002 HC RX 250 W HCPCS SELF ADMINISTERED DRUGS (ALT 637 FOR MEDICARE OP, ALT 636 FOR OP/ED): Performed by: INTERNAL MEDICINE

## 2025-01-26 PROCEDURE — 2500000001 HC RX 250 WO HCPCS SELF ADMINISTERED DRUGS (ALT 637 FOR MEDICARE OP): Performed by: STUDENT IN AN ORGANIZED HEALTH CARE EDUCATION/TRAINING PROGRAM

## 2025-01-26 PROCEDURE — 83735 ASSAY OF MAGNESIUM: CPT | Performed by: STUDENT IN AN ORGANIZED HEALTH CARE EDUCATION/TRAINING PROGRAM

## 2025-01-26 PROCEDURE — 2500000001 HC RX 250 WO HCPCS SELF ADMINISTERED DRUGS (ALT 637 FOR MEDICARE OP): Performed by: FAMILY MEDICINE

## 2025-01-26 RX ADMIN — AMIODARONE HYDROCHLORIDE 100 MG: 200 TABLET ORAL at 08:19

## 2025-01-26 RX ADMIN — METHOCARBAMOL TABLETS 500 MG: 500 TABLET, COATED ORAL at 06:21

## 2025-01-26 RX ADMIN — ACETAMINOPHEN 650 MG: 325 TABLET ORAL at 12:10

## 2025-01-26 RX ADMIN — FLUTICASONE FUROATE AND VILANTEROL TRIFENATATE 1 PUFF: 200; 25 POWDER RESPIRATORY (INHALATION) at 06:23

## 2025-01-26 RX ADMIN — FLUTICASONE PROPIONATE 2 SPRAY: 50 SPRAY, METERED NASAL at 08:20

## 2025-01-26 RX ADMIN — IPRATROPIUM BROMIDE AND ALBUTEROL SULFATE 3 ML: 2.5; .5 SOLUTION RESPIRATORY (INHALATION) at 13:14

## 2025-01-26 RX ADMIN — CEFAZOLIN SODIUM 2 G: 2 INJECTION, SOLUTION INTRAVENOUS at 06:22

## 2025-01-26 RX ADMIN — Medication 2 L/MIN: at 07:13

## 2025-01-26 RX ADMIN — METOPROLOL TARTRATE 50 MG: 50 TABLET, FILM COATED ORAL at 08:19

## 2025-01-26 RX ADMIN — MICAFUNGIN SODIUM 100 MG: 100 INJECTION, POWDER, LYOPHILIZED, FOR SOLUTION INTRAVENOUS at 12:09

## 2025-01-26 RX ADMIN — CEFAZOLIN SODIUM 2 G: 2 INJECTION, SOLUTION INTRAVENOUS at 14:01

## 2025-01-26 RX ADMIN — EZETIMIBE 10 MG: 10 TABLET ORAL at 08:19

## 2025-01-26 RX ADMIN — POTASSIUM CHLORIDE 20 MEQ: 1500 TABLET, EXTENDED RELEASE ORAL at 08:19

## 2025-01-26 RX ADMIN — SUCRALFATE 1 G: 1 TABLET ORAL at 06:22

## 2025-01-26 RX ADMIN — TIOTROPIUM BROMIDE INHALATION SPRAY 2 PUFF: 3.12 SPRAY, METERED RESPIRATORY (INHALATION) at 06:23

## 2025-01-26 RX ADMIN — SUCRALFATE 1 G: 1 TABLET ORAL at 12:10

## 2025-01-26 RX ADMIN — IPRATROPIUM BROMIDE AND ALBUTEROL SULFATE 3 ML: 2.5; .5 SOLUTION RESPIRATORY (INHALATION) at 07:13

## 2025-01-26 RX ADMIN — ACETAMINOPHEN 650 MG: 325 TABLET ORAL at 06:21

## 2025-01-26 RX ADMIN — METHOCARBAMOL TABLETS 500 MG: 500 TABLET, COATED ORAL at 14:01

## 2025-01-26 RX ADMIN — PANTOPRAZOLE SODIUM 40 MG: 40 TABLET, DELAYED RELEASE ORAL at 06:21

## 2025-01-26 ASSESSMENT — COGNITIVE AND FUNCTIONAL STATUS - GENERAL
TOILETING: A LITTLE
DRESSING REGULAR UPPER BODY CLOTHING: A LITTLE
WALKING IN HOSPITAL ROOM: A LITTLE
DAILY ACTIVITIY SCORE: 20
MOBILITY SCORE: 17
CLIMB 3 TO 5 STEPS WITH RAILING: TOTAL
STANDING UP FROM CHAIR USING ARMS: A LITTLE
HELP NEEDED FOR BATHING: A LITTLE
MOVING TO AND FROM BED TO CHAIR: A LITTLE
TURNING FROM BACK TO SIDE WHILE IN FLAT BAD: A LITTLE
PERSONAL GROOMING: A LITTLE

## 2025-01-26 ASSESSMENT — PAIN SCALES - GENERAL
PAINLEVEL_OUTOF10: 0 - NO PAIN
PAINLEVEL_OUTOF10: 0 - NO PAIN

## 2025-01-26 NOTE — NURSING NOTE
"Patient starting to have more strength in upper extremities. Patient starting to be able to lift bilat arms above head. NICHOLAS Lincoln NP at bedside. Wife at bedside and given update. Patient verbalized \"I feel better. I feel like I have more energy\".   "
"Spoke with Rolo from [employer] regarding patient's device. Patient and wife verbalized that patient was told that he can not do MRI's. CT's were ok. Rolo stated \"I'm looking at the patient's information and it looks like the device is MRI compatible but the implants and other material used are not MRI compatible. NICHOLAS Lincoln NP and Dr Wilder aware.   "
Called report to [facility]    Patient will be picked up at 4 pm by [facility]     Notified patients wife, Linnea Tate RN   
Heparin Assay came back at 0.6. Heparin running at 9 units per hour .   
NICHOLAS Lincoln NP and Dr Wilder aware of patient c/o poor depth perception with visual. Pupils equal and reactive. Patient denies blurred vision or any other problems with vision. Patient able to view all rodrigues. NICHOLAS Lincoln NP and Dr Wilder also aware of patient bilat arms are weaker. Bilat hand grasps are equal but patient having a hard time lifting arms fully. NICHOLAS Lincoln NP and Dr Wilder at bedside. Patient speech clear, patient alert, oriented x4. Face/smile equal. Will continue to monitor closely.   
Notified MD during rounds that patient's o2 requirements increased (6L NC/BPAP --> 10-15L oxymizer) , sputum being coughed up being bloody/thick, abdominal distention, and bladder scan showing 254ml.     No new orders at this time- per Dr. aSpp we will address concerns with ID and see if updated scans/tests are needed.    Updated wife with all questions  
Patient requested to be taken off BiPAP.  
Patient taken to nuc med via bed on 3L NC on monitors with RN  
Rapid Response RN Note     48 hour post ICU transfer follow up:     Per bedside RN - no episodes of diarrhea today. Remains on heparin gtt. VSS at this time. Patient wearing 3L NC during the day, and home BiPAP at night. Patient will need PICC line prior to discharge to [place]. No concerns at this time.   
Rapid Response RN note:     24 Hour post ICU transfer follow up:     Patient admitted 1/10 for diarrhea. Patient does still continue to have diarrhea - stool pathogen panel was negative. Patient did have positive blood cultures and will need to be on 6 weeks of antibiotics per Infectious Disease. Remains on heparin drip for a-fib. VSS at this time. Patient is wearing home BiPAP at night. Plan for patient to discharge to [facility] post PICC line placement. No concerns at this time. Rapid Response team will continue to follow.   
Report received from [facility]lvarez.  
Progress slowly

## 2025-01-26 NOTE — PROGRESS NOTES
Off o2.  Accepted at [facility] and facility can accept today. Pt to get IV a dose at 1200 and 2:00pm. Then ready. Requested transports and if needed new or updated 7000. Discussed w/ Rn. GR and & day mar sent ot [place]. Notified facility pt is on cpap, His own unit. Rn confirmed she will make sure unit travels w/pt. Transport pending. Requested 4 pm. Mariella Farley is arranging w/  DSC team.     The S Vehicle you requested for Guicho OLIVARESAshley in unit/room 1019 A on 01/26/2025 is scheduled to arrive at 4:00pm EST! [facility] is handling this ride and you can contact them at [phone]. RN and unit sec updated.

## 2025-01-26 NOTE — PROGRESS NOTES
Subjective: Patient lying in bed and eating breakfast this morning.  No concerns at this time.      Review of Systems  ROS Negative unless otherwise stated above.    Allergies  Allergies   Allergen Reactions    Levofloxacin Palpitations     Rapid Heart Rate - Severe per pt.       Medications  Current Outpatient Medications   Medication Instructions    albuterol (Proventil HFA) 90 mcg/actuation inhaler 1 puff, Every 4 hours PRN    alteplase (CATHFLO ACTIVASE) 2 mg, intra-catheter, Daily PRN    amiodarone (PACERONE) 100 mg, oral, Daily    amoxicillin (Amoxil) 500 mg capsule 4 capsules, See admin instructions    aspirin 81 mg    atorvastatin (LIPITOR) 40 mg, oral, Nightly    budesonide-glycopyr-formoterol (Breztri Aerosphere) 160-9-4.8 mcg/actuation HFA aerosol inhaler 2 puffs, 2 times daily RT    ceFAZolin (Ancef) IVPB 2 g, intravenous, Every 8 hours    empagliflozin (JARDIANCE) 10 mg, oral, Daily    ertugliflozin (STEGLATRO) 5 mg, oral, Daily    ezetimibe (ZETIA) 10 mg, oral, Daily    fluticasone (Flonase) 50 mcg/actuation nasal spray 2 sprays, Each Nostril, Daily    glucosamine HCl 1,500 mg tablet 1 tablet, Daily    hydroCHLOROthiazide (HYDRODiuril) 25 mg tablet TAKE 1 TABLET MON WED FRI ONLY    ipratropium (Atrovent) 42 mcg (0.06 %) nasal spray 2 sprays, Each Nostril, 3 times daily    isosorbide mononitrate ER (IMDUR) 30 mg, oral, Daily, 1 tablet daily    lisinopril 40 mg, oral, Daily    magnesium oxide (MagOx) 400 mg (241.3 mg magnesium) tablet 1 tablet, 2 times daily    metoprolol succinate XL (Toprol-XL) 100 mg 24 hr tablet 1/2 tablet in the morning, 1 full tablet at night    metoprolol tartrate (Lopressor) 25 mg tablet Take 1 tablet daily for palpitations only    micafungin 100 mg in dextrose 5% 100 mL IVPB 100 mg, intravenous, Every 24 hours    montelukast (SINGULAIR) 10 mg, oral, Every evening    nitroglycerin (NITROSTAT) 0.4 mg, sublingual, Every 5 min PRN    omega 3-dha-epa-fish oil (Fish OiL) 1,000 mg (120  mg-180 mg) capsule 1,000 mg, 2 times daily    oxygen (O2) 2 L/min, inhalation, Every 24 hours    pantoprazole (PROTONIX) 40 mg, oral, 2 times daily, Do not crush, chew, or split.    potassium chloride CR (Klor-Con M20) 20 mEq ER tablet 20 mEq, oral, 2 times daily, Do not crush or chew.    rivaroxaban (Xarelto) 20 mg tablet 1 tablet daily    spironolactone (ALDACTONE) 25 mg, oral, Every 48 hours    sucralfate (CARAFATE) 1 g, oral, Every 6 hours scheduled        PHYSICAL EXAM:  Visit Vitals  /65 (BP Location: Left arm, Patient Position: Lying)   Pulse 60   Temp 36.4 °C (97.5 °F) (Temporal)   Resp 16        General: A&Ox3, NAD.  HEENT: AT/NC.   GI: Soft, NT/ND.   Extrem: No edema.   Skin: No Jaundice.   Neuro: No focal deficits.   Psych: Normal mood and affect.       Results from last 7 days   Lab Units 01/26/25  0632 01/25/25  0538 01/24/25  0520   WBC AUTO x10*3/uL 8.0 9.4 9.6   HEMOGLOBIN g/dL 8.2* 8.3* 8.2*   HEMATOCRIT % 25.7* 26.1* 25.7*   PLATELETS AUTO x10*3/uL 132* 133* 134*     Results from last 7 days   Lab Units 01/26/25  0632 01/25/25  0538 01/24/25  0520   SODIUM mmol/L 137 138 138   POTASSIUM mmol/L 4.3 4.2 4.2   CHLORIDE mmol/L 110* 110* 112*   CO2 mmol/L 22 22 22   BUN mg/dL 12 14 17   CREATININE mg/dL 0.74 0.79 0.77   CALCIUM mg/dL 7.8* 7.7* 7.6*   PROTEIN TOTAL g/dL 5.5* 5.3* 5.0*   BILIRUBIN TOTAL mg/dL 0.7 0.8 0.8   ALK PHOS U/L 47 48 46   ALT U/L 4* 4* 4*   AST U/L 13 13 15   GLUCOSE mg/dL 86 80 83         ASSESSMENT/PLAN:  Guicho Jones is an 85yo male with a PMH of ICM s/p ICD, CAD/MI s/p PCI, HFpEF, COPD, pulmonary HTN, HTN, HLD, atrial fibrillation (Xarelto), CVA, AAA, obesity, prostate cancer who presented with fall, diarrhea, N/V with concern for MSSA bacteremia/endocarditis and GI bleed/PUD s/p EGD 1/20/2025 that showed gastric ulcer with flat pigmented spot s/p epi and 3 clips.  Gastric biopsies were obtained and showed fungal organisms.  Via joint discussion with ID, it is felt  that these organisms are colonizers and likely a red herring.  Patient to complete short course of micafungin.  Further EGD not indicated at this time.  Continue PPI twice daily.  Outpatient GI follow-up.      Zia Ruano DO  GI Attending

## 2025-01-26 NOTE — CARE PLAN
The patient's goals for the shift include Patient will remain safe and free from falls    The clinical goals for the shift include HDS    Problem: Fall/Injury  Goal: Not fall by end of shift  Outcome: Progressing  Goal: Be free from injury by end of the shift  Outcome: Progressing  Goal: Verbalize understanding of personal risk factors for fall in the hospital  Outcome: Progressing  Goal: Verbalize understanding of risk factor reduction measures to prevent injury from fall in the home  Outcome: Progressing  Goal: Use assistive devices by end of the shift  Outcome: Progressing  Goal: Pace activities to prevent fatigue by end of the shift  Outcome: Progressing     Problem: Pain - Adult  Goal: Verbalizes/displays adequate comfort level or baseline comfort level  Outcome: Progressing     Problem: Discharge Planning  Goal: Discharge to home or other facility with appropriate resources  Outcome: Progressing     Problem: Chronic Conditions and Co-morbidities  Goal: Patient's chronic conditions and co-morbidity symptoms are monitored and maintained or improved  Outcome: Progressing     Problem: Skin  Goal: Participates in plan/prevention/treatment measures  Outcome: Progressing  Goal: Prevent/manage excess moisture  Outcome: Progressing  Goal: Prevent/minimize sheer/friction injuries  Outcome: Progressing  Goal: Promote/optimize nutrition  Outcome: Progressing  Goal: Promote skin healing  Outcome: Progressing     Problem: Heart Failure  Goal: Improved gas exchange this shift  Outcome: Progressing  Goal: Improved urinary output this shift  Outcome: Progressing  Goal: Reduction in peripheral edema within 24 hours  Outcome: Progressing  Goal: Report improvement of dyspnea/breathlessness this shift  Outcome: Progressing  Goal: Weight from fluid excess reduced over 2-3 days, then stabilize  Outcome: Progressing  Goal: Increase self care and/or family involvement in 24 hours  Outcome: Progressing     Problem: Nutrition  Goal:  Oral intake greater than 50%  Outcome: Progressing  Goal: Consume prescribed supplement  Outcome: Progressing  Goal: Adequate PO fluid intake  Outcome: Progressing  Goal: Lab values WNL  Outcome: Progressing  Goal: Electrolytes WNL  Outcome: Progressing  Goal: Maintain stable weight  Outcome: Progressing     Problem: Risk for Decreased Cardiac Output  Goal: Patient will maintain vital signs within normal limits, adequate urine output, and adequate tissue perfusion.  Patient will maintain an asymptomatic cardiac rhythm without signs and symptoms of decreased cardiac output.  Outcome: Progressing

## 2025-01-27 LAB
LABORATORY COMMENT REPORT: NORMAL
PATH REPORT.ADDENDUM SPEC: NORMAL
PATH REPORT.COMMENTS IMP SPEC: NORMAL
PATH REPORT.FINAL DX SPEC: NORMAL
PATH REPORT.GROSS SPEC: NORMAL
PATH REPORT.RELEVANT HX SPEC: NORMAL
PATH REPORT.TOTAL CANCER: NORMAL

## 2025-01-31 ENCOUNTER — APPOINTMENT (OUTPATIENT)
Dept: SURGERY | Facility: CLINIC | Age: 85
End: 2025-01-31
Payer: MEDICARE

## 2025-02-03 ENCOUNTER — APPOINTMENT (OUTPATIENT)
Dept: PRIMARY CARE | Facility: CLINIC | Age: 85
End: 2025-02-03
Payer: MEDICARE

## 2025-02-17 ENCOUNTER — APPOINTMENT (OUTPATIENT)
Dept: CARDIOLOGY | Facility: CLINIC | Age: 85
End: 2025-02-17
Payer: MEDICARE

## 2025-02-24 ENCOUNTER — APPOINTMENT (OUTPATIENT)
Facility: CLINIC | Age: 85
End: 2025-02-24
Payer: MEDICARE

## 2025-02-27 ENCOUNTER — TELEPHONE (OUTPATIENT)
Dept: PRIMARY CARE | Facility: CLINIC | Age: 85
End: 2025-02-27
Payer: MEDICARE

## 2025-02-28 ENCOUNTER — PATIENT OUTREACH (OUTPATIENT)
Dept: PRIMARY CARE | Facility: CLINIC | Age: 85
End: 2025-02-28
Payer: MEDICARE

## 2025-02-28 NOTE — PROGRESS NOTES
Discharge Facility:  [facility]   Discharge Diagnosis:  MSSA Bacteremia  leukocytoclastic vasculitis     Admission Date:  2/9/25    Discharge Date:   2/27/25-home with [facility] for IV ATBx     PCP Appointment Date:  TBD-Message sent to office staff requesting assistance. As well as encourged patient to call today to schedule.     Specialist Appointment Date:   3/3/25 Pulmonology  3/10/25 Optometry  3/14/25 Nephrology  3/27/25 Dermatology  Appointment with Quintin Mejia MD (03/24/2025)   Appointment with Joce Bassett MD (05/05/2025)   Hospital Encounter and Summary Linked: Yes  Transitional Care Management Enrollment with Lizett Gutierrez LPN (02/28/2025)     [facility]      Home Health Care      [facility]       Phone# [phone]              [facility]     [facility]       Phone/Fax [phone]        START:  -Daptomycin (Cubicin) IV antibiotic. 700 mg every 48 hours  -Bumetanide (Bumex) 1 mg once daily:  Your current weight is 215 lbs. Please continue to weight your self daily first thing in the mornings. You are instructed to take bumetanide 1mg once daily. If your weight increases by 4lbs in one day, please take one extra dose of bumex. If your weight drops to 210 lbs, please hold your dose of bumex that day.      CHANGE:  -Metoprolol succinate ER (Toprol XL) 25 mg. Take 3 tablets by mouth once daily.     STOP:  -Aspirin 81 mg   -Atorvastatin (Lipitor) 40 mg   -Sucralfate (Carafate)  -Cefazolin   -Ferrous sulfate (Iron) 325 mg  -Glucosamine 500mg   -Lisinopril 40 mg  -Spironolactone (Aldactone) 25 mg  -Ertugliflozin (Steglatro)    Two attempts were made to reach patient within two business days after discharge. I left voicemail to introduce myself and the TCM program to Guicho Jones. I encouraged patient to contact office or myself for follow up appt with   Ava. I gave my contact information to return my call so we can go over discharge instructions and see if I can assist in anyway.

## 2025-03-03 ENCOUNTER — TELEPHONE (OUTPATIENT)
Dept: PULMONOLOGY | Age: 85
End: 2025-03-03

## 2025-03-03 NOTE — TELEPHONE ENCOUNTER
/PT WAS ADMITTED IN TO  [facility] FOR A MONTH DUE TO A BLOOD INFECTION.        PT HAS A F/U WITH DR AUGUSTIN 3/19/25. ALL IMAGES HAVE BEEN REQUESTED FROM [facility].          PT WANTED TO MAKE YOU AWARE SO YOU COULD REVIEW ANY INFORMATION.

## 2025-03-11 ENCOUNTER — APPOINTMENT (OUTPATIENT)
Dept: PRIMARY CARE | Facility: CLINIC | Age: 85
End: 2025-03-11
Payer: MEDICARE

## 2025-03-11 VITALS
HEART RATE: 71 BPM | HEIGHT: 65 IN | TEMPERATURE: 98.2 F | OXYGEN SATURATION: 95 % | WEIGHT: 219.4 LBS | DIASTOLIC BLOOD PRESSURE: 84 MMHG | SYSTOLIC BLOOD PRESSURE: 162 MMHG | BODY MASS INDEX: 36.55 KG/M2

## 2025-03-11 DIAGNOSIS — N18.31 STAGE 3A CHRONIC KIDNEY DISEASE (MULTI): ICD-10-CM

## 2025-03-11 DIAGNOSIS — Z09 HOSPITAL DISCHARGE FOLLOW-UP: Primary | ICD-10-CM

## 2025-03-11 DIAGNOSIS — N40.1 BENIGN PROSTATIC HYPERPLASIA WITH INCOMPLETE BLADDER EMPTYING: ICD-10-CM

## 2025-03-11 DIAGNOSIS — E66.01 OBESITY, MORBID (MULTI): ICD-10-CM

## 2025-03-11 DIAGNOSIS — R39.14 BENIGN PROSTATIC HYPERPLASIA WITH INCOMPLETE BLADDER EMPTYING: ICD-10-CM

## 2025-03-11 DIAGNOSIS — R78.81 BACTEREMIA: ICD-10-CM

## 2025-03-11 DIAGNOSIS — I48.19 PERSISTENT ATRIAL FIBRILLATION (MULTI): ICD-10-CM

## 2025-03-11 PROBLEM — B35.6 TINEA CRURIS: Status: RESOLVED | Noted: 2023-09-29 | Resolved: 2025-03-11

## 2025-03-11 PROCEDURE — 99214 OFFICE O/P EST MOD 30 MIN: CPT | Performed by: INTERNAL MEDICINE

## 2025-03-11 PROCEDURE — 1159F MED LIST DOCD IN RCRD: CPT | Performed by: INTERNAL MEDICINE

## 2025-03-11 PROCEDURE — 3079F DIAST BP 80-89 MM HG: CPT | Performed by: INTERNAL MEDICINE

## 2025-03-11 PROCEDURE — 3077F SYST BP >= 140 MM HG: CPT | Performed by: INTERNAL MEDICINE

## 2025-03-11 PROCEDURE — 1157F ADVNC CARE PLAN IN RCRD: CPT | Performed by: INTERNAL MEDICINE

## 2025-03-11 PROCEDURE — 1123F ACP DISCUSS/DSCN MKR DOCD: CPT | Performed by: INTERNAL MEDICINE

## 2025-03-11 PROCEDURE — G2211 COMPLEX E/M VISIT ADD ON: HCPCS | Performed by: INTERNAL MEDICINE

## 2025-03-11 PROCEDURE — 1036F TOBACCO NON-USER: CPT | Performed by: INTERNAL MEDICINE

## 2025-03-11 RX ORDER — TAMSULOSIN HYDROCHLORIDE 0.4 MG/1
0.4 CAPSULE ORAL DAILY
Qty: 30 CAPSULE | Refills: 11 | Status: SHIPPED | OUTPATIENT
Start: 2025-03-11 | End: 2026-03-11

## 2025-03-11 RX ORDER — BUMETANIDE 1 MG/1
1 TABLET ORAL
COMMUNITY
Start: 2025-02-28

## 2025-03-11 RX ORDER — DOCUSATE SODIUM 100 MG/1
200 CAPSULE, LIQUID FILLED ORAL 2 TIMES DAILY
COMMUNITY

## 2025-03-11 RX ORDER — POLYETHYLENE GLYCOL 3350 17 G/17G
17 POWDER, FOR SOLUTION ORAL DAILY
COMMUNITY

## 2025-03-11 RX ORDER — DAPTOMYCIN IN SODIUM CHLORIDE 700 MG/100ML
700 INJECTION, SOLUTION INTRAVENOUS
COMMUNITY
Start: 2025-02-27

## 2025-03-11 ASSESSMENT — PATIENT HEALTH QUESTIONNAIRE - PHQ9
2. FEELING DOWN, DEPRESSED OR HOPELESS: NOT AT ALL
SUM OF ALL RESPONSES TO PHQ9 QUESTIONS 1 AND 2: 0
1. LITTLE INTEREST OR PLEASURE IN DOING THINGS: NOT AT ALL

## 2025-03-11 NOTE — PROGRESS NOTES
Guicho Jones, pleasant 85 y.o. male was seen today for:      Chief Complaint   Patient presents with    [facility] Follow-up 2-       Guicho Jones   Patient was recently discharged from [facility].  He had a prolonged hospitalization.  Prior to that he was in [facility] and then was discharged to [facility].  Patient does have a history of bacteremia which is a MSSA obvious cause was not known.  He does have history of ischemic cardiomyopathy with low ejection fraction AICD and paroxysmal atrial fibrillation.  During the hospitalization patient also had acute lower GI bleed resulting in gastric ulcer clipping by the gastroenterologist.  His hospital stay complicated with the drug reaction with cefazolin.  Patient was then in clinically hospital for daptomycin still then at the cause and source of infection was not known.  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  He has a PICC now through which she is getting daptomycin every other day.  He does have worsening kidney function he will be seeing nephrologist as well.  Currently he is doing well.  He is planning to go to [place] in April.  He denies any fever or chills.  He is eating drinking well is looking great.  However few days ago he was very sick that thinking was taking to put him on palliative and hospice care.      MEDICAL DECISION MAKING:  - Current co morbidities have been considered.   - All pertinent labs and images were addressed as per medical necessity.    - Also reviewed relevant notes from the specialty consultants.     - Time spent before, during and after office visit, which includes coordination of care counseling was 45 minutes  - Next follow up : August 2025    TODAY'S VISIT  DX:     1. Hospital discharge follow-up  Post discharge F/U; all concerns and questions were answered. Patient is now back  "to normal self and at baseline.      2. Benign prostatic hyperplasia with incomplete bladder emptying  tamsulosin (Flomax) 0.4 mg 24 hr capsule      3. Stage 3a chronic kidney disease (Multi)  Stable at this time.  Although patient is taking daptomycin for MSSA bacteremia and Bumex for congestive heart failure      4. Obesity, morbid (Multi)  He lost substantial weight during this hospitalization.  Patient was told to increase activity and decrease calorie consumption.      5. Bacteremia  MSSA.  Currently has PICC on the right upper extremity.  Patient is receiving IV daptomycin by ID, [facility]      6. Persistent atrial fibrillation (Multi)  Currently not on Xarelto secondary to GI bleed           Visit Vitals  /84 (BP Location: Left arm, Patient Position: Sitting, BP Cuff Size: Adult)   Pulse 71   Temp 36.8 °C (98.2 °F) (Temporal)   Ht 1.651 m (5' 5\")   Wt 99.5 kg (219 lb 6.4 oz)   SpO2 95% Comment: RA   BMI 36.51 kg/m²   Smoking Status Former   BSA 2.14 m²     Wt Readings from Last 10 Encounters:   03/11/25 99.5 kg (219 lb 6.4 oz)   01/26/25 105 kg (231 lb 0.7 oz)   11/25/24 105 kg (231 lb)   11/04/24 103 kg (226 lb 12.8 oz)   08/21/24 101 kg (222 lb 6.4 oz)   08/19/24 101 kg (222 lb 12.8 oz)   06/05/24 101 kg (223 lb 6.4 oz)   05/02/24 102 kg (225 lb)   04/24/24 101 kg (223 lb)   04/01/24 101 kg (223 lb 9.6 oz)       MEDICATIONS:   Current Outpatient Medications   Medication Instructions    albuterol (Proventil HFA) 90 mcg/actuation inhaler 1 puff, Every 4 hours PRN    alteplase (CATHFLO ACTIVASE) 2 mg, intra-catheter, Daily PRN    budesonide-glycopyr-formoterol (Breztri Aerosphere) 160-9-4.8 mcg/actuation HFA aerosol inhaler 2 puffs, 2 times daily RT    bumetanide (BUMEX) 1 mg, Daily RT    DAPTOmycin (Cubicin) 700 mg/100 mL  mg, Every 48 hours    docusate sodium (COLACE) 200 mg, 2 times daily    ezetimibe (ZETIA) 10 mg, oral, Daily    isosorbide mononitrate ER (IMDUR) 30 mg, oral, Daily, 1 tablet daily    " metoprolol succinate XL (Toprol-XL) 100 mg 24 hr tablet 1/2 tablet in the morning, 1 full tablet at night    montelukast (SINGULAIR) 10 mg, oral, Every evening    nitroglycerin (NITROSTAT) 0.4 mg, sublingual, Every 5 min PRN    oxygen (O2) 2 L/min, inhalation, Every 24 hours    pantoprazole (PROTONIX) 40 mg, oral, 2 times daily, Do not crush, chew, or split.    polyethylene glycol (GLYCOLAX, MIRALAX) 17 g, Daily    tamsulosin (FLOMAX) 0.4 mg, oral, Daily       Review of Systems   Constitutional:  Negative for activity change and fever.   HENT:  Negative for hearing loss, nosebleeds and tinnitus.    Eyes:  Negative for redness.   Respiratory:  Negative for chest tightness and stridor.    Cardiovascular:  Negative for chest pain, palpitations and leg swelling.   Gastrointestinal:  Negative for blood in stool.   Endocrine: Negative for cold intolerance.   Genitourinary:  Negative for hematuria.   Musculoskeletal:  Negative for joint swelling.   Skin:  Negative for rash.   Neurological:  Negative for speech difficulty and light-headedness.   Psychiatric/Behavioral:  Negative for behavioral problems.       Physical Exam  Constitutional:       General: She is not in acute distress.     Appearance: Normal appearance.   HENT:      Head: Normocephalic.      Right Ear: Tympanic membrane normal.      Left Ear: Tympanic membrane normal.      Mouth/Throat:      Mouth: Mucous membranes are moist.   Cardiovascular:      Rate and Rhythm: Normal rate and regular rhythm.      Heart sounds: No murmur heard.  Pulmonary:      Effort: No respiratory distress.   Abdominal:      Palpations: Abdomen is soft.   Musculoskeletal:      Cervical back: Neck supple.      Right lower leg: No edema.      Left lower leg: No edema.   Skin:     Findings: No rash.   Neurological:      General: No focal deficit present.      Mental Status: She is alert and oriented to person, place, and time.   Psychiatric:         Mood and Affect: Mood normal.       RECENT LABS:  Lab Results   Component Value Date    WBC 8.0 01/26/2025    HGB 8.2 (L) 01/26/2025    HCT 25.7 (L) 01/26/2025     (L) 01/26/2025    CHOL 117 06/10/2024    TRIG 119 06/10/2024    HDL 53.8 06/10/2024    ALT 4 (L) 01/26/2025    AST 13 01/26/2025     01/26/2025    K 4.3 01/26/2025     (H) 01/26/2025    CREATININE 0.74 01/26/2025    BUN 12 01/26/2025    CO2 22 01/26/2025    TSH 1.15 03/13/2024    INR 1.7 (H) 01/10/2025     Lab Results   Component Value Date    GLUCOSE 86 01/26/2025    CALCIUM 7.8 (L) 01/26/2025     01/26/2025    K 4.3 01/26/2025    CO2 22 01/26/2025     (H) 01/26/2025    BUN 12 01/26/2025    CREATININE 0.74 01/26/2025      Lab Results   Component Value Date    LDLCALC 39 06/10/2024     Lab Results   Component Value Date    LDLCALC 39 06/10/2024    CREATININE 0.74 01/26/2025         P.S: This note was completed using Dragon voice recognition technology and may include unintended errors with respect to translation of words, typographical errors or grammar errors which may not have been identified while finalizing the chart.

## 2025-03-17 ENCOUNTER — PATIENT OUTREACH (OUTPATIENT)
Dept: PRIMARY CARE | Facility: CLINIC | Age: 85
End: 2025-03-17
Payer: MEDICARE

## 2025-03-19 ENCOUNTER — TELEMEDICINE (OUTPATIENT)
Dept: PULMONOLOGY | Age: 85
End: 2025-03-19
Payer: MEDICARE

## 2025-03-19 DIAGNOSIS — E66.09 CLASS 2 OBESITY DUE TO EXCESS CALORIES WITHOUT SERIOUS COMORBIDITY WITH BODY MASS INDEX (BMI) OF 36.0 TO 36.9 IN ADULT: ICD-10-CM

## 2025-03-19 DIAGNOSIS — R91.1 LUNG NODULE: ICD-10-CM

## 2025-03-19 DIAGNOSIS — E66.812 CLASS 2 OBESITY DUE TO EXCESS CALORIES WITHOUT SERIOUS COMORBIDITY WITH BODY MASS INDEX (BMI) OF 36.0 TO 36.9 IN ADULT: ICD-10-CM

## 2025-03-19 DIAGNOSIS — J96.12 CHRONIC RESPIRATORY FAILURE WITH HYPOXIA AND HYPERCAPNIA: ICD-10-CM

## 2025-03-19 DIAGNOSIS — J96.11 CHRONIC RESPIRATORY FAILURE WITH HYPOXIA AND HYPERCAPNIA: ICD-10-CM

## 2025-03-19 DIAGNOSIS — J44.9 CHRONIC OBSTRUCTIVE PULMONARY DISEASE, UNSPECIFIED COPD TYPE (HCC): Primary | ICD-10-CM

## 2025-03-19 PROCEDURE — 1123F ACP DISCUSS/DSCN MKR DOCD: CPT | Performed by: INTERNAL MEDICINE

## 2025-03-19 PROCEDURE — 99214 OFFICE O/P EST MOD 30 MIN: CPT | Performed by: INTERNAL MEDICINE

## 2025-03-19 PROCEDURE — G8428 CUR MEDS NOT DOCUMENT: HCPCS | Performed by: INTERNAL MEDICINE

## 2025-03-19 RX ORDER — FLUTICASONE FUROATE, UMECLIDINIUM BROMIDE AND VILANTEROL TRIFENATATE 200; 62.5; 25 UG/1; UG/1; UG/1
1 POWDER RESPIRATORY (INHALATION) DAILY
Qty: 1 EACH | Refills: 4 | Status: SHIPPED | OUTPATIENT
Start: 2025-03-19

## 2025-03-19 RX ORDER — ENSIFENTRINE 3 MG/2.5ML
2.5 SUSPENSION RESPIRATORY (INHALATION) 2 TIMES DAILY
Qty: 150 ML | Refills: 3 | Status: SHIPPED | OUTPATIENT
Start: 2025-03-19

## 2025-03-19 ASSESSMENT — ENCOUNTER SYMPTOMS
GASTROINTESTINAL NEGATIVE: 1
EYES NEGATIVE: 1

## 2025-03-19 NOTE — PROGRESS NOTES
stated that they are currently in the [facility]. If the patient is a minor, permission has been obtained by the parent or guardian for the patient to receive medical care at this visit.     Patient and/or health care decision maker is aware that that he/she may receive a bill for this telephone service, depending on his insurance coverage, and has provided verbal consent to proceed.    This visit was completed via telephone.  Total time 30 minutes. Virtual visit      Return in about 10 weeks (around 5/28/2025).       The patient (or guardian, if applicable) and other individuals in attendance with the patient were advised that Artificial Intelligence will be utilized during this visit to record, process the conversation to generate a clinical note, and support improvement of the AI technology. The patient (or guardian, if applicable) and other individuals in attendance at the appointment consented to the use of AI, including the recording.      Elizabeth Feliciano MD

## 2025-03-20 ENCOUNTER — TELEPHONE (OUTPATIENT)
Dept: PRIMARY CARE | Facility: CLINIC | Age: 85
End: 2025-03-20
Payer: MEDICARE

## 2025-03-20 ENCOUNTER — TELEPHONE (OUTPATIENT)
Dept: PULMONOLOGY | Age: 85
End: 2025-03-20

## 2025-03-20 DIAGNOSIS — I63.442 CEREBROVASCULAR ACCIDENT (CVA) DUE TO EMBOLISM OF LEFT CEREBELLAR ARTERY (MULTI): ICD-10-CM

## 2025-03-20 DIAGNOSIS — J44.1 COPD EXACERBATION (MULTI): ICD-10-CM

## 2025-03-20 DIAGNOSIS — I25.5 ISCHEMIC CARDIOMYOPATHY: Primary | ICD-10-CM

## 2025-03-20 DIAGNOSIS — I50.32 CHRONIC DIASTOLIC CONGESTIVE HEART FAILURE, NYHA CLASS 2: ICD-10-CM

## 2025-03-20 DIAGNOSIS — J44.1 COPD EXACERBATION (HCC): Primary | ICD-10-CM

## 2025-03-20 NOTE — TELEPHONE ENCOUNTER
Lissette from [facility] called requesting a referral for hospice. They met with patient and patient is requesting to go on hospice.     Please fax referral to [phone]

## 2025-03-20 NOTE — TELEPHONE ENCOUNTER
PT CALLED IN TO THE OFFICE TO SEE IF THERE WAS ANYTHING THAT WOULD HELP HIS O2 SATURATIONS.       HE IS ASKING IF HE CAN DO MORE THAN 2 NEBULIZER TREATMENTS A DAY?

## 2025-03-24 RX ORDER — PREDNISONE 10 MG/1
40 TABLET ORAL DAILY
Qty: 20 TABLET | Refills: 0 | Status: SHIPPED | OUTPATIENT
Start: 2025-03-24 | End: 2025-03-29

## 2025-03-31 ENCOUNTER — APPOINTMENT (OUTPATIENT)
Dept: CARDIOLOGY | Facility: CLINIC | Age: 85
End: 2025-03-31
Payer: MEDICARE

## 2025-03-31 VITALS
HEIGHT: 65 IN | DIASTOLIC BLOOD PRESSURE: 72 MMHG | WEIGHT: 225 LBS | SYSTOLIC BLOOD PRESSURE: 128 MMHG | BODY MASS INDEX: 37.49 KG/M2 | HEART RATE: 67 BPM

## 2025-03-31 DIAGNOSIS — I48.19 PERSISTENT ATRIAL FIBRILLATION (MULTI): ICD-10-CM

## 2025-03-31 DIAGNOSIS — I50.30 CHF NYHA CLASS III, UNSPECIFIED FAILURE CHRONICITY, DIASTOLIC (MULTI): Primary | ICD-10-CM

## 2025-03-31 DIAGNOSIS — I48.11 LONGSTANDING PERSISTENT ATRIAL FIBRILLATION (MULTI): ICD-10-CM

## 2025-03-31 DIAGNOSIS — R78.81 BACTEREMIA: ICD-10-CM

## 2025-03-31 DIAGNOSIS — I25.10 CORONARY ARTERY DISEASE INVOLVING NATIVE CORONARY ARTERY OF NATIVE HEART WITHOUT ANGINA PECTORIS: ICD-10-CM

## 2025-03-31 DIAGNOSIS — Z71.89 ENCOUNTER FOR HOSPICE CARE DISCUSSION: ICD-10-CM

## 2025-03-31 DIAGNOSIS — I47.20 PAROXYSMAL VT: ICD-10-CM

## 2025-03-31 DIAGNOSIS — D50.0 IRON DEFICIENCY ANEMIA DUE TO CHRONIC BLOOD LOSS: ICD-10-CM

## 2025-03-31 PROBLEM — I48.0 PAROXYSMAL ATRIAL FIBRILLATION (MULTI): Status: ACTIVE | Noted: 2025-03-31

## 2025-03-31 PROCEDURE — G2211 COMPLEX E/M VISIT ADD ON: HCPCS | Performed by: INTERNAL MEDICINE

## 2025-03-31 PROCEDURE — 1157F ADVNC CARE PLAN IN RCRD: CPT | Performed by: INTERNAL MEDICINE

## 2025-03-31 PROCEDURE — 1036F TOBACCO NON-USER: CPT | Performed by: INTERNAL MEDICINE

## 2025-03-31 PROCEDURE — 99215 OFFICE O/P EST HI 40 MIN: CPT | Performed by: INTERNAL MEDICINE

## 2025-03-31 PROCEDURE — 3078F DIAST BP <80 MM HG: CPT | Performed by: INTERNAL MEDICINE

## 2025-03-31 PROCEDURE — 3074F SYST BP LT 130 MM HG: CPT | Performed by: INTERNAL MEDICINE

## 2025-03-31 PROCEDURE — 1123F ACP DISCUSS/DSCN MKR DOCD: CPT | Performed by: INTERNAL MEDICINE

## 2025-03-31 PROCEDURE — 1159F MED LIST DOCD IN RCRD: CPT | Performed by: INTERNAL MEDICINE

## 2025-03-31 RX ORDER — METOPROLOL SUCCINATE 100 MG/1
100 TABLET, EXTENDED RELEASE ORAL DAILY
Start: 2025-03-31 | End: 2026-03-31

## 2025-03-31 RX ORDER — ENSIFENTRINE 3 MG/2.5ML
2.5 SUSPENSION RESPIRATORY (INHALATION) 2 TIMES DAILY
COMMUNITY
Start: 2025-03-19 | End: 2025-03-31 | Stop reason: WASHOUT

## 2025-03-31 RX ORDER — CLOPIDOGREL BISULFATE 75 MG/1
75 TABLET ORAL DAILY
Qty: 90 TABLET | Refills: 3 | Status: SHIPPED | OUTPATIENT
Start: 2025-03-31 | End: 2026-03-31

## 2025-03-31 RX ORDER — AMIODARONE HYDROCHLORIDE 100 MG/1
100 TABLET ORAL EVERY 24 HOURS
COMMUNITY
Start: 2025-01-27

## 2025-03-31 RX ORDER — BUMETANIDE 1 MG/1
TABLET ORAL
Qty: 150 TABLET | Refills: 3 | Status: SHIPPED | OUTPATIENT
Start: 2025-03-31

## 2025-03-31 RX ORDER — DOXYCYCLINE 100 MG/1
100 CAPSULE ORAL 2 TIMES DAILY
COMMUNITY
Start: 2025-03-18

## 2025-03-31 NOTE — PROGRESS NOTES
Patient:  Guicho Jones  YOB: 1940  MRN: [id number]       Impression/Plan:     Diagnoses and all orders for this visit:  CHF NYHA class III, unspecified failure chronicity, diastolic (Multi)  -     He has had PAOLO and regular echo within the last 2 months showing preserved LV systolic function and no evidence of development of valvular heart disease  -     Blood pressure is well-controlled at this time  -     He has not had urinary tract infections could consider Jardiance but he does have history of prostate disease possible retention and for now would avoid that agent.  Will increase his diuretic he had not been taking the Bumex daily and would increase it to 1 mg a day and add an extra should he have a weight gain of 5 pounds over 3-day timeframe.  -     Function is much better and may be able to consider adding spironolactone potentially Entresto depending on response to adjustment of diuretic.  -     bumetanide (Bumex) 1 mg tablet; 1 tablet daily.  Take 1 extra dose a day for 1 dose or as directed by phycian for weight gain of more than 5 lbs in 3 days  -     Basic Metabolic Panel; Future  Longstanding persistent atrial fibrillation (Multi)  Iron deficiency anemia due to chronic blood loss  -    Despite clipping of the ulcer in January and EGD at [facility] showing no active ulcer bleeding in February he still needed 2 more units of blood  -    Device check shows no A-fib for many months.  -    In light of the severity of his GI bleeding even after the ulcer was clipped and EGD showing no evidence of significant bleeding at that point we will hold off on full anticoagulation.  Particularly since A-fib is very low frequency and not been demonstrated for many many months.  -    If device checks should show A-fib would consider full anticoagulation.  -   Continue amiodarone for now had been on 200 today previously but thus far no recurrent A-fib or VT    Paroxysmal VT (Multi)  -    Very low  burden on amiodarone did not complicate his recent hospitalizations  Coronary artery disease involving native coronary artery of native heart without angina pectoris  -   He has had no angina  -   No symptoms or signs of GI bleeding in the last 6 weeks  -   He does have significant vascular disease.  I would not be inclined to resume full anticoagulation as described above I think Plavix once a day would be reasonable to reduce vascular complications without significantly increasing risk of bleeding to the degree full anticoagulation with  -     clopidogrel (Plavix) 75 mg tablet; Take 1 tablet (75 mg) by mouth once daily.  Bacteremia        -     Exact initial source remains unclear        -     Given type of bacteremia and abnormality on the pacemaker lead is noted on PET scanning at [facility] ideally would remove the device and the leads.  This would be at potentially significant high risk of the leads are rather old.  Although his LV function has returned to normal just 2 and half years ago his device was shown to pace him out of episode of V. tach.        -     Removing the device alone would not address the infection in the aorta as well described by [facility] infectious disease and hands would be difficult to justify the risk.  To explore the aorta surgically would be extraordinarily high risk and he is not interested in pursuing.         -    Hopefully suppressive therapy as managed at [facility] by Dr. Ames of infectious disease will keep the infection at bay    Encounter for hospice care discussion         -    Certainly if bacteremia returns his prognosis will be poor.  At this point in time however he has no cognitive impairment or CNS involvement.  His renal function has nearly returned to normal.  He has had no liver dysfunction.  LV systolic function remains normal and he has no evidence to suggest progressive coronary disease despite the multiple stresses over the last 3 months.   He does have severe emphysema and a small nodule in his right upper lobe that was FDG avid suggesting either inflammation or potentially malignancy that he did not wish to have further assess          -    His morbidity and mortality from a cardiovascular standpoint is actually reasonable and he would be anticipated to live longer than 6 months from that standpoint.          -    Given the severity of his infection and the potential ofits return understanding what hospice would have to offer him I think is reasonable.  I discussed with him that if hospice were decided upon turning off his device would be appropriate.  At this point he said he would not be interested in turning off the device he understands the purpose of prolongation of life and his current circumstance.           -     Will collect more information as to what hospice would be of benefit for him.  If infection returns his prognosis would be poor.  Should his pulmonary status markedly decrease that would certainly affect his prognosis.  He also has an enlarging abdominal aortic aneurysm that could quickly affect his prognosis as well.  Fortunately at this time he is comfortable.  He would like to keep seeing specialist for now.  I certainly agree that cardiothoracic surgery intervention for the aorta or pacemaker leads would be a high risk and he would not likely survive.      Chief Complaint/Active Symptoms:      Chief Complaint   Patient presents with    Hospital Follow-up     Hospital follow up. Admitted to [facility] 1/10/25, discharged 1/26/25. Admitted to [facility] 2/7/25 and discharged 2/27/25.        Guicho Jones is a 85 y.o. male who presents with ischemic cardiomyopathy, echo 10/21 50% EF. Cath 6/15/2022 LM-30%. LAD-diffuse disease 40% distal. CX/RCA calcified diffuse disease. LVEDP 15. He has AICD secondary to prior severe decrease in overall systolic function. Also with persistent atrial fibrillation. He has a 5.6 cm complex dumbbell  abdominal aortic aneurysm that has been followed by vascular surgery.  As of 2022 patient preferred no intervention.  Was to be intervened upon previously but he then developed prostate cancer and intervention postponed. Finally, he has centrilobular emphysema-severe.         1/31/2024 perfusion study: Abnormal no evidence of ischemia prior inferolateral infarct LVEF 50%. No change from previously        1/31/24   Echo  with 50% ejection fraction inferolateral hypokinesis trivial AI trivial to 1+ MR RVSP 55 mm.          1/12/2025 PAOLO        1. The left ventricular systolic function is normal, with a visually estimated ejection fraction of 60-65%.   2. There is normal right ventricular global systolic function.   3. The left atrium is moderately dilated.   4. The right atrium is moderately dilated.   5. Moderate mitral valve regurgitation.   6. Moderate tricuspid regurgitation.   7. No left atrial thrombus.   8. No vegetations seen on the valvular structures or the pacemaker leads.   9. There is plaque visualized in the descending aorta.       2/19/2025 TTE ([facility])  - The left ventricle is normal in size. Left ventricular systolic function is   normal. EF = 58 ± 5% (2D biplane) Prior EF was reported as 57%.   - The right ventricle is normal in size. Right ventricular systolic function is   normal.   - The visualized aorta is dilated with a maximal dimension of 4.6 cm.   - There is moderate (2+) mitral holosystolic valve regurgitation.     He was doing well when I saw him in August 2024.  However beginning in January of this year he has had multiple and extensive hospitalizations for the following reasons    1/10/2025 through 1/26/2025 [facility]-3 unit gastric ulcer bleed, MSSA bacteremia possible endocarditis  Had presented after significant GI issues including diarrhea nausea vomiting and profound weakness such that he could not get up and EMS had to be called  Found to have MSSA bacteremia felt likely endocarditis  though PAOLO could not demonstrate significant vegetations on the visualized portion of the pacemaker leads.  He also had severe GI bleeding requiring 3 units of packed cells and clipping of a gastric ulcer.  Plan was to resume anticoagulation after ulcer healed.  At time of discharge she was resumed on his Xarelto with plan of 6 weeks of IV antibiotics using cefazolin    At [facility] for rehab after that until 2/7/2025 when transferred to [place] emergency room [facility] with recurrent melena and diffuse body rash.    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  ISAIAS possibly secondary to allergic reaction sotalol discontinued in favor of amiodarone changed to vancomycin. transferred to [facility] secondary to multiple complexities of infectious disease, ISAIAS, possible endocarditis    2/9/2025 through 2/27/2025 [facility]  Vancomycin was switched to Cubicin IV  Discharged on Toprol 75  Taken off of aspirin/Lipitor/sucralfate/cefazolin/lisinopril/spironolactone  Primary issue of transfer was vasculitis GI bleeding and bacteremia.    Based on PET scanning his bacteremia was felt related to Taylor infectious ascending aortic arch and/or right atrial ICD lead as both showed FDG uptake.  An area of his lumbar spine was also suspicious.  Also developed acute pulmonary edema during that hospital stay he was diuresed down to 214 pounds.  He did have repeat EGD at that hospital stay on 2/11/2025 which was unremarkable he did require another 2 units of packed cells.    CT surgery did evaluate him and felt interventions for the aortic arch and removal of device and leads would have a very high incidence of morbidity mortality and the patient did not wish to take that risk.  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  Assessment for connective tissue  disease was negative.    In light of recurrent GI bleeding it was recommended continue to hold Xarelto.  He was continued on amiodarone at 100 mg no A-fib was described despite complex hospitalization.    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    Device check 2/20/2025 no VT no A-fib described normally functioning device 3.5 years left on the battery    3/24/2025 laboratory studies    Hemoglobin 7.9 white count 9.4 platelet count 245  Creatinine 1.19  CPK normal  CRP remains significantly elevated 84.4    Despite very prolonged hospital stay he is doing fairly well at this time.  He is on continuous oxygen.  He had been taking his Bumex just if weight changed and has been noticing increasing lower extremity edema increasing abdominal girth slight worsening of shortness of breath.  He has not had chest pain he has not had palpitation.    Extensive discussion as to hospice status.  He is being considered for hospice.  The reason being primarily that the true source of infection cannot be addressed.  Whether or not the pet identified abnormalities of the pacer lead or aorta are the primary sources or are they secondary is not clear.  Hs-CRP remains very elevated.  It is felt that there is a fairly high likelihood of recurrent severe infection as a source could not be controlled.  Nonetheless he is now on suppressive oral antibiotics having just completed the IV course and followed very closely at [facility].      Review of Systems: Unremarkable except as noted above    Meds     Current Outpatient Medications   Medication Instructions    albuterol (Proventil HFA) 90 mcg/actuation inhaler 1 puff, Every 4 hours PRN    alteplase (CATHFLO ACTIVASE) 2 mg, intra-catheter, Daily PRN    amiodarone (PACERONE) 100 mg, Every 24 hours    bumetanide (Bumex) 1 mg tablet 1 tablet daily.  Take 1 extra dose a day for 1 dose or as directed by phycian for weight  gain of more than 5 lbs in 3 days    clopidogrel (PLAVIX) 75 mg, oral, Daily    docusate sodium (COLACE) 100 mg, 2 times daily    doxycycline (MONODOX) 100 mg, 2 times daily    ezetimibe (ZETIA) 10 mg, oral, Daily    isosorbide mononitrate ER (IMDUR) 30 mg, oral, Daily, 1 tablet daily    metoprolol succinate XL (TOPROL-XL) 100 mg, oral, Daily    montelukast (SINGULAIR) 10 mg, oral, Every evening    nitroglycerin (NITROSTAT) 0.4 mg, sublingual, Every 5 min PRN    oxygen (O2) 2 L/min, inhalation, Every 24 hours    polyethylene glycol (GLYCOLAX, MIRALAX) 17 g, Daily    tamsulosin (FLOMAX) 0.4 mg, oral, Daily        Allergies     Allergies   Allergen Reactions    Levofloxacin Palpitations     Rapid Heart Rate - Severe per pt.    Cefazolin Rash     rash, purpuric, leucocytoclastic vasculitis         Annotated Problems     Specialty Problems          Cardiology Problems    CAD (coronary artery disease)     1/31/2024 perfusion study: Abnormal no evidence of ischemia prior inferolateral infarct LVEF 50%.  No change from previously    Echo from yesterday with 50% ejection fraction inferolateral hypokinesis trivial AI trivial to 1+ MR RVSP 55 mm.  Echo 2021 RVSP 43 mmHg   · Cath 6/15/2022 LM-30%. LAD-diffuse disease 40% distal. CX/RCA calcified diffuse      disease. LVEDP 15   3/19/2020. Lexiscan. Posterior lateral MI with mild jimmy-infarction ischemia. LVEF 42%.       Echocardiogram demonstrates 60% ejection fraction       2009. LVEF normal. LAD/RCA stents patent. Occluded distal circumflex. LM aneurysmal.       2008. BRONSON-proximal and mid LAD. BRONSON RCA.       1994 acute inferior infarct         Abdominal aortic aneurysm     Follows with Dr. Ross and as of 10/2022 though felt to be a candidate for an off label fenestrated approach, patient preferred no intervention   · November 2021 vascular follow-up with Dr. Finney. Aorta at 5.6 cm. Intervention      postponed secondary to prostate cancer treatments   January 2021  dumbbell shaped abdominal aortic aneurysm maximal diameter 5 cm      involving origin of renal arteries         AICD (automatic cardioverter/defibrillator) present    Anticoagulant long-term use    CHF NYHA class III, unspecified failure chronicity, diastolic (Multi)     Echo from yesterday with 50% ejection fraction inferolateral hypokinesis trivial AI trivial to 1+ MR RVSP 55 mm.  Echo 2021 RVSP 43 mmHg         Essential hypertension    Ischemic cardiomyopathy     January 2024 LVEF 50% RVSP 55   · October 2021 echo LVEF 55%.  Biatrial enlargement.  No significant valvular disease      March 2020 echo LVEF 60%.  Diastolic dysfunction      2002 50%      1996 25% after acute inferior infarct         Mixed hyperlipidemia    Paroxysmal VT (Multi)    Persistent atrial fibrillation (Multi)    Renal artery stenosis (CMS-HCC)    Never smoked cigarettes    Paroxysmal atrial fibrillation (Multi)        Problem List     Patient Active Problem List    Diagnosis Date Noted    Paroxysmal atrial fibrillation (Multi) 03/31/2025    Stage 3a chronic kidney disease (Multi) 03/11/2025    Bacteremia 01/23/2025    Diarrhea, unspecified type 01/10/2025    Hidradenitis suppurativa of anus 11/25/2024    Pulmonary hypertension (Multi) 02/02/2024    Never smoked cigarettes 02/01/2024    Primary osteoarthritis of right knee 11/13/2023    CVA (cerebral vascular accident) (Multi) 10/09/2023    Abdominal aortic aneurysm 09/29/2023    AICD (automatic cardioverter/defibrillator) present 09/29/2023    Allergic rhinitis, seasonal 09/29/2023    Centrilobular emphysema (Multi) 09/29/2023    Essential hypertension 09/29/2023    Hyperuricemia 09/29/2023    Infarction of right basal ganglia (Multi) 09/29/2023    Ischemic cardiomyopathy 09/29/2023    Mixed hyperlipidemia 09/29/2023    Paroxysmal VT (Multi) 09/29/2023    Persistent atrial fibrillation (Multi) 09/29/2023    Renal artery stenosis (CMS-HCC) 09/29/2023    Anticoagulant long-term use 09/29/2023  "   CHF NYHA class III, unspecified failure chronicity, diastolic (Multi) 09/29/2023    High risk medication use 09/29/2023    Tremor 09/29/2023    Prostate cancer (Multi) 09/29/2023    Spinal stenosis, lumbar region with neurogenic claudication 07/17/2018    CAD (coronary artery disease) 10/19/2016    Obesity, morbid (Multi) 10/19/2016    COPD exacerbation (Multi) 10/19/2016    Epiretinal membrane (ERM) of right eye 03/15/2016    Pseudophakia of both eyes 01/14/2016    Vitreomacular traction syndrome of right eye 01/14/2016    Dermatochalasis of right upper eyelid 08/17/2015    Dermatochalasis of left upper eyelid 08/17/2015    Degenerative retinal drusen of both eyes 08/17/2015    Floaters 08/17/2015    Lumbosacral spondylosis without myelopathy 07/23/2015    Blepharitis of both eyes 01/28/2015    Drusen (degenerative) of retina 01/28/2015    Choroidal nevus, right eye 01/28/2015    Pinguecula 01/28/2015    MGD (meibomian gland dysfunction) 01/28/2015       Objective:     Vitals:    03/31/25 1010   BP: 128/72   BP Location: Left arm   Patient Position: Sitting   Pulse: 67   Weight: 102 kg (225 lb)   Height: 1.651 m (5' 5\")      Wt Readings from Last 4 Encounters:   03/31/25 102 kg (225 lb)   03/11/25 99.5 kg (219 lb 6.4 oz)   01/26/25 105 kg (231 lb 0.7 oz)   11/25/24 105 kg (231 lb)           LAB:     Lab Results   Component Value Date    WBC 8.0 01/26/2025    HGB 8.2 (L) 01/26/2025    HCT 25.7 (L) 01/26/2025     (L) 01/26/2025    CHOL 117 06/10/2024    TRIG 119 06/10/2024    HDL 53.8 06/10/2024    ALT 4 (L) 01/26/2025    AST 13 01/26/2025     01/26/2025    K 4.3 01/26/2025     (H) 01/26/2025    CREATININE 0.74 01/26/2025    BUN 12 01/26/2025    CO2 22 01/26/2025    TSH 1.15 03/13/2024    INR 1.7 (H) 01/10/2025         Physical Exam     General Appearance: alert and oriented to person, place and time, in no acute distress  Cardiovascular: normal rate, regular rhythm, normal S1 and S2, no " murmurs, rubs, clicks, or gallops, possibly slight JVD distant heart sounds  Pulmonary/Chest: Diminished breath sounds diffusely abdomen: soft, non-tender, non-distended, normal bowel sounds, no masses   Extremities: no cyanosis, clubbing.  2-3+ lower extremity edema  Skin: warm and dry, no rash or erythema  Eyes: EOMI  Neck: supple and non-tender without mass, no thyromegaly   Neurological: alert, oriented, normal speech, no focal findings or movement disorder noted  Vascular: Creased pulses distally secondary to edema      Provider attestation-scribe documentation  Any medical record entries made by the scribe were at my discretion and personally dictated by me.  I have reviewed the chart and agree that the record accurately reflects my personal performance of the history, physical exam, discussion and plan.      Scribe Attestation  By signing my name below, I, Michelle Edmond CMA   , Scribe   attest that this documentation has been prepared under the direction and in the presence of Hugh Dergoot MD.

## 2025-03-31 NOTE — PATIENT INSTRUCTIONS
CHANGE BUMETANIDE 1 MG TO:  1 tablet daily every day.  Take an 1 extra dose a day for 1 dose or as directed by physician for weight gain of more than 5 lbs in 3 days    NEW:  CLOPIDOGREL 75 MG 1 TABLET DAILY    NON FASTING LABS TO BE DONE IN 2 WEEKS-4/14/25    Please bring all medicines, vitamins, and herbal supplements with you in original bottles to every appointment! This is the best way to ensure your medication list in your chart is accurate.    Prescriptions will not be filled unless you are compliant with your follow up appointments or have a follow up appointment scheduled as per instruction of your physician. Refills should be requested at the time of your visit.    DID YOU KNOW  We have a pharmacy here in the [facility].  They can fill all prescriptions, not just cardiac medications.  Prescriptions from other pharmacies can easily be transferred to the  pharmacy by the  pharmacist on site.   pharmacies offer FREE HOME DELIVERY on medications to anywhere in [place]. They can sync your medications. Typically prescriptions can be ready in 10 - 15 minutes. If pharmacy is unable to fill your  prescription or if cost is more than your paying now the Pharmacist can easily transfer back to your Pharmacy of choice. Pharmacy phone # [phone].

## 2025-04-07 PROCEDURE — 93298 REM INTERROG DEV EVAL SCRMS: CPT | Performed by: INTERNAL MEDICINE

## 2025-04-08 ENCOUNTER — HOSPITAL ENCOUNTER (OUTPATIENT)
Dept: CARDIOLOGY | Age: 85
Discharge: HOME | End: 2025-04-08
Payer: MEDICARE

## 2025-04-08 PROCEDURE — 93296 REM INTERROG EVL PM/IDS: CPT

## 2025-04-12 LAB
ANION GAP SERPL CALCULATED.4IONS-SCNC: 9 MMOL/L (CALC) (ref 7–17)
BUN SERPL-MCNC: 31 MG/DL (ref 7–25)
BUN/CREAT SERPL: 25 (CALC) (ref 6–22)
CALCIUM SERPL-MCNC: 8.3 MG/DL (ref 8.6–10.3)
CHLORIDE SERPL-SCNC: 108 MMOL/L (ref 98–110)
CO2 SERPL-SCNC: 27 MMOL/L (ref 20–32)
CREAT SERPL-MCNC: 1.23 MG/DL (ref 0.7–1.22)
EGFRCR SERPLBLD CKD-EPI 2021: 57 ML/MIN/1.73M2
GLUCOSE SERPL-MCNC: 129 MG/DL (ref 65–99)
POTASSIUM SERPL-SCNC: 3.7 MMOL/L (ref 3.5–5.3)
SODIUM SERPL-SCNC: 144 MMOL/L (ref 135–146)

## 2025-04-14 DIAGNOSIS — I25.10 CORONARY ARTERY DISEASE INVOLVING NATIVE CORONARY ARTERY OF NATIVE HEART WITHOUT ANGINA PECTORIS: ICD-10-CM

## 2025-04-14 DIAGNOSIS — I47.20 PAROXYSMAL VT: ICD-10-CM

## 2025-04-14 DIAGNOSIS — I48.11 LONGSTANDING PERSISTENT ATRIAL FIBRILLATION (MULTI): ICD-10-CM

## 2025-04-14 DIAGNOSIS — I50.30 CHF NYHA CLASS III, UNSPECIFIED FAILURE CHRONICITY, DIASTOLIC (MULTI): ICD-10-CM

## 2025-04-21 ENCOUNTER — APPOINTMENT (OUTPATIENT)
Dept: CARDIOLOGY | Facility: CLINIC | Age: 85
End: 2025-04-21
Payer: MEDICARE

## 2025-04-21 VITALS
HEIGHT: 65 IN | WEIGHT: 217 LBS | BODY MASS INDEX: 36.15 KG/M2 | SYSTOLIC BLOOD PRESSURE: 130 MMHG | HEART RATE: 60 BPM | DIASTOLIC BLOOD PRESSURE: 68 MMHG

## 2025-04-21 DIAGNOSIS — I48.11 LONGSTANDING PERSISTENT ATRIAL FIBRILLATION (MULTI): ICD-10-CM

## 2025-04-21 DIAGNOSIS — I47.20 PAROXYSMAL VT: ICD-10-CM

## 2025-04-21 DIAGNOSIS — Z79.899 HIGH RISK MEDICATION USE: ICD-10-CM

## 2025-04-21 DIAGNOSIS — I50.30 CHF NYHA CLASS III, UNSPECIFIED FAILURE CHRONICITY, DIASTOLIC (MULTI): Primary | ICD-10-CM

## 2025-04-21 DIAGNOSIS — I25.10 CORONARY ARTERY DISEASE INVOLVING NATIVE CORONARY ARTERY OF NATIVE HEART WITHOUT ANGINA PECTORIS: ICD-10-CM

## 2025-04-21 DIAGNOSIS — Z95.810 AICD (AUTOMATIC CARDIOVERTER/DEFIBRILLATOR) PRESENT: ICD-10-CM

## 2025-04-21 DIAGNOSIS — I25.5 ISCHEMIC CARDIOMYOPATHY: ICD-10-CM

## 2025-04-21 DIAGNOSIS — Z87.891 FORMER SMOKER: ICD-10-CM

## 2025-04-21 PROCEDURE — 1036F TOBACCO NON-USER: CPT | Performed by: INTERNAL MEDICINE

## 2025-04-21 PROCEDURE — 1157F ADVNC CARE PLAN IN RCRD: CPT | Performed by: INTERNAL MEDICINE

## 2025-04-21 PROCEDURE — 99215 OFFICE O/P EST HI 40 MIN: CPT | Performed by: INTERNAL MEDICINE

## 2025-04-21 PROCEDURE — 3078F DIAST BP <80 MM HG: CPT | Performed by: INTERNAL MEDICINE

## 2025-04-21 PROCEDURE — 1159F MED LIST DOCD IN RCRD: CPT | Performed by: INTERNAL MEDICINE

## 2025-04-21 PROCEDURE — 3075F SYST BP GE 130 - 139MM HG: CPT | Performed by: INTERNAL MEDICINE

## 2025-04-21 PROCEDURE — 1123F ACP DISCUSS/DSCN MKR DOCD: CPT | Performed by: INTERNAL MEDICINE

## 2025-04-21 RX ORDER — ENSIFENTRINE 3 MG/2.5ML
SUSPENSION RESPIRATORY (INHALATION) DAILY
COMMUNITY

## 2025-04-21 RX ORDER — AMIODARONE HYDROCHLORIDE 200 MG/1
TABLET ORAL
Qty: 90 TABLET | Refills: 1 | Status: SHIPPED | OUTPATIENT
Start: 2025-04-21

## 2025-04-21 NOTE — PATIENT INSTRUCTIONS
4-6 week follow up appointment  WITH DEVICE CHECK   Increase amiodarone to 200mg twice daily for 7 days and then 200mg once daily  Have a heart cath done soon with Dr. Penn    DID YOU KNOW  We have a pharmacy here in the [facility].  They can fill all prescriptions, not just cardiac medications.  Prescriptions from other pharmacies can easily be transferred to the  pharmacy by the  pharmacist on site.   pharmacies offer FREE HOME DELIVERY on medications to anywhere in [place]. They can sync your medications. Typically prescriptions can be ready in 10 - 15 minutes. If pharmacy is unable to fill your  prescription or if cost is more than your paying now the Pharmacist can easily transfer back to your Pharmacy of choice. Pharmacy phone # [phone].   Please bring all medicines, vitamins, and herbal supplements with you in original bottles to every appointment  Prescriptions will not be filled unless you are compliant with your follow up appointments or have a follow up appointment scheduled as per instruction of your physician. Refills should be requested at the time of your visit.

## 2025-04-21 NOTE — PROGRESS NOTES
CARDIOLOGY OFFICE VISIT      CHIEF COMPLAINT  Chief Complaint   Patient presents with    Follow-up     Here for an abnormal device check.        HISTORY OF PRESENT ILLNESS  HPI  85-year-old male  with a past medical history of coronary artery disease with normal  left ventricular function on echocardiogram in March 2008, congestive  heart failure New York Heart Association class 2, remote ventricular  fibrillation RVR status post single-chamber ICD implanted in 1994  with multiple generator change outs and also with an abdominal  implant. His device was upgraded to a dual-chamber ICD in the  prepectoral side due to evidence of atrial fibrillation. He was  placed on sotalol.     His echocardiogram performed in March 2020 showed normal left ventricular function value 55 to 60% with 1+ mitral regurgitation. Stress test at the same time, show a small area of jimmy-infarct ischemia in the posterior lateral area and also a small posterolateral myocardial infarction with a left ventricular ejection fraction 42%.     Looking at his records, patient was admitted in December 2021 for COPD exacerbation and apparently pneumonia. Since then patient has been using oxygen therapy at home. Covidâ€“19 was negative.     Patient had an a stress test in February 2021 that shows moderate inferior posterior lateral perfusion defect consistent with myocardial infarction with no evidence of ischemia with a left ventricular ejection fraction 45%.     February 2022 device interrogation, patient had an episode of atrial fibrillation that lasted approximately 15 hours. In the middle of these episodes, patient had episode of ventricular tachycardia (polymorphic) that triggered ICD charging but no ICD shock was delivered. Episode was aborted.     Patient had a cardiac catheterization in May 2022 that shows left main 30%, LAD diffusely diseased. Distal LAD 40%. Circumflex diffusely diseased with right coronary artery calcified. Medical therapy was  recommended.     He recalls having an episode of dizziness and syncope while walking to the bathroom in December 31, 2023 around 10 PM.  Device interrogation showed that the patient had an episode of atrial fibrillation with episodes of atrial fibrillation with rapid ventricular response or change intraventricular tachycardia rate of 288 bpm.  This episode was self terminated before device deliver an ICD shock.  Second episode was in January 2024 with an episode of atrial fibrillation that ended with ventricular tachycardia self terminated into atrial fibrillation again.     Patient still using sotalol therapy.     During the last office visit back in February 2024, we discussed the option of changing antiarrhythmic therapy.  Patient discontinue sotalol.  Patient had an EKG performed few days later with QT interval above 500 ms.  Patient did not receive the phone call to start amiodarone.  Patient presented to emergency department few weeks later complaining of palpitations and he was found to be in atrial fibrillation.  He saw general cardiology service recently (Dr. Hugh Perez) who put patient back on amiodarone 400 mg 3 times a day for a week and then 400 mg twice a day for a week and then 400 mg daily for 2 weeks and then 200 mg daily.     Patient was seen in my office in November 2024.  At time he was maintaining sinus rhythm.  No significant ventricular arrhythmias.  He was on amiodarone 100 mg daily.    Patient was admitted at [facility] in January 2025.  He presented to [facility] emergency department by EMS for fall. Reports  after eating pizza began to have nausea, vomiting, and diarrhea. Nausea and vomiting improved but had persistent diarrhea. Last night at approx 0130 slipped out of the bed when attempting to get up to the bathroom. Wife was able to assist back into the bed but this morning was unable to ambulate due to weakness prompting family to call EMS. Denies sick contacts and no other  family members with similar symptoms.      Patient has prolonged ICU course.  Bacteremia.  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  Infectious disease following.  Will require 6 weeks of IV Ancef.  Developed GI bleed.  Seen by gastroenterology.  Underwent emergent EGD.  Clipped a gastric ulcer.  Had blood loss anemia.  Required a total of 3 units PRBCs.  Hemoglobin stable.  Tolerating soft diet.  Continue 40 mg twice daily proton pump inhibitor.  Continue sucralfate 1 g 3 times daily for 8 weeks.  Resume anticoagulation 72 hours after EGD.     1/25/25  Addendum  Patient was appropriate for discharge  however became concerning about possible fungal infection in the ulcer.  To rule out perforation he underwent stat CT abdomen no acute.  Gastroenterology and infectious disease evaluation and discussion with pathology.  Noted that fungal elements are likely colonization.  However, to be sure, he will be treated with IV antifungal for 5 days.  He was readmitted in February 2025 at [facility] for diffuse rash in the lower extremities and also anasarca.  Questionable GI bleed.  There was a question for rash due to medications.  Cefazolin was changed to vancomycin.  Dermatology recommended skin biopsy and the patient was transferred to [facility] as requested.  He also had an endoscopy that shows a single ulcer in the fundus of the stomach reflecting maintenance.  Placed 3 clips successfully.  He was continued on PPI twice a day.  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  Both show FDG avidity on PET scan.  The discussion about removing the ICD lead was performed but this could not provide optimal source control due to inoperable aortic arch infection.  During this admission he developed acute respiratory distress requiring  MICU evaluation/pulmonary edema.  He responded very well with aggressive diuresis.  He was discharged home on diuretic therapy.   He also received 2 units of PRBC.       PFT   April 2024, FEV1 91% FEV1/FVC 0.62, no significant response to bronchodilator  January 2022, FEV1 57%, FEV1/FVC 0.57  Echo done at  January 2024, EF 55%, diastolic dysfunction, RVSP 50-55.  And valvular heart disease          1/12/2025 PAOLO        1. The left ventricular systolic function is normal, with a visually estimated ejection fraction of 60-65%.   2. There is normal right ventricular global systolic function.   3. The left atrium is moderately dilated.   4. The right atrium is moderately dilated.   5. Moderate mitral valve regurgitation.   6. Moderate tricuspid regurgitation.   7. No left atrial thrombus.   8. No vegetations seen on the valvular structures or the pacemaker leads.   9. There is plaque visualized in the descending aorta.        2/19/2025 TTE ([facility])  - The left ventricle is normal in size. Left ventricular systolic function is   normal. EF = 58 ± 5% (2D biplane) Prior EF was reported as 57%.   - The right ventricle is normal in size. Right ventricular systolic function is   normal.   - The visualized aorta is dilated with a maximal dimension of 4.6 cm.   - There is moderate (2+) mitral holosystolic valve regurgitation.        Despite very prolonged hospital stays, he is doing well.  Currently he is on oxygen therapy.  He denies any chest pain or shortness of breath.    Patient had a device interrogation remotely 8/20/2025.  Shows dual-chamber ICD [employer] with battery longevity 3.5 years.  Had 2 episodes of atrial fibrillation 9 hours of duration.  Rate average 104 bpm.    Subsequent transmission in April 14, 2025 shows 1 episode of ventricular tachycardia on April 13, 2025 at 2:20 AM.  Rate 222 bpm.  ATP x 1 terminated the arrhythmia.    Subsequent transmission in April 7, 2025 shows 1 episode of ventricular tachycardia at 4:49 AM.  A rate of 225 bpm.  Terminated with ATP x 2.  Both episodes asymptomatic.      Past Medical History  Medical  History[1]    Social History  Social History[2]    Family History   Family History[3]     Allergies:  RX Allergies[4]     Outpatient Medications:  Current Outpatient Medications   Medication Instructions    albuterol (Proventil HFA) 90 mcg/actuation inhaler 1 puff, Every 4 hours PRN    alteplase (CATHFLO ACTIVASE) 2 mg, intra-catheter, Daily PRN    amiodarone (PACERONE) 100 mg, Every 24 hours    bumetanide (Bumex) 1 mg tablet 1 tablet daily.  Take 1 extra dose a day for 1 dose or as directed by phycian for weight gain of more than 5 lbs in 3 days    clopidogrel (PLAVIX) 75 mg, oral, Daily    docusate sodium (COLACE) 100 mg, 2 times daily    doxycycline (MONODOX) 100 mg, 2 times daily    ensifentrine (Ohtuvayre) 3 mg/2.5 mL suspension for nebulization Daily    ezetimibe (ZETIA) 10 mg, oral, Daily    isosorbide mononitrate ER (IMDUR) 30 mg, oral, Daily, 1 tablet daily    metoprolol succinate XL (TOPROL-XL) 100 mg, oral, Daily    montelukast (SINGULAIR) 10 mg, oral, Every evening    nitroglycerin (NITROSTAT) 0.4 mg, sublingual, Every 5 min PRN    oxygen (O2) 2 L/min, inhalation, Every 24 hours    polyethylene glycol (GLYCOLAX, MIRALAX) 17 g, Daily    tamsulosin (FLOMAX) 0.4 mg, oral, Daily          REVIEW OF SYSTEMS  ROS      VITALS  Vitals:    04/21/25 1143   BP: 130/68   Pulse: 60       PHYSICAL EXAM  Constitutional:       Appearance: Healthy appearance. Not in distress.   Neck:      Vascular: No JVR. JVD normal.   Pulmonary:      Effort: Pulmonary effort is normal.      Breath sounds: Normal breath sounds. No wheezing. No rhonchi. No rales.   Chest:      Chest wall: Not tender to palpatation.   Cardiovascular:      PMI at left midclavicular line. Normal rate. Regular rhythm. Normal S1. Normal S2.       Murmurs: There is no murmur.      No gallop.  No click. No rub.   Pulses:     Intact distal pulses.   Edema:     Peripheral edema absent.   Abdominal:      General: Bowel sounds are normal.      Palpations: Abdomen is  soft.      Tenderness: There is no abdominal tenderness.   Musculoskeletal: Normal range of motion.         General: No tenderness. Skin:     General: Skin is warm and dry.   Neurological:      General: No focal deficit present.      Mental Status: Alert and oriented to person, place and time.           ASSESSMENT AND PLAN    CLINICAL IMPRESSION:   1. Status post cardiac arrest. Status post ICD therapy, device interrogation reviewed with patient during this evaluation  2. Ischemic cardiomyopathy. Stable  3. Congestive heart failure, New York Heart Association class II. Compensated  4. Persistent atrial fibrillation. Device interrogation reviewed during this evaluation with patient  5. High-risk medication.  Of sotalol therapy.  Now on amiodarone  6. Ventricular tachycardia. Recurrence of this arrhythmia by device interrogation by episode in February 2022. Also recurrent of ventricular tachycardia by device interrogation 6 April 2025 terminated with ATP. Plan discussed with patient during this office visit  7. Coronary artery disease with percutaneous coronary interventions  in the past. Recommended appointment with Dr. Penn for possible cardiac catheterization  8. Hypertension. Controlled  9. Hyperlipidemia. Stable  10. Chronic obstructive pulmonary disease. No recurrence  11. Long-term anticoagulation therapy with Eliquis. No evidence of bleeding  12. Shortness of breath with recent hospitalization in December 2021 due to COPD exacerbationâ€“pneumonia, now with oxygen therapy followed by pulmonary service. Stable  13.  Syncope associated with ventricular arrhythmias.      Plan recommendations    I had a lengthy discussion with patient and family member regarding findings of ventricular arrhythmia seen on device interrogation in the last 3 to 4 weeks.  Patient will benefit of ischemia workup-cardiac catheterization.  Procedure, risk, benefits and possible complications were explained to patient.  All questions  were answered.  Patient agrees with plan.      We will rebolus him with amiodarone 200 mg 1 tablet twice a day for 7 days and then 200 mg daily as a maintenance dose.    Patient will be seen my office in the next 6 to 8 weeks with a device interrogation to see burden of ventricular arrhythmias.    Continue beta-blocker therapy.    Follow device clinic as scheduled    Risk factor modification and lifestyle modification discussed with patient. Diet , exercise and hydration discussed with patient.    I have personally review with patient during this office visit, laboratory data, echocardiogram results, stress test results, Holter-event monitor results prior and after the last electrophysiology visit. All questions has been answered.    Please excuse any errors in grammar or translation related to this dictation.  Voice recognition software was utilized to prepare this document.      I, Dr. Bassett, personally performed the services described in the documentation as scribed by the nurse in my presence, and confirm it is both accurate and complete.   Scribe Attestation  By signing my name below, I, Chelita Langley RN  , Scribe   attest that this documentation has been prepared under the direction and in the presence of Joce Bassett MD.          [1]   Past Medical History:  Diagnosis Date    Allergic     Arthritis     Cancer (Multi)     Cataract     CHF (congestive heart failure)     COPD (chronic obstructive pulmonary disease) (Multi)     Encounter for follow-up examination after completed treatment for conditions other than malignant neoplasm 12/27/2021    Hospital discharge follow-up    Heart disease     Hypertension     Ischemic cardiomyopathy     Ischemic cardiomyopathy with implantable cardioverter-defibrillator (ICD)    Myocardial infarction (Multi)     Pain in unspecified hip     Joint pain, hip    Personal history of other diseases of the musculoskeletal system and connective tissue     History of low back  pain    Personal history of other endocrine, nutritional and metabolic disease     History of hyperlipidemia    Personal history of other specified conditions 12/21/2021    History of elevated prostate specific antigen (PSA)    Presence of coronary angioplasty implant and graft     Stented coronary artery    Unspecified atrial flutter (Multi)     Paroxysmal atrial flutter   [2]   Social History  Tobacco Use    Smoking status: Former     Current packs/day: 1.50     Average packs/day: 1.5 packs/day for 15.0 years (22.5 ttl pk-yrs)     Types: Cigarettes     Passive exposure: Never    Smokeless tobacco: Never   Vaping Use    Vaping status: Never Used   Substance Use Topics    Alcohol use: Yes     Alcohol/week: 3.0 standard drinks of alcohol     Types: 3 Standard drinks or equivalent per week     Comment: occassional    Drug use: Never   [3]   Family History  Problem Relation Name Age of Onset    Liver disease Mother Ysabel. Campbell Jones     Arthritis Mother Kelle Jones     Coronary artery disease Father Guicho Jones     Atrial fibrillation Father Guicho Jones     Heart disease Father Guicho Jones     Cancer Sister Sally Jones    [4]   Allergies  Allergen Reactions    Levofloxacin Palpitations     Rapid Heart Rate - Severe per pt.    Cefazolin Rash     rash, purpuric, leucocytoclastic vasculitis

## 2025-04-22 ENCOUNTER — TELEPHONE (OUTPATIENT)
Dept: CARDIOLOGY | Facility: CLINIC | Age: 85
End: 2025-04-22
Payer: MEDICARE

## 2025-04-22 DIAGNOSIS — Z95.810 AICD (AUTOMATIC CARDIOVERTER/DEFIBRILLATOR) PRESENT: ICD-10-CM

## 2025-04-22 PROBLEM — I48.11 LONGSTANDING PERSISTENT ATRIAL FIBRILLATION (MULTI): Status: ACTIVE | Noted: 2025-04-21

## 2025-04-22 NOTE — TELEPHONE ENCOUNTER
Rec'd fax from [facility] device clinic requesting  order for services rendered.  Order entered for date of service rendered and x next year for routine device checks in-clinic and remotely or as directed by physician.

## 2025-04-30 ENCOUNTER — APPOINTMENT (OUTPATIENT)
Dept: CARDIOLOGY | Facility: CLINIC | Age: 85
End: 2025-04-30
Payer: MEDICARE

## 2025-04-30 VITALS
DIASTOLIC BLOOD PRESSURE: 68 MMHG | SYSTOLIC BLOOD PRESSURE: 124 MMHG | HEART RATE: 70 BPM | HEIGHT: 65 IN | BODY MASS INDEX: 36.92 KG/M2 | WEIGHT: 221.6 LBS

## 2025-04-30 DIAGNOSIS — I48.0 PAROXYSMAL ATRIAL FIBRILLATION (MULTI): ICD-10-CM

## 2025-04-30 DIAGNOSIS — I50.32 CHRONIC DIASTOLIC CONGESTIVE HEART FAILURE, NYHA CLASS 2: Primary | ICD-10-CM

## 2025-04-30 DIAGNOSIS — I25.10 CORONARY ARTERY DISEASE INVOLVING NATIVE CORONARY ARTERY OF NATIVE HEART WITHOUT ANGINA PECTORIS: ICD-10-CM

## 2025-04-30 DIAGNOSIS — I47.20 SUSTAINED VENTRICULAR TACHYCARDIA (MULTI): ICD-10-CM

## 2025-04-30 DIAGNOSIS — N18.31 STAGE 3A CHRONIC KIDNEY DISEASE (MULTI): ICD-10-CM

## 2025-04-30 PROCEDURE — G2211 COMPLEX E/M VISIT ADD ON: HCPCS | Performed by: INTERNAL MEDICINE

## 2025-04-30 PROCEDURE — 3074F SYST BP LT 130 MM HG: CPT | Performed by: INTERNAL MEDICINE

## 2025-04-30 PROCEDURE — 1159F MED LIST DOCD IN RCRD: CPT | Performed by: INTERNAL MEDICINE

## 2025-04-30 PROCEDURE — 3078F DIAST BP <80 MM HG: CPT | Performed by: INTERNAL MEDICINE

## 2025-04-30 PROCEDURE — 99214 OFFICE O/P EST MOD 30 MIN: CPT | Performed by: INTERNAL MEDICINE

## 2025-04-30 PROCEDURE — 1036F TOBACCO NON-USER: CPT | Performed by: INTERNAL MEDICINE

## 2025-04-30 PROCEDURE — 1123F ACP DISCUSS/DSCN MKR DOCD: CPT | Performed by: INTERNAL MEDICINE

## 2025-04-30 PROCEDURE — 1157F ADVNC CARE PLAN IN RCRD: CPT | Performed by: INTERNAL MEDICINE

## 2025-04-30 RX ORDER — SPIRONOLACTONE 25 MG/1
25 TABLET ORAL DAILY
Qty: 90 TABLET | Refills: 3 | Status: SHIPPED | OUTPATIENT
Start: 2025-04-30 | End: 2026-04-30

## 2025-04-30 RX ORDER — ATORVASTATIN CALCIUM 40 MG/1
40 TABLET, FILM COATED ORAL DAILY
Qty: 90 TABLET | Refills: 3 | Status: SHIPPED | OUTPATIENT
Start: 2025-04-30 | End: 2026-04-30

## 2025-04-30 NOTE — PATIENT INSTRUCTIONS
FOLLOW UP WITH Dr. Hugh Degroot MD, [facility] IN 1 MONTH    BLOODWORK TO BE DONE IN 2 WEEKS (NONFASTING) ON 05/14/25     START SPIRONOLACTONE 25MG TAKE ONE TABLET ONCE DAILY    START LIPITOR 40MG TAKE ONE TABLET DAILY      DID YOU KNOW  We have a pharmacy here in the [facility].  They can fill all prescriptions, not just cardiac medications.  Prescriptions from other pharmacies can easily be transferred to the  pharmacy by the  pharmacist on site.   pharmacies offer FREE HOME DELIVERY on medications to anywhere in [place]. They can sync your medications. Typically prescriptions can be ready in 10 - 15 minutes. If pharmacy is unable to fill your  prescription or if cost is more than your paying now the Pharmacist can easily transfer back to your Pharmacy of choice. Pharmacy phone # [phone].     Please bring all medicines, vitamins, and herbal supplements with you in original bottles to every appointment! This is the best way to ensure your medication list in your chart is accurate.    Prescriptions will not be filled unless you are compliant with your follow up appointments or have a follow up appointment scheduled as per instruction of your physician. Refills should be requested at the time of your visit.

## 2025-04-30 NOTE — PROGRESS NOTES
Patient:  Guicho Jones  YOB: 1940  MRN: [id number]       Impression/Plan:     Diagnoses and all orders for this visit:  Chronic diastolic congestive heart failure, NYHA class 2  -     Continues to improve renal function is nearly back to normal.  -     Has normal LV systolic function with diastolic CHF will add spironolactone.  Ideally would add Jardiance but he has had recently very severe infection and is at risk of urinary tract infection as he has had retention secondary to his BPH in the past.  Certainly if CHF were to worsen could consider adding that agent but for now we will continue current regimen with the addition of spironolactone laboratory testing to be done in the near future after 1 to 2 weeks of Aldactone  -     spironolactone (Aldactone) 25 mg tablet; Take 1 tablet (25 mg) by mouth once daily.  -     Basic Metabolic Panel; Future  Coronary artery disease involving native coronary artery of native heart without angina pectoris  Sustained ventricular tachycardia (Multi)  -     Although no angina with 3 very recent episodes of sustained VT successfully terminated with antitachycardia pacing.  Agree with catheterization as currently scheduled.  Also he needs to resume his atorvastatin that was held when he was particularly ill in the hospital  -     atorvastatin (Lipitor) 40 mg tablet; Take 1 tablet (40 mg) by mouth once daily.  Stage 3a chronic kidney disease (Multi)  -     Renal function markedly improved and near normal at this time.  -     Basic Metabolic Panel; Future    When he was quite ill it was recommended to him he consider hospice.  As long as he does not again become infected and that is certainly a variable his prognosis is otherwise reasonable and he would be expected to easily survive another 6 months.  Mentation is completely normal.  He is becoming more active.  At this time he has elected not to pursue hospice      Chief Complaint/Active Symptoms:      Chief  Complaint   Patient presents with    Follow-up     1 month follow up for CAD, CHF, afib.        Guicho Jones is a 85 y.o. male who presents with  ischemic cardiomyopathy, echo 10/21 50% EF. Cath 6/15/2022 LM-30%. LAD-diffuse disease 40% distal. CX/RCA calcified diffuse disease. LVEDP 15. He has AICD secondary to prior severe decrease in overall systolic function. Also with persistent atrial fibrillation. He has a 5.6 cm complex dumbbell abdominal aortic aneurysm that has been followed by vascular surgery.  As of 2022 patient preferred no intervention.  Was to be intervened upon previously but he then developed prostate cancer and intervention postponed. Finally, he has centrilobular emphysema-severe.         1/31/2024 perfusion study: Abnormal no evidence of ischemia prior inferolateral infarct LVEF 50%. No change from previously        1/31/24   Echo  with 50% ejection fraction inferolateral hypokinesis trivial AI trivial to 1+ MR RVSP 55 mm.          1/12/2025 [facility]        1. The left ventricular systolic function is normal, with a visually estimated ejection fraction of 60-65%.   2. There is normal right ventricular global systolic function.   3. The left atrium is moderately dilated.   4. The right atrium is moderately dilated.   5. Moderate mitral valve regurgitation.   6. Moderate tricuspid regurgitation.   7. No left atrial thrombus.   8. No vegetations seen on the valvular structures or the pacemaker leads.   9. There is plaque visualized in the descending aorta.        2/19/2025 TTE ([facility])  - The left ventricle is normal in size. Left ventricular systolic function is   normal. EF = 58 ± 5% (2D biplane) Prior EF was reported as 57%.   - The right ventricle is normal in size. Right ventricular systolic function is   normal.   - The visualized aorta is dilated with a maximal dimension of 4.6 cm.   - There is moderate (2+) mitral holosystolic valve regurgitation.      He was doing well when I saw him in August  2024.  However beginning in January of this year he has had multiple and extensive hospitalizations for the following reasons     1/10/2025 through 1/26/2025 [place] [place] gastric ulcer bleed, MSSA bacteremia possible endocarditis.      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  ISAIAS possibly secondary to allergic reaction sotalol discontinued in favor of amiodarone changed to vancomycin. transferred to [facility] secondary to multiple complexities of infectious disease, ISAIAS, possible endocarditis     2/9/2025 through 2/27/2025 [facility]  Vancomycin was switched to Cubicin IV  Discharged on Toprol 75  Taken off of aspirin/Lipitor/sucralfate/cefazolin/lisinopril/spironolactone  Primary issue of transfer was vasculitis GI bleeding and bacteremia.           Based on PET scanning his bacteremia was felt related to  infectious ascending aortic arch and/or right atrial ICD lead as both showed FDG uptake.  An area of his lumbar spine was also suspicious.  Also developed acute pulmonary edema during that hospital stay he was diuresed down to 214 pounds.  He did have repeat EGD at that hospital stay on 2/11/2025 which was unremarkable he did require another 2 units of packed cells.     CT surgery did evaluate him and felt interventions for the aortic arch and removal of device and leads would have a very high incidence of morbidity mortality and the patient did not wish to take that risk.  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  Assessment for connective tissue disease was negative.     In light of recurrent GI bleeding it was recommended continue to hold Xarelto.  He was continued on amiodarone at 100 mg no A-fib was described despite complex hospitalization.    His prognosis was felt to be poor secondary clear primary source of infection was not clearly  identified.  There was question as to whether the pet identified areas of infection pacemaker lead ascending aorta were secondary or primary.  Hs-CRP had remained very elevated.  He was on suppressive antibiotics when seen in this office 3/31/2025.    At office visit 3/31/2025 he was in class III diastolic CHF.  Blood pressure was well-controlled.  [place] to avoid Jardiance secondary to prostate and urine retention in the past.  Function had improved and was considering adjusting medications to resume spironolactone and potentially resume Entresto.  He was to take an extra Bumex if weight were to increase.  Because he has had such severe GI bleeding even after his ulcer was clipped was felt prudent to hold off on anticoagulation as he had very low frequency of A-fib on device checks.    Also at that office visit consideration of hospice which had been previously recommended was discussed.  Certainly if bacterial recurred his prognosis would be quite poor but at that office visit he was comfortable with no cognitive impairment and no recurrence of the infection.  He has severe emphysema but cardiovascular status was stable.  Likely live longer than 6 months certainly from a pulmonary and cardiac standpoint.  Certainly hospice would not be unreasonable if infection recurred but the device would need to be turned off of that was his final decision.  Going to look into this further.  I did agree that intervention for the aorta or lead infection would be highly risky for him.    He had been seen by Dr. Bassett on 4/21/2025 on April 13 he had had a run of ventricular tachycardia rate 222 with atrial pacing termination.  He had 2 other events of antitachycardia pacing.  For this reason Dr. Bassett arrange coronary angiography.    4/11/25 laboratory studies sodium 144 potassium 3.7 BUN of 31 creatinine 1.23   arch 10th it had been 1.97, February 2.45     Overall he is feeling a bit better.  He thinks he will be able to reduce his  oxygen back to just taking it at night he has follow-up with pulmonary to further discuss.  Lower extremity edema persists but has improved.  He is unaware of the sustained VT episodes he had had recently that were detected on device check.  Importantly despite the remarkable stress she had 1 was quite ill earlier in the year the device did not show VT during those episodes.    Review of Systems: Unremarkable except as noted above    Meds     Current Outpatient Medications   Medication Instructions    albuterol (Proventil HFA) 90 mcg/actuation inhaler 1 puff, Every 4 hours PRN    alteplase (CATHFLO ACTIVASE) 2 mg, intra-catheter, Daily PRN    amiodarone (Pacerone) 200 mg tablet Take 1 tablet twice daily for 7 days and then 1 tablet  once daily    bumetanide (Bumex) 1 mg tablet 1 tablet daily.  Take 1 extra dose a day for 1 dose or as directed by phycian for weight gain of more than 5 lbs in 3 days    clopidogrel (PLAVIX) 75 mg, oral, Daily    docusate sodium (COLACE) 100 mg, 2 times daily    doxycycline (MONODOX) 100 mg, 2 times daily    ensifentrine (Ohtuvayre) 3 mg/2.5 mL suspension for nebulization Daily    ezetimibe (ZETIA) 10 mg, oral, Daily    isosorbide mononitrate ER (IMDUR) 30 mg, oral, Daily, 1 tablet daily    metoprolol succinate XL (TOPROL-XL) 100 mg, oral, Daily    montelukast (SINGULAIR) 10 mg, oral, Every evening    nitroglycerin (NITROSTAT) 0.4 mg, sublingual, Every 5 min PRN    oxygen (O2) 2 L/min, inhalation, Every 24 hours    polyethylene glycol (GLYCOLAX, MIRALAX) 17 g, Daily    tamsulosin (FLOMAX) 0.4 mg, oral, Daily        Allergies   Allergies[1]      Annotated Problems     Specialty Problems          Cardiology Problems    CAD (coronary artery disease)    1/31/2024 perfusion study: Abnormal no evidence of ischemia prior inferolateral infarct LVEF 50%.  No change from previously    Echo from yesterday with 50% ejection fraction inferolateral hypokinesis trivial AI trivial to 1+ MR RVSP 55 mm.   Echo 2021 RVSP 43 mmHg   · Cath 6/15/2022 LM-30%. LAD-diffuse disease 40% distal. CX/RCA calcified diffuse      disease. LVEDP 15   3/19/2020. Lexiscan. Posterior lateral MI with mild jimmy-infarction ischemia. LVEF 42%.       Echocardiogram demonstrates 60% ejection fraction       2009. LVEF normal. LAD/RCA stents patent. Occluded distal circumflex. LM aneurysmal.       2008. BRONSON-proximal and mid LAD. BRONSON RCA.       1994 acute inferior infarct         Abdominal aortic aneurysm    Follows with Dr. Ross and as of 10/2022 though felt to be a candidate for an off label fenestrated approach, patient preferred no intervention   · November 2021 vascular follow-up with Dr. Finney. Aorta at 5.6 cm. Intervention      postponed secondary to prostate cancer treatments   January 2021 dumbbell shaped abdominal aortic aneurysm maximal diameter 5 cm      involving origin of renal arteries         AICD (automatic cardioverter/defibrillator) present    Anticoagulant long-term use    CHF NYHA class III, unspecified failure chronicity, diastolic (Multi)    Echo from yesterday with 50% ejection fraction inferolateral hypokinesis trivial AI trivial to 1+ MR RVSP 55 mm.  Echo 2021 RVSP 43 mmHg         Essential hypertension    Ischemic cardiomyopathy    January 2024 LVEF 50% RVSP 55   · October 2021 echo LVEF 55%.  Biatrial enlargement.  No significant valvular disease      March 2020 echo LVEF 60%.  Diastolic dysfunction      2002 50%      1996 25% after acute inferior infarct         Mixed hyperlipidemia    Paroxysmal VT    Persistent atrial fibrillation (Multi)    Renal artery stenosis (CMS-HCC)    Never smoked cigarettes    Paroxysmal atrial fibrillation (Multi)    Longstanding persistent atrial fibrillation (Multi)        Problem List     Patient Active Problem List    Diagnosis Date Noted    Paroxysmal atrial fibrillation (Multi) 03/31/2025    Stage 3a chronic kidney disease (Multi) 03/11/2025    Bacteremia 01/23/2025     Diarrhea, unspecified type 01/10/2025    Hidradenitis suppurativa of anus 11/25/2024    Pulmonary hypertension (Multi) 02/02/2024    Never smoked cigarettes 02/01/2024    Primary osteoarthritis of right knee 11/13/2023    CVA (cerebral vascular accident) (Multi) 10/09/2023    Abdominal aortic aneurysm 09/29/2023    AICD (automatic cardioverter/defibrillator) present 09/29/2023    Allergic rhinitis, seasonal 09/29/2023    Centrilobular emphysema (Multi) 09/29/2023    Essential hypertension 09/29/2023    Hyperuricemia 09/29/2023    Infarction of right basal ganglia (Multi) 09/29/2023    Ischemic cardiomyopathy 09/29/2023    Mixed hyperlipidemia 09/29/2023    Paroxysmal VT 09/29/2023    Persistent atrial fibrillation (Multi) 09/29/2023    Renal artery stenosis (CMS-[facility]) 09/29/2023    Anticoagulant long-term use 09/29/2023    CHF NYHA class III, unspecified failure chronicity, diastolic (Multi) 09/29/2023    High risk medication use 09/29/2023    Tremor 09/29/2023    Prostate cancer (Multi) 09/29/2023    Spinal stenosis, lumbar region with neurogenic claudication 07/17/2018    CAD (coronary artery disease) 10/19/2016    Obesity, morbid (Multi) 10/19/2016    COPD exacerbation (Multi) 10/19/2016    Epiretinal membrane (ERM) of right eye 03/15/2016    Pseudophakia of both eyes 01/14/2016    Vitreomacular traction syndrome of right eye 01/14/2016    Dermatochalasis of right upper eyelid 08/17/2015    Dermatochalasis of left upper eyelid 08/17/2015    Degenerative retinal drusen of both eyes 08/17/2015    Floaters 08/17/2015    Lumbosacral spondylosis without myelopathy 07/23/2015    Blepharitis of both eyes 01/28/2015    Drusen (degenerative) of retina 01/28/2015    Choroidal nevus, right eye 01/28/2015    Pinguecula 01/28/2015    MGD (meibomian gland dysfunction) 01/28/2015    Longstanding persistent atrial fibrillation (Multi) 04/21/2025       Objective:     Vitals:    04/30/25 0759   BP: 124/68   BP Location: Left arm  "  Patient Position: Sitting   Pulse: 70   Weight: 101 kg (221 lb 9.6 oz)   Height: 1.651 m (5' 5\")      Wt Readings from Last 4 Encounters:   04/30/25 101 kg (221 lb 9.6 oz)   04/21/25 98.4 kg (217 lb)   03/31/25 102 kg (225 lb)   03/11/25 99.5 kg (219 lb 6.4 oz)           LAB:     Lab Results   Component Value Date    WBC 8.0 01/26/2025    HGB 8.2 (L) 01/26/2025    HCT 25.7 (L) 01/26/2025     (L) 01/26/2025    CHOL 117 06/10/2024    TRIG 119 06/10/2024    HDL 53.8 06/10/2024    ALT 4 (L) 01/26/2025    AST 13 01/26/2025     04/11/2025    K 3.7 04/11/2025     04/11/2025    CREATININE 1.23 (H) 04/11/2025    BUN 31 (H) 04/11/2025    CO2 27 04/11/2025    TSH 1.15 03/13/2024    INR 1.7 (H) 01/10/2025         Physical Exam     General Appearance: alert and oriented to person, place and time, in no acute distress  Cardiovascular: normal rate, regular rhythm, normal S1 and S2, 1/6 systolic murmur, rubs, clicks, or gallops,  no JVD  Pulmonary/Chest: clear to auscultation bilaterally- no wheezes, rales or rhonchi, normal air movement, no respiratory distress  Abdomen: soft, non-tender, non-distended, normal bowel sounds, no masses   Extremities: no cyanosis, clubbing or edema  Skin: warm and dry, no rash or erythema  Eyes: EOMI  Neck: supple and non-tender without mass, no thyromegaly   Neurological: alert, oriented, normal speech, no focal findings or movement disorder noted      Scribe Attestation  By signing my name below, I, Jaclyn Sears RN, Scribe   attest that this documentation has been prepared under the direction and in the presence of Hugh Degroot MD.      Provider attestation-scribe documentation  Any medical record entries made by the scribe were at my discretion and personally dictated by me.  I have reviewed the chart and agree that the record accurately reflects my personal performance of the history, physical exam, discussion and plan.                 [1]   Allergies  Allergen Reactions "    Levofloxacin Palpitations     Rapid Heart Rate - Severe per pt.    Cefazolin Rash     rash, purpuric, leucocytoclastic vasculitis

## 2025-04-30 NOTE — H&P (VIEW-ONLY)
Patient:  Guicho Jones  YOB: 1940  MRN: [id number]       Impression/Plan:     Diagnoses and all orders for this visit:  Chronic diastolic congestive heart failure, NYHA class 2  -     Continues to improve renal function is nearly back to normal.  -     Has normal LV systolic function with diastolic CHF will add spironolactone.  Ideally would add Jardiance but he has had recently very severe infection and is at risk of urinary tract infection as he has had retention secondary to his BPH in the past.  Certainly if CHF were to worsen could consider adding that agent but for now we will continue current regimen with the addition of spironolactone laboratory testing to be done in the near future after 1 to 2 weeks of Aldactone  -     spironolactone (Aldactone) 25 mg tablet; Take 1 tablet (25 mg) by mouth once daily.  -     Basic Metabolic Panel; Future  Coronary artery disease involving native coronary artery of native heart without angina pectoris  Sustained ventricular tachycardia (Multi)  -     Although no angina with 3 very recent episodes of sustained VT successfully terminated with antitachycardia pacing.  Agree with catheterization as currently scheduled.  Also he needs to resume his atorvastatin that was held when he was particularly ill in the hospital  -     atorvastatin (Lipitor) 40 mg tablet; Take 1 tablet (40 mg) by mouth once daily.  Stage 3a chronic kidney disease (Multi)  -     Renal function markedly improved and near normal at this time.  -     Basic Metabolic Panel; Future    When he was quite ill it was recommended to him he consider hospice.  As long as he does not again become infected and that is certainly a variable his prognosis is otherwise reasonable and he would be expected to easily survive another 6 months.  Mentation is completely normal.  He is becoming more active.  At this time he has elected not to pursue hospice      Chief Complaint/Active Symptoms:      Chief  Complaint   Patient presents with    Follow-up     1 month follow up for CAD, CHF, afib.        Guicho Jones is a 85 y.o. male who presents with  ischemic cardiomyopathy, echo 10/21 50% EF. Cath 6/15/2022 LM-30%. LAD-diffuse disease 40% distal. CX/RCA calcified diffuse disease. LVEDP 15. He has AICD secondary to prior severe decrease in overall systolic function. Also with persistent atrial fibrillation. He has a 5.6 cm complex dumbbell abdominal aortic aneurysm that has been followed by vascular surgery.  As of 2022 patient preferred no intervention.  Was to be intervened upon previously but he then developed prostate cancer and intervention postponed. Finally, he has centrilobular emphysema-severe.         1/31/2024 perfusion study: Abnormal no evidence of ischemia prior inferolateral infarct LVEF 50%. No change from previously        1/31/24   Echo  with 50% ejection fraction inferolateral hypokinesis trivial AI trivial to 1+ MR RVSP 55 mm.          1/12/2025 PAOLO        1. The left ventricular systolic function is normal, with a visually estimated ejection fraction of 60-65%.   2. There is normal right ventricular global systolic function.   3. The left atrium is moderately dilated.   4. The right atrium is moderately dilated.   5. Moderate mitral valve regurgitation.   6. Moderate tricuspid regurgitation.   7. No left atrial thrombus.   8. No vegetations seen on the valvular structures or the pacemaker leads.   9. There is plaque visualized in the descending aorta.        2/19/2025 TTE ([facility])  - The left ventricle is normal in size. Left ventricular systolic function is   normal. EF = 58 ± 5% (2D biplane) Prior EF was reported as 57%.   - The right ventricle is normal in size. Right ventricular systolic function is   normal.   - The visualized aorta is dilated with a maximal dimension of 4.6 cm.   - There is moderate (2+) mitral holosystolic valve regurgitation.      He was doing well when I saw him in August  2024.  However beginning in January of this year he has had multiple and extensive hospitalizations for the following reasons     1/10/2025 through 1/26/2025 [place] [place] gastric ulcer bleed, MSSA bacteremia possible endocarditis.      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  ISAIAS possibly secondary to allergic reaction sotalol discontinued in favor of amiodarone changed to vancomycin. transferred to [facility] secondary to multiple complexities of infectious disease, ISAIAS, possible endocarditis     2/9/2025 through 2/27/2025 [facility]  Vancomycin was switched to Cubicin IV  Discharged on Toprol 75  Taken off of aspirin/Lipitor/sucralfate/cefazolin/lisinopril/spironolactone  Primary issue of transfer was vasculitis GI bleeding and bacteremia.           Based on PET scanning his bacteremia was felt related to  infectious ascending aortic arch and/or right atrial ICD lead as both showed FDG uptake.  An area of his lumbar spine was also suspicious.  Also developed acute pulmonary edema during that hospital stay he was diuresed down to 214 pounds.  He did have repeat EGD at that hospital stay on 2/11/2025 which was unremarkable he did require another 2 units of packed cells.     CT surgery did evaluate him and felt interventions for the aortic arch and removal of device and leads would have a very high incidence of morbidity mortality and the patient did not wish to take that risk.  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  Assessment for connective tissue disease was negative.     In light of recurrent GI bleeding it was recommended continue to hold Xarelto.  He was continued on amiodarone at 100 mg no A-fib was described despite complex hospitalization.    His prognosis was felt to be poor secondary clear primary source of infection was not clearly  identified.  There was question as to whether the pet identified areas of infection pacemaker lead ascending aorta were secondary or primary.  Hs-CRP had remained very elevated.  He was on suppressive antibiotics when seen in this office 3/31/2025.    At office visit 3/31/2025 he was in class III diastolic CHF.  Blood pressure was well-controlled.  [place] to avoid Jardiance secondary to prostate and urine retention in the past.  Function had improved and was considering adjusting medications to resume spironolactone and potentially resume Entresto.  He was to take an extra Bumex if weight were to increase.  Because he has had such severe GI bleeding even after his ulcer was clipped was felt prudent to hold off on anticoagulation as he had very low frequency of A-fib on device checks.    Also at that office visit consideration of hospice which had been previously recommended was discussed.  Certainly if bacterial recurred his prognosis would be quite poor but at that office visit he was comfortable with no cognitive impairment and no recurrence of the infection.  He has severe emphysema but cardiovascular status was stable.  Likely live longer than 6 months certainly from a pulmonary and cardiac standpoint.  Certainly hospice would not be unreasonable if infection recurred but the device would need to be turned off of that was his final decision.  Going to look into this further.  I did agree that intervention for the aorta or lead infection would be highly risky for him.    He had been seen by Dr. Bassett on 4/21/2025 on April 13 he had had a run of ventricular tachycardia rate 222 with atrial pacing termination.  He had 2 other events of antitachycardia pacing.  For this reason Dr. Bassett arrange coronary angiography.    4/11/25 laboratory studies sodium 144 potassium 3.7 BUN of 31 creatinine 1.23   arch 10th it had been 1.97, February 2.45     Overall he is feeling a bit better.  He thinks he will be able to reduce his  oxygen back to just taking it at night he has follow-up with pulmonary to further discuss.  Lower extremity edema persists but has improved.  He is unaware of the sustained VT episodes he had had recently that were detected on device check.  Importantly despite the remarkable stress she had 1 was quite ill earlier in the year the device did not show VT during those episodes.    Review of Systems: Unremarkable except as noted above    Meds     Current Outpatient Medications   Medication Instructions    albuterol (Proventil HFA) 90 mcg/actuation inhaler 1 puff, Every 4 hours PRN    alteplase (CATHFLO ACTIVASE) 2 mg, intra-catheter, Daily PRN    amiodarone (Pacerone) 200 mg tablet Take 1 tablet twice daily for 7 days and then 1 tablet  once daily    bumetanide (Bumex) 1 mg tablet 1 tablet daily.  Take 1 extra dose a day for 1 dose or as directed by phycian for weight gain of more than 5 lbs in 3 days    clopidogrel (PLAVIX) 75 mg, oral, Daily    docusate sodium (COLACE) 100 mg, 2 times daily    doxycycline (MONODOX) 100 mg, 2 times daily    ensifentrine (Ohtuvayre) 3 mg/2.5 mL suspension for nebulization Daily    ezetimibe (ZETIA) 10 mg, oral, Daily    isosorbide mononitrate ER (IMDUR) 30 mg, oral, Daily, 1 tablet daily    metoprolol succinate XL (TOPROL-XL) 100 mg, oral, Daily    montelukast (SINGULAIR) 10 mg, oral, Every evening    nitroglycerin (NITROSTAT) 0.4 mg, sublingual, Every 5 min PRN    oxygen (O2) 2 L/min, inhalation, Every 24 hours    polyethylene glycol (GLYCOLAX, MIRALAX) 17 g, Daily    tamsulosin (FLOMAX) 0.4 mg, oral, Daily        Allergies   Allergies[1]      Annotated Problems     Specialty Problems          Cardiology Problems    CAD (coronary artery disease)    1/31/2024 perfusion study: Abnormal no evidence of ischemia prior inferolateral infarct LVEF 50%.  No change from previously    Echo from yesterday with 50% ejection fraction inferolateral hypokinesis trivial AI trivial to 1+ MR RVSP 55 mm.   Echo 2021 RVSP 43 mmHg   · Cath 6/15/2022 LM-30%. LAD-diffuse disease 40% distal. CX/RCA calcified diffuse      disease. LVEDP 15   3/19/2020. Lexiscan. Posterior lateral MI with mild jimmy-infarction ischemia. LVEF 42%.       Echocardiogram demonstrates 60% ejection fraction       2009. LVEF normal. LAD/RCA stents patent. Occluded distal circumflex. LM aneurysmal.       2008. BRONSON-proximal and mid LAD. BRONSON RCA.       1994 acute inferior infarct         Abdominal aortic aneurysm    Follows with Dr. Ross and as of 10/2022 though felt to be a candidate for an off label fenestrated approach, patient preferred no intervention   · November 2021 vascular follow-up with Dr. Finney. Aorta at 5.6 cm. Intervention      postponed secondary to prostate cancer treatments   January 2021 dumbbell shaped abdominal aortic aneurysm maximal diameter 5 cm      involving origin of renal arteries         AICD (automatic cardioverter/defibrillator) present    Anticoagulant long-term use    CHF NYHA class III, unspecified failure chronicity, diastolic (Multi)    Echo from yesterday with 50% ejection fraction inferolateral hypokinesis trivial AI trivial to 1+ MR RVSP 55 mm.  Echo 2021 RVSP 43 mmHg         Essential hypertension    Ischemic cardiomyopathy    January 2024 LVEF 50% RVSP 55   · October 2021 echo LVEF 55%.  Biatrial enlargement.  No significant valvular disease      March 2020 echo LVEF 60%.  Diastolic dysfunction      2002 50%      1996 25% after acute inferior infarct         Mixed hyperlipidemia    Paroxysmal VT    Persistent atrial fibrillation (Multi)    Renal artery stenosis (CMS-HCC)    Never smoked cigarettes    Paroxysmal atrial fibrillation (Multi)    Longstanding persistent atrial fibrillation (Multi)        Problem List     Patient Active Problem List    Diagnosis Date Noted    Paroxysmal atrial fibrillation (Multi) 03/31/2025    Stage 3a chronic kidney disease (Multi) 03/11/2025    Bacteremia 01/23/2025     Diarrhea, unspecified type 01/10/2025    Hidradenitis suppurativa of anus 11/25/2024    Pulmonary hypertension (Multi) 02/02/2024    Never smoked cigarettes 02/01/2024    Primary osteoarthritis of right knee 11/13/2023    CVA (cerebral vascular accident) (Multi) 10/09/2023    Abdominal aortic aneurysm 09/29/2023    AICD (automatic cardioverter/defibrillator) present 09/29/2023    Allergic rhinitis, seasonal 09/29/2023    Centrilobular emphysema (Multi) 09/29/2023    Essential hypertension 09/29/2023    Hyperuricemia 09/29/2023    Infarction of right basal ganglia (Multi) 09/29/2023    Ischemic cardiomyopathy 09/29/2023    Mixed hyperlipidemia 09/29/2023    Paroxysmal VT 09/29/2023    Persistent atrial fibrillation (Multi) 09/29/2023    Renal artery stenosis (CMS-[facility]) 09/29/2023    Anticoagulant long-term use 09/29/2023    CHF NYHA class III, unspecified failure chronicity, diastolic (Multi) 09/29/2023    High risk medication use 09/29/2023    Tremor 09/29/2023    Prostate cancer (Multi) 09/29/2023    Spinal stenosis, lumbar region with neurogenic claudication 07/17/2018    CAD (coronary artery disease) 10/19/2016    Obesity, morbid (Multi) 10/19/2016    COPD exacerbation (Multi) 10/19/2016    Epiretinal membrane (ERM) of right eye 03/15/2016    Pseudophakia of both eyes 01/14/2016    Vitreomacular traction syndrome of right eye 01/14/2016    Dermatochalasis of right upper eyelid 08/17/2015    Dermatochalasis of left upper eyelid 08/17/2015    Degenerative retinal drusen of both eyes 08/17/2015    Floaters 08/17/2015    Lumbosacral spondylosis without myelopathy 07/23/2015    Blepharitis of both eyes 01/28/2015    Drusen (degenerative) of retina 01/28/2015    Choroidal nevus, right eye 01/28/2015    Pinguecula 01/28/2015    MGD (meibomian gland dysfunction) 01/28/2015    Longstanding persistent atrial fibrillation (Multi) 04/21/2025       Objective:     Vitals:    04/30/25 0759   BP: 124/68   BP Location: Left arm  "  Patient Position: Sitting   Pulse: 70   Weight: 101 kg (221 lb 9.6 oz)   Height: 1.651 m (5' 5\")      Wt Readings from Last 4 Encounters:   04/30/25 101 kg (221 lb 9.6 oz)   04/21/25 98.4 kg (217 lb)   03/31/25 102 kg (225 lb)   03/11/25 99.5 kg (219 lb 6.4 oz)           LAB:     Lab Results   Component Value Date    WBC 8.0 01/26/2025    HGB 8.2 (L) 01/26/2025    HCT 25.7 (L) 01/26/2025     (L) 01/26/2025    CHOL 117 06/10/2024    TRIG 119 06/10/2024    HDL 53.8 06/10/2024    ALT 4 (L) 01/26/2025    AST 13 01/26/2025     04/11/2025    K 3.7 04/11/2025     04/11/2025    CREATININE 1.23 (H) 04/11/2025    BUN 31 (H) 04/11/2025    CO2 27 04/11/2025    TSH 1.15 03/13/2024    INR 1.7 (H) 01/10/2025         Physical Exam     General Appearance: alert and oriented to person, place and time, in no acute distress  Cardiovascular: normal rate, regular rhythm, normal S1 and S2, 1/6 systolic murmur, rubs, clicks, or gallops,  no JVD  Pulmonary/Chest: clear to auscultation bilaterally- no wheezes, rales or rhonchi, normal air movement, no respiratory distress  Abdomen: soft, non-tender, non-distended, normal bowel sounds, no masses   Extremities: no cyanosis, clubbing or edema  Skin: warm and dry, no rash or erythema  Eyes: EOMI  Neck: supple and non-tender without mass, no thyromegaly   Neurological: alert, oriented, normal speech, no focal findings or movement disorder noted      Scribe Attestation  By signing my name below, I, Jaclyn Sears RN, Scribe   attest that this documentation has been prepared under the direction and in the presence of Hugh Degroot MD.      Provider attestation-scribe documentation  Any medical record entries made by the scribe were at my discretion and personally dictated by me.  I have reviewed the chart and agree that the record accurately reflects my personal performance of the history, physical exam, discussion and plan.                 [1]   Allergies  Allergen Reactions "    Levofloxacin Palpitations     Rapid Heart Rate - Severe per pt.    Cefazolin Rash     rash, purpuric, leucocytoclastic vasculitis

## 2025-05-02 ENCOUNTER — HOSPITAL ENCOUNTER (OUTPATIENT)
Facility: HOSPITAL | Age: 85
Setting detail: OUTPATIENT SURGERY
Discharge: HOME | End: 2025-05-02
Attending: INTERNAL MEDICINE | Admitting: INTERNAL MEDICINE
Payer: MEDICARE

## 2025-05-02 ENCOUNTER — APPOINTMENT (OUTPATIENT)
Dept: CARDIOLOGY | Facility: HOSPITAL | Age: 85
End: 2025-05-02
Payer: MEDICARE

## 2025-05-02 VITALS
OXYGEN SATURATION: 92 % | SYSTOLIC BLOOD PRESSURE: 162 MMHG | RESPIRATION RATE: 12 BRPM | HEIGHT: 65 IN | DIASTOLIC BLOOD PRESSURE: 82 MMHG | TEMPERATURE: 36.8 F | BODY MASS INDEX: 36.22 KG/M2 | WEIGHT: 217.37 LBS | HEART RATE: 60 BPM

## 2025-05-02 DIAGNOSIS — Z95.810 AICD (AUTOMATIC CARDIOVERTER/DEFIBRILLATOR) PRESENT: ICD-10-CM

## 2025-05-02 DIAGNOSIS — I25.10 CORONARY ARTERY DISEASE INVOLVING NATIVE CORONARY ARTERY OF NATIVE HEART WITHOUT ANGINA PECTORIS: ICD-10-CM

## 2025-05-02 DIAGNOSIS — I48.11 LONGSTANDING PERSISTENT ATRIAL FIBRILLATION (MULTI): Primary | ICD-10-CM

## 2025-05-02 DIAGNOSIS — I49.8 OTHER SPECIFIED CARDIAC ARRHYTHMIAS: ICD-10-CM

## 2025-05-02 LAB
ANION GAP SERPL CALC-SCNC: 12 MMOL/L (ref 10–20)
BUN SERPL-MCNC: 26 MG/DL (ref 6–23)
CALCIUM SERPL-MCNC: 8.6 MG/DL (ref 8.6–10.3)
CHLORIDE SERPL-SCNC: 106 MMOL/L (ref 98–107)
CO2 SERPL-SCNC: 26 MMOL/L (ref 21–32)
CREAT SERPL-MCNC: 1.31 MG/DL (ref 0.5–1.3)
EGFRCR SERPLBLD CKD-EPI 2021: 53 ML/MIN/1.73M*2
ERYTHROCYTE [DISTWIDTH] IN BLOOD BY AUTOMATED COUNT: 18 % (ref 11.5–14.5)
GLUCOSE SERPL-MCNC: 90 MG/DL (ref 74–99)
HCT VFR BLD AUTO: 31 % (ref 41–52)
HGB BLD-MCNC: 9.8 G/DL (ref 13.5–17.5)
MCH RBC QN AUTO: 29.4 PG (ref 26–34)
MCHC RBC AUTO-ENTMCNC: 31.6 G/DL (ref 32–36)
MCV RBC AUTO: 93 FL (ref 80–100)
NRBC BLD-RTO: 0 /100 WBCS (ref 0–0)
PLATELET # BLD AUTO: 181 X10*3/UL (ref 150–450)
POTASSIUM SERPL-SCNC: 4.6 MMOL/L (ref 3.5–5.3)
RBC # BLD AUTO: 3.33 X10*6/UL (ref 4.5–5.9)
SODIUM SERPL-SCNC: 139 MMOL/L (ref 136–145)
WBC # BLD AUTO: 8 X10*3/UL (ref 4.4–11.3)

## 2025-05-02 PROCEDURE — 2550000001 HC RX 255 CONTRASTS: Performed by: INTERNAL MEDICINE

## 2025-05-02 PROCEDURE — 2500000004 HC RX 250 GENERAL PHARMACY W/ HCPCS (ALT 636 FOR OP/ED): Performed by: INTERNAL MEDICINE

## 2025-05-02 PROCEDURE — C1887 CATHETER, GUIDING: HCPCS | Performed by: INTERNAL MEDICINE

## 2025-05-02 PROCEDURE — 99152 MOD SED SAME PHYS/QHP 5/>YRS: CPT | Performed by: INTERNAL MEDICINE

## 2025-05-02 PROCEDURE — 2500000004 HC RX 250 GENERAL PHARMACY W/ HCPCS (ALT 636 FOR OP/ED): Mod: JZ | Performed by: NURSE PRACTITIONER

## 2025-05-02 PROCEDURE — 2500000001 HC RX 250 WO HCPCS SELF ADMINISTERED DRUGS (ALT 637 FOR MEDICARE OP): Performed by: NURSE PRACTITIONER

## 2025-05-02 PROCEDURE — 2720000007 HC OR 272 NO HCPCS: Performed by: INTERNAL MEDICINE

## 2025-05-02 PROCEDURE — 93005 ELECTROCARDIOGRAM TRACING: CPT

## 2025-05-02 PROCEDURE — 93010 ELECTROCARDIOGRAM REPORT: CPT | Performed by: INTERNAL MEDICINE

## 2025-05-02 PROCEDURE — 7100000009 HC PHASE TWO TIME - INITIAL BASE CHARGE: Performed by: INTERNAL MEDICINE

## 2025-05-02 PROCEDURE — 7100000010 HC PHASE TWO TIME - EACH INCREMENTAL 1 MINUTE: Performed by: INTERNAL MEDICINE

## 2025-05-02 PROCEDURE — 7100000002 HC RECOVERY ROOM TIME - EACH INCREMENTAL 1 MINUTE: Performed by: INTERNAL MEDICINE

## 2025-05-02 PROCEDURE — 7100000001 HC RECOVERY ROOM TIME - INITIAL BASE CHARGE: Performed by: INTERNAL MEDICINE

## 2025-05-02 PROCEDURE — 93454 CORONARY ARTERY ANGIO S&I: CPT | Performed by: INTERNAL MEDICINE

## 2025-05-02 PROCEDURE — 85027 COMPLETE CBC AUTOMATED: CPT | Performed by: NURSE PRACTITIONER

## 2025-05-02 PROCEDURE — 99024 POSTOP FOLLOW-UP VISIT: CPT | Performed by: NURSE PRACTITIONER

## 2025-05-02 PROCEDURE — 80048 BASIC METABOLIC PNL TOTAL CA: CPT | Performed by: NURSE PRACTITIONER

## 2025-05-02 PROCEDURE — C1894 INTRO/SHEATH, NON-LASER: HCPCS | Performed by: INTERNAL MEDICINE

## 2025-05-02 RX ORDER — FENTANYL CITRATE 50 UG/ML
INJECTION, SOLUTION INTRAMUSCULAR; INTRAVENOUS AS NEEDED
Status: DISCONTINUED | OUTPATIENT
Start: 2025-05-02 | End: 2025-05-02 | Stop reason: HOSPADM

## 2025-05-02 RX ORDER — ASPIRIN 325 MG
325 TABLET ORAL ONCE
Status: COMPLETED | OUTPATIENT
Start: 2025-05-02 | End: 2025-05-02

## 2025-05-02 RX ORDER — SODIUM CHLORIDE 9 MG/ML
75 INJECTION, SOLUTION INTRAVENOUS CONTINUOUS
Status: DISCONTINUED | OUTPATIENT
Start: 2025-05-02 | End: 2025-05-02 | Stop reason: HOSPADM

## 2025-05-02 RX ORDER — NITROGLYCERIN 5 MG/ML
INJECTION, SOLUTION INTRAVENOUS AS NEEDED
Status: DISCONTINUED | OUTPATIENT
Start: 2025-05-02 | End: 2025-05-02 | Stop reason: HOSPADM

## 2025-05-02 RX ORDER — LIDOCAINE HYDROCHLORIDE 20 MG/ML
INJECTION, SOLUTION INFILTRATION; PERINEURAL AS NEEDED
Status: DISCONTINUED | OUTPATIENT
Start: 2025-05-02 | End: 2025-05-02 | Stop reason: HOSPADM

## 2025-05-02 RX ORDER — HEPARIN SODIUM 1000 [USP'U]/ML
INJECTION, SOLUTION INTRAVENOUS; SUBCUTANEOUS AS NEEDED
Status: DISCONTINUED | OUTPATIENT
Start: 2025-05-02 | End: 2025-05-02 | Stop reason: HOSPADM

## 2025-05-02 RX ORDER — MIDAZOLAM HYDROCHLORIDE 1 MG/ML
INJECTION, SOLUTION INTRAMUSCULAR; INTRAVENOUS AS NEEDED
Status: DISCONTINUED | OUTPATIENT
Start: 2025-05-02 | End: 2025-05-02 | Stop reason: HOSPADM

## 2025-05-02 RX ADMIN — SODIUM CHLORIDE 75 ML/HR: 0.9 INJECTION, SOLUTION INTRAVENOUS at 11:58

## 2025-05-02 RX ADMIN — ASPIRIN 325 MG: 325 TABLET ORAL at 11:58

## 2025-05-02 ASSESSMENT — PAIN - FUNCTIONAL ASSESSMENT
PAIN_FUNCTIONAL_ASSESSMENT: 0-10

## 2025-05-02 ASSESSMENT — PAIN SCALES - GENERAL
PAINLEVEL_OUTOF10: 0 - NO PAIN

## 2025-05-02 ASSESSMENT — COLUMBIA-SUICIDE SEVERITY RATING SCALE - C-SSRS
1. IN THE PAST MONTH, HAVE YOU WISHED YOU WERE DEAD OR WISHED YOU COULD GO TO SLEEP AND NOT WAKE UP?: NO
2. HAVE YOU ACTUALLY HAD ANY THOUGHTS OF KILLING YOURSELF?: NO
6. HAVE YOU EVER DONE ANYTHING, STARTED TO DO ANYTHING, OR PREPARED TO DO ANYTHING TO END YOUR LIFE?: NO

## 2025-05-02 NOTE — POST-PROCEDURE NOTE
Physician Transition of Care Summary  Invasive Cardiovascular Lab    Procedure Date: 5/2/2025  Attending:    * Richard Penn - Primary  Resident/Fellow/Other Assistant: Surgeons and Role:  * No surgeons found with a matching role *    Indications:   Pre-op Diagnosis      * Longstanding persistent atrial fibrillation (Multi) [I48.11]     * Coronary artery disease involving native coronary artery of native heart without angina pectoris [I25.10]     * AICD (automatic cardioverter/defibrillator) present [Z95.810]    Post-procedure diagnosis:   Post-op Diagnosis     * Longstanding persistent atrial fibrillation (Multi) [I48.11]     * Coronary artery disease involving native coronary artery of native heart without angina pectoris [I25.10]     * AICD (automatic cardioverter/defibrillator) present [Z95.810]    Procedure(s):   LHC, With LV  47559 - OR CATH PLMT L HRT & ARTS W/NJX & ANGIO IMG S&I        Procedure Findings:   Normal coronaries, moderate left ventricular dysfunction ejection fraction of 40%    Description of the Procedure:   Left heart cath coronary angiography left ventriculogram    Complications:   None    Stents/Implants:   Implants       No implant documentation for this case.            Anticoagulation/Antiplatelet Plan:   Intravenous heparin    Estimated Blood Loss:   * No values recorded between 5/2/2025 12:00 AM and 5/2/2025 12:13 PM *    Anesthesia: Moderate Sedation Anesthesia Staff: No anesthesia staff entered.    Any Specimen(s) Removed:   Order Name Source Comment Collection Info Order Time   BASIC METABOLIC PANEL Blood, Venous  Collected By: Lee Ann Perez RN 5/2/2025 11:27 AM     [facility]   Immediate        CBC Blood, Venous  Collected By: Lee Ann Perez RN 5/2/2025 11:27 AM     Release result to MyChart   Immediate            Disposition:   Medical therapy      Electronically signed by: Richard Penn MD, 5/2/2025 12:13 PM

## 2025-05-02 NOTE — Clinical Note
Multiple views of the left coronary artery obtained using power injection. Normal rate, regular rhythm.  Heart sounds S1, S2

## 2025-05-02 NOTE — NURSING NOTE
Discharge instructions reviewed with patient and spouse. Discussed in depth post procedure restrictions, medications and follow up appointments. Questions and concerns addressed. Right radial site remains stable and unchanged. Plan to dc home.

## 2025-05-02 NOTE — DISCHARGE INSTRUCTIONS
CARDIAC CATHETERIZATION DISCHARGE INSTRUCTIONS     FOR SUDDEN AND SEVERE CHEST PAIN, SHORTNESS OF BREATH, EXCESSIVE BLEEDING, SIGNS OF STROKE, OR CHANGES IN MENTAL STATUS YOU SHOULD CALL 911 IMMEDIATELY.     If your provider has prescribed clopidogrel (Plavix)--- DO NOT STOP THESE MEDICATIONS for any reason without talking to your cardiologist first. If any of these were prescribed, you must take them every day without missing a single dose. If you are getting low on these medications, contact your provider immediately for a refill.     FOR NEXT 24 HOURS  - Upon discharge, you should return home and rest for the remainder of the day and evening. You do not have to stay on bed rest but should not be very active.  It is recommended a responsible adult be with you for the first 24 hours after the procedure.    - No driving for 24 hours after procedure. Please arrange for someone to drive you home from the hospital today.     - Do not drive, operate machinery, or use power tools for 24 hours after your procedure.     - Do not make any legal decisions for 24 hours after your procedure.     - Do not drink alcoholic beverages for 24 hours after your procedure.    WOUND CARE     *FOR RADIAL (WRIST) ACCESS*  ·      No lifting more than 10 pounds or excessive use of the wrist for 24 hours - for example, treat your wrist as if it is sprained.  ·      Do not engage in vigorous activities (tennis, golf, bowling, weights) for at least 48 hours after the procedure.  ·      Do not submerge the wrist for 7 days after the procedure.  ·      You should expect mild tingling in your hand and tenderness at the puncture site for up to 3 days.    - The transparent dressing should be removed from the site 24 hours after the procedure.  Wash the site gently with soap and water. Rinse well and pat dry. Keep the area clean and dry. You may apply a Band-Aid to the site. Avoid lotions, ointments, or powders until fully healed.     - You  may shower the day after your procedure.      - It is normal to notice a small bruise around the puncture site and/or a small grape sized or smaller lump. Any large bruising or large lump warrants a call to the office.     - If bleeding should occur, apply pressure to the affected area for 10 minutes.  If the bleeding stops notify your physician.  If there is a large amount of bleeding or spurting of blood CALL 911 immediately.  DO NOT drive yourself to the hospital.    - You may experience some tenderness, bruising or minimal inflammation.  If you have any concerns, you may contact the Cath Lab or if any of these symptoms become excessive, contact your cardiologist or go to the emergency room.     OTHER INSTRUCTIONS  - You may take acetaminophen (Tylenol) as directed for discomfort.  If pain is not relieved with acetaminophen (Tylenol), contact your doctor.    - If you notice or experience any of the following, you should notify your doctor or seek medical attention  Chest pain or discomfort  Change in mental status or weakness in extremities.  Dizziness, light headedness, or feeling faint.  Change in the site where the procedure was performed, such as bleeding or an increased area of bruising or swelling.  Tingling, numbness, pain, or coolness in the leg/arm beyond the site where the procedure was performed.  Signs of infection (i.e. shaking chills, temperature > 100 degrees Fahrenheit, warmth, redness) in the leg/arm area where the procedure was performed.  Changes in urination   Bloody or black stools  Vomiting blood  Severe nose bleeds  Any excessive bleeding    - If you DO NOT have an appointment with your cardiologist within 2-4 weeks following your procedure, please contact their office.

## 2025-05-02 NOTE — NURSING NOTE
Patient returned from cath lab, s/p [facility]. Right radial site closed with a vascband with 10 cc of air, with palpable radial pulses. Patient is A&Ox4 and has no c/o at this time. VSS.  Family at bedside, will continue to monitor.

## 2025-05-02 NOTE — PROGRESS NOTES
Patient is stable status post LHC under the care of Dr. Penn.  Discussed results of procedure with patient and his family member.  Pictures provided.  Findings of the LHC revealed patent previous stent in the LAD, mild diffuse disease elsewhere and an LVEF of 40%.  Medical management is advised.  Patient is scheduled to follow-up with Dr. Bassett in 2 weeks for further management of VT.  Patient also has follow-up with Dr. Degroot in 3 to 4 weeks.  Postprocedural activity, restrictions, potential complications, medications and future follow-up discussed at length.  All questions answered.  Both verbalized an understanding.

## 2025-05-03 LAB
ATRIAL RATE: 61 BPM
Q ONSET: 225 MS
QRS COUNT: 10 BEATS
QRS DURATION: 112 MS
QT INTERVAL: 478 MS
QTC CALCULATION(BAZETT): 481 MS
QTC FREDERICIA: 480 MS
R AXIS: -6 DEGREES
T AXIS: 27 DEGREES
T OFFSET: 464 MS
VENTRICULAR RATE: 61 BPM

## 2025-05-05 ENCOUNTER — APPOINTMENT (OUTPATIENT)
Dept: CARDIOLOGY | Facility: CLINIC | Age: 85
End: 2025-05-05
Payer: MEDICARE

## 2025-05-13 DIAGNOSIS — J30.89 NON-SEASONAL ALLERGIC RHINITIS, UNSPECIFIED TRIGGER: Primary | ICD-10-CM

## 2025-05-13 NOTE — TELEPHONE ENCOUNTER
Rx requested:  Requested Prescriptions     Pending Prescriptions Disp Refills    fluticasone (FLONASE) 50 MCG/ACT nasal spray [Pharmacy Med Name: FLUTICASONE 50MCG/ACT NASAL SPR]  0       Last Office Visit:   3/19/2025      Next Visit Date:  Future Appointments   Date Time Provider Department Center   5/29/2025  9:30 AM Elizabeth Feliciano MD [facility]       Statement Selected

## 2025-05-14 DIAGNOSIS — I50.32 CHRONIC DIASTOLIC CONGESTIVE HEART FAILURE, NYHA CLASS 2: ICD-10-CM

## 2025-05-14 DIAGNOSIS — N18.31 STAGE 3A CHRONIC KIDNEY DISEASE (MULTI): ICD-10-CM

## 2025-05-14 RX ORDER — FLUTICASONE PROPIONATE 50 MCG
2 SPRAY, SUSPENSION (ML) NASAL 2 TIMES DAILY
Qty: 3 EACH | Refills: 1 | Status: SHIPPED | OUTPATIENT
Start: 2025-05-14

## 2025-05-19 ENCOUNTER — APPOINTMENT (OUTPATIENT)
Dept: CARDIOLOGY | Facility: CLINIC | Age: 85
End: 2025-05-19
Payer: MEDICARE

## 2025-05-19 ENCOUNTER — OFFICE VISIT (OUTPATIENT)
Dept: CARDIOLOGY | Facility: CLINIC | Age: 85
End: 2025-05-19
Payer: MEDICARE

## 2025-05-19 VITALS
HEIGHT: 65 IN | HEART RATE: 63 BPM | WEIGHT: 214.6 LBS | SYSTOLIC BLOOD PRESSURE: 118 MMHG | DIASTOLIC BLOOD PRESSURE: 62 MMHG | BODY MASS INDEX: 35.75 KG/M2

## 2025-05-19 DIAGNOSIS — I48.11 LONGSTANDING PERSISTENT ATRIAL FIBRILLATION (MULTI): ICD-10-CM

## 2025-05-19 DIAGNOSIS — I47.20 SUSTAINED VENTRICULAR TACHYCARDIA (MULTI): ICD-10-CM

## 2025-05-19 DIAGNOSIS — Z95.810 AICD (AUTOMATIC CARDIOVERTER/DEFIBRILLATOR) PRESENT: ICD-10-CM

## 2025-05-19 DIAGNOSIS — I25.5 ISCHEMIC CARDIOMYOPATHY: ICD-10-CM

## 2025-05-19 DIAGNOSIS — I50.30 CHF NYHA CLASS III, UNSPECIFIED FAILURE CHRONICITY, DIASTOLIC (MULTI): ICD-10-CM

## 2025-05-19 DIAGNOSIS — I25.10 CORONARY ARTERY DISEASE INVOLVING NATIVE CORONARY ARTERY OF NATIVE HEART WITHOUT ANGINA PECTORIS: ICD-10-CM

## 2025-05-19 DIAGNOSIS — Z87.891 FORMER SMOKER: ICD-10-CM

## 2025-05-19 DIAGNOSIS — J44.9 CHRONIC OBSTRUCTIVE PULMONARY DISEASE, UNSPECIFIED COPD TYPE (HCC): ICD-10-CM

## 2025-05-19 DIAGNOSIS — I49.8 OTHER SPECIFIED CARDIAC ARRHYTHMIAS: ICD-10-CM

## 2025-05-19 DIAGNOSIS — I50.32 CHRONIC DIASTOLIC CONGESTIVE HEART FAILURE, NYHA CLASS 2: ICD-10-CM

## 2025-05-19 DIAGNOSIS — Z79.899 HIGH RISK MEDICATION USE: ICD-10-CM

## 2025-05-19 DIAGNOSIS — I48.0 PAROXYSMAL ATRIAL FIBRILLATION (MULTI): ICD-10-CM

## 2025-05-19 DIAGNOSIS — I47.20 PAROXYSMAL VT: Primary | ICD-10-CM

## 2025-05-19 PROCEDURE — 99215 OFFICE O/P EST HI 40 MIN: CPT | Performed by: INTERNAL MEDICINE

## 2025-05-19 PROCEDURE — 93283 PRGRMG EVAL IMPLANTABLE DFB: CPT | Performed by: INTERNAL MEDICINE

## 2025-05-19 PROCEDURE — 1159F MED LIST DOCD IN RCRD: CPT | Performed by: INTERNAL MEDICINE

## 2025-05-19 PROCEDURE — 3074F SYST BP LT 130 MM HG: CPT | Performed by: INTERNAL MEDICINE

## 2025-05-19 PROCEDURE — 3078F DIAST BP <80 MM HG: CPT | Performed by: INTERNAL MEDICINE

## 2025-05-19 RX ORDER — FLUTICASONE FUROATE, UMECLIDINIUM BROMIDE AND VILANTEROL TRIFENATATE 200; 62.5; 25 UG/1; UG/1; UG/1
1 POWDER RESPIRATORY (INHALATION) DAILY
Qty: 3 EACH | Refills: 1 | Status: SHIPPED | OUTPATIENT
Start: 2025-05-19

## 2025-05-19 NOTE — TELEPHONE ENCOUNTER
PT NEEDS 90 DAY SUPPLY FOR INSURANCE.    Rx requested:  Requested Prescriptions     Pending Prescriptions Disp Refills    fluticasone-umeclidin-vilant (TRELEGY ELLIPTA) 200-62.5-25 MCG/ACT AEPB inhaler 3 each 1     Sig: Inhale 1 puff into the lungs daily       Last Office Visit:   3/19/2025      Next Visit Date:  Future Appointments   Date Time Provider Department Center   5/29/2025  9:30 AM Elizabeth Feliciano MD [facility]

## 2025-05-19 NOTE — PATIENT INSTRUCTIONS
Follow up 6 weeks with [employer] rep present  Fasting labs to be done approximately 1 week prior to next appointment. We are a paperless office.  The order is in the computer and you can go to any  lab to have done. You can always ask for order to be printed if you'd like to go to outside lab.        DID YOU KNOW  We have a pharmacy here in the [facility].  They can fill all prescriptions, not just cardiac medications.  Prescriptions from other pharmacies can easily be transferred to the  pharmacy by the  pharmacist on site.   pharmacies offer FREE HOME DELIVERY on medications to anywhere in [place]. They can sync your medications. Typically prescriptions can be ready in 10 - 15 minutes. If pharmacy is unable to fill your  prescription or if cost is more than your paying now the Pharmacist can easily transfer back to your Pharmacy of choice. Pharmacy phone # [phone].     Please bring all medicines, vitamins, and herbal supplements with you in original bottles to every appointment!!!!    Prescriptions will not be filled unless you are compliant with your follow up appointments or have a follow up appointment scheduled as per instruction of your physician. Refills should be requested at the time of your visit.

## 2025-05-19 NOTE — PROGRESS NOTES
CARDIOLOGY OFFICE VISIT      CHIEF COMPLAINT  Chief Complaint   Patient presents with    Follow-up     Patient is being seen for a routine follow up        HISTORY OF PRESENT ILLNESS  HPI  85-year-old male  with a past medical history of coronary artery disease with normal  left ventricular function on echocardiogram in March 2008, congestive  heart failure New York Heart Association class 2, remote ventricular  fibrillation RVR status post single-chamber ICD implanted in 1994  with multiple generator change outs and also with an abdominal  implant. His device was upgraded to a dual-chamber ICD in the  prepectoral side due to evidence of atrial fibrillation. He was  placed on sotalol.     His echocardiogram performed in March 2020 showed normal left ventricular function value 55 to 60% with 1+ mitral regurgitation. Stress test at the same time, show a small area of jimmy-infarct ischemia in the posterior lateral area and also a small posterolateral myocardial infarction with a left ventricular ejection fraction 42%.     Looking at his records, patient was admitted in December 2021 for COPD exacerbation and apparently pneumonia. Since then patient has been using oxygen therapy at home. Covidâ€“19 was negative.     Patient had an a stress test in February 2021 that shows moderate inferior posterior lateral perfusion defect consistent with myocardial infarction with no evidence of ischemia with a left ventricular ejection fraction 45%.     February 2022 device interrogation, patient had an episode of atrial fibrillation that lasted approximately 15 hours. In the middle of these episodes, patient had episode of ventricular tachycardia (polymorphic) that triggered ICD charging but no ICD shock was delivered. Episode was aborted.     Patient had a cardiac catheterization in May 2022 that shows left main 30%, LAD diffusely diseased. Distal LAD 40%. Circumflex diffusely diseased with right coronary artery calcified. Medical  therapy was recommended.     He recalls having an episode of dizziness and syncope while walking to the bathroom in December 31, 2023 around 10 PM.  Device interrogation showed that the patient had an episode of atrial fibrillation with episodes of atrial fibrillation with rapid ventricular response or change intraventricular tachycardia rate of 288 bpm.  This episode was self terminated before device deliver an ICD shock.  Second episode was in January 2024 with an episode of atrial fibrillation that ended with ventricular tachycardia self terminated into atrial fibrillation again.     Patient still using sotalol therapy.     During the last office visit back in February 2024, we discussed the option of changing antiarrhythmic therapy.  Patient discontinue sotalol.  Patient had an EKG performed few days later with QT interval above 500 ms.  Patient did not receive the phone call to start amiodarone.  Patient presented to emergency department few weeks later complaining of palpitations and he was found to be in atrial fibrillation.  He saw general cardiology service recently (Dr. Hugh Perez) who put patient back on amiodarone 400 mg 3 times a day for a week and then 400 mg twice a day for a week and then 400 mg daily for 2 weeks and then 200 mg daily.     Patient was seen in my office in November 2024.  At time he was maintaining sinus rhythm.  No significant ventricular arrhythmias.  He was on amiodarone 100 mg daily.     Patient was admitted at [facility] ER in January 2025.  He presented to [facility] emergency department by EMS for fall. Reports  after eating pizza began to have nausea, vomiting, and diarrhea. Nausea and vomiting improved but had persistent diarrhea. Last night at approx 0130 slipped out of the bed when attempting to get up to the bathroom. Wife was able to assist back into the bed but this morning was unable to ambulate due to weakness prompting family to call EMS. Denies sick contacts and  no other family members with similar symptoms.      Patient has prolonged ICU course.  Bacteremia.  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  Infectious disease following.  Will require 6 weeks of IV Ancef.  Developed GI bleed.  Seen by gastroenterology.  Underwent emergent EGD.  Clipped a gastric ulcer.  Had blood loss anemia.  Required a total of 3 units PRBCs.  Hemoglobin stable.  Tolerating soft diet.  Continue 40 mg twice daily proton pump inhibitor.  Continue sucralfate 1 g 3 times daily for 8 weeks.  Resume anticoagulation 72 hours after EGD.     1/25/25  Addendum  Patient was appropriate for discharge  however became concerning about possible fungal infection in the ulcer.  To rule out perforation he underwent stat CT abdomen no acute.  Gastroenterology and infectious disease evaluation and discussion with pathology.  Noted that fungal elements are likely colonization.  However, to be sure, he will be treated with IV antifungal for 5 days.  He was readmitted in February 2025 at [facility] for diffuse rash in the lower extremities and also anasarca.  Questionable GI bleed.  There was a question for rash due to medications.  Cefazolin was changed to vancomycin.  Dermatology recommended skin biopsy and the patient was transferred to [facility] as requested.  He also had an endoscopy that shows a single ulcer in the fundus of the stomach reflecting maintenance.  Placed 3 clips successfully.  He was continued on PPI twice a day.  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  Both show FDG avidity on PET scan.  The discussion about removing the ICD lead was performed but this could not provide optimal source control due to inoperable aortic arch infection.  During this admission he developed acute respiratory distress requiring  MICU evaluation/pulmonary edema.  He responded very well with aggressive diuresis.  He was discharged home on diuretic  therapy.  He also received 2 units of PRBC.       PFT   April 2024, FEV1 91% FEV1/FVC 0.62, no significant response to bronchodilator  January 2022, FEV1 57%, FEV1/FVC 0.57  Echo done at  January 2024, EF 55%, diastolic dysfunction, RVSP 50-55.  And valvular heart disease        Patient had a device interrogation remotely 8/20/2025.  Shows dual-chamber ICD [employer] with battery longevity 3.5 years.  Had 2 episodes of atrial fibrillation 9 hours of duration.  Rate average 104 bpm.     Subsequent transmission in April 14, 2025 shows 1 episode of ventricular tachycardia on April 13, 2025 at 2:20 AM.  Rate 222 bpm.  ATP x 1 terminated the arrhythmia.     Subsequent transmission in April 7, 2025 shows 1 episode of ventricular tachycardia at 4:49 AM.  A rate of 225 bpm.  Terminated with ATP x 2.  Both episodes asymptomatic.    1/12/2025 PAOLO        1. The left ventricular systolic function is normal, with a visually estimated ejection fraction of 60-65%.   2. There is normal right ventricular global systolic function.   3. The left atrium is moderately dilated.   4. The right atrium is moderately dilated.   5. Moderate mitral valve regurgitation.   6. Moderate tricuspid regurgitation.   7. No left atrial thrombus.   8. No vegetations seen on the valvular structures or the pacemaker leads.   9. There is plaque visualized in the descending aorta.        2/19/2025 TTE ([facility])  - The left ventricle is normal in size. Left ventricular systolic function is   normal. EF = 58 ± 5% (2D biplane) Prior EF was reported as 57%.   - The right ventricle is normal in size. Right ventricular systolic function is   normal.   - The visualized aorta is dilated with a maximal dimension of 4.6 cm.   - There is moderate (2+) mitral holosystolic valve regurgitation.       Due to evidence of ventricular arrhythmias he was scheduled for a cardiac catheterization.  He was also reloaded with amiodarone therapy    Patient states that so far  he has been stable.  Denies any worsening shortness of breath.  No chest pain.        Cardiac cath 05/2025  Findings of the [facility] revealed patent previous stent in the LAD, mild diffuse disease elsewhere and an LVEF of 40%. Medical management is advised.  Device interrogation today during this evaluation shows 3 episodes of nonsustained ventricular tachycardia since the last device interrogation in April 2025.  No sustained arrhythmias.         Past Medical History  Medical History[1]    Social History  Social History[2]    Family History   Family History[3]     Allergies:  RX Allergies[4]     Outpatient Medications:  Current Outpatient Medications   Medication Instructions    albuterol (Proventil HFA) 90 mcg/actuation inhaler 1 puff, Every 4 hours PRN    amiodarone (Pacerone) 200 mg tablet Take 1 tablet twice daily for 7 days and then 1 tablet  once daily    atorvastatin (LIPITOR) 40 mg, oral, Daily    bumetanide (Bumex) 1 mg tablet 1 tablet daily.  Take 1 extra dose a day for 1 dose or as directed by phycian for weight gain of more than 5 lbs in 3 days    clopidogrel (PLAVIX) 75 mg, oral, Daily    docusate sodium (COLACE) 100 mg, 2 times daily    doxycycline (MONODOX) 100 mg, 2 times daily    ensifentrine (Ohtuvayre) 3 mg/2.5 mL suspension for nebulization Daily    ezetimibe (ZETIA) 10 mg, oral, Daily    isosorbide mononitrate ER (IMDUR) 30 mg, oral, Daily, 1 tablet daily    metoprolol succinate XL (TOPROL-XL) 100 mg, oral, Daily    montelukast (SINGULAIR) 10 mg, oral, Every evening    nitroglycerin (NITROSTAT) 0.4 mg, sublingual, Every 5 min PRN    oxygen (O2) 2 L/min, inhalation, Every 24 hours    polyethylene glycol (GLYCOLAX, MIRALAX) 17 g, Daily    spironolactone (ALDACTONE) 25 mg, oral, Daily    tamsulosin (FLOMAX) 0.4 mg, oral, Daily          REVIEW OF SYSTEMS  Review of Systems   All other systems reviewed and are negative.        VITALS  Vitals:    05/19/25 1152   BP: 118/62   Pulse: 63       PHYSICAL  EXAM  Constitutional:       Appearance: Healthy appearance. Not in distress.   Neck:      Vascular: No JVR. JVD normal.   Pulmonary:      Effort: Pulmonary effort is normal.      Breath sounds: Normal breath sounds. No wheezing. No rhonchi. No rales.   Chest:      Chest wall: Not tender to palpatation.   Cardiovascular:      PMI at left midclavicular line. Normal rate. Regular rhythm. Normal S1. Normal S2.       Murmurs: There is no murmur.      No gallop.  No click. No rub.      Comments: Device left pectoral area. No hematoma or infection noted.     Pulses:     Intact distal pulses.   Edema:     Peripheral edema absent.   Abdominal:      General: Bowel sounds are normal.      Palpations: Abdomen is soft.      Tenderness: There is no abdominal tenderness.   Musculoskeletal: Normal range of motion.         General: No tenderness. Skin:     General: Skin is warm and dry.   Neurological:      General: No focal deficit present.      Mental Status: Alert and oriented to person, place and time.           ASSESSMENT AND PLAN      CLINICAL IMPRESSION:   1. Status post cardiac arrest. Status post ICD therapy, device interrogation reviewed with patient during this evaluation  2. Ischemic cardiomyopathy. Stable  3. Congestive heart failure, New York Heart Association class II. Compensated  4. Persistent atrial fibrillation. Device interrogation reviewed during this evaluation with patient  5. High-risk medication.  Of sotalol therapy.  Now on amiodarone  6. Ventricular tachycardia. Recurrence of this arrhythmia by device interrogation by episode in February 2022. Also recurrent of ventricular tachycardia by device interrogation 6 April 2025 terminated with ATP. Plan discussed with patient during this office visit  7. Coronary artery disease with percutaneous coronary interventions  in the past.  Recent cardiac catheterization in May 2025.  Medical therapy was recommended.  8. Hypertension. Controlled  9. Hyperlipidemia.  Stable  10. Chronic obstructive pulmonary disease. No recurrence  11. Long-term anticoagulation therapy with Eliquis. No evidence of bleeding  12. Shortness of breath with recent hospitalization in December 2021 due to COPD exacerbationâ€“pneumonia, now with oxygen therapy followed by pulmonary service. Stable  13.  Syncope associated with ventricular arrhythmias.    Plan recommendations    I had a lengthy discussion with patient and family member regarding findings of the device interrogation.  Patient still has episodes of ventricular tachycardia, nonsustained, brief.  Will continue with observation.    Continue with amiodarone 200 mg 1 tablet daily.    CMP and TSH before next office visit.    Follow my office in 6 weeks or sooner if needed.    Follow device clinic as scheduled.    Get CMP and TSH every 6 months for high risk medication.    Get complete PFTs annually for high risk medication.    Risk factor modification and lifestyle modification discussed with patient. Diet , exercise and hydration discussed with patient.    I have personally review with patient during this office visit, laboratory data, echocardiogram results, stress test results, Holter-event monitor results prior and after the last electrophysiology visit. All questions has been answered.    Please excuse any errors in grammar or translation related to this dictation.  Voice recognition software was utilized to prepare this document.         I, Dr. Bassett, personally performed the services described in the documentation as scribed by the nurse in my presence, and confirm it is both accurate and complete.     Scribe Attestation  By signing my name below, I, Christina Benjamin LPN , Scribe   attest that this documentation has been prepared under the direction and in the presence of Joce Bassett MD         [1]   Past Medical History:  Diagnosis Date    Allergic     Arthritis     Cancer (Multi)     Cataract     CHF (congestive heart failure)     COPD  (chronic obstructive pulmonary disease) (Multi)     Encounter for follow-up examination after completed treatment for conditions other than malignant neoplasm 12/27/2021    Hospital discharge follow-up    Heart disease     Hypertension     Ischemic cardiomyopathy     Ischemic cardiomyopathy with implantable cardioverter-defibrillator (ICD)    Myocardial infarction (Multi)     Pain in unspecified hip     Joint pain, hip    Personal history of other diseases of the musculoskeletal system and connective tissue     History of low back pain    Personal history of other endocrine, nutritional and metabolic disease     History of hyperlipidemia    Personal history of other specified conditions 12/21/2021    History of elevated prostate specific antigen (PSA)    Presence of coronary angioplasty implant and graft     Stented coronary artery    Unspecified atrial flutter (Multi)     Paroxysmal atrial flutter   [2]   Social History  Tobacco Use    Smoking status: Former     Current packs/day: 1.50     Average packs/day: 1.5 packs/day for 15.0 years (22.5 ttl pk-yrs)     Types: Cigarettes     Passive exposure: Never    Smokeless tobacco: Never   Vaping Use    Vaping status: Never Used   Substance Use Topics    Alcohol use: Yes     Alcohol/week: 3.0 standard drinks of alcohol     Types: 3 Standard drinks or equivalent per week     Comment: occassional    Drug use: Never   [3]   Family History  Problem Relation Name Age of Onset    Liver disease Mother Kelle Jones     Arthritis Mother Kelle Jones     Coronary artery disease Father Guicho Jones     Atrial fibrillation Father Guicho Jones     Heart disease Father Guicho Jones     Cancer Sister Sally Jones    [4]   Allergies  Allergen Reactions    Levofloxacin Palpitations     Rapid Heart Rate - Severe per pt.    Cefazolin Rash     rash, purpuric, leucocytoclastic vasculitis

## 2025-05-28 LAB
ANION GAP SERPL CALCULATED.4IONS-SCNC: 10 MMOL/L (CALC) (ref 7–17)
BUN SERPL-MCNC: 34 MG/DL (ref 7–25)
BUN/CREAT SERPL: 24 (CALC) (ref 6–22)
CALCIUM SERPL-MCNC: 9.1 MG/DL (ref 8.6–10.3)
CHLORIDE SERPL-SCNC: 106 MMOL/L (ref 98–110)
CO2 SERPL-SCNC: 28 MMOL/L (ref 20–32)
CREAT SERPL-MCNC: 1.42 MG/DL (ref 0.7–1.22)
EGFRCR SERPLBLD CKD-EPI 2021: 48 ML/MIN/1.73M2
GLUCOSE SERPL-MCNC: 91 MG/DL (ref 65–99)
POTASSIUM SERPL-SCNC: 4.3 MMOL/L (ref 3.5–5.3)
SODIUM SERPL-SCNC: 144 MMOL/L (ref 135–146)

## 2025-05-29 ENCOUNTER — OFFICE VISIT (OUTPATIENT)
Age: 85
End: 2025-05-29
Payer: MEDICARE

## 2025-05-29 VITALS
BODY MASS INDEX: 34.81 KG/M2 | WEIGHT: 216.6 LBS | DIASTOLIC BLOOD PRESSURE: 60 MMHG | HEIGHT: 66 IN | SYSTOLIC BLOOD PRESSURE: 100 MMHG | HEART RATE: 60 BPM | TEMPERATURE: 98.3 F | RESPIRATION RATE: 18 BRPM | OXYGEN SATURATION: 95 %

## 2025-05-29 DIAGNOSIS — E66.09 CLASS 2 OBESITY DUE TO EXCESS CALORIES WITHOUT SERIOUS COMORBIDITY WITH BODY MASS INDEX (BMI) OF 36.0 TO 36.9 IN ADULT: ICD-10-CM

## 2025-05-29 DIAGNOSIS — Z79.899 ON AMIODARONE THERAPY: ICD-10-CM

## 2025-05-29 DIAGNOSIS — J90 PLEURAL EFFUSION: ICD-10-CM

## 2025-05-29 DIAGNOSIS — E66.812 CLASS 2 OBESITY DUE TO EXCESS CALORIES WITHOUT SERIOUS COMORBIDITY WITH BODY MASS INDEX (BMI) OF 36.0 TO 36.9 IN ADULT: ICD-10-CM

## 2025-05-29 DIAGNOSIS — J96.11 CHRONIC RESPIRATORY FAILURE WITH HYPOXIA AND HYPERCAPNIA (HCC): Primary | ICD-10-CM

## 2025-05-29 DIAGNOSIS — I51.89 DIASTOLIC DYSFUNCTION: ICD-10-CM

## 2025-05-29 DIAGNOSIS — J30.89 NON-SEASONAL ALLERGIC RHINITIS, UNSPECIFIED TRIGGER: ICD-10-CM

## 2025-05-29 DIAGNOSIS — R93.89 ABNORMAL CT OF THE CHEST: ICD-10-CM

## 2025-05-29 DIAGNOSIS — J44.9 CHRONIC OBSTRUCTIVE PULMONARY DISEASE, UNSPECIFIED COPD TYPE (HCC): ICD-10-CM

## 2025-05-29 DIAGNOSIS — J96.12 CHRONIC RESPIRATORY FAILURE WITH HYPOXIA AND HYPERCAPNIA (HCC): Primary | ICD-10-CM

## 2025-05-29 DIAGNOSIS — R91.8 LUNG NODULES: ICD-10-CM

## 2025-05-29 PROCEDURE — 99214 OFFICE O/P EST MOD 30 MIN: CPT | Performed by: INTERNAL MEDICINE

## 2025-05-29 PROCEDURE — 1159F MED LIST DOCD IN RCRD: CPT | Performed by: INTERNAL MEDICINE

## 2025-05-29 PROCEDURE — 1123F ACP DISCUSS/DSCN MKR DOCD: CPT | Performed by: INTERNAL MEDICINE

## 2025-05-29 PROCEDURE — 1036F TOBACCO NON-USER: CPT | Performed by: INTERNAL MEDICINE

## 2025-05-29 PROCEDURE — 3023F SPIROM DOC REV: CPT | Performed by: INTERNAL MEDICINE

## 2025-05-29 PROCEDURE — G8417 CALC BMI ABV UP PARAM F/U: HCPCS | Performed by: INTERNAL MEDICINE

## 2025-05-29 PROCEDURE — G8427 DOCREV CUR MEDS BY ELIG CLIN: HCPCS | Performed by: INTERNAL MEDICINE

## 2025-05-29 RX ORDER — POLYETHYLENE GLYCOL 3350 17 G/17G
17 POWDER, FOR SOLUTION ORAL DAILY
COMMUNITY

## 2025-05-29 RX ORDER — CLOPIDOGREL BISULFATE 75 MG/1
75 TABLET ORAL DAILY
COMMUNITY
Start: 2025-03-31 | End: 2026-03-31

## 2025-05-29 RX ORDER — BUMETANIDE 1 MG/1
1 TABLET ORAL DAILY
COMMUNITY

## 2025-05-29 RX ORDER — IPRATROPIUM BROMIDE AND ALBUTEROL SULFATE 2.5; .5 MG/3ML; MG/3ML
1 SOLUTION RESPIRATORY (INHALATION) EVERY 4 HOURS
Qty: 360 ML | Refills: 3 | Status: SHIPPED | OUTPATIENT
Start: 2025-05-29

## 2025-05-29 RX ORDER — FLUTICASONE PROPIONATE 50 MCG
2 SPRAY, SUSPENSION (ML) NASAL 2 TIMES DAILY
Qty: 3 EACH | Refills: 1 | Status: SHIPPED | OUTPATIENT
Start: 2025-05-29

## 2025-05-29 RX ORDER — DUPILUMAB 300 MG/2ML
300 INJECTION, SOLUTION SUBCUTANEOUS ONCE
Qty: 2 ML | Refills: 0 | Status: SHIPPED | OUTPATIENT
Start: 2025-05-29 | End: 2025-05-29 | Stop reason: SDUPTHER

## 2025-05-29 RX ORDER — DOXYCYCLINE 100 MG/1
100 CAPSULE ORAL 2 TIMES DAILY
COMMUNITY
Start: 2025-03-18

## 2025-05-29 RX ORDER — PSEUDOEPHEDRINE HCL 30 MG
100 TABLET ORAL 2 TIMES DAILY
COMMUNITY
Start: 2025-02-03

## 2025-05-29 NOTE — PROGRESS NOTES
Subjective:     Albert Eastman is a 85 y.o. male who complains today of:     Chief Complaint   Patient presents with    Follow-up     10 Week F/U for COPD, Chronic respiratory failure with hypoxia and hypercapnia and Lung Nodule       HPI  Patient presents for COPD and chronic respiratory failure      5/29/2025  Presents for follow-up, still has dyspnea on exertion, he was recently hospitalized at  for pneumonia, no chest pain, no heartburn, weight is stable, no nasal congestion or postnasal drip and no lower extremity edema.  He tried Dr. Kaur did not work for him    10/28/2024  Overall doing good, able to do his daily activities with no issues, no chest pain, no coughing, mild shortness of breath on exertion, no lower extremity edema, no heartburn.  Weight is stable, no nasal congestion postnasal drip  He uses Linda currently, he feels it is pushes too much air with large tidal volumes.  He does not like his current mask.        4/23/2024  Presents for evaluation of COPD, he is doing good, symptoms controlled, he has dyspnea on significant exertion, able to do his daily activities, main limitation is from hip pain and back pain.  No lower extremity edema, he has nose congestion and runny nose almost throughout the day, he is on Flonase azelastine and Singulair for that.  No chest pain, no heartburn, weight is stable, he uses trilogy every night while asleep, with 2 L O2 bleed.    2/20/2024  Presents for follow-up, overall doing good, at his baseline, however continues to have dyspnea on exertion, limiting his daily activities, no lower extremity edema, no chest pain, he uses trilogy while asleep with 2 L O2 bleed, and compliant with treatment, he is on Trelegy and compliant, has history of A-fib and follows up with cardiology, history of abdominal aortic aneurysm and he does not wish to continue monitoring.  No chest pain, no nasal congestion or postnasal drip, no heartburn, his weight is

## 2025-05-29 NOTE — TELEPHONE ENCOUNTER
Needs to be for 2 pens    Rx requested:  Requested Prescriptions      No prescriptions requested or ordered in this encounter       Last Office Visit:   5/29/2025      Next Visit Date:  Future Appointments   Date Time Provider Department Center   7/10/2025  9:30 AM Elizabeth Feliciano MD [facility]

## 2025-05-30 RX ORDER — DUPILUMAB 300 MG/2ML
300 INJECTION, SOLUTION SUBCUTANEOUS
Qty: 4 ML | Refills: 5 | Status: SHIPPED | OUTPATIENT
Start: 2025-05-30

## 2025-05-30 RX ORDER — FLUTICASONE FUROATE, UMECLIDINIUM BROMIDE AND VILANTEROL TRIFENATATE 100; 62.5; 25 UG/1; UG/1; UG/1
1 POWDER RESPIRATORY (INHALATION) DAILY
Qty: 1 EACH | Refills: 0 | COMMUNITY
Start: 2025-05-30

## 2025-05-31 ENCOUNTER — APPOINTMENT (OUTPATIENT)
Dept: CARDIOLOGY | Facility: HOSPITAL | Age: 85
End: 2025-05-31
Payer: MEDICARE

## 2025-05-31 ENCOUNTER — APPOINTMENT (OUTPATIENT)
Dept: RADIOLOGY | Facility: HOSPITAL | Age: 85
End: 2025-05-31
Payer: MEDICARE

## 2025-05-31 ENCOUNTER — HOSPITAL ENCOUNTER (OUTPATIENT)
Facility: HOSPITAL | Age: 85
Setting detail: OBSERVATION
Discharge: LEFT BEFORE DISCHARGE | End: 2025-05-31
Attending: STUDENT IN AN ORGANIZED HEALTH CARE EDUCATION/TRAINING PROGRAM | Admitting: INTERNAL MEDICINE
Payer: MEDICARE

## 2025-05-31 VITALS
HEART RATE: 60 BPM | TEMPERATURE: 97.5 F | WEIGHT: 212 LBS | RESPIRATION RATE: 16 BRPM | HEIGHT: 66 IN | SYSTOLIC BLOOD PRESSURE: 149 MMHG | OXYGEN SATURATION: 99 % | BODY MASS INDEX: 34.07 KG/M2 | DIASTOLIC BLOOD PRESSURE: 78 MMHG

## 2025-05-31 DIAGNOSIS — G45.9 TIA (TRANSIENT ISCHEMIC ATTACK): Primary | ICD-10-CM

## 2025-05-31 DIAGNOSIS — I63.81 OTHER CEREBRAL INFARCTION DUE TO OCCLUSION OR STENOSIS OF SMALL ARTERY: ICD-10-CM

## 2025-05-31 LAB
ALBUMIN SERPL BCP-MCNC: 3.5 G/DL (ref 3.4–5)
ALP SERPL-CCNC: 76 U/L (ref 33–136)
ALT SERPL W P-5'-P-CCNC: 10 U/L (ref 10–52)
ANION GAP SERPL CALC-SCNC: 11 MMOL/L (ref 10–20)
APTT PPP: 30 SECONDS (ref 26–36)
AST SERPL W P-5'-P-CCNC: 14 U/L (ref 9–39)
BASOPHILS # BLD AUTO: 0.03 X10*3/UL (ref 0–0.1)
BASOPHILS NFR BLD AUTO: 0.4 %
BILIRUB SERPL-MCNC: 0.5 MG/DL (ref 0–1.2)
BUN SERPL-MCNC: 31 MG/DL (ref 6–23)
CALCIUM SERPL-MCNC: 8.8 MG/DL (ref 8.6–10.3)
CARDIAC TROPONIN I PNL SERPL HS: 13 NG/L (ref 0–20)
CHLORIDE SERPL-SCNC: 108 MMOL/L (ref 98–107)
CO2 SERPL-SCNC: 27 MMOL/L (ref 21–32)
CREAT SERPL-MCNC: 1.58 MG/DL (ref 0.5–1.3)
EGFRCR SERPLBLD CKD-EPI 2021: 42 ML/MIN/1.73M*2
EOSINOPHIL # BLD AUTO: 0.12 X10*3/UL (ref 0–0.4)
EOSINOPHIL NFR BLD AUTO: 1.5 %
ERYTHROCYTE [DISTWIDTH] IN BLOOD BY AUTOMATED COUNT: 17.1 % (ref 11.5–14.5)
GLUCOSE BLD MANUAL STRIP-MCNC: 120 MG/DL (ref 74–99)
GLUCOSE SERPL-MCNC: 105 MG/DL (ref 74–99)
HCT VFR BLD AUTO: 33.3 % (ref 41–52)
HGB BLD-MCNC: 10.4 G/DL (ref 13.5–17.5)
IMM GRANULOCYTES # BLD AUTO: 0.04 X10*3/UL (ref 0–0.5)
IMM GRANULOCYTES NFR BLD AUTO: 0.5 % (ref 0–0.9)
INR PPP: 1.1 (ref 0.9–1.1)
LYMPHOCYTES # BLD AUTO: 1.39 X10*3/UL (ref 0.8–3)
LYMPHOCYTES NFR BLD AUTO: 17.3 %
MCH RBC QN AUTO: 30.1 PG (ref 26–34)
MCHC RBC AUTO-ENTMCNC: 31.2 G/DL (ref 32–36)
MCV RBC AUTO: 96 FL (ref 80–100)
MONOCYTES # BLD AUTO: 0.81 X10*3/UL (ref 0.05–0.8)
MONOCYTES NFR BLD AUTO: 10.1 %
NEUTROPHILS # BLD AUTO: 5.65 X10*3/UL (ref 1.6–5.5)
NEUTROPHILS NFR BLD AUTO: 70.2 %
NRBC BLD-RTO: 0 /100 WBCS (ref 0–0)
PLATELET # BLD AUTO: 150 X10*3/UL (ref 150–450)
POTASSIUM SERPL-SCNC: 4 MMOL/L (ref 3.5–5.3)
PROT SERPL-MCNC: 6 G/DL (ref 6.4–8.2)
PROTHROMBIN TIME: 11.7 SECONDS (ref 9.8–12.4)
RBC # BLD AUTO: 3.46 X10*6/UL (ref 4.5–5.9)
SODIUM SERPL-SCNC: 142 MMOL/L (ref 136–145)
TSH SERPL-ACNC: 1.87 MIU/L (ref 0.44–3.98)
WBC # BLD AUTO: 8 X10*3/UL (ref 4.4–11.3)

## 2025-05-31 PROCEDURE — 70498 CT ANGIOGRAPHY NECK: CPT

## 2025-05-31 PROCEDURE — 2550000001 HC RX 255 CONTRASTS: Performed by: STUDENT IN AN ORGANIZED HEALTH CARE EDUCATION/TRAINING PROGRAM

## 2025-05-31 PROCEDURE — 80061 LIPID PANEL: CPT | Performed by: NURSE PRACTITIONER

## 2025-05-31 PROCEDURE — 80053 COMPREHEN METABOLIC PANEL: CPT | Performed by: STUDENT IN AN ORGANIZED HEALTH CARE EDUCATION/TRAINING PROGRAM

## 2025-05-31 PROCEDURE — 93005 ELECTROCARDIOGRAM TRACING: CPT

## 2025-05-31 PROCEDURE — 85730 THROMBOPLASTIN TIME PARTIAL: CPT | Performed by: STUDENT IN AN ORGANIZED HEALTH CARE EDUCATION/TRAINING PROGRAM

## 2025-05-31 PROCEDURE — 99285 EMERGENCY DEPT VISIT HI MDM: CPT | Mod: 25 | Performed by: STUDENT IN AN ORGANIZED HEALTH CARE EDUCATION/TRAINING PROGRAM

## 2025-05-31 PROCEDURE — 70450 CT HEAD/BRAIN W/O DYE: CPT | Mod: 59

## 2025-05-31 PROCEDURE — 0042T CT BRAIN ATTACK PERFUSION W IV CONTRAST: CPT

## 2025-05-31 PROCEDURE — 82947 ASSAY GLUCOSE BLOOD QUANT: CPT | Mod: 59

## 2025-05-31 PROCEDURE — 84443 ASSAY THYROID STIM HORMONE: CPT | Performed by: NURSE PRACTITIONER

## 2025-05-31 PROCEDURE — 83036 HEMOGLOBIN GLYCOSYLATED A1C: CPT | Mod: ELYLAB | Performed by: NURSE PRACTITIONER

## 2025-05-31 PROCEDURE — 70450 CT HEAD/BRAIN W/O DYE: CPT | Performed by: RADIOLOGY

## 2025-05-31 PROCEDURE — 36415 COLL VENOUS BLD VENIPUNCTURE: CPT | Performed by: STUDENT IN AN ORGANIZED HEALTH CARE EDUCATION/TRAINING PROGRAM

## 2025-05-31 PROCEDURE — 70496 CT ANGIOGRAPHY HEAD: CPT | Performed by: RADIOLOGY

## 2025-05-31 PROCEDURE — 70498 CT ANGIOGRAPHY NECK: CPT | Performed by: RADIOLOGY

## 2025-05-31 PROCEDURE — 85025 COMPLETE CBC W/AUTO DIFF WBC: CPT | Performed by: STUDENT IN AN ORGANIZED HEALTH CARE EDUCATION/TRAINING PROGRAM

## 2025-05-31 PROCEDURE — 85610 PROTHROMBIN TIME: CPT | Performed by: STUDENT IN AN ORGANIZED HEALTH CARE EDUCATION/TRAINING PROGRAM

## 2025-05-31 PROCEDURE — 84484 ASSAY OF TROPONIN QUANT: CPT | Performed by: STUDENT IN AN ORGANIZED HEALTH CARE EDUCATION/TRAINING PROGRAM

## 2025-05-31 PROCEDURE — G0427 INPT/ED TELECONSULT70: HCPCS | Performed by: PSYCHIATRY & NEUROLOGY

## 2025-05-31 PROCEDURE — G0378 HOSPITAL OBSERVATION PER HR: HCPCS

## 2025-05-31 RX ORDER — ALUMINUM HYDROXIDE, MAGNESIUM HYDROXIDE, AND SIMETHICONE 1200; 120; 1200 MG/30ML; MG/30ML; MG/30ML
30 SUSPENSION ORAL EVERY 6 HOURS PRN
Status: CANCELLED | OUTPATIENT
Start: 2025-05-31

## 2025-05-31 RX ORDER — TAMSULOSIN HYDROCHLORIDE 0.4 MG/1
0.4 CAPSULE ORAL DAILY
Status: CANCELLED | OUTPATIENT
Start: 2025-06-01

## 2025-05-31 RX ORDER — ACETAMINOPHEN 160 MG/5ML
650 SOLUTION ORAL EVERY 4 HOURS PRN
Status: CANCELLED | OUTPATIENT
Start: 2025-05-31

## 2025-05-31 RX ORDER — GUAIFENESIN 600 MG/1
600 TABLET, EXTENDED RELEASE ORAL EVERY 12 HOURS PRN
Status: CANCELLED | OUTPATIENT
Start: 2025-05-31

## 2025-05-31 RX ORDER — PANTOPRAZOLE SODIUM 40 MG/10ML
40 INJECTION, POWDER, LYOPHILIZED, FOR SOLUTION INTRAVENOUS
Status: CANCELLED | OUTPATIENT
Start: 2025-06-01

## 2025-05-31 RX ORDER — ONDANSETRON HYDROCHLORIDE 2 MG/ML
4 INJECTION, SOLUTION INTRAVENOUS EVERY 8 HOURS PRN
Status: CANCELLED | OUTPATIENT
Start: 2025-05-31

## 2025-05-31 RX ORDER — DEXTROSE 50 % IN WATER (D50W) INTRAVENOUS SYRINGE
12.5
Status: CANCELLED | OUTPATIENT
Start: 2025-05-31

## 2025-05-31 RX ORDER — ALBUTEROL SULFATE 90 UG/1
1 INHALANT RESPIRATORY (INHALATION) EVERY 4 HOURS PRN
Status: CANCELLED | OUTPATIENT
Start: 2025-05-31

## 2025-05-31 RX ORDER — GUAIFENESIN/DEXTROMETHORPHAN 100-10MG/5
5 SYRUP ORAL EVERY 4 HOURS PRN
Status: CANCELLED | OUTPATIENT
Start: 2025-05-31

## 2025-05-31 RX ORDER — CLOPIDOGREL BISULFATE 75 MG/1
75 TABLET ORAL DAILY
Status: CANCELLED | OUTPATIENT
Start: 2025-06-01

## 2025-05-31 RX ORDER — ACETAMINOPHEN 325 MG/1
650 TABLET ORAL EVERY 4 HOURS PRN
Status: CANCELLED | OUTPATIENT
Start: 2025-05-31

## 2025-05-31 RX ORDER — BUMETANIDE 1 MG/1
1 TABLET ORAL DAILY
Status: CANCELLED | OUTPATIENT
Start: 2025-06-01

## 2025-05-31 RX ORDER — DEXTROSE 50 % IN WATER (D50W) INTRAVENOUS SYRINGE
25
Status: CANCELLED | OUTPATIENT
Start: 2025-05-31

## 2025-05-31 RX ORDER — DOCUSATE SODIUM 100 MG/1
100 CAPSULE, LIQUID FILLED ORAL 2 TIMES DAILY
Status: CANCELLED | OUTPATIENT
Start: 2025-05-31

## 2025-05-31 RX ORDER — MONTELUKAST SODIUM 10 MG/1
10 TABLET ORAL EVERY EVENING
Status: CANCELLED | OUTPATIENT
Start: 2025-05-31

## 2025-05-31 RX ORDER — ACETAMINOPHEN 650 MG/1
650 SUPPOSITORY RECTAL EVERY 4 HOURS PRN
Status: CANCELLED | OUTPATIENT
Start: 2025-05-31

## 2025-05-31 RX ORDER — NITROGLYCERIN 0.4 MG/1
0.4 TABLET SUBLINGUAL EVERY 5 MIN PRN
Status: CANCELLED | OUTPATIENT
Start: 2025-05-31

## 2025-05-31 RX ORDER — METOPROLOL SUCCINATE 50 MG/1
100 TABLET, EXTENDED RELEASE ORAL DAILY
Status: CANCELLED | OUTPATIENT
Start: 2025-06-01

## 2025-05-31 RX ORDER — ONDANSETRON 4 MG/1
4 TABLET, ORALLY DISINTEGRATING ORAL EVERY 8 HOURS PRN
Status: CANCELLED | OUTPATIENT
Start: 2025-05-31

## 2025-05-31 RX ORDER — TALC
3 POWDER (GRAM) TOPICAL NIGHTLY PRN
Status: CANCELLED | OUTPATIENT
Start: 2025-05-31

## 2025-05-31 RX ORDER — IPRATROPIUM BROMIDE AND ALBUTEROL SULFATE 2.5; .5 MG/3ML; MG/3ML
3 SOLUTION RESPIRATORY (INHALATION)
Status: CANCELLED | OUTPATIENT
Start: 2025-06-01

## 2025-05-31 RX ORDER — SPIRONOLACTONE 25 MG/1
25 TABLET ORAL DAILY
Status: CANCELLED | OUTPATIENT
Start: 2025-06-01

## 2025-05-31 RX ORDER — PANTOPRAZOLE SODIUM 40 MG/1
40 TABLET, DELAYED RELEASE ORAL
Status: CANCELLED | OUTPATIENT
Start: 2025-06-01

## 2025-05-31 RX ORDER — SODIUM CHLORIDE 9 MG/ML
75 INJECTION, SOLUTION INTRAVENOUS CONTINUOUS
Status: CANCELLED | OUTPATIENT
Start: 2025-05-31 | End: 2025-06-01

## 2025-05-31 RX ORDER — POLYETHYLENE GLYCOL 3350 17 G/17G
17 POWDER, FOR SOLUTION ORAL DAILY
Status: CANCELLED | OUTPATIENT
Start: 2025-06-01

## 2025-05-31 RX ORDER — ISOSORBIDE MONONITRATE 30 MG/1
30 TABLET, EXTENDED RELEASE ORAL DAILY
Status: CANCELLED | OUTPATIENT
Start: 2025-06-01

## 2025-05-31 RX ORDER — AMIODARONE HYDROCHLORIDE 200 MG/1
200 TABLET ORAL DAILY
Status: CANCELLED | OUTPATIENT
Start: 2025-06-01

## 2025-05-31 RX ORDER — ATORVASTATIN CALCIUM 20 MG/1
40 TABLET, FILM COATED ORAL DAILY
Status: CANCELLED | OUTPATIENT
Start: 2025-06-01

## 2025-05-31 RX ADMIN — IOHEXOL 50 ML: 350 INJECTION, SOLUTION INTRAVENOUS at 18:31

## 2025-05-31 RX ADMIN — IOHEXOL 70 ML: 350 INJECTION, SOLUTION INTRAVENOUS at 18:33

## 2025-05-31 ASSESSMENT — PAIN SCALES - GENERAL
PAINLEVEL_OUTOF10: 0 - NO PAIN

## 2025-05-31 ASSESSMENT — LIFESTYLE VARIABLES
EVER HAD A DRINK FIRST THING IN THE MORNING TO STEADY YOUR NERVES TO GET RID OF A HANGOVER: NO
TOTAL SCORE: 0
EVER FELT BAD OR GUILTY ABOUT YOUR DRINKING: NO
HAVE YOU EVER FELT YOU SHOULD CUT DOWN ON YOUR DRINKING: NO
HAVE PEOPLE ANNOYED YOU BY CRITICIZING YOUR DRINKING: NO

## 2025-05-31 ASSESSMENT — PAIN - FUNCTIONAL ASSESSMENT: PAIN_FUNCTIONAL_ASSESSMENT: 0-10

## 2025-05-31 NOTE — ED PROVIDER NOTES
History/Exam limitations: none.   Additional history was obtained from patient.    HPI:       Guicho Jones is a 85 y.o. with a history of COPD, chronic respiratory failure on supplemental O2 at baseline, CAD, CKD, CHF, VT, prior AICD placement, persistent A-fib.  Patient no longer on anticoagulation due to prior GI bleeding.  Currently on aspirin and Plavix.  Patient reports that he was holding his portable oxygen device in his left hand when he felt weak and dropped it.  States that it felt numb and tingling.  Symptoms were waxing and waning for period of time then became constant about 30 minutes prior to arrival.  Denies any speech changes, vision changes, facial droop.     Last Known Well Time: 1600        ------------------------------------------------------------------------------------------------------------------------------------------     Physical Exam:    ED Triage Vitals   Temp Pulse Resp BP   -- -- -- --      SpO2 Temp src Heart Rate Source Patient Position   -- -- -- --      BP Location FiO2 (%)     -- --          Gen: Not in acute distress  Head/Neck: NCAT  Eyes: Anicteric sclerae, noninjected conjunctivae  Nose: No rhinorrhea  Mouth:  MMM  Heart: Regular rate and rhythm w/ no murmurs, rubs, gallops  Lungs: CTAB w/o wheezes, rales, rhonchi, no increased work of breathing  Abdomen: Soft, NT/ND  Musculoskeletal: No deformities  Extremities: No edema.  Neurologic: Please see below for NIHSS  Skin: No rashes noted  Psychological: Calm        Interval: Baseline  Time: 10:55 PM  Person Administering Scale: Trell Holm MD     1a  Level of consciousness: 0=alert; keenly responsive   1b. LOC questions:  0=Performs both tasks correctly   1c. LOC commands: 0=Performs both tasks correctly   2.  Best Gaze: 0=normal   3. Visual: 0=No visual loss   4. Facial Palsy: 0=Normal symmetric movement   5a. Motor left arm: 1=Drift, limb holds 90 (or 45) degrees but drifts down before full 10 seconds: does not  hit bed   5b.  Motor right arm: 0=No drift, limb holds 90 (or 45) degrees for full 10 seconds   6a. motor left le=No drift, limb holds 90 (or 45) degrees for full 10 seconds   6b  Motor right le=No drift, limb holds 90 (or 45) degrees for full 10 seconds   7. Limb Ataxia: 0=Absent   8.  Sensory: 1=Mild to moderate sensory loss; patient feels pinprick is less sharp or is dull on the affected side; there is a loss of superficial pain with pinprick but patient is aware He is being touched   9. Best Language:  0=No aphasia, normal   10. Dysarthria: 0=Normal   11. Extinction and Inattention: 0=No abnormality     Total:   2         VAN: VAN: Negative     ------------------------------------------------------------------------------------------------------------------------------------------     Medical Decision Making:     ED Course as of 25   Sat May 31, 2025   1702 Twelve-lead ECG obtained at 1700 by my interpretation demonstrates atrial paced rhythm with a rate of 60, no STEMI [MK]   1742 Labs reviewed.  CBC without leukocytosis, baseline H&H.  Metabolic panel with baseline CKD.  Normal troponin.  Glucose 120.  Normal coags. [MK]      ED Course User Index  [MK] Trell Holm MD         Diagnoses as of 25   TIA (transient ischemic attack)             Independent Interpretation of Studies: I independently interpreted the CT head and see No obvious evidence of intracranial hemorrhage        Discussion of Management with Other Providers:   I discussed the patient/results with: stroke neurology and the admitting team    Patient initially presented with left arm weakness and numbness.  NIH stroke scale of 2.  Patient was taken emergently for CT imaging of the head which was negative for intracranial hemorrhage or other acute findings.  Case was discussed with stroke neurologist at [facility] over the phone who recommended obtaining CTA and perfusion due to intermittent nature and high risk  for possible thrombus due to underlying A-fib without anticoagulation.  After patient returned from advanced imaging, NIH stroke scale was 0.  Was evaluated by stroke neurology over telehealth.  Recommended admission for further TIA workup.  Recommended discussing risks and benefits of anticoagulation as his prior ulcer was clipped and had bleeding on Xarelto.  Recommended considering Eliquis due to lower bleeding risk profile.     After discussing hospitalization with admitting team, patient changes mind and did not want to stay overnight for further workup and monitoring.  Discussed the risks with the patient who expressed understanding.  Patient reports follow-up plans with cardiology and primary care.  Discussed reasons to return.  Patient wishes to leave AMA.  He appeared rational, alert, appropriate, and exhibited no signs/symptoms of psychosis or suicidal ideation.  Patient has the capacity to make this medical decision.  Patient was aware of his suspected diagnosis as suggested by the initial screening exam and acknowledged their understanding for my recommendations (hospitalization, transfer, further testing, medical treatment).  Risks of refusal of recommended care were disclosed to the patient including, but not limited to death, permanent mental or physical impairment, loss of current lifestyle or paralysis.  Welcomed to return to the ED at any time regardless of their ability to pay and was provided follow up instructions.  The patient will leave AMA at this time.         IV Thrombolysis IV Thrombolysis Checklist        IV Thrombolysis Given: No; Thrombolysis contraindication reason: Neurologic signs are mild and judged to be non-disabling and Neurologic signs have spontaneously resolved         Procedure  Procedures          Trell Holm MD  05/31/25 2218       Trell Holm MD  05/31/25 2213       Trell Holm MD  05/31/25 0575

## 2025-06-01 LAB
ATRIAL RATE: 54 BPM
CHOLEST SERPL-MCNC: 105 MG/DL (ref 0–199)
CHOLESTEROL/HDL RATIO: 2.2
EST. AVERAGE GLUCOSE BLD GHB EST-MCNC: 103 MG/DL
HBA1C MFR BLD: 5.2 % (ref ?–5.7)
HDLC SERPL-MCNC: 47.1 MG/DL
LDLC SERPL CALC-MCNC: 28 MG/DL
NON HDL CHOLESTEROL: 58 MG/DL (ref 0–149)
P OFFSET: 138 MS
P ONSET: 118 MS
Q ONSET: 219 MS
QRS COUNT: 10 BEATS
QRS DURATION: 124 MS
QT INTERVAL: 506 MS
QTC CALCULATION(BAZETT): 506 MS
QTC FREDERICIA: 506 MS
R AXIS: -16 DEGREES
T AXIS: 32 DEGREES
T OFFSET: 472 MS
TRIGL SERPL-MCNC: 151 MG/DL (ref 0–149)
VENTRICULAR RATE: 60 BPM
VLDL: 30 MG/DL (ref 0–40)

## 2025-06-01 NOTE — CONSULTS
"Inpatient consult to Neuro TeleStroke  Consult performed by: Ellie Powell MD  Consult ordered by: Trell Holm MD      Virtual Visit start time: provider in video 8:21 pm  Patient had been in CT prior to that.    History Of Present Illness:  Historian: Patient, ED Provider , and chart  Guicho Jones \"Doroteo\" is a 85 y.o. male with a history of Atrial fibrillation not anticoagulated due to a GI Bleed.  Per patient that occurred while on Xarelto and in the setting of Sepsis.  He had a bleeding ulcer clipped at that time.  Today he is presenting with transient loss of left hand/arm strength.  He was carrying his oxygen at 4pm when he suddenly dropped it.  His arm was limp.  In the ED he initially had a minimal drift but when I saw him he felt completely back to normal.  He did have one additional episode in the ED where the arm was briefly weak again.  Following the initial event he took a total of 4 baby aspirin (already on plavix).    Last known well: 4 pm  Had stroke symptoms resolved at time of presentation: No, but have now.  Current antiplatelet/anticoagulant use: Clopidogrel, and took aspirin today before coming in.      Stroke Risk Factors:  Atrial Fibrillation and H/O CAD    Last Recorded Vitals:  Blood pressure 134/60, pulse 60, temperature 36.6 °C (97.9 °F), temperature source Temporal, resp. rate 18, height 1.676 m (5' 6\"), weight 96.2 kg (212 lb), SpO2 96%.    EXAM:  Patient is alert, attentive with normal language, orientation and speech.  Extraocular eye movements are intact without nystagmus.  Sensation is intact to patient light touch V1-3.  Face is symmetric.  Motor: There is no drift, no orbiting and intact fine finger movements. Sensation is intact to light touch on the arms and legs bilaterally.  Finger nose finger shows no ataxia.      NIHSS   1A. Level of Consciousness: 0  1B. Ask Month and Age: 0  1C. Blink Eyes & Squeeze Hands: 0  2. Best Gaze: 0  3. Visual: 0  4. Facial Palsy: " 0  5A. Motor - Left Arm: 0  5B. Motor - Right Arm: 0  6A. Motor - Left Le  6B. Motor - Right Le  7. Limb Ataxia: 0  8. Sensory Loss: 0  9. Best Language: 0  10. Dysarthria: 0  11. Extinction and Inattention: 0  NIH Stroke Scale: 0       Relevant Results:  LABS:  Glucose   Date Value Ref Range Status   2025 105 (H) 74 - 99 mg/dL Final     INR   Date Value Ref Range Status   2025 1.1 0.9 - 1.1 Final      Results for orders placed or performed during the hospital encounter of 25 (from the past 24 hours)   CBC and Auto Differential   Result Value Ref Range    WBC 8.0 4.4 - 11.3 x10*3/uL    nRBC 0.0 0.0 - 0.0 /100 WBCs    RBC 3.46 (L) 4.50 - 5.90 x10*6/uL    Hemoglobin 10.4 (L) 13.5 - 17.5 g/dL    Hematocrit 33.3 (L) 41.0 - 52.0 %    MCV 96 80 - 100 fL    MCH 30.1 26.0 - 34.0 pg    MCHC 31.2 (L) 32.0 - 36.0 g/dL    RDW 17.1 (H) 11.5 - 14.5 %    Platelets 150 150 - 450 x10*3/uL    Neutrophils % 70.2 40.0 - 80.0 %    Immature Granulocytes %, Automated 0.5 0.0 - 0.9 %    Lymphocytes % 17.3 13.0 - 44.0 %    Monocytes % 10.1 2.0 - 10.0 %    Eosinophils % 1.5 0.0 - 6.0 %    Basophils % 0.4 0.0 - 2.0 %    Neutrophils Absolute 5.65 (H) 1.60 - 5.50 x10*3/uL    Immature Granulocytes Absolute, Automated 0.04 0.00 - 0.50 x10*3/uL    Lymphocytes Absolute 1.39 0.80 - 3.00 x10*3/uL    Monocytes Absolute 0.81 (H) 0.05 - 0.80 x10*3/uL    Eosinophils Absolute 0.12 0.00 - 0.40 x10*3/uL    Basophils Absolute 0.03 0.00 - 0.10 x10*3/uL   Comprehensive metabolic panel   Result Value Ref Range    Glucose 105 (H) 74 - 99 mg/dL    Sodium 142 136 - 145 mmol/L    Potassium 4.0 3.5 - 5.3 mmol/L    Chloride 108 (H) 98 - 107 mmol/L    Bicarbonate 27 21 - 32 mmol/L    Anion Gap 11 10 - 20 mmol/L    Urea Nitrogen 31 (H) 6 - 23 mg/dL    Creatinine 1.58 (H) 0.50 - 1.30 mg/dL    eGFR 42 (L) >60 mL/min/1.73m*2    Calcium 8.8 8.6 - 10.3 mg/dL    Albumin 3.5 3.4 - 5.0 g/dL    Alkaline Phosphatase 76 33 - 136 U/L    Total Protein 6.0  (L) 6.4 - 8.2 g/dL    AST 14 9 - 39 U/L    Bilirubin, Total 0.5 0.0 - 1.2 mg/dL    ALT 10 10 - 52 U/L   Troponin I, High Sensitivity   Result Value Ref Range    Troponin I, High Sensitivity 13 0 - 20 ng/L   Protime-INR   Result Value Ref Range    Protime 11.7 9.8 - 12.4 seconds    INR 1.1 0.9 - 1.1   APTT   Result Value Ref Range    aPTT 30 26 - 36 seconds   POCT GLUCOSE   Result Value Ref Range    POCT Glucose 120 (H) 74 - 99 mg/dL   ECG 12 lead   Result Value Ref Range    Ventricular Rate 60 BPM    Atrial Rate 54 BPM    QRS Duration 124 ms    QT Interval 506 ms    QTC Calculation(Bazett) 506 ms    R Axis -16 degrees    T Axis 32 degrees    QRS Count 10 beats    Q Onset 219 ms    P Onset 118 ms    P Offset 138 ms    T Offset 472 ms    QTC Fredericia 506 ms        CT Head Imaging:  CTH imaging personally reviewed, showed no acute ischemic / hemorrhagic changes     CTA Head and Neck Imaging:  CTA head and neck imaging personally reviewed, no large vessel occlusion or severe stenosis seen, although short segment in the left (asymptomatic side) is not well visualized.  Images limited by motion.    CT Perfusion Imaging:  Please note there was a delay in processing of the perfusion study and it was not available at the time of the consult.  It shows no infarct although there is some decreased perfusion on the left, which would not explain his left sided symptoms.    Diagnosis:  Transient Ischemic Attack    Assessment/Plan:  Two brief episodes of left hand weakness, now resolved concerning for TIA.  The biggest risk factor is Atrial fibrillation, currently not anticoagulated due to GI bleed on Xarelto.  Would discuss ability to anticoagulate GI.  He is s/p clip of his ulcer in January.  Eliquis has a lower rate of GI Bleeding than Xarelto and if he is anticoagulated, it should be with Eliquis.    IV Thrombolysis IV Thrombolysis Checklist        IV Thrombolysis Given: No; Thrombolysis contraindication reason: Neurologic  signs are mild and judged to be non-disabling          Patient is a candidate for thrombectomy:  yes/no: No; contraindication reason: NIHSS <6    Additional Recommendations:  MRI Brain w/o contrast stroke protocol or repeat CTH 24 hours after initial CTH if unable to get MRI.  Lipid panel, A1c.  Had echo in January, could get another to rule out thrombus, which would push him to be anticoagulated.  Continue plavix for now; discuss anticoagulation with GI as per assessment and plan above.  Lipid Goals: education on healthy diet and statin therapy to maintain or achieve goal LDL-cholesterol < 70mg. Atorvastatin 40mg..  Blood pressure goals: avoid hypotension SBP <100 that could worsen cerebral perfusion. Ischemic stroke- early permissive hypertension SBP < 220 mmHg with cautious inpatient lowering..  Glucose Goals: early treatment of hyperglycemia to goal glucose 140-180 mg/dl with long-term goal A1c < 7%.  NPO until nurse bedside swallow assessment.  Consider focused stroke order set.  Smoking Cessation and Education.  Assessment for Rehabilitation needs.  Patient and family education on signs and symptoms of stroke, calling 911, healthy strategies for stroke prevention.    Please have the pacemaker interrogated to see if he had Atrial fibrillation recently.    Disposition:  Patient will remain at referring facility for further evaluation and management.    Virtual or Telephone Consent    An interactive audio and video telecommunication system which permits real time communications between the patient (at the originating site) and provider (at the distant site) was utilized to provide this telehealth service.   Verbal consent was requested and obtained from Guicho Jones on this date, 05/31/25 for a telehealth visit.      Telestroke is covered in shift work. If there are further Neurological questions or concerns please contact your regional neurologist on call during daytime hours or contact the transfer center  with an ADT20 order.    Ellie Powell MD

## 2025-06-02 ENCOUNTER — APPOINTMENT (OUTPATIENT)
Dept: CARDIOLOGY | Facility: CLINIC | Age: 85
End: 2025-06-02
Payer: MEDICARE

## 2025-06-02 ENCOUNTER — PATIENT OUTREACH (OUTPATIENT)
Dept: CARDIOLOGY | Facility: CLINIC | Age: 85
End: 2025-06-02

## 2025-06-02 VITALS
SYSTOLIC BLOOD PRESSURE: 112 MMHG | HEART RATE: 53 BPM | DIASTOLIC BLOOD PRESSURE: 60 MMHG | WEIGHT: 217.4 LBS | BODY MASS INDEX: 35.09 KG/M2

## 2025-06-02 DIAGNOSIS — I48.0 PAROXYSMAL ATRIAL FIBRILLATION (MULTI): ICD-10-CM

## 2025-06-02 DIAGNOSIS — G45.9 TIA (TRANSIENT ISCHEMIC ATTACK): Primary | ICD-10-CM

## 2025-06-02 DIAGNOSIS — G45.9 TIA (TRANSIENT ISCHEMIC ATTACK): ICD-10-CM

## 2025-06-02 DIAGNOSIS — N17.9 AKI (ACUTE KIDNEY INJURY): ICD-10-CM

## 2025-06-02 DIAGNOSIS — I50.32 CHRONIC DIASTOLIC CONGESTIVE HEART FAILURE, NYHA CLASS 2: ICD-10-CM

## 2025-06-02 DIAGNOSIS — I25.10 CORONARY ARTERY DISEASE INVOLVING NATIVE CORONARY ARTERY OF NATIVE HEART WITHOUT ANGINA PECTORIS: ICD-10-CM

## 2025-06-02 DIAGNOSIS — I48.19 PERSISTENT ATRIAL FIBRILLATION (MULTI): ICD-10-CM

## 2025-06-02 LAB — HOLD SPECIMEN: NORMAL

## 2025-06-02 PROCEDURE — 3074F SYST BP LT 130 MM HG: CPT | Performed by: INTERNAL MEDICINE

## 2025-06-02 PROCEDURE — 1159F MED LIST DOCD IN RCRD: CPT | Performed by: INTERNAL MEDICINE

## 2025-06-02 PROCEDURE — 1036F TOBACCO NON-USER: CPT | Performed by: INTERNAL MEDICINE

## 2025-06-02 PROCEDURE — 3078F DIAST BP <80 MM HG: CPT | Performed by: INTERNAL MEDICINE

## 2025-06-02 PROCEDURE — 99215 OFFICE O/P EST HI 40 MIN: CPT | Performed by: INTERNAL MEDICINE

## 2025-06-02 PROCEDURE — G2211 COMPLEX E/M VISIT ADD ON: HCPCS | Performed by: INTERNAL MEDICINE

## 2025-06-02 RX ORDER — BUMETANIDE 1 MG/1
TABLET ORAL
Start: 2025-06-02

## 2025-06-02 RX ORDER — SPIRONOLACTONE 25 MG/1
25 TABLET ORAL EVERY OTHER DAY
Start: 2025-06-02 | End: 2026-06-02

## 2025-06-02 NOTE — PROGRESS NOTES
Discharge Facility:  [facility]  Discharge Diagnosis:  TIA  Admission Date:  5/31/25 AMA  Discharge Date:     PCP Appointment Date: Dr Degroot 6/2/25 CB  Specialist Appointment Date:   UN 4 2025 11:00 AM - Primary Care Medicare Annual  [facility] - Shahram Escalante MD     Hospital Encounter and Summary Linked: Yes    Two attempts were made to reach patient within two business days after discharge. Left voicemail with contact information for patient to call back with any non-emergent questions or concerns.     Patient does have an appt within 2 days of discharge and completed appt today

## 2025-06-02 NOTE — PATIENT INSTRUCTIONS
FOLLOW UP WITH Dr. Hugh Degroot MD, [facility] IN 3 WEEKS    SCHEDULE AN IN CLINIC OR REMOTE DEVICE CHECK AT [facility]    DISCONTINUE TAKING XARELTO AT THIS TIME      START TAKING ELIQUIS 2.5MG TAKE ONE TABLET TWICE DAILY    START TAKING SPIRONOLACTONE 25MG TAKE ONE TABLET EVERY OTHER DAY     START TAKING BUMEX 1MG TAKE ONE TABLET EVERY OTHER DAY     NONFASTING BLOODWORK TO BE DONE IN 1 WEEK/ON OR AFTER 06/09/25    DID YOU KNOW  We have a pharmacy here in the [facility].  They can fill all prescriptions, not just cardiac medications.  Prescriptions from other pharmacies can easily be transferred to the  pharmacy by the  pharmacist on site.   pharmacies offer FREE HOME DELIVERY on medications to anywhere in [place]. They can sync your medications. Typically prescriptions can be ready in 10 - 15 minutes. If pharmacy is unable to fill your  prescription or if cost is more than your paying now the Pharmacist can easily transfer back to your Pharmacy of choice. Pharmacy phone # [phone].     Please bring all medicines, vitamins, and herbal supplements with you in original bottles to every appointment! This is the best way to ensure your medication list in your chart is accurate.    Prescriptions will not be filled unless you are compliant with your follow up appointments or have a follow up appointment scheduled as per instruction of your physician. Refills should be requested at the time of your visit.

## 2025-06-02 NOTE — TELEPHONE ENCOUNTER
Patient LVM requesting that Eliquis script be sent to [facility] instead of [facility] [place].   Pharmacy updated and order pended to review and sign

## 2025-06-02 NOTE — PROGRESS NOTES
Patient:  Guicho Jones  YOB: 1940  MRN: [id number]       Impression/Plan:     Diagnoses and all orders for this visit:  TIA (transient ischemic attack)  -     His intermittent symptoms of left arm weakness are concerning for a TIA CT showed no pathology and he cannot have MRI because of having an old AICD leads.  Could consider PAOLO but he had a recent PAOLO that excluded any structural potential causes of emboli.  He is at risk of recurrent A-fib device checks have not shown any as up to April.  Will have him get another device check.  However because he has a very high incidence of recurrence and has had no bleeding since early February from his clip to gastric ulcer though there is a benefit of recurrent bleeding I think the benefit of avoidance of stroke is higher and therefore we will reinstitute Eliquis at a level for renal dysfunction.  -     Additionally, on Thursday prior to ER visit he had taken his first dose of Dupixent gone by pulmonary medicine Dr. Cardona.  Rarely less than 1% has been associated with myocardial infarction and CVA.  Hence would avoid that at this point in time.  However if he is able to tolerate full anticoagulation could consider it at a later date.  -     Also discussed with him that he would be a candidate for Watchman device should he have recurrent bleed.  -     If device check shows absolutely no atrial fibrillation this may have been more of an ischemic event as opposed to embolic.  Further recommendations based on results of device check and assessment of his tolerance to current medication  -     apixaban (Eliquis) 2.5 mg tablet; Take 1 tablet (2.5 mg) by mouth 2 times a day.  -     CBC; Future  -     apixaban (Eliquis) 2.5 mg tablet; Take 1 tablet (2.5 mg) by mouth 2 times a day.  Persistent atrial fibrillation (Multi)  -     CBC; Future  Chronic diastolic congestive heart failure, NYHA class 2  -     Follow Up In Cardiology  -     Follow Up In Cardiology;  Future  -     bumetanide (Bumex) 1 mg tablet; TAKE ONE TABLET EVERY OTHER DAY  -     Basic Metabolic Panel; Future  -     spironolactone (Aldactone) 25 mg tablet; Take 1 tablet (25 mg) by mouth every other day.  ISAIAS (acute kidney injury)  Coronary artery disease involving native coronary artery of native heart without angina pectoris  -     Follow Up In Cardiology      Chief Complaint/Active Symptoms:    No chief complaint on file.      Guicho Jones is a 85 y.o. male who presents with  ischemic cardiomyopathy, echo 10/21 50% EF. Cath 6/15/2022 LM-30%. LAD-diffuse disease 40% distal. CX/RCA calcified diffuse disease. LVEDP 15. He has AICD secondary to prior severe decrease in overall systolic function. Also with persistent atrial fibrillation. He has a 5.6 cm complex dumbbell abdominal aortic aneurysm that has been followed by vascular surgery.  As of 2022 patient preferred no intervention.  Was to be intervened upon previously but he then developed prostate cancer and intervention postponed. Finally, he has centrilobular emphysema-severe.       February 2025 with gram-positive bacteremia noted at admission [facility] when he was feeling extraordinarily ill PET scan suggested infection involving ascending aortic arch right atrial lead both with FDG uptake.  Cardiology at [facility] did not feel reasonable to remove the lead as the aorta would not be addressed and hence would not eliminate the infection.  He was treated with antibiotics prognosis was felt guarded at that time.    1/12/2025 PAOLO        1. The left ventricular systolic function is normal, with a visually estimated ejection fraction of 60-65%.   2. There is normal right ventricular global systolic function.   3. The left atrium is moderately dilated.   4. The right atrium is moderately dilated.   5. Moderate mitral valve regurgitation.   6. Moderate tricuspid regurgitation.   7. No left atrial thrombus.   8. No vegetations seen on the  valvular structures or the pacemaker leads.   9. There is plaque visualized in the descending aorta.       5/2/2025 coronary angiography   1. Distal Left Main: 40% stenosis.   2. Left Main Coronary Artery: This artery is irregular and non-significant obstruction.   3. Left Anterior Descending Artery: presents luminal irregularities.   4. Mid LAD Lesion: The percent stenosis is 40%.   5. Circumflex Coronary Artery: presents luminal irregularities.   6. Distal CX Lesion: The percent stenosis is 100%.   7. Right Coronary Artery: presents luminal irregularities.    I had last seen/30/25 he was functional class II diastolic CHF with improved symptoms as well as improved renal function.  However he had had episodes of VT and was referred on for cath by Dr. Bassett as noted above cath showed no progression of disease.  Renal function had improved at that time.  Tolerated catheterization without difficulty    5/31/2025 seen in the emergency department with transient left hand weakness.  Felt numb and tingling as well.  No other neurologic events and improved spontaneously in the emergency department.  CT of the brain without acute events.  Angiography demonstrated no significant infarcts or hypoperfusion via angiography although significant atherosclerotic plaque was identified.  Telemetry neurology evaluation felt symptoms consistent with TIA as they had resolved but very classic nonetheless.  They wondered about anticoagulation though he had had recurrent bleeding after the ulcer was clipped which is the reason he has not been on anticoagulation and A-fib burden is low.  No A-fib was described in the emergency room.  Patient refused admission.  Left AMA.  He did not meet criteria for intravenous thrombolysis.    ECG in emergency room: Atrial pacing no acute ST change    Device check 5/19/2025 no A-fib prior to that    At this time he says he feels fairly well.  He is not short of breath or having chest tightness has had no  palpitations.  He has full strength in his left arm.  He said he did feel a little funny yesterday but he lifted weights in his left and right arm and he could do each equally the same.  He has had no mental status issues.  He has not been unsteady on his feet he has generalized weakness but that has not changed.  He is eating and drinking well.     ER visit and labs and prior office visits with pulmonary medicine at [facility] reviewed in detail            Review of Systems: Unremarkable except as noted above    Meds     Current Outpatient Medications   Medication Instructions    albuterol (Proventil HFA) 90 mcg/actuation inhaler 1 puff, Every 4 hours PRN    amiodarone (Pacerone) 200 mg tablet Take 1 tablet twice daily for 7 days and then 1 tablet  once daily    atorvastatin (LIPITOR) 40 mg, oral, Daily    bumetanide (Bumex) 1 mg tablet 1 tablet daily.  Take 1 extra dose a day for 1 dose or as directed by phycian for weight gain of more than 5 lbs in 3 days    clopidogrel (PLAVIX) 75 mg, oral, Daily    docusate sodium (COLACE) 100 mg, 2 times daily    doxycycline (MONODOX) 100 mg, 2 times daily    ensifentrine (Ohtuvayre) 3 mg/2.5 mL suspension for nebulization Daily    ezetimibe (ZETIA) 10 mg, oral, Daily    isosorbide mononitrate ER (IMDUR) 30 mg, oral, Daily, 1 tablet daily    metoprolol succinate XL (TOPROL-XL) 100 mg, oral, Daily    montelukast (SINGULAIR) 10 mg, oral, Every evening    nitroglycerin (NITROSTAT) 0.4 mg, sublingual, Every 5 min PRN    oxygen (O2) 2 L/min, inhalation, Every 24 hours    polyethylene glycol (GLYCOLAX, MIRALAX) 17 g, Daily    spironolactone (ALDACTONE) 25 mg, oral, Daily    tamsulosin (FLOMAX) 0.4 mg, oral, Daily        Allergies   Allergies[1]      Annotated Problems     Specialty Problems          Cardiology Problems    CAD (coronary artery disease)    1/31/2024 perfusion study: Abnormal no evidence of ischemia prior inferolateral infarct LVEF 50%.  No change  from previously    Echo from yesterday with 50% ejection fraction inferolateral hypokinesis trivial AI trivial to 1+ MR RVSP 55 mm.  Echo 2021 RVSP 43 mmHg   · Cath 6/15/2022 LM-30%. LAD-diffuse disease 40% distal. CX/RCA calcified diffuse      disease. LVEDP 15   3/19/2020. Lexiscan. Posterior lateral MI with mild jimmy-infarction ischemia. LVEF 42%.       Echocardiogram demonstrates 60% ejection fraction       2009. LVEF normal. LAD/RCA stents patent. Occluded distal circumflex. LM aneurysmal.       2008. BRONSON-proximal and mid LAD. BRONSON RCA.       1994 acute inferior infarct         Abdominal aortic aneurysm    Follows with Dr. Ross and as of 10/2022 though felt to be a candidate for an off label fenestrated approach, patient preferred no intervention   · November 2021 vascular follow-up with Dr. Finney. Aorta at 5.6 cm. Intervention      postponed secondary to prostate cancer treatments   January 2021 dumbbell shaped abdominal aortic aneurysm maximal diameter 5 cm      involving origin of renal arteries         AICD (automatic cardioverter/defibrillator) present    Anticoagulant long-term use    Chronic diastolic congestive heart failure, NYHA class 2    Echo from yesterday with 50% ejection fraction inferolateral hypokinesis trivial AI trivial to 1+ MR RVSP 55 mm.  Echo 2021 RVSP 43 mmHg         Essential hypertension    Ischemic cardiomyopathy    January 2024 LVEF 50% RVSP 55   · October 2021 echo LVEF 55%.  Biatrial enlargement.  No significant valvular disease      March 2020 echo LVEF 60%.  Diastolic dysfunction      2002 50%      1996 25% after acute inferior infarct         Mixed hyperlipidemia    Persistent atrial fibrillation (Multi)    Renal artery stenosis    Sustained ventricular tachycardia (Multi)    Never smoked cigarettes    Paroxysmal atrial fibrillation (Multi)    Longstanding persistent atrial fibrillation (Multi)        Problem List     Patient Active Problem List    Diagnosis Date Noted     TIA (transient ischemic attack) 05/31/2025    Paroxysmal atrial fibrillation (Multi) 03/31/2025    Stage 3a chronic kidney disease (Multi) 03/11/2025    Bacteremia 01/23/2025    Diarrhea, unspecified type 01/10/2025    Hidradenitis suppurativa of anus 11/25/2024    Pulmonary hypertension (Multi) 02/02/2024    Never smoked cigarettes 02/01/2024    Primary osteoarthritis of right knee 11/13/2023    CVA (cerebral vascular accident) (Multi) 10/09/2023    Abdominal aortic aneurysm 09/29/2023    AICD (automatic cardioverter/defibrillator) present 09/29/2023    Allergic rhinitis, seasonal 09/29/2023    Centrilobular emphysema (Multi) 09/29/2023    Essential hypertension 09/29/2023    Hyperuricemia 09/29/2023    Infarction of right basal ganglia (Multi) 09/29/2023    Ischemic cardiomyopathy 09/29/2023    Mixed hyperlipidemia 09/29/2023    Sustained ventricular tachycardia (Multi) 09/29/2023    Persistent atrial fibrillation (Multi) 09/29/2023    Renal artery stenosis 09/29/2023    Anticoagulant long-term use 09/29/2023    Chronic diastolic congestive heart failure, NYHA class 2 09/29/2023    High risk medication use 09/29/2023    Tremor 09/29/2023    Prostate cancer (Multi) 09/29/2023    Spinal stenosis, lumbar region with neurogenic claudication 07/17/2018    CAD (coronary artery disease) 10/19/2016    Obesity, morbid (Multi) 10/19/2016    COPD exacerbation (Multi) 10/19/2016    Epiretinal membrane (ERM) of right eye 03/15/2016    Pseudophakia of both eyes 01/14/2016    Vitreomacular traction syndrome of right eye 01/14/2016    Dermatochalasis of right upper eyelid 08/17/2015    Dermatochalasis of left upper eyelid 08/17/2015    Degenerative retinal drusen of both eyes 08/17/2015    Floaters 08/17/2015    Lumbosacral spondylosis without myelopathy 07/23/2015    Blepharitis of both eyes 01/28/2015    Drusen (degenerative) of retina 01/28/2015    Choroidal nevus, right eye 01/28/2015    Pinguecula 01/28/2015    MGD  (meibomian gland dysfunction) 01/28/2015    Longstanding persistent atrial fibrillation (Multi) 04/21/2025       Objective:     Vitals:    06/02/25 1026   BP: 112/60   BP Location: Right arm   Patient Position: Sitting   Pulse: 53   Weight: 98.6 kg (217 lb 6.4 oz)      Wt Readings from Last 4 Encounters:   06/02/25 98.6 kg (217 lb 6.4 oz)   05/31/25 96.2 kg (212 lb)   05/19/25 97.3 kg (214 lb 9.6 oz)   05/02/25 98.6 kg (217 lb 6 oz)           LAB:     Lab Results   Component Value Date    WBC 8.0 05/31/2025    HGB 10.4 (L) 05/31/2025    HCT 33.3 (L) 05/31/2025     05/31/2025    CHOL 105 05/31/2025    TRIG 151 (H) 05/31/2025    HDL 47.1 05/31/2025    ALT 10 05/31/2025    AST 14 05/31/2025     05/31/2025    K 4.0 05/31/2025     (H) 05/31/2025    CREATININE 1.58 (H) 05/31/2025    BUN 31 (H) 05/31/2025    CO2 27 05/31/2025    TSH 1.87 05/31/2025    INR 1.1 05/31/2025    HGBA1C 5.2 05/31/2025         Physical Exam     General Appearance: alert and oriented to person, place and time, in no acute distress  Cardiovascular: normal rate, regular rhythm, normal S1 and S2, no murmurs, rubs, clicks, or gallops,  no JVD  Pulmonary/Chest: clear to auscultation bilaterally- no wheezes, rales or rhonchi, normal air movement, no respiratory distress  Abdomen: soft, non-tender, non-distended, normal bowel sounds, no masses   Extremities: no cyanosis, clubbing or edema  Skin: warm and dry, no rash or erythema  Eyes: EOMI  Neck: supple and non-tender without mass, no thyromegaly   Neurological: alert, oriented, normal speech, no focal findings or movement disorder noted.  Bilateral upper extremity  is equal and 5 out of 5 strength.  He can have full range of motion of both upper extremities strength is symmetric in the same in all directions.  Vascular: Pulses 2+ symmetric    Scribe Attestation  By signing my name below, I, Jaclyn Sears RN, Scribe   attest that this documentation has been prepared under the  direction and in the presence of Hugh Degroot MD.        Provider attestation-scribe documentation  Any medical record entries made by the scribe were at my discretion and personally dictated by me.  I have reviewed the chart and agree that the record accurately reflects my personal performance of the history, physical exam, discussion and plan.                 [1]   Allergies  Allergen Reactions    Levofloxacin Palpitations     Rapid Heart Rate - Severe per pt.    Cefazolin Rash     rash, purpuric, leucocytoclastic vasculitis

## 2025-06-03 ENCOUNTER — TELEPHONE (OUTPATIENT)
Dept: CARDIOLOGY | Facility: CLINIC | Age: 85
End: 2025-06-03
Payer: MEDICARE

## 2025-06-03 NOTE — TELEPHONE ENCOUNTER
----- Message from Joce Bassett sent at 6/3/2025  3:02 PM EDT -----  Please inform patient.  No atrial fibrillation by device interrogation  ----- Message -----  From: Camille Givens  Sent: 6/3/2025   1:45 PM EDT  To: Joce Bassett MD

## 2025-06-04 ENCOUNTER — APPOINTMENT (OUTPATIENT)
Dept: PRIMARY CARE | Facility: CLINIC | Age: 85
End: 2025-06-04
Payer: MEDICARE

## 2025-06-04 ENCOUNTER — PATIENT OUTREACH (OUTPATIENT)
Dept: PRIMARY CARE | Facility: CLINIC | Age: 85
End: 2025-06-04

## 2025-06-04 VITALS
BODY MASS INDEX: 35.07 KG/M2 | TEMPERATURE: 98.6 F | OXYGEN SATURATION: 94 % | DIASTOLIC BLOOD PRESSURE: 62 MMHG | HEIGHT: 66 IN | SYSTOLIC BLOOD PRESSURE: 108 MMHG | HEART RATE: 62 BPM | WEIGHT: 218.2 LBS

## 2025-06-04 DIAGNOSIS — I48.19 PERSISTENT ATRIAL FIBRILLATION (MULTI): ICD-10-CM

## 2025-06-04 DIAGNOSIS — Z00.00 MEDICARE ANNUAL WELLNESS VISIT, SUBSEQUENT: Primary | ICD-10-CM

## 2025-06-04 DIAGNOSIS — I25.10 CORONARY ARTERY DISEASE INVOLVING NATIVE HEART, UNSPECIFIED VESSEL OR LESION TYPE, UNSPECIFIED WHETHER ANGINA PRESENT: ICD-10-CM

## 2025-06-04 DIAGNOSIS — J43.2 CENTRILOBULAR EMPHYSEMA (MULTI): ICD-10-CM

## 2025-06-04 RX ORDER — IPRATROPIUM BROMIDE AND ALBUTEROL SULFATE 2.5; .5 MG/3ML; MG/3ML
3 SOLUTION RESPIRATORY (INHALATION)
COMMUNITY
Start: 2025-05-29

## 2025-06-04 RX ORDER — FLUTICASONE FUROATE, UMECLIDINIUM BROMIDE AND VILANTEROL TRIFENATATE 200; 62.5; 25 UG/1; UG/1; UG/1
POWDER RESPIRATORY (INHALATION)
COMMUNITY
Start: 2025-05-20

## 2025-06-04 ASSESSMENT — ENCOUNTER SYMPTOMS
LOSS OF SENSATION IN FEET: 0
DEPRESSION: 0
OCCASIONAL FEELINGS OF UNSTEADINESS: 1

## 2025-06-04 NOTE — PROGRESS NOTES
Guicho Jones, pleasant 85 y.o. male was seen today:   Chief Complaint   Patient presents with    Medicare Annual Wellness Visit Subsequent       VISIT ELISABET:  Guicho Jones comes for annual medical check up.  No acute medical complaint today. Overall doing well. Chronic medical conditions are stable with current medical management. Cognitive function is assessed. Immunizations are age appropriate. Home medications have been reconciled. The healthy lifestyle has been reinforced. Encouraged continued avoidance of tobacco, alcohol, poly pharmacy and substances. Functional capacity has been assessed. Discussed about fall risk and safety measures, at home and outside.  Age appropriate cancer screening tests were recommended. Reminded about code status and health care Power of  (POA). This has been completed. Discussed about mental health and wellbeing. The depression screening questionnaire  (PHQ 9) was discussed for 5 mins. We did discuss food security, transport needs, and housing/utility needs, and patient did not identify any concerns. Vital signs, recent lab results, imaging studies were reviewed. At the end patient raised the concern about his recently diagnosed bacteremia, which is now being managed with oral doxycycline. He is following with infectious disease. They were not able to identify a source, but there is some concern that this is related to his cardiac implant. No plans for removal as risk is too great. A complete physical exams was performed to evaluate those conditions.    The patient denies any acute complaints. He does try to eat healthy, and he does not have any issues maintaining his weight. He is trying to drink plenty of water. The patient reports occasional alcohol use (a few times a week). He denies illicit drug use. Prior tobacco use, quit in 1985. He does use oxygen on a routine basis given his COPD. He is on 1L at this time, but typically uses 2L at home. He sleeps with 3L  through his BiPAP. He denies any falls.     He did recently see pulmonology (last Thursday) who recommended he try dupixent. He had an adverse effect with this medication (numbness in left hand, concern for stroke-like symptoms, had negative ED workup, is resolving). He does not want to continue this medication.     We will see him back in 6 months.           MEDICATIONS:   Current Outpatient Medications   Medication Instructions    albuterol (Proventil HFA) 90 mcg/actuation inhaler 1 puff, Every 4 hours PRN    amiodarone (Pacerone) 200 mg tablet Take 1 tablet twice daily for 7 days and then 1 tablet  once daily    apixaban (ELIQUIS) 2.5 mg, oral, 2 times daily    atorvastatin (LIPITOR) 40 mg, oral, Daily    bumetanide (Bumex) 1 mg tablet TAKE ONE TABLET EVERY OTHER DAY    clopidogrel (PLAVIX) 75 mg, oral, Daily    docusate sodium (COLACE) 100 mg, 2 times daily    doxycycline (MONODOX) 100 mg, 2 times daily    ezetimibe (ZETIA) 10 mg, oral, Daily    ipratropium-albuteroL (Duo-Neb) 0.5-2.5 mg/3 mL nebulizer solution 3 mL, 4 times daily RT    isosorbide mononitrate ER (IMDUR) 30 mg, oral, Daily, 1 tablet daily    metoprolol succinate XL (TOPROL-XL) 100 mg, oral, Daily    montelukast (SINGULAIR) 10 mg, oral, Every evening    nitroglycerin (Nitrostat) 0.4 mg SL tablet DISSOLVE 1 TABLET UNDER THE TONGUE EVERY 5 MINUTES IF NEEDED FOR CHEST PAIN AS DIRECTED    oxygen (O2) 2 L/min, inhalation, Every 24 hours    polyethylene glycol (GLYCOLAX, MIRALAX) 17 g, Daily    spironolactone (ALDACTONE) 25 mg, oral, Every other day    tamsulosin (FLOMAX) 0.4 mg, oral, Daily    Trelegy Ellipta 200-62.5-25 mcg blister with device          TODAY'S VISIT  DX:     1. Medicare annual wellness visit, subsequent  Performed annual wellness visit, reviewed age appropriate immunizations and cancer screenings. Reconciled home medications.       2. Coronary artery disease involving native heart, unspecified vessel or lesion type, unspecified  "whether angina present  Stable, continue medications. Follow up with cardiology as scheduled. Has defibrillator in place.      3. Persistent atrial fibrillation (Multi)  Stable, on amiodarone. Denies any palpitations. Recent device interrogation with very minimal atrial fibrillation burden. Continue anticoagulation.       4. Centrilobular emphysema (Multi)  Stable, on home oxygen. Follows with pulmonology.            MEDICAL DECISION MAKING:  - The current and active medical co morbidities have been considered.   - Recent lab work and relevant imaging studies have been reviewed.    - Relevant correspondence/notes from other specialty consultants were reviewed.    - Medication have been sent for refill.    -Therapy and treatment as per above plan  - Next Follow up in 6 months.       Review of Systems  12 point review of systems was negative unless mentioned above    Visit Vitals  /62 (BP Location: Left arm, Patient Position: Sitting, BP Cuff Size: Adult)   Pulse 62   Temp 37 °C (98.6 °F) (Temporal)   Ht 1.676 m (5' 6\")   Wt 99 kg (218 lb 3.2 oz)   SpO2 94% Comment: with 1 liter oxygen via nasal cannula   BMI 35.22 kg/m²   Smoking Status Former   BSA 2.15 m²         Wt Readings from Last 10 Encounters:   06/04/25 99 kg (218 lb 3.2 oz)   06/02/25 98.6 kg (217 lb 6.4 oz)   05/31/25 96.2 kg (212 lb)   05/19/25 97.3 kg (214 lb 9.6 oz)   05/02/25 98.6 kg (217 lb 6 oz)   04/30/25 101 kg (221 lb 9.6 oz)   04/21/25 98.4 kg (217 lb)   03/31/25 102 kg (225 lb)   03/11/25 99.5 kg (219 lb 6.4 oz)   01/26/25 105 kg (231 lb 0.7 oz)     Physical Exam   General: Not in acute distress, A&O x3, alert, coopertive, obese, ambulatory with walker with nasal cannula in place  HEENT: Normocephalic, atraumatic, EOMI, moist mucous membranes  Neck: Neck supple, trachea midline, no evidence of trauma  Cardiovascular: RRR, S1 and S2 appreciated, no murmurs rubs gallops appreciated, distal pulses 2+ bilaterally (radial and dorsalis " pedis)  Respiratory: Clear to auscultation bilaterally without wheezes, rales, rhonchi; no increased work of breathing noted on 1L NC  GI: Abdomen soft, nondistended, nontender to palpation, bowel sounds present  Extremities: No edema appreciated in lower extremities bilaterally, no cyanosis  Neuro: A&O x3, no focal deficits, strength and sensation intact bilaterally  Skin: Warm and dry, without lesions or rashes      RECENT LABS:  Lab Results   Component Value Date    WBC 8.0 05/31/2025    HGB 10.4 (L) 05/31/2025    HCT 33.3 (L) 05/31/2025     05/31/2025    CHOL 105 05/31/2025    TRIG 151 (H) 05/31/2025    HDL 47.1 05/31/2025    ALT 10 05/31/2025    AST 14 05/31/2025     05/31/2025    K 4.0 05/31/2025     (H) 05/31/2025    CREATININE 1.58 (H) 05/31/2025    BUN 31 (H) 05/31/2025    CO2 27 05/31/2025    TSH 1.87 05/31/2025    INR 1.1 05/31/2025    HGBA1C 5.2 05/31/2025     Lab Results   Component Value Date    GLUCOSE 105 (H) 05/31/2025    CALCIUM 8.8 05/31/2025     05/31/2025    K 4.0 05/31/2025    CO2 27 05/31/2025     (H) 05/31/2025    BUN 31 (H) 05/31/2025    CREATININE 1.58 (H) 05/31/2025      Lab Results   Component Value Date    LDLCALC 28 05/31/2025     Lab Results   Component Value Date    HGBA1C 5.2 05/31/2025         Alfreda Agee DO  Internal Medicine PGY-1 Resident

## 2025-06-05 DIAGNOSIS — J30.2 SEASONAL ALLERGIC RHINITIS, UNSPECIFIED TRIGGER: ICD-10-CM

## 2025-06-05 RX ORDER — MONTELUKAST SODIUM 10 MG/1
10 TABLET ORAL EVERY EVENING
Qty: 90 TABLET | Refills: 3 | Status: SHIPPED | OUTPATIENT
Start: 2025-06-05

## 2025-06-05 NOTE — TELEPHONE ENCOUNTER
Pharmacy requesting refills   6/4/2025  Future Appointments   Date Time Provider Department Center   6/23/2025 11:15 AM Hugh Degroot MD [id number] [place]   6/23/2025 12:00 PM Joce Bassett MD [id number] [place]   12/8/2025 10:00 AM Shahram Escalante MD [address]

## 2025-06-09 DIAGNOSIS — I48.19 PERSISTENT ATRIAL FIBRILLATION (MULTI): ICD-10-CM

## 2025-06-09 DIAGNOSIS — G45.9 TIA (TRANSIENT ISCHEMIC ATTACK): ICD-10-CM

## 2025-06-09 DIAGNOSIS — I50.32 CHRONIC DIASTOLIC CONGESTIVE HEART FAILURE, NYHA CLASS 2: ICD-10-CM

## 2025-06-11 ENCOUNTER — APPOINTMENT (OUTPATIENT)
Dept: PRIMARY CARE | Facility: CLINIC | Age: 85
End: 2025-06-11
Payer: MEDICARE

## 2025-06-19 LAB
ANION GAP SERPL CALCULATED.4IONS-SCNC: 9 MMOL/L (CALC) (ref 7–17)
BUN SERPL-MCNC: 33 MG/DL (ref 7–25)
BUN/CREAT SERPL: 24 (CALC) (ref 6–22)
CALCIUM SERPL-MCNC: 8.6 MG/DL (ref 8.6–10.3)
CHLORIDE SERPL-SCNC: 109 MMOL/L (ref 98–110)
CO2 SERPL-SCNC: 24 MMOL/L (ref 20–32)
CREAT SERPL-MCNC: 1.38 MG/DL (ref 0.7–1.22)
EGFRCR SERPLBLD CKD-EPI 2021: 50 ML/MIN/1.73M2
ERYTHROCYTE [DISTWIDTH] IN BLOOD BY AUTOMATED COUNT: 15.4 % (ref 11–15)
GLUCOSE SERPL-MCNC: 94 MG/DL (ref 65–99)
HCT VFR BLD AUTO: 33.2 % (ref 38.5–50)
HGB BLD-MCNC: 10.5 G/DL (ref 13.2–17.1)
MCH RBC QN AUTO: 30.6 PG (ref 27–33)
MCHC RBC AUTO-ENTMCNC: 31.6 G/DL (ref 32–36)
MCV RBC AUTO: 96.8 FL (ref 80–100)
PLATELET # BLD AUTO: 192 THOUSAND/UL (ref 140–400)
PMV BLD REES-ECKER: 9.4 FL (ref 7.5–12.5)
POTASSIUM SERPL-SCNC: 4.4 MMOL/L (ref 3.5–5.3)
RBC # BLD AUTO: 3.43 MILLION/UL (ref 4.2–5.8)
SODIUM SERPL-SCNC: 142 MMOL/L (ref 135–146)
WBC # BLD AUTO: 7.6 THOUSAND/UL (ref 3.8–10.8)

## 2025-06-23 ENCOUNTER — APPOINTMENT (OUTPATIENT)
Dept: CARDIOLOGY | Facility: CLINIC | Age: 85
End: 2025-06-23
Payer: MEDICARE

## 2025-06-23 ENCOUNTER — OFFICE VISIT (OUTPATIENT)
Dept: CARDIOLOGY | Facility: CLINIC | Age: 85
End: 2025-06-23
Payer: MEDICARE

## 2025-06-23 VITALS
SYSTOLIC BLOOD PRESSURE: 124 MMHG | HEART RATE: 60 BPM | HEIGHT: 66 IN | BODY MASS INDEX: 35.48 KG/M2 | WEIGHT: 220.8 LBS | DIASTOLIC BLOOD PRESSURE: 60 MMHG

## 2025-06-23 DIAGNOSIS — G45.9 TIA (TRANSIENT ISCHEMIC ATTACK): ICD-10-CM

## 2025-06-23 DIAGNOSIS — I50.32 CHRONIC DIASTOLIC CONGESTIVE HEART FAILURE, NYHA CLASS 2: ICD-10-CM

## 2025-06-23 DIAGNOSIS — I25.10 CORONARY ARTERY DISEASE INVOLVING NATIVE CORONARY ARTERY OF NATIVE HEART WITHOUT ANGINA PECTORIS: ICD-10-CM

## 2025-06-23 PROCEDURE — G2211 COMPLEX E/M VISIT ADD ON: HCPCS | Performed by: INTERNAL MEDICINE

## 2025-06-23 PROCEDURE — 99214 OFFICE O/P EST MOD 30 MIN: CPT | Performed by: INTERNAL MEDICINE

## 2025-06-23 PROCEDURE — 1159F MED LIST DOCD IN RCRD: CPT | Performed by: INTERNAL MEDICINE

## 2025-06-23 PROCEDURE — 1036F TOBACCO NON-USER: CPT | Performed by: INTERNAL MEDICINE

## 2025-06-23 PROCEDURE — 3074F SYST BP LT 130 MM HG: CPT | Performed by: INTERNAL MEDICINE

## 2025-06-23 PROCEDURE — 3078F DIAST BP <80 MM HG: CPT | Performed by: INTERNAL MEDICINE

## 2025-06-23 NOTE — PROGRESS NOTES
Patient:  Guicho Jones  YOB: 1940  MRN: [id number]       Impression/Plan:     Diagnoses and all orders for this visit:  TIA (transient ischemic attack)  -    CT scan had shown no events he cannot have an MRI  -    From a temporal standpoint symptoms correlated very well with a side effect to Dexilant.  This is not an absolute obviously but there was no evidence of atrial fibrillation or other dysrhythmia at that time to cause an event.  He has been asymptomatic since  -    Although 1 could argue for low-dose anticoagulation with Eliquis for cerebrovascular disease alone he has had significant GI bleeding in the recent past.  At this point I think without evidence of atrial fibrillation I would not use full anticoagulation but rather Monday through Friday aspirin 81 mg and continue Plavix.  Should neurologic symptoms recur would then consider resumption of full anticoagulation  Coronary artery disease involving native coronary artery of native heart without angina pectoris  -    No angina  Chronic diastolic congestive heart failure, NYHA class 2  -    Reasonably well compensated      Chief Complaint/Active Symptoms:      Chief Complaint   Patient presents with    Follow-up     Presents today for follow up of CHF AND LAB RESULTS       Guicho Jones is a 85 y.o. male who presents with  ischemic cardiomyopathy, echo 10/21 50% EF. Cath 6/15/2022 LM-30%. LAD-diffuse disease 40% distal. CX/RCA calcified diffuse disease. LVEDP 15. He has AICD secondary to prior severe decrease in overall systolic function. Also with persistent atrial fibrillation. He has a 5.6 cm complex dumbbell abdominal aortic aneurysm that has been followed by vascular surgery.  As of 2022 patient preferred no intervention.  Was to be intervened upon previously but he then developed prostate cancer and intervention postponed. Finally, he has centrilobular emphysema-severe.        February 2025 with gram-positive bacteremia noted at  admission [facility] when he was feeling extraordinarily ill PET scan suggested infection involving ascending aortic arch right atrial lead both with FDG uptake.  Cardiology at [facility] did not feel reasonable to remove the lead as the aorta would not be addressed and hence would not eliminate the infection.  He was treated with antibiotics prognosis was felt guarded at that time.     1/12/2025 PAOLO        1. The left ventricular systolic function is normal, with a visually estimated ejection fraction of 60-65%.   2. There is normal right ventricular global systolic function.   3. The left atrium is moderately dilated.   4. The right atrium is moderately dilated.   5. Moderate mitral valve regurgitation.   6. Moderate tricuspid regurgitation.   7. No left atrial thrombus.   8. No vegetations seen on the valvular structures or the pacemaker leads.   9. There is plaque visualized in the descending aorta.        5/2/2025 coronary angiography   1. Distal Left Main: 40% stenosis.   2. Left Main Coronary Artery: This artery is irregular and non-significant obstruction.   3. Left Anterior Descending Artery: presents luminal irregularities.   4. Mid LAD Lesion: The percent stenosis is 40%.   5. Circumflex Coronary Artery: presents luminal irregularities.   6. Distal CX Lesion: The percent stenosis is 100%.   7. Right Coronary Artery: presents luminal irregularities..      5/31/2025 seen in the emergency department with transient left hand weakness. Felt numb and tingling as well. No other neurologic events and improved spontaneously in the emergency department. CT of the brain without acute events. Angiography demonstrated no significant infarcts or hypoperfusion via angiography although significant atherosclerotic plaque was identified. Telemetry neurology evaluation felt symptoms consistent with TIA as they had resolved but very classic nonetheless. They wondered about anticoagulation though he had had  recurrent bleeding after the ulcer was clipped which is the reason he has not been on anticoagulation and A-fib burden is low. No A-fib was described in the emergency room. Patient refused admission. Left AMA. He did not meet criteria for intravenous thrombolysis.     I last saw him 6/2/2025 he had been having left arm weakness transiently significantly concerning for TIA.  Plan was to get a device check to assure no atrial fibrillation.  Because of the risk of stroke it was felt prudent to resume Eliquis despite potential risk of bleeding.  Is also concerning that perhaps it was the Dupixent given by pulmonary medicine which does have a 1% incidence of stroke and infarct.  Hence that agent was to be avoided.  Discussed possibility of watchman should bleeding recur.  Functional class II diastolic CHF.            6/19/2025 sodium 142 potassium 4.4 BUN of 33 creatinine 1.38 hemoglobin 10.5 unchanged from previously    Device check: Last A-fib March 2025.  Occasional short bursts of nonsustained VT    He denies angina, dyspnea, palpitation, edema, lightheadedness or syncope.  He has had no symptoms of claudication or neurologic deterioration.  There have been no hospitalizations or emergency room visits since last office visit.    He says he feels quite well.  He is fairly convinced that Dexilant contributed to his symptoms.  The symptoms began 2 days after he started it and gradually decreased after the initial dose.  He has had no neurologic symptoms since last office visit.  He has need of oxygen secondary to his pulmonary disease but that his shortness of breath is not changed.  He is fairly comfortable at his current functional capacity he can do things that he usually does without difficulty.  He has had no bleeding on the anticoagulation          Review of Systems: Unremarkable except as noted above    Meds     Current Outpatient Medications   Medication Instructions    albuterol (Proventil HFA) 90 mcg/actuation  inhaler 1 puff, Every 4 hours PRN    amiodarone (Pacerone) 200 mg tablet Take 1 tablet twice daily for 7 days and then 1 tablet  once daily    apixaban (ELIQUIS) 2.5 mg, oral, 2 times daily    atorvastatin (LIPITOR) 40 mg, oral, Daily    bumetanide (Bumex) 1 mg tablet TAKE ONE TABLET EVERY OTHER DAY    clopidogrel (PLAVIX) 75 mg, oral, Daily    docusate sodium (COLACE) 100 mg, 2 times daily    doxycycline (MONODOX) 100 mg, 2 times daily    ezetimibe (ZETIA) 10 mg, oral, Daily    ipratropium-albuteroL (Duo-Neb) 0.5-2.5 mg/3 mL nebulizer solution 3 mL, 4 times daily RT    isosorbide mononitrate ER (IMDUR) 30 mg, oral, Daily, 1 tablet daily    metoprolol succinate XL (TOPROL-XL) 100 mg, oral, Daily    montelukast (SINGULAIR) 10 mg, oral, Every evening    nitroglycerin (Nitrostat) 0.4 mg SL tablet DISSOLVE 1 TABLET UNDER THE TONGUE EVERY 5 MINUTES IF NEEDED FOR CHEST PAIN AS DIRECTED    oxygen (O2) 2 L/min, inhalation, Every 24 hours    polyethylene glycol (GLYCOLAX, MIRALAX) 17 g, Daily    spironolactone (ALDACTONE) 25 mg, oral, Every other day    tamsulosin (FLOMAX) 0.4 mg, oral, Daily    Trelegy Ellipta 200-62.5-25 mcg blister with device         Allergies   Allergies[1]      Annotated Problems     Specialty Problems          Cardiology Problems    CAD (coronary artery disease)    1/31/2024 perfusion study: Abnormal no evidence of ischemia prior inferolateral infarct LVEF 50%.  No change from previously    Echo from yesterday with 50% ejection fraction inferolateral hypokinesis trivial AI trivial to 1+ MR RVSP 55 mm.  Echo 2021 RVSP 43 mmHg   · Cath 6/15/2022 LM-30%. LAD-diffuse disease 40% distal. CX/RCA calcified diffuse      disease. LVEDP 15   3/19/2020. Lexiscan. Posterior lateral MI with mild jimmy-infarction ischemia. LVEF 42%.       Echocardiogram demonstrates 60% ejection fraction       2009. LVEF normal. LAD/RCA stents patent. Occluded distal circumflex. LM aneurysmal.       2008. BRONSON-proximal and mid LAD.  BRONSON RCA.       1994 acute inferior infarct         Abdominal aortic aneurysm    Follows with Dr. Ross and as of 10/2022 though felt to be a candidate for an off label fenestrated approach, patient preferred no intervention   · November 2021 vascular follow-up with Dr. Finney. Aorta at 5.6 cm. Intervention      postponed secondary to prostate cancer treatments   January 2021 dumbbell shaped abdominal aortic aneurysm maximal diameter 5 cm      involving origin of renal arteries         AICD (automatic cardioverter/defibrillator) present    Anticoagulant long-term use    Chronic diastolic congestive heart failure, NYHA class 2    Echo from yesterday with 50% ejection fraction inferolateral hypokinesis trivial AI trivial to 1+ MR RVSP 55 mm.  Echo 2021 RVSP 43 mmHg         Essential hypertension    Ischemic cardiomyopathy    January 2024 LVEF 50% RVSP 55   · October 2021 echo LVEF 55%.  Biatrial enlargement.  No significant valvular disease      March 2020 echo LVEF 60%.  Diastolic dysfunction      2002 50%      1996 25% after acute inferior infarct         Mixed hyperlipidemia    Persistent atrial fibrillation (Multi)    Renal artery stenosis    Sustained ventricular tachycardia (Multi)    Never smoked cigarettes    Paroxysmal atrial fibrillation (Multi)    Longstanding persistent atrial fibrillation (Multi)        Problem List     Patient Active Problem List    Diagnosis Date Noted    TIA (transient ischemic attack) 05/31/2025    Paroxysmal atrial fibrillation (Multi) 03/31/2025    Stage 3a chronic kidney disease (Multi) 03/11/2025    Bacteremia 01/23/2025    Diarrhea, unspecified type 01/10/2025    Hidradenitis suppurativa of anus 11/25/2024    Pulmonary hypertension (Multi) 02/02/2024    Never smoked cigarettes 02/01/2024    Primary osteoarthritis of right knee 11/13/2023    CVA (cerebral vascular accident) (Multi) 10/09/2023    Abdominal aortic aneurysm 09/29/2023    AICD (automatic cardioverter/defibrillator)  "present 09/29/2023    Allergic rhinitis, seasonal 09/29/2023    Centrilobular emphysema (Multi) 09/29/2023    Essential hypertension 09/29/2023    Hyperuricemia 09/29/2023    Infarction of right basal ganglia (Multi) 09/29/2023    Ischemic cardiomyopathy 09/29/2023    Mixed hyperlipidemia 09/29/2023    Sustained ventricular tachycardia (Multi) 09/29/2023    Persistent atrial fibrillation (Multi) 09/29/2023    Renal artery stenosis 09/29/2023    Anticoagulant long-term use 09/29/2023    Chronic diastolic congestive heart failure, NYHA class 2 09/29/2023    High risk medication use 09/29/2023    Tremor 09/29/2023    Prostate cancer (Multi) 09/29/2023    Spinal stenosis, lumbar region with neurogenic claudication 07/17/2018    CAD (coronary artery disease) 10/19/2016    Obesity, morbid (Multi) 10/19/2016    COPD exacerbation (Multi) 10/19/2016    Epiretinal membrane (ERM) of right eye 03/15/2016    Pseudophakia of both eyes 01/14/2016    Vitreomacular traction syndrome of right eye 01/14/2016    Dermatochalasis of right upper eyelid 08/17/2015    Dermatochalasis of left upper eyelid 08/17/2015    Degenerative retinal drusen of both eyes 08/17/2015    Floaters 08/17/2015    Lumbosacral spondylosis without myelopathy 07/23/2015    Blepharitis of both eyes 01/28/2015    Drusen (degenerative) of retina 01/28/2015    Choroidal nevus, right eye 01/28/2015    Pinguecula 01/28/2015    MGD (meibomian gland dysfunction) 01/28/2015    Longstanding persistent atrial fibrillation (Multi) 04/21/2025       Objective:     Vitals:    06/23/25 1047   BP: 124/60   BP Location: Left arm   Patient Position: Sitting   Pulse: 60   Weight: 100 kg (220 lb 12.8 oz)   Height: 1.676 m (5' 6\")      Wt Readings from Last 4 Encounters:   06/23/25 100 kg (220 lb 12.8 oz)   06/04/25 99 kg (218 lb 3.2 oz)   06/02/25 98.6 kg (217 lb 6.4 oz)   05/31/25 96.2 kg (212 lb)           LAB:     Lab Results   Component Value Date    WBC 7.6 06/19/2025    HGB " 10.5 (L) 06/19/2025    HCT 33.2 (L) 06/19/2025     06/19/2025    CHOL 105 05/31/2025    TRIG 151 (H) 05/31/2025    HDL 47.1 05/31/2025    ALT 10 05/31/2025    AST 14 05/31/2025     06/19/2025    K 4.4 06/19/2025     06/19/2025    CREATININE 1.38 (H) 06/19/2025    BUN 33 (H) 06/19/2025    CO2 24 06/19/2025    TSH 1.87 05/31/2025    INR 1.1 05/31/2025    HGBA1C 5.2 05/31/2025         Physical Exam     General Appearance: alert and oriented to person, place and time, in no acute distress, on oxygen  Cardiovascular: normal rate, regular rhythm, normal S1 and S2, no murmurs, rubs, clicks, or gallops,  no JVD  Pulmonary/Chest: clear to auscultation bilaterally- no wheezes, rales or rhonchi, normal air movement, no respiratory distress  Abdomen: soft, non-tender, non-distended, normal bowel sounds, no masses   Extremities: no cyanosis, clubbing or edema  Skin: warm and dry, no rash or erythema  Eyes: EOMI  Neck: supple and non-tender without mass, no thyromegaly   Neurological: alert, oriented, normal speech, no focal findings or movement disorder noted      Scribe Attestation  By signing my name below, I, Jaclyn Sears RN, Scribe   attest that this documentation has been prepared under the direction and in the presence of Hugh Degroot MD.        Provider attestation-scribe documentation  Any medical record entries made by the scribe were at my discretion and personally dictated by me.  I have reviewed the chart and agree that the record accurately reflects my personal performance of the history, physical exam, discussion and plan.                 [1]   Allergies  Allergen Reactions    Levofloxacin Palpitations     Rapid Heart Rate - Severe per pt.    Cefazolin Rash     rash, purpuric, leucocytoclastic vasculitis

## 2025-06-23 NOTE — PATIENT INSTRUCTIONS
FOLLOW UP WITH Dr. Hugh Degroot MD, [facility] IN 3 MONTHS    DISCONTINUE ELIQUIS AT THIS TIME    CONTINUE TAKING PLAVIX 75MG DAILY     START TAKING ASPIRIN 81MG TAKE ONE TABLET MONDAY THROUGH FRIDAY ONLY     DID YOU KNOW  We have a pharmacy here in the [facility].  They can fill all prescriptions, not just cardiac medications.  Prescriptions from other pharmacies can easily be transferred to the  pharmacy by the  pharmacist on site.   pharmacies offer FREE HOME DELIVERY on medications to anywhere in [place]. They can sync your medications. Typically prescriptions can be ready in 10 - 15 minutes. If pharmacy is unable to fill your  prescription or if cost is more than your paying now the Pharmacist can easily transfer back to your Pharmacy of choice. Pharmacy phone # [phone].     Please bring all medicines, vitamins, and herbal supplements with you in original bottles to every appointment! This is the best way to ensure your medication list in your chart is accurate.    Prescriptions will not be filled unless you are compliant with your follow up appointments or have a follow up appointment scheduled as per instruction of your physician. Refills should be requested at the time of your visit.

## 2025-06-26 ENCOUNTER — HOSPITAL ENCOUNTER (OUTPATIENT)
Dept: CT IMAGING | Age: 85
Discharge: HOME | End: 2025-06-28
Attending: INTERNAL MEDICINE
Payer: MEDICARE

## 2025-06-26 ENCOUNTER — HOSPITAL ENCOUNTER (OUTPATIENT)
Dept: PULMONOLOGY | Age: 85
Discharge: HOME | End: 2025-06-26
Attending: INTERNAL MEDICINE
Payer: MEDICARE

## 2025-06-26 DIAGNOSIS — J44.9 CHRONIC OBSTRUCTIVE PULMONARY DISEASE, UNSPECIFIED COPD TYPE (HCC): ICD-10-CM

## 2025-06-26 PROCEDURE — 71250 CT THORAX DX C-: CPT

## 2025-06-26 RX ORDER — ALBUTEROL SULFATE 0.83 MG/ML
SOLUTION RESPIRATORY (INHALATION)
Status: DISCONTINUED
Start: 2025-06-26 | End: 2025-06-26 | Stop reason: WASHOUT

## 2025-06-27 ENCOUNTER — HOSPITAL ENCOUNTER (OUTPATIENT)
Dept: PULMONOLOGY | Age: 85
Discharge: HOME | End: 2025-06-27
Attending: INTERNAL MEDICINE
Payer: MEDICARE

## 2025-06-27 PROCEDURE — 6360000002 HC RX W HCPCS

## 2025-06-27 PROCEDURE — 94726 PLETHYSMOGRAPHY LUNG VOLUMES: CPT

## 2025-06-27 PROCEDURE — 94060 EVALUATION OF WHEEZING: CPT

## 2025-06-27 PROCEDURE — 94729 DIFFUSING CAPACITY: CPT

## 2025-06-27 RX ORDER — ALBUTEROL SULFATE 0.83 MG/ML
SOLUTION RESPIRATORY (INHALATION)
Status: COMPLETED
Start: 2025-06-27 | End: 2025-06-27

## 2025-06-27 RX ADMIN — ALBUTEROL SULFATE 2.5 MG: 2.5 SOLUTION RESPIRATORY (INHALATION) at 12:15

## 2025-06-30 ENCOUNTER — APPOINTMENT (OUTPATIENT)
Dept: CARDIOLOGY | Facility: CLINIC | Age: 85
End: 2025-06-30
Payer: MEDICARE

## 2025-06-30 VITALS
SYSTOLIC BLOOD PRESSURE: 136 MMHG | HEIGHT: 66 IN | HEART RATE: 60 BPM | WEIGHT: 224.2 LBS | DIASTOLIC BLOOD PRESSURE: 62 MMHG | BODY MASS INDEX: 36.03 KG/M2

## 2025-06-30 DIAGNOSIS — I49.8 OTHER SPECIFIED CARDIAC ARRHYTHMIAS: ICD-10-CM

## 2025-06-30 DIAGNOSIS — Z79.899 HIGH RISK MEDICATION USE: ICD-10-CM

## 2025-06-30 DIAGNOSIS — I25.10 CORONARY ARTERY DISEASE INVOLVING NATIVE CORONARY ARTERY OF NATIVE HEART WITHOUT ANGINA PECTORIS: ICD-10-CM

## 2025-06-30 DIAGNOSIS — I50.32 CHRONIC DIASTOLIC CONGESTIVE HEART FAILURE, NYHA CLASS 2: ICD-10-CM

## 2025-06-30 DIAGNOSIS — I48.11 LONGSTANDING PERSISTENT ATRIAL FIBRILLATION (MULTI): ICD-10-CM

## 2025-06-30 DIAGNOSIS — I50.30 CHF NYHA CLASS III, UNSPECIFIED FAILURE CHRONICITY, DIASTOLIC (MULTI): ICD-10-CM

## 2025-06-30 DIAGNOSIS — I25.10 CORONARY ARTERY DISEASE INVOLVING NATIVE CORONARY ARTERY OF NATIVE HEART WITHOUT ANGINA PECTORIS: Primary | ICD-10-CM

## 2025-06-30 DIAGNOSIS — I25.5 ISCHEMIC CARDIOMYOPATHY: ICD-10-CM

## 2025-06-30 DIAGNOSIS — Z95.810 AICD (AUTOMATIC CARDIOVERTER/DEFIBRILLATOR) PRESENT: ICD-10-CM

## 2025-06-30 DIAGNOSIS — I48.0 PAROXYSMAL ATRIAL FIBRILLATION (MULTI): ICD-10-CM

## 2025-06-30 DIAGNOSIS — I47.20 PAROXYSMAL VT: ICD-10-CM

## 2025-06-30 DIAGNOSIS — I47.20 SUSTAINED VENTRICULAR TACHYCARDIA (MULTI): ICD-10-CM

## 2025-06-30 DIAGNOSIS — Z87.891 FORMER SMOKER: ICD-10-CM

## 2025-06-30 PROCEDURE — 3078F DIAST BP <80 MM HG: CPT | Performed by: INTERNAL MEDICINE

## 2025-06-30 PROCEDURE — 3075F SYST BP GE 130 - 139MM HG: CPT | Performed by: INTERNAL MEDICINE

## 2025-06-30 PROCEDURE — 1159F MED LIST DOCD IN RCRD: CPT | Performed by: INTERNAL MEDICINE

## 2025-06-30 PROCEDURE — 93283 PRGRMG EVAL IMPLANTABLE DFB: CPT | Performed by: INTERNAL MEDICINE

## 2025-06-30 PROCEDURE — 99215 OFFICE O/P EST HI 40 MIN: CPT | Performed by: INTERNAL MEDICINE

## 2025-06-30 PROCEDURE — 1036F TOBACCO NON-USER: CPT | Performed by: INTERNAL MEDICINE

## 2025-06-30 RX ORDER — ASPIRIN 81 MG/1
81 TABLET ORAL
COMMUNITY

## 2025-06-30 NOTE — PATIENT INSTRUCTIONS
Follow up in 6 weeks with [place] Rep device check    DID YOU KNOW  We have a pharmacy here in the [facility].  They can fill all prescriptions, not just cardiac medications.  Prescriptions from other pharmacies can easily be transferred to the  pharmacy by the  pharmacist on site.   pharmacies offer FREE HOME DELIVERY on medications to anywhere in [place]. They can sync your medications. Typically prescriptions can be ready in 10 - 15 minutes. If pharmacy is unable to fill your  prescription or if cost is more than your paying now the Pharmacist can easily transfer back to your Pharmacy of choice. Pharmacy phone # [phone].   Please bring all medicines, vitamins, and herbal supplements with you in original bottles to every appointment  Prescriptions will not be filled unless you are compliant with your follow up appointments or have a follow up appointment scheduled as per instruction of your physician. Refills should be requested at the time of your visit.

## 2025-06-30 NOTE — PROGRESS NOTES
CARDIOLOGY OFFICE VISIT      CHIEF COMPLAINT  Chief Complaint   Patient presents with    Follow-up     Presents today for follow up of device and have it checked       HISTORY OF PRESENT ILLNESS  HPI  85-year-old male  with a past medical history of coronary artery disease with normal  left ventricular function on echocardiogram in March 2008, congestive  heart failure New York Heart Association class 2, remote ventricular  fibrillation RVR status post single-chamber ICD implanted in 1994  with multiple generator change outs and also with an abdominal  implant. His device was upgraded to a dual-chamber ICD in the  prepectoral side due to evidence of atrial fibrillation. He was  placed on sotalol.     His echocardiogram performed in March 2020 showed normal left ventricular function value 55 to 60% with 1+ mitral regurgitation. Stress test at the same time, show a small area of jimmy-infarct ischemia in the posterior lateral area and also a small posterolateral myocardial infarction with a left ventricular ejection fraction 42%.     Looking at his records, patient was admitted in December 2021 for COPD exacerbation and apparently pneumonia. Since then patient has been using oxygen therapy at home. Covidâ€“19 was negative.     Patient had an a stress test in February 2021 that shows moderate inferior posterior lateral perfusion defect consistent with myocardial infarction with no evidence of ischemia with a left ventricular ejection fraction 45%.     February 2022 device interrogation, patient had an episode of atrial fibrillation that lasted approximately 15 hours. In the middle of these episodes, patient had episode of ventricular tachycardia (polymorphic) that triggered ICD charging but no ICD shock was delivered. Episode was aborted.     Patient had a cardiac catheterization in May 2022 that shows left main 30%, LAD diffusely diseased. Distal LAD 40%. Circumflex diffusely diseased with right coronary artery  calcified. Medical therapy was recommended.     He recalls having an episode of dizziness and syncope while walking to the bathroom in December 31, 2023 around 10 PM.  Device interrogation showed that the patient had an episode of atrial fibrillation with episodes of atrial fibrillation with rapid ventricular response or change intraventricular tachycardia rate of 288 bpm.  This episode was self terminated before device deliver an ICD shock.  Second episode was in January 2024 with an episode of atrial fibrillation that ended with ventricular tachycardia self terminated into atrial fibrillation again.     Patient still using sotalol therapy.     During the last office visit back in February 2024, we discussed the option of changing antiarrhythmic therapy.  Patient discontinue sotalol.  Patient had an EKG performed few days later with QT interval above 500 ms.  Patient did not receive the phone call to start amiodarone.  Patient presented to emergency department few weeks later complaining of palpitations and he was found to be in atrial fibrillation.  He saw general cardiology service recently (Dr. Hugh Perez) who put patient back on amiodarone 400 mg 3 times a day for a week and then 400 mg twice a day for a week and then 400 mg daily for 2 weeks and then 200 mg daily.     Patient was seen in my office in November 2024.  At time he was maintaining sinus rhythm.  No significant ventricular arrhythmias.  He was on amiodarone 100 mg daily.     Patient was admitted at [facility] in January 2025.  He presented to [facility] emergency department by EMS for fall. Reports  after eating pizza began to have nausea, vomiting, and diarrhea. Nausea and vomiting improved but had persistent diarrhea. Last night at approx 0130 slipped out of the bed when attempting to get up to the bathroom. Wife was able to assist back into the bed but this morning was unable to ambulate due to weakness prompting family to call EMS.  Denies sick contacts and no other family members with similar symptoms.      Patient has prolonged ICU course.  Bacteremia.  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  Infectious disease following.  Will require 6 weeks of IV Ancef.  Developed GI bleed.  Seen by gastroenterology.  Underwent emergent EGD.  Clipped a gastric ulcer.  Had blood loss anemia.  Required a total of 3 units PRBCs.  Hemoglobin stable.  Tolerating soft diet.  Continue 40 mg twice daily proton pump inhibitor.  Continue sucralfate 1 g 3 times daily for 8 weeks.  Resume anticoagulation 72 hours after EGD.     1/25/25  Addendum  Patient was appropriate for discharge  however became concerning about possible fungal infection in the ulcer.  To rule out perforation he underwent stat CT abdomen no acute.  Gastroenterology and infectious disease evaluation and discussion with pathology.  Noted that fungal elements are likely colonization.  However, to be sure, he will be treated with IV antifungal for 5 days.  He was readmitted in February 2025 at [facility] for diffuse rash in the lower extremities and also anasarca.  Questionable GI bleed.  There was a question for rash due to medications.  Cefazolin was changed to vancomycin.  Dermatology recommended skin biopsy and the patient was transferred to [facility] as requested.  He also had an endoscopy that shows a single ulcer in the fundus of the stomach reflecting maintenance.  Placed 3 clips successfully.  He was continued on PPI twice a day.  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  Both show FDG avidity on PET scan.  The discussion about removing the ICD lead was performed but this could not provide optimal source control due to inoperable aortic arch infection.  During this admission he developed acute respiratory distress requiring  MICU evaluation/pulmonary edema.  He responded very well with aggressive diuresis.  He was  discharged home on diuretic therapy.  He also received 2 units of PRBC.       PFT   April 2024, FEV1 91% FEV1/FVC 0.62, no significant response to bronchodilator  January 2022, FEV1 57%, FEV1/FVC 0.57  Echo done at  January 2024, EF 55%, diastolic dysfunction, RVSP 50-55.  And valvular heart disease        Patient had a device interrogation remotely 8/20/2025.  Shows dual-chamber ICD [employer] with battery longevity 3.5 years.  Had 2 episodes of atrial fibrillation 9 hours of duration.  Rate average 104 bpm.     Subsequent transmission in April 14, 2025 shows 1 episode of ventricular tachycardia on April 13, 2025 at 2:20 AM.  Rate 222 bpm.  ATP x 1 terminated the arrhythmia.     Subsequent transmission in April 7, 2025 shows 1 episode of ventricular tachycardia at 4:49 AM.  A rate of 225 bpm.  Terminated with ATP x 2.  Both episodes asymptomatic.    1/12/2025 PAOLO        1. The left ventricular systolic function is normal, with a visually estimated ejection fraction of 60-65%.   2. There is normal right ventricular global systolic function.   3. The left atrium is moderately dilated.   4. The right atrium is moderately dilated.   5. Moderate mitral valve regurgitation.   6. Moderate tricuspid regurgitation.   7. No left atrial thrombus.   8. No vegetations seen on the valvular structures or the pacemaker leads.   9. There is plaque visualized in the descending aorta.        2/19/2025 TTE ([facility])  - The left ventricle is normal in size. Left ventricular systolic function is   normal. EF = 58 ± 5% (2D biplane) Prior EF was reported as 57%.   - The right ventricle is normal in size. Right ventricular systolic function is   normal.   - The visualized aorta is dilated with a maximal dimension of 4.6 cm.   - There is moderate (2+) mitral holosystolic valve regurgitation.         Due to evidence of ventricular arrhythmias he was scheduled for a cardiac catheterization.  He was also reloaded with amiodarone  therapy      Cardiac cath 05/2025  Findings of the [facility] revealed patent previous stent in the LAD, mild diffuse disease elsewhere and an LVEF of 40%. Medical management is advised.  Device interrogation today during this evaluation shows 3 episodes of nonsustained ventricular tachycardia since the last device interrogation in April 2025.  No sustained arrhythmias.     Since the last office visit, he has been doing well.  He denies any symptoms of chest pain or shortness breath or palpitations.    Laboratory data in June 2025 shows creatinine 1.3.    Device interrogation today during this office visit shows dual-chamber ICD [employer] with battery longevity 2.5 years.  There was 1 brief episode of nonsustained ventricular tachycardia on June 17, 2025 at 11:54 PM.  Rate up to 233 bpm.  No therapies delivered.  12 seconds.        Past Medical History  Medical History[1]    Social History  Social History[2]    Family History   Family History[3]     Allergies:  RX Allergies[4]     Outpatient Medications:  Current Outpatient Medications   Medication Instructions    albuterol (Proventil HFA) 90 mcg/actuation inhaler 1 puff, Every 4 hours PRN    amiodarone (Pacerone) 200 mg tablet Take 1 tablet twice daily for 7 days and then 1 tablet  once daily    aspirin 81 mg, oral, Once daily (morning) M-F (5 days a week)    atorvastatin (LIPITOR) 40 mg, oral, Daily    bumetanide (Bumex) 1 mg tablet TAKE ONE TABLET EVERY OTHER DAY    clopidogrel (PLAVIX) 75 mg, oral, Daily    docusate sodium (COLACE) 100 mg, 2 times daily    doxycycline (MONODOX) 100 mg, 2 times daily    ezetimibe (ZETIA) 10 mg, oral, Daily    ipratropium-albuteroL (Duo-Neb) 0.5-2.5 mg/3 mL nebulizer solution 3 mL, 4 times daily RT    isosorbide mononitrate ER (IMDUR) 30 mg, oral, Daily, 1 tablet daily    metoprolol succinate XL (TOPROL-XL) 100 mg, oral, Daily    montelukast (SINGULAIR) 10 mg, oral, Every evening    nitroglycerin (Nitrostat) 0.4 mg SL tablet  DISSOLVE 1 TABLET UNDER THE TONGUE EVERY 5 MINUTES IF NEEDED FOR CHEST PAIN AS DIRECTED    oxygen (O2) 2 L/min, inhalation, Every 24 hours    polyethylene glycol (GLYCOLAX, MIRALAX) 17 g, Daily    spironolactone (ALDACTONE) 25 mg, oral, Every other day    tamsulosin (FLOMAX) 0.4 mg, oral, Daily    Trelegy Ellipta 200-62.5-25 mcg blister with device           REVIEW OF SYSTEMS  ROS      VITALS  Vitals:    06/30/25 1151   BP: 136/62   Pulse: 60       PHYSICAL EXAM  Constitutional:       Appearance: Healthy appearance. Not in distress.   Neck:      Vascular: No JVR. JVD normal.   Pulmonary:      Effort: Pulmonary effort is normal.      Breath sounds: Normal breath sounds. No wheezing. No rhonchi. No rales.   Chest:      Chest wall: Not tender to palpatation.   Cardiovascular:      PMI at left midclavicular line. Normal rate. Regular rhythm. Normal S1. Normal S2.       Murmurs: There is no murmur.      No gallop.  No click. No rub.      Comments: Device left pectoral area. No hematoma or infection noted.     Pulses:     Intact distal pulses.   Edema:     Peripheral edema absent.   Abdominal:      General: Bowel sounds are normal.      Palpations: Abdomen is soft.      Tenderness: There is no abdominal tenderness.   Musculoskeletal: Normal range of motion.         General: No tenderness. Skin:     General: Skin is warm and dry.   Neurological:      General: No focal deficit present.      Mental Status: Alert and oriented to person, place and time.           ASSESSMENT AND PLAN      CLINICAL IMPRESSION:   1. Status post cardiac arrest. Status post ICD therapy, device interrogation reviewed with patient during this evaluation  2. Ischemic cardiomyopathy. Stable  3. Congestive heart failure, New York Heart Association class II. Compensated  4. Persistent atrial fibrillation. Device interrogation reviewed during this evaluation with patient  5. High-risk medication.  Of sotalol therapy.  Now on amiodarone  6. Ventricular  tachycardia. Recurrence of this arrhythmia by device interrogation by episode in February 2022. Also recurrent of ventricular tachycardia by device interrogation 6 April 2025 terminated with ATP. Plan discussed with patient during this office visit  7. Coronary artery disease with percutaneous coronary interventions  in the past.  Recent cardiac catheterization in May 2025.  Medical therapy was recommended.  8. Hypertension. Controlled  9. Hyperlipidemia. Stable  10. Chronic obstructive pulmonary disease. No recurrence  11. Long-term anticoagulation therapy with Eliquis. No evidence of bleeding  12. Shortness of breath with recent hospitalization in December 2021 due to COPD exacerbationâ€“pneumonia, now with oxygen therapy followed by pulmonary service. Stable  13.  Syncope associated with ventricular arrhythmias.    Recommendation    I will continue with observation for now.  I have personally reviewed device interrogation with patient today.  Asymptomatic.  Continue with amiodarone current dose.  If he has persistent episodes of nonsustained ventricular tachycardia or ICD therapy, he will be reloaded with amiodarone and we may have to consider mexiletine.    Risk factor modification and lifestyle modification discussed with patient. Diet , exercise and hydration discussed with patient.    I have personally review with patient during this office visit, laboratory data, echocardiogram results, stress test results, Holter-event monitor results prior and after the last electrophysiology visit. All questions has been answered.    Follow my office in 4 to 6 weeks or sooner if needed    Please excuse any errors in grammar or translation related to this dictation.  Voice recognition software was utilized to prepare this document.      I, Dr. Bassett, personally performed the services described in the documentation as scribed by the nurse in my presence, and confirm it is both accurate and complete.   Scribe Attestation  By  signing my name below, I, Chelita Langley RN  , [place]   attest that this documentation has been prepared under the direction and in the presence of Joce Bassett MD.           [1]   Past Medical History:  Diagnosis Date    Allergic     Arthritis     Cancer (Multi)     Cataract     CHF (congestive heart failure)     COPD (chronic obstructive pulmonary disease) (Multi)     Encounter for follow-up examination after completed treatment for conditions other than malignant neoplasm 2021    Hospital discharge follow-up    Heart disease     Hypertension     Ischemic cardiomyopathy     Ischemic cardiomyopathy with implantable cardioverter-defibrillator (ICD)    Myocardial infarction (Multi)     Pain in unspecified hip     Joint pain, hip    Personal history of other diseases of the musculoskeletal system and connective tissue     History of low back pain    Personal history of other endocrine, nutritional and metabolic disease     History of hyperlipidemia    Personal history of other specified conditions 2021    History of elevated prostate specific antigen (PSA)    Presence of coronary angioplasty implant and graft     Stented coronary artery    Unspecified atrial flutter (Multi)     Paroxysmal atrial flutter   [2]   Social History  Tobacco Use    Smoking status: Former     Current packs/day: 0.00     Average packs/day: 1.5 packs/day for 15.0 years (22.5 ttl pk-yrs)     Types: Cigarettes     Quit date:      Years since quittin.5     Passive exposure: Never    Smokeless tobacco: Never   Vaping Use    Vaping status: Never Used   Substance Use Topics    Alcohol use: Yes     Alcohol/week: 3.0 standard drinks of alcohol     Types: 3 Standard drinks or equivalent per week     Comment: occassional    Drug use: Never   [3]   Family History  Problem Relation Name Age of Onset    Liver disease Mother Ysabel. Campbell Jones     Arthritis Mother Ysabel. Campbell Jones     Coronary artery disease Father Guicho  Arnold Jones     Atrial fibrillation Father Guicho Jones     Heart disease Father Guicho Jones     Cancer Sister Sally Jones    [4]   Allergies  Allergen Reactions    Levofloxacin Palpitations     Rapid Heart Rate - Severe per pt.    Cefazolin Rash     rash, purpuric, leucocytoclastic vasculitis

## 2025-07-10 ENCOUNTER — HOSPITAL ENCOUNTER (OUTPATIENT)
Dept: CARDIOLOGY | Age: 85
Discharge: HOME | End: 2025-07-10

## 2025-07-10 ENCOUNTER — OFFICE VISIT (OUTPATIENT)
Age: 85
End: 2025-07-10
Payer: MEDICARE

## 2025-07-10 VITALS — HEART RATE: 60 BPM | OXYGEN SATURATION: 95 % | SYSTOLIC BLOOD PRESSURE: 124 MMHG | DIASTOLIC BLOOD PRESSURE: 62 MMHG

## 2025-07-10 DIAGNOSIS — E66.812 CLASS 2 OBESITY DUE TO EXCESS CALORIES WITHOUT SERIOUS COMORBIDITY WITH BODY MASS INDEX (BMI) OF 36.0 TO 36.9 IN ADULT: ICD-10-CM

## 2025-07-10 DIAGNOSIS — J96.12 CHRONIC RESPIRATORY FAILURE WITH HYPOXIA AND HYPERCAPNIA (HCC): ICD-10-CM

## 2025-07-10 DIAGNOSIS — R94.2 DIFFUSION CAPACITY OF LUNG (DL), DECREASED: ICD-10-CM

## 2025-07-10 DIAGNOSIS — J44.9 CHRONIC OBSTRUCTIVE PULMONARY DISEASE, UNSPECIFIED COPD TYPE (HCC): Primary | ICD-10-CM

## 2025-07-10 DIAGNOSIS — J30.89 NON-SEASONAL ALLERGIC RHINITIS, UNSPECIFIED TRIGGER: ICD-10-CM

## 2025-07-10 DIAGNOSIS — Z79.899 ON AMIODARONE THERAPY: ICD-10-CM

## 2025-07-10 DIAGNOSIS — J96.11 CHRONIC RESPIRATORY FAILURE WITH HYPOXIA AND HYPERCAPNIA (HCC): ICD-10-CM

## 2025-07-10 DIAGNOSIS — E66.09 CLASS 2 OBESITY DUE TO EXCESS CALORIES WITHOUT SERIOUS COMORBIDITY WITH BODY MASS INDEX (BMI) OF 36.0 TO 36.9 IN ADULT: ICD-10-CM

## 2025-07-10 PROCEDURE — 1123F ACP DISCUSS/DSCN MKR DOCD: CPT | Performed by: INTERNAL MEDICINE

## 2025-07-10 PROCEDURE — 1159F MED LIST DOCD IN RCRD: CPT | Performed by: INTERNAL MEDICINE

## 2025-07-10 PROCEDURE — 99214 OFFICE O/P EST MOD 30 MIN: CPT | Performed by: INTERNAL MEDICINE

## 2025-07-10 PROCEDURE — 1036F TOBACCO NON-USER: CPT | Performed by: INTERNAL MEDICINE

## 2025-07-10 PROCEDURE — 3023F SPIROM DOC REV: CPT | Performed by: INTERNAL MEDICINE

## 2025-07-10 PROCEDURE — G8427 DOCREV CUR MEDS BY ELIG CLIN: HCPCS | Performed by: INTERNAL MEDICINE

## 2025-07-10 PROCEDURE — G8417 CALC BMI ABV UP PARAM F/U: HCPCS | Performed by: INTERNAL MEDICINE

## 2025-07-10 RX ORDER — TAMSULOSIN HYDROCHLORIDE 0.4 MG/1
CAPSULE ORAL
COMMUNITY
Start: 2025-07-05

## 2025-07-10 RX ORDER — APIXABAN 2.5 MG/1
TABLET, FILM COATED ORAL
COMMUNITY
Start: 2025-06-04

## 2025-07-10 RX ORDER — FLUTICASONE FUROATE, UMECLIDINIUM BROMIDE AND VILANTEROL TRIFENATATE 200; 62.5; 25 UG/1; UG/1; UG/1
1 POWDER RESPIRATORY (INHALATION) DAILY
Qty: 1 EACH | Refills: 4 | Status: SHIPPED | OUTPATIENT
Start: 2025-07-10

## 2025-07-10 RX ORDER — IPRATROPIUM BROMIDE 42 UG/1
2 SPRAY, METERED NASAL 4 TIMES DAILY
Qty: 15 ML | Refills: 3 | Status: SHIPPED | OUTPATIENT
Start: 2025-07-10

## 2025-07-10 NOTE — PROGRESS NOTES
07/10/25 0930   BP: 124/62   BP Site: Right Upper Arm   Patient Position: Sitting   BP Cuff Size: Medium Adult   Pulse: 60   SpO2: 95%         Physical Exam  Constitutional:       Appearance: He is well-developed.   HENT:      Head: Normocephalic and atraumatic.   Eyes:      General:         Left eye: No discharge.      Conjunctiva/sclera: Conjunctivae normal.      Pupils: Pupils are equal, round, and reactive to light.   Neck:      Vascular: No JVD.   Cardiovascular:      Rate and Rhythm: Normal rate and regular rhythm.      Heart sounds: Normal heart sounds. No murmur heard.     No friction rub. No gallop.   Pulmonary:      Effort: Pulmonary effort is normal. No respiratory distress.      Breath sounds: Normal breath sounds. No wheezing or rales.   Chest:      Chest wall: No tenderness.   Abdominal:      General: Bowel sounds are normal.      Palpations: Abdomen is soft.   Musculoskeletal:         General: No tenderness or deformity.      Cervical back: Normal range of motion and neck supple.      Right lower leg: No edema.      Left lower leg: No edema.   Skin:     General: Skin is warm and dry.   Neurological:      Mental Status: He is alert and oriented to person, place, and time.   Psychiatric:         Judgment: Judgment normal.       Imaging studies reviewed and interpreted by me CT chest.June 2025, no lung mass or nodule,      Lab results reviewed in chart  PFT   June 2025 FEV1 92% FEV1/FVC 0.64  April 2024, FEV1 91% FEV1/FVC 0.62,   January 2022, FEV1 57%, FEV1/FVC 0.57      Echo done at  January 2024, EF 55%, diastolic dysfunction, RVSP 50-55.  And valvular heart disease    Assessment and Plan       Diagnosis Orders   1. Chronic obstructive pulmonary disease, unspecified COPD type (HCC)  fluticasone-umeclidin-vilant (TRELEGY ELLIPTA) 200-62.5-25 MCG/ACT AEPB inhaler      2. Non-seasonal allergic rhinitis, unspecified trigger  ipratropium (ATROVENT) 0.06 % nasal spray      3. Chronic respiratory failure

## 2025-07-22 ENCOUNTER — TELEPHONE (OUTPATIENT)
Age: 85
End: 2025-07-22

## 2025-07-29 ENCOUNTER — HOSPITAL ENCOUNTER (OUTPATIENT)
Dept: CARDIOLOGY | Age: 85
Discharge: HOME | End: 2025-07-29
Payer: MEDICARE

## 2025-07-29 PROCEDURE — 93295 DEV INTERROG REMOTE 1/2/MLT: CPT | Performed by: INTERNAL MEDICINE

## 2025-07-29 PROCEDURE — 93296 REM INTERROG EVL PM/IDS: CPT

## 2025-08-04 ENCOUNTER — APPOINTMENT (OUTPATIENT)
Dept: CARDIOLOGY | Facility: CLINIC | Age: 85
End: 2025-08-04
Payer: MEDICARE

## 2025-08-04 VITALS
DIASTOLIC BLOOD PRESSURE: 60 MMHG | BODY MASS INDEX: 36.35 KG/M2 | HEART RATE: 60 BPM | WEIGHT: 226.2 LBS | SYSTOLIC BLOOD PRESSURE: 120 MMHG | HEIGHT: 66 IN

## 2025-08-04 DIAGNOSIS — I50.32 CHRONIC DIASTOLIC CONGESTIVE HEART FAILURE, NYHA CLASS 2: ICD-10-CM

## 2025-08-04 DIAGNOSIS — I47.20 PAROXYSMAL VT: ICD-10-CM

## 2025-08-04 DIAGNOSIS — Z95.810 AICD (AUTOMATIC CARDIOVERTER/DEFIBRILLATOR) PRESENT: ICD-10-CM

## 2025-08-04 DIAGNOSIS — I25.5 ISCHEMIC CARDIOMYOPATHY: ICD-10-CM

## 2025-08-04 DIAGNOSIS — I48.11 LONGSTANDING PERSISTENT ATRIAL FIBRILLATION (MULTI): ICD-10-CM

## 2025-08-04 DIAGNOSIS — I25.10 CORONARY ARTERY DISEASE INVOLVING NATIVE CORONARY ARTERY OF NATIVE HEART WITHOUT ANGINA PECTORIS: ICD-10-CM

## 2025-08-04 DIAGNOSIS — Z87.891 FORMER SMOKER: ICD-10-CM

## 2025-08-04 DIAGNOSIS — Z79.899 HIGH RISK MEDICATION USE: ICD-10-CM

## 2025-08-04 DIAGNOSIS — I49.8 OTHER SPECIFIED CARDIAC ARRHYTHMIAS: ICD-10-CM

## 2025-08-04 PROCEDURE — 3078F DIAST BP <80 MM HG: CPT | Performed by: INTERNAL MEDICINE

## 2025-08-04 PROCEDURE — 99215 OFFICE O/P EST HI 40 MIN: CPT | Performed by: INTERNAL MEDICINE

## 2025-08-04 PROCEDURE — 93000 ELECTROCARDIOGRAM COMPLETE: CPT | Performed by: INTERNAL MEDICINE

## 2025-08-04 PROCEDURE — 93283 PRGRMG EVAL IMPLANTABLE DFB: CPT | Performed by: INTERNAL MEDICINE

## 2025-08-04 PROCEDURE — 1159F MED LIST DOCD IN RCRD: CPT | Performed by: INTERNAL MEDICINE

## 2025-08-04 PROCEDURE — 3074F SYST BP LT 130 MM HG: CPT | Performed by: INTERNAL MEDICINE

## 2025-08-04 RX ORDER — AMIODARONE HYDROCHLORIDE 200 MG/1
TABLET ORAL
Qty: 120 TABLET | Refills: 1 | Status: SHIPPED | OUTPATIENT
Start: 2025-08-04

## 2025-08-04 NOTE — PROGRESS NOTES
CARDIOLOGY OFFICE VISIT      CHIEF COMPLAINT  Chief Complaint   Patient presents with    Follow-up     Presents today for follow up of TIA/CAD         HISTORY OF PRESENT ILLNESS  HPI  85-year-old male  with a past medical history of coronary artery disease with normal  left ventricular function on echocardiogram in March 2008, congestive  heart failure New York Heart Association class 2, remote ventricular  fibrillation RVR status post single-chamber ICD implanted in 1994  with multiple generator change outs and also with an abdominal  implant. His device was upgraded to a dual-chamber ICD in the  prepectoral side due to evidence of atrial fibrillation. He was  placed on sotalol.     His echocardiogram performed in March 2020 showed normal left ventricular function value 55 to 60% with 1+ mitral regurgitation. Stress test at the same time, show a small area of jimmy-infarct ischemia in the posterior lateral area and also a small posterolateral myocardial infarction with a left ventricular ejection fraction 42%.     Looking at his records, patient was admitted in December 2021 for COPD exacerbation and apparently pneumonia. Since then patient has been using oxygen therapy at home. Covidâ€“19 was negative.     Patient had an a stress test in February 2021 that shows moderate inferior posterior lateral perfusion defect consistent with myocardial infarction with no evidence of ischemia with a left ventricular ejection fraction 45%.     February 2022 device interrogation, patient had an episode of atrial fibrillation that lasted approximately 15 hours. In the middle of these episodes, patient had episode of ventricular tachycardia (polymorphic) that triggered ICD charging but no ICD shock was delivered. Episode was aborted.     Patient had a cardiac catheterization in May 2022 that shows left main 30%, LAD diffusely diseased. Distal LAD 40%. Circumflex diffusely diseased with right coronary artery calcified. Medical  therapy was recommended.     He recalls having an episode of dizziness and syncope while walking to the bathroom in December 31, 2023 around 10 PM.  Device interrogation showed that the patient had an episode of atrial fibrillation with episodes of atrial fibrillation with rapid ventricular response or change intraventricular tachycardia rate of 288 bpm.  This episode was self terminated before device deliver an ICD shock.  Second episode was in January 2024 with an episode of atrial fibrillation that ended with ventricular tachycardia self terminated into atrial fibrillation again.     Patient still using sotalol therapy.     During the last office visit back in February 2024, we discussed the option of changing antiarrhythmic therapy.  Patient discontinue sotalol.  Patient had an EKG performed few days later with QT interval above 500 ms.  Patient did not receive the phone call to start amiodarone.  Patient presented to emergency department few weeks later complaining of palpitations and he was found to be in atrial fibrillation.  He saw general cardiology service recently (Dr. Hugh Perez) who put patient back on amiodarone 400 mg 3 times a day for a week and then 400 mg twice a day for a week and then 400 mg daily for 2 weeks and then 200 mg daily.     Patient was seen in my office in November 2024.  At time he was maintaining sinus rhythm.  No significant ventricular arrhythmias.  He was on amiodarone 100 mg daily.     Patient was admitted at [facility] in January 2025.  He presented to [facility] emergency department by EMS for fall. Reports  after eating pizza began to have nausea, vomiting, and diarrhea. Nausea and vomiting improved but had persistent diarrhea. Last night at approx 0130 slipped out of the bed when attempting to get up to the bathroom. Wife was able to assist back into the bed but this morning was unable to ambulate due to weakness prompting family to call EMS. Denies sick contacts and  no other family members with similar symptoms.      Patient has prolonged ICU course.  Bacteremia.  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  Infectious disease following.  Will require 6 weeks of IV Ancef.  Developed GI bleed.  Seen by gastroenterology.  Underwent emergent EGD.  Clipped a gastric ulcer.  Had blood loss anemia.  Required a total of 3 units PRBCs.  Hemoglobin stable.  Tolerating soft diet.  Continue 40 mg twice daily proton pump inhibitor.  Continue sucralfate 1 g 3 times daily for 8 weeks.  Resume anticoagulation 72 hours after EGD.     1/25/25  Addendum  Patient was appropriate for discharge  however became concerning about possible fungal infection in the ulcer.  To rule out perforation he underwent stat CT abdomen no acute.  Gastroenterology and infectious disease evaluation and discussion with pathology.  Noted that fungal elements are likely colonization.  However, to be sure, he will be treated with IV antifungal for 5 days.  He was readmitted in February 2025 at [facility] for diffuse rash in the lower extremities and also anasarca.  Questionable GI bleed.  There was a question for rash due to medications.  Cefazolin was changed to vancomycin.  Dermatology recommended skin biopsy and the patient was transferred to [facility] as requested.  He also had an endoscopy that shows a single ulcer in the fundus of the stomach reflecting maintenance.  Placed 3 clips successfully.  He was continued on PPI twice a day.  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  Both show FDG avidity on PET scan.  The discussion about removing the ICD lead was performed but this could not provide optimal source control due to inoperable aortic arch infection.  During this admission he developed acute respiratory distress requiring  MICU evaluation/pulmonary edema.  He responded very well with aggressive diuresis.  He was discharged home on diuretic  therapy.  He also received 2 units of PRBC.       PFT   April 2024, FEV1 91% FEV1/FVC 0.62, no significant response to bronchodilator  January 2022, FEV1 57%, FEV1/FVC 0.57  Echo done at  January 2024, EF 55%, diastolic dysfunction, RVSP 50-55.  And valvular heart disease        Patient had a device interrogation remotely 8/20/2025.  Shows dual-chamber ICD [employer] with battery longevity 3.5 years.  Had 2 episodes of atrial fibrillation 9 hours of duration.  Rate average 104 bpm.     Subsequent transmission in April 14, 2025 shows 1 episode of ventricular tachycardia on April 13, 2025 at 2:20 AM.  Rate 222 bpm.  ATP x 1 terminated the arrhythmia.     Subsequent transmission in April 7, 2025 shows 1 episode of ventricular tachycardia at 4:49 AM.  A rate of 225 bpm.  Terminated with ATP x 2.  Both episodes asymptomatic.    1/12/2025 PAOLO        1. The left ventricular systolic function is normal, with a visually estimated ejection fraction of 60-65%.   2. There is normal right ventricular global systolic function.   3. The left atrium is moderately dilated.   4. The right atrium is moderately dilated.   5. Moderate mitral valve regurgitation.   6. Moderate tricuspid regurgitation.   7. No left atrial thrombus.   8. No vegetations seen on the valvular structures or the pacemaker leads.   9. There is plaque visualized in the descending aorta.        2/19/2025 TTE ([facility])  - The left ventricle is normal in size. Left ventricular systolic function is   normal. EF = 58 ± 5% (2D biplane) Prior EF was reported as 57%.   - The right ventricle is normal in size. Right ventricular systolic function is   normal.   - The visualized aorta is dilated with a maximal dimension of 4.6 cm.   - There is moderate (2+) mitral holosystolic valve regurgitation.         Due to evidence of ventricular arrhythmias he was scheduled for a cardiac catheterization.  He was also reloaded with amiodarone therapy       Cardiac cath  05/2025  Findings of the [facility] revealed patent previous stent in the LAD, mild diffuse disease elsewhere and an LVEF of 40%. Medical management is advised.  Device interrogation today during this evaluation shows 3 episodes of nonsustained ventricular tachycardia since the last device interrogation in April 2025.  No sustained arrhythmias.    Patient states that he was doing well until July 20, 2025.  He was in the morning at the kitchen and then he started having episodes of dizziness.    Device interrogation shows 2 episodes of ventricular tachycardia 1 terminated with ATP the other one self terminated.  No ICD therapies rate up to 238 bpm.    EKG done today shows atrial paced rhythm at a rate of 60 bpm QRS duration 110 ms QT corrected 476 ms.  Rhythm strip shows the same pattern.      Past Medical History  Medical History[1]    Social History  Social History[2]    Family History   Family History[3]     Allergies:  RX Allergies[4]     Outpatient Medications:  Current Outpatient Medications   Medication Instructions    albuterol (Proventil HFA) 90 mcg/actuation inhaler 1 puff, Every 4 hours PRN    amiodarone (Pacerone) 200 mg tablet Take 1 tablet twice daily for 7 days and then 1 tablet  once daily    aspirin 81 mg, Once daily (morning) M-F (5 days a week)    atorvastatin (LIPITOR) 40 mg, oral, Daily    bumetanide (Bumex) 1 mg tablet TAKE ONE TABLET EVERY OTHER DAY    clopidogrel (PLAVIX) 75 mg, oral, Daily    docusate sodium (COLACE) 100 mg, 2 times daily    doxycycline (MONODOX) 100 mg, 2 times daily    ezetimibe (ZETIA) 10 mg, oral, Daily    ipratropium-albuteroL (Duo-Neb) 0.5-2.5 mg/3 mL nebulizer solution 3 mL, 4 times daily RT    isosorbide mononitrate ER (IMDUR) 30 mg, oral, Daily, 1 tablet daily    metoprolol succinate XL (TOPROL-XL) 100 mg, oral, Daily    montelukast (SINGULAIR) 10 mg, oral, Every evening    nitroglycerin (Nitrostat) 0.4 mg SL tablet DISSOLVE 1 TABLET UNDER THE TONGUE EVERY 5 MINUTES IF  NEEDED FOR CHEST PAIN AS DIRECTED    oxygen (O2) 2 L/min, inhalation, Every 24 hours    polyethylene glycol (GLYCOLAX, MIRALAX) 17 g, Daily    spironolactone (ALDACTONE) 25 mg, oral, Every other day    tamsulosin (FLOMAX) 0.4 mg, oral, Daily    Trelegy Ellipta 200-62.5-25 mcg blister with device           REVIEW OF SYSTEMS  ROS      VITALS  Vitals:    08/04/25 1157   BP: 120/60   Pulse: 60       PHYSICAL EXAM  Constitutional:       Appearance: Healthy appearance. Not in distress.   Neck:      Vascular: No JVR. JVD normal.   Pulmonary:      Effort: Pulmonary effort is normal.      Breath sounds: Normal breath sounds. No wheezing. No rhonchi. No rales.   Chest:      Chest wall: Not tender to palpatation.   Cardiovascular:      PMI at left midclavicular line. Normal rate. Regular rhythm. Normal S1. Normal S2.       Murmurs: There is no murmur.      No gallop.  No click. No rub.      Comments: Device left pectoral area. No hematoma or infection noted.     Pulses:     Intact distal pulses.   Edema:     Peripheral edema absent.   Abdominal:      General: Bowel sounds are normal.      Palpations: Abdomen is soft.      Tenderness: There is no abdominal tenderness.   Musculoskeletal: Normal range of motion.         General: No tenderness. Skin:     General: Skin is warm and dry.   Neurological:      General: No focal deficit present.      Mental Status: Alert and oriented to person, place and time.         ASSESSMENT AND PLAN      CLINICAL IMPRESSION:   1. Status post cardiac arrest. Status post ICD therapy, device interrogation reviewed with patient during this evaluation  2. Ischemic cardiomyopathy. Stable  3. Congestive heart failure, New York Heart Association class II. Compensated  4. Persistent atrial fibrillation. Device interrogation reviewed during this evaluation with patient  5. High-risk medication.  Of sotalol therapy.  Now on amiodarone  6. Ventricular tachycardia. Recurrence of this arrhythmia by device  interrogation by episode in February 2022. Also recurrent of ventricular tachycardia by device interrogation 6 April 2025 terminated with ATP. Plan discussed with patient during this office visit  7. Coronary artery disease with percutaneous coronary interventions  in the past.  Recent cardiac catheterization in May 2025.  Medical therapy was recommended.  8. Hypertension. Controlled  9. Hyperlipidemia. Stable  10. Chronic obstructive pulmonary disease. No recurrence  11. Long-term anticoagulation therapy with Eliquis. No evidence of bleeding  12. Shortness of breath with recent hospitalization in December 2021 due to COPD exacerbationâ€“pneumonia, now with oxygen therapy followed by pulmonary service. Stable  13.  Syncope associated with ventricular arrhythmias.    Plan recommendation    Patient continues having episodes of ventricular tachycardia.  Patient will be reloaded with amiodarone.    Patient will start amiodarone 200 mg 2 tablets twice a day for 3 days and then 1 tablet twice a day for 5 days and then 1 tablet daily.    Follow device clinic as scheduled.    Follow-up in  my office seen for 6 weeks or sooner if needed.    Will obtain a device interrogation prior to next office visit.    Get CMP and TSH every 6 months for high risk medication.    Get complete PFTs annually for high risk medication.    Follow device clinic as scheduled.    If he has recurrence of ventricular arrhythmias, he may benefit of mexiletine or ablation therapy.    Risk factor modification and lifestyle modification discussed with patient. Diet , exercise and hydration discussed with patient.    I have personally review with patient during this office visit, laboratory data, echocardiogram results, stress test results, Holter-event monitor results prior and after the last electrophysiology visit. All questions has been answered.    Please excuse any errors in grammar or translation related to this dictation.  Voice recognition  software was utilized to prepare this document.      I, Dr. Bassett, personally performed the services described in the documentation as scribed by the nurse in my presence, and confirm it is both accurate and complete.   Scribe Attestation  By signing my name below, I, Chelita Langley RN  , Scribe   attest that this documentation has been prepared under the direction and in the presence of Joce Bassett MD.           [1]   Past Medical History:  Diagnosis Date    Allergic     Arthritis     Cancer (Multi)     Cataract     CHF (congestive heart failure)     COPD (chronic obstructive pulmonary disease) (Multi)     Encounter for follow-up examination after completed treatment for conditions other than malignant neoplasm 2021    Hospital discharge follow-up    Heart disease     Hypertension     Ischemic cardiomyopathy     Ischemic cardiomyopathy with implantable cardioverter-defibrillator (ICD)    Myocardial infarction (Multi)     Pain in unspecified hip     Joint pain, hip    Personal history of other diseases of the musculoskeletal system and connective tissue     History of low back pain    Personal history of other endocrine, nutritional and metabolic disease     History of hyperlipidemia    Personal history of other specified conditions 2021    History of elevated prostate specific antigen (PSA)    Presence of coronary angioplasty implant and graft     Stented coronary artery    Unspecified atrial flutter (Multi)     Paroxysmal atrial flutter   [2]   Social History  Tobacco Use    Smoking status: Former     Current packs/day: 0.00     Average packs/day: 1.5 packs/day for 15.0 years (22.5 ttl pk-yrs)     Types: Cigarettes     Quit date:      Years since quittin.6     Passive exposure: Never    Smokeless tobacco: Never   Vaping Use    Vaping status: Never Used   Substance Use Topics    Alcohol use: Yes     Alcohol/week: 3.0 standard drinks of alcohol     Types: 3 Standard drinks or equivalent  per week     Comment: occassional    Drug use: Never   [3]   Family History  Problem Relation Name Age of Onset    Liver disease Mother Ysabel. Campbell Jones     Arthritis Mother Kelle Jones     Coronary artery disease Father Guicho Jones     Atrial fibrillation Father Guicho Jones     Heart disease Father Guicho Jones     Cancer Sister Sally Jones    [4]   Allergies  Allergen Reactions    Levofloxacin Palpitations     Rapid Heart Rate - Severe per pt.    Cefazolin Rash     rash, purpuric, leucocytoclastic vasculitis

## 2025-08-04 NOTE — PATIENT INSTRUCTIONS
Start taking amiodarone 2 tablets twice daily for 3 days and then 1 tablet  twice daily for 5 days then take 1 tablet  once daily thereafter    Dr. Bassett will see you again in 4 weeks for a Follow-up appointment.       Keep all device check appointments as scheduled for every 3 months  [facility] - [phone]       DID YOU KNOW  We have a pharmacy here in the [facility].  They can fill all prescriptions, not just cardiac medications.  Prescriptions from other pharmacies can easily be transferred to the  pharmacy by the  pharmacist on site.   pharmacies offer FREE HOME DELIVERY on medications to anywhere in [place]. They can sync your medications. Typically prescriptions can be ready in 10 - 15 minutes. If pharmacy is unable to fill your  prescription or if cost is more than your paying now the Pharmacist can easily transfer back to your Pharmacy of choice. Pharmacy phone # [phone].   Please bring all medicines, vitamins, and herbal supplements with you in original bottles to every appointment  Prescriptions will not be filled unless you are compliant with your follow up appointments or have a follow up appointment scheduled as per instruction of your physician. Refills should be requested at the time of your visit.

## 2025-08-20 DIAGNOSIS — R78.81 BACTEREMIA: ICD-10-CM

## 2025-08-22 DIAGNOSIS — R78.81 BACTEREMIA: ICD-10-CM

## 2025-08-29 DIAGNOSIS — R78.81 BACTEREMIA: ICD-10-CM

## 2025-09-08 ENCOUNTER — APPOINTMENT (OUTPATIENT)
Dept: CARDIOLOGY | Facility: CLINIC | Age: 85
End: 2025-09-08
Payer: MEDICARE

## 2025-09-23 ENCOUNTER — APPOINTMENT (OUTPATIENT)
Dept: CARDIOLOGY | Facility: CLINIC | Age: 85
End: 2025-09-23
Payer: MEDICARE

## 2025-12-08 ENCOUNTER — APPOINTMENT (OUTPATIENT)
Dept: PRIMARY CARE | Facility: CLINIC | Age: 85
End: 2025-12-08
Payer: MEDICARE

## (undated) DEVICE — VIAL ACCESS CANNULA,SMART TIP: Brand: MONOJECT

## (undated) DEVICE — RADIFOCUS GLIDEWIRE: Brand: GLIDEWIRE

## (undated) DEVICE — SYRINGE ANGIO 150ML CAP LINDEN LUER HANDI FILL STRW

## (undated) DEVICE — INJECTOR CNTRST 48 IN NAMIC

## (undated) DEVICE — SUTURE VCRL + SZ 3-0 L18IN ABSRB UD PS-2 3/8 CIR REV CUT VCP497H

## (undated) DEVICE — FOGARTY - HYDRAGRIP SURGICAL - CLAMP INSERTS: Brand: FOGARTY SAFEJAW

## (undated) DEVICE — KIT DRAIN 100CC W/7MM PERF: Brand: CARDINAL HEALTH

## (undated) DEVICE — TUBING, SUCTION, 1/4" X 10', STRAIGHT: Brand: MEDLINE

## (undated) DEVICE — CATHETER, OPTITORQUE, 5FR, JACKY, 3.5/ 2H/110CM, CURVED

## (undated) DEVICE — 3.0MM PRECISION NEURO (MATCH HEAD)

## (undated) DEVICE — CORD BPLR 2 PIN FLAT AND RND DISP

## (undated) DEVICE — SYRINGE MED 30ML STD CLR PLAS LUERLOCK TIP N CTRL DISP

## (undated) DEVICE — Device: Brand: MEDEX

## (undated) DEVICE — DECANTER BAG 9": Brand: MEDLINE INDUSTRIES, INC.

## (undated) DEVICE — INTENDED USED TO PROTECT, TAG AND HELP LOCATED SUTURES DURING SURGERY: Brand: STERION®SUTURE AID BOOTIES

## (undated) DEVICE — APPLIER LIG CLP M L11IN TI STR RNG HNDL FOR 20 CLP DISP

## (undated) DEVICE — SYRINGE MED 10ML LUERLOCK TIP W/O SFTY DISP

## (undated) DEVICE — COVER LT HNDL BLU PLAS

## (undated) DEVICE — INTENDED FOR TISSUE SEPARATION, AND OTHER PROCEDURES THAT REQUIRE A SHARP SURGICAL BLADE TO PUNCTURE OR CUT.: Brand: BARD-PARKER ® CARBON RIB-BACK BLADES

## (undated) DEVICE — APPLICATOR MEDICATED 26 CC SOLUTION HI LT ORNG CHLORAPREP

## (undated) DEVICE — ELECTROSURGICAL PENCIL BUTTON SWITCH E-Z CLEAN COATED BLADE ELECTRODE 10 FT (3 M) CORD HOLSTER: Brand: MEGADYNE

## (undated) DEVICE — 3M™ TEGADERM™ TRANSPARENT FILM DRESSING FRAME STYLE, 1624W, 2-3/8 IN X 2-3/4 IN (6 CM X 7 CM), 100/CT 4CT/CASE: Brand: 3M™ TEGADERM™

## (undated) DEVICE — GLOVE ORANGE PI 7   MSG9070

## (undated) DEVICE — SUTURE VCRL SZ 4-0 L18IN ABSRB UD L19MM PS-2 3/8 CIR PRIM J496H

## (undated) DEVICE — CATHETER IV 16GA 205ML/MIN L1.77IN OD1.74MM ID1.359MM GRY

## (undated) DEVICE — CODMAN® SURGICAL PATTIES 1/2" X 1/2" (1.27CM X 1.27CM): Brand: CODMAN®

## (undated) DEVICE — CATHETER EPI 20GA L40IN POLYAMIDE CLS TIP W/ CONN THREADING

## (undated) DEVICE — TUBING, MANIFOLD, LOW PRESSURE

## (undated) DEVICE — WAX SURG 2.5GM HEMSTAT BNE BEESWAX PARAFFIN ISO PALMITATE

## (undated) DEVICE — LOOP VES L12IN DIA0.066IN WHT SIL THN WALL AIR CUSH

## (undated) DEVICE — COVER,TABLE,44X90,STERILE: Brand: MEDLINE

## (undated) DEVICE — ELECTRODE PT RET AD L9FT HI MOIST COND ADH HYDRGEL CORDED

## (undated) DEVICE — 3M™ STERI-DRAPE™ INCISE DRAPE, XL 1051: Brand: STERI-DRAPE™

## (undated) DEVICE — 3M™ STERI-STRIP™ REINFORCED ADHESIVE SKIN CLOSURES, R1547, 1/2 IN X 4 IN (12 MM X 100 MM), 6 STRIPS/ENVELOPE: Brand: 3M™ STERI-STRIP™

## (undated) DEVICE — TOTAL TRAY, DB, 100% SILI FOLEY, 16FR 10: Brand: MEDLINE

## (undated) DEVICE — CATHETER PTCA BLLN L4CM INFL 10-37MM CATH L90CM MOLDING

## (undated) DEVICE — PACK,LAPAROTOMY,NO GOWNS: Brand: MEDLINE

## (undated) DEVICE — MARKER SURG SKIN GENTIAN VLT REG TIP W/ 6IN RUL

## (undated) DEVICE — SECTO® DISSECTOR, KITTNER, 5/16 IN DIAMETER, (5 EA/POUCH, 24 POUCHES/PK, 4 PK/BX): Brand: SYMMETRY SURGICAL

## (undated) DEVICE — CATHETER ANGIO AD PED 5FR L65CM GWIRE 0.035IN PERIPH STR

## (undated) DEVICE — GAUZE,SPONGE,4"X4",16PLY,XRAY,STRL,LF: Brand: MEDLINE

## (undated) DEVICE — DRAPE SURG W41XL74IN CLR FULL SZ C ARM 3 ADH POLY STRP E

## (undated) DEVICE — SET BLD COLL 21GA TB L12IN NDL L0.75IN WNG GRN SIMP EZ TO

## (undated) DEVICE — 3M™ STERI-DRAPE™ INSTRUMENT POUCH 1018: Brand: STERI-DRAPE™

## (undated) DEVICE — COUNTER NDL 40 COUNT HLD 70 FOAM BLK ADH W/ MAG

## (undated) DEVICE — TOWEL,OR,DSP,ST,BLUE,STD,4/PK,20PK/CS: Brand: MEDLINE

## (undated) DEVICE — GAUZE,SPONGE,4"X4",12PLY,STERILE,LF,2'S: Brand: MEDLINE

## (undated) DEVICE — CHLORAPREP 26ML ORANGE

## (undated) DEVICE — 3M™ IOBAN™ 2 ANTIMICROBIAL INCISE DRAPE 6650EZ: Brand: IOBAN™ 2

## (undated) DEVICE — YANKAUER,BULB TIP,W/O VENT,RIGID,STERILE: Brand: MEDLINE

## (undated) DEVICE — SUTURE VCRL + SZ 2-0 L18IN ABSRB VLT OS-4 L22MM 1/2 CIR REV VCP706T

## (undated) DEVICE — GOWN,AURORA,NONREINFORCED,LARGE: Brand: MEDLINE

## (undated) DEVICE — GUIDEWIRE VASC L180CM DIA0.035IN TIP L7CM PTFE S STL STR

## (undated) DEVICE — GUIDEWIRE, J3 FIXED CORE, 0.035 IN X 260 CM, EXCHANGE

## (undated) DEVICE — SHEATH, GLIDESHEATH, SLENDER, 6FR 10CM

## (undated) DEVICE — APPLIER CLP L L13IN TI MULT RNG HNDL 20 CLP STR LIGACLP

## (undated) DEVICE — SUTURE NABSORBABLE PRONOVA L24IN SZ 5-0 BLU C-1 L13MM 3/8 CIR REV PN3725H

## (undated) DEVICE — NEEDLE HYPO 22GA L1 1/2IN PIVOTING SHLD FOR LUERLOCK SYR

## (undated) DEVICE — NEEDLE ANGIO COURNAND THN 3 PART 18GAX5.1CM MAJESTIK

## (undated) DEVICE — TTL1LYR 16FR10ML 100%SIL TMPST TR: Brand: MEDLINE

## (undated) DEVICE — Z DISCONTINUED PER MEDLINE USE 2741942 DRESSING AQUACEL 6 IN ALG W9XL15CM SIL CVR WTRPRF VIR BACT BARR ANTIMIC

## (undated) DEVICE — CATHETER, DIAGNOSTIC, AMPLATZ, 5 FR, AR MOD

## (undated) DEVICE — NEPTUNE E-SEP SMOKE EVACUATION PENCIL, COATED, 70MM BLADE, PUSH BUTTON SWITCH: Brand: NEPTUNE E-SEP

## (undated) DEVICE — 4-PORT MANIFOLD: Brand: NEPTUNE 2

## (undated) DEVICE — SUTURE VCRL + SZ 2-0 L36IN ABSRB UD L36MM CT-1 1/2 CIR VCP945H

## (undated) DEVICE — LABEL MED MINI W/ MARKER

## (undated) DEVICE — CATHETER ANGIO 5FR L65CM 0.038IN BERN SEL SFT RADPQ TIP HI

## (undated) DEVICE — PATIENT RETURN ELECTRODE, SINGLE-USE, CONTACT QUALITY MONITORING, ADULT, WITH 9FT CORD, FOR PATIENTS WEIGING OVER 33LBS. (15KG): Brand: MEGADYNE

## (undated) DEVICE — SPONGE,LAP,18"X18",DLX,XR,ST,5/PK,40/PK: Brand: MEDLINE

## (undated) DEVICE — CATHETER DIAG 5FR L65CM ID0.052IN 20 BND 5 PRT PGTL VES SZ

## (undated) DEVICE — SYRINGE IRRIG 60ML SFT PLIABLE BLB EZ TO GRP 1 HND USE W/

## (undated) DEVICE — SHEATH INTRO 6FR L11CM HEMSTAT VLV DIL SIDE PRT RADPQ W/O

## (undated) DEVICE — BAND, VASCULAR, RADIAL HEMOSTAT, REGULAR 24CM

## (undated) DEVICE — SHEET,DRAPE,53X77,STERILE: Brand: MEDLINE

## (undated) DEVICE — GLOVE SURG SZ 85 L12IN FNGR THK94MIL TRNSLUC YEL LTX

## (undated) DEVICE — SUCKER CARD L9IN 0.25IN CONN MINI RIG SFT TIP W/ SIDE PRT

## (undated) DEVICE — GAUZE,SPONGE,2"X2",8PLY,STERILE,LF,2'S: Brand: MEDLINE

## (undated) DEVICE — 35 ML SYRINGE LUER-LOCK TIP: Brand: MONOJECT